# Patient Record
Sex: FEMALE | Race: BLACK OR AFRICAN AMERICAN | Employment: OTHER | URBAN - METROPOLITAN AREA
[De-identification: names, ages, dates, MRNs, and addresses within clinical notes are randomized per-mention and may not be internally consistent; named-entity substitution may affect disease eponyms.]

---

## 2017-01-19 ENCOUNTER — OFFICE VISIT (OUTPATIENT)
Dept: FAMILY MEDICINE CLINIC | Age: 67
End: 2017-01-19

## 2017-01-19 ENCOUNTER — HOSPITAL ENCOUNTER (OUTPATIENT)
Dept: LAB | Age: 67
Discharge: HOME | End: 2017-01-19
Payer: COMMERCIAL

## 2017-01-19 VITALS
HEIGHT: 66 IN | HEART RATE: 87 BPM | RESPIRATION RATE: 16 BRPM | BODY MASS INDEX: 38.09 KG/M2 | WEIGHT: 237 LBS | SYSTOLIC BLOOD PRESSURE: 160 MMHG | OXYGEN SATURATION: 99 % | DIASTOLIC BLOOD PRESSURE: 74 MMHG | TEMPERATURE: 98 F

## 2017-01-19 DIAGNOSIS — K29.70 GASTRITIS WITHOUT BLEEDING, UNSPECIFIED CHRONICITY, UNSPECIFIED GASTRITIS TYPE: ICD-10-CM

## 2017-01-19 DIAGNOSIS — M25.561 CHRONIC PAIN OF BOTH KNEES: ICD-10-CM

## 2017-01-19 DIAGNOSIS — Z13.39 SCREENING FOR ALCOHOLISM: ICD-10-CM

## 2017-01-19 DIAGNOSIS — Z13.5 GLAUCOMA SCREENING: ICD-10-CM

## 2017-01-19 DIAGNOSIS — E78.00 HYPERCHOLESTEREMIA: Chronic | ICD-10-CM

## 2017-01-19 DIAGNOSIS — Z71.89 ADVANCE CARE PLANNING: ICD-10-CM

## 2017-01-19 DIAGNOSIS — Z00.00 MEDICARE ANNUAL WELLNESS VISIT, SUBSEQUENT: Primary | ICD-10-CM

## 2017-01-19 DIAGNOSIS — G40.909 SEIZURE DISORDER (HCC): Chronic | ICD-10-CM

## 2017-01-19 DIAGNOSIS — Z86.73 HISTORY OF CVA (CEREBROVASCULAR ACCIDENT): Chronic | ICD-10-CM

## 2017-01-19 DIAGNOSIS — M25.562 CHRONIC PAIN OF BOTH KNEES: ICD-10-CM

## 2017-01-19 DIAGNOSIS — Z00.00 ROUTINE GENERAL MEDICAL EXAMINATION AT A HEALTH CARE FACILITY: ICD-10-CM

## 2017-01-19 DIAGNOSIS — I10 ESSENTIAL HYPERTENSION: ICD-10-CM

## 2017-01-19 DIAGNOSIS — G89.29 CHRONIC PAIN OF BOTH KNEES: ICD-10-CM

## 2017-01-19 DIAGNOSIS — Z12.11 COLON CANCER SCREENING: ICD-10-CM

## 2017-01-19 DIAGNOSIS — K21.9 GASTROESOPHAGEAL REFLUX DISEASE WITHOUT ESOPHAGITIS: ICD-10-CM

## 2017-01-19 DIAGNOSIS — E11.40 TYPE 2 DIABETES MELLITUS WITH DIABETIC NEUROPATHY, UNSPECIFIED LONG TERM INSULIN USE STATUS: ICD-10-CM

## 2017-01-19 LAB
CHOLEST SERPL-MCNC: 229 MG/DL
EST. AVERAGE GLUCOSE BLD GHB EST-MCNC: 246 MG/DL
HBA1C MFR BLD: 10.2 % (ref 4.2–5.6)
HDLC SERPL-MCNC: 98 MG/DL (ref 40–60)
HDLC SERPL: 2.3 {RATIO} (ref 0–5)
LDLC SERPL CALC-MCNC: 97 MG/DL (ref 0–100)
LIPID PROFILE,FLP: ABNORMAL
TRIGL SERPL-MCNC: 170 MG/DL (ref ?–150)
VALPROATE SERPL-MCNC: 10 UG/ML (ref 50–100)
VLDLC SERPL CALC-MCNC: 34 MG/DL

## 2017-01-19 PROCEDURE — 80061 LIPID PANEL: CPT | Performed by: INTERNAL MEDICINE

## 2017-01-19 PROCEDURE — 83036 HEMOGLOBIN GLYCOSYLATED A1C: CPT | Performed by: INTERNAL MEDICINE

## 2017-01-19 PROCEDURE — 80164 ASSAY DIPROPYLACETIC ACD TOT: CPT | Performed by: INTERNAL MEDICINE

## 2017-01-19 PROCEDURE — 36415 COLL VENOUS BLD VENIPUNCTURE: CPT | Performed by: INTERNAL MEDICINE

## 2017-01-19 RX ORDER — ISOSORBIDE MONONITRATE 30 MG/1
30 TABLET, EXTENDED RELEASE ORAL DAILY
Qty: 90 TAB | Refills: 1 | Status: SHIPPED | OUTPATIENT
Start: 2017-01-19 | End: 2017-03-27 | Stop reason: SDUPTHER

## 2017-01-19 RX ORDER — LOSARTAN POTASSIUM 50 MG/1
50 TABLET ORAL 2 TIMES DAILY
Qty: 180 TAB | Refills: 1 | Status: SHIPPED | OUTPATIENT
Start: 2017-01-19 | End: 2017-03-27 | Stop reason: SDUPTHER

## 2017-01-19 RX ORDER — HYDROCHLOROTHIAZIDE 25 MG/1
25 TABLET ORAL DAILY
Qty: 90 TAB | Refills: 1 | Status: SHIPPED | OUTPATIENT
Start: 2017-01-19 | End: 2017-03-27 | Stop reason: SDUPTHER

## 2017-01-19 RX ORDER — PANTOPRAZOLE SODIUM 40 MG/1
40 TABLET, DELAYED RELEASE ORAL DAILY
Qty: 90 TAB | Refills: 1 | Status: SHIPPED | OUTPATIENT
Start: 2017-01-19 | End: 2017-03-27 | Stop reason: SDUPTHER

## 2017-01-19 RX ORDER — DIVALPROEX SODIUM 500 MG/1
500 TABLET, EXTENDED RELEASE ORAL 2 TIMES DAILY
Qty: 180 TAB | Refills: 3 | Status: SHIPPED | OUTPATIENT
Start: 2017-01-19 | End: 2017-08-10 | Stop reason: SDUPTHER

## 2017-01-19 RX ORDER — ATORVASTATIN CALCIUM 40 MG/1
40 TABLET, FILM COATED ORAL DAILY
Qty: 90 TAB | Refills: 1 | Status: SHIPPED | OUTPATIENT
Start: 2017-01-19 | End: 2017-03-27 | Stop reason: SDUPTHER

## 2017-01-19 RX ORDER — CARVEDILOL 6.25 MG/1
6.25 TABLET ORAL 2 TIMES DAILY
Qty: 60 TAB | Refills: 3 | Status: SHIPPED | OUTPATIENT
Start: 2017-01-19 | End: 2017-03-27 | Stop reason: DRUGHIGH

## 2017-01-19 RX ORDER — INSULIN LISPRO 100 [IU]/ML
INJECTION, SOLUTION INTRAVENOUS; SUBCUTANEOUS
Qty: 2 VIAL | Refills: 5
Start: 2017-01-19 | End: 2017-03-01 | Stop reason: SDUPTHER

## 2017-01-19 RX ORDER — ASPIRIN AND DIPYRIDAMOLE 25; 200 MG/1; MG/1
1 CAPSULE, EXTENDED RELEASE ORAL 2 TIMES DAILY
Qty: 180 CAP | Refills: 1 | Status: SHIPPED | OUTPATIENT
Start: 2017-01-19 | End: 2017-12-04 | Stop reason: SDUPTHER

## 2017-01-19 NOTE — MR AVS SNAPSHOT
Visit Information Date & Time Provider Department Dept. Phone Encounter #  
 1/19/2017  2:15 PM Jacki William, [address] [phone] [id number] Follow-up Instructions Return in 2 months (on 3/19/2017). Upcoming Health Maintenance Date Due  
 EYE EXAM RETINAL OR DILATED Q1 3/3/2016 FOOT EXAM Q1 11/23/2016 HEMOGLOBIN A1C Q6M 4/24/2017 LIPID PANEL Q1 4/29/2017 Pneumococcal 19-64 Highest Risk (3 of 3 - PCV13) 11/10/2017 MICROALBUMIN Q1 1/19/2018 MEDICARE YEARLY EXAM 1/20/2018 PAP AKA CERVICAL CYTOLOGY 10/27/2018 BREAST CANCER SCRN MAMMOGRAM 12/5/2018 COLONOSCOPY 10/27/2025 DTaP/Tdap/Td series (2 - Td) 11/23/2025 Allergies as of 1/19/2017  Review Complete On: 1/19/2017 By: Alice Almaraz LPN Severity Noted Reaction Type Reactions Metformin  10/22/2015    Other (comments) Patient states that she is not allergic to metformin. Current Immunizations  Reviewed on 11/15/2016 Name Date Influenza Vaccine 10/17/2016, 11/23/2015 10:18 AM  
 Influenza Vaccine (Madin Gita Canine Kidney) PF 2/4/2015 10:00 AM  
 Pneumococcal Polysaccharide (PPSV-23) 2/4/2015  9:57 AM  
 Tdap 11/23/2015 10:19 AM  
  
 Not reviewed this visit You Were Diagnosed With   
  
 Codes Comments Medicare annual wellness visit, subsequent    -  Primary ICD-10-CM: Z00.00 ICD-9-CM: V70.0 Essential hypertension     ICD-10-CM: I10 
ICD-9-CM: 401.9 Hypercholesteremia     ICD-10-CM: E78.00 ICD-9-CM: 272.0 Gastritis without bleeding, unspecified chronicity, unspecified gastritis type     ICD-10-CM: K29.70 ICD-9-CM: 535.50 Type 2 diabetes mellitus with diabetic neuropathy, unspecified long term insulin use status (HCC)     ICD-10-CM: E11.40 ICD-9-CM: 250.60, 357.2 Seizure disorder ([facility] [address]     ICD-10-CM: G40.909 ICD-9-CM: 345.90  History of CVA (cerebrovascular accident)     ICD-10-CM: Z86.73 
ICD-9-CM: V12.54   
 Gastroesophageal reflux disease without esophagitis     ICD-10-CM: K21.9 ICD-9-CM: 530.81 Advance care planning     ICD-10-CM: Z71.89 ICD-9-CM: V65.49 Glaucoma screening     ICD-10-CM: Z13.5 ICD-9-CM: V80.1 Routine general medical examination at a health care facility     ICD-10-CM: Z00.00 ICD-9-CM: V70.0 Screening for alcoholism     ICD-10-CM: Z13.89 ICD-9-CM: V79.1 Colon cancer screening     ICD-10-CM: Z12.11 ICD-9-CM: V76.51 Chronic pain of both knees     ICD-10-CM: M25.561, M25.562, G89.29 ICD-9-CM: 719.46, 338.29 Vitals BP Pulse Temp Resp Height(growth percentile) Weight(growth percentile) 160/74 87 98 °F (36.7 °C) (Oral) 16 5' 6\" (1.676 m) 237 lb (107.5 kg) SpO2 BMI OB Status Smoking Status 99% 38.25 kg/m2 Postmenopausal Never Smoker Vitals History BMI and BSA Data Body Mass Index Body Surface Area  
 38.25 kg/m 2 2.24 m 2 Preferred Pharmacy Pharmacy Name [facility] [address], [address] [place] [address] [phone] Your Updated Medication List  
  
   
This list is accurate as of: 1/19/17  2:56 PM.  Always use your most recent med list.  
  
  
  
  
 aspirin-dipyridamole  mg per SR capsule Commonly known as:  AGGRENOX Take 1 Cap by mouth two (2) times a day. atorvastatin 40 mg tablet Commonly known as:  LIPITOR Take 1 Tab by mouth daily. carvedilol 6.25 mg tablet Commonly known as:  May Hush Take 1 Tab by mouth two (2) times a day. clotrimazole 1 % topical cream  
Commonly known as:  Anu Laina Apply  to affected area two (2) times a day. divalproex  mg ER tablet Commonly known as:  DEPAKOTE ER Take 1 Tab by mouth two (2) times a day. glucose blood VI test strips strip Commonly known as:  FREESTYLE LITE STRIPS Monitor blood sugars twice daily  
  
 hydroCHLOROthiazide 25 mg tablet Commonly known as:  HYDRODIURIL  
 Take 1 Tab by mouth daily. insulin detemir 100 unit/mL injection Commonly known as:  LEVEMIR  
0.3 mL by SubCUTAneous route every twelve (12) hours. insulin lispro 100 unit/mL injection Commonly known as:  HUMALOG Take  6 units 3   times daily before each meal.  
  
 isosorbide mononitrate ER 30 mg tablet Commonly known as:  IMDUR Take 1 Tab by mouth daily. letrozole 2.5 mg tablet Commonly known as:  [facility] Take 2.5 mg by mouth daily. levoFLOXacin 750 mg tablet Commonly known as:  Amanda Pih Take 1 Tab by mouth daily. losartan 50 mg tablet Commonly known as:  COZAAR Take 1 Tab by mouth two (2) times a day. pantoprazole 40 mg tablet Commonly known as:  PROTONIX Take 1 Tab by mouth daily. SONAFINE Emul topical  
Generic drug:  emollient combination no. 10  
  
 therapeutic multivitamin tablet Commonly known as:  [facility] Take 1 Tab by mouth daily. VITAMIN D3 1,000 unit tablet Generic drug:  cholecalciferol Take 1,000 Units by mouth daily. Prescriptions Sent to Pharmacy Refills  
 hydroCHLOROthiazide (HYDRODIURIL) 25 mg tablet 1 Sig: Take 1 Tab by mouth daily. Class: Normal  
 Pharmacy: [facility] [address], [address] Ph #: [phone] Route: Oral  
 aspirin-dipyridamole (AGGRENOX)  mg per SR capsule 1 Sig: Take 1 Cap by mouth two (2) times a day. Class: Normal  
 Pharmacy: [facility] [address], [address] Ph #: [phone] Route: Oral  
 atorvastatin (LIPITOR) 40 mg tablet 1 Sig: Take 1 Tab by mouth daily. Class: Normal  
 Pharmacy: [facility] [address], [address] Ph #: [phone] Route: Oral  
 isosorbide mononitrate ER (IMDUR) 30 mg tablet 1 Sig: Take 1 Tab by mouth daily. Class: Normal  
 Pharmacy: [facility] [address], [address] Ph #: [phone]  Route: Oral  
 pantoprazole (PROTONIX) 40 mg tablet 1 Sig: Take 1 Tab by mouth daily. Class: Normal  
 Pharmacy: [address], [address] Ph #: [phone] Route: Oral  
 losartan (COZAAR) 50 mg tablet 1 Sig: Take 1 Tab by mouth two (2) times a day. Class: Normal  
 Pharmacy: [address], [address] Ph #: [phone] Route: Oral  
 insulin detemir (LEVEMIR) 100 unit/mL injection 0 Si.3 mL by SubCUTAneous route every twelve (12) hours. Class: Normal  
 Pharmacy: [address], [address] Ph #: [phone] Route: SubCUTAneous  
 divalproex ER (DEPAKOTE ER) 500 mg ER tablet 3 Sig: Take 1 Tab by mouth two (2) times a day. Class: Normal  
 Pharmacy: [address], [address] Ph #: [phone] Route: Oral  
 carvedilol (COREG) 6.25 mg tablet 3 Sig: Take 1 Tab by mouth two (2) times a day. Class: Normal  
 Pharmacy: [address], [address] Ph #: [phone] Route: Oral  
  
We Performed the Following ADVANCE CARE PLANNING FIRST 30 MINS [35359 CPT(R)] REFERRAL TO GASTROENTEROLOGY [GJU60 Custom] REFERRAL TO OPHTHALMOLOGY [REF57 Custom] Follow-up Instructions Return in 2 months (on 3/19/2017). To-Do List   
 2017 Lab:  HEMOGLOBIN A1C WITH EAG   
  
 2017 Lab:  LIPID PANEL   
  
 2017 Lab:  MICROALBUMIN, UR, RAND W/ MICROALBUMIN/CREA RATIO   
  
 2017 Lab:  VALPROIC ACID   
  
 2017 Imaging:  XR KNEE LT MAX 2 VWS   
  
 2017 Imaging:  XR KNEE RT MAX 2 VWS Referral Information Referral ID Referred By Referred To  
  
 [id number] [facility] Not Available Visits Status Start Date End Date 1 New Request 17  If your referral has a status of pending review or denied, additional information will be sent to support the outcome of this decision. Referral ID Referred By Referred To  
 [id number] [facility], Leslie Christiansen MD  
   [id number] [facility] [address] [place] [address] [facility] Phone: [phone] Fax: [phone] Visits Status Start Date End Date 1 New Request 1/19/17 1/19/18 If your referral has a status of pending review or denied, additional information will be sent to support the outcome of this decision. Patient Instructions Medicare Wellness Visit, Female The best way to live healthy is to have a healthy lifestyle by eating a well-balanced diet, exercising regularly, limiting alcohol and stopping smoking. Regular physical exams and screening tests are another way to keep healthy. Preventive exams provided by your health care provider can find health problems before they become diseases or illnesses. Preventive services including immunizations, screening tests, monitoring and exams can help you take care of your own health. All people over age 72 should have a pneumovax  and and a prevnar shot to prevent pneumonia. These are once in a lifetime unless you and your provider decide differently. All people over 65 should have a yearly flu shot and a tetanus vaccine every 10 years. A bone mass density to screen for osteoporosis or thinning of the bones should be done every 2 years after 65. Screening for diabetes mellitus with a blood sugar test should be done every year. Glaucoma is a disease of the eye due to increased ocular pressure that can lead to blindness and it should be done every year by an eye professional. 
 
Cardiovascular screening tests that check for elevated lipids (fatty part of blood) which can lead to heart disease and strokes should be done every 5 years.  
 
Colorectal screening that evaluates for blood or polyps in your colon should be done yearly as a stool test or every five years as a flexible sigmoidoscope or every 10 years as a colonoscopy up to age 76. Breast cancer screening with a mammogram is recommended biennially  for women age 54-69. Screening for cervical cancer with a pap smear and pelvic exam is recommended for women after age 72 years every 2 years up to age 79 or when the provider and patient decide to stop. If there is a history of cervical abnormalities or other increased risk for cancer then the test is recommended yearly. Hepatitis C screening is also recommended for anyone born between [birth year] through [address]. A shingles vaccine is also recommended once in a lifetime after age 61. Your Medicare Wellness Exam is recommended annually. Here is a list of your current Health Maintenance items with a due date: 
Health Maintenance Due Topic Date Due [facility] Eye Exam  03/03/2016 [facility] Diabetic Foot Care  11/23/2016 Introducing [facility] SERVICES! [facility] introduces [employer] patient portal. Now you can access parts of your medical record, email your doctor's office, and request medication refills online. 1. In your internet browser, go to [email or web address] 2. Click on the First Time User? Click Here link in the Sign In box. You will see the New Member Sign Up page. 3. Enter your [employer] Access Code exactly as it appears below. You will not need to use this code after youve completed the sign-up process. If you do not sign up before the expiration date, you must request a new code. · [employer] Access Code: [facility] Expires: 4/19/2017  2:56 PM 
 
4. Enter the last four digits of your Social Security Number (xxxx) and Date of Birth (mm/dd/yyyy) as indicated and click Submit. You will be taken to the next sign-up page. 5. Create a [employer] ID. This will be your [employer] login ID and cannot be changed, so think of one that is secure and easy to remember. 6. Create a [employer] password. You can change your password at any time. 7. Enter your Password Reset Question and Answer. This can be used at a later time if you forget your password. 8. Enter your e-mail address. You will receive e-mail notification when new information is available in [address] 9. Click Sign Up. You can now view and download portions of your medical record. 10. Click the Download Summary menu link to download a portable copy of your medical information. If you have questions, please visit the Frequently Asked Questions section of the [employer] website. Remember, [employer] is NOT to be used for urgent needs. For medical emergencies, dial 911. Now available from your iPhone and Android! Please provide this summary of care documentation to your next provider. Your primary care clinician is listed as Renetta Thacker. If you have any questions after today's visit, please call [phone].

## 2017-01-19 NOTE — ACP (ADVANCE CARE PLANNING)
Advance Care Planning (ACP) Provider Note - Comprehensive     Date of ACP Conversation: 01/19/17  Persons included in Conversation:  patient and family  Length of ACP Conversation in minutes:  16 minutes    Authorized Decision Maker (if patient is incapable of making informed decisions): This person is:            General ACP for ALL Patients with Decision Making Capacity:   Importance of advance care planning, including choosing a healthcare agent to communicate patient's healthcare decisions if patient lost the ability to make decisions, such as after a sudden illness or accident  Understanding of the healthcare agent role was assessed and information provided    Review of Existing Advance Directive:  none exists    For Serious or Chronic Illness:  Understanding of medical condition    Understanding of CPR, goals and expected outcomes, benefits and burdens discussed.     Interventions Provided:  Recommended completion of Advance Directive form after review of ACP materials and conversation with prospective healthcare agent

## 2017-01-19 NOTE — PROGRESS NOTES
This is a Subsequent Medicare Annual Wellness Visit providing Personalized Prevention Plan Services (PPPS) (Performed 12 months after initial AWV and PPPS )    I have reviewed the patient's medical history in detail and updated the computerized patient record. History     Past Medical History   Diagnosis Date    Arthritis     Breast CA ([facility] [address]     Diabetes ([facility] [address]     Hypercholesteremia     Hypertension     Menopause     Psychiatric disorder      DEPRESSION    PUD (peptic ulcer disease)     S/P cardiac cath 02/2015     No angiographic evidence of CAD    Seizures (HCC)      LAST SEIZURE ABOUT 2 MONTHS AGO (NOV 2015)    Stroke ([facility] [address]      x2 with left side deficit      Past Surgical History   Procedure Laterality Date    Hx heent       TONSILLECTOMY    Hx breast lumpectomy Right 1/12/2016     Dr. Carito Albright Partial Mastectomy w./ axillary lymphadenectomy    Hx mastectomy Right 1/26/2016     Dr. Carito Albright Repeat Partial Mastectomy for positive margins    Hx orthopaedic       left arm surg    Ir mediport       Current Outpatient Prescriptions   Medication Sig Dispense Refill    hydroCHLOROthiazide (HYDRODIURIL) 25 mg tablet Take 1 Tab by mouth daily. 90 Tab 1    aspirin-dipyridamole (AGGRENOX)  mg per SR capsule Take 1 Cap by mouth two (2) times a day. 180 Cap 1    atorvastatin (LIPITOR) 40 mg tablet Take 1 Tab by mouth daily. 90 Tab 1    isosorbide mononitrate ER (IMDUR) 30 mg tablet Take 1 Tab by mouth daily. 90 Tab 1    pantoprazole (PROTONIX) 40 mg tablet Take 1 Tab by mouth daily. 90 Tab 1    losartan (COZAAR) 50 mg tablet Take 1 Tab by mouth two (2) times a day. 180 Tab 1    insulin detemir (LEVEMIR) 100 unit/mL injection 0.3 mL by SubCUTAneous route every twelve (12) hours. 1 Vial 0    insulin lispro (HUMALOG) 100 unit/mL injection Take  6 units 3   times daily before each meal. 2 Vial 5    divalproex ER (DEPAKOTE ER) 500 mg ER tablet Take 1 Tab by mouth two (2) times a day. 180 Tab 3    carvedilol (COREG) 6.25 mg tablet Take 1 Tab by mouth two (2) times a day. 60 Tab 3    letrozole (FEMARA) 2.5 mg tablet Take 2.5 mg by mouth daily.  cholecalciferol (VITAMIN D3) 1,000 unit tablet Take 1,000 Units by mouth daily.  glucose blood VI test strips (FREESTYLE LITE STRIPS) strip Monitor blood sugars twice daily 100 Strip 2    therapeutic multivitamin (THERAGRAN) tablet Take 1 Tab by mouth daily.  clotrimazole (LOTRIMIN) 1 % topical cream Apply  to affected area two (2) times a day. 45 g 3    SONAFINE emul topical       levoFLOXacin (LEVAQUIN) 750 mg tablet Take 1 Tab by mouth daily. (Patient not taking: Reported on 11/18/2016) 6 Tab 0     Allergies   Allergen Reactions    Metformin Other (comments)     Patient states that she is not allergic to metformin.       Family History   Problem Relation Age of Onset    Cancer Father [de-identified]     colon    Hypertension Mother     Heart Disease Mother     Diabetes Mother      Social History   Substance Use Topics    Smoking status: Never Smoker    Smokeless tobacco: Never Used    Alcohol use No     Patient Active Problem List   Diagnosis Code    Diabetes mellitus with neuropathy (HCC) E11.40    HTN (hypertension) I10    Chest pain R07.9    Seizure disorder ([address] G40.909    Hypercholesteremia E78.00    History of CVA (cerebrovascular accident) Z80.78    Hearing impairment H91.90    Gastroesophageal reflux disease without esophagitis K21.9    Breast cancer ([facility])-right inferior, 2.2cm,2/18 nodes, + - -, C50.919    DM type 2, not at goal [facility]) E11.9    Altered mental status R41.82    SIRS (systemic inflammatory response syndrome) ([facility]) R65.10    Type 2 diabetes mellitus with diabetic neuropathy, with long-term current use of insulin (HCC) E11.40, Z79.4    Pulmonary nodule, right R91.1    History of breast cancer Z85.3       Depression Risk Factor Screening:     PHQ 2 / 9, over the last two weeks 1/7/2016   Little interest or pleasure in doing things Not at all   Feeling down, depressed or hopeless Not at all   Total Score PHQ 2 0     Alcohol Risk Factor Screening: On any occasion during the past 3 months, have you had more than 3 drinks containing alcohol? No    Do you average more than 7 drinks per week? No      Functional Ability and Level of Safety:     Hearing Loss   none    Activities of Daily Living   Partial assistance. Requires assistance with: ambulation and no ADLs    Fall Risk     Fall Risk Assessment, last 12 mths 1/12/2016   Able to walk? Yes   Fall in past 12 months? No     Abuse Screen   Patient is not abused    Review of Systems   A comprehensive review of systems was negative except for: Musculoskeletal: positive for arthralgias    Physical Examination     Evaluation of Cognitive Function:  Mood/affect:  neutral  Appearance: age appropriate  Family member/caregiver input:     Visit Vitals    /74    Pulse 87    Temp 98 °F (36.7 °C) (Oral)    Resp 16    Ht 5' 6\" (1.676 m)    Wt 237 lb (107.5 kg)    SpO2 99%    BMI 38.25 kg/m2     General:  Alert, cooperative, no distress, appears stated age. Head:  Normocephalic, without obvious abnormality, atraumatic. Eyes:  Conjunctivae/corneas clear. PERRL, EOMs intact. Fundi benign. Ears:  Normal TMs and external ear canals both ears. Nose: Nares normal. Septum midline. Mucosa normal. No drainage or sinus tenderness. Throat: Lips, mucosa, and tongue normal. Teeth and gums normal.   Neck: Supple, symmetrical, trachea midline, no adenopathy, thyroid: no enlargement/tenderness/nodules, no carotid bruit and no JVD. Back:   Symmetric, no curvature. ROM normal. No CVA tenderness. Lungs:   Clear to auscultation bilaterally. Chest wall:  No tenderness or deformity. Heart:  Regular rate and rhythm, S1, S2 normal, no murmur, click, rub or gallop. Abdomen:   Soft, non-tender. Bowel sounds normal. No masses,  No organomegaly. Extremities: Extremities normal, atraumatic, no cyanosis or edema. Pulses: 2+ and symmetric all extremities. Skin: Skin color, texture, turgor normal. No rashes or lesions. Lymph nodes: Cervical, supraclavicular, and axillary nodes normal.   Neurologic: CNII-XII intact. Normal strength, sensation and reflexes throughout. ,uses a cane to walk, slight limp  DM foot exam - sensations intact, hallux deformity, DP , PT weak bilateral       Patient Care Team:  Bill Mclain MD as PCP - General (Internal Medicine)  Abel Vega NP (Nurse Practitioner)  Barb Alcantar MD (General Surgery)  Willis Dose. Nida Cho RN as Nurse Navigator (Oncology)  Kenton Lau MD (Pulmonary Disease)    Advice/Referrals/Counseling   Education and counseling provided:  Are appropriate based on today's review and evaluation    Assessment/Plan   current treatment plan is effective, no change in therapy. Chronic condition    DM - will order labs    Knee pain - order Xrays.

## 2017-01-19 NOTE — PATIENT INSTRUCTIONS

## 2017-01-31 ENCOUNTER — OFFICE VISIT (OUTPATIENT)
Dept: SURGERY | Age: 67
End: 2017-01-31

## 2017-01-31 VITALS
HEART RATE: 85 BPM | OXYGEN SATURATION: 99 % | TEMPERATURE: 97.1 F | DIASTOLIC BLOOD PRESSURE: 83 MMHG | RESPIRATION RATE: 20 BRPM | HEIGHT: 66 IN | BODY MASS INDEX: 38.57 KG/M2 | SYSTOLIC BLOOD PRESSURE: 186 MMHG | WEIGHT: 240 LBS

## 2017-01-31 DIAGNOSIS — Z86.73 HISTORY OF CVA (CEREBROVASCULAR ACCIDENT): Chronic | ICD-10-CM

## 2017-01-31 DIAGNOSIS — I10 ESSENTIAL HYPERTENSION: Chronic | ICD-10-CM

## 2017-01-31 DIAGNOSIS — M79.621 AXILLARY PAIN, RIGHT: ICD-10-CM

## 2017-01-31 DIAGNOSIS — C50.911 MALIGNANT NEOPLASM OF RIGHT FEMALE BREAST, UNSPECIFIED SITE OF BREAST: Primary | ICD-10-CM

## 2017-01-31 DIAGNOSIS — E11.40 TYPE 2 DIABETES MELLITUS WITH DIABETIC NEUROPATHY, WITH LONG-TERM CURRENT USE OF INSULIN (HCC): Chronic | ICD-10-CM

## 2017-01-31 DIAGNOSIS — G40.909 SEIZURE DISORDER (HCC): Chronic | ICD-10-CM

## 2017-01-31 DIAGNOSIS — Z79.4 TYPE 2 DIABETES MELLITUS WITH DIABETIC NEUROPATHY, WITH LONG-TERM CURRENT USE OF INSULIN (HCC): Chronic | ICD-10-CM

## 2017-01-31 NOTE — MR AVS SNAPSHOT
Visit Information Date & Time Provider Department Dept. Phone Encounter #  
 1/31/2017  2:30 PM Johan Hale MD [address] [phone] [id number] Follow-up Instructions Return in about 4 months (around 5/31/2017). Your Appointments 3/20/2017  2:30 PM  
Follow Up with Leslye Granados MD  
[facility] [address] [place] [address]) Appt Note: Return in 2 months (on 3/19/2017). [address] [place] [address] [address]  
[phone]  
  
   
 [address] [place] [facility] 1334  
  
    
 5/30/2017  1:00 PM  
Follow Up with Johan Hale MD  
[address]) Appt Note: 4 month follow up  
 [address] [facility] 405 [facility] 83 [address]  
  
   
 [address] [place] [facility] 88 [address] [place] [address] Upcoming Health Maintenance Date Due  
 EYE EXAM RETINAL OR DILATED Q1 3/3/2016 FOOT EXAM Q1 11/23/2016 HEMOGLOBIN A1C Q6M 7/19/2017 Pneumococcal 19-64 Highest Risk (3 of 3 - PCV13) 11/10/2017 MICROALBUMIN Q1 1/19/2018 LIPID PANEL Q1 1/19/2018 MEDICARE YEARLY EXAM 1/20/2018 PAP AKA CERVICAL CYTOLOGY 10/27/2018 BREAST CANCER SCRN MAMMOGRAM 12/5/2018 COLONOSCOPY 10/27/2025 DTaP/Tdap/Td series (2 - Td) 11/23/2025 Allergies as of 1/31/2017  Review Complete On: 1/31/2017 By: Johan Hale MD  
  
 Severity Noted Reaction Type Reactions Metformin  10/22/2015    Other (comments) Patient states that she is not allergic to metformin. Current Immunizations  Reviewed on 11/15/2016 Name Date Influenza Vaccine 10/17/2016, 11/23/2015 10:18 AM  
 Influenza Vaccine (Madin Gita Canine Kidney) PF 2/4/2015 10:00 AM  
 Pneumococcal Polysaccharide (PPSV-23) 2/4/2015  9:57 AM  
 Tdap 11/23/2015 10:19 AM  
  
 Not reviewed this visit You Were Diagnosed With   
  
 Codes Comments Malignant neoplasm of right female breast, unspecified site of breast ([facility].)    -  Primary ICD-10-CM: C50.911 ICD-9-CM: 174.9 Type 2 diabetes mellitus with diabetic neuropathy, with long-term current use of insulin (HCC)     ICD-10-CM: E11.40, Z79.4 ICD-9-CM: 250.60, 357.2, V58.67 Essential hypertension     ICD-10-CM: I10 
ICD-9-CM: 401.9 Seizure disorder ([facility].)     ICD-10-CM: G40.909 ICD-9-CM: 345.90 History of CVA (cerebrovascular accident)     ICD-10-CM: Z86.73 
ICD-9-CM: V12.54 Vitals BP Pulse Temp Resp Height(growth percentile) Weight(growth percentile) 186/83 (BP 1 Location: Left arm, BP Patient Position: Sitting) 85 97.1 °F (36.2 °C) (Oral) 20 5' 6\" (1.676 m) 240 lb (108.9 kg) SpO2 BMI OB Status Smoking Status 99% 38.74 kg/m2 Postmenopausal Never Smoker BMI and BSA Data Body Mass Index Body Surface Area 38.74 kg/m 2 2.25 m 2 Preferred Pharmacy Pharmacy Name [facility] [address], [address] [place], [place] [address]erna [phone] Your Updated Medication List  
  
   
This list is accurate as of: 1/31/17  3:01 PM.  Always use your most recent med list.  
  
  
  
  
 aspirin-dipyridamole  mg per SR capsule Commonly known as:  AGGRENOX Take 1 Cap by mouth two (2) times a day. atorvastatin 40 mg tablet Commonly known as:  LIPITOR Take 1 Tab by mouth daily. carvedilol 6.25 mg tablet Commonly known as:  Margaret Dome Take 1 Tab by mouth two (2) times a day. clotrimazole 1 % topical cream  
Commonly known as:  [place] Star Apply  to affected area two (2) times a day. divalproex  mg ER tablet Commonly known as:  DEPAKOTE ER Take 1 Tab by mouth two (2) times a day. glucose blood VI test strips strip Commonly known as:  FREESTYLE LITE STRIPS Monitor blood sugars twice daily  
  
 hydroCHLOROthiazide 25 mg tablet Commonly known as:  HYDRODIURIL Take 1 Tab by mouth daily. insulin detemir 100 unit/mL injection Commonly known as:  LEVEMIR  
0.3 mL by SubCUTAneous route every twelve (12) hours. insulin lispro 100 unit/mL injection Commonly known as:  HUMALOG Take  6 units 3   times daily before each meal.  
  
 isosorbide mononitrate ER 30 mg tablet Commonly known as:  IMDUR Take 1 Tab by mouth daily. letrozole 2.5 mg tablet Commonly known as:  [facility] Take 2.5 mg by mouth daily. levoFLOXacin 750 mg tablet Commonly known as:  [place] Claude Take 1 Tab by mouth daily. losartan 50 mg tablet Commonly known as:  COZAAR Take 1 Tab by mouth two (2) times a day. pantoprazole 40 mg tablet Commonly known as:  PROTONIX Take 1 Tab by mouth daily. SONAFINE Emul topical  
Generic drug:  emollient combination no. 10  
  
 therapeutic multivitamin tablet Commonly known as:  [facility] Take 1 Tab by mouth daily. VITAMIN D3 1,000 unit tablet Generic drug:  cholecalciferol Take 1,000 Units by mouth daily. Follow-up Instructions Return in about 4 months (around 5/31/2017). Patient Instructions If you have any questions or concerns about today's appointment, the verbal and/or written instructions you were given for follow up care, please call our office at [phone]. [facility] - [address], [address] 
 
[phone] office [phone] at [address] [place] [address] located at [address] (y) [phone] Please contact Dr. Keisha Garcia surgery scheduler, Deon Rodríguez, at [phone] with any questions regarding the appointment for physical therapy Introducing [facility] SERVICES! [facility] introduces [email or web address] patient portal. Now you can access parts of your medical record, email your doctor's office, and request medication refills online.    
 
1. In your internet browser, go to [email or web address] 2. Click on the First Time User? Click Here link in the Sign In box. You will see the New Member Sign Up page. 3. Enter your [employer] Access Code exactly as it appears below. You will not need to use this code after youve completed the sign-up process. If you do not sign up before the expiration date, you must request a new code. · [employer] Access Code: [facility] Expires: 4/19/2017  2:56 PM 
 
4. Enter the last four digits of your Social Security Number (xxxx) and Date of Birth (mm/dd/yyyy) as indicated and click Submit. You will be taken to the next sign-up page. 5. Create a VOIP [employer] ID. This will be your [employer] login ID and cannot be changed, so think of one that is secure and easy to remember. 6. Create a Delphi password. You can change your password at any time. 7. Enter your Password Reset Question and Answer. This can be used at a later time if you forget your password. 8. Enter your e-mail address. You will receive e-mail notification when new information is available in [address] 9. Click Sign Up. You can now view and download portions of your medical record. 10. Click the Download Summary menu link to download a portable copy of your medical information. If you have questions, please visit the Frequently Asked Questions section of the [employer] website. Remember, [employer] is NOT to be used for urgent needs. For medical emergencies, dial 911. Now available from your iPhone and Android! Please provide this summary of care documentation to your next provider. Your primary care clinician is listed as Maribeth Harris. If you have any questions after today's visit, please call [phone].

## 2017-01-31 NOTE — PATIENT INSTRUCTIONS
If you have any questions or concerns about today's appointment, the verbal and/or written instructions you were given for follow up care, please call our office at [phone].     [facility] - [address]    [phone] office  [phone]      Physical Therapy at [facility] located at [address] ) [phone]    Please contact Dr. Amaury Andres surgery scheduler, Samantha Rivera, at [phone] with any questions regarding the appointment for physical therapy

## 2017-01-31 NOTE — PROGRESS NOTES
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 She had 2/18 lymph nodes that were positive. The tumor was estrogen receptor positive progesterone receptor and HER-2 negative. The patient did have a positive margin at the time of the initial surgery and have it reexcised. She saw Dr. Edmundo Dalton postoperatively and underwent a brief course of chemotherapy but had problems and it was stopped. Subtotal she had radiation therapy to the right breast and now is on Femara. The patient's last mammogram in December was negative. The patient denies any new breast lumps or masses or soreness. She denies any edema in the right arm. She denies any other new aches, pains, shortness of breath or headaches. She does complain of pain in the right axilla especially when she uses the arm reaching forward or lifting up. At other times it is minimal.    Allergies   Allergen Reactions    Metformin Other (comments)     Patient states that she is not allergic to metformin. Current Outpatient Prescriptions   Medication Sig Dispense Refill    hydroCHLOROthiazide (HYDRODIURIL) 25 mg tablet Take 1 Tab by mouth daily. 90 Tab 1    aspirin-dipyridamole (AGGRENOX)  mg per SR capsule Take 1 Cap by mouth two (2) times a day. 180 Cap 1    atorvastatin (LIPITOR) 40 mg tablet Take 1 Tab by mouth daily. 90 Tab 1    isosorbide mononitrate ER (IMDUR) 30 mg tablet Take 1 Tab by mouth daily. 90 Tab 1    pantoprazole (PROTONIX) 40 mg tablet Take 1 Tab by mouth daily. 90 Tab 1    insulin detemir (LEVEMIR) 100 unit/mL injection 0.3 mL by SubCUTAneous route every twelve (12) hours.  1 Vial 0    insulin lispro (HUMALOG) 100 unit/mL injection Take  6 units 3   times daily before each meal. 2 Vial 5    divalproex ER (DEPAKOTE ER) 500 mg ER tablet Take 1 Tab by mouth two (2) times a day. 180 Tab 3    carvedilol (COREG) 6.25 mg tablet Take 1 Tab by mouth two (2) times a day. 60 Tab 3    letrozole (FEMARA) 2.5 mg tablet Take 2.5 mg by mouth daily.  cholecalciferol (VITAMIN D3) 1,000 unit tablet Take 1,000 Units by mouth daily.  glucose blood VI test strips (FREESTYLE LITE STRIPS) strip Monitor blood sugars twice daily 100 Strip 2    therapeutic multivitamin (THERAGRAN) tablet Take 1 Tab by mouth daily.  losartan (COZAAR) 50 mg tablet Take 1 Tab by mouth two (2) times a day. 180 Tab 1    SONAFINE emul topical       levoFLOXacin (LEVAQUIN) 750 mg tablet Take 1 Tab by mouth daily. (Patient not taking: Reported on 11/18/2016) 6 Tab 0    clotrimazole (LOTRIMIN) 1 % topical cream Apply  to affected area two (2) times a day. 45 g 3       Past Medical History   Diagnosis Date    Arthritis     Breast CA ([address]     Diabetes ([address]     Hypercholesteremia     Hypertension     Menopause     Psychiatric disorder      DEPRESSION    PUD (peptic ulcer disease)     S/P cardiac cath 02/2015     No angiographic evidence of CAD    Seizures (HCC)      LAST SEIZURE ABOUT 2 MONTHS AGO (NOV 2015)    Stroke ([address]      x2 with left side deficit       Past Surgical History   Procedure Laterality Date    Hx heent       TONSILLECTOMY    Hx breast lumpectomy Right 1/12/2016     Dr. Doshi Necessary Partial Mastectomy w./ axillary lymphadenectomy    Hx mastectomy Right 1/26/2016     Dr. Doshi Necessary Repeat Partial Mastectomy for positive margins    Hx orthopaedic       left arm surg    Ir mediport          Family History   Problem Relation Age of Onset    Cancer Father [de-identified]     colon    Hypertension Mother     Heart Disease Mother     Diabetes Mother        Social History     Social History    Marital status: SINGLE     Spouse name: N/A    Number of children: N/A    Years of education: N/A     Occupational History    Not on file.      Social History Main Topics    Smoking status: Never Smoker    Smokeless tobacco: Never Used    Alcohol use No    Drug use: No    Sexual activity: Yes     Partners: Male     Other Topics Concern     Service No    Blood Transfusions No    Caffeine Concern No    Occupational Exposure No    Hobby Hazards No    Sleep Concern Yes    Stress Concern No    Exercise Yes     walks using cane    Seat Belt Yes    Self-Exams Yes     Social History Narrative      Patient's review of systems has not changed. She continues to be on medication for diabetes and hypertension and history of stroke. She denies any cardiac, respiratory, gastrointestinal or genitourinary problems. PHYSICAL EXAM    Visit Vitals    /83 (BP 1 Location: Left arm, BP Patient Position: Sitting)    Pulse 85    Temp 97.1 °F (36.2 °C) (Oral)    Resp 20    Ht 5' 6\" (1.676 m)    Wt 108.9 kg (240 lb)    SpO2 99%    BMI 38.74 kg/m2       Constitutional:  Well-developed, well-nourished, no acute distress. Head:  Head, eyes, ears, nose, throat within normal limits. Skin:  No suspicious moles or rashes. Neck:  No masses or adenopathy. The airway appears normal. Thyroid is not enlarged and there are no palpable thyroid nodules. Lungs:  Lungs are clear to auscultation and percussion. No respiratory distress. No chest wall tenderness. Heart:  Heart is regular with no extra heart sounds or murmur heard. Breast Exam: The breasts are free of any discrete tenderness or masses. The skin and nipple areas appear normal. Both axillae are negative. She does complain of some tenderness on exam in her right axilla but I do not feel any masses. All of the incisions in the right breast are healed well without any evidence of recurrent masses. She does have some brown discoloration from radiation and some edema of the skin from the radiation. Abdomen: The abdomen is soft and nontender without organomegaly or masses. Bowel sounds are active and of normal  pitch.  There is no abdominal distention. No hernias are evident. Extremities:  No tenderness of the extremities and no significant swelling. Psych:  Alert and oriented. ASSESSMENT:Invasive ductal 2.2 cm right breast cancer treated with partial mastectomy in July 2016. 2 lymph nodes were positive out of 18 the tumor was estrogen receptor positive she received some partial chemotherapy from Dr. Leopoldo Hodgkins is now on Femara. #2 pain in the right axilla at times. #3 diabetes. #4 hypertension. RECOMMENDATIONS:We will plan to get some physical therapy on the patient for her right arm/axilla to see if that will improve or tenderness. Otherwise she is due for a mammogram next December. I will see her in 4 months. oBb Jessica MD  Please note: This document has been produced using voice recognition software. Unrecognized errors in transcription may be present.

## 2017-01-31 NOTE — PROGRESS NOTES
Patient is a 61 y.o. female who presents for a follow up breast exam for invasive ductal carcinoma of the right breast with partial mastectomy done on 01/12/16. Patient relays she has been experiencing right axillary pain since the time of her surgery, which she scores as a 9/10 today. 1. Have you been to the ER, urgent care clinic since your last visit? Hospitalized since your last visit? No    2. Have you seen or consulted any other health care providers outside of the [address] [place] [address] since your last visit? Include any pap smears or colon screening.  No

## 2017-02-13 ENCOUNTER — APPOINTMENT (OUTPATIENT)
Dept: PHYSICAL THERAPY | Age: 67
End: 2017-02-13

## 2017-03-01 DIAGNOSIS — E11.40 TYPE 2 DIABETES MELLITUS WITH DIABETIC NEUROPATHY, UNSPECIFIED LONG TERM INSULIN USE STATUS: ICD-10-CM

## 2017-03-01 RX ORDER — INSULIN LISPRO 100 [IU]/ML
INJECTION, SOLUTION INTRAVENOUS; SUBCUTANEOUS
Qty: 2 VIAL | Refills: 5
Start: 2017-03-01 | End: 2017-03-08 | Stop reason: SDUPTHER

## 2017-03-01 NOTE — TELEPHONE ENCOUNTER
Requested Prescriptions     Pending Prescriptions Disp Refills    insulin lispro (HUMALOG) 100 unit/mL injection 2 Vial 5     Sig: Take  6 units 3   times daily before each meal.    insulin detemir (LEVEMIR) 100 unit/mL injection 1 Vial 0     Si Units by SubCUTAneous route every twelve (12) hours.      Last office visit: 2017  Next office visit: 3/20/2017

## 2017-03-01 NOTE — TELEPHONE ENCOUNTER
Patient called in stating she needs a reorder of the following medication   Requested Prescriptions     Pending Prescriptions Disp Refills    insulin lispro (HUMALOG) 100 unit/mL injection 2 Vial 5     Sig: Take  6 units 3   times daily before each meal.    insulin detemir (LEVEMIR) 100 unit/mL injection 1 Vial 0     Si Units by SubCUTAneous route every twelve (12) hours.

## 2017-03-03 ENCOUNTER — TELEPHONE (OUTPATIENT)
Dept: FAMILY MEDICINE CLINIC | Age: 67
End: 2017-03-03

## 2017-03-08 DIAGNOSIS — E11.40 TYPE 2 DIABETES MELLITUS WITH DIABETIC NEUROPATHY, UNSPECIFIED LONG TERM INSULIN USE STATUS: ICD-10-CM

## 2017-03-08 RX ORDER — INSULIN LISPRO 100 [IU]/ML
INJECTION, SOLUTION INTRAVENOUS; SUBCUTANEOUS
Qty: 2 VIAL | Refills: 5 | Status: SHIPPED | OUTPATIENT
Start: 2017-03-08 | End: 2017-03-27 | Stop reason: SDUPTHER

## 2017-03-26 ENCOUNTER — HOSPITAL ENCOUNTER (EMERGENCY)
Age: 67
Discharge: HOME | End: 2017-03-27
Attending: EMERGENCY MEDICINE
Payer: COMMERCIAL

## 2017-03-26 DIAGNOSIS — G40.909 RECURRENT SEIZURES (HCC): Primary | ICD-10-CM

## 2017-03-26 DIAGNOSIS — Z78.9 DIFFICULT INTRAVENOUS ACCESS: ICD-10-CM

## 2017-03-26 LAB
ALBUMIN SERPL BCP-MCNC: 3 G/DL (ref 3.4–5)
ALBUMIN/GLOB SERPL: 0.6 {RATIO} (ref 0.8–1.7)
ALP SERPL-CCNC: 88 U/L (ref 45–117)
ALT SERPL-CCNC: 17 U/L (ref 13–56)
ANION GAP BLD CALC-SCNC: 7 MMOL/L (ref 3–18)
AST SERPL W P-5'-P-CCNC: 17 U/L (ref 15–37)
BASOPHILS # BLD AUTO: 0 K/UL (ref 0–0.06)
BASOPHILS # BLD: 0 % (ref 0–2)
BILIRUB SERPL-MCNC: 0.4 MG/DL (ref 0.2–1)
BUN SERPL-MCNC: 20 MG/DL (ref 7–18)
BUN/CREAT SERPL: 20 (ref 12–20)
CALCIUM SERPL-MCNC: 9.2 MG/DL (ref 8.5–10.1)
CHLORIDE SERPL-SCNC: 102 MMOL/L (ref 100–108)
CO2 SERPL-SCNC: 29 MMOL/L (ref 21–32)
CREAT SERPL-MCNC: 1.02 MG/DL (ref 0.6–1.3)
DIFFERENTIAL METHOD BLD: ABNORMAL
EOSINOPHIL # BLD: 0.1 K/UL (ref 0–0.4)
EOSINOPHIL NFR BLD: 2 % (ref 0–5)
ERYTHROCYTE [DISTWIDTH] IN BLOOD BY AUTOMATED COUNT: 13.4 % (ref 11.6–14.5)
GLOBULIN SER CALC-MCNC: 4.7 G/DL (ref 2–4)
GLUCOSE SERPL-MCNC: 350 MG/DL (ref 74–99)
HCT VFR BLD AUTO: 30.7 % (ref 35–45)
HGB BLD-MCNC: 9.9 G/DL (ref 12–16)
LYMPHOCYTES # BLD AUTO: 28 % (ref 21–52)
LYMPHOCYTES # BLD: 1.7 K/UL (ref 0.9–3.6)
MCH RBC QN AUTO: 26.7 PG (ref 24–34)
MCHC RBC AUTO-ENTMCNC: 32.2 G/DL (ref 31–37)
MCV RBC AUTO: 82.7 FL (ref 74–97)
MONOCYTES # BLD: 0.6 K/UL (ref 0.05–1.2)
MONOCYTES NFR BLD AUTO: 10 % (ref 3–10)
NEUTS SEG # BLD: 3.5 K/UL (ref 1.8–8)
NEUTS SEG NFR BLD AUTO: 60 % (ref 40–73)
PLATELET # BLD AUTO: 184 K/UL (ref 135–420)
PMV BLD AUTO: 10.8 FL (ref 9.2–11.8)
POTASSIUM SERPL-SCNC: 3.7 MMOL/L (ref 3.5–5.5)
PROT SERPL-MCNC: 7.7 G/DL (ref 6.4–8.2)
RBC # BLD AUTO: 3.71 M/UL (ref 4.2–5.3)
SODIUM SERPL-SCNC: 138 MMOL/L (ref 136–145)
WBC # BLD AUTO: 5.8 K/UL (ref 4.6–13.2)

## 2017-03-26 PROCEDURE — 93005 ELECTROCARDIOGRAM TRACING: CPT

## 2017-03-26 PROCEDURE — 74011250636 HC RX REV CODE- 250/636: Performed by: EMERGENCY MEDICINE

## 2017-03-26 PROCEDURE — 99284 EMERGENCY DEPT VISIT MOD MDM: CPT

## 2017-03-26 PROCEDURE — 85025 COMPLETE CBC W/AUTO DIFF WBC: CPT | Performed by: EMERGENCY MEDICINE

## 2017-03-26 PROCEDURE — 96365 THER/PROPH/DIAG IV INF INIT: CPT

## 2017-03-26 PROCEDURE — 74011000250 HC RX REV CODE- 250: Performed by: EMERGENCY MEDICINE

## 2017-03-26 PROCEDURE — 96375 TX/PRO/DX INJ NEW DRUG ADDON: CPT

## 2017-03-26 PROCEDURE — 74011000258 HC RX REV CODE- 258: Performed by: EMERGENCY MEDICINE

## 2017-03-26 PROCEDURE — 80053 COMPREHEN METABOLIC PANEL: CPT | Performed by: EMERGENCY MEDICINE

## 2017-03-26 RX ORDER — LORAZEPAM 2 MG/ML
0.52 INJECTION INTRAMUSCULAR
Status: DISCONTINUED | OUTPATIENT
Start: 2017-03-26 | End: 2017-03-26

## 2017-03-26 RX ORDER — LORAZEPAM 2 MG/ML
2 INJECTION INTRAMUSCULAR
Status: COMPLETED | OUTPATIENT
Start: 2017-03-26 | End: 2017-03-26

## 2017-03-26 RX ORDER — LEVETIRACETAM 15 MG/ML
1500 INJECTION INTRAVASCULAR
Status: DISCONTINUED | OUTPATIENT
Start: 2017-03-26 | End: 2017-03-26

## 2017-03-26 RX ADMIN — LORAZEPAM 2 MG: 2 INJECTION, SOLUTION INTRAMUSCULAR; INTRAVENOUS at 20:16

## 2017-03-26 RX ADMIN — SODIUM CHLORIDE 500 MG: 900 INJECTION, SOLUTION INTRAVENOUS at 22:49

## 2017-03-27 ENCOUNTER — TELEPHONE (OUTPATIENT)
Dept: FAMILY MEDICINE CLINIC | Age: 67
End: 2017-03-27

## 2017-03-27 ENCOUNTER — OFFICE VISIT (OUTPATIENT)
Dept: FAMILY MEDICINE CLINIC | Age: 67
End: 2017-03-27

## 2017-03-27 VITALS
OXYGEN SATURATION: 96 % | RESPIRATION RATE: 18 BRPM | DIASTOLIC BLOOD PRESSURE: 83 MMHG | BODY MASS INDEX: 37.93 KG/M2 | SYSTOLIC BLOOD PRESSURE: 170 MMHG | WEIGHT: 236 LBS | HEART RATE: 93 BPM | TEMPERATURE: 98.5 F | HEIGHT: 66 IN

## 2017-03-27 VITALS
HEART RATE: 100 BPM | SYSTOLIC BLOOD PRESSURE: 168 MMHG | TEMPERATURE: 98.8 F | RESPIRATION RATE: 24 BRPM | DIASTOLIC BLOOD PRESSURE: 68 MMHG | OXYGEN SATURATION: 100 %

## 2017-03-27 DIAGNOSIS — I10 ESSENTIAL HYPERTENSION: Primary | ICD-10-CM

## 2017-03-27 DIAGNOSIS — K29.70 GASTRITIS WITHOUT BLEEDING, UNSPECIFIED CHRONICITY, UNSPECIFIED GASTRITIS TYPE: ICD-10-CM

## 2017-03-27 DIAGNOSIS — G40.909 SEIZURE DISORDER (HCC): Chronic | ICD-10-CM

## 2017-03-27 DIAGNOSIS — I10 ESSENTIAL HYPERTENSION: ICD-10-CM

## 2017-03-27 DIAGNOSIS — E78.00 HYPERCHOLESTEREMIA: Chronic | ICD-10-CM

## 2017-03-27 DIAGNOSIS — Z86.73 HISTORY OF CVA (CEREBROVASCULAR ACCIDENT): Chronic | ICD-10-CM

## 2017-03-27 DIAGNOSIS — E11.40 TYPE 2 DIABETES MELLITUS WITH DIABETIC NEUROPATHY, UNSPECIFIED LONG TERM INSULIN USE STATUS: ICD-10-CM

## 2017-03-27 LAB
ATRIAL RATE: 94 BPM
CALCULATED P AXIS, ECG09: 51 DEGREES
CALCULATED R AXIS, ECG10: 10 DEGREES
CALCULATED T AXIS, ECG11: 12 DEGREES
DIAGNOSIS, 93000: NORMAL
P-R INTERVAL, ECG05: 134 MS
Q-T INTERVAL, ECG07: 358 MS
QRS DURATION, ECG06: 86 MS
QTC CALCULATION (BEZET), ECG08: 447 MS
VENTRICULAR RATE, ECG03: 94 BPM

## 2017-03-27 PROCEDURE — 74011250636 HC RX REV CODE- 250/636: Performed by: EMERGENCY MEDICINE

## 2017-03-27 PROCEDURE — 74011000258 HC RX REV CODE- 258: Performed by: EMERGENCY MEDICINE

## 2017-03-27 RX ORDER — PANTOPRAZOLE SODIUM 40 MG/1
40 TABLET, DELAYED RELEASE ORAL DAILY
Qty: 90 TAB | Refills: 1 | Status: SHIPPED | OUTPATIENT
Start: 2017-03-27 | End: 2017-03-27 | Stop reason: SDUPTHER

## 2017-03-27 RX ORDER — CARVEDILOL 12.5 MG/1
12.5 TABLET ORAL 2 TIMES DAILY WITH MEALS
Qty: 60 TAB | Refills: 3 | Status: SHIPPED | OUTPATIENT
Start: 2017-03-27 | End: 2017-03-27 | Stop reason: SDUPTHER

## 2017-03-27 RX ORDER — INSULIN LISPRO 100 [IU]/ML
INJECTION, SOLUTION INTRAVENOUS; SUBCUTANEOUS
Qty: 2 VIAL | Refills: 5
Start: 2017-03-27 | End: 2017-05-03 | Stop reason: SDUPTHER

## 2017-03-27 RX ORDER — LEVETIRACETAM 500 MG/1
500 TABLET ORAL 2 TIMES DAILY
Qty: 60 TAB | Refills: 0 | Status: SHIPPED | OUTPATIENT
Start: 2017-03-27 | End: 2017-08-10 | Stop reason: SDUPTHER

## 2017-03-27 RX ORDER — AMLODIPINE BESYLATE 5 MG/1
5 TABLET ORAL
Qty: 30 TAB | Refills: 3 | Status: SHIPPED | OUTPATIENT
Start: 2017-03-27 | End: 2017-03-27 | Stop reason: SDUPTHER

## 2017-03-27 RX ORDER — AMLODIPINE BESYLATE 5 MG/1
5 TABLET ORAL
Qty: 30 TAB | Refills: 3 | Status: SHIPPED | OUTPATIENT
Start: 2017-03-27 | End: 2017-09-05 | Stop reason: SDUPTHER

## 2017-03-27 RX ORDER — LEVETIRACETAM 500 MG/1
500 TABLET ORAL 2 TIMES DAILY
Qty: 60 TAB | Refills: 0 | Status: SHIPPED | OUTPATIENT
Start: 2017-03-27 | End: 2017-03-27 | Stop reason: SDUPTHER

## 2017-03-27 RX ORDER — ISOSORBIDE MONONITRATE 30 MG/1
30 TABLET, EXTENDED RELEASE ORAL DAILY
Qty: 90 TAB | Refills: 1 | Status: SHIPPED | OUTPATIENT
Start: 2017-03-27 | End: 2017-09-05 | Stop reason: SDUPTHER

## 2017-03-27 RX ORDER — HYDROCHLOROTHIAZIDE 25 MG/1
25 TABLET ORAL DAILY
Qty: 90 TAB | Refills: 1 | Status: SHIPPED | OUTPATIENT
Start: 2017-03-27 | End: 2017-03-27 | Stop reason: SDUPTHER

## 2017-03-27 RX ORDER — LOSARTAN POTASSIUM 50 MG/1
50 TABLET ORAL 2 TIMES DAILY
Qty: 180 TAB | Refills: 1 | Status: SHIPPED | OUTPATIENT
Start: 2017-03-27 | End: 2017-03-27 | Stop reason: SDUPTHER

## 2017-03-27 RX ORDER — INSULIN LISPRO 100 [IU]/ML
INJECTION, SOLUTION INTRAVENOUS; SUBCUTANEOUS
Qty: 2 VIAL | Refills: 5
Start: 2017-03-27 | End: 2017-03-27 | Stop reason: SDUPTHER

## 2017-03-27 RX ORDER — PANTOPRAZOLE SODIUM 40 MG/1
40 TABLET, DELAYED RELEASE ORAL DAILY
Qty: 90 TAB | Refills: 1 | Status: SHIPPED | OUTPATIENT
Start: 2017-03-27 | End: 2018-10-10 | Stop reason: SDUPTHER

## 2017-03-27 RX ORDER — ATORVASTATIN CALCIUM 40 MG/1
40 TABLET, FILM COATED ORAL DAILY
Qty: 90 TAB | Refills: 1 | Status: SHIPPED | OUTPATIENT
Start: 2017-03-27 | End: 2017-03-27 | Stop reason: SDUPTHER

## 2017-03-27 RX ORDER — CARVEDILOL 12.5 MG/1
12.5 TABLET ORAL 2 TIMES DAILY WITH MEALS
Qty: 60 TAB | Refills: 3 | Status: SHIPPED | OUTPATIENT
Start: 2017-03-27 | End: 2017-09-05 | Stop reason: SDUPTHER

## 2017-03-27 RX ORDER — HYDROCHLOROTHIAZIDE 25 MG/1
25 TABLET ORAL DAILY
Qty: 90 TAB | Refills: 1 | Status: SHIPPED | OUTPATIENT
Start: 2017-03-27 | End: 2017-09-05 | Stop reason: SDUPTHER

## 2017-03-27 RX ORDER — ATORVASTATIN CALCIUM 40 MG/1
40 TABLET, FILM COATED ORAL DAILY
Qty: 90 TAB | Refills: 1 | Status: SHIPPED | OUTPATIENT
Start: 2017-03-27 | End: 2017-09-05 | Stop reason: SDUPTHER

## 2017-03-27 RX ORDER — LOSARTAN POTASSIUM 50 MG/1
50 TABLET ORAL 2 TIMES DAILY
Qty: 180 TAB | Refills: 1 | Status: SHIPPED | OUTPATIENT
Start: 2017-03-27 | End: 2017-09-05 | Stop reason: SDUPTHER

## 2017-03-27 RX ORDER — ISOSORBIDE MONONITRATE 30 MG/1
30 TABLET, EXTENDED RELEASE ORAL DAILY
Qty: 90 TAB | Refills: 1 | Status: SHIPPED | OUTPATIENT
Start: 2017-03-27 | End: 2017-03-27 | Stop reason: SDUPTHER

## 2017-03-27 RX ADMIN — SODIUM CHLORIDE 2000 MG: 900 INJECTION, SOLUTION INTRAVENOUS at 00:32

## 2017-03-27 NOTE — TELEPHONE ENCOUNTER
Attempt made to notify pt of appt for April 7 at 1130 with Dr. Justo Gomez at [facility]. Unable to leave message, voicemail full.

## 2017-03-27 NOTE — MR AVS SNAPSHOT
Visit Information Date & Time Provider Department Dept. Phone Encounter #  
 3/27/2017  2:15 PM Brigido Cid, [address] [facility] [phone] [id number] Your Appointments 5/30/2017  1:00 PM  
Follow Up with Fercho Vaughan MD  
[address]) Appt Note: 4 month follow up  
 [facility] [address]  
  
   
 [facility] Abdirizak De Gasperi 88 [address] [facility] 95 Upcoming Health Maintenance Date Due  
 EYE EXAM RETINAL OR DILATED Q1 3/3/2016 FOOT EXAM Q1 11/23/2016 HEMOGLOBIN A1C Q6M 7/19/2017 Pneumococcal 19-64 Highest Risk (3 of 3 - PCV13) 11/10/2017 MICROALBUMIN Q1 1/19/2018 LIPID PANEL Q1 1/19/2018 MEDICARE YEARLY EXAM 1/20/2018 PAP AKA CERVICAL CYTOLOGY 10/27/2018 BREAST CANCER SCRN MAMMOGRAM 12/5/2018 COLONOSCOPY 10/27/2025 DTaP/Tdap/Td series (2 - Td) 11/23/2025 Allergies as of 3/27/2017  Review Complete On: 3/27/2017 By: Brigido Cdi MD  
  
 Severity Noted Reaction Type Reactions Metformin  10/22/2015    Other (comments) Patient states that she is not allergic to metformin. Current Immunizations  Reviewed on 11/15/2016 Name Date Influenza Vaccine 10/17/2016, 11/23/2015 10:18 AM  
 Influenza Vaccine (Madin [place] Canine Kidney) PF 2/4/2015 10:00 AM  
 Pneumococcal Polysaccharide (PPSV-23) 2/4/2015  9:57 AM  
 Tdap 11/23/2015 10:19 AM  
  
 Not reviewed this visit You Were Diagnosed With   
  
 Codes Comments Essential hypertension    -  Primary ICD-10-CM: I10 
ICD-9-CM: 401.9 Type 2 diabetes mellitus with diabetic neuropathy, unspecified long term insulin use status (HCC)     ICD-10-CM: E11.40 ICD-9-CM: 250.60, 357.2 Seizure disorder ([facility] [address]     ICD-10-CM: G40.909 ICD-9-CM: 345.90  History of CVA (cerebrovascular accident)     ICD-10-CM: Z86.73 
ICD-9-CM: V12.54   
 Hypercholesteremia     ICD-10-CM: E78.00 ICD-9-CM: 272.0 Gastritis without bleeding, unspecified chronicity, unspecified gastritis type     ICD-10-CM: K29.70 ICD-9-CM: 535.50 Vitals BP Pulse Temp Resp Height(growth percentile) Weight(growth percentile) 170/83 (BP 1 Location: Right arm, BP Patient Position: Sitting) 93 98.5 °F (36.9 °C) (Oral) 18 5' 6\" (1.676 m) 236 lb (107 kg) SpO2 BMI OB Status Smoking Status 96% 38.09 kg/m2 Postmenopausal Never Smoker Vitals History BMI and BSA Data Body Mass Index Body Surface Area 38.09 kg/m 2 2.23 m 2 Preferred Pharmacy Pharmacy Name Phone [facility] [address], [address] [place] [address] [phone] Your Updated Medication List  
  
   
This list is accurate as of: 3/27/17  3:30 PM.  Always use your most recent med list. amLODIPine 5 mg tablet Commonly known as:  Horris Bills Take 1 Tab by mouth nightly. aspirin-dipyridamole  mg per SR capsule Commonly known as:  AGGRENOX Take 1 Cap by mouth two (2) times a day. atorvastatin 40 mg tablet Commonly known as:  LIPITOR Take 1 Tab by mouth daily. carvedilol 12.5 mg tablet Commonly known as:  Dafne Chew Take 1 Tab by mouth two (2) times daily (with meals). clotrimazole 1 % topical cream  
Commonly known as:  Delcie Moraes Apply  to affected area two (2) times a day. divalproex  mg ER tablet Commonly known as:  DEPAKOTE ER Take 1 Tab by mouth two (2) times a day. glucose blood VI test strips strip Commonly known as:  FREESTYLE LITE STRIPS Monitor blood sugars twice daily  
  
 hydroCHLOROthiazide 25 mg tablet Commonly known as:  HYDRODIURIL Take 1 Tab by mouth daily. insulin detemir 100 unit/mL injection Commonly known as:  LEVEMIR  
30 Units by SubCUTAneous route every twelve (12) hours. insulin lispro 100 unit/mL injection Commonly known as:  HUMALOG Take  6 units 3   times daily before each meal.  
  
 isosorbide mononitrate ER 30 mg tablet Commonly known as:  IMDUR Take 1 Tab by mouth daily. letrozole 2.5 mg tablet Commonly known as:  [facility] Take 2.5 mg by mouth daily. levETIRAcetam 500 mg tablet Commonly known as:  KEPPRA Take 1 Tab by mouth two (2) times a day. levoFLOXacin 750 mg tablet Commonly known as:  Threasa Mince Take 1 Tab by mouth daily. losartan 50 mg tablet Commonly known as:  COZAAR Take 1 Tab by mouth two (2) times a day. pantoprazole 40 mg tablet Commonly known as:  PROTONIX Take 1 Tab by mouth daily. SONAFINE Emul topical  
Generic drug:  emollient combination no. 10  
  
 therapeutic multivitamin tablet Commonly known as:  [facility] Take 1 Tab by mouth daily. VITAMIN D3 1,000 unit tablet Generic drug:  cholecalciferol Take 1,000 Units by mouth daily. Prescriptions Sent to Pharmacy Refills  
 losartan (COZAAR) 50 mg tablet 1 Sig: Take 1 Tab by mouth two (2) times a day. Class: Normal  
 Pharmacy: [facility] [address], [address] Ph #: [phone] Route: Oral  
 carvedilol (COREG) 12.5 mg tablet 3 Sig: Take 1 Tab by mouth two (2) times daily (with meals). Class: Normal  
 Pharmacy: [facility] [address], [address] Ph #: [phone] Route: Oral  
 amLODIPine (NORVASC) 5 mg tablet 3 Sig: Take 1 Tab by mouth nightly. Class: Normal  
 Pharmacy: [facility] [address], [address] Ph #: [phone] Route: Oral  
 hydroCHLOROthiazide (HYDRODIURIL) 25 mg tablet 1 Sig: Take 1 Tab by mouth daily. Class: Normal  
 Pharmacy: [facility] [address], [address] Ph #: [phone] Route: Oral  
 levETIRAcetam (KEPPRA) 500 mg tablet 0 Sig: Take 1 Tab by mouth two (2) times a day.   
 Class: Normal  
 Pharmacy: [facility] [address], [address] Ph #: [phone] Route: Oral  
 insulin detemir (LEVEMIR) 100 unit/mL injection 0 Si Units by SubCUTAneous route every twelve (12) hours. Class: Normal  
 Pharmacy: [facility] [address], [address] Ph #: [phone] Route: SubCUTAneous  
 atorvastatin (LIPITOR) 40 mg tablet 1 Sig: Take 1 Tab by mouth daily. Class: Normal  
 Pharmacy: [facility] [address], [address] Ph #: [phone] Route: Oral  
 isosorbide mononitrate ER (IMDUR) 30 mg tablet 1 Sig: Take 1 Tab by mouth daily. Class: Normal  
 Pharmacy: [place] [address], [address] Ph #: [phone] Route: Oral  
 pantoprazole (PROTONIX) 40 mg tablet 1 Sig: Take 1 Tab by mouth daily. Class: Normal  
 Pharmacy: [facility] [address], [address] Ph #: [phone] Route: Oral  
  
We Performed the Following REFERRAL TO NEUROLOGY [HNZ21 Custom] Referral Information Referral ID Referred By Referred To  
  
 [id number] Ashley JARAMILLO Not Available Visits Status Start Date End Date 1 New Request 3/27/17 3/27/18 If your referral has a status of pending review or denied, additional information will be sent to support the outcome of this decision. Introducing [facility]! Shelby Rivera introduces [employer] patient portal. Now you can access parts of your medical record, email your doctor's office, and request medication refills online. 1. In your internet browser, go to [email or web address]. [email or web address] 2. Click on the First Time User? Click Here link in the Sign In box. You will see the New Member Sign Up page. 3. Enter your [employer] Access Code exactly as it appears below. You will not need to use this code after youve completed the sign-up process.  If you do not sign up before the expiration date, you must request a new code. · [employer] Access Code: [facility] Expires: 4/19/2017  3:56 PM 
 
4. Enter the last four digits of your Social Security Number (xxxx) and Date of Birth (mm/dd/yyyy) as indicated and click Submit. You will be taken to the next sign-up page. 5. Create a [employer] ID. This will be your [employer] login ID and cannot be changed, so think of one that is secure and easy to remember. 6. Create a [employer] password. You can change your password at any time. 7. Enter your Password Reset Question and Answer. This can be used at a later time if you forget your password. 8. Enter your e-mail address. You will receive e-mail notification when new information is available in [address] 9. Click Sign Up. You can now view and download portions of your medical record. 10. Click the Download Summary menu link to download a portable copy of your medical information. If you have questions, please visit the Frequently Asked Questions section of the [employer] website. Remember, [employer] is NOT to be used for urgent needs. For medical emergencies, dial 911. Now available from your iPhone and Android! Please provide this summary of care documentation to your next provider. Your primary care clinician is listed as [id number]. If you have any questions after today's visit, please call [phone].

## 2017-03-27 NOTE — PROGRESS NOTES
1. Have you been to the ER, urgent care clinic since your last visit? Hospitalized since your last visit? 3/26/17 for seizures at [address]    2. Have you seen or consulted any other health care providers outside of the [address] [place] [address] since your last visit? Include any pap smears or colon screening.  No

## 2017-03-27 NOTE — ED NOTES
MD asked anesthesiology for assistance with placing a line. Anesthesiologist able to place IV in left AC #20 diffusix.

## 2017-03-27 NOTE — PROGRESS NOTES
Sharon Mae is a 61 y.o.  female and presents with     Chief Complaint   Patient presents with    Diabetes    Hypertension    Seizure    Extremity Weakness       [facility] whe ns he had seizures.  reports that pt had 12 seuzures. Pt did not bite her tongue. Pt did not have urinary incont.  was holding her . Pt did not fall. Pt does not know how many seizures she had. Past Medical History:   Diagnosis Date    Arthritis     Breast CA ([address]     Diabetes ([address]     Hypercholesteremia     Hypertension     Menopause     Psychiatric disorder     DEPRESSION    PUD (peptic ulcer disease)     S/P cardiac cath 02/2015    No angiographic evidence of CAD    Seizures (HCC)     LAST SEIZURE ABOUT 2 MONTHS AGO (NOV 2015)    Stroke ([facility] [address]     x2 with left side deficit     Past Surgical History:   Procedure Laterality Date    HX BREAST LUMPECTOMY Right 1/12/2016    Dr. Claudius Litten Partial Mastectomy w./ axillary lymphadenectomy    HX HEENT      TONSILLECTOMY    HX MASTECTOMY Right 1/26/2016    Dr. Claudius Litten Repeat Partial Mastectomy for positive margins    HX ORTHOPAEDIC      left arm surg    IR MEDIPORT       Current Outpatient Prescriptions   Medication Sig    losartan (COZAAR) 50 mg tablet Take 1 Tab by mouth two (2) times a day.  carvedilol (COREG) 12.5 mg tablet Take 1 Tab by mouth two (2) times daily (with meals).  amLODIPine (NORVASC) 5 mg tablet Take 1 Tab by mouth nightly.  hydroCHLOROthiazide (HYDRODIURIL) 25 mg tablet Take 1 Tab by mouth daily.  levETIRAcetam (KEPPRA) 500 mg tablet Take 1 Tab by mouth two (2) times a day.  insulin detemir (LEVEMIR) 100 unit/mL injection 30 Units by SubCUTAneous route every twelve (12) hours.  atorvastatin (LIPITOR) 40 mg tablet Take 1 Tab by mouth daily.  isosorbide mononitrate ER (IMDUR) 30 mg tablet Take 1 Tab by mouth daily.  pantoprazole (PROTONIX) 40 mg tablet Take 1 Tab by mouth daily.     insulin lispro (HUMALOG) 100 unit/mL injection Take  6 units 3   times daily before each meal.    aspirin-dipyridamole (AGGRENOX)  mg per SR capsule Take 1 Cap by mouth two (2) times a day.  divalproex ER (DEPAKOTE ER) 500 mg ER tablet Take 1 Tab by mouth two (2) times a day.  letrozole (FEMARA) 2.5 mg tablet Take 2.5 mg by mouth daily.  SONAFINE emul topical     cholecalciferol (VITAMIN D3) 1,000 unit tablet Take 1,000 Units by mouth daily.  levoFLOXacin (LEVAQUIN) 750 mg tablet Take 1 Tab by mouth daily.  glucose blood VI test strips (FREESTYLE LITE STRIPS) strip Monitor blood sugars twice daily    therapeutic multivitamin (THERAGRAN) tablet Take 1 Tab by mouth daily.  clotrimazole (LOTRIMIN) 1 % topical cream Apply  to affected area two (2) times a day. No current facility-administered medications for this visit. Health Maintenance   Topic Date Due    EYE EXAM RETINAL OR DILATED Q1  03/03/2016    FOOT EXAM Q1  11/23/2016    HEMOGLOBIN A1C Q6M  07/19/2017    Pneumococcal 19-64 Highest Risk (3 of 3 - PCV13) 11/10/2017    MICROALBUMIN Q1  01/19/2018    LIPID PANEL Q1  01/19/2018    MEDICARE YEARLY EXAM  01/20/2018    PAP AKA CERVICAL CYTOLOGY  10/27/2018    BREAST CANCER SCRN MAMMOGRAM  12/05/2018    COLONOSCOPY  10/27/2025    DTaP/Tdap/Td series (2 - Td) 11/23/2025    Hepatitis C Screening  Completed    ZOSTER VACCINE AGE 60>  Completed    INFLUENZA AGE 9 TO ADULT  Completed     Immunization History   Administered Date(s) Administered    Influenza Vaccine 11/23/2015, 10/17/2016    Influenza Vaccine (Madin [place] Canine Kidney) PF 02/04/2015    Pneumococcal Polysaccharide (PPSV-23) 02/04/2015    Tdap 11/23/2015     No LMP recorded. Patient is postmenopausal.        Allergies and Intolerances: Allergies   Allergen Reactions    Metformin Other (comments)     Patient states that she is not allergic to metformin.         Family History:   Family History   Problem Relation Age of Onset    Cancer Father [de-identified]     colon    Hypertension Mother     Heart Disease Mother     Diabetes Mother        Social History:   She  reports that she has never smoked. She has never used smokeless tobacco.  She  reports that she does not drink alcohol. Review of Systems:   General: negative for - chills, fatigue, fever, weight change  Psych: negative for - anxiety, depression, irritability or mood swings  ENT: negative for - headaches, hearing change, nasal congestion, oral lesions, sneezing or sore throat  Heme/ Lymph: negative for - bleeding problems, bruising, pallor or swollen lymph nodes  Endo: negative for - hot flashes, polydipsia/polyuria or temperature intolerance  Resp: negative for - cough, shortness of breath or wheezing  CV: negative for - chest pain, edema or palpitations  GI: negative for - abdominal pain, change in bowel habits, constipation, diarrhea or nausea/vomiting  : negative for - dysuria, hematuria, incontinence, pelvic pain or vulvar/vaginal symptoms  MSK: negative for - joint pain, joint swelling or muscle pain  Neuro: negative for - confusion, headaches, seizures or weakness  Derm: negative for - dry skin, hair changes, rash or skin lesion changes          Physical:   Vitals:   Vitals:    03/27/17 1455 03/27/17 1502   BP: 171/87 170/83   Pulse: 93    Resp: 18    Temp: 98.5 °F (36.9 °C)    TempSrc: Oral    SpO2: 96%    Weight: 236 lb (107 kg)    Height: 5' 6\" (1.676 m)            Exam:   HEENT- atraumatic,normocephalic, awake, oriented, well nourished,slightly slurred speech, not new. Neck - supple,no enlarged lymph nodes, no JVD, no thyromegaly  Chest- CTA, no rhonchi, no crackles  Heart- rrr, no murmurs / gallop/rub  Abdomen- soft,BS+,NT, no hepatosplenomegaly  Ext - no c/c/edema   Neuro- no focal deficits. Power 5/5 all extremities  Skin - warm,dry, no obvious rashes.           Review of Data:   LABS:   Lab Results   Component Value Date/Time    WBC 5.8 03/26/2017 10:32 PM    HGB 9.9 03/26/2017 10:32 PM    HCT 30.7 03/26/2017 10:32 PM    PLATELET 110 66/31/7229 10:32 PM     Lab Results   Component Value Date/Time    Sodium 138 03/26/2017 10:32 PM    Potassium 3.7 03/26/2017 10:32 PM    Chloride 102 03/26/2017 10:32 PM    CO2 29 03/26/2017 10:32 PM    Glucose 350 03/26/2017 10:32 PM    BUN 20 03/26/2017 10:32 PM    Creatinine 1.02 03/26/2017 10:32 PM     Lab Results   Component Value Date/Time    Cholesterol, total 229 01/19/2017 03:08 PM    HDL Cholesterol 98 01/19/2017 03:08 PM    LDL, calculated 97 01/19/2017 03:08 PM    Triglyceride 170 01/19/2017 03:08 PM     No results found for: GPT        Impression / Plan:        ICD-10-CM ICD-9-CM    1. Essential hypertension I10 401.9 losartan (COZAAR) 50 mg tablet      carvedilol (COREG) 12.5 mg tablet      amLODIPine (NORVASC) 5 mg tablet      hydroCHLOROthiazide (HYDRODIURIL) 25 mg tablet      isosorbide mononitrate ER (IMDUR) 30 mg tablet      DISCONTINUED: losartan (COZAAR) 50 mg tablet      DISCONTINUED: carvedilol (COREG) 12.5 mg tablet      DISCONTINUED: amLODIPine (NORVASC) 5 mg tablet   2. Type 2 diabetes mellitus with diabetic neuropathy, unspecified long term insulin use status (HCC) E11.40 250.60 insulin detemir (LEVEMIR) 100 unit/mL injection     357.2 insulin lispro (HUMALOG) 100 unit/mL injection      DISCONTINUED: insulin detemir (LEVEMIR) 100 unit/mL injection      DISCONTINUED: insulin lispro (HUMALOG) 100 unit/mL injection   3. Seizure disorder ([facility] [address] G40.909 345.90 REFERRAL TO NEUROLOGY   4. History of CVA (cerebrovascular accident) Z86.73 V12.54    5. Hypercholesteremia E78.00 272.0 atorvastatin (LIPITOR) 40 mg tablet   6. Gastritis without bleeding, unspecified chronicity, unspecified gastritis type K29.70 535.50 pantoprazole (PROTONIX) 40 mg tablet         Explained to patient risk benefits of the medications. Advised patient to stop meds if having any side effects. Pt verbalized understanding of the instructions. I have discussed the diagnosis with the patient and the intended plan as seen in the above orders. The patient has received an after-visit summary and questions were answered concerning future plans. I have discussed medication side effects and warnings with the patient as well. I have reviewed the plan of care with the patient, accepted their input and they are in agreement with the treatment goals. Reviewed plan of care. Patient has provided input and agrees with goals.     Follow-up Disposition: Not on Gee Rivas MD

## 2017-03-27 NOTE — ED TRIAGE NOTES
Patient arrives via EMS for reported 9 seizures today. Patient reports she is med compliant and no changes to medications recently. Patient began seizing during triage, no IV access at this time, Megha Mendoza MD verbal order for lorazepam IM. Medication given. Patient stated later she ran out of her seizure medications last night.

## 2017-03-27 NOTE — ED PROVIDER NOTES
HPI Comments: Michelle Tam is a 61 y.o. Female with h/o seizures with reportedly mult seizures today per family. Pt arrived awake alert, states she has been compliant with meds up until today. C/o headache since this morning. Per ems she was post ictal but became awake alert on way here. No recent falls, fcs, nvd, cp, sob, cough, urinary diff. Sx are intermittent without known exacerbating factor    The history is provided by the patient, the EMS personnel and medical records. Past Medical History:   Diagnosis Date    Arthritis     [facility]     Diabetes ([facility]     Hypercholesteremia     Hypertension     Menopause     Psychiatric disorder     DEPRESSION    PUD (peptic ulcer disease)     S/P cardiac cath 02/2015    No angiographic evidence of CAD    Seizures (HCC)     LAST SEIZURE ABOUT 2 MONTHS AGO (NOV 2015)    Stroke ([facility]     x2 with left side deficit       Past Surgical History:   Procedure Laterality Date    HX BREAST LUMPECTOMY Right 1/12/2016    Dr. Neftali Jiménez Partial Mastectomy w./ axillary lymphadenectomy    HX HEENT      TONSILLECTOMY    HX MASTECTOMY Right 1/26/2016    Dr. Neftali Jiménez Repeat Partial Mastectomy for positive margins    HX ORTHOPAEDIC      left arm surg    IR MEDIPORT           Family History:   Problem Relation Age of Onset    Cancer Father [de-identified]     colon    Hypertension Mother     Heart Disease Mother     Diabetes Mother        Social History     Social History    Marital status: SINGLE     Spouse name: N/A    Number of children: N/A    Years of education: N/A     Occupational History    Not on file.      Social History Main Topics    Smoking status: Never Smoker    Smokeless tobacco: Never Used    Alcohol use No    Drug use: No    Sexual activity: Yes     Partners: Male     Other Topics Concern     Service No    Blood Transfusions No    Caffeine Concern No    Occupational Exposure No    Hobby Hazards No    Sleep Concern Yes    Stress Concern No    Exercise Yes     walks using cane    Seat Belt Yes    Self-Exams Yes     Social History Narrative         ALLERGIES: Metformin    Review of Systems   Constitutional: Negative for fever. HENT: Negative for sore throat. Eyes: Negative for visual disturbance. Respiratory: Negative for cough and shortness of breath. Cardiovascular: Negative for leg swelling. Gastrointestinal: Negative for abdominal pain. Endocrine: Negative for polyuria. Genitourinary: Negative for difficulty urinating and dysuria. Musculoskeletal: Positive for gait problem. Skin: Negative for rash. Allergic/Immunologic: Negative for immunocompromised state. Neurological: Positive for seizures and headaches. Negative for syncope. Psychiatric/Behavioral: Positive for sleep disturbance. Vitals:    03/26/17 2012 03/26/17 2045 03/26/17 2254 03/27/17 0053   BP: (!) 138/114  177/56 168/68   Pulse: 88 98 100 100   Resp: 18 17 15 24   Temp: 98.8 °F (37.1 °C)      SpO2: 100% 100%              Physical Exam   Constitutional: She is oriented to person, place, and time. She appears well-developed and well-nourished. No distress. HENT:   Head: Normocephalic and atraumatic. Right Ear: External ear normal.   Left Ear: External ear normal.   Nose: Nose normal.   Mouth/Throat: Uvula is midline, oropharynx is clear and moist and mucous membranes are normal.   Eyes: Conjunctivae are normal. No scleral icterus. Neck: Neck supple. Cardiovascular: Normal rate, regular rhythm, normal heart sounds and intact distal pulses. Pulmonary/Chest: Effort normal and breath sounds normal.   Abdominal: Soft. There is no tenderness. Musculoskeletal: She exhibits no edema. Neurological: She is alert and oriented to person, place, and time. No cranial nerve deficit (3-12). She exhibits normal muscle tone. Coordination (Fn) and gait normal.   Equal strength both ext   Skin: Skin is warm and dry. She is not diaphoretic. Psychiatric: Her behavior is normal.   Nursing note and vitals reviewed. [facility]  ED Course       Procedures    Vitals:  Patient Vitals for the past 12 hrs:   Temp Pulse Resp BP SpO2   03/27/17 0053 - 100 24 168/68 -   03/26/17 2254 - 100 15 177/56 -   03/26/17 2045 - 98 17 - 100 %   03/26/17 2012 98.8 °F (37.1 °C) 88 18 (!) 138/114 100 %         Medications ordered:   Medications   valproate (DEPACON) 500 mg in 0.9% sodium chloride 50 mL IVPB (0 mg IntraVENous IV Completed 3/27/17 0000)   LORazepam (ATIVAN) injection 2 mg (2 mg IntraMUSCular Given 3/26/17 2016)   levETIRAcetam (KEPPRA) 2,000 mg in 0.9% sodium chloride IVPB (0 mg IntraVENous IV Completed 3/27/17 0047)         Lab findings:  Recent Results (from the past 12 hour(s))   CBC WITH AUTOMATED DIFF    Collection Time: 03/26/17 10:32 PM   Result Value Ref Range    WBC 5.8 4.6 - 13.2 K/uL    RBC 3.71 (L) 4.20 - 5.30 M/uL    HGB 9.9 (L) 12.0 - 16.0 g/dL    HCT 30.7 (L) 35.0 - 45.0 %    MCV 82.7 74.0 - 97.0 FL    MCH 26.7 24.0 - 34.0 PG    MCHC 32.2 31.0 - 37.0 g/dL    RDW 13.4 11.6 - 14.5 %    PLATELET 788 831 - 819 K/uL    MPV 10.8 9.2 - 11.8 FL    NEUTROPHILS 60 40 - 73 %    LYMPHOCYTES 28 21 - 52 %    MONOCYTES 10 3 - 10 %    EOSINOPHILS 2 0 - 5 %    BASOPHILS 0 0 - 2 %    ABS. NEUTROPHILS 3.5 1.8 - 8.0 K/UL    ABS. LYMPHOCYTES 1.7 0.9 - 3.6 K/UL    ABS. MONOCYTES 0.6 0.05 - 1.2 K/UL    ABS. EOSINOPHILS 0.1 0.0 - 0.4 K/UL    ABS.  BASOPHILS 0.0 0.0 - 0.06 K/UL    DF AUTOMATED     METABOLIC PANEL, COMPREHENSIVE    Collection Time: 03/26/17 10:32 PM   Result Value Ref Range    Sodium 138 136 - 145 mmol/L    Potassium 3.7 3.5 - 5.5 mmol/L    Chloride 102 100 - 108 mmol/L    CO2 29 21 - 32 mmol/L    Anion gap 7 3.0 - 18 mmol/L    Glucose 350 (H) 74 - 99 mg/dL    BUN 20 (H) 7.0 - 18 MG/DL    Creatinine 1.02 0.6 - 1.3 MG/DL    BUN/Creatinine ratio 20 12 - 20      GFR est AA >60 >60 ml/min/1.73m2    GFR est non-AA 55 (L) >60 ml/min/1.73m2    Calcium 9.2 8.5 - 10.1 MG/DL    Bilirubin, total 0.4 0.2 - 1.0 MG/DL    ALT (SGPT) 17 13 - 56 U/L    AST (SGOT) 17 15 - 37 U/L    Alk. phosphatase 88 45 - 117 U/L    Protein, total 7.7 6.4 - 8.2 g/dL    Albumin 3.0 (L) 3.4 - 5.0 g/dL    Globulin 4.7 (H) 2.0 - 4.0 g/dL    A-G Ratio 0.6 (L) 0.8 - 1.7     EKG, 12 LEAD, INITIAL    Collection Time: 03/26/17 10:52 PM   Result Value Ref Range    Ventricular Rate 94 BPM    Atrial Rate 94 BPM    P-R Interval 134 ms    QRS Duration 86 ms    Q-T Interval 358 ms    QTC Calculation (Bezet) 447 ms    Calculated P Axis 51 degrees    Calculated R Axis 10 degrees    Calculated T Axis 12 degrees    Diagnosis       Normal sinus rhythm  Voltage criteria for left ventricular hypertrophy  Abnormal ECG  When compared with ECG of 23-OCT-2016 21:05,  T wave inversion less evident in Inferior leads         EKG interpretation by ED Physician:  nsr with no acute st tw changes except for slight tw abnl from before  ratea 94, qtc 447      X-Ray, CT or other radiology findings or impressions:  No orders to display       Progress notes, Consult notes or additional Procedure notes:   Mult attempts at iv access by nurses myself to include us guidance,. crna was able to obtain access after mult attempts with us  Unable to get additional blood to check depakote level after also mult attempts  Pt has remained seizure free since ativan. Loaded with keppra  Will place on keppra as well, d/c home with close f/u this week with pcp recommended      Reevaluation of patient:   Stable for d/     Disposition:  Diagnosis:   1. Recurrent seizures ([address]    2.  Difficult intravenous access        Disposition: home      Follow-up Information     Follow up With Details Comments [address] MD Schedule an appointment as soon as possible for a visit this week for recheck about your seizures [address]  [address] [facility] EMERGENCY DEPT  If symptoms worsen [address] [place] [address]  [phone]            Patient's Medications   Start Taking    LEVETIRACETAM (KEPPRA) 500 MG TABLET    Take 1 Tab by mouth two (2) times a day. Continue Taking    ASPIRIN-DIPYRIDAMOLE (AGGRENOX)  MG PER SR CAPSULE    Take 1 Cap by mouth two (2) times a day. ATORVASTATIN (LIPITOR) 40 MG TABLET    Take 1 Tab by mouth daily. CARVEDILOL (COREG) 6.25 MG TABLET    Take 1 Tab by mouth two (2) times a day. CHOLECALCIFEROL (VITAMIN D3) 1,000 UNIT TABLET    Take 1,000 Units by mouth daily. CLOTRIMAZOLE (LOTRIMIN) 1 % TOPICAL CREAM    Apply  to affected area two (2) times a day. DIVALPROEX ER (DEPAKOTE ER) 500 MG ER TABLET    Take 1 Tab by mouth two (2) times a day. GLUCOSE BLOOD VI TEST STRIPS (FREESTYLE LITE STRIPS) STRIP    Monitor blood sugars twice daily    HYDROCHLOROTHIAZIDE (HYDRODIURIL) 25 MG TABLET    Take 1 Tab by mouth daily. INSULIN DETEMIR (LEVEMIR) 100 UNIT/ML INJECTION    30 Units by SubCUTAneous route every twelve (12) hours. INSULIN LISPRO (HUMALOG) 100 UNIT/ML INJECTION    Take  6 units 3   times daily before each meal.    ISOSORBIDE MONONITRATE ER (IMDUR) 30 MG TABLET    Take 1 Tab by mouth daily. LETROZOLE (FEMARA) 2.5 MG TABLET    Take 2.5 mg by mouth daily. LEVOFLOXACIN (LEVAQUIN) 750 MG TABLET    Take 1 Tab by mouth daily. LOSARTAN (COZAAR) 50 MG TABLET    Take 1 Tab by mouth two (2) times a day. PANTOPRAZOLE (PROTONIX) 40 MG TABLET    Take 1 Tab by mouth daily. SONAFINE EMUL TOPICAL        THERAPEUTIC MULTIVITAMIN (THERAGRAN) TABLET    Take 1 Tab by mouth daily.    These Medications have changed    No medications on file   Stop Taking    No medications on file

## 2017-03-27 NOTE — ED NOTES
This RN, two other RN's and an ED tech attempted IV acces. NP Ed attempted EJ access without success. Sanam Mendes MD at bedside at this time attempting ultrasound.

## 2017-03-29 ENCOUNTER — PATIENT OUTREACH (OUTPATIENT)
Dept: FAMILY MEDICINE CLINIC | Age: 67
End: 2017-03-29

## 2017-03-29 NOTE — LETTER
3/29/2017 4:58 PM 
 
Ms. Abhijeet Gao [address] [facility] Dear Ms. Blakely John am a nurse navigator working with your PCP Dr. Leila Mcgill. We spoke on the phone regarding your Neurology referral that Dr. Leila Mcgill referred you to. You are currently scheduled with Dr. Gene Diaz on 04/07/2017 at 11:30am. Their office is located at [address] [place] [address]. Please call their office if you have any questions about your appointment at [phone]. I have also attached a copy of your referral in this letter. If you have any questions or concerns, please contact your PCP office at [phone]. Thank you for allowing us to participate in your care! Sincerely, Carolina BONDN, RN   Nurse Navigator [address] Office   [phone] Fax   [phone]

## 2017-03-29 NOTE — PROGRESS NOTES
Nurse Navigator  POST ED NOTE  Patient discharged on 03/26/17 from [facility] ED : seizures      Noted patient seen in PCP office 03/27/17 for post ED f/u  Noted nurse attempted to reach patient to inform appt with Neuro      Attempted to reach patient x2--  1st attempt, a woman answered who stated she was the patient. Then phone line got disconnected. Attempted to reach patient again. No answer, mailbox full. An option was given to send an SMS message with PCP office number.  I requested to have SMS message sent to patient      Will try to reach patient again

## 2017-03-29 NOTE — PROGRESS NOTES
Nurse Navigator  POST ED NOTE  Patient discharged on 03/26/17 from [facility] ED : seizures        Reached patient. Identity verified x2. Spoke to patient regarding her Neuro appt. Appt info provided as well as Neuro office address and number. Also mailed a copy of appt into to patient. Pt also denied any seizures since her office visit. No complaints at this time.  Advised to call PCP office for any questions/concerns      Letter sent

## 2017-04-05 DIAGNOSIS — E11.40 TYPE 2 DIABETES MELLITUS WITH DIABETIC NEUROPATHY, UNSPECIFIED LONG TERM INSULIN USE STATUS: ICD-10-CM

## 2017-04-06 RX ORDER — INSULIN DETEMIR 100 [IU]/ML
INJECTION, SOLUTION SUBCUTANEOUS
Qty: 10 ML | Refills: 0 | Status: SHIPPED | OUTPATIENT
Start: 2017-04-06 | End: 2017-05-03 | Stop reason: SDUPTHER

## 2017-05-03 DIAGNOSIS — E11.40 TYPE 2 DIABETES MELLITUS WITH DIABETIC NEUROPATHY, UNSPECIFIED LONG TERM INSULIN USE STATUS: ICD-10-CM

## 2017-05-04 ENCOUNTER — HOSPITAL ENCOUNTER (OUTPATIENT)
Dept: LAB | Age: 67
Discharge: HOME | End: 2017-05-04
Payer: COMMERCIAL

## 2017-05-04 ENCOUNTER — TELEPHONE (OUTPATIENT)
Dept: FAMILY MEDICINE CLINIC | Age: 67
End: 2017-05-04

## 2017-05-04 DIAGNOSIS — E11.40 TYPE 2 DIABETES MELLITUS WITH DIABETIC NEUROPATHY, UNSPECIFIED LONG TERM INSULIN USE STATUS: ICD-10-CM

## 2017-05-04 LAB — VALPROATE SERPL-MCNC: 61 UG/ML (ref 50–100)

## 2017-05-04 PROCEDURE — 36415 COLL VENOUS BLD VENIPUNCTURE: CPT | Performed by: PSYCHIATRY & NEUROLOGY

## 2017-05-04 PROCEDURE — 80164 ASSAY DIPROPYLACETIC ACD TOT: CPT | Performed by: PSYCHIATRY & NEUROLOGY

## 2017-05-04 RX ORDER — INSULIN LISPRO 100 [IU]/ML
6 INJECTION, SOLUTION INTRAVENOUS; SUBCUTANEOUS
Qty: 2 VIAL | Refills: 5 | Status: SHIPPED | OUTPATIENT
Start: 2017-05-04 | End: 2017-07-12 | Stop reason: SDUPTHER

## 2017-05-04 RX ORDER — INSULIN LISPRO 100 [IU]/ML
INJECTION, SOLUTION INTRAVENOUS; SUBCUTANEOUS
Qty: 2 VIAL | Refills: 5
Start: 2017-05-04 | End: 2017-05-04 | Stop reason: SDUPTHER

## 2017-05-04 RX ORDER — INSULIN DETEMIR 100 [IU]/ML
INJECTION, SOLUTION SUBCUTANEOUS
Qty: 10 ML | Refills: 0 | Status: SHIPPED | OUTPATIENT
Start: 2017-05-04 | End: 2017-05-30 | Stop reason: SDUPTHER

## 2017-05-04 NOTE — TELEPHONE ENCOUNTER
Pt pharmacy called stating they need to verify that both Humalog and Levimer medications are needed.  Please assist.

## 2017-05-30 ENCOUNTER — OFFICE VISIT (OUTPATIENT)
Dept: SURGERY | Age: 67
End: 2017-05-30

## 2017-05-30 VITALS
SYSTOLIC BLOOD PRESSURE: 174 MMHG | TEMPERATURE: 99 F | WEIGHT: 233.2 LBS | HEIGHT: 66 IN | DIASTOLIC BLOOD PRESSURE: 78 MMHG | OXYGEN SATURATION: 100 % | RESPIRATION RATE: 16 BRPM | BODY MASS INDEX: 37.48 KG/M2 | HEART RATE: 85 BPM

## 2017-05-30 DIAGNOSIS — G40.909 SEIZURE DISORDER (HCC): Chronic | ICD-10-CM

## 2017-05-30 DIAGNOSIS — E11.40 TYPE 2 DIABETES MELLITUS WITH DIABETIC NEUROPATHY, UNSPECIFIED LONG TERM INSULIN USE STATUS: ICD-10-CM

## 2017-05-30 DIAGNOSIS — C50.911 MALIGNANT NEOPLASM OF RIGHT FEMALE BREAST, UNSPECIFIED SITE OF BREAST: Primary | ICD-10-CM

## 2017-05-30 DIAGNOSIS — I10 ESSENTIAL HYPERTENSION: Chronic | ICD-10-CM

## 2017-05-30 DIAGNOSIS — E11.40 TYPE 2 DIABETES MELLITUS WITH DIABETIC NEUROPATHY, WITH LONG-TERM CURRENT USE OF INSULIN (HCC): Chronic | ICD-10-CM

## 2017-05-30 DIAGNOSIS — K21.9 GASTROESOPHAGEAL REFLUX DISEASE WITHOUT ESOPHAGITIS: ICD-10-CM

## 2017-05-30 DIAGNOSIS — Z79.4 TYPE 2 DIABETES MELLITUS WITH DIABETIC NEUROPATHY, WITH LONG-TERM CURRENT USE OF INSULIN (HCC): Chronic | ICD-10-CM

## 2017-05-30 NOTE — PROGRESS NOTES
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  She had 2 out of 18 lymph nodes that were positive. The tumor was estrogen receptor positive but progesterone and HER-2/ravi negative. She had a positive margin initially and needed reexcision. Subsequently she received radiation therapy and has been evaluated by Dr. Tio Avalos. He initially gave her some chemotherapy but she was developing a reaction to it that he was very concerned about so he stopped chemotherapy and she is now on Femara. She states that she has fairly constant pain in the area of her right breast incision and axillary incision. There is not been any redness or drainage. Nothing seems to aggravate the pain but not much seems to relieve it either. She has good range of motion of her right arm. She does believe that she has some slight swelling in the triceps area of her right arm. She denies any other new aches, pains, shortness of breath or headaches. The patient's last mammogram was in December and was negative other than postsurgical changes. The patient has been doing some exercise and although she usually rides a wheelchair that is motorized to the office today she was walking with a cane and states that she stronger as a result of the exercise. Allergies   Allergen Reactions    Metformin Other (comments)     Patient states that she is not allergic to metformin.       Past Medical History:   Diagnosis Date    Arthritis     Breast CA ([address]     Diabetes ([address]     Hypercholesteremia     Hypertension     Menopause     Psychiatric disorder     DEPRESSION    PUD (peptic ulcer disease)     S/P cardiac cath 02/2015    No angiographic evidence of CAD    Seizures ([address]     LAST SEIZURE ABOUT 2 MONTHS AGO (NOV 2015)    Stroke ([address]     x2 with left side deficit     Past Surgical History:   Procedure Laterality Date    HX BREAST LUMPECTOMY Right 1/12/2016    Dr. Sae Lamb Partial Mastectomy w./ axillary lymphadenectomy    HX HEENT      TONSILLECTOMY    HX MASTECTOMY Right 1/26/2016    Dr. Sae Lamb Repeat Partial Mastectomy for positive margins    HX ORTHOPAEDIC      left arm surg    IR MEDIPORT       Social History     Social History    Marital status: SINGLE     Spouse name: N/A    Number of children: N/A    Years of education: N/A     Social History Main Topics    Smoking status: Never Smoker    Smokeless tobacco: Never Used    Alcohol use No    Drug use: No    Sexual activity: Yes     Partners: Male     Other Topics Concern     Service No    Blood Transfusions No    Caffeine Concern No    Occupational Exposure No    Hobby Hazards No    Sleep Concern Yes    Stress Concern No    Exercise Yes     walks using cane    Seat Belt Yes    Self-Exams Yes     Social History Narrative     The patient's review of systems has not changed. She has multiple medical problems as noted above including hypertension which is only moderately well controlled and diabetes which is somewhat poorly controlled. She takes medication for her GERD and for hypothyroidism and hypercholesterolemia. She does seem to be speaking better today and moving with less difficulty. She denies any new cardiac, respiratory, gastrointestinal or genitourinary problems. PHYSICAL EXAM    Visit Vitals    /78 (BP 1 Location: Left arm, BP Patient Position: Sitting)    Pulse 85    Temp 99 °F (37.2 °C) (Oral)    Resp 16    Ht 5' 6\" (1.676 m)    Wt 105.8 kg (233 lb 3.2 oz)    SpO2 100%    BMI 37.64 kg/m2        Constitutional:  Well-developed, well-nourished, no acute distress. Head:  Head, eyes, ears, nose, throat within normal limits. Skin:  No suspicious moles or rashes. Neck:  No masses or adenopathy. The airway appears normal. Thyroid is not enlarged and there are no palpable thyroid nodules.      Lungs:  Lungs are clear to auscultation and percussion. No respiratory distress. No chest wall tenderness. Heart:  Heart is regular with no extra heart sounds or murmur heard. Breast Exam: The breasts are free of any discrete tenderness or masses except in the upper right breast and in the axilla where she has tenderness to palpation. The skin and nipple areas appear normal. Both axillae are negative. I do not feel any masses in either breast.     Abdomen: The abdomen is soft and nontender without organomegaly or masses. Bowel sounds are active and of normal pitch. There is no abdominal distention. No hernias are evident. Extremities:  No tenderness of the extremities and no significant swelling. Psych:  Alert and oriented. Assessment: #1 invasive ductal carcinoma of the right breast upper outer portion measuring 2.2 cm with 2 out of 18 lymph nodes positive. Tumor was estrogen receptor positive but progesterone and HER-2 negative. Patient did receive postop radiation therapy and a brief course of chemotherapy. She is now on Femara. No evidence of recurrent disease. #2 insulin-dependent diabetes. #3 hypertension poorly controlled. #4 hypercholesterolemia. #5 GERD. #6 previous seizure disorder and CVA. Recommendations at this point I recommend patient continue to see me every 4 months. We will plan on repeating her next mammogram at the end of this year. She is to call if she has any problems. The above was dictated with voice recognition and there may be unrecognized errors.     Sae Lamb MD

## 2017-05-30 NOTE — PATIENT INSTRUCTIONS
If you have any questions or concerns about today's appointment, the verbal and/or written instructions you were given for follow up care, please call our office at [phone].     [facility] - [address]    [phone] office  [phone]

## 2017-05-30 NOTE — MR AVS SNAPSHOT
Visit Information Date & Time Provider Department Dept. Phone Encounter #  
 5/30/2017  1:00 PM Karely Castro MD [facility] [phone] [id number] Follow-up Instructions Return in about 4 months (around 9/30/2017). Your Appointments 6/27/2017  2:30 PM  
ROUTINE CARE with Cynthia Samuel MD  
[facility] App[facility] Note: Return in 3 month for ROV [address] [place] [address] [place]  
  
   
 [address] Upcoming Health Maintenance Date Due  
 EYE EXAM RETINAL OR DILATED Q1 3/3/2016 FOOT EXAM Q1 11/23/2016 HEMOGLOBIN A1C Q6M 7/19/2017 INFLUENZA AGE 9 TO ADULT 8/1/2017 Pneumococcal 19-64 Highest Risk (3 of 3 - PCV13) 11/10/2017 MICROALBUMIN Q1 1/19/2018 LIPID PANEL Q1 1/19/2018 MEDICARE YEARLY EXAM 1/20/2018 PAP AKA CERVICAL CYTOLOGY 10/27/2018 BREAST CANCER SCRN MAMMOGRAM 12/5/2018 COLONOSCOPY 10/27/2025 DTaP/Tdap/Td series (2 - Td) 11/23/2025 Allergies as of 5/30/2017  Review Complete On: 5/30/2017 By: Karely Castro MD  
  
 Severity Noted Reaction Type Reactions Metformin  10/22/2015    Other (comments) Patient states that she is not allergic to metformin. Current Immunizations  Reviewed on 11/15/2016 Name Date Influenza Vaccine 10/17/2016, 11/23/2015 10:18 AM  
 Influenza Vaccine (Madin [place] Canine Kidney) PF 2/4/2015 10:00 AM  
 Pneumococcal Polysaccharide (PPSV-23) 2/4/2015  9:57 AM  
 Tdap 11/23/2015 10:19 AM  
  
 Not reviewed this visit Vitals BP Pulse Temp Resp Height(growth percentile) Weight(growth percentile) 174/78 (BP 1 Location: Left arm, BP Patient Position: Sitting) 85 99 °F (37.2 °C) (Oral) 16 5' 6\" (1.676 m) 233 lb 3.2 oz (105.8 kg) SpO2 BMI OB Status Smoking Status 100% 37.64 kg/m2 Postmenopausal Never Smoker BMI and BSA Data Body Mass Index Body Surface Area  
 37.64 kg/m 2 2.22 m 2 Preferred Pharmacy Pharmacy Name Phone [facility] - [address], [address] [phone] Your Updated Medication List  
  
   
This list is accurate as of: 5/30/17  1:23 PM.  Always use your most recent med list. amLODIPine 5 mg tablet Commonly known as:  Sandra Half Take 1 Tab by mouth nightly. aspirin-dipyridamole  mg per SR capsule Commonly known as:  AGGRENOX Take 1 Cap by mouth two (2) times a day. atorvastatin 40 mg tablet Commonly known as:  LIPITOR Take 1 Tab by mouth daily. carvedilol 12.5 mg tablet Commonly known as:  Roger Shy Take 1 Tab by mouth two (2) times daily (with meals). clotrimazole 1 % topical cream  
Commonly known as:  [place] Apply  to affected area two (2) times a day. divalproex  mg ER tablet Commonly known as:  DEPAKOTE ER Take 1 Tab by mouth two (2) times a day. glucose blood VI test strips strip Commonly known as:  FREESTYLE LITE STRIPS Monitor blood sugars twice daily  
  
 hydroCHLOROthiazide 25 mg tablet Commonly known as:  HYDRODIURIL Take 1 Tab by mouth daily. insulin lispro 100 unit/mL injection Commonly known as:  HUMALOG  
6 Units by SubCUTAneous route Before breakfast, lunch, and dinner. isosorbide mononitrate ER 30 mg tablet Commonly known as:  IMDUR Take 1 Tab by mouth daily. letrozole 2.5 mg tablet Commonly known as:  [facility] Take 2.5 mg by mouth daily. LEVEMIR 100 unit/mL injection Generic drug:  insulin detemir INJECT 30 UNITS SUBCUTANEOUSLY EVERY 12 HOURS  
  
 levETIRAcetam 500 mg tablet Commonly known as:  KEPPRA Take 1 Tab by mouth two (2) times a day. levoFLOXacin 750 mg tablet Commonly known as:  Laretta Bending Take 1 Tab by mouth daily. losartan 50 mg tablet Commonly known as:  COZAAR Take 1 Tab by mouth two (2) times a day. pantoprazole 40 mg tablet Commonly known as:  PROTONIX Take 1 Tab by mouth daily. SONAFINE Emul topical  
Generic drug:  emollient combination no. 10  
  
 therapeutic multivitamin tablet Commonly known as:  [facility] Take 1 Tab by mouth daily. VITAMIN D3 1,000 unit tablet Generic drug:  cholecalciferol Take 1,000 Units by mouth daily. Follow-up Instructions Return in about 4 months (around 9/30/2017). Patient Instructions If you have any questions or concerns about today's appointment, the verbal and/or written instructions you were given for follow up care, please call our office at [phone]. [facility] - [address], [address] 
 
[phone] office [phone] Introducing hospitals & HEALTH SERVICES! [facility] introduces [employer] patient portal. Now you can access parts of your medical record, email your doctor's office, and request medication refills online. 1. In your internet browser, go to [email or web address] 2. Click on the First Time User? Click Here link in the Sign In box. You will see the New Member Sign Up page. 3. Enter your [employer] Access Code exactly as it appears below. You will not need to use this code after youve completed the sign-up process. If you do not sign up before the expiration date, you must request a new code. · [employer] Access Code: [email or web address] Expires: 8/28/2017  1:23 PM 
 
4. Enter the last four digits of your Social Security Number (xxxx) and Date of Birth (mm/dd/yyyy) as indicated and click Submit. You will be taken to the next sign-up page. 5. Create a Intelligent Beautyt ID. This will be your [employer] login ID and cannot be changed, so think of one that is secure and easy to remember. 6. Create a Intelligent Beautyt password. You can change your password at any time. 7. Enter your Password Reset Question and Answer.  This can be used at a later time if you forget your password. 8. Enter your e-mail address. You will receive e-mail notification when new information is available in [address] 9. Click Sign Up. You can now view and download portions of your medical record. 10. Click the Download Summary menu link to download a portable copy of your medical information. If you have questions, please visit the Frequently Asked Questions section of the EventBuilder website. Remember, [employer] is NOT to be used for urgent needs. For medical emergencies, dial 911. Now available from your iPhone and Android! Please provide this summary of care documentation to your next provider. Your primary care clinician is listed as Jose Eduardo Cheatham. If you have any questions after today's visit, please call [phone].

## 2017-07-12 ENCOUNTER — HOSPITAL ENCOUNTER (OUTPATIENT)
Dept: LAB | Age: 67
Discharge: HOME | End: 2017-07-12

## 2017-07-12 ENCOUNTER — OFFICE VISIT (OUTPATIENT)
Dept: FAMILY MEDICINE CLINIC | Age: 67
End: 2017-07-12

## 2017-07-12 VITALS
WEIGHT: 237 LBS | OXYGEN SATURATION: 99 % | BODY MASS INDEX: 38.09 KG/M2 | SYSTOLIC BLOOD PRESSURE: 148 MMHG | HEART RATE: 72 BPM | DIASTOLIC BLOOD PRESSURE: 62 MMHG | TEMPERATURE: 98 F | HEIGHT: 66 IN

## 2017-07-12 DIAGNOSIS — E11.40 TYPE 2 DIABETES MELLITUS WITH DIABETIC NEUROPATHY, WITH LONG-TERM CURRENT USE OF INSULIN (HCC): Primary | Chronic | ICD-10-CM

## 2017-07-12 DIAGNOSIS — E78.00 HYPERCHOLESTEREMIA: Chronic | ICD-10-CM

## 2017-07-12 DIAGNOSIS — I10 ESSENTIAL HYPERTENSION: Chronic | ICD-10-CM

## 2017-07-12 DIAGNOSIS — Z79.4 TYPE 2 DIABETES MELLITUS WITH DIABETIC NEUROPATHY, WITH LONG-TERM CURRENT USE OF INSULIN (HCC): Primary | Chronic | ICD-10-CM

## 2017-07-12 DIAGNOSIS — Z79.4 TYPE 2 DIABETES MELLITUS WITH DIABETIC NEUROPATHY, WITH LONG-TERM CURRENT USE OF INSULIN (HCC): Chronic | ICD-10-CM

## 2017-07-12 DIAGNOSIS — K59.00 CONSTIPATION, UNSPECIFIED CONSTIPATION TYPE: ICD-10-CM

## 2017-07-12 DIAGNOSIS — M79.89 LEG SWELLING: ICD-10-CM

## 2017-07-12 DIAGNOSIS — E11.40 TYPE 2 DIABETES MELLITUS WITH DIABETIC NEUROPATHY, UNSPECIFIED LONG TERM INSULIN USE STATUS: Primary | ICD-10-CM

## 2017-07-12 DIAGNOSIS — E11.40 TYPE 2 DIABETES MELLITUS WITH DIABETIC NEUROPATHY, WITH LONG-TERM CURRENT USE OF INSULIN (HCC): Chronic | ICD-10-CM

## 2017-07-12 RX ORDER — LUBIPROSTONE 8 UG/1
8 CAPSULE, GELATIN COATED ORAL
Qty: 30 CAP | Refills: 3 | Status: SHIPPED | OUTPATIENT
Start: 2017-07-12 | End: 2017-08-29 | Stop reason: DRUGHIGH

## 2017-07-12 RX ORDER — FUROSEMIDE 20 MG/1
TABLET ORAL
Qty: 30 TAB | Refills: 3 | Status: SHIPPED | OUTPATIENT
Start: 2017-07-12 | End: 2018-09-24 | Stop reason: SDUPTHER

## 2017-07-12 RX ORDER — INSULIN LISPRO 100 [IU]/ML
6 INJECTION, SOLUTION INTRAVENOUS; SUBCUTANEOUS
Qty: 2 VIAL | Refills: 5
Start: 2017-07-12 | End: 2019-06-21 | Stop reason: ALTCHOICE

## 2017-07-12 NOTE — MR AVS SNAPSHOT
Visit Information Date & Time Provider Department Dept. Phone Encounter #  
 7/12/2017  3:00 PM [id number], [address] [facility] [phone] [id number] Follow-up Instructions Return in about 1 month (around 8/12/2017). Your Appointments 9/26/2017  1:00 PM  
Follow Up with Abram Remy MD  
[facility] [facility] Appt Note: 4 months follow up  
 [address] [facility] 405 [facility] [address] [place] [address]  
  
   
 [address] [facility] [address] [place] Upcoming Health Maintenance Date Due  
 EYE EXAM RETINAL OR DILATED Q1 3/3/2016 FOOT EXAM Q1 11/23/2016 HEMOGLOBIN A1C Q6M 7/19/2017 INFLUENZA AGE 9 TO ADULT 8/1/2017 Pneumococcal 19-64 Highest Risk (3 of 3 - PCV13) 11/10/2017 MICROALBUMIN Q1 1/19/2018 LIPID PANEL Q1 1/19/2018 MEDICARE YEARLY EXAM 1/20/2018 PAP AKA CERVICAL CYTOLOGY 10/27/2018 BREAST CANCER SCRN MAMMOGRAM 12/5/2018 COLONOSCOPY 10/27/2025 DTaP/Tdap/Td series (2 - Td) 11/23/2025 Allergies as of 7/12/2017  Review Complete On: 7/12/2017 By: [id number] [address] MD  
  
 Severity Noted Reaction Type Reactions Metformin  10/22/2015    Other (comments) Patient states that she is not allergic to metformin. Current Immunizations  Reviewed on 11/15/2016 Name Date Influenza Vaccine 10/17/2016, 11/23/2015 10:18 AM  
 Influenza Vaccine (Madin [place] Canine Kidney) PF 2/4/2015 10:00 AM  
 Pneumococcal Polysaccharide (PPSV-23) 2/4/2015  9:57 AM  
 Tdap 11/23/2015 10:19 AM  
  
 Not reviewed this visit You Were Diagnosed With   
  
 Codes Comments Type 2 diabetes mellitus with diabetic neuropathy, unspecified long term insulin use status ([facility]    -  Primary ICD-10-CM: E11.40 ICD-9-CM: 250.60, 357.2 Essential hypertension     ICD-10-CM: I10 
ICD-9-CM: 401.9 Hypercholesteremia     ICD-10-CM: E78.00 ICD-9-CM: 272.0 Leg swelling     ICD-10-CM: M79.89 ICD-9-CM: 729.81 Constipation, unspecified constipation type     ICD-10-CM: K59.00 ICD-9-CM: 564.00 Vitals BP Pulse Temp Height(growth percentile) Weight(growth percentile) SpO2  
 148/62 72 98 °F (36.7 °C) (Oral) 5' 6\" (1.676 m) 237 lb (107.5 kg) 99% BMI OB Status Smoking Status 38.25 kg/m2 Postmenopausal Never Smoker Vitals History BMI and BSA Data Body Mass Index Body Surface Area  
 38.25 kg/m 2 2.24 m 2 Preferred Pharmacy Pharmacy Name Phone [facility] - [address], [address] [phone] Your Updated Medication List  
  
   
This list is accurate as of: 7/12/17  3:12 PM.  Always use your most recent med list. amLODIPine 5 mg tablet Commonly known as:  Ester Raddle Take 1 Tab by mouth nightly. aspirin-dipyridamole  mg per SR capsule Commonly known as:  AGGRENOX Take 1 Cap by mouth two (2) times a day. atorvastatin 40 mg tablet Commonly known as:  LIPITOR Take 1 Tab by mouth daily. carvedilol 12.5 mg tablet Commonly known as:  Heather Juan Take 1 Tab by mouth two (2) times daily (with meals). clotrimazole 1 % topical cream  
Commonly known as:  Inetta Frankford Apply  to affected area two (2) times a day. divalproex  mg ER tablet Commonly known as:  DEPAKOTE ER Take 1 Tab by mouth two (2) times a day. furosemide 20 mg tablet Commonly known as:  LASIX One po daily  
  
 glucose blood VI test strips strip Commonly known as:  FREESTYLE LITE STRIPS Monitor blood sugars twice daily  
  
 hydroCHLOROthiazide 25 mg tablet Commonly known as:  HYDRODIURIL Take 1 Tab by mouth daily. insulin detemir 100 unit/mL injection Commonly known as:  LEVEMIR  
30 Units by SubCUTAneous route two (2) times a day. insulin lispro 100 unit/mL injection Commonly known as:  HUMALOG 6 Units by SubCUTAneous route Before breakfast, lunch, and dinner. isosorbide mononitrate ER 30 mg tablet Commonly known as:  IMDUR Take 1 Tab by mouth daily. letrozole 2.5 mg tablet Commonly known as:  [facility] Take 2.5 mg by mouth daily. levETIRAcetam 500 mg tablet Commonly known as:  KEPPRA Take 1 Tab by mouth two (2) times a day. levoFLOXacin 750 mg tablet Commonly known as:  Shellia Havers Take 1 Tab by mouth daily. losartan 50 mg tablet Commonly known as:  COZAAR Take 1 Tab by mouth two (2) times a day. lubiPROStone 8 mcg capsule Commonly known as:  Shefali President Take 1 Cap by mouth daily (with breakfast). pantoprazole 40 mg tablet Commonly known as:  PROTONIX Take 1 Tab by mouth daily. SONAFINE Emul topical  
Generic drug:  emollient combination no. 10  
  
 therapeutic multivitamin tablet Commonly known as:  [facility] Take 1 Tab by mouth daily. VITAMIN D3 1,000 unit tablet Generic drug:  cholecalciferol Take 1,000 Units by mouth daily. Prescriptions Sent to Pharmacy Refills  
 insulin detemir (LEVEMIR) 100 unit/mL injection 1 Si Units by SubCUTAneous route two (2) times a day. Class: Normal  
 Pharmacy: "[facility] [employer] [facility] [employer] Ph #: [phone] Route: SubCUTAneous  
 furosemide (LASIX) 20 mg tablet 3 Sig: One po daily Class: Normal  
 Pharmacy: [address] [place] [address] Ph #: [phone]  
 lubiPROStone (AMITIZA) 8 mcg capsule 3 Sig: Take 1 Cap by mouth daily (with breakfast). Class: Normal  
 Pharmacy: [email or web address] Ph #: [phone] Route: Oral  
  
Follow-up Instructions Return in about 1 month (around 2017). To-Do List   
 2017 Lab:  HEMOGLOBIN A1C WITH EAG   
  
 2017 Lab:  LIPID PANEL   
  
 2017   Lab:  METABOLIC PANEL, COMPREHENSIVE   
  
 07/12/2017 Lab:  MICROALBUMIN, UR, RAND W/ MICROALBUMIN/CREA RATIO   
  
 07/12/2017 Lab:  TSH 3RD GENERATION Introducing [facility] & HEALTH SERVICES! [employer] introduces [employer] patient portal. Now you can access parts of your medical record, email your doctor's office, and request medication refills online. 1. In your internet browser, go to [email or web address] 2. Click on the First Time User? Click Here link in the Sign In box. You will see the New Member Sign Up page. 3. Enter your [employer] Access Code exactly as it appears below. You will not need to use this code after youve completed the sign-up process. If you do not sign up before the expiration date, you must request a new code. · [employer] Access Code: [email or web address] Expires: 8/28/2017  1:23 PM 
 
4. Enter the last four digits of your Social Security Number (xxxx) and Date of Birth (mm/dd/yyyy) as indicated and click Submit. You will be taken to the next sign-up page. 5. Create a [employer] ID. This will be your [employer] login ID and cannot be changed, so think of one that is secure and easy to remember. 6. Create a [employer] password. You can change your password at any time. 7. Enter your Password Reset Question and Answer. This can be used at a later time if you forget your password. 8. Enter your e-mail address. You will receive e-mail notification when new information is available in [address] 9. Click Sign Up. You can now view and download portions of your medical record. 10. Click the Download Summary menu link to download a portable copy of your medical information. If you have questions, please visit the Frequently Asked Questions section of the [employer] website. Remember, [employer] is NOT to be used for urgent needs. For medical emergencies, dial 911. Now available from your iPhone and Android! Please provide this summary of care documentation to your next provider. Your primary care clinician is listed as Skye Lopez. If you have any questions after today's visit, please call [phone].

## 2017-07-26 ENCOUNTER — HOSPITAL ENCOUNTER (OUTPATIENT)
Dept: LAB | Age: 67
Discharge: HOME | End: 2017-07-26
Payer: MEDICARE

## 2017-07-26 DIAGNOSIS — E11.9 DM TYPE 2, NOT AT GOAL (HCC): Primary | ICD-10-CM

## 2017-07-26 LAB
CREAT UR-MCNC: 44.46 MG/DL (ref 30–125)
MICROALBUMIN UR-MCNC: 1.5 MG/DL (ref 0–3)
MICROALBUMIN/CREAT UR-RTO: 34 MG/G (ref 0–30)

## 2017-07-26 PROCEDURE — 82043 UR ALBUMIN QUANTITATIVE: CPT | Performed by: INTERNAL MEDICINE

## 2017-07-28 ENCOUNTER — HOSPITAL ENCOUNTER (OUTPATIENT)
Dept: LAB | Age: 67
Discharge: HOME | End: 2017-07-28
Payer: MEDICARE

## 2017-07-28 LAB
ALBUMIN SERPL BCP-MCNC: 3 G/DL (ref 3.4–5)
ALBUMIN/GLOB SERPL: 0.6 {RATIO} (ref 0.8–1.7)
ALP SERPL-CCNC: 78 U/L (ref 45–117)
ALT SERPL-CCNC: 21 U/L (ref 13–56)
ANION GAP BLD CALC-SCNC: 10 MMOL/L (ref 3–18)
AST SERPL W P-5'-P-CCNC: 18 U/L (ref 15–37)
BILIRUB SERPL-MCNC: 0.3 MG/DL (ref 0.2–1)
BUN SERPL-MCNC: 23 MG/DL (ref 7–18)
BUN/CREAT SERPL: 21 (ref 12–20)
CALCIUM SERPL-MCNC: 9 MG/DL (ref 8.5–10.1)
CHLORIDE SERPL-SCNC: 106 MMOL/L (ref 100–108)
CHOLEST SERPL-MCNC: 222 MG/DL
CO2 SERPL-SCNC: 23 MMOL/L (ref 21–32)
CREAT SERPL-MCNC: 1.1 MG/DL (ref 0.6–1.3)
GLOBULIN SER CALC-MCNC: 5 G/DL (ref 2–4)
GLUCOSE SERPL-MCNC: 175 MG/DL (ref 74–99)
HBA1C MFR BLD: 7.3 % (ref 4.2–5.6)
HDLC SERPL-MCNC: 86 MG/DL (ref 40–60)
HDLC SERPL: 2.6 {RATIO} (ref 0–5)
LDLC SERPL CALC-MCNC: 111.8 MG/DL (ref 0–100)
LIPID PROFILE,FLP: ABNORMAL
POTASSIUM SERPL-SCNC: 4.3 MMOL/L (ref 3.5–5.5)
PROT SERPL-MCNC: 8 G/DL (ref 6.4–8.2)
SODIUM SERPL-SCNC: 139 MMOL/L (ref 136–145)
TRIGL SERPL-MCNC: 121 MG/DL (ref ?–150)
TSH SERPL DL<=0.05 MIU/L-ACNC: 2.46 UIU/ML (ref 0.36–3.74)
VLDLC SERPL CALC-MCNC: 24.2 MG/DL

## 2017-07-28 PROCEDURE — 83036 HEMOGLOBIN GLYCOSYLATED A1C: CPT | Performed by: INTERNAL MEDICINE

## 2017-07-28 PROCEDURE — 84443 ASSAY THYROID STIM HORMONE: CPT | Performed by: INTERNAL MEDICINE

## 2017-07-28 PROCEDURE — 80061 LIPID PANEL: CPT | Performed by: INTERNAL MEDICINE

## 2017-07-28 PROCEDURE — 80053 COMPREHEN METABOLIC PANEL: CPT | Performed by: INTERNAL MEDICINE

## 2017-07-28 PROCEDURE — 36415 COLL VENOUS BLD VENIPUNCTURE: CPT | Performed by: INTERNAL MEDICINE

## 2017-08-10 ENCOUNTER — OFFICE VISIT (OUTPATIENT)
Dept: FAMILY MEDICINE CLINIC | Age: 67
End: 2017-08-10

## 2017-08-10 VITALS
WEIGHT: 232 LBS | HEIGHT: 66 IN | SYSTOLIC BLOOD PRESSURE: 148 MMHG | DIASTOLIC BLOOD PRESSURE: 76 MMHG | HEART RATE: 88 BPM | TEMPERATURE: 98.7 F | RESPIRATION RATE: 20 BRPM | BODY MASS INDEX: 37.28 KG/M2 | OXYGEN SATURATION: 100 %

## 2017-08-10 DIAGNOSIS — E11.40 TYPE 2 DIABETES MELLITUS WITH DIABETIC NEUROPATHY, WITH LONG-TERM CURRENT USE OF INSULIN (HCC): Chronic | ICD-10-CM

## 2017-08-10 DIAGNOSIS — Z86.73 HISTORY OF CVA (CEREBROVASCULAR ACCIDENT): Chronic | ICD-10-CM

## 2017-08-10 DIAGNOSIS — M79.89 LEG SWELLING: ICD-10-CM

## 2017-08-10 DIAGNOSIS — G40.909 SEIZURE DISORDER (HCC): Primary | Chronic | ICD-10-CM

## 2017-08-10 DIAGNOSIS — Z79.4 TYPE 2 DIABETES MELLITUS WITH DIABETIC NEUROPATHY, WITH LONG-TERM CURRENT USE OF INSULIN (HCC): Chronic | ICD-10-CM

## 2017-08-10 DIAGNOSIS — K21.9 GASTROESOPHAGEAL REFLUX DISEASE WITHOUT ESOPHAGITIS: ICD-10-CM

## 2017-08-10 RX ORDER — LEVETIRACETAM 500 MG/1
500 TABLET ORAL 2 TIMES DAILY
Qty: 60 TAB | Refills: 0 | Status: SHIPPED | OUTPATIENT
Start: 2017-08-10 | End: 2017-09-05 | Stop reason: SDUPTHER

## 2017-08-10 RX ORDER — DIVALPROEX SODIUM 500 MG/1
500 TABLET, EXTENDED RELEASE ORAL 2 TIMES DAILY
Qty: 180 TAB | Refills: 0 | Status: SHIPPED | OUTPATIENT
Start: 2017-08-10 | End: 2017-09-05 | Stop reason: SDUPTHER

## 2017-08-10 NOTE — PROGRESS NOTES
1. Have you been to the ER, urgent care clinic since your last visit? Hospitalized since your last visit? No    2. Have you seen or consulted any other health care providers outside of the [address] [place] [address] since your last visit? Include any pap smears or colon screening.  No

## 2017-08-10 NOTE — MR AVS SNAPSHOT
Visit Information Date & Time Provider Department Dept. Phone Encounter #  
 8/10/2017  3:00 PM [id number], 16[address] [facility] [phone] [id number] Follow-up Instructions Return in about 3 months (around 11/10/2017). Follow-up and Disposition History Your Appointments 9/26/2017  1:00 PM  
Follow Up with Shruthi Hale MD  
[address]) Appt Note: 4 months follow up  
 [address] [address] [place]  
  
   
 [address] [facility] 88 710 [facility] [address] Upcoming Health Maintenance Date Due  
 EYE EXAM RETINAL OR DILATED Q1 3/3/2016 FOOT EXAM Q1 11/23/2016 INFLUENZA AGE 9 TO ADULT 8/1/2017 GLAUCOMA SCREENING Q2Y 8/9/2017 Pneumococcal 65+ High/Highest Risk (1 of 2 - PCV13) 8/9/2017 MEDICARE YEARLY EXAM 1/20/2018 HEMOGLOBIN A1C Q6M 1/28/2018 MICROALBUMIN Q1 7/26/2018 LIPID PANEL Q1 7/28/2018 PAP AKA CERVICAL CYTOLOGY 10/27/2018 BREAST CANCER SCRN MAMMOGRAM 12/5/2018 COLONOSCOPY 10/27/2025 DTaP/Tdap/Td series (2 - Td) 11/23/2025 Allergies as of 8/10/2017  Review Complete On: 8/10/2017 By: [id number] [address] FR[address] MD  
  
 Severity Noted Reaction Type Reactions Metformin  10/22/2015    Other (comments) Patient states that she is not allergic to metformin. Current Immunizations  Reviewed on 11/15/2016 Name Date Influenza Vaccine 10/17/2016, 11/23/2015 10:18 AM  
 Influenza Vaccine (Madin [place] Canine Kidney) PF 2/4/2015 10:00 AM  
 Pneumococcal Polysaccharide (PPSV-23) 2/4/2015  9:57 AM  
 Tdap 11/23/2015 10:19 AM  
  
 Not reviewed this visit You Were Diagnosed With   
  
 Codes Comments Seizure disorder ([facility]    -  Primary ICD-10-CM: D13.428 ICD-9-CM: 345.90 Type 2 diabetes mellitus with diabetic neuropathy, with long-term current use of insulin (HCC)     ICD-10-CM: E11.40, Z79.4 ICD-9-CM: 250.60, 357.2, V58.67 History of CVA (cerebrovascular accident)     ICD-10-CM: Z86.73 
ICD-9-CM: V12.54 Leg swelling     ICD-10-CM: M79.89 ICD-9-CM: 729.81 Gastroesophageal reflux disease without esophagitis     ICD-10-CM: K21.9 ICD-9-CM: 530.81 Vitals BP Pulse Temp Resp Height(growth percentile) Weight(growth percentile) 148/76 88 98.7 °F (37.1 °C) (Oral) 20 5' 6\" (1.676 m) 232 lb (105.2 kg) SpO2 BMI OB Status Smoking Status 100% 37.45 kg/m2 Postmenopausal Never Smoker BMI and BSA Data Body Mass Index Body Surface Area  
 37.45 kg/m 2 2.21 m 2 Preferred Pharmacy Pharmacy Name Phone [facility] - [address], [address] [phone] Your Updated Medication List  
  
   
This list is accurate as of: 8/10/17  4:26 PM.  Always use your most recent med list. amLODIPine 5 mg tablet Commonly known as:  Tone Lute Take 1 Tab by mouth nightly. aspirin-dipyridamole  mg per SR capsule Commonly known as:  AGGRENOX Take 1 Cap by mouth two (2) times a day. atorvastatin 40 mg tablet Commonly known as:  LIPITOR Take 1 Tab by mouth daily. carvedilol 12.5 mg tablet Commonly known as:  Halima Ready Take 1 Tab by mouth two (2) times daily (with meals). clotrimazole 1 % topical cream  
Commonly known as:  [place] Goodie Apply  to affected area two (2) times a day. divalproex  mg ER tablet Commonly known as:  DEPAKOTE ER Take 1 Tab by mouth two (2) times a day. furosemide 20 mg tablet Commonly known as:  LASIX One po daily  
  
 glucose blood VI test strips strip Commonly known as:  FREESTYLE LITE STRIPS Monitor blood sugars twice daily  
  
 hydroCHLOROthiazide 25 mg tablet Commonly known as:  HYDRODIURIL Take 1 Tab by mouth daily. insulin detemir 100 unit/mL injection Commonly known as:  LEVEMIR  
30 Units by SubCUTAneous route two (2) times a day. insulin lispro 100 unit/mL injection Commonly known as:  HUMALOG  
6 Units by SubCUTAneous route Before breakfast, lunch, and dinner. isosorbide mononitrate ER 30 mg tablet Commonly known as:  IMDUR Take 1 Tab by mouth daily. letrozole 2.5 mg tablet Commonly known as:  [facility] Take 2.5 mg by mouth daily. levETIRAcetam 500 mg tablet Commonly known as:  KEPPRA Take 1 Tab by mouth two (2) times a day. levoFLOXacin 750 mg tablet Commonly known as:  Kayy Ground Take 1 Tab by mouth daily. losartan 50 mg tablet Commonly known as:  COZAAR Take 1 Tab by mouth two (2) times a day. lubiPROStone 8 mcg capsule Commonly known as:  Audrene Rings Take 1 Cap by mouth daily (with breakfast). pantoprazole 40 mg tablet Commonly known as:  PROTONIX Take 1 Tab by mouth daily. SONAFINE Emul topical  
Generic drug:  emollient combination no. 10  
  
 therapeutic multivitamin tablet Commonly known as:  [facility] Take 1 Tab by mouth daily. VITAMIN D3 1,000 unit tablet Generic drug:  cholecalciferol Take 1,000 Units by mouth daily. Prescriptions Sent to Pharmacy Refills  
 levETIRAcetam (KEPPRA) 500 mg tablet 0 Sig: Take 1 Tab by mouth two (2) times a day. Class: Normal  
 Pharmacy: [address] [place] [address], [employer] Ph #: [phone] Route: Oral  
 divalproex ER (DEPAKOTE ER) 500 mg ER tablet 0 Sig: Take 1 Tab by mouth two (2) times a day. Class: Normal  
 Pharmacy: [address] [place] [address], [employer] Ph #: [phone] Route: Oral  
  
Follow-up Instructions Return in about 3 months (around 11/10/2017). Introducing [facility] & HEALTH SERVICES! Ramón Padilla introduces [employer] patient portal. Now you can access parts of your medical record, email your doctor's office, and request medication refills online. 1. In your internet browser, go to [email or web address] 2. Click on the First Time User? Click Here link in the Sign In box. You will see the New Member Sign Up page. 3. Enter your [employer] Access Code exactly as it appears below. You will not need to use this code after youve completed the sign-up process. If you do not sign up before the expiration date, you must request a new code. · [employer] Access Code: [email or web address] Expires: 8/28/2017  1:23 PM 
 
4. Enter the last four digits of your Social Security Number (xxxx) and Date of Birth (mm/dd/yyyy) as indicated and click Submit. You will be taken to the next sign-up page. 5. Create a [employer]zter ID. This will be your [employer]ter login ID and cannot be changed, so think of one that is secure and easy to remember. 6. Create a Magzter password. You can change your password at any time. 7. Enter your Password Reset Question and Answer. This can be used at a later time if you forget your password. 8. Enter your e-mail address. You will receive e-mail notification when new information is available in [address] 9. Click Sign Up. You can now view and download portions of your medical record. 10. Click the Download Summary menu link to download a portable copy of your medical information. If you have questions, please visit the Frequently Asked Questions section of the [employer] website. Remember, [employer] is NOT to be used for urgent needs. For medical emergencies, dial 911. Now available from your iPhone and Android! Please provide this summary of care documentation to your next provider. Your primary care clinician is listed as [id number]. If you have any questions after today's visit, please call [phone].

## 2017-08-10 NOTE — PROGRESS NOTES
Olaf Bates is a 74 y.o.  female and presents with     Chief Complaint   Patient presents with    Medication Refill    Diabetes    Seizure    Hypertension    Extremity Weakness    Cholesterol Problem    Leg Swelling       Pt is here for follow up on labs. Pts sugars have improved. Pt is taking lasix but still has some swelling in legs. Pt denies any SOB. Pt has appt with Neurology in a few weeks. Pt is not sure if she is taking Norvasc. Cosntiaption has improved. Past Medical History:   Diagnosis Date    Arthritis     Breast CA ([address]     Diabetes ([address]     Hypercholesteremia     Hypertension     Menopause     Psychiatric disorder     DEPRESSION    PUD (peptic ulcer disease)     S/P cardiac cath 02/2015    No angiographic evidence of CAD    Seizures (HCC)     LAST SEIZURE ABOUT 2 MONTHS AGO (NOV 2015)    Stroke ([facility] [address]     x2 with left side deficit     Past Surgical History:   Procedure Laterality Date    HX BREAST LUMPECTOMY Right 1/12/2016    Dr. Bouchra Le Partial Mastectomy w./ axillary lymphadenectomy    HX HEENT      TONSILLECTOMY    HX MASTECTOMY Right 1/26/2016    Dr. Bouchra Le Repeat Partial Mastectomy for positive margins    HX ORTHOPAEDIC      left arm surg    IR MEDIPORT       Current Outpatient Prescriptions   Medication Sig    insulin detemir (LEVEMIR) 100 unit/mL injection 30 Units by SubCUTAneous route two (2) times a day.  insulin lispro (HUMALOG) 100 unit/mL injection 6 Units by SubCUTAneous route Before breakfast, lunch, and dinner.  furosemide (LASIX) 20 mg tablet One po daily    lubiPROStone (AMITIZA) 8 mcg capsule Take 1 Cap by mouth daily (with breakfast).  losartan (COZAAR) 50 mg tablet Take 1 Tab by mouth two (2) times a day.  carvedilol (COREG) 12.5 mg tablet Take 1 Tab by mouth two (2) times daily (with meals).  amLODIPine (NORVASC) 5 mg tablet Take 1 Tab by mouth nightly.     hydroCHLOROthiazide (HYDRODIURIL) 25 mg tablet Take 1 Tab by mouth daily.  levETIRAcetam (KEPPRA) 500 mg tablet Take 1 Tab by mouth two (2) times a day.  atorvastatin (LIPITOR) 40 mg tablet Take 1 Tab by mouth daily.  isosorbide mononitrate ER (IMDUR) 30 mg tablet Take 1 Tab by mouth daily.  pantoprazole (PROTONIX) 40 mg tablet Take 1 Tab by mouth daily.  aspirin-dipyridamole (AGGRENOX)  mg per SR capsule Take 1 Cap by mouth two (2) times a day.  divalproex ER (DEPAKOTE ER) 500 mg ER tablet Take 1 Tab by mouth two (2) times a day.  letrozole (FEMARA) 2.5 mg tablet Take 2.5 mg by mouth daily.  SONAFINE emul topical     cholecalciferol (VITAMIN D3) 1,000 unit tablet Take 1,000 Units by mouth daily.  levoFLOXacin (LEVAQUIN) 750 mg tablet Take 1 Tab by mouth daily.  glucose blood VI test strips (FREESTYLE LITE STRIPS) strip Monitor blood sugars twice daily    therapeutic multivitamin (THERAGRAN) tablet Take 1 Tab by mouth daily.  clotrimazole (LOTRIMIN) 1 % topical cream Apply  to affected area two (2) times a day. No current facility-administered medications for this visit.       Health Maintenance   Topic Date Due    EYE EXAM RETINAL OR DILATED Q1  03/03/2016    FOOT EXAM Q1  11/23/2016    INFLUENZA AGE 9 TO ADULT  08/01/2017    GLAUCOMA SCREENING Q2Y  08/09/2017    Pneumococcal 65+ High/Highest Risk (1 of 2 - PCV13) 08/09/2017    MEDICARE YEARLY EXAM  01/20/2018    HEMOGLOBIN A1C Q6M  01/28/2018    MICROALBUMIN Q1  07/26/2018    LIPID PANEL Q1  07/28/2018    PAP AKA CERVICAL CYTOLOGY  10/27/2018    BREAST CANCER SCRN MAMMOGRAM  12/05/2018    COLONOSCOPY  10/27/2025    DTaP/Tdap/Td series (2 - Td) 11/23/2025    Hepatitis C Screening  Completed    OSTEOPOROSIS SCREENING (DEXA)  Completed    ZOSTER VACCINE AGE 60>  Completed     Immunization History   Administered Date(s) Administered    Influenza Vaccine 11/23/2015, 10/17/2016    Influenza Vaccine ([facility] [place] Canine Kidney) PF 02/04/2015    Pneumococcal Polysaccharide (PPSV-23) 02/04/2015    Tdap 11/23/2015     No LMP recorded. Patient is postmenopausal.        Allergies and Intolerances: Allergies   Allergen Reactions    Metformin Other (comments)     Patient states that she is not allergic to metformin. Family History:   Family History   Problem Relation Age of Onset    Cancer Father [de-identified]     colon    Hypertension Mother     Heart Disease Mother     Diabetes Mother        Social History:   She  reports that she has never smoked. She has never used smokeless tobacco.  She  reports that she does not drink alcohol.             Review of Systems:   General: negative for - chills, fatigue, fever, weight change  Psych: negative for - anxiety, depression, irritability or mood swings  ENT: negative for - headaches, hearing change, nasal congestion, oral lesions, sneezing or sore throat  Heme/ Lymph: negative for - bleeding problems, bruising, pallor or swollen lymph nodes  Endo: negative for - hot flashes, polydipsia/polyuria or temperature intolerance  Resp: negative for - cough, shortness of breath or wheezing  CV: negative for - chest pain, edema or palpitations  GI: negative for - abdominal pain, change in bowel habits, constipation, diarrhea or nausea/vomiting  : negative for - dysuria, hematuria, incontinence, pelvic pain or vulvar/vaginal symptoms  MSK: negative for - joint pain, joint swelling or muscle pain  Neuro: negative for - confusion, headaches, seizures or weakness  Derm: negative for - dry skin, hair changes, rash or skin lesion changes          Physical:   Vitals:   Vitals:    08/10/17 1449   BP: 148/76   Pulse: 88   Resp: 20   Temp: 98.7 °F (37.1 °C)   TempSrc: Oral   SpO2: 100%   Weight: 232 lb (105.2 kg)   Height: 5' 6\" (1.676 m)           Exam:   HEENT- atraumatic,normocephalic, awake, oriented, well nourished  Neck - supple,no enlarged lymph nodes, no JVD, no thyromegaly  Chest- CTA, no rhonchi, no crackles  Heart- rrr, no murmurs / gallop/rub  Abdomen- soft,BS+,NT, no hepatosplenomegaly  Ext - no c/c/edema   Neuro- no focal deficits. Power 5/5 all extremities  Skin - warm,dry, no obvious rashes. Review of Data:   LABS:   Lab Results   Component Value Date/Time    WBC 5.8 03/26/2017 10:32 PM    HGB 9.9 03/26/2017 10:32 PM    HCT 30.7 03/26/2017 10:32 PM    PLATELET 483 75/75/1947 10:32 PM     Lab Results   Component Value Date/Time    Sodium 139 07/28/2017 10:31 AM    Potassium 4.3 07/28/2017 10:31 AM    Chloride 106 07/28/2017 10:31 AM    CO2 23 07/28/2017 10:31 AM    Glucose 175 07/28/2017 10:31 AM    BUN 23 07/28/2017 10:31 AM    Creatinine 1.10 07/28/2017 10:31 AM     Lab Results   Component Value Date/Time    Cholesterol, total 222 07/28/2017 10:31 AM    HDL Cholesterol 86 07/28/2017 10:31 AM    LDL, calculated 111.8 07/28/2017 10:31 AM    Triglyceride 121 07/28/2017 10:31 AM     No results found for: GPT        Impression / Plan:        ICD-10-CM ICD-9-CM    1. Seizure disorder ([facility] [address] G40.909 345.90    2. Type 2 diabetes mellitus with diabetic neuropathy, with long-term current use of insulin (HCC) E11.40 250.60     Z79.4 357.2      V58.67    3. History of CVA (cerebrovascular accident) Z86.73 V12.54    4. Leg swelling M79.89 729.81    5. Gastroesophageal reflux disease without esophagitis K21.9 530.81      DM - A1c - improved     Constipaiton - symptoms improved    Seizure disorder - stable , has appt with Neurology    Breast cancer -stable, in remission, sees Dr Shweta Gayle. Explained to patient risk benefits of the medications. Advised patient to stop meds if having any side effects. Pt verbalized understanding of the instructions. I have discussed the diagnosis with the patient and the intended plan as seen in the above orders. The patient has received an after-visit summary and questions were answered concerning future plans. I have discussed medication side effects and warnings with the patient as well.  I have reviewed the plan of care with the patient, accepted their input and they are in agreement with the treatment goals. Reviewed plan of care. Patient has provided input and agrees with goals.     Follow-up Disposition: Not on Primus MD Zaria

## 2017-08-22 ENCOUNTER — HOSPITAL ENCOUNTER (OUTPATIENT)
Dept: GENERAL RADIOLOGY | Age: 67
Discharge: HOME | End: 2017-08-22
Payer: MEDICARE

## 2017-08-22 ENCOUNTER — OFFICE VISIT (OUTPATIENT)
Dept: FAMILY MEDICINE CLINIC | Age: 67
End: 2017-08-22

## 2017-08-22 VITALS
OXYGEN SATURATION: 98 % | HEIGHT: 66 IN | SYSTOLIC BLOOD PRESSURE: 137 MMHG | BODY MASS INDEX: 37.09 KG/M2 | DIASTOLIC BLOOD PRESSURE: 81 MMHG | RESPIRATION RATE: 16 BRPM | HEART RATE: 87 BPM | WEIGHT: 230.8 LBS | TEMPERATURE: 98.9 F

## 2017-08-22 DIAGNOSIS — K59.00 CONSTIPATION, UNSPECIFIED CONSTIPATION TYPE: Primary | ICD-10-CM

## 2017-08-22 DIAGNOSIS — K59.00 CONSTIPATION, UNSPECIFIED CONSTIPATION TYPE: ICD-10-CM

## 2017-08-22 PROCEDURE — 74000 XR ABD (KUB): CPT

## 2017-08-22 RX ORDER — DOCUSATE SODIUM 100 MG/1
100 CAPSULE, LIQUID FILLED ORAL 2 TIMES DAILY
Qty: 60 CAP | Refills: 2 | Status: SHIPPED | OUTPATIENT
Start: 2017-08-22 | End: 2017-11-20

## 2017-08-22 RX ORDER — FACIAL-BODY WIPES
10 EACH TOPICAL DAILY
Qty: 10 SUPPOSITORY | Refills: 1 | Status: SHIPPED | OUTPATIENT
Start: 2017-08-22 | End: 2017-08-29 | Stop reason: SDUPTHER

## 2017-08-22 RX ORDER — LACTULOSE 10 G/15ML
30 SOLUTION ORAL; RECTAL
Qty: 480 ML | Refills: 3 | Status: SHIPPED | OUTPATIENT
Start: 2017-08-22 | End: 2017-08-29 | Stop reason: SDUPTHER

## 2017-08-22 NOTE — PROGRESS NOTES
Jennifer Edwards is a 74 y.o. female presents today for compliant of constipation. Patient reports that she last had a bowel movement last week. Patient reports of some abdominal pain. Pt is in Room # 21        1. Have you been to the ER, urgent care clinic since your last visit? Hospitalized since your last visit? No    2. Have you seen or consulted any other health care providers outside of the [address] [place] [address] since your last visit? Include any pap smears or colon screening.  No

## 2017-08-22 NOTE — PROGRESS NOTES
Karen Aldana is a 74 y.o.  female and presents with     Chief Complaint   Patient presents with    Constipation       Pt says her constipation had improved on Amitiza. However now she has recurrence of symptoms. Pt has not had Bm in a week. Does not have great appeitite. No vomiting. No chills or fevers. Past Medical History:   Diagnosis Date    Arthritis     Breast CA ([address]     Diabetes ([address]     Hypercholesteremia     Hypertension     Menopause     Psychiatric disorder     DEPRESSION    PUD (peptic ulcer disease)     S/P cardiac cath 02/2015    No angiographic evidence of CAD    Seizures (HCC)     LAST SEIZURE ABOUT 2 MONTHS AGO (NOV 2015)    Stroke ([facility] [address]     x2 with left side deficit     Past Surgical History:   Procedure Laterality Date    HX BREAST LUMPECTOMY Right 1/12/2016    Dr. Genevie Saint Partial Mastectomy w./ axillary lymphadenectomy    HX HEENT      TONSILLECTOMY    HX MASTECTOMY Right 1/26/2016    Dr. Genevie Saint Repeat Partial Mastectomy for positive margins    HX ORTHOPAEDIC      left arm surg    IR MEDIPORT       Current Outpatient Prescriptions   Medication Sig    lactulose (CHRONULAC) 10 gram/15 mL solution Take 45 mL by mouth nightly.  bisacodyl (DULCOLAX, BISACODYL,) 10 mg suppository Insert 10 mg into rectum daily.  docusate sodium (COLACE) 100 mg capsule Take 1 Cap by mouth two (2) times a day for 90 days.  levETIRAcetam (KEPPRA) 500 mg tablet Take 1 Tab by mouth two (2) times a day.  divalproex ER (DEPAKOTE ER) 500 mg ER tablet Take 1 Tab by mouth two (2) times a day.  insulin detemir (LEVEMIR) 100 unit/mL injection 30 Units by SubCUTAneous route two (2) times a day.  insulin lispro (HUMALOG) 100 unit/mL injection 6 Units by SubCUTAneous route Before breakfast, lunch, and dinner.  furosemide (LASIX) 20 mg tablet One po daily    lubiPROStone (AMITIZA) 8 mcg capsule Take 1 Cap by mouth daily (with breakfast).     losartan (COZAAR) 50 mg tablet Take 1 Tab by mouth two (2) times a day.  carvedilol (COREG) 12.5 mg tablet Take 1 Tab by mouth two (2) times daily (with meals).  amLODIPine (NORVASC) 5 mg tablet Take 1 Tab by mouth nightly.  hydroCHLOROthiazide (HYDRODIURIL) 25 mg tablet Take 1 Tab by mouth daily.  atorvastatin (LIPITOR) 40 mg tablet Take 1 Tab by mouth daily.  isosorbide mononitrate ER (IMDUR) 30 mg tablet Take 1 Tab by mouth daily.  pantoprazole (PROTONIX) 40 mg tablet Take 1 Tab by mouth daily.  aspirin-dipyridamole (AGGRENOX)  mg per SR capsule Take 1 Cap by mouth two (2) times a day.  letrozole (FEMARA) 2.5 mg tablet Take 2.5 mg by mouth daily.  SONAFINE emul topical     cholecalciferol (VITAMIN D3) 1,000 unit tablet Take 1,000 Units by mouth daily.  glucose blood VI test strips (FREESTYLE LITE STRIPS) strip Monitor blood sugars twice daily    therapeutic multivitamin (THERAGRAN) tablet Take 1 Tab by mouth daily.  clotrimazole (LOTRIMIN) 1 % topical cream Apply  to affected area two (2) times a day. No current facility-administered medications for this visit.       Health Maintenance   Topic Date Due    EYE EXAM RETINAL OR DILATED Q1  03/03/2016    FOOT EXAM Q1  11/23/2016    INFLUENZA AGE 9 TO ADULT  08/01/2017    GLAUCOMA SCREENING Q2Y  08/09/2017    Pneumococcal 65+ High/Highest Risk (1 of 2 - PCV13) 08/09/2017    MEDICARE YEARLY EXAM  01/20/2018    HEMOGLOBIN A1C Q6M  01/28/2018    MICROALBUMIN Q1  07/26/2018    LIPID PANEL Q1  07/28/2018    PAP AKA CERVICAL CYTOLOGY  10/27/2018    BREAST CANCER SCRN MAMMOGRAM  12/05/2018    COLONOSCOPY  10/27/2025    DTaP/Tdap/Td series (2 - Td) 11/23/2025    Hepatitis C Screening  Completed    OSTEOPOROSIS SCREENING (DEXA)  Completed    ZOSTER VACCINE AGE 60>  Completed     Immunization History   Administered Date(s) Administered    Influenza Vaccine 11/23/2015, 10/17/2016    Influenza Vaccine (Madin [place] Canine Kidney) PF 02/04/2015    Pneumococcal Polysaccharide (PPSV-23) 02/04/2015    Tdap 11/23/2015     No LMP recorded. Patient is postmenopausal.        Allergies and Intolerances: Allergies   Allergen Reactions    Metformin Other (comments)     Patient states that she is not allergic to metformin. Family History:   Family History   Problem Relation Age of Onset    Cancer Father [de-identified]     colon    Hypertension Mother     Heart Disease Mother     Diabetes Mother        Social History:   She  reports that she has never smoked. She has never used smokeless tobacco.  She  reports that she does not drink alcohol.             Review of Systems:   General: negative for - chills, fatigue, fever, weight change  Psych: negative for - anxiety, depression, irritability or mood swings  ENT: negative for - headaches, hearing change, nasal congestion, oral lesions, sneezing or sore throat  Heme/ Lymph: negative for - bleeding problems, bruising, pallor or swollen lymph nodes  Endo: negative for - hot flashes, polydipsia/polyuria or temperature intolerance  Resp: negative for - cough, shortness of breath or wheezing  CV: negative for - chest pain, edema or palpitations  GI: negative for - abdominal pain, change in bowel habits, pos ofr constipation  : negative for - dysuria, hematuria, incontinence, pelvic pain or vulvar/vaginal symptoms  MSK: negative for - joint pain, joint swelling or muscle pain  Neuro: negative for - confusion, headaches, seizures or weakness  Derm: negative for - dry skin, hair changes, rash or skin lesion changes          Physical:   Vitals:   Vitals:    08/22/17 1358   BP: 137/81   Pulse: 87   Resp: 16   Temp: 98.9 °F (37.2 °C)   TempSrc: Oral   SpO2: 98%   Weight: 230 lb 12.8 oz (104.7 kg)   Height: 5' 6\" (1.676 m)           Exam:   HEENT- atraumatic,normocephalic, awake, oriented, well nourished  Neck - supple,no enlarged lymph nodes, no JVD, no thyromegaly  Chest- CTA, no rhonchi, no crackles  Heart- rrr, no murmurs / gallop/rub  Abdomen- soft,BS+,NT, no hepatosplenomegaly, mild lower abdominal tendneress, dulllness to percusssion  Ext - no c/c/edema   Neuro- no focal deficits. Power 5/5 all extremities  Skin - warm,dry, no obvious rashes. Review of Data:   LABS:   Lab Results   Component Value Date/Time    WBC 5.8 03/26/2017 10:32 PM    HGB 9.9 03/26/2017 10:32 PM    HCT 30.7 03/26/2017 10:32 PM    PLATELET 438 34/44/4473 10:32 PM     Lab Results   Component Value Date/Time    Sodium 139 07/28/2017 10:31 AM    Potassium 4.3 07/28/2017 10:31 AM    Chloride 106 07/28/2017 10:31 AM    CO2 23 07/28/2017 10:31 AM    Glucose 175 07/28/2017 10:31 AM    BUN 23 07/28/2017 10:31 AM    Creatinine 1.10 07/28/2017 10:31 AM     Lab Results   Component Value Date/Time    Cholesterol, total 222 07/28/2017 10:31 AM    HDL Cholesterol 86 07/28/2017 10:31 AM    LDL, calculated 111.8 07/28/2017 10:31 AM    Triglyceride 121 07/28/2017 10:31 AM     No results found for: GPT        Impression / Plan:        ICD-10-CM ICD-9-CM    1. Constipation, unspecified constipation type K59.00 564.00 lactulose (CHRONULAC) 10 gram/15 mL solution      bisacodyl (DULCOLAX, BISACODYL,) 10 mg suppository      XR ABD (KUB)      docusate sodium (COLACE) 100 mg capsule       Explained to patient risk benefits of the medications. Advised patient to stop meds if having any side effects. Pt verbalized understanding of the instructions. I have discussed the diagnosis with the patient and the intended plan as seen in the above orders. The patient has received an after-visit summary and questions were answered concerning future plans. I have discussed medication side effects and warnings with the patient as well. I have reviewed the plan of care with the patient, accepted their input and they are in agreement with the treatment goals. Reviewed plan of care. Patient has provided input and agrees with goals.     Follow-up Disposition: Not on File    Toi JARAMILLO Brennon Lira MD

## 2017-08-22 NOTE — MR AVS SNAPSHOT
Visit Information Date & Time Provider Department Dept. Phone Encounter #  
 8/22/2017  1:45 PM Betty Pedroradha, 55[address] [facility] [phone] Follow-up Instructions Return in about 1 week (around 8/29/2017). Your Appointments 9/26/2017  1:00 PM  
Follow Up with Katrina Blanco MD  
[address] [facility]) Appt Note: 4 months follow up  
 [address] [facility] [address] [facility] Abdirizak De Gasperi [address] [place] [address]  
  
    
 11/9/2017  3:00 PM  
Follow Up with Betty Doan MD  
[facility] 3651 [facility]) Appt Note: Return in 3 months (11/9/17) for follow up. [address] [facility] [address]  
  
   
 [address] [facility] Upcoming Health Maintenance Date Due  
 EYE EXAM RETINAL OR DILATED Q1 3/3/2016 FOOT EXAM Q1 11/23/2016 INFLUENZA AGE 9 TO ADULT 8/1/2017 GLAUCOMA SCREENING Q2Y 8/9/2017 Pneumococcal 65+ High/Highest Risk (1 of 2 - PCV13) 8/9/2017 MEDICARE YEARLY EXAM 1/20/2018 HEMOGLOBIN A1C Q6M 1/28/2018 MICROALBUMIN Q1 7/26/2018 LIPID PANEL Q1 7/28/2018 PAP AKA CERVICAL CYTOLOGY 10/27/2018 BREAST CANCER SCRN MAMMOGRAM 12/5/2018 COLONOSCOPY 10/27/2025 DTaP/Tdap/Td series (2 - Td) 11/23/2025 Allergies as of 8/22/2017  Review Complete On: 8/22/2017 By: Kenan Vazquez LPN Severity Noted Reaction Type Reactions Metformin  10/22/2015    Other (comments) Patient states that she is not allergic to metformin. Current Immunizations  Reviewed on 11/15/2016 Name Date Influenza Vaccine 10/17/2016, 11/23/2015 10:18 AM  
 Influenza Vaccine (Madin Tate Canine Kidney) PF 2/4/2015 10:00 AM  
 Pneumococcal Polysaccharide (PPSV-23) 2/4/2015  9:57 AM  
 Tdap 11/23/2015 10:19 AM  
  
 Not reviewed this visit You Were Diagnosed With   
  
 Codes Comments Constipation, unspecified constipation type    -  Primary ICD-10-CM: K59.00 ICD-9-CM: 564.00 Vitals BP Pulse Temp Resp Height(growth percentile) Weight(growth percentile) 137/81 (BP 1 Location: Left arm, BP Patient Position: Sitting) 87 98.9 °F (37.2 °C) (Oral) 16 5' 6\" (1.676 m) 230 lb 12.8 oz (104.7 kg) SpO2 BMI OB Status Smoking Status 98% 37.25 kg/m2 Postmenopausal Never Smoker BMI and BSA Data Body Mass Index Body Surface Area  
 37.25 kg/m 2 2.21 m 2 Preferred Pharmacy Pharmacy Name Phone [facility] - [address], [address] [phone] Your Updated Medication List  
  
   
This list is accurate as of: 8/22/17  2:35 PM.  Always use your most recent med list. amLODIPine 5 mg tablet Commonly known as:  Davis Inks Take 1 Tab by mouth nightly. aspirin-dipyridamole  mg per SR capsule Commonly known as:  AGGRENOX Take 1 Cap by mouth two (2) times a day. atorvastatin 40 mg tablet Commonly known as:  LIPITOR Take 1 Tab by mouth daily. bisacodyl 10 mg suppository Commonly known as:  DULCOLAX (BISACODYL) Insert 10 mg into rectum daily. carvedilol 12.5 mg tablet Commonly known as:  Edison Heal Take 1 Tab by mouth two (2) times daily (with meals). clotrimazole 1 % topical cream  
Commonly known as:  Quenten Purl Apply  to affected area two (2) times a day. divalproex  mg ER tablet Commonly known as:  DEPAKOTE ER Take 1 Tab by mouth two (2) times a day. docusate sodium 100 mg capsule Commonly known as:  Jamie Parody Take 1 Cap by mouth two (2) times a day for 90 days. furosemide 20 mg tablet Commonly known as:  LASIX One po daily  
  
 glucose blood VI test strips strip Commonly known as:  FREESTYLE LITE STRIPS Monitor blood sugars twice daily  
  
 hydroCHLOROthiazide 25 mg tablet Commonly known as:  HYDRODIURIL Take 1 Tab by mouth daily. insulin detemir 100 unit/mL injection Commonly known as:  LEVEMIR  
30 Units by SubCUTAneous route two (2) times a day. insulin lispro 100 unit/mL injection Commonly known as:  HUMALOG  
6 Units by SubCUTAneous route Before breakfast, lunch, and dinner. isosorbide mononitrate ER 30 mg tablet Commonly known as:  IMDUR Take 1 Tab by mouth daily. lactulose 10 gram/15 mL solution Commonly known as:  Marimar Basket Take 45 mL by mouth nightly. letrozole 2.5 mg tablet Commonly known as:  [facility] Take 2.5 mg by mouth daily. levETIRAcetam 500 mg tablet Commonly known as:  KEPPRA Take 1 Tab by mouth two (2) times a day. losartan 50 mg tablet Commonly known as:  COZAAR Take 1 Tab by mouth two (2) times a day. lubiPROStone 8 mcg capsule Commonly known as:  Maddy Stagers Take 1 Cap by mouth daily (with breakfast). pantoprazole 40 mg tablet Commonly known as:  PROTONIX Take 1 Tab by mouth daily. SONAFINE Emul topical  
Generic drug:  emollient combination no. 10  
  
 therapeutic multivitamin tablet Commonly known as:  [facility] Take 1 Tab by mouth daily. VITAMIN D3 1,000 unit tablet Generic drug:  cholecalciferol Take 1,000 Units by mouth daily. Prescriptions Sent to Pharmacy Refills  
 lactulose (CHRONULAC) 10 gram/15 mL solution 3 Sig: Take 45 mL by mouth nightly. Class: Normal  
 Pharmacy: [address] [place], [place] [address]y I, [address] Ph #: [phone] Route: Oral  
 bisacodyl (DULCOLAX, BISACODYL,) 10 mg suppository 1 Sig: Insert 10 mg into rectum daily. Class: Normal  
 [facility]: [facility] [address], [address] Ph #: [phone] Route: Rectal  
 docusate sodium (COLACE) 100 mg capsule 2 Sig: Take 1 Cap by mouth two (2) times a day for 90 days.   
 Class: Normal  
 Pharmacy: [address], [address] [employer]  #: [phone] Route: Oral  
  
Follow-up Instructions Return in about 1 week (around 8/29/2017). To-Do List   
 08/22/2017 Imaging:  XR ABD (KUB) Introducing [facility] & HEALTH SERVICES! [facility] introduces GVISP 1 patient portal. Now you can access parts of your medical record, email your doctor's office, and request medication refills online. 1. In your internet browser, go to [email or web address] 2. Click on the First Time User? Click Here link in the Sign In box. You will see the New Member Sign Up page. 3. Enter your GVISP 1 Access Code exactly as it appears below. You will not need to use this code after youve completed the sign-up process. If you do not sign up before the expiration date, you must request a new code. · GVISP 1 Access Code: [email or web address] Expires: 8/28/2017  1:23 PM 
 
4. Enter the last four digits of your Social Security Number (xxxx) and Date of Birth (mm/dd/yyyy) as indicated and click Submit. You will be taken to the next sign-up page. 5. Create a GVISP 1 ID. This will be your GVISP 1 login ID and cannot be changed, so think of one that is secure and easy to remember. 6. Create a GVISP 1 password. You can change your password at any time. 7. Enter your Password Reset Question and Answer. This can be used at a later time if you forget your password. 8. Enter your e-mail address. You will receive e-mail notification when new information is available in [address] 9. Click Sign Up. You can now view and download portions of your medical record. 10. Click the Download Summary menu link to download a portable copy of your medical information. If you have questions, please visit the Frequently Asked Questions section of the GVISP 1 website. Remember, GVISP 1 is NOT to be used for urgent needs. For medical emergencies, dial 911. Now available from your iPhone and Android! Please provide this summary of care documentation to your next provider. Your primary care clinician is listed as Betty Doan. If you have any questions after today's visit, please call [phone].

## 2017-08-29 ENCOUNTER — OFFICE VISIT (OUTPATIENT)
Dept: FAMILY MEDICINE CLINIC | Age: 67
End: 2017-08-29

## 2017-08-29 ENCOUNTER — HOSPITAL ENCOUNTER (OUTPATIENT)
Dept: LAB | Age: 67
Discharge: HOME | End: 2017-08-29
Payer: MEDICARE

## 2017-08-29 VITALS
DIASTOLIC BLOOD PRESSURE: 92 MMHG | TEMPERATURE: 97.9 F | HEART RATE: 81 BPM | RESPIRATION RATE: 18 BRPM | OXYGEN SATURATION: 100 % | BODY MASS INDEX: 37.19 KG/M2 | WEIGHT: 231.4 LBS | SYSTOLIC BLOOD PRESSURE: 199 MMHG | HEIGHT: 66 IN

## 2017-08-29 DIAGNOSIS — R10.10 PAIN OF UPPER ABDOMEN: ICD-10-CM

## 2017-08-29 DIAGNOSIS — Z23 ENCOUNTER FOR IMMUNIZATION: ICD-10-CM

## 2017-08-29 DIAGNOSIS — K80.20 GALLSTONES: ICD-10-CM

## 2017-08-29 DIAGNOSIS — K59.00 CONSTIPATION, UNSPECIFIED CONSTIPATION TYPE: Primary | ICD-10-CM

## 2017-08-29 DIAGNOSIS — R10.9 ABDOMINAL PAIN, UNSPECIFIED LOCATION: Primary | ICD-10-CM

## 2017-08-29 PROCEDURE — 80053 COMPREHEN METABOLIC PANEL: CPT | Performed by: INTERNAL MEDICINE

## 2017-08-29 PROCEDURE — 83690 ASSAY OF LIPASE: CPT | Performed by: INTERNAL MEDICINE

## 2017-08-29 PROCEDURE — 82150 ASSAY OF AMYLASE: CPT | Performed by: INTERNAL MEDICINE

## 2017-08-29 PROCEDURE — 85025 COMPLETE CBC W/AUTO DIFF WBC: CPT | Performed by: INTERNAL MEDICINE

## 2017-08-29 PROCEDURE — 83605 ASSAY OF LACTIC ACID: CPT | Performed by: INTERNAL MEDICINE

## 2017-08-29 PROCEDURE — 36415 COLL VENOUS BLD VENIPUNCTURE: CPT | Performed by: INTERNAL MEDICINE

## 2017-08-29 RX ORDER — LUBIPROSTONE 24 UG/1
24 CAPSULE, GELATIN COATED ORAL
Qty: 30 CAP | Refills: 3 | Status: SHIPPED | OUTPATIENT
Start: 2017-08-29 | End: 2017-09-28

## 2017-08-29 RX ORDER — LACTULOSE 10 G/15ML
30 SOLUTION ORAL; RECTAL
Qty: 480 ML | Refills: 3 | Status: SHIPPED | OUTPATIENT
Start: 2017-08-29 | End: 2019-06-21 | Stop reason: ALTCHOICE

## 2017-08-29 RX ORDER — FACIAL-BODY WIPES
10 EACH TOPICAL DAILY
Qty: 10 SUPPOSITORY | Refills: 1 | Status: SHIPPED | OUTPATIENT
Start: 2017-08-29 | End: 2019-06-21 | Stop reason: ALTCHOICE

## 2017-08-29 NOTE — MR AVS SNAPSHOT
Visit Information Date & Time Provider Department Dept. Phone Encounter #  
 8/29/2017  3:30 PM [id number] Fuentes, [address] [phone] Follow-up Instructions Return in about 1 week (around 9/5/2017). Your Appointments 9/26/2017  1:00 PM  
Follow Up with Katrina Blanco MD  
[address] 3651 [facility]) Appt Note: 4 months follow up  
 [address] [place] Abdirizak De Gasperi [address]  
  
    
 11/9/2017  3:00 PM  
Follow Up with Bianka Fuentes MD  
[facility] Medical [facility] 3651 [facility]) Appt Note: Return in 3 months (11/9/17) for follow up. [address]  
  
   
 [address] Upcoming Health Maintenance Date Due  
 EYE EXAM RETINAL OR DILATED Q1 3/3/2016 FOOT EXAM Q1 11/23/2016 GLAUCOMA SCREENING Q2Y 8/9/2017 Pneumococcal 65+ High/Highest Risk (1 of 2 - PCV13) 8/9/2017 MEDICARE YEARLY EXAM 1/20/2018 HEMOGLOBIN A1C Q6M 1/28/2018 MICROALBUMIN Q1 7/26/2018 LIPID PANEL Q1 7/28/2018 PAP AKA CERVICAL CYTOLOGY 10/27/2018 BREAST CANCER SCRN MAMMOGRAM 12/5/2018 COLONOSCOPY 10/27/2025 DTaP/Tdap/Td series (2 - Td) 11/23/2025 Allergies as of 8/29/2017  Review Complete On: 8/29/2017 By: Bianka Fuentes MD  
  
 Severity Noted Reaction Type Reactions Metformin  10/22/2015    Other (comments) Patient states that she is not allergic to metformin. Current Immunizations  Reviewed on 11/15/2016 Name Date Influenza Vaccine 10/17/2016, 11/23/2015 10:18 AM  
 Influenza Vaccine (Madin Lannon Canine Kidney) PF 2/4/2015 10:00 AM  
 Pneumococcal Polysaccharide (PPSV-23) 2/4/2015  9:57 AM  
 Tdap 11/23/2015 10:19 AM  
  
 Not reviewed this visit You Were Diagnosed With   
  
 Codes Comments Constipation, unspecified constipation type    -  Primary ICD-10-CM: K59.00 ICD-9-CM: 564.00 Pain of upper abdomen     ICD-10-CM: R10.10 ICD-9-CM: 789.09 Gallstones     ICD-10-CM: K80.20 ICD-9-CM: 574.20 Vitals BP Pulse Temp Resp Height(growth percentile) Weight(growth percentile) (!) 199/92 81 97.9 °F (36.6 °C) (Oral) 18 5' 6\" (1.676 m) 231 lb 6.4 oz (105 kg) SpO2 BMI OB Status Smoking Status 100% 37.35 kg/m2 Postmenopausal Never Smoker Vitals History BMI and BSA Data Body Mass Index Body Surface Area  
 37.35 kg/m 2 2.21 m 2 Preferred Pharmacy Pharmacy Name Phone [facility] - [address], [address] [phone] Your Updated Medication List  
  
   
This list is accurate as of: 8/29/17  3:57 PM.  Always use your most recent med list. amLODIPine 5 mg tablet Commonly known as:  Bonita Ape Take 1 Tab by mouth nightly. aspirin-dipyridamole  mg per SR capsule Commonly known as:  AGGRENOX Take 1 Cap by mouth two (2) times a day. atorvastatin 40 mg tablet Commonly known as:  LIPITOR Take 1 Tab by mouth daily. bisacodyl 10 mg suppository Commonly known as:  DULCOLAX (BISACODYL) Insert 10 mg into rectum daily. carvedilol 12.5 mg tablet Commonly known as:  Nydia Salts Take 1 Tab by mouth two (2) times daily (with meals). clotrimazole 1 % topical cream  
Commonly known as:  Lindajean Hasting Apply  to affected area two (2) times a day. divalproex  mg ER tablet Commonly known as:  DEPAKOTE ER Take 1 Tab by mouth two (2) times a day. docusate sodium 100 mg capsule Commonly known as:  Eilleen Schneiders Take 1 Cap by mouth two (2) times a day for 90 days. furosemide 20 mg tablet Commonly known as:  LASIX One po daily  
  
 glucose blood VI test strips strip Commonly known as:  FREESTYLE LITE STRIPS Monitor blood sugars twice daily hydroCHLOROthiazide 25 mg tablet Commonly known as:  HYDRODIURIL Take 1 Tab by mouth daily. insulin detemir 100 unit/mL injection Commonly known as:  LEVEMIR  
30 Units by SubCUTAneous route two (2) times a day. insulin lispro 100 unit/mL injection Commonly known as:  HUMALOG  
6 Units by SubCUTAneous route Before breakfast, lunch, and dinner. isosorbide mononitrate ER 30 mg tablet Commonly known as:  IMDUR Take 1 Tab by mouth daily. lactulose 10 gram/15 mL solution Commonly known as:  Suri Katie Take 45 mL by mouth nightly. letrozole 2.5 mg tablet Commonly known as:  [facility] Take 2.5 mg by mouth daily. levETIRAcetam 500 mg tablet Commonly known as:  KEPPRA Take 1 Tab by mouth two (2) times a day. losartan 50 mg tablet Commonly known as:  COZAAR Take 1 Tab by mouth two (2) times a day. lubiPROStone 24 mcg capsule Commonly known as:  [place] Eaton Take 1 Cap by mouth daily (with breakfast) for 30 days. pantoprazole 40 mg tablet Commonly known as:  PROTONIX Take 1 Tab by mouth daily. SONAFINE Emul topical  
Generic drug:  emollient combination no. 10  
  
 therapeutic multivitamin tablet Commonly known as:  [facility] Take 1 Tab by mouth daily. VITAMIN D3 1,000 unit tablet Generic drug:  cholecalciferol Take 1,000 Units by mouth daily. Prescriptions Sent to Pharmacy Refills  
 lubiPROStone (AMITIZA) 24 mcg capsule 3 Sig: Take 1 Cap by mouth daily (with breakfast) for 30 days. Class: Normal  
 Pharmacy: [address] [facility] [address], [address] Ph #: [phone] Route: Oral  
 bisacodyl (DULCOLAX, BISACODYL,) 10 mg suppository 1 Sig: Insert 10 mg into rectum daily. Class: Normal  
 Pharmacy: [address] [facility] [address], [address] Ph #: [phone] Route: Rectal  
 lactulose (CHRONULAC) 10 gram/15 mL solution 3 Sig: Take 45 mL by mouth nightly. Class: Normal  
 Pharmacy: [address], [address]  #: [phone] Route: Oral  
  
Follow-up Instructions Return in about 1 week (around 9/5/2017). To-Do List   
 08/29/2017 Lab:  AMYLASE   
  
 08/29/2017 Lab:  CBC WITH AUTOMATED DIFF   
  
 08/29/2017 Lab:  LACTIC ACID   
  
 08/29/2017 Lab:  LIPASE   
  
 08/29/2017 Lab:  METABOLIC PANEL, COMPREHENSIVE   
  
 08/29/2017 Imaging:  NM HEPATOBILIARY DUCT SCAN Introducing \[facility]\"" & HEALTH SERVICES! [facility] introduces BookMyShow patient portal. Now you can access parts of your medical record, email your doctor's office, and request medication refills online. 1. In your internet browser, go to [email or web address] 2. Click on the First Time User? Click Here link in the Sign In box. You will see the New Member Sign Up page. 3. Enter your BookMyShow Access Code exactly as it appears below. You will not need to use this code after youve completed the sign-up process. If you do not sign up before the expiration date, you must request a new code. · BookMyShow Access Code: [email or web address] Expires: 11/27/2017  3:57 PM 
 
4. Enter the last four digits of your Social Security Number (xxxx) and Date of Birth (mm/dd/yyyy) as indicated and click Submit. You will be taken to the next sign-up page. 5. Create a BookMyShow ID. This will be your BookMyShow login ID and cannot be changed, so think of one that is secure and easy to remember. 6. Create a BookMyShow password. You can change your password at any time. 7. Enter your Password Reset Question and Answer. This can be used at a later time if you forget your password. 8. Enter your e-mail address. You will receive e-mail notification when new information is available in [address] 9. Click Sign Up. You can now view and download portions of your medical record.  
10. Click the Download Summary menu link to download a portable copy of your medical information. If you have questions, please visit the Frequently Asked Questions section of the [employer] website. Remember, [employer] is NOT to be used for urgent needs. For medical emergencies, dial 911. Now available from your iPhone and Android! Please provide this summary of care documentation to your next provider. Your primary care clinician is listed as Dmitri Bradford. If you have any questions after today's visit, please call [phone].

## 2017-08-29 NOTE — PROGRESS NOTES
1. Have you been to the ER, urgent care clinic since your last visit? Hospitalized since your last visit? No    2. Have you seen or consulted any other health care providers outside of the [address] [place] [address] since your last visit? Include any pap smears or colon screening.  No

## 2017-08-29 NOTE — PROGRESS NOTES
Hussain Grimm is a 74 y.o.  female and presents with     Chief Complaint   Patient presents with    Abdominal Pain    Constipation       Pt is taking Amitiza, colace and lactulose along with dulcolax supp. Pt feels the suppositories are helping . She had a small bowel movement today. No nausea, vomiting. No chills, fevers, no rectal bleeding. Pt had Xray abdomen that showed gallstone and moderate amount of stool in the colon. Past Medical History:   Diagnosis Date    Arthritis     Breast CA ([address]     Diabetes ([facility]     Hypercholesteremia     Hypertension     Menopause     Psychiatric disorder     DEPRESSION    PUD (peptic ulcer disease)     S/P cardiac cath 02/2015    No angiographic evidence of CAD    Seizures (HCC)     LAST SEIZURE ABOUT 2 MONTHS AGO (NOV 2015)    Stroke ([facility]     x2 with left side deficit     Past Surgical History:   Procedure Laterality Date    HX BREAST LUMPECTOMY Right 1/12/2016    Dr. Jesus Manuel Dean Partial Mastectomy w./ axillary lymphadenectomy    HX HEENT      TONSILLECTOMY    HX MASTECTOMY Right 1/26/2016    Dr. Jesus Manuel Dean Repeat Partial Mastectomy for positive margins    HX ORTHOPAEDIC      left arm surg    IR MEDIPORT       Current Outpatient Prescriptions   Medication Sig    lubiPROStone (AMITIZA) 24 mcg capsule Take 1 Cap by mouth daily (with breakfast) for 30 days.  bisacodyl (DULCOLAX, BISACODYL,) 10 mg suppository Insert 10 mg into rectum daily.  lactulose (CHRONULAC) 10 gram/15 mL solution Take 45 mL by mouth nightly.  docusate sodium (COLACE) 100 mg capsule Take 1 Cap by mouth two (2) times a day for 90 days.  levETIRAcetam (KEPPRA) 500 mg tablet Take 1 Tab by mouth two (2) times a day.  divalproex ER (DEPAKOTE ER) 500 mg ER tablet Take 1 Tab by mouth two (2) times a day.  insulin detemir (LEVEMIR) 100 unit/mL injection 30 Units by SubCUTAneous route two (2) times a day.     insulin lispro (HUMALOG) 100 unit/mL injection 6 Units by SubCUTAneous route Before breakfast, lunch, and dinner.  furosemide (LASIX) 20 mg tablet One po daily    losartan (COZAAR) 50 mg tablet Take 1 Tab by mouth two (2) times a day.  carvedilol (COREG) 12.5 mg tablet Take 1 Tab by mouth two (2) times daily (with meals).  amLODIPine (NORVASC) 5 mg tablet Take 1 Tab by mouth nightly.  hydroCHLOROthiazide (HYDRODIURIL) 25 mg tablet Take 1 Tab by mouth daily.  atorvastatin (LIPITOR) 40 mg tablet Take 1 Tab by mouth daily.  isosorbide mononitrate ER (IMDUR) 30 mg tablet Take 1 Tab by mouth daily.  pantoprazole (PROTONIX) 40 mg tablet Take 1 Tab by mouth daily.  aspirin-dipyridamole (AGGRENOX)  mg per SR capsule Take 1 Cap by mouth two (2) times a day.  letrozole (FEMARA) 2.5 mg tablet Take 2.5 mg by mouth daily.  SONAFINE emul topical     cholecalciferol (VITAMIN D3) 1,000 unit tablet Take 1,000 Units by mouth daily.  glucose blood VI test strips (FREESTYLE LITE STRIPS) strip Monitor blood sugars twice daily    therapeutic multivitamin (THERAGRAN) tablet Take 1 Tab by mouth daily.  clotrimazole (LOTRIMIN) 1 % topical cream Apply  to affected area two (2) times a day. No current facility-administered medications for this visit.       Health Maintenance   Topic Date Due    EYE EXAM RETINAL OR DILATED Q1  03/03/2016    FOOT EXAM Q1  11/23/2016    GLAUCOMA SCREENING Q2Y  08/09/2017    Pneumococcal 65+ High/Highest Risk (1 of 2 - PCV13) 08/09/2017    MEDICARE YEARLY EXAM  01/20/2018    HEMOGLOBIN A1C Q6M  01/28/2018    MICROALBUMIN Q1  07/26/2018    LIPID PANEL Q1  07/28/2018    PAP AKA CERVICAL CYTOLOGY  10/27/2018    BREAST CANCER SCRN MAMMOGRAM  12/05/2018    COLONOSCOPY  10/27/2025    DTaP/Tdap/Td series (2 - Td) 11/23/2025    Hepatitis C Screening  Completed    OSTEOPOROSIS SCREENING (DEXA)  Completed    ZOSTER VACCINE AGE 60>  Completed    INFLUENZA AGE 9 TO ADULT  Completed     Immunization History Administered Date(s) Administered    Influenza Vaccine 11/23/2015, 10/17/2016    Influenza Vaccine (Madin [place] Canine Kidney) PF 02/04/2015    Pneumococcal Polysaccharide (PPSV-23) 02/04/2015    Tdap 11/23/2015     No LMP recorded. Patient is postmenopausal.        Allergies and Intolerances: Allergies   Allergen Reactions    Metformin Other (comments)     Patient states that she is not allergic to metformin. Family History:   Family History   Problem Relation Age of Onset    Cancer Father [de-identified]     colon    Hypertension Mother     Heart Disease Mother     Diabetes Mother        Social History:   She  reports that she has never smoked. She has never used smokeless tobacco.  She  reports that she does not drink alcohol.             Review of Systems:   General: negative for - chills, fatigue, fever, weight change  Psych: negative for - anxiety, depression, irritability or mood swings  ENT: negative for - headaches, hearing change, nasal congestion, oral lesions, sneezing or sore throat  Heme/ Lymph: negative for - bleeding problems, bruising, pallor or swollen lymph nodes  Endo: negative for - hot flashes, polydipsia/polyuria or temperature intolerance  Resp: negative for - cough, shortness of breath or wheezing  CV: negative for - chest pain, edema or palpitations  GI: positive for - abdominal pain, change in bowel habits, constipation, neg for diarrhea or nausea/vomiting  : negative for - dysuria, hematuria, incontinence, pelvic pain or vulvar/vaginal symptoms  MSK: negative for - joint pain, joint swelling or muscle pain  Neuro: negative for - confusion, headaches, seizures or weakness  Derm: negative for - dry skin, hair changes, rash or skin lesion changes          Physical:   Vitals:   Vitals:    08/29/17 1532 08/29/17 1536   BP: (!) 192/94 (!) 199/92   Pulse: 81    Resp: 18    Temp: 97.9 °F (36.6 °C)    TempSrc: Oral    SpO2: 100%    Weight: 231 lb 6.4 oz (105 kg)    Height: 5' 6\" (1.676 m) Exam:   HEENT- atraumatic,normocephalic, awake, oriented, well nourished  Neck - supple,no enlarged lymph nodes, no JVD, no thyromegaly  Chest- CTA, no rhonchi, no crackles  Heart- rrr, no murmurs / gallop/rub  Abdomen- soft,BS+,NT, no hepatosplenomegaly, mild RUQ tedneress, mild periumbilical tenderness , left lower quadrant tendernss, no gaurding , no rebound, bowel sounds slightly hypoactive. Ext - no c/c/edema   Neuro- no focal deficits. Power 5/5 all extremities  Skin - warm,dry, no obvious rashes. Review of Data:   LABS:   Lab Results   Component Value Date/Time    WBC 5.8 03/26/2017 10:32 PM    HGB 9.9 03/26/2017 10:32 PM    HCT 30.7 03/26/2017 10:32 PM    PLATELET 143 87/69/6826 10:32 PM     Lab Results   Component Value Date/Time    Sodium 139 07/28/2017 10:31 AM    Potassium 4.3 07/28/2017 10:31 AM    Chloride 106 07/28/2017 10:31 AM    CO2 23 07/28/2017 10:31 AM    Glucose 175 07/28/2017 10:31 AM    BUN 23 07/28/2017 10:31 AM    Creatinine 1.10 07/28/2017 10:31 AM     Lab Results   Component Value Date/Time    Cholesterol, total 222 07/28/2017 10:31 AM    HDL Cholesterol 86 07/28/2017 10:31 AM    LDL, calculated 111.8 07/28/2017 10:31 AM    Triglyceride 121 07/28/2017 10:31 AM     No results found for: GPT        Impression / Plan:        ICD-10-CM ICD-9-CM    1. Constipation, unspecified constipation type K59.00 564.00 lubiPROStone (AMITIZA) 24 mcg capsule      bisacodyl (DULCOLAX, BISACODYL,) 10 mg suppository      lactulose (CHRONULAC) 10 gram/15 mL solution   2. Pain of upper abdomen R10.10 789.09 AMYLASE      LIPASE      CBC WITH AUTOMATED DIFF      METABOLIC PANEL, COMPREHENSIVE      LACTIC ACID   3. Gallstones K80.20 574.20 NM HEPATOBILIARY DUCT SCAN     Asked pt to go to ER for worsening of symptoms such as chills, fever, nausea, vomiting. Explained to patient risk benefits of the medications. Advised patient to stop meds if having any side effects. Pt verbalized understanding of the instructions. I have discussed the diagnosis with the patient and the intended plan as seen in the above orders. The patient has received an after-visit summary and questions were answered concerning future plans. I have discussed medication side effects and warnings with the patient as well. I have reviewed the plan of care with the patient, accepted their input and they are in agreement with the treatment goals. Reviewed plan of care. Patient has provided input and agrees with goals.     Follow-up Disposition: Not on Robert Liu MD

## 2017-08-30 ENCOUNTER — TELEPHONE (OUTPATIENT)
Dept: FAMILY MEDICINE CLINIC | Age: 67
End: 2017-08-30

## 2017-08-30 PROCEDURE — 83605 ASSAY OF LACTIC ACID: CPT | Performed by: INTERNAL MEDICINE

## 2017-08-30 PROCEDURE — 85025 COMPLETE CBC W/AUTO DIFF WBC: CPT | Performed by: INTERNAL MEDICINE

## 2017-08-30 PROCEDURE — 82150 ASSAY OF AMYLASE: CPT | Performed by: INTERNAL MEDICINE

## 2017-08-30 PROCEDURE — 80053 COMPREHEN METABOLIC PANEL: CPT | Performed by: INTERNAL MEDICINE

## 2017-08-30 PROCEDURE — 36415 COLL VENOUS BLD VENIPUNCTURE: CPT | Performed by: INTERNAL MEDICINE

## 2017-08-30 PROCEDURE — 83690 ASSAY OF LIPASE: CPT | Performed by: INTERNAL MEDICINE

## 2017-08-31 NOTE — TELEPHONE ENCOUNTER
Spoke with physician concerning critical lab, called patient and if patient continues to experience abdominal pain, pt should go to the ER. If patient is fine, provider will order a CT. Lvm detailing this information and also asked for patient to return call.

## 2017-08-31 NOTE — TELEPHONE ENCOUNTER
Patient returned call. Advised patient that due to the lab results provider is requesting patient to go to the ER. Pt was asked do she have a way to the ER and she stated by bus. Asked patient to go to the ER today. This encounter will be closed.

## 2017-09-05 ENCOUNTER — OFFICE VISIT (OUTPATIENT)
Dept: FAMILY MEDICINE CLINIC | Age: 67
End: 2017-09-05

## 2017-09-05 ENCOUNTER — HOSPITAL ENCOUNTER (OUTPATIENT)
Dept: NUCLEAR MEDICINE | Age: 67
Discharge: HOME | End: 2017-09-05
Attending: INTERNAL MEDICINE
Payer: MEDICARE

## 2017-09-05 VITALS
TEMPERATURE: 97.6 F | SYSTOLIC BLOOD PRESSURE: 191 MMHG | HEIGHT: 66 IN | OXYGEN SATURATION: 97 % | DIASTOLIC BLOOD PRESSURE: 85 MMHG | RESPIRATION RATE: 16 BRPM | BODY MASS INDEX: 37.28 KG/M2 | WEIGHT: 232 LBS

## 2017-09-05 VITALS — BODY MASS INDEX: 37.19 KG/M2 | WEIGHT: 230.4 LBS

## 2017-09-05 DIAGNOSIS — K80.20 GALLSTONES: ICD-10-CM

## 2017-09-05 DIAGNOSIS — E78.00 HYPERCHOLESTEREMIA: Chronic | ICD-10-CM

## 2017-09-05 DIAGNOSIS — G40.909 SEIZURE DISORDER (HCC): Chronic | ICD-10-CM

## 2017-09-05 DIAGNOSIS — I10 ESSENTIAL HYPERTENSION: Primary | ICD-10-CM

## 2017-09-05 PROCEDURE — 78226 HEPATOBILIARY SYSTEM IMAGING: CPT

## 2017-09-05 PROCEDURE — 78227 HEPATOBIL SYST IMAGE W/DRUG: CPT

## 2017-09-05 PROCEDURE — A9537 TC99M MEBROFENIN: HCPCS

## 2017-09-05 RX ORDER — ATORVASTATIN CALCIUM 40 MG/1
40 TABLET, FILM COATED ORAL DAILY
Qty: 30 TAB | Refills: 3 | Status: SHIPPED | OUTPATIENT
Start: 2017-09-05 | End: 2018-09-24 | Stop reason: SDUPTHER

## 2017-09-05 RX ORDER — LEVETIRACETAM 500 MG/1
500 TABLET ORAL 2 TIMES DAILY
Qty: 60 TAB | Refills: 3 | Status: SHIPPED | OUTPATIENT
Start: 2017-09-05 | End: 2018-05-08

## 2017-09-05 RX ORDER — CARVEDILOL 12.5 MG/1
12.5 TABLET ORAL 2 TIMES DAILY WITH MEALS
Qty: 60 TAB | Refills: 3 | Status: SHIPPED | OUTPATIENT
Start: 2017-09-05 | End: 2018-09-24 | Stop reason: SDUPTHER

## 2017-09-05 RX ORDER — ISOSORBIDE MONONITRATE 30 MG/1
30 TABLET, EXTENDED RELEASE ORAL DAILY
Qty: 90 TAB | Refills: 1 | Status: SHIPPED | OUTPATIENT
Start: 2017-09-05 | End: 2018-09-24 | Stop reason: SDUPTHER

## 2017-09-05 RX ORDER — AMLODIPINE BESYLATE 5 MG/1
5 TABLET ORAL
Qty: 30 TAB | Refills: 3 | Status: SHIPPED | OUTPATIENT
Start: 2017-09-05 | End: 2018-09-24 | Stop reason: SDUPTHER

## 2017-09-05 RX ORDER — DIVALPROEX SODIUM 500 MG/1
500 TABLET, EXTENDED RELEASE ORAL 2 TIMES DAILY
Qty: 60 TAB | Refills: 3 | Status: SHIPPED | OUTPATIENT
Start: 2017-09-05 | End: 2018-09-24 | Stop reason: SDUPTHER

## 2017-09-05 RX ORDER — HYDROCHLOROTHIAZIDE 25 MG/1
25 TABLET ORAL DAILY
Qty: 30 TAB | Refills: 3 | Status: SHIPPED | OUTPATIENT
Start: 2017-09-05 | End: 2018-09-24 | Stop reason: SDUPTHER

## 2017-09-05 RX ORDER — LOSARTAN POTASSIUM 50 MG/1
50 TABLET ORAL 2 TIMES DAILY
Qty: 60 TAB | Refills: 3 | Status: SHIPPED | OUTPATIENT
Start: 2017-09-05 | End: 2018-09-24 | Stop reason: SDUPTHER

## 2017-09-05 NOTE — MR AVS SNAPSHOT
Visit Information Date & Time Provider Department Dept. Phone Encounter #  
 9/5/2017  3:30 PM Chauncey Cummings, [address] [facility] [phone] [id number] Follow-up Instructions Return in about 1 month (around 10/5/2017). Your Appointments 9/26/2017  1:00 PM  
Follow Up with Tomeka Ward MD  
[address] [facility]) Appt Note: 4 months follow up  
 [facility] [facility] [address]  
  
    
 11/9/2017  3:00 PM  
Follow Up with Chauncey Cummings MD  
[facility] [address] [facility]) Appt Note: Return in 3 months (11/9/17) for follow up. [address]  
  
   
 [address] Upcoming Health Maintenance Date Due  
 EYE EXAM RETINAL OR DILATED Q1 3/3/2016 FOOT EXAM Q1 11/23/2016 GLAUCOMA SCREENING Q2Y 8/9/2017 Pneumococcal 65+ High/Highest Risk (1 of 2 - PCV13) 8/9/2017 MEDICARE YEARLY EXAM 1/20/2018 HEMOGLOBIN A1C Q6M 1/28/2018 MICROALBUMIN Q1 7/26/2018 LIPID PANEL Q1 7/28/2018 PAP AKA CERVICAL CYTOLOGY 10/27/2018 BREAST CANCER SCRN MAMMOGRAM 12/5/2018 COLONOSCOPY 10/27/2025 DTaP/Tdap/Td series (2 - Td) 11/23/2025 Allergies as of 9/5/2017  Review Complete On: 9/5/2017 By: Chauncey Cummings MD  
  
 Severity Noted Reaction Type Reactions Metformin  10/22/2015    Other (comments) Patient states that she is not allergic to metformin. Current Immunizations  Reviewed on 11/15/2016 Name Date Influenza High Dose Vaccine PF 8/29/2017  4:00 PM  
 Influenza Vaccine 10/17/2016, 11/23/2015 10:18 AM  
 Influenza Vaccine (Madin [place] Canine Kidney) PF 2/4/2015 10:00 AM  
 Pneumococcal Polysaccharide (PPSV-23) 2/4/2015  9:57 AM  
 Tdap 11/23/2015 10:19 AM  
  
 Not reviewed this visit You Were Diagnosed With   
  
 Codes Comments Essential hypertension    -  Primary ICD-10-CM: I10 
ICD-9-CM: 401.9 Seizure disorder ([facility] [address]     ICD-10-CM: G40.909 ICD-9-CM: 345.90 Hypercholesteremia     ICD-10-CM: E78.00 ICD-9-CM: 272.0 Vitals BP Temp Resp Height(growth percentile) Weight(growth percentile) SpO2  
 191/85 97.6 °F (36.4 °C) (Oral) 16 5' 6\" (1.676 m) 232 lb (105.2 kg) 97% BMI OB Status Smoking Status 37.45 kg/m2 Postmenopausal Never Smoker Vitals History BMI and BSA Data Body Mass Index Body Surface Area  
 37.45 kg/m 2 2.21 m 2 Preferred Pharmacy Pharmacy Name Phone [facility] - [address], [address] [phone] Your Updated Medication List  
  
   
This list is accurate as of: 9/5/17  4:12 PM.  Always use your most recent med list. amLODIPine 5 mg tablet Commonly known as:  Sherrye Acron Take 1 Tab by mouth nightly. aspirin-dipyridamole  mg per SR capsule Commonly known as:  AGGRENOX Take 1 Cap by mouth two (2) times a day. atorvastatin 40 mg tablet Commonly known as:  LIPITOR Take 1 Tab by mouth daily. bisacodyl 10 mg suppository Commonly known as:  DULCOLAX (BISACODYL) Insert 10 mg into rectum daily. carvedilol 12.5 mg tablet Commonly known as:  Zachariah Byres Take 1 Tab by mouth two (2) times daily (with meals). clotrimazole 1 % topical cream  
Commonly known as:  Rudie Primes Apply  to affected area two (2) times a day. divalproex  mg ER tablet Commonly known as:  DEPAKOTE ER Take 1 Tab by mouth two (2) times a day. docusate sodium 100 mg capsule Commonly known as:  Yakov Roe Take 1 Cap by mouth two (2) times a day for 90 days. furosemide 20 mg tablet Commonly known as:  LASIX One po daily  
  
 glucose blood VI test strips strip Commonly known as:  FREESTYLE LITE STRIPS Monitor blood sugars twice daily hydroCHLOROthiazide 25 mg tablet Commonly known as:  HYDRODIURIL Take 1 Tab by mouth daily. insulin detemir 100 unit/mL injection Commonly known as:  LEVEMIR  
30 Units by SubCUTAneous route two (2) times a day. insulin lispro 100 unit/mL injection Commonly known as:  HUMALOG  
6 Units by SubCUTAneous route Before breakfast, lunch, and dinner. isosorbide mononitrate ER 30 mg tablet Commonly known as:  IMDUR Take 1 Tab by mouth daily. lactulose 10 gram/15 mL solution Commonly known as:  Mabelene Madelyn Take 45 mL by mouth nightly. letrozole 2.5 mg tablet Commonly known as:  [facility] Take 2.5 mg by mouth daily. levETIRAcetam 500 mg tablet Commonly known as:  KEPPRA Take 1 Tab by mouth two (2) times a day. losartan 50 mg tablet Commonly known as:  COZAAR Take 1 Tab by mouth two (2) times a day. lubiPROStone 24 mcg capsule Commonly known as:  Shefali President Take 1 Cap by mouth daily (with breakfast) for 30 days. pantoprazole 40 mg tablet Commonly known as:  PROTONIX Take 1 Tab by mouth daily. SONAFINE Emul topical  
Generic drug:  emollient combination no. 10  
  
 therapeutic multivitamin tablet Commonly known as:  [facility] Take 1 Tab by mouth daily. VITAMIN D3 1,000 unit tablet Generic drug:  cholecalciferol Take 1,000 Units by mouth daily. Prescriptions Sent to Pharmacy Refills  
 levETIRAcetam (KEPPRA) 500 mg tablet 3 Sig: Take 1 Tab by mouth two (2) times a day. Class: Normal  
 Pharmacy: [address] [place] [address] [employer] [employer] Ph #: [phone] Route: Oral  
 divalproex ER (DEPAKOTE ER) 500 mg ER tablet 3 Sig: Take 1 Tab by mouth two (2) times a day. Class: Normal  
 Pharmacy: [address] [place] [address], [address] [employer] Ph #: [phone] Route: Oral  
 losartan (COZAAR) 50 mg tablet 3 Sig: Take 1 Tab by mouth two (2) times a day.   
 Class: Normal  
 [facility] [employer] Ph #: [phone] Route: Oral  
 carvedilol (COREG) 12.5 mg tablet 3 Sig: Take 1 Tab by mouth two (2) times daily (with meals). Class: Normal  
 [facility] [employer] Ph #: [phone] Route: Oral  
 amLODIPine (NORVASC) 5 mg tablet 3 Sig: Take 1 Tab by mouth nightly. Class: Normal  
 [facility] [employer] Ph #: [phone] Route: Oral  
 hydroCHLOROthiazide (HYDRODIURIL) 25 mg tablet 3 Sig: Take 1 Tab by mouth daily. Class: Normal  
 Pharmacy: [address] [place], [place] [address] [employer] Ph #: [phone] Route: Oral  
 atorvastatin (LIPITOR) 40 mg tablet 3 Sig: Take 1 Tab by mouth daily. Class: Normal  
 Pharmacy: [address] [place] [address] [facility] [employer] Ph #: [phone] Route: Oral  
 isosorbide mononitrate ER (IMDUR) 30 mg tablet 1 Sig: Take 1 Tab by mouth daily. Class: Normal  
 [facility] [employer] Ph #: [phone] Route: Oral  
  
Follow-up Instructions Return in about 1 month (around 10/5/2017). Introducing [facility] SERVICES! Jenna Covington introduces [employer] patient portal. Now you can access parts of your medical record, email your doctor's office, and request medication refills online. 1. In your internet browser, go to [email or web address] 2. Click on the First Time User? Click Here link in the Sign In box. You will see the New Member Sign Up page. 3. Enter your [employer] Access Code exactly as it appears below. You will not need to use this code after youve completed the sign-up process. If you do not sign up before the expiration date, you must request a new code. · [employer] Access Code: [email or web address] Expires: 11/27/2017  3:57 PM 
 
4.  Enter the last four digits of your Social Security Number (xxxx) and Date of Birth (mm/dd/yyyy) as indicated and click Submit. You will be taken to the next sign-up page. 5. Create a [employer] ID. This will be your [employer] login ID and cannot be changed, so think of one that is secure and easy to remember. 6. Create a [employer] password. You can change your password at any time. 7. Enter your Password Reset Question and Answer. This can be used at a later time if you forget your password. 8. Enter your e-mail address. You will receive e-mail notification when new information is available in [address] 9. Click Sign Up. You can now view and download portions of your medical record. 10. Click the Download Summary menu link to download a portable copy of your medical information. If you have questions, please visit the Frequently Asked Questions section of the [employer] website. Remember, [employer] is NOT to be used for urgent needs. For medical emergencies, dial 911. Now available from your iPhone and Android! Please provide this summary of care documentation to your next provider. Your primary care clinician is listed as [id number]. If you have any questions after today's visit, please call [phone].

## 2017-09-05 NOTE — PROGRESS NOTES
Ishmael Espinoza is a 74 y.o.  female and presents with     Chief Complaint   Patient presents with    Hypertension    Cholesterol Problem    Seizure    Constipation       Pt says her belly is not hurting  Like it was. Pt says last BM was 3 days back and that is better than it was. Pt denies nausea or vomiting. Pt just picked Amitiza today. Pt is not taking any of her BP meds. Back of her head hurts. Pt has HIDA scan today. Results are pending. Past Medical History:   Diagnosis Date    Arthritis     [facility]     Diabetes ([facility]     Hypercholesteremia     Hypertension     Menopause     Psychiatric disorder     DEPRESSION    PUD (peptic ulcer disease)     S/P cardiac cath 02/2015    No angiographic evidence of CAD    Seizures (HCC)     LAST SEIZURE ABOUT 2 MONTHS AGO (NOV 2015)    Stroke ([facility]     x2 with left side deficit     Past Surgical History:   Procedure Laterality Date    HX BREAST LUMPECTOMY Right 1/12/2016    Dr. Candie Wang Partial Mastectomy w./ axillary lymphadenectomy    HX HEENT      TONSILLECTOMY    HX MASTECTOMY Right 1/26/2016    Dr. Candie Wang Repeat Partial Mastectomy for positive margins    HX ORTHOPAEDIC      left arm surg    IR MEDIPORT       Current Outpatient Prescriptions   Medication Sig    levETIRAcetam (KEPPRA) 500 mg tablet Take 1 Tab by mouth two (2) times a day.  divalproex ER (DEPAKOTE ER) 500 mg ER tablet Take 1 Tab by mouth two (2) times a day.  losartan (COZAAR) 50 mg tablet Take 1 Tab by mouth two (2) times a day.  carvedilol (COREG) 12.5 mg tablet Take 1 Tab by mouth two (2) times daily (with meals).  amLODIPine (NORVASC) 5 mg tablet Take 1 Tab by mouth nightly.  hydroCHLOROthiazide (HYDRODIURIL) 25 mg tablet Take 1 Tab by mouth daily.  atorvastatin (LIPITOR) 40 mg tablet Take 1 Tab by mouth daily.  isosorbide mononitrate ER (IMDUR) 30 mg tablet Take 1 Tab by mouth daily.     lubiPROStone (AMITIZA) 24 mcg capsule Take 1 Cap by mouth daily (with breakfast) for 30 days.  bisacodyl (DULCOLAX, BISACODYL,) 10 mg suppository Insert 10 mg into rectum daily.  lactulose (CHRONULAC) 10 gram/15 mL solution Take 45 mL by mouth nightly.  docusate sodium (COLACE) 100 mg capsule Take 1 Cap by mouth two (2) times a day for 90 days.  insulin detemir (LEVEMIR) 100 unit/mL injection 30 Units by SubCUTAneous route two (2) times a day.  insulin lispro (HUMALOG) 100 unit/mL injection 6 Units by SubCUTAneous route Before breakfast, lunch, and dinner.  furosemide (LASIX) 20 mg tablet One po daily    pantoprazole (PROTONIX) 40 mg tablet Take 1 Tab by mouth daily.  aspirin-dipyridamole (AGGRENOX)  mg per SR capsule Take 1 Cap by mouth two (2) times a day.  letrozole (FEMARA) 2.5 mg tablet Take 2.5 mg by mouth daily.  SONAFINE emul topical     cholecalciferol (VITAMIN D3) 1,000 unit tablet Take 1,000 Units by mouth daily.  glucose blood VI test strips (FREESTYLE LITE STRIPS) strip Monitor blood sugars twice daily    therapeutic multivitamin (THERAGRAN) tablet Take 1 Tab by mouth daily.  clotrimazole (LOTRIMIN) 1 % topical cream Apply  to affected area two (2) times a day. No current facility-administered medications for this visit.       Health Maintenance   Topic Date Due    EYE EXAM RETINAL OR DILATED Q1  03/03/2016    FOOT EXAM Q1  11/23/2016    GLAUCOMA SCREENING Q2Y  08/09/2017    Pneumococcal 65+ High/Highest Risk (1 of 2 - PCV13) 08/09/2017    MEDICARE YEARLY EXAM  01/20/2018    HEMOGLOBIN A1C Q6M  01/28/2018    MICROALBUMIN Q1  07/26/2018    LIPID PANEL Q1  07/28/2018    PAP AKA CERVICAL CYTOLOGY  10/27/2018    BREAST CANCER SCRN MAMMOGRAM  12/05/2018    COLONOSCOPY  10/27/2025    DTaP/Tdap/Td series (2 - Td) 11/23/2025    Hepatitis C Screening  Completed    OSTEOPOROSIS SCREENING (DEXA)  Completed    ZOSTER VACCINE AGE 60>  Completed    INFLUENZA AGE 9 TO ADULT  Completed     Immunization History   Administered Date(s) Administered    Influenza High Dose Vaccine PF 08/29/2017    Influenza Vaccine 11/23/2015, 10/17/2016    Influenza Vaccine ([place] Canine Kidney) PF 02/04/2015    Pneumococcal Polysaccharide (PPSV-23) 02/04/2015    Tdap 11/23/2015     No LMP recorded. Patient is postmenopausal.        Allergies and Intolerances: Allergies   Allergen Reactions    Metformin Other (comments)     Patient states that she is not allergic to metformin. Family History:   Family History   Problem Relation Age of Onset    Cancer Father [de-identified]     colon    Hypertension Mother     Heart Disease Mother     Diabetes Mother        Social History:   She  reports that she has never smoked. She has never used smokeless tobacco.  She  reports that she does not drink alcohol.             Review of Systems:   General: negative for - chills, fatigue, fever, weight change  Psych: negative for - anxiety, depression, irritability or mood swings  ENT: negative for - headaches, hearing change, nasal congestion, oral lesions, sneezing or sore throat  Heme/ Lymph: negative for - bleeding problems, bruising, pallor or swollen lymph nodes  Endo: negative for - hot flashes, polydipsia/polyuria or temperature intolerance  Resp: negative for - cough, shortness of breath or wheezing  CV: negative for - chest pain, edema or palpitations  GI: negative for - abdominal pain, change in bowel habits, constipation, diarrhea or nausea/vomiting  : negative for - dysuria, hematuria, incontinence, pelvic pain or vulvar/vaginal symptoms  MSK: negative for - joint pain, joint swelling or muscle pain  Neuro: negative for - confusion, headaches, seizures or weakness  Derm: negative for - dry skin, hair changes, rash or skin lesion changes          Physical:   Vitals:   Vitals:    09/05/17 1540 09/05/17 1545   BP: 192/86 191/85   Resp: 16    Temp: 97.6 °F (36.4 °C)    TempSrc: Oral    SpO2: 97%    Weight: 232 lb (105.2 kg) Height: 5' 6\" (1.676 m)            Exam:   HEENT- atraumatic,normocephalic, awake, oriented, well nourished  Neck - supple,no enlarged lymph nodes, no JVD, no thyromegaly  Chest- CTA, no rhonchi, no crackles  Heart- rrr, no murmurs / gallop/rub  Abdomen- soft,BS+,NT, no hepatosplenomegaly  Ext - no c/c/edema   Neuro- no focal deficits. Power 5/5 all extremities  Skin - warm,dry, no obvious rashes. Review of Data:   LABS:   Lab Results   Component Value Date/Time    WBC 8.0 08/30/2017 01:37 PM    HGB 11.4 08/30/2017 01:37 PM    HCT 36.5 08/30/2017 01:37 PM    PLATELET 046 62/38/3230 01:37 PM     Lab Results   Component Value Date/Time    Sodium 138 08/30/2017 01:37 PM    Potassium 4.4 08/30/2017 01:37 PM    Chloride 108 08/30/2017 01:37 PM    CO2 23 08/30/2017 01:37 PM    Glucose 218 08/30/2017 01:37 PM    BUN 17 08/30/2017 01:37 PM    Creatinine 1.06 08/30/2017 01:37 PM     Lab Results   Component Value Date/Time    Cholesterol, total 222 07/28/2017 10:31 AM    HDL Cholesterol 86 07/28/2017 10:31 AM    LDL, calculated 111.8 07/28/2017 10:31 AM    Triglyceride 121 07/28/2017 10:31 AM     No results found for: GPT        Impression / Plan:        ICD-10-CM ICD-9-CM    1. Essential hypertension I10 401.9 losartan (COZAAR) 50 mg tablet      carvedilol (COREG) 12.5 mg tablet      amLODIPine (NORVASC) 5 mg tablet      hydroCHLOROthiazide (HYDRODIURIL) 25 mg tablet      isosorbide mononitrate ER (IMDUR) 30 mg tablet   2. Seizure disorder (HCC) G40.909 345.90 divalproex ER (DEPAKOTE ER) 500 mg ER tablet   3. Hypercholesteremia E78.00 272.0 atorvastatin (LIPITOR) 40 mg tablet     Asked pt to restart all her blood pressure meds    Abdominal pain , constipation - improved HIDA scan pending. Explained to patient risk benefits of the medications. Advised patient to stop meds if having any side effects. Pt verbalized understanding of the instructions.     I have discussed the diagnosis with the patient and the intended plan as seen in the above orders. The patient has received an after-visit summary and questions were answered concerning future plans. I have discussed medication side effects and warnings with the patient as well. I have reviewed the plan of care with the patient, accepted their input and they are in agreement with the treatment goals. Reviewed plan of care. Patient has provided input and agrees with goals.     Follow-up Disposition: Not on Lexx Swanson MD

## 2017-09-05 NOTE — PROGRESS NOTES
1. Have you been to the ER, urgent care clinic since your last visit? Hospitalized since your last visit? No    2. Have you seen or consulted any other health care providers outside of the [address] [place] [address] since your last visit? Include any pap smears or colon screening.  No

## 2017-09-06 LAB
ALBUMIN SERPL-MCNC: 3.2 G/DL (ref 3.4–5)
ALBUMIN/GLOB SERPL: 0.6 {RATIO} (ref 0.8–1.7)
ALP SERPL-CCNC: 109 U/L (ref 45–117)
ALT SERPL-CCNC: 22 U/L (ref 13–56)
AMYLASE SERPL-CCNC: 33 U/L (ref 25–115)
ANION GAP SERPL CALC-SCNC: 7 MMOL/L (ref 3–18)
AST SERPL-CCNC: 14 U/L (ref 15–37)
BASOPHILS # BLD: 0 K/UL (ref 0–0.06)
BASOPHILS NFR BLD: 0 % (ref 0–2)
BILIRUB SERPL-MCNC: 0.5 MG/DL (ref 0.2–1)
BUN SERPL-MCNC: 17 MG/DL (ref 7–18)
BUN/CREAT SERPL: 16 (ref 12–20)
CALCIUM SERPL-MCNC: 9.6 MG/DL (ref 8.5–10.1)
CHLORIDE SERPL-SCNC: 108 MMOL/L (ref 100–108)
CO2 SERPL-SCNC: 23 MMOL/L (ref 21–32)
CREAT SERPL-MCNC: 1.06 MG/DL (ref 0.6–1.3)
DIFFERENTIAL METHOD BLD: ABNORMAL
EOSINOPHIL # BLD: 0.1 K/UL (ref 0–0.4)
EOSINOPHIL NFR BLD: 1 % (ref 0–5)
ERYTHROCYTE [DISTWIDTH] IN BLOOD BY AUTOMATED COUNT: 13.1 % (ref 11.6–14.5)
GLOBULIN SER CALC-MCNC: 5 G/DL (ref 2–4)
GLUCOSE SERPL-MCNC: 218 MG/DL (ref 74–99)
HCT VFR BLD AUTO: 36.5 % (ref 35–45)
HGB BLD-MCNC: 11.4 G/DL (ref 12–16)
LACTATE SERPL-SCNC: 2.5 MMOL/L (ref 0.4–2)
LIPASE SERPL-CCNC: 91 U/L (ref 73–393)
LYMPHOCYTES # BLD: 2.1 K/UL (ref 0.9–3.6)
LYMPHOCYTES NFR BLD: 27 % (ref 21–52)
MCH RBC QN AUTO: 26.8 PG (ref 24–34)
MCHC RBC AUTO-ENTMCNC: 31.2 G/DL (ref 31–37)
MCV RBC AUTO: 85.9 FL (ref 74–97)
MONOCYTES # BLD: 0.4 K/UL (ref 0.05–1.2)
MONOCYTES NFR BLD: 5 % (ref 3–10)
NEUTS SEG # BLD: 5.4 K/UL (ref 1.8–8)
NEUTS SEG NFR BLD: 67 % (ref 40–73)
PLATELET # BLD AUTO: 209 K/UL (ref 135–420)
PMV BLD AUTO: 11.2 FL (ref 9.2–11.8)
POTASSIUM SERPL-SCNC: 4.4 MMOL/L (ref 3.5–5.5)
PROT SERPL-MCNC: 8.2 G/DL (ref 6.4–8.2)
RBC # BLD AUTO: 4.25 M/UL (ref 4.2–5.3)
SODIUM SERPL-SCNC: 138 MMOL/L (ref 136–145)
WBC # BLD AUTO: 8 K/UL (ref 4.6–13.2)

## 2017-09-07 ENCOUNTER — TELEPHONE (OUTPATIENT)
Dept: FAMILY MEDICINE CLINIC | Age: 67
End: 2017-09-07

## 2017-09-07 DIAGNOSIS — K80.20 GALLSTONES: Primary | ICD-10-CM

## 2017-09-07 DIAGNOSIS — R10.11 RIGHT UPPER QUADRANT ABDOMINAL PAIN: ICD-10-CM

## 2017-10-04 ENCOUNTER — TELEPHONE (OUTPATIENT)
Dept: SURGERY | Age: 67
End: 2017-10-04

## 2017-10-04 ENCOUNTER — OFFICE VISIT (OUTPATIENT)
Dept: SURGERY | Age: 67
End: 2017-10-04

## 2017-10-04 VITALS
OXYGEN SATURATION: 99 % | TEMPERATURE: 96.6 F | HEIGHT: 66 IN | DIASTOLIC BLOOD PRESSURE: 64 MMHG | SYSTOLIC BLOOD PRESSURE: 126 MMHG | WEIGHT: 231 LBS | HEART RATE: 72 BPM | RESPIRATION RATE: 16 BRPM | BODY MASS INDEX: 37.12 KG/M2

## 2017-10-04 DIAGNOSIS — R10.11 ABDOMINAL PAIN, RIGHT UPPER QUADRANT: Primary | ICD-10-CM

## 2017-10-04 NOTE — LETTER
10/4/2017 10:32 AM 
 
Patient:  Jenaro Nelson YOB: 1950 Date of Visit: 10/4/2017 Bill Mclain MD 
[address] [place] [id number] VIA In Basket Dear Bill Mclain MD, Thank you for referring Ms. Jenaro Nelson to [place] [address] for evaluation and treatment. Below are the relevant portions of my assessment and plan of care. Thank you very much for your referral of Ms. Jenaro Nelson. If you have questions, please do not hesitate to call me. I look forward to following Ms. Margret Conti along with you and will keep you updated as to her progress. Sincerely, Diann Alston MD

## 2017-10-04 NOTE — PROGRESS NOTES
General Surgery Consult      Breanna Sales  Admit date: (Not on file)    MRN: [id number]     : 1950     Age: 74 y.o. Attending Physician: Natasha Avalos MD, FACS      Subjective:     Cosme Leos is a 74 y.o. female with a history of abdominal pain. The patient had history of stroke and she is a poor historian. She said that she had pain all over her body including the left flank and right upper extremity in addition to right upper quadrant pain. She said that she's been having some pain for years but her most significant pain happened in the last few weeks in the right upper quadrant. She had a previous CT scan in the past that showed cholelithiasis. She had a recent HIDA scan a few weeks ago that showed nonvisualization of the gallbladder consistent with acute cholecystitis. She denies any fever or chills currently. As noted below the patient has significant active medical conditions.     Patient Active Problem List    Diagnosis Date Noted    Pulmonary nodule, right 11/15/2016    History of breast cancer 11/15/2016    Type 2 diabetes mellitus with diabetic neuropathy, with long-term current use of insulin ([address] 11/10/2016    Altered mental status 10/24/2016    SIRS (systemic inflammatory response syndrome) ([address] 10/24/2016    DM type 2, not at goal [facility]) 2016    Breast cancer (HCC)-right inferior, 2.2cm,2/18 nodes, + - -, 2016    Gastroesophageal reflux disease without esophagitis 2016    Hearing impairment 2015    History of CVA (cerebrovascular accident) 2015    Hypercholesteremia 2015    Seizure disorder ([address] 2015    Chest pain 2015    Diabetes mellitus with neuropathy ([address] 2015    HTN (hypertension) 2015     Past Medical History:   Diagnosis Date    Arthritis     Breast CA ([address]     Diabetes ([address]     Hypercholesteremia     Hypertension     Menopause     Psychiatric disorder     DEPRESSION    PUD (peptic ulcer disease)  S/P cardiac cath 02/2015    No angiographic evidence of CAD    Seizures (HCC)     LAST SEIZURE ABOUT 2 MONTHS AGO (NOV 2015)    Stroke [facility])     x2 with left side deficit      Past Surgical History:   Procedure Laterality Date    HX BREAST LUMPECTOMY Right 1/12/2016    Dr. Neftali Jiménez Partial Mastectomy w./ axillary lymphadenectomy    HX HEENT      TONSILLECTOMY    HX MASTECTOMY Right 1/26/2016    Dr. Neftali Jiménez Repeat Partial Mastectomy for positive margins    HX ORTHOPAEDIC      left arm surg    IR MEDIPORT        Social History   Substance Use Topics    Smoking status: Never Smoker    Smokeless tobacco: Never Used    Alcohol use No      History   Smoking Status    Never Smoker   Smokeless Tobacco    Never Used     Family History   Problem Relation Age of Onset    Cancer Father [de-identified]     colon    Hypertension Mother     Heart Disease Mother     Diabetes Mother       Current Outpatient Prescriptions   Medication Sig    levETIRAcetam (KEPPRA) 500 mg tablet Take 1 Tab by mouth two (2) times a day.  divalproex ER (DEPAKOTE ER) 500 mg ER tablet Take 1 Tab by mouth two (2) times a day.  losartan (COZAAR) 50 mg tablet Take 1 Tab by mouth two (2) times a day.  carvedilol (COREG) 12.5 mg tablet Take 1 Tab by mouth two (2) times daily (with meals).  amLODIPine (NORVASC) 5 mg tablet Take 1 Tab by mouth nightly.  hydroCHLOROthiazide (HYDRODIURIL) 25 mg tablet Take 1 Tab by mouth daily.  atorvastatin (LIPITOR) 40 mg tablet Take 1 Tab by mouth daily.  isosorbide mononitrate ER (IMDUR) 30 mg tablet Take 1 Tab by mouth daily.  bisacodyl (DULCOLAX, BISACODYL,) 10 mg suppository Insert 10 mg into rectum daily.  lactulose (CHRONULAC) 10 gram/15 mL solution Take 45 mL by mouth nightly.  docusate sodium (COLACE) 100 mg capsule Take 1 Cap by mouth two (2) times a day for 90 days.  insulin detemir (LEVEMIR) 100 unit/mL injection 30 Units by SubCUTAneous route two (2) times a day.     insulin lispro (HUMALOG) 100 unit/mL injection 6 Units by SubCUTAneous route Before breakfast, lunch, and dinner.  furosemide (LASIX) 20 mg tablet One po daily    pantoprazole (PROTONIX) 40 mg tablet Take 1 Tab by mouth daily.  aspirin-dipyridamole (AGGRENOX)  mg per SR capsule Take 1 Cap by mouth two (2) times a day.  letrozole (FEMARA) 2.5 mg tablet Take 2.5 mg by mouth daily.  SONAFINE emul topical     cholecalciferol (VITAMIN D3) 1,000 unit tablet Take 1,000 Units by mouth daily.  glucose blood VI test strips (FREESTYLE LITE STRIPS) strip Monitor blood sugars twice daily    therapeutic multivitamin (THERAGRAN) tablet Take 1 Tab by mouth daily.  clotrimazole (LOTRIMIN) 1 % topical cream Apply  to affected area two (2) times a day. No current facility-administered medications for this visit. Allergies   Allergen Reactions    Metformin Other (comments)     Patient states that she is not allergic to metformin. Review of Systems:  Pertinent items are noted in the History of Present Illness. Objective:     Visit Vitals    /64 (BP 1 Location: Left arm, BP Patient Position: Sitting)    Pulse 72    Temp 96.6 °F (35.9 °C) (Oral)    Resp 16    Ht 5' 6\" (1.676 m)    Wt 104.8 kg (231 lb)    SpO2 99%    BMI 37.28 kg/m2       Physical Exam:      General:  in no apparent distress   Eyes:  conjunctivae and sclerae normal, pupils equal, round, reactive to light   Throat & Neck: no erythema or exudates noted and neck supple and symmetrical; no palpable masses   Lungs:   clear to auscultation bilaterally   Heart:  Regular rate and rhythm   Abdomen:   rounded and obese, soft, right upper quadrant tenderness with localized guarding ( positive  sign), distended, no masses or organomegaly.     Extremities: extremities normal, atraumatic, no cyanosis or edema   Skin: Normal.         Imaging and Lab Review:     CBC:   Lab Results   Component Value Date/Time    WBC 8.0 08/29/2017 01:37 PM    RBC 4.25 08/29/2017 01:37 PM    HGB 11.4 08/29/2017 01:37 PM    HCT 36.5 08/29/2017 01:37 PM    PLATELET 687 81/05/1306 01:37 PM     BMP:   Lab Results   Component Value Date/Time    Glucose 218 08/29/2017 01:37 PM    Sodium 138 08/29/2017 01:37 PM    Potassium 4.4 08/29/2017 01:37 PM    Chloride 108 08/29/2017 01:37 PM    CO2 23 08/29/2017 01:37 PM    BUN 17 08/29/2017 01:37 PM    Creatinine 1.06 08/29/2017 01:37 PM    Calcium 9.6 08/29/2017 01:37 PM     CMP:  Lab Results   Component Value Date/Time    Glucose 218 08/29/2017 01:37 PM    Sodium 138 08/29/2017 01:37 PM    Potassium 4.4 08/29/2017 01:37 PM    Chloride 108 08/29/2017 01:37 PM    CO2 23 08/29/2017 01:37 PM    BUN 17 08/29/2017 01:37 PM    Creatinine 1.06 08/29/2017 01:37 PM    Calcium 9.6 08/29/2017 01:37 PM    Anion gap 7 08/29/2017 01:37 PM    BUN/Creatinine ratio 16 08/29/2017 01:37 PM    Alk. phosphatase 109 08/29/2017 01:37 PM    Protein, total 8.2 08/29/2017 01:37 PM    Albumin 3.2 08/29/2017 01:37 PM    Globulin 5.0 08/29/2017 01:37 PM    A-G Ratio 0.6 08/29/2017 01:37 PM       No results found for this or any previous visit (from the past 24 hour(s)). images and reports reviewed    Assessment:   Jose Raul James is a 74 y.o. female is presenting with abdominal pain and a picture of acute cholecystitis. Unfortunately the disease process has been there for at least 2-3 weeks, which makes the surgical option is very difficult one. In addition her medical history put her at risk for even higher complication. I explained the indications for robotic cholecystectomy as well as the alternatives. I discussed the potential risks, including but not limited to bleeding, wound infection, trocar injuries, bile duct injury and leak, and also the possible need for conversion to open procedure. I also explained the firefly technology and how it allow us to visualize the biliary tree to avoid bile duct injury or leak.  She indicates that she understands the risks, accepts and wishes to proceed. However I explained to the patient that there is an option of IR cholecystostomy tube placement. I believe that if the patient is cleared to for surgery that would be the best option. Plan:     If the patient is cleared medically to undergo general anesthesia then we proceed with robotic cholecystectomy with firefly (ICG) technology for identification of the biliary tree.    If the patient is high risk for the surgery, and probably the best office to do an eye on a cholecystostomy or medical management and observation    Please call me if you have any questions (cell phone: [phone])     Signed By: Javid Quiñones MD     October 4, 2017

## 2017-10-04 NOTE — TELEPHONE ENCOUNTER
Left voice mail message to contact our office re: scheduling a visit with PCP, Dr. Tone Diamond, for a surgical clearance for cholecystectomy.

## 2017-10-04 NOTE — MR AVS SNAPSHOT
Visit Information Date & Time Provider Department Dept. Phone Encounter #  
 10/4/2017 10:30 AM Ortiz Chapa MD [facility] [phone] [id number] Your Appointments 10/5/2017  3:00 PM  
Follow Up with Ezio Ricks MD  
[address]) Appt Note: Return in about 1 month (around 10/5/2017). [address] [facility] [address] [place] [facility] [address]  
[phone]  
  
   
 [address] [facility] [address]  
  
    
 10/10/2017  2:15 PM  
Follow Up with Roc Donnelly MD  
[address]) Appt Note: 4 months follow up; $15 cp/ pwk/ photo ID/ ins card. ..Alondra Serna; R/S appointment from 09- Reason: forgot appt [id number] [place] [address] [address] [place] [address] 88 [address] [address]  
  
    
 11/9/2017  3:00 PM  
Follow Up with Ezio Ricks MD  
[address]) Appt Note: Return in 3 months (11/9/17) for follow up. [address] [facility] [address] [facility] 83 [id number] [phone] Upcoming Health Maintenance Date Due  
 EYE EXAM RETINAL OR DILATED Q1 3/3/2016 FOOT EXAM Q1 11/23/2016 GLAUCOMA SCREENING Q2Y 8/9/2017 Pneumococcal 65+ High/Highest Risk (1 of 2 - PCV13) 8/9/2017 MEDICARE YEARLY EXAM 1/20/2018 HEMOGLOBIN A1C Q6M 1/28/2018 MICROALBUMIN Q1 7/26/2018 LIPID PANEL Q1 7/28/2018 BREAST CANCER SCRN MAMMOGRAM 12/5/2018 COLONOSCOPY 10/27/2025 DTaP/Tdap/Td series (2 - Td) 11/23/2025 Allergies as of 10/4/2017  Review Complete On: 10/4/2017 By: Mark Alarcon Severity Noted Reaction Type Reactions Metformin  10/22/2015    Other (comments) Patient states that she is not allergic to metformin. Current Immunizations  Reviewed on 11/15/2016 Name Date  Influenza High Dose Vaccine PF 8/29/2017  4:00 PM  
 Influenza Vaccine 10/17/2016, 11/23/2015 10:18 AM  
 Influenza Vaccine (Madin Gita Canine Kidney) PF 2/4/2015 10:00 AM  
 Pneumococcal Polysaccharide (PPSV-23) 2/4/2015  9:57 AM  
 Tdap 11/23/2015 10:19 AM  
  
 Not reviewed this visit Vitals BP Pulse Temp Resp Height(growth percentile) Weight(growth percentile) 126/64 (BP 1 Location: Left arm, BP Patient Position: Sitting) 72 96.6 °F (35.9 °C) (Oral) 16 5' 6\" (1.676 m) 231 lb (104.8 kg) SpO2 BMI OB Status Smoking Status 99% 37.28 kg/m2 Postmenopausal Never Smoker BMI and BSA Data Body Mass Index Body Surface Area  
 37.28 kg/m 2 2.21 m 2 Preferred Pharmacy Pharmacy Name Phone [facility] - [address], [address] [phone] Your Updated Medication List  
  
   
This list is accurate as of: 10/4/17 10:54 AM.  Always use your most recent med list. amLODIPine 5 mg tablet Commonly known as:  Celina Royals Take 1 Tab by mouth nightly. aspirin-dipyridamole  mg per SR capsule Commonly known as:  AGGRENOX Take 1 Cap by mouth two (2) times a day. atorvastatin 40 mg tablet Commonly known as:  LIPITOR Take 1 Tab by mouth daily. bisacodyl 10 mg suppository Commonly known as:  DULCOLAX (BISACODYL) Insert 10 mg into rectum daily. carvedilol 12.5 mg tablet Commonly known as:  Mar Secor Take 1 Tab by mouth two (2) times daily (with meals). clotrimazole 1 % topical cream  
Commonly known as:  Quin Cater Apply  to affected area two (2) times a day. divalproex  mg ER tablet Commonly known as:  DEPAKOTE ER Take 1 Tab by mouth two (2) times a day. docusate sodium 100 mg capsule Commonly known as:  [place] Helio Take 1 Cap by mouth two (2) times a day for 90 days. furosemide 20 mg tablet Commonly known as:  LASIX One po daily  
  
 glucose blood VI test strips strip Commonly known as:  FREESTYLE LITE STRIPS  
 Monitor blood sugars twice daily  
  
 hydroCHLOROthiazide 25 mg tablet Commonly known as:  HYDRODIURIL Take 1 Tab by mouth daily. insulin detemir 100 unit/mL injection Commonly known as:  LEVEMIR  
30 Units by SubCUTAneous route two (2) times a day. insulin lispro 100 unit/mL injection Commonly known as:  HUMALOG  
6 Units by SubCUTAneous route Before breakfast, lunch, and dinner. isosorbide mononitrate ER 30 mg tablet Commonly known as:  IMDUR Take 1 Tab by mouth daily. lactulose 10 gram/15 mL solution Commonly known as:  Maximiano Ros Take 45 mL by mouth nightly. letrozole 2.5 mg tablet Commonly known as:  [facility] Take 2.5 mg by mouth daily. levETIRAcetam 500 mg tablet Commonly known as:  KEPPRA Take 1 Tab by mouth two (2) times a day. losartan 50 mg tablet Commonly known as:  COZAAR Take 1 Tab by mouth two (2) times a day. pantoprazole 40 mg tablet Commonly known as:  PROTONIX Take 1 Tab by mouth daily. SONAFINE Emul topical  
Generic drug:  emollient combination no. 10  
  
 therapeutic multivitamin tablet Commonly known as:  [facility] Take 1 Tab by mouth daily. VITAMIN D3 1,000 unit tablet Generic drug:  cholecalciferol Take 1,000 Units by mouth daily. Patient Instructions If you have any questions or concerns about today's appointment, the verbal and/or written instructions you were given for follow up care, please call our office at [phone]. [facility] - [address], [address] [facility], [address] [place] [address] 
 
[phone] office [phone] Introducing [facility]! [facility] introduces [employer] patient portal. Now you can access parts of your medical record, email your doctor's office, and request medication refills online. 1. In your internet browser, go to [email or web address] 2. Click on the First Time User? Click Here link in the Sign In box. You will see the New Member Sign Up page. 3. Enter your [employer] Access Code exactly as it appears below. You will not need to use this code after youve completed the sign-up process. If you do not sign up before the expiration date, you must request a new code. · [employer] Access Code: [email or web address] Expires: 11/27/2017  3:57 PM 
 
4. Enter the last four digits of your Social Security Number (xxxx) and Date of Birth (mm/dd/yyyy) as indicated and click Submit. You will be taken to the next sign-up page. 5. Create a [employer] ID. This will be your [employer] login ID and cannot be changed, so think of one that is secure and easy to remember. 6. Create a [employer] password. You can change your password at any time. 7. Enter your Password Reset Question and Answer. This can be used at a later time if you forget your password. 8. Enter your e-mail address. You will receive e-mail notification when new information is available in [address] 9. Click Sign Up. You can now view and download portions of your medical record. 10. Click the Download Summary menu link to download a portable copy of your medical information. If you have questions, please visit the Frequently Asked Questions section of the [employer] website. Remember, [employer] is NOT to be used for urgent needs. For medical emergencies, dial 911. Now available from your iPhone and Android! Please provide this summary of care documentation to your next provider. Your primary care clinician is listed as Brigido Cid. If you have any questions after today's visit, please call [phone].

## 2017-10-04 NOTE — PATIENT INSTRUCTIONS
If you have any questions or concerns about today's appointment, the verbal and/or written instructions you were given for follow up care, please call our office at [phone].     [facility] - [address]    [phone] office  [phone]

## 2017-10-04 NOTE — PROGRESS NOTES
Padma Gil is a 74 y.o. female who presents for a surgical evaluation of an abnormal HIDA scan done on 09/05/17 & choloelithiasis. She scores her RUQ abdominal pain as a 9/10.

## 2017-10-06 ENCOUNTER — DOCUMENTATION ONLY (OUTPATIENT)
Dept: FAMILY MEDICINE CLINIC | Age: 67
End: 2017-10-06

## 2017-10-06 NOTE — PROGRESS NOTES
Diabetic eye exam notes received from [facility] eye care. patient had an appnt on 9/25/17, notes sent to scanning.

## 2017-10-10 ENCOUNTER — OFFICE VISIT (OUTPATIENT)
Dept: SURGERY | Age: 67
End: 2017-10-10

## 2017-10-10 VITALS
WEIGHT: 231 LBS | BODY MASS INDEX: 37.12 KG/M2 | TEMPERATURE: 98.2 F | SYSTOLIC BLOOD PRESSURE: 148 MMHG | HEIGHT: 66 IN | RESPIRATION RATE: 20 BRPM | HEART RATE: 90 BPM | DIASTOLIC BLOOD PRESSURE: 90 MMHG

## 2017-10-10 DIAGNOSIS — Z86.73 HISTORY OF CVA (CEREBROVASCULAR ACCIDENT): Chronic | ICD-10-CM

## 2017-10-10 DIAGNOSIS — I10 ESSENTIAL HYPERTENSION: Chronic | ICD-10-CM

## 2017-10-10 DIAGNOSIS — E11.40 TYPE 2 DIABETES MELLITUS WITH DIABETIC NEUROPATHY, WITH LONG-TERM CURRENT USE OF INSULIN (HCC): Chronic | ICD-10-CM

## 2017-10-10 DIAGNOSIS — Z79.4 TYPE 2 DIABETES MELLITUS WITH DIABETIC NEUROPATHY, WITH LONG-TERM CURRENT USE OF INSULIN (HCC): Chronic | ICD-10-CM

## 2017-10-10 DIAGNOSIS — Z17.0 MALIGNANT NEOPLASM OF RIGHT BREAST IN FEMALE, ESTROGEN RECEPTOR POSITIVE, UNSPECIFIED SITE OF BREAST (HCC): Primary | ICD-10-CM

## 2017-10-10 DIAGNOSIS — C50.911 MALIGNANT NEOPLASM OF RIGHT BREAST IN FEMALE, ESTROGEN RECEPTOR POSITIVE, UNSPECIFIED SITE OF BREAST (HCC): Primary | ICD-10-CM

## 2017-10-10 DIAGNOSIS — G40.909 SEIZURE DISORDER (HCC): Chronic | ICD-10-CM

## 2017-10-10 NOTE — PROGRESS NOTES
Sierra Cunningham is a 74 y.o. female who presents today with   Chief Complaint   Patient presents with    Surgical Follow-up     4 month f/u  from 5- for previous Right Partial mastectomy done 1/12/2016 HX of right breast invasive ductal carcinoma/                1. Have you been to the ER, urgent care clinic since your last visit? Hospitalized since your last visit? No    2. Have you seen or consulted any other health care providers outside of the [address] [place] [address] since your last visit? Include any pap smears or colon screening.  No

## 2017-10-11 NOTE — PROGRESS NOTES
Valentin Magallanes comes to the office today for her almost 2 year follow-up from a previous right partial mastectomy for a 2.2 cm invasive ductal carcinoma grade 2. She had 2 out of 18 lymph nodes positive. The tumor was estrogen receptor positive but progesterone receptor and HER-2 negative. After her partial mastectomy there was an attempt to treat her with some chemotherapy by Dr. arellano but there was a question of a possible small seizure so that was stopped. Subsequently she underwent radiation therapy and then has been placed on Femara which she has tolerated well. The patient does complain of pain in the right lateral breast and chest wall which she states is not getting better. She has also had some laterally in the left breast.  This is been a chronic problem but on the right perhaps exacerbated somewhat by her surgery and radiation. Denies any edema of the right hand but thinks she may have a little bit of swelling around the right axilla and upper arm. She denies any other shortness of breath or headaches. Allergies   Allergen Reactions    Metformin Other (comments)     Patient states that she is not allergic to metformin.       Past Medical History:   Diagnosis Date    Arthritis     Breast CA ([address]     Diabetes ([address]     Hypercholesteremia     Hypertension     Menopause     Psychiatric disorder     DEPRESSION    PUD (peptic ulcer disease)     S/P cardiac cath 02/2015    No angiographic evidence of CAD    Seizures (HCC)     LAST SEIZURE ABOUT 2 MONTHS AGO (NOV 2015)    Stroke ([address]     x2 with left side deficit     Past Surgical History:   Procedure Laterality Date    HX BREAST LUMPECTOMY Right 1/12/2016    Dr. Carito Albright Partial Mastectomy w./ axillary lymphadenectomy    HX HEENT      TONSILLECTOMY    HX MASTECTOMY Right 1/26/2016    Dr. Carito Albright Repeat Partial Mastectomy for positive margins    HX ORTHOPAEDIC      left arm surg    IR [facility]       Social History     Social History    Marital status: SINGLE     Spouse name: N/A    Number of children: N/A    Years of education: N/A     Social History Main Topics    Smoking status: Never Smoker    Smokeless tobacco: Never Used    Alcohol use No    Drug use: No    Sexual activity: Yes     Partners: Male     Other Topics Concern     Service No    Blood Transfusions No    Caffeine Concern No    Occupational Exposure No    Hobby Hazards No    Sleep Concern Yes    Stress Concern No    Exercise Yes     walks using cane    Seat Belt Yes    Self-Exams Yes     Social History Narrative     The patient's review of systems has not changed. She continues with multiple medical problems including gastroesophageal reflux disease type 2 diabetes with diabetic neuropathy some hearing impairment with a history of a previous CVA, hypercholesterolemia, and seizure disorder. She has not had any seizures in a long time. She also has hypertension. PHYSICAL EXAM    Visit Vitals    /90 (BP 1 Location: Left arm, BP Patient Position: At rest)    Pulse 90    Temp 98.2 °F (36.8 °C) (Oral)    Resp 20    Ht 5' 6\" (1.676 m)    Wt 104.8 kg (231 lb)    BMI 37.28 kg/m2          Constitutional:  Well-developed, well-nourished, no acute distress. Patient says that she is depressed. She is fearful about the possibility of recurrence of her breast cancer. She has also been having some abdominal pain recently and apparently was evaluated by Dr. Apple Adames and told that she might need her gallbladder removed. Head:  Head, eyes, ears, nose, throat within normal limits. She still has slightly slurred speech from her previous stroke as well as some difficulty walking. This is a powered chair normally. Skin:  No suspicious moles or rashes. Neck:  No masses or adenopathy. The airway appears normal. Thyroid is not enlarged and there are no palpable thyroid nodules. Lungs:  Lungs are clear to auscultation and percussion.  No respiratory distress. No chest wall tenderness. Heart:  Heart is regular with no extra heart sounds or murmur heard. Breast Exam: The breasts are free of any discrete tenderness or masses  The skin and nipple areas appear normal. Both axillae are negative. On the right breast the patient does have some tenderness especially laterally and in the axilla. There does seem to be some slight swelling of the upper arm but not of the hands. I do not appreciate any masses in the right breast but she has some mild persistent changes from the radiation. Abdomen: The abdomen is soft and nontender without organomegaly or masses. Bowel sounds are active and of normal pitch. There is no abdominal distention. No hernias are evident. Extremities:  No tenderness of the extremities and no significant swelling. Psych:  Alert and oriented. Assessment: #1   2.2 cm invasive ductal carcinoma of the lateral right breast with 2 out of 18 lymph nodes positive. The tumor was estrogen receptor positive but progesterone receptor and HER-2 negative. Patient is presently on Femara and tolerating it. #2 history of diabetes #3 hypertension. #4 GERD. #5 history of CVA. Recommendations: I recommend that we see the patient back in about 6 months. She is to follow-up with us sooner if she has any problems. She will need to have her next mammogram in December. The above was dictated with Silvino. There may be unrecognized errors.     Janina Irving MD

## 2017-10-12 ENCOUNTER — DOCUMENTATION ONLY (OUTPATIENT)
Dept: FAMILY MEDICINE CLINIC | Age: 67
End: 2017-10-12

## 2017-10-12 NOTE — LETTER
10/12/2017 Rick Kuo [address] [facility] [address] [id number] Dear Ms. Rick Torrespj, We had an appointment reserved for you 10/5/2017 and were concerned when you did not show or call within 24 hours to cancel the appointment. Our policy is to call patients two days prior to their appointment to remind them of the date and time. We perform these calls as a courtesy to our patients and to allow us the opportunity to rebook the time slot should the appointment not be necessary. Recognizing that everyones time is valuable and that appointment time is limited, we ask that you provide 24 hours notice if you are unable to keep your appointment. Please call us at your earliest convenience to reschedule your appointment as your provider felt it was important to see you. Thank you for your anticipated cooperation. The scheduling staff: 
 
[address] [place] [address],[address] [id number] 
[phone]

## 2017-10-16 ENCOUNTER — TELEPHONE (OUTPATIENT)
Dept: FAMILY MEDICINE CLINIC | Age: 67
End: 2017-10-16

## 2017-10-16 NOTE — TELEPHONE ENCOUNTER
Kate Hernandez from [address] [place] [address] Court stated that patient was seen by her on 10/14/17. Patient was positive with depression and abnormal result for mini cognitive exam. Ms. Kate Hernandez is also stated that patient was concern about the high cost of her medication. She would like to know if mail order can help to lower the cost of the patient's medication. No further detail was given when asked.

## 2017-10-19 NOTE — TELEPHONE ENCOUNTER
Called Ms. Antonio Zaldivar back from [facility], requested to be called back at 3pm. This encounter will be closed.

## 2017-11-10 ENCOUNTER — PATIENT OUTREACH (OUTPATIENT)
Dept: FAMILY MEDICINE CLINIC | Age: 67
End: 2017-11-10

## 2017-11-10 NOTE — PROGRESS NOTES
Nurse Navigator  ED Follow Up Note  Received patient from [employer]  Patient discharged on 11/07/17 from [facility]           Attempted to reach patient for follow up. Unable to reach. Left message on voicemail. Noted had an appt scheduled with PCP yesterday. Patient no showed, no other appt currently scheduled at this time. Patient's most recent hospital visit summary not available at [address] [place] [address] Loop     Will try to call patient again at later time          This note will not be viewable in [address]

## 2017-11-13 ENCOUNTER — PATIENT OUTREACH (OUTPATIENT)
Dept: FAMILY MEDICINE CLINIC | Age: 67
End: 2017-11-13

## 2017-11-13 NOTE — LETTER
11/13/2017 12:12 PM 
 
Ms. Karen Strange [address] [facility] 8 [facility] 83 [phone] Dear Ms. Deepali Nakul am a Nurse Navigator working with your primary care provider (PCP), Dr. Imtiaz Maurer. Part of my job is to follow up with patients who have been in the hospital to see how they are feeling, answer any questions they may have about their visit, and also make sure they have a follow-up appointment to see their primary care doctor. I have been unable to reach you by telephone and wanted to make sure that you scheduled a follow-up appointment to come in and talk to Dr. Imtiaz Maurer about your recent visit to the hospital.   
 
Please call our office at [phone] to schedule your appointment as soon as possible. If you have any questions or concerns, please call [phone]. Thank you for allowing us to participate in your care! Sincerely, Roro BONDN, RN   Nurse Navigator [address] Office   [phone] Fax   [phone]

## 2017-11-13 NOTE — PROGRESS NOTES
Nurse Navigator  ED Follow Up Note  Received patient from [employer]  Patient discharged on 11/07/17 from [facility]  Attempted to reach patient again for follow up. Unable to reach. Left message on voicemail.     Patient's most recent hospital visit summary currently not available at [facility].   No future PCP appt currently scheduled at this time       Letter sent

## 2017-12-04 ENCOUNTER — OFFICE VISIT (OUTPATIENT)
Dept: FAMILY MEDICINE CLINIC | Age: 67
End: 2017-12-04

## 2017-12-04 VITALS
HEART RATE: 86 BPM | WEIGHT: 226 LBS | BODY MASS INDEX: 36.32 KG/M2 | DIASTOLIC BLOOD PRESSURE: 61 MMHG | TEMPERATURE: 97.3 F | HEIGHT: 66 IN | OXYGEN SATURATION: 100 % | RESPIRATION RATE: 16 BRPM | SYSTOLIC BLOOD PRESSURE: 113 MMHG

## 2017-12-04 DIAGNOSIS — E11.40 TYPE 2 DIABETES MELLITUS WITH DIABETIC NEUROPATHY, WITH LONG-TERM CURRENT USE OF INSULIN (HCC): Primary | Chronic | ICD-10-CM

## 2017-12-04 DIAGNOSIS — R91.1 LUNG NODULE: ICD-10-CM

## 2017-12-04 DIAGNOSIS — Z79.4 TYPE 2 DIABETES MELLITUS WITH DIABETIC NEUROPATHY, WITH LONG-TERM CURRENT USE OF INSULIN (HCC): Primary | Chronic | ICD-10-CM

## 2017-12-04 DIAGNOSIS — Z86.73 HISTORY OF CVA (CEREBROVASCULAR ACCIDENT): Chronic | ICD-10-CM

## 2017-12-04 LAB
GLUCOSE POC: 232 MG/DL
HBA1C MFR BLD HPLC: 11.3 %

## 2017-12-04 RX ORDER — ASPIRIN AND DIPYRIDAMOLE 25; 200 MG/1; MG/1
1 CAPSULE, EXTENDED RELEASE ORAL 2 TIMES DAILY
Qty: 180 CAP | Refills: 1 | Status: SHIPPED | OUTPATIENT
Start: 2017-12-04 | End: 2019-09-26 | Stop reason: SDUPTHER

## 2017-12-04 NOTE — PROGRESS NOTES
Rick Kuo is a 74 y.o.  female and presents with     Chief Complaint   Patient presents with    Diabetes    Hypertension    Cholesterol Problem    Breast Cancer    Lung Nodule    Seizure       Pt went to [facility] nad was there for a month. Pt went to ER as she ran out of in[employer]. Her sugars were high in the ER. Pt is currently injecting Levimir 40 units twice daily along with Humalog. However she has not been watching her diet. Pt is taking her blood pressure ,chol and seizure meds. Pt has h/o lung nodule. CT chets was order by Thuy but was never done by pt. Past Medical History:   Diagnosis Date    Arthritis     Breast CA ([address]     Diabetes [facility]     Hypercholesteremia     Hypertension     Menopause     Psychiatric disorder     DEPRESSION    PUD (peptic ulcer disease)     S/P cardiac cath 02/2015    No angiographic evidence of CAD    Seizures (HCC)     LAST SEIZURE ABOUT 2 MONTHS AGO (NOV 2015)    Stroke ([facility]     x2 with left side deficit     Past Surgical History:   Procedure Laterality Date    HX BREAST LUMPECTOMY Right 1/12/2016    Dr. Gerardo Sanchez Partial Mastectomy w./ axillary lymphadenectomy    HX HEENT      TONSILLECTOMY    HX MASTECTOMY Right 1/26/2016    Dr. Gerardo Sanchez Repeat Partial Mastectomy for positive margins    HX ORTHOPAEDIC      left arm surg    IR MEDIPORT       Current Outpatient Prescriptions   Medication Sig    aspirin-dipyridamole (AGGRENOX)  mg per SR capsule Take 1 Cap by mouth two (2) times a day.  levETIRAcetam (KEPPRA) 500 mg tablet Take 1 Tab by mouth two (2) times a day.  divalproex ER (DEPAKOTE ER) 500 mg ER tablet Take 1 Tab by mouth two (2) times a day.  losartan (COZAAR) 50 mg tablet Take 1 Tab by mouth two (2) times a day.  carvedilol (COREG) 12.5 mg tablet Take 1 Tab by mouth two (2) times daily (with meals).  amLODIPine (NORVASC) 5 mg tablet Take 1 Tab by mouth nightly.     hydroCHLOROthiazide (HYDRODIURIL) 25 mg tablet Take 1 Tab by mouth daily.  atorvastatin (LIPITOR) 40 mg tablet Take 1 Tab by mouth daily.  isosorbide mononitrate ER (IMDUR) 30 mg tablet Take 1 Tab by mouth daily.  bisacodyl (DULCOLAX, BISACODYL,) 10 mg suppository Insert 10 mg into rectum daily.  lactulose (CHRONULAC) 10 gram/15 mL solution Take 45 mL by mouth nightly.  insulin detemir (LEVEMIR) 100 unit/mL injection 30 Units by SubCUTAneous route two (2) times a day.  insulin lispro (HUMALOG) 100 unit/mL injection 6 Units by SubCUTAneous route Before breakfast, lunch, and dinner.  furosemide (LASIX) 20 mg tablet One po daily    pantoprazole (PROTONIX) 40 mg tablet Take 1 Tab by mouth daily.  letrozole (FEMARA) 2.5 mg tablet Take 2.5 mg by mouth daily.  SONAFINE emul topical     cholecalciferol (VITAMIN D3) 1,000 unit tablet Take 1,000 Units by mouth daily.  glucose blood VI test strips (FREESTYLE LITE STRIPS) strip Monitor blood sugars twice daily    therapeutic multivitamin (THERAGRAN) tablet Take 1 Tab by mouth daily.  clotrimazole (LOTRIMIN) 1 % topical cream Apply  to affected area two (2) times a day. No current facility-administered medications for this visit.       Health Maintenance   Topic Date Due    FOOT EXAM Q1  11/23/2016    Pneumococcal 65+ High/Highest Risk (1 of 2 - PCV13) 08/09/2017    MEDICARE YEARLY EXAM  01/20/2018    HEMOGLOBIN A1C Q6M  01/28/2018    MICROALBUMIN Q1  07/26/2018    LIPID PANEL Q1  07/28/2018    EYE EXAM RETINAL OR DILATED Q1  09/25/2018    BREAST CANCER SCRN MAMMOGRAM  12/05/2018    GLAUCOMA SCREENING Q2Y  09/25/2019    COLONOSCOPY  10/27/2025    DTaP/Tdap/Td series (2 - Td) 11/23/2025    Hepatitis C Screening  Completed    OSTEOPOROSIS SCREENING (DEXA)  Completed    ZOSTER VACCINE AGE 60>  Completed    Influenza Age 5 to Adult  Completed     Immunization History   Administered Date(s) Administered    Influenza High Dose Vaccine PF 08/29/2017    Influenza Vaccine 11/23/2015, 10/17/2016    Influenza Vaccine (Madin Gita Canine Kidney) PF 02/04/2015    Pneumococcal Polysaccharide (PPSV-23) 02/04/2015    Tdap 11/23/2015     No LMP recorded. Patient is postmenopausal.        Allergies and Intolerances: Allergies   Allergen Reactions    Metformin Other (comments)     Patient states that she is not allergic to metformin. Family History:   Family History   Problem Relation Age of Onset    Cancer Father [de-identified]     colon    Hypertension Mother     Heart Disease Mother     Diabetes Mother        Social History:   She  reports that she has never smoked. She has never used smokeless tobacco.  She  reports that she does not drink alcohol.             Review of Systems:   General: negative for - chills, fatigue, fever, weight change  Psych: negative for - anxiety, depression, irritability or mood swings  ENT: negative for - headaches, hearing change, nasal congestion, oral lesions, sneezing or sore throat  Heme/ Lymph: negative for - bleeding problems, bruising, pallor or swollen lymph nodes  Endo: negative for - hot flashes, polydipsia/polyuria or temperature intolerance  Resp: negative for - cough, shortness of breath or wheezing  CV: negative for - chest pain, edema or palpitations  GI: negative for - abdominal pain, change in bowel habits, constipation, diarrhea or nausea/vomiting  : negative for - dysuria, hematuria, incontinence, pelvic pain or vulvar/vaginal symptoms  MSK: negative for - joint pain, joint swelling or muscle pain  Neuro: negative for - confusion, headaches, seizures or weakness  Derm: negative for - dry skin, hair changes, rash or skin lesion changes          Physical:   Vitals:   Vitals:    12/04/17 1324   BP: 113/61   Pulse: 86   Resp: 16   Temp: 97.3 °F (36.3 °C)   TempSrc: Oral   SpO2: 100%   Weight: 226 lb (102.5 kg)   Height: 5' 6\" (1.676 m)           Exam:   HEENT- atraumatic,normocephalic, awake, oriented, well nourished  Neck - supple,no enlarged lymph nodes, no JVD, no thyromegaly  Chest- CTA, no rhonchi, no crackles  Heart- rrr, no murmurs / gallop/rub  Abdomen- soft,BS+,NT, no hepatosplenomegaly  Ext - no c/c/edema   Neuro- no focal deficits. Power 5/5 all extremities  Skin - warm,dry, no obvious rashes. Review of Data:   LABS:   Lab Results   Component Value Date/Time    WBC 8.0 08/29/2017 01:37 PM    HGB 11.4 08/29/2017 01:37 PM    HCT 36.5 08/29/2017 01:37 PM    PLATELET 592 99/94/7513 01:37 PM     Lab Results   Component Value Date/Time    Sodium 138 08/29/2017 01:37 PM    Potassium 4.4 08/29/2017 01:37 PM    Chloride 108 08/29/2017 01:37 PM    CO2 23 08/29/2017 01:37 PM    Glucose 218 08/29/2017 01:37 PM    BUN 17 08/29/2017 01:37 PM    Creatinine 1.06 08/29/2017 01:37 PM     Lab Results   Component Value Date/Time    Cholesterol, total 222 07/28/2017 10:31 AM    HDL Cholesterol 86 07/28/2017 10:31 AM    LDL, calculated 111.8 07/28/2017 10:31 AM    Triglyceride 121 07/28/2017 10:31 AM     No results found for: GPT        Impression / Plan:        ICD-10-CM ICD-9-CM    1. Type 2 diabetes mellitus with diabetic neuropathy, with long-term current use of insulin (HCC) E11.40 250.60 AMB POC GLUCOSE BLOOD, BY GLUCOSE MONITORING DEVICE    Z79.4 357.2 AMB POC HEMOGLOBIN A1C     V58.67    2. History of CVA (cerebrovascular accident) Z86.73 V12.54 aspirin-dipyridamole (AGGRENOX)  mg per SR capsule   3. Lung nodule R91.1 793.11 CT CHEST W WO CONT     MD - uncontrolled, increse levimir to 45  Units twice daily. HTN/Hyperchol - stable    H/o breast cancer - [facility] scheduled for Dec 11th.,    Monitor blood sugars and bring a log next visit      Explained to patient risk benefits of the medications. Advised patient to stop meds if having any side effects. Pt verbalized understanding of the instructions. I have discussed the diagnosis with the patient and the intended plan as seen in the above orders.   The patient has received an after-visit summary and questions were answered concerning future plans. I have discussed medication side effects and warnings with the patient as well. I have reviewed the plan of care with the patient, accepted their input and they are in agreement with the treatment goals. Reviewed plan of care. Patient has provided input and agrees with goals.     Follow-up Disposition: Not on Tejinder Milan MD

## 2017-12-04 NOTE — PROGRESS NOTES
1. Have you been to the ER, urgent care clinic since your last visit? Hospitalized since your last visit? 11/7/17 high blood sugar    2. Have you seen or consulted any other health care providers outside of the [facility] since your last visit? Include any pap smears or colon screening.  No

## 2017-12-04 NOTE — MR AVS SNAPSHOT
Visit Information Date & Time Provider Department Dept. Phone Encounter #  
 12/4/2017  1:15 PM Marlene Dumont, [address] [phone] Follow-up Instructions Return in about 5 weeks (around 1/8/2018). Upcoming Health Maintenance Date Due  
 FOOT EXAM Q1 11/23/2016 Pneumococcal 65+ High/Highest Risk (1 of 2 - PCV13) 8/9/2017 MEDICARE YEARLY EXAM 1/20/2018 HEMOGLOBIN A1C Q6M 1/28/2018 MICROALBUMIN Q1 7/26/2018 LIPID PANEL Q1 7/28/2018 EYE EXAM RETINAL OR DILATED Q1 9/25/2018 BREAST CANCER SCRN MAMMOGRAM 12/5/2018 GLAUCOMA SCREENING Q2Y 9/25/2019 COLONOSCOPY 10/27/2025 DTaP/Tdap/Td series (2 - Td) 11/23/2025 Allergies as of 12/4/2017  Review Complete On: 10/10/2017 By: Durga Draper MD  
  
 Severity Noted Reaction Type Reactions Metformin  10/22/2015    Other (comments) Patient states that she is not allergic to metformin. Current Immunizations  Reviewed on 11/15/2016 Name Date Influenza High Dose Vaccine PF 8/29/2017  4:00 PM  
 Influenza Vaccine 10/17/2016, 11/23/2015 10:18 AM  
 Influenza Vaccine (Madin Knox Canine Kidney) PF 2/4/2015 10:00 AM  
 Pneumococcal Polysaccharide (PPSV-23) 2/4/2015  9:57 AM  
 Tdap 11/23/2015 10:19 AM  
  
 Not reviewed this visit You Were Diagnosed With   
  
 Codes Comments Type 2 diabetes mellitus with diabetic neuropathy, with long-term current use of insulin (HCC)    -  Primary ICD-10-CM: E11.40, Z79.4 ICD-9-CM: 250.60, 357.2, V58.67 History of CVA (cerebrovascular accident)     ICD-10-CM: Z86.73 
ICD-9-CM: V12.54 Lung nodule     ICD-10-CM: R91.1 ICD-9-CM: 793.11 Vitals BP Pulse Temp Resp Height(growth percentile) Weight(growth percentile) 113/61 86 97.3 °F (36.3 °C) (Oral) 16 5' 6\" (1.676 m) 226 lb (102.5 kg) SpO2 BMI OB Status Smoking Status 100% 36.48 kg/m2 Postmenopausal Never Smoker Vitals History BMI and BSA Data Body Mass Index Body Surface Area  
 36.48 kg/m 2 2.18 m 2 Preferred Pharmacy Pharmacy Name Phone [facility] - [address], [address] [phone] Your Updated Medication List  
  
   
This list is accurate as of: 12/4/17  1:49 PM.  Always use your most recent med list. amLODIPine 5 mg tablet Commonly known as:  [place] Fanti Take 1 Tab by mouth nightly. aspirin-dipyridamole  mg per SR capsule Commonly known as:  AGGRENOX Take 1 Cap by mouth two (2) times a day. atorvastatin 40 mg tablet Commonly known as:  LIPITOR Take 1 Tab by mouth daily. bisacodyl 10 mg suppository Commonly known as:  DULCOLAX (BISACODYL) Insert 10 mg into rectum daily. carvedilol 12.5 mg tablet Commonly known as:  Yuridiajorge alberto Le Take 1 Tab by mouth two (2) times daily (with meals). clotrimazole 1 % topical cream  
Commonly known as:  Morgan Dusky Apply  to affected area two (2) times a day. divalproex  mg ER tablet Commonly known as:  DEPAKOTE ER Take 1 Tab by mouth two (2) times a day. furosemide 20 mg tablet Commonly known as:  LASIX One po daily  
  
 glucose blood VI test strips strip Commonly known as:  FREESTYLE LITE STRIPS Monitor blood sugars twice daily  
  
 hydroCHLOROthiazide 25 mg tablet Commonly known as:  HYDRODIURIL Take 1 Tab by mouth daily. insulin detemir 100 unit/mL injection Commonly known as:  LEVEMIR  
30 Units by SubCUTAneous route two (2) times a day. insulin lispro 100 unit/mL injection Commonly known as:  HUMALOG  
6 Units by SubCUTAneous route Before breakfast, lunch, and dinner. isosorbide mononitrate ER 30 mg tablet Commonly known as:  IMDUR Take 1 Tab by mouth daily. lactulose 10 gram/15 mL solution Commonly known as:  [place] Take 45 mL by mouth nightly. letrozole 2.5 mg tablet Commonly known as:  [facility] Take 2.5 mg by mouth daily. levETIRAcetam 500 mg tablet Commonly known as:  KEPPRA Take 1 Tab by mouth two (2) times a day. losartan 50 mg tablet Commonly known as:  COZAAR Take 1 Tab by mouth two (2) times a day. pantoprazole 40 mg tablet Commonly known as:  PROTONIX Take 1 Tab by mouth daily. SONAFINE Emul topical  
Generic drug:  emollient combination no. 10  
  
 therapeutic multivitamin tablet Commonly known as:  [facility] Take 1 Tab by mouth daily. VITAMIN D3 1,000 unit tablet Generic drug:  cholecalciferol Take 1,000 Units by mouth daily. Prescriptions Sent to Pharmacy Refills  
 aspirin-dipyridamole (AGGRENOX)  mg per SR capsule 1 Sig: Take 1 Cap by mouth two (2) times a day. Class: Normal  
 Pharmacy: [address] [place] [address], [address] Ph #: [phone] Route: Oral  
  
We Performed the Following AMB POC GLUCOSE BLOOD, BY GLUCOSE MONITORING DEVICE [14773 CPT(R)] AMB POC HEMOGLOBIN A1C [42810 CPT(R)] Follow-up Instructions Return in about 5 weeks (around 1/8/2018). To-Do List   
 12/04/2017 Imaging:  CT CHEST W WO CONT   
  
 12/11/2017 11:00 AM  
  Appointment with [facility] RHONA CHRIS RM 1 at [facility] ([phone]) PAYMENT  For Non-Medicare patients - $15.00 will be collected from you at the time of your exam.  You will be billed $35.00 from the reading [facility]. OUTSIDE FILMS  - Any outside films related to the study being scheduled should be brought with you on the day of the exam.  If this cannot be done there may be a delay in the reading of the study. MEDICATIONS  - Patient must bring a complete list of all medications currently taking to include prescriptions, over-the-counter meds, herbals, vitamins & any dietary supplements GENERAL INSTRUCTIONS  - On the day of your exam do not use any bath powder, deodorant or lotions on the armpit area. Referral Information Referral ID Referred By Referred To  
  
 [id number] Orquidea JARAMILLO Not Available Visits Status Start Date End Date 1 New Request 12/4/17 12/4/18 If your referral has a status of pending review or denied, additional information will be sent to support the outcome of this decision. Introducing [facility]! Divya Vu introduces [employer] patient portal. Now you can access parts of your medical record, email your doctor's office, and request medication refills online. 1. In your internet browser, go to [email or web address] [email or web address] 2. Click on the First Time User? Click Here link in the Sign In box. You will see the New Member Sign Up page. 3. Enter your [employer] Access Code exactly as it appears below. You will not need to use this code after youve completed the sign-up process. If you do not sign up before the expiration date, you must request a new code. · [employer] Access Code: [email or web address] Expires: 3/4/2018  1:16 PM 
 
4. Enter the last four digits of your Social Security Number (xxxx) and Date of Birth (mm/dd/yyyy) as indicated and click Submit. You will be taken to the next sign-up page. 5. Create a [employer] ID. This will be your [employer] login ID and cannot be changed, so think of one that is secure and easy to remember. 6. Create a [employer] password. You can change your password at any time. 7. Enter your Password Reset Question and Answer. This can be used at a later time if you forget your password. 8. Enter your e-mail address. You will receive e-mail notification when new information is available in [address] 9. Click Sign Up. You can now view and download portions of your medical record. 10. Click the Download Summary menu link to download a portable copy of your medical information.  
 
If you have questions, please visit the Frequently Asked Questions section of the [employer]. Remember, [employer] is NOT to be used for urgent needs. For medical emergencies, dial 911. Now available from your iPhone and Android! Please provide this summary of care documentation to your next provider. Your primary care clinician is listed as Elva Negron. If you have any questions after today's visit, please call [phone].

## 2017-12-11 ENCOUNTER — HOSPITAL ENCOUNTER (OUTPATIENT)
Dept: MAMMOGRAPHY | Age: 67
Discharge: HOME | End: 2017-12-11
Attending: SPECIALIST
Payer: MEDICARE

## 2017-12-11 DIAGNOSIS — Z17.0 MALIGNANT NEOPLASM OF RIGHT BREAST IN FEMALE, ESTROGEN RECEPTOR POSITIVE, UNSPECIFIED SITE OF BREAST (HCC): ICD-10-CM

## 2017-12-11 DIAGNOSIS — C50.911 MALIGNANT NEOPLASM OF RIGHT BREAST IN FEMALE, ESTROGEN RECEPTOR POSITIVE, UNSPECIFIED SITE OF BREAST (HCC): ICD-10-CM

## 2017-12-11 PROCEDURE — 77062 BREAST TOMOSYNTHESIS BI: CPT

## 2017-12-13 ENCOUNTER — HOSPITAL ENCOUNTER (OUTPATIENT)
Dept: CT IMAGING | Age: 67
Discharge: HOME | End: 2017-12-13
Attending: INTERNAL MEDICINE
Payer: MEDICARE

## 2017-12-13 DIAGNOSIS — R91.1 LUNG NODULE: ICD-10-CM

## 2017-12-13 PROCEDURE — 71250 CT THORAX DX C-: CPT

## 2017-12-14 ENCOUNTER — OFFICE VISIT (OUTPATIENT)
Dept: SURGERY | Age: 67
End: 2017-12-14

## 2017-12-14 VITALS
HEIGHT: 66 IN | WEIGHT: 231.8 LBS | SYSTOLIC BLOOD PRESSURE: 180 MMHG | OXYGEN SATURATION: 100 % | RESPIRATION RATE: 18 BRPM | HEART RATE: 79 BPM | BODY MASS INDEX: 37.25 KG/M2 | TEMPERATURE: 97.5 F | DIASTOLIC BLOOD PRESSURE: 84 MMHG

## 2017-12-14 DIAGNOSIS — I10 ESSENTIAL HYPERTENSION: Chronic | ICD-10-CM

## 2017-12-14 DIAGNOSIS — I89.0 LYMPHEDEMA OF RIGHT ARM: ICD-10-CM

## 2017-12-14 DIAGNOSIS — C50.911 MALIGNANT NEOPLASM OF RIGHT BREAST IN FEMALE, ESTROGEN RECEPTOR POSITIVE, UNSPECIFIED SITE OF BREAST (HCC): Primary | ICD-10-CM

## 2017-12-14 DIAGNOSIS — Z79.4 TYPE 2 DIABETES MELLITUS WITH DIABETIC NEUROPATHY, WITH LONG-TERM CURRENT USE OF INSULIN (HCC): Chronic | ICD-10-CM

## 2017-12-14 DIAGNOSIS — Z17.0 MALIGNANT NEOPLASM OF RIGHT BREAST IN FEMALE, ESTROGEN RECEPTOR POSITIVE, UNSPECIFIED SITE OF BREAST (HCC): Primary | ICD-10-CM

## 2017-12-14 DIAGNOSIS — E11.40 TYPE 2 DIABETES MELLITUS WITH DIABETIC NEUROPATHY, WITH LONG-TERM CURRENT USE OF INSULIN (HCC): Chronic | ICD-10-CM

## 2017-12-14 PROBLEM — E11.21 TYPE 2 DIABETES MELLITUS WITH NEPHROPATHY (HCC): Status: ACTIVE | Noted: 2017-12-14

## 2017-12-14 NOTE — PROGRESS NOTES
Gabi Turner comes to the office today for her slightly over 2 year follow-up for a previous right partial mastectomy. 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 She had 2 out of 18 lymph nodes that were positive after undergoing an axillary dissection. The tumor was estrogen and progestin receptor positive but HER-2 negative. She was seen by Dr. arellano and started on chemotherapy but it was stopped because of concerns about some symptoms that she was having. She now takes Femara. She had a recent mammogram that was negative. The patient denies any other new aches, pains or shortness of breath. She has occasional headaches but this has been a chronic problem. The patient comes in and states that as of about Thanksgiving a month ago she began to notice swelling in her right hand and arm. It is associated with some mild tenderness as well. She states that it does seem to vary and that presently it is less than it was around Thanksgiving. The patient is already on diuretics. She denied any increased usage of the right arm or any trauma that may have triggered this. She also has not had any obvious infections. Allergies   Allergen Reactions    Metformin Other (comments)     Patient states that she is not allergic to metformin.       Past Medical History:   Diagnosis Date    Arthritis     Breast CA ([address]     Diabetes ([address]     Hypercholesteremia     Hypertension     Menopause     Psychiatric disorder     DEPRESSION    PUD (peptic ulcer disease)     S/P cardiac cath 02/2015    No angiographic evidence of CAD    Seizures (HCC)     LAST SEIZURE ABOUT 2 MONTHS AGO (NOV 2015)    Stroke ([address]     x2 with left side deficit     Past Surgical History:   Procedure Laterality Date    HX BREAST LUMPECTOMY Right 1/12/2016    Dr. Julio Redmond Partial Mastectomy w./ axillary lymphadenectomy    HX HEENT      TONSILLECTOMY    HX MASTECTOMY Right 1/26/2016    Dr. Julio Redmond Repeat Partial Mastectomy for positive margins    HX ORTHOPAEDIC      left arm surg    IR MEDIPORT       Social History     Social History    Marital status: SINGLE     Spouse name: N/A    Number of children: N/A    Years of education: N/A     Social History Main Topics    Smoking status: Never Smoker    Smokeless tobacco: Never Used    Alcohol use No    Drug use: No    Sexual activity: Yes     Partners: Male     Other Topics Concern     Service No    Blood Transfusions No    Caffeine Concern No    Occupational Exposure No    Hobby Hazards No    Sleep Concern Yes    Stress Concern No    Exercise Yes     walks using cane    Seat Belt Yes    Self-Exams Yes     Social History Narrative     The patient denies any new cardiac, respiratory, gastrointestinal or genitourinary problems. She states that her diabetes is under moderate control and her blood pressure obviously seems poorly controlled given the blood pressure today. PHYSICAL EXAM    Visit Vitals    /84 (BP 1 Location: Left arm, BP Patient Position: Sitting)    Pulse 79    Temp 97.5 °F (36.4 °C) (Oral)    Resp 18    Ht 5' 6\" (1.676 m)    Wt 105.1 kg (231 lb 12.8 oz)    SpO2 100%    BMI 37.41 kg/m2          Constitutional:  Well-developed, well-nourished, no acute distress. Head:  Head, eyes, ears, nose, throat within normal limits. Skin:  No suspicious moles or rashes. Neck:  No masses or adenopathy. The airway appears normal. Thyroid is not enlarged and there are no palpable thyroid nodules. Lungs:  Lungs are clear to auscultation and percussion. No respiratory distress. No chest wall tenderness. Heart:  Heart is regular with no extra heart sounds or murmur heard. Breast Exam: The breasts are free of any discrete  masses but she does have some tenderness in the right breast especially in the areas of her previous lumpectomy and axillary dissection.   There are also radiation changes of the skin in the right breast.  The skin and nipple areas appear normal. Both axillae are negative. But she has some swelling or firmness of the axillary incision on the right as well as some tenderness. Abdomen: The abdomen is soft and nontender without organomegaly or masses. Bowel sounds are active and of normal pitch. There is no abdominal distention. No hernias are evident. Extremities:  No tenderness of the extremities and no significant swelling. Examination of the upper extremity shows that she does have about 1+ edema of the right arm compared to the left with no pitting. She has some mild tenderness to palpation. Psych:  Alert and oriented. Assessment: #1 new onset post axillary dissection right arm lymphedema after lumpectomy for breast cancer. #2 status post right partial mastectomy for a 2.2 cm invasive ductal grade 2 carcinoma. Axillary dissection showed 2 positive lymph nodes out of 18. The tumor was estrogen and progesterone positive but HER-2 negative. She presently is on Femara and being followed by Dr. arellano. #3 poorly controlled diabetes. #4 poorly controlled hypertension. #5 history of CVA and seizures. Recommendations: I referred the patient for lymphedema therapy by physical therapy and encouraged her to use a pillow at night to keep her arm elevated. She is to call me if this does not improve. The above was dictated with Silvino. There may be unrecognized errors.     Torsten Butler MD

## 2017-12-14 NOTE — PATIENT INSTRUCTIONS
If you have any questions or concerns about today's appointment, the verbal and/or written instructions you were given for follow up care, please call our office at [phone].     [facility] [place]  [address] [place] [address], [facility] [address]    [phone] office  [phone]

## 2017-12-14 NOTE — PROGRESS NOTES
Estefany Harrell is a 74 y.o. female who presents for a surgical evaluation of right arm swelling & pain, which she cores as a 9/10, & relays as having an onset of one month ago. Patient has a history of a previous right partial mastectomy done for invasive ductal carcinoma on 01/12/16. 1. Have you been to the ER, urgent care clinic since your last visit? Hospitalized since your last visit? Yes, an ED in [facility] due to being out of her insulin. 2. Have you seen or consulted any other health care providers outside of the [address] [place] [address] since your last visit? Include any pap smears or colon screening. Yes, see previous answer.

## 2017-12-14 NOTE — MR AVS SNAPSHOT
Visit Information Date & Time Provider Department Dept. Phone Encounter #  
 12/14/2017 10:00 AM Lidia Evans, 64 [address] [id number] Follow-up Instructions Return in about 4 months (around 4/14/2018). Your Appointments 1/4/2018  1:30 PM  
ROUTINE CARE with Daisy Wilkins MD  
[facility] [address] [facility]) Appt Note: Return in about 5 weeks (around 1/8/2018). [address] [facility] [address]  
  
   
 [address] [place] [address] [address] Upcoming Health Maintenance Date Due  
 FOOT EXAM Q1 11/23/2016 Pneumococcal 65+ High/Highest Risk (1 of 2 - PCV13) 8/9/2017 MEDICARE YEARLY EXAM 1/20/2018 HEMOGLOBIN A1C Q6M 6/4/2018 MICROALBUMIN Q1 7/26/2018 LIPID PANEL Q1 7/28/2018 EYE EXAM RETINAL OR DILATED Q1 9/25/2018 GLAUCOMA SCREENING Q2Y 9/25/2019 COLONOSCOPY 10/27/2025 DTaP/Tdap/Td series (2 - Td) 11/23/2025 Allergies as of 12/14/2017  Review Complete On: 12/14/2017 By: Lidia Evans MD  
  
 Severity Noted Reaction Type Reactions Metformin  10/22/2015    Other (comments) Patient states that she is not allergic to metformin. Current Immunizations  Reviewed on 11/15/2016 Name Date Influenza High Dose Vaccine PF 8/29/2017  4:00 PM  
 Influenza Vaccine 10/17/2016, 11/23/2015 10:18 AM  
 Influenza Vaccine (Madin Pope Canine Kidney) PF 2/4/2015 10:00 AM  
 Pneumococcal Polysaccharide (PPSV-23) 2/4/2015  9:57 AM  
 Tdap 11/23/2015 10:19 AM  
  
 Not reviewed this visit You Were Diagnosed With   
  
 Codes Comments Malignant neoplasm of right breast in female, estrogen receptor positive, unspecified site of breast ([facility]    -  Primary ICD-10-CM: C50.911, Z17.0 ICD-9-CM: 174.9, V86.0  Type 2 diabetes mellitus with diabetic neuropathy, with long-term current use of insulin (HCC)     ICD-10-CM: E11.40, Z79.4 ICD-9-CM: 250.60, 357.2, V58.67 Essential hypertension     ICD-10-CM: I10 
ICD-9-CM: 401.9 Lymphedema of right arm     ICD-10-CM: I89.0 ICD-9-CM: 945.6 Vitals BP Pulse Temp Resp Height(growth percentile) Weight(growth percentile) 180/84 (BP 1 Location: Left arm, BP Patient Position: Sitting) 79 97.5 °F (36.4 °C) (Oral) 18 5' 6\" (1.676 m) 231 lb 12.8 oz (105.1 kg) SpO2 BMI OB Status Smoking Status 100% 37.41 kg/m2 Postmenopausal Never Smoker BMI and BSA Data Body Mass Index Body Surface Area  
 37.41 kg/m 2 2.21 m 2 Preferred Pharmacy Pharmacy Name Phone [facility] [place], [address] [phone] Your Updated Medication List  
  
   
This list is accurate as of: 12/14/17 10:59 AM.  Always use your most recent med list. amLODIPine 5 mg tablet Commonly known as:  Arthur Presser Take 1 Tab by mouth nightly. aspirin-dipyridamole  mg per SR capsule Commonly known as:  AGGRENOX Take 1 Cap by mouth two (2) times a day. atorvastatin 40 mg tablet Commonly known as:  LIPITOR Take 1 Tab by mouth daily. bisacodyl 10 mg suppository Commonly known as:  DULCOLAX (BISACODYL) Insert 10 mg into rectum daily. carvedilol 12.5 mg tablet Commonly known as:  Clarice Buster Take 1 Tab by mouth two (2) times daily (with meals). clotrimazole 1 % topical cream  
Commonly known as:  Dia Lash Apply  to affected area two (2) times a day. divalproex  mg ER tablet Commonly known as:  DEPAKOTE ER Take 1 Tab by mouth two (2) times a day. furosemide 20 mg tablet Commonly known as:  LASIX One po daily  
  
 glucose blood VI test strips strip Commonly known as:  FREESTYLE LITE STRIPS Monitor blood sugars twice daily  
  
 hydroCHLOROthiazide 25 mg tablet Commonly known as:  HYDRODIURIL Take 1 Tab by mouth daily. insulin detemir 100 unit/mL injection Commonly known as:  LEVEMIR  
30 Units by SubCUTAneous route two (2) times a day. insulin lispro 100 unit/mL injection Commonly known as:  HUMALOG  
6 Units by SubCUTAneous route Before breakfast, lunch, and dinner. isosorbide mononitrate ER 30 mg tablet Commonly known as:  IMDUR Take 1 Tab by mouth daily. lactulose 10 gram/15 mL solution Commonly known as:  Graciela Common Take 45 mL by mouth nightly. letrozole 2.5 mg tablet Commonly known as:  [facility] Take 2.5 mg by mouth daily. levETIRAcetam 500 mg tablet Commonly known as:  KEPPRA Take 1 Tab by mouth two (2) times a day. losartan 50 mg tablet Commonly known as:  COZAAR Take 1 Tab by mouth two (2) times a day. pantoprazole 40 mg tablet Commonly known as:  PROTONIX Take 1 Tab by mouth daily. SONAFINE Emul topical  
Generic drug:  emollient combination no. 10  
  
 therapeutic multivitamin tablet Commonly known as:  [facility] Take 1 Tab by mouth daily. VITAMIN D3 1,000 unit tablet Generic drug:  cholecalciferol Take 1,000 Units by mouth daily. We Performed the Following REFERRAL TO PHYSICAL THERAPY [OCD93 Custom] Comments:  
 Lymphedema rt arm post lumpectomy Follow-up Instructions Return in about 4 months (around 4/14/2018). Referral Information Referral ID Referred By Referred To  
  
 [id number] JOSIAS MURILLO Not Available Visits Status Start Date End Date 1 New Request 12/14/17 12/14/18 If your referral has a status of pending review or denied, additional information will be sent to support the outcome of this decision. Patient Instructions If you have any questions or concerns about today's appointment, the verbal and/or written instructions you were given for follow up care, please call our office at [phone]. [employer] [facility] [place] [id number] [place] [address], [address] 
 
[phone] office [phone] Introducing [facility] & HEALTH SERVICES! [facility] introduces [employer] patient portal. Now you can access parts of your medical record, email your doctor's office, and request medication refills online. 1. In your internet browser, go to https://"[employer] Zapcoder/"[employer]" 2. Click on the First Time User? Click Here link in the Sign In box. You will see the New Member Sign Up page. 3. Enter your Smart Holograms Access Code exactly as it appears below. You will not need to use this code after youve completed the sign-up process. If you do not sign up before the expiration date, you must request a new code. · [employer] Access Code: [email or web address] Expires: 3/4/2018  1:16 PM 
 
4. Enter the last four digits of your Social Security Number (xxxx) and Date of Birth (mm/dd/yyyy) as indicated and click Submit. You will be taken to the next sign-up page. 5. Create a Smart Holograms ID. This will be your Smart Holograms login ID and cannot be changed, so think of one that is secure and easy to remember. 6. Create a [employer] H[employer]rams password. You can change your password at any time. 7. Enter your Password Reset Question and Answer. This can be used at a later time if you forget your password. 8. Enter your e-mail address. You will receive e-mail notification when new information is available in [address] 9. Click Sign Up. You can now view and download portions of your medical record. 10. Click the Download Summary menu link to download a portable copy of your medical information. If you have questions, please visit the Frequently Asked Questions section of the [employer] Holograms website. Remember, [employer] Holograms is NOT to be used for urgent needs. For medical emergencies, dial 911. Now available from your iPhone and Android! Please provide this summary of care documentation to your next provider. Your primary care clinician is listed as [id number]. If you have any questions after today's visit, please call [phone].

## 2018-02-13 DIAGNOSIS — E11.40 TYPE 2 DIABETES MELLITUS WITH DIABETIC NEUROPATHY, UNSPECIFIED LONG TERM INSULIN USE STATUS: ICD-10-CM

## 2018-02-18 RX ORDER — INSULIN LISPRO 100 [IU]/ML
INJECTION, SOLUTION INTRAVENOUS; SUBCUTANEOUS
Qty: 10 ML | Refills: 0 | Status: SHIPPED | OUTPATIENT
Start: 2018-02-18 | End: 2018-05-08 | Stop reason: ALTCHOICE

## 2018-03-09 NOTE — PROGRESS NOTES
"Subjective:   Ariella Rendon is a 64 y.o. Female presenting to clinic for follow-up on pericardial effusion. Patient underwent a CT scan that showed an incidental finding of pericardial effusion. She denies any shortness of breath. No fluid overload symptoms.    She continues to have intermittent substernal chest pressure. She has a history of GERD and tried using Mylanta with one of the episodes with complete resolution of her symptoms.     Continues to smoke about half a pack a day.     Her blood pressure is usually well-controlled like it is today.    Her main concern today is fatigue. She has a history of sleep apnea for which she is only on nocturnal oxygen. She has previously been advised to be on CPAP but the patient does not like to use it.      Past Medical History:   Diagnosis Date   • Anemia     past history   • Arthritis     hands   • Breath shortness    • Cancer (CMS-HCC)     cervical, endometrial   • COPD (chronic obstructive pulmonary disease) (CMS-HCC)    • Dental disorder    • Heart burn     lumbar, colon   • Hiatus hernia syndrome    • Hypertension    • Indigestion    • Other specified disorder of intestines     constipation and diarrhea   • Other specified symptom associated with female genital organs 1970    cervical   • Psychiatric problem     depression     Past Surgical History:   Procedure Laterality Date   • LOW ANTERIOR RESECTION LAPAROSCOPIC  9/12/2014    Performed by Nakul Beltran M.D. at SURGERY [facility] ORS   • GYN SURGERY  1970    hysterectomy   • GYN SURGERY  1970    scope x 4   • OTHER ABDOMINAL SURGERY      remove foreign object, age 9     History reviewed. No pertinent family history.  History   Smoking Status   • Current Every Day Smoker   • Packs/day: 0.50   • Years: 40.00   • Types: Cigarettes   Smokeless Tobacco   • Never Used     No alcohol use. Smokes marijuana daily.    Allergies   Allergen Reactions   • Other Environmental Rash     \"alloids in metal\"     Outpatient " 1. Have you been to the ER, urgent care clinic since your last visit? Hospitalized since your last visit? No    2. Have you seen or consulted any other health care providers outside of the [address] [place] [address] since your last visit? Include any pap smears or colon screening.  No Encounter Prescriptions as of 3/9/2018   Medication Sig Dispense Refill   • BREO ELLIPTA 200-25 MCG/INH AEROSOL POWDER, BREATH ACTIVATED INL 1 PUFF PO QD  3   • vitamin D (CHOLECALCIFEROL) 1000 UNIT Tab TK 1 T PO ONCE A DAY  5   • amlodipine (NORVASC) 10 MG Tab TK 1 T PO QD  3   • bisacodyl EC (DULCOLAX) 5 MG Tablet Delayed Response TK 1 T PO  QD PRN  7   • morphine ER (MS CONTIN) 15 MG Tab CR tablet TK 1 T PO Q 12 H  0   • ropinirole (REQUIP) 4 MG tablet TK 1 T PO QID PRN P  3   • topiramate (TOPAMAX) 50 MG tablet TK 1 T PO  QD  3   • VENTOLIN  (90 Base) MCG/ACT Aero Soln inhalation aerosol INL 1 PUFF PO Q 4 H PRF SOB  11   • aspirin EC (ECOTRIN) 81 MG Tablet Delayed Response Take 1 Tab by mouth every day. 30 Tab    • asa/apap/caffeine (EXCEDRIN) 250-250-65 MG TABS Take 2 Tabs by mouth every 6 hours as needed. Indications: Headache     • NS SOLN 60 mL with albuterol 2.5 mg/0.5 mL NEBU 5 mL 5 mg/hr by Nebulization route as needed.     • magnesium gluconate (MAG-G) 500 MG tablet Take 1,000 mg by mouth every day.     • fluoxetine (PROZAC) 40 MG capsule Take 40 mg by mouth every day.     • Cetirizine HCl (ZYRTEC PO) Take  by mouth every day.     • omeprazole (PRILOSEC) 40 MG capsule Take 40 mg by mouth every day.     • lorazepam (ATIVAN) 1 MG Tab TK 1 T PO BID  3   • oxycodone-acetaminophen (PERCOCET-10)  MG Tab TK 1 T PO QID  0   • meloxicam (MOBIC) 15 MG tablet TK 1 T PO QD  6   • ALBUTEROL SULFATE INH Inhale 2 Puffs by mouth as needed.       No facility-administered encounter medications on file as of 3/9/2018.      Review of Systems   Constitutional: Positive for malaise/fatigue.   Respiratory: Negative for shortness of breath.    Cardiovascular: Positive for chest pain. Negative for palpitations, orthopnea, leg swelling and PND.   Gastrointestinal: Negative for abdominal pain.   Musculoskeletal: Positive for back pain. Negative for falls.   Neurological: Negative for dizziness and loss of  "consciousness.   Endo/Heme/Allergies: Does not bruise/bleed easily.   Psychiatric/Behavioral: Negative for depression.   All other systems reviewed and are negative.       Objective:   /62   Pulse (!) 110   Ht 1.676 m (5' 6\")   Wt 96.2 kg (212 lb)   SpO2 92%   BMI 34.22 kg/m²     Physical Exam   Constitutional: She is oriented to person, place, and time. No distress.   HENT:   Head: Normocephalic and atraumatic.   Eyes: Conjunctivae are normal.   Neck: Normal range of motion. Neck supple. No JVD present.   Cardiovascular: Normal rate and regular rhythm.  Exam reveals no gallop and no friction rub.    Murmur heard.   Crescendo decrescendo systolic murmur is present with a grade of 2/6   Pulmonary/Chest: Effort normal and breath sounds normal. No respiratory distress. She has no wheezes. She has no rales.   Abdominal: Soft. There is no tenderness.   Musculoskeletal: She exhibits no edema.   Neurological: She is alert and oriented to person, place, and time.   Skin: Skin is warm and dry. She is not diaphoretic.   Psychiatric: She has a normal mood and affect.   Nursing note and vitals reviewed.    Echocardiogram performed in February 2018. Images were personally reviewed and showed normal LV systolic function. No significant pericardial effusion noted. No significant valvular pathology noted.    Nuclear myocardial perfusion study performed in February 2018. Images and tracings were personally reviewed and showed no fixed or reversible defects.    Assessment:     1. Chest pain, unspecified type     2. Pericardial effusion     3. Systolic murmur     4. Essential hypertension     5. EMILY (obstructive sleep apnea)     6. Cigarette smoker         Medical Decision Making:  Today's Assessment / Status / Plan:     Pericardial effusion: Was  an incidental finding on the CT scan. Echocardiogram did not show any significant pericardial effusion. No further workup is indicated at this time.    Systolic murmur: No " significant valvular pathology noted on the echocardiogram. Patient was reassured about this.    Chest discomfort: Atypical in nature. Myocardial perfusion study as detailed above was low risk. I have extended the test results to the patient. Given her recent history, it appears that her symptoms are likely secondary to GERD. Should the patient started having increasing symptoms or symptoms of exertion, she will let us know.    Obstructive sleep apnea: Patient's only on nocturnal oxygen. Her fatigue is likely secondary to her untreated sleep apnea. I spent a few minutes during this appointment discussing the benefits of CPAP use. Patient however is not interested in using CPAP.    Tobacco use: Patient continues to smoke about half a pack a day. She takes about quitting but is not quite ready to quit at this time. I have encouraged her to consider quitting. I spent 4 minutes discussing smoking cessation.     Return to clinic in 6 months or earlier if needed.    Thank you for allowing me to participate in the care of this patient. Please do not hesitate to contact me with any questions.    Jessica Diaz MD  Cardiologist  [facility]      PLEASE NOTE: This dictation was created using voice recognition software.

## 2018-04-17 ENCOUNTER — HOSPITAL ENCOUNTER (OUTPATIENT)
Dept: LAB | Age: 68
Discharge: HOME | End: 2018-04-17
Payer: MEDICARE

## 2018-04-17 ENCOUNTER — OFFICE VISIT (OUTPATIENT)
Dept: SURGERY | Age: 68
End: 2018-04-17

## 2018-04-17 ENCOUNTER — OFFICE VISIT (OUTPATIENT)
Dept: FAMILY MEDICINE CLINIC | Age: 68
End: 2018-04-17

## 2018-04-17 VITALS — TEMPERATURE: 97.1 F | DIASTOLIC BLOOD PRESSURE: 82 MMHG | HEART RATE: 80 BPM | SYSTOLIC BLOOD PRESSURE: 155 MMHG

## 2018-04-17 VITALS
BODY MASS INDEX: 38.57 KG/M2 | TEMPERATURE: 97.2 F | HEIGHT: 66 IN | DIASTOLIC BLOOD PRESSURE: 78 MMHG | HEART RATE: 87 BPM | RESPIRATION RATE: 18 BRPM | OXYGEN SATURATION: 98 % | SYSTOLIC BLOOD PRESSURE: 170 MMHG | WEIGHT: 240 LBS

## 2018-04-17 DIAGNOSIS — Z00.00 MEDICARE ANNUAL WELLNESS VISIT, SUBSEQUENT: Primary | ICD-10-CM

## 2018-04-17 DIAGNOSIS — I10 ESSENTIAL HYPERTENSION: Chronic | ICD-10-CM

## 2018-04-17 DIAGNOSIS — Z86.73 HISTORY OF CVA (CEREBROVASCULAR ACCIDENT): Chronic | ICD-10-CM

## 2018-04-17 DIAGNOSIS — Z79.4 TYPE 2 DIABETES MELLITUS WITH DIABETIC NEUROPATHY, WITH LONG-TERM CURRENT USE OF INSULIN (HCC): Chronic | ICD-10-CM

## 2018-04-17 DIAGNOSIS — G40.909 SEIZURE DISORDER (HCC): ICD-10-CM

## 2018-04-17 DIAGNOSIS — R19.7 DIARRHEA, UNSPECIFIED TYPE: ICD-10-CM

## 2018-04-17 DIAGNOSIS — R82.90 ABNORMAL FINDING ON URINALYSIS: ICD-10-CM

## 2018-04-17 DIAGNOSIS — Z78.0 POST-MENOPAUSAL: ICD-10-CM

## 2018-04-17 DIAGNOSIS — C50.011 MALIGNANT NEOPLASM OF AREOLA OF RIGHT BREAST IN FEMALE, UNSPECIFIED ESTROGEN RECEPTOR STATUS (HCC): ICD-10-CM

## 2018-04-17 DIAGNOSIS — E11.40 TYPE 2 DIABETES MELLITUS WITH DIABETIC NEUROPATHY, WITH LONG-TERM CURRENT USE OF INSULIN (HCC): Chronic | ICD-10-CM

## 2018-04-17 DIAGNOSIS — H91.92 HEARING LOSS OF LEFT EAR, UNSPECIFIED HEARING LOSS TYPE: ICD-10-CM

## 2018-04-17 DIAGNOSIS — C50.911 MALIGNANT NEOPLASM OF RIGHT BREAST IN FEMALE, ESTROGEN RECEPTOR POSITIVE, UNSPECIFIED SITE OF BREAST (HCC): Primary | ICD-10-CM

## 2018-04-17 DIAGNOSIS — R35.0 FREQUENT URINATION: ICD-10-CM

## 2018-04-17 DIAGNOSIS — E66.01 SEVERE OBESITY (BMI 35.0-39.9) WITH COMORBIDITY (HCC): ICD-10-CM

## 2018-04-17 DIAGNOSIS — Z23 ENCOUNTER FOR IMMUNIZATION: ICD-10-CM

## 2018-04-17 DIAGNOSIS — Z17.0 MALIGNANT NEOPLASM OF RIGHT BREAST IN FEMALE, ESTROGEN RECEPTOR POSITIVE, UNSPECIFIED SITE OF BREAST (HCC): Primary | ICD-10-CM

## 2018-04-17 DIAGNOSIS — M79.89 SWELLING OF UPPER ARM: ICD-10-CM

## 2018-04-17 DIAGNOSIS — G40.909 SEIZURE DISORDER (HCC): Chronic | ICD-10-CM

## 2018-04-17 DIAGNOSIS — E78.00 HYPERCHOLESTEREMIA: Chronic | ICD-10-CM

## 2018-04-17 LAB
BILIRUB UR QL STRIP: NEGATIVE
GLUCOSE UR-MCNC: NEGATIVE MG/DL
KETONES P FAST UR STRIP-MCNC: NEGATIVE MG/DL
PH UR STRIP: 5 [PH] (ref 4.6–8)
PROT UR QL STRIP: NORMAL
SP GR UR STRIP: 1.01 (ref 1–1.03)
UA UROBILINOGEN AMB POC: NORMAL (ref 0.2–1)
URINALYSIS CLARITY POC: CLEAR
URINALYSIS COLOR POC: YELLOW
URINE BLOOD POC: NEGATIVE
URINE LEUKOCYTES POC: NORMAL
URINE NITRITES POC: NEGATIVE

## 2018-04-17 PROCEDURE — 87086 URINE CULTURE/COLONY COUNT: CPT | Performed by: INTERNAL MEDICINE

## 2018-04-17 NOTE — MR AVS SNAPSHOT
[address] [place] [address] [phone] 
[phone] Patient: Carri Roe MRN: [id number]  Visit Information Date & Time Provider Department Dept. Phone Encounter #  
 2018  1:30 PM John Meyer MD [address] [phone] [id number] Follow-up Instructions Return in about 6 months (around 10/17/2018). Upcoming Health Maintenance Date Due  
 FOOT EXAM Q1 2016 Pneumococcal 65+ High/Highest Risk (1 of 2 - PCV13) 2017 MEDICARE YEARLY EXAM 3/20/2018 HEMOGLOBIN A1C Q6M 2018 MICROALBUMIN Q1 2018 LIPID PANEL Q1 2018 EYE EXAM RETINAL OR DILATED Q1 2018 GLAUCOMA SCREENING Q2Y 2019 BREAST CANCER SCRN MAMMOGRAM 2019 COLONOSCOPY 10/27/2025 DTaP/Tdap/Td series (2 - Td) 2025 Allergies as of 2018  Review Complete On: 2018 By: John Meyer MD  
  
 Severity Noted Reaction Type Reactions Metformin  10/22/2015    Other (comments) Patient states that she is not allergic to metformin. Current Immunizations  Reviewed on 11/15/2016 Name Date Influenza High Dose Vaccine PF 2017  4:00 PM  
 Influenza Vaccine 10/17/2016, 2015 10:18 AM  
 Influenza Vaccine (Madin Gita Canine Kidney) PF 2015 10:00 AM  
 Pneumococcal Polysaccharide (PPSV-23) 2015  9:57 AM  
 Tdap 2015 10:19 AM  
  
 Not reviewed this visit You Were Diagnosed With   
  
 Codes Comments Malignant neoplasm of right breast in female, estrogen receptor positive, unspecified site of breast ([facility]    -  Primary ICD-10-CM: C50.911, Z17.0 ICD-9-CM: 174.9, V86.0 Severe obesity (BMI 35.0-39. 9) with comorbidity ([facility]     ICD-10-CM: E66.01 
ICD-9-CM: 278.01 Type 2 diabetes mellitus with diabetic neuropathy, with long-term current use of insulin (HCC)     ICD-10-CM: E11.40, Z79.4 ICD-9-CM: 250.60, 357.2, V58.67 Essential hypertension     ICD-10-CM: I10 
ICD-9-CM: 401.9 Seizure disorder ([address]     ICD-10-CM: G40.909 ICD-9-CM: 345.90 History of CVA (cerebrovascular accident)     ICD-10-CM: Z86.73 
ICD-9-CM: V12.54 Vitals BP Pulse Temp OB Status Smoking Status 155/82 (BP 1 Location: Left arm, BP Patient Position: At rest) 80 97.1 °F (36.2 °C) (Oral) Postmenopausal Never Smoker Vitals History Preferred Pharmacy Pharmacy Name Phone [facility] [place],  [address] [phone] Your Updated Medication List  
  
   
This list is accurate as of 4/17/18  2:50 PM.  Always use your most recent med list. amLODIPine 5 mg tablet Commonly known as:  Davis Inks Take 1 Tab by mouth nightly. aspirin-dipyridamole  mg per SR capsule Commonly known as:  AGGRENOX Take 1 Cap by mouth two (2) times a day. atorvastatin 40 mg tablet Commonly known as:  LIPITOR Take 1 Tab by mouth daily. bisacodyl 10 mg suppository Commonly known as:  DULCOLAX (BISACODYL) Insert 10 mg into rectum daily. carvedilol 12.5 mg tablet Commonly known as:  Edison Heal Take 1 Tab by mouth two (2) times daily (with meals). clotrimazole 1 % topical cream  
Commonly known as:  Quenten Purl Apply  to affected area two (2) times a day. divalproex  mg ER tablet Commonly known as:  DEPAKOTE ER Take 1 Tab by mouth two (2) times a day. furosemide 20 mg tablet Commonly known as:  LASIX One po daily  
  
 glucose blood VI test strips strip Commonly known as:  FREESTYLE LITE STRIPS Monitor blood sugars twice daily  
  
 hydroCHLOROthiazide 25 mg tablet Commonly known as:  HYDRODIURIL Take 1 Tab by mouth daily. insulin detemir U-100 100 unit/mL injection Commonly known as:  LEVEMIR U-100 INSULIN  
30 Units by SubCUTAneous route two (2) times a day. * insulin lispro 100 unit/mL injection Commonly known as:  HUMALOG  
6 Units by SubCUTAneous route Before breakfast, lunch, and dinner. * HumaLOG U-100 Insulin 100 unit/mL injection Generic drug:  insulin lispro INJECT 6 UNITS BENEATH THE SKIN BREAKFAST, LUNCH, AND DINNER  
  
 isosorbide mononitrate ER 30 mg tablet Commonly known as:  IMDUR Take 1 Tab by mouth daily. lactulose 10 gram/15 mL solution Commonly known as:  Maryla Mood Take 45 mL by mouth nightly. letrozole 2.5 mg tablet Commonly known as:  [facility] Take 2.5 mg by mouth daily. levETIRAcetam 500 mg tablet Commonly known as:  KEPPRA Take 1 Tab by mouth two (2) times a day. losartan 50 mg tablet Commonly known as:  COZAAR Take 1 Tab by mouth two (2) times a day. pantoprazole 40 mg tablet Commonly known as:  PROTONIX Take 1 Tab by mouth daily. SONAFINE Emul topical  
Generic drug:  emollient combination no. 10  
  
 therapeutic multivitamin tablet Commonly known as:  [facility] Take 1 Tab by mouth daily. VITAMIN D3 1,000 unit tablet Generic drug:  cholecalciferol Take 1,000 Units by mouth daily. * Notice: This list has 2 medication(s) that are the same as other medications prescribed for you. Read the directions carefully, and ask your doctor or other care provider to review them with you. Follow-up Instructions Return in about 6 months (around 10/17/2018). Patient Instructions If you have any questions or concerns about today's appointment, the verbal and/or written instructions you were given for follow up care, please call our office at [phone]. [facility] [place] [address] [place] [address], [facility] [address] 
 
[phone] office [phone] Introducing [facility] & HEALTH SERVICES!    
 [facility] introduces [employer] patient portal. Now you can access parts of your medical record, email your doctor's office, and request medication refills online. 1. In your internet browser, go to [email or web address] 2. Click on the First Time User? Click Here link in the Sign In box. You will see the New Member Sign Up page. 3. Enter your [employer] Access Code exactly as it appears below. You will not need to use this code after youve completed the sign-up process. If you do not sign up before the expiration date, you must request a new code. · [employer] Access Code: [email or web address] Expires: 7/16/2018  1:41 PM 
 
4. Enter the last four digits of your Social Security Number (xxxx) and Date of Birth (mm/dd/yyyy) as indicated and click Submit. You will be taken to the next sign-up page. 5. Create a [employer] ID. This will be your [employer] login ID and cannot be changed, so think of one that is secure and easy to remember. 6. Create a [employer] password. You can change your password at any time. 7. Enter your Password Reset Question and Answer. This can be used at a later time if you forget your password. 8. Enter your e-mail address. You will receive e-mail notification when new information is available in [address] 9. Click Sign Up. You can now view and download portions of your medical record. 10. Click the Download Summary menu link to download a portable copy of your medical information. If you have questions, please visit the Frequently Asked Questions section of the [employer] website. Remember, [employer] is NOT to be used for urgent needs. For medical emergencies, dial 911. Now available from your iPhone and Android! Please provide this summary of care documentation to your next provider. Your primary care clinician is listed as [address] If you have any questions after today's visit, please call [phone].

## 2018-04-17 NOTE — PATIENT INSTRUCTIONS

## 2018-04-17 NOTE — PATIENT INSTRUCTIONS
If you have any questions or concerns about today's appointment, the verbal and/or written instructions you were given for follow up care, please call our office at [phone].     [facility] - [address]    [phone] office  [phone]

## 2018-04-17 NOTE — ACP (ADVANCE CARE PLANNING)
Advance Care Planning (ACP) Provider Note - Comprehensive     Date of ACP Conversation: 04/17/18  Persons included in Conversation:  pt and   Length of ACP Conversation in minutes:  <16 minutes (Non-Billable)    Authorized Decision Maker (if patient is incapable of making informed decisions): This person is:            General ACP for ALL Patients with Decision Making Capacity:   Importance of advance care planning, including choosing a healthcare agent to communicate patient's healthcare decisions if patient lost the ability to make decisions, such as after a sudden illness or accident  Understanding of the healthcare agent role was assessed and information provided    Review of Existing Advance Directive:  does not have one    For Serious or Chronic Illness:  Understanding of medical condition    Understanding of CPR, goals and expected outcomes, benefits and burdens discussed.     Interventions Provided:  Recommended completion of Advance Directive form after review of ACP materials and conversation with prospective healthcare agent

## 2018-04-17 NOTE — PROGRESS NOTES
This is the Subsequent Medicare Annual Wellness Exam, performed 12 months or more after the Initial AWV or the last Subsequent AWV    I have reviewed the patient's medical history in detail and updated the computerized patient record. History     Past Medical History:   Diagnosis Date    Arthritis     [facility] CA ([facility]     Diabetes ([facility]     Hypercholesteremia     Hypertension     Menopause     Psychiatric disorder     DEPRESSION    PUD (peptic ulcer disease)     S/P cardiac cath 02/2015    No angiographic evidence of CAD    Seizures (HCC)     LAST SEIZURE ABOUT 2 MONTHS AGO (NOV 2015)    Stroke ([facility]     x2 with left side deficit      Past Surgical History:   Procedure Laterality Date    HX BREAST LUMPECTOMY Right 1/12/2016    Dr. Mindi Piña Partial Mastectomy w./ axillary lymphadenectomy    HX HEENT      TONSILLECTOMY    HX MASTECTOMY Right 1/26/2016    Dr. Mindi Piña Repeat Partial Mastectomy for positive margins    HX ORTHOPAEDIC      left arm surg    IR MEDIPORT       Current Outpatient Prescriptions   Medication Sig Dispense Refill    varicella zoster vaccine live (ZOSTAVAX) 19,400 unit/0.65 mL susr injection 1 Vial by SubCUTAneous route once for 1 dose. 0.65 mL 0    HUMALOG U-100 INSULIN 100 unit/mL injection INJECT 6 UNITS BENEATH THE SKIN BREAKFAST, LUNCH, AND DINNER 10 mL 0    aspirin-dipyridamole (AGGRENOX)  mg per SR capsule Take 1 Cap by mouth two (2) times a day. 180 Cap 1    levETIRAcetam (KEPPRA) 500 mg tablet Take 1 Tab by mouth two (2) times a day. 60 Tab 3    divalproex ER (DEPAKOTE ER) 500 mg ER tablet Take 1 Tab by mouth two (2) times a day. 60 Tab 3    losartan (COZAAR) 50 mg tablet Take 1 Tab by mouth two (2) times a day. 60 Tab 3    carvedilol (COREG) 12.5 mg tablet Take 1 Tab by mouth two (2) times daily (with meals). 60 Tab 3    amLODIPine (NORVASC) 5 mg tablet Take 1 Tab by mouth nightly.  30 Tab 3    hydroCHLOROthiazide (HYDRODIURIL) 25 mg tablet Take 1 Tab by mouth daily. 30 Tab 3    atorvastatin (LIPITOR) 40 mg tablet Take 1 Tab by mouth daily. 30 Tab 3    isosorbide mononitrate ER (IMDUR) 30 mg tablet Take 1 Tab by mouth daily. 90 Tab 1    bisacodyl (DULCOLAX, BISACODYL,) 10 mg suppository Insert 10 mg into rectum daily. 10 Suppository 1    lactulose (CHRONULAC) 10 gram/15 mL solution Take 45 mL by mouth nightly. 480 mL 3    insulin detemir (LEVEMIR) 100 unit/mL injection 30 Units by SubCUTAneous route two (2) times a day. 54 mL 1    insulin lispro (HUMALOG) 100 unit/mL injection 6 Units by SubCUTAneous route Before breakfast, lunch, and dinner. 2 Vial 5    furosemide (LASIX) 20 mg tablet One po daily 30 Tab 3    pantoprazole (PROTONIX) 40 mg tablet Take 1 Tab by mouth daily. 90 Tab 1    letrozole (FEMARA) 2.5 mg tablet Take 2.5 mg by mouth daily.  SONAFINE emul topical       cholecalciferol (VITAMIN D3) 1,000 unit tablet Take 1,000 Units by mouth daily.  glucose blood VI test strips (FREESTYLE LITE STRIPS) strip Monitor blood sugars twice daily 100 Strip 2    therapeutic multivitamin (THERAGRAN) tablet Take 1 Tab by mouth daily.  clotrimazole (LOTRIMIN) 1 % topical cream Apply  to affected area two (2) times a day. 45 g 3     Allergies   Allergen Reactions    Metformin Other (comments)     Patient states that she is not allergic to metformin.       Family History   Problem Relation Age of Onset    Cancer Father [de-identified]     colon    Hypertension Mother     Heart Disease Mother     Diabetes Mother      Social History   Substance Use Topics    Smoking status: Never Smoker    Smokeless tobacco: Never Used    Alcohol use No     Patient Active Problem List   Diagnosis Code    Diabetes mellitus with neuropathy (HCC) E11.40    HTN (hypertension) I10    Chest pain R07.9    Seizure disorder ([address] G40.909    Hypercholesteremia E78.00    History of CVA (cerebrovascular accident) Z80.78    Hearing impairment H91.90    Gastroesophageal reflux disease without esophagitis K21.9    Breast cancer (HCC)-right inferior, 2.2cm,2/18 nodes, + - -, C50.919    DM type 2, not at goal [facility]) E11.9    Altered mental status R41.82    SIRS (systemic inflammatory response syndrome) (HCC) R65.10    Type 2 diabetes mellitus with diabetic neuropathy, with long-term current use of insulin (HCC) E11.40, Z79.4    Pulmonary nodule, right R91.1    History of breast cancer Z85.3    Type 2 diabetes mellitus with nephropathy (HCC) E11.21    Severe obesity (BMI 35.0-39. 9) with comorbidity ([facility] E66.01       Depression Risk Factor Screening:     PHQ over the last two weeks 1/7/2016   Little interest or pleasure in doing things Not at all   Feeling down, depressed or hopeless Not at all   Total Score PHQ 2 0     Alcohol Risk Factor Screening: You do not drink alcohol or very rarely. Functional Ability and Level of Safety:   Hearing Loss  The patient needs further evaluation. Activities of Daily Living  The home contains: no safety equipment. Patient does total self care    Fall Risk  Fall Risk Assessment, last 12 mths 8/10/2017   Able to walk? Yes   Fall in past 12 months? No       Abuse Screen  Patient is not abused    Cognitive Screening   Evaluation of Cognitive Function:  Has your family/caregiver stated any concerns about your memory: no  Normal    Patient Care Team   Patient Care Team:  Anselmo Nesbitt MD as PCP - General (Internal Medicine)  Rose Mary Eisenberg NP (Nurse Practitioner)  Malcom Aldridge MD (General Surgery)  Jayce Lagos. Jacqueline Harrison RN as Nurse Navigator (Oncology)  Kathryn Miramontes MD (Pulmonary Disease)  Rufino Crumb as [address] (Family Practice)  Miguel Yoo MD (Surgery)    Assessment/Plan   Education and counseling provided:  Are appropriate based on today's review and evaluation    Diagnoses and all orders for this visit:    1. Medicare annual wellness visit, subsequent    2.  Frequent urination  -     AMB POC URINALYSIS DIP STICK AUTO W/O MICRO  -     CULTURE, URINE; Future    3. Abnormal finding on urinalysis  -     CULTURE, URINE; Future    4. Type 2 diabetes mellitus with diabetic neuropathy, with long-term current use of insulin (HCC)  -     HEMOGLOBIN A1C WITH EAG; Future  -     LIPID PANEL; Future  -     METABOLIC PANEL, COMPREHENSIVE; Future  -     CBC WITH AUTOMATED DIFF; Future  -     MICROALBUMIN, UR, RAND W/ MICROALB/CREAT RATIO; Future  -     TSH 3RD GENERATION; Future    5. Essential hypertension    6. Hypercholesteremia    7. History of CVA (cerebrovascular accident)    8. Malignant neoplasm of areola of right breast in female, unspecified estrogen receptor status ([facility] [address]    9. Swelling of upper arm    10. Post-menopausal  -     DEXA BONE DENSITY STUDY AXIAL; Future    11. Seizure disorder (HCC)  -     LEVETIRACETAM (KEPPRA); Future  -     VALPROIC ACID; Future    12. Encounter for immunization  -     Pneumococcal Admin ()  -     Pneumococcal Conjugate Vaccine  -     varicella zoster vaccine live (ZOSTAVAX) 19,400 unit/0.65 mL susr injection; 1 Vial by SubCUTAneous route once for 1 dose. 13. Hearing loss of left ear, unspecified hearing loss type  -     REFERRAL TO AUDIOLOGY    14. Diarrhea, unspecified type  -     C. DIFFICILE/EPI PCR ([facility] Only); Future  -     WBC, STOOL; Future        Health Maintenance Due   Topic Date Due    FOOT EXAM Q1  11/23/2016    Pneumococcal 65+ High/Highest Risk (1 of 2 - PCV13) 08/09/2017     -----------------------------------------------------------------    Hussain Grimm is a 74 y.o.  female and presents with     Chief Complaint   Patient presents with    Diabetes    Seizure    Arm swelling    Breast Cancer    Diarrhea       Pt has rt arm swelling. Pt has h/o breast cancer and had rt breast mastectomy. She has swelling in rt arm. She is supposed to get PT. Pt has dysuria. Pt denies any seizure.   Pt says she has been having loose BMs after she takes her meds, but she is not sure what med is doing it. Past Medical History:   Diagnosis Date    Arthritis     Breast CA ([facility] [address]     Diabetes ([facility] [address]     Hypercholesteremia     Hypertension     Menopause     Psychiatric disorder     DEPRESSION    PUD (peptic ulcer disease)     S/P cardiac cath 02/2015    No angiographic evidence of CAD    Seizures (HCC)     LAST SEIZURE ABOUT 2 MONTHS AGO (NOV 2015)    Stroke ([facility] [address]     x2 with left side deficit     Past Surgical History:   Procedure Laterality Date    HX BREAST LUMPECTOMY Right 1/12/2016    Dr. Padmini Smith Partial Mastectomy w./ axillary lymphadenectomy    HX HEENT      TONSILLECTOMY    HX MASTECTOMY Right 1/26/2016    Dr. Padmini Smith Repeat Partial Mastectomy for positive margins    HX ORTHOPAEDIC      left arm surg    IR MEDIPORT       Current Outpatient Prescriptions   Medication Sig    varicella zoster vaccine live (ZOSTAVAX) 19,400 unit/0.65 mL susr injection 1 Vial by SubCUTAneous route once for 1 dose.  HUMALOG U-100 INSULIN 100 unit/mL injection INJECT 6 UNITS BENEATH THE SKIN BREAKFAST, LUNCH, AND DINNER    aspirin-dipyridamole (AGGRENOX)  mg per SR capsule Take 1 Cap by mouth two (2) times a day.  levETIRAcetam (KEPPRA) 500 mg tablet Take 1 Tab by mouth two (2) times a day.  divalproex ER (DEPAKOTE ER) 500 mg ER tablet Take 1 Tab by mouth two (2) times a day.  losartan (COZAAR) 50 mg tablet Take 1 Tab by mouth two (2) times a day.  carvedilol (COREG) 12.5 mg tablet Take 1 Tab by mouth two (2) times daily (with meals).  amLODIPine (NORVASC) 5 mg tablet Take 1 Tab by mouth nightly.  hydroCHLOROthiazide (HYDRODIURIL) 25 mg tablet Take 1 Tab by mouth daily.  atorvastatin (LIPITOR) 40 mg tablet Take 1 Tab by mouth daily.  isosorbide mononitrate ER (IMDUR) 30 mg tablet Take 1 Tab by mouth daily.  bisacodyl (DULCOLAX, BISACODYL,) 10 mg suppository Insert 10 mg into rectum daily.     lactulose (CHRONULAC) 10 gram/15 mL solution Take 45 mL by mouth nightly.  insulin detemir (LEVEMIR) 100 unit/mL injection 30 Units by SubCUTAneous route two (2) times a day.  insulin lispro (HUMALOG) 100 unit/mL injection 6 Units by SubCUTAneous route Before breakfast, lunch, and dinner.  furosemide (LASIX) 20 mg tablet One po daily    pantoprazole (PROTONIX) 40 mg tablet Take 1 Tab by mouth daily.  letrozole (FEMARA) 2.5 mg tablet Take 2.5 mg by mouth daily.  SONAFINE emul topical     cholecalciferol (VITAMIN D3) 1,000 unit tablet Take 1,000 Units by mouth daily.  glucose blood VI test strips (FREESTYLE LITE STRIPS) strip Monitor blood sugars twice daily    therapeutic multivitamin (THERAGRAN) tablet Take 1 Tab by mouth daily.  clotrimazole (LOTRIMIN) 1 % topical cream Apply  to affected area two (2) times a day. No current facility-administered medications for this visit. Health Maintenance   Topic Date Due    FOOT EXAM Q1  11/23/2016    Pneumococcal 65+ High/Highest Risk (1 of 2 - PCV13) 08/09/2017    HEMOGLOBIN A1C Q6M  06/04/2018    MICROALBUMIN Q1  07/26/2018    LIPID PANEL Q1  07/28/2018    EYE EXAM RETINAL OR DILATED Q1  09/25/2018    MEDICARE YEARLY EXAM  04/18/2019    GLAUCOMA SCREENING Q2Y  09/25/2019    BREAST CANCER SCRN MAMMOGRAM  12/11/2019    COLONOSCOPY  10/27/2025    DTaP/Tdap/Td series (2 - Td) 11/23/2025    Hepatitis C Screening  Completed    Bone Densitometry (Dexa) Screening  Completed    ZOSTER VACCINE AGE 60>  Completed    Influenza Age 5 to Adult  Completed     Immunization History   Administered Date(s) Administered    Influenza High Dose Vaccine PF 08/29/2017    Influenza Vaccine 11/23/2015, 10/17/2016    Influenza Vaccine (Madin Rodeo Canine Kidney) PF 02/04/2015    Pneumococcal Conjugate (PCV-13) 04/17/2018    Pneumococcal Polysaccharide (PPSV-23) 02/04/2015    Tdap 11/23/2015     No LMP recorded.  Patient is postmenopausal.        Allergies and Intolerances: Allergies   Allergen Reactions    Metformin Other (comments)     Patient states that she is not allergic to metformin. Family History:   Family History   Problem Relation Age of Onset    Cancer Father [de-identified]     colon    Hypertension Mother     Heart Disease Mother     Diabetes Mother        Social History:   She  reports that she has never smoked. She has never used smokeless tobacco.  She  reports that she does not drink alcohol.             Review of Systems: pos for rt arm swelling  General: negative for - chills, fatigue, fever, weight change  Psych: negative for - anxiety, depression, irritability or mood swings  ENT: negative for - headaches, hearing change, nasal congestion, oral lesions, sneezing or sore throat  Heme/ Lymph: negative for - bleeding problems, bruising, pallor or swollen lymph nodes  Endo: negative for - hot flashes, polydipsia/polyuria or temperature intolerance  Resp: negative for - cough, shortness of breath or wheezing  CV: negative for - chest pain, edema or palpitations  GI: negative for - abdominal pain, change in bowel habits, constipation, pos for diarrhea   : negative for - dysuria, hematuria, incontinence, pelvic pain or vulvar/vaginal symptoms, pos for dysuria  MSK: negative for - joint pain, joint swelling or muscle pain  Neuro: negative for - confusion, headaches, seizures or weakness  Derm: negative for - dry skin, hair changes, rash or skin lesion changes          Physical:   Vitals:   Vitals:    04/17/18 1558   BP: 170/78   Pulse: 87   Resp: 18   Temp: 97.2 °F (36.2 °C)   TempSrc: Oral   SpO2: 98%   Weight: 240 lb (108.9 kg)   Height: 5' 6\" (1.676 m)           Exam:   HEENT- atraumatic,normocephalic, awake, oriented, well nourished,slurred speech  Neck - supple,no enlarged lymph nodes, no JVD, no thyromegaly  Chest- CTA, no rhonchi, no crackles  Heart- rrr, no murmurs / gallop/rub  Abdomen- soft,BS+,NT, no hepatosplenomegaly  Ext - rt arm edema   Neuro- residual weakness   Skin - warm,dry, no obvious rashes. Review of Data:   LABS:   Lab Results   Component Value Date/Time    WBC 8.0 08/29/2017 01:37 PM    HGB 11.4 (L) 08/29/2017 01:37 PM    HCT 36.5 08/29/2017 01:37 PM    PLATELET 122 29/56/1942 01:37 PM     Lab Results   Component Value Date/Time    Sodium 138 08/29/2017 01:37 PM    Potassium 4.4 08/29/2017 01:37 PM    Chloride 108 08/29/2017 01:37 PM    CO2 23 08/29/2017 01:37 PM    Glucose 218 (H) 08/29/2017 01:37 PM    BUN 17 08/29/2017 01:37 PM    Creatinine 1.06 08/29/2017 01:37 PM     Lab Results   Component Value Date/Time    Cholesterol, total 222 (H) 07/28/2017 10:31 AM    HDL Cholesterol 86 (H) 07/28/2017 10:31 AM    LDL, calculated 111.8 (H) 07/28/2017 10:31 AM    Triglyceride 121 07/28/2017 10:31 AM     No results found for: GPT        Impression / Plan:        ICD-10-CM ICD-9-CM    1. Medicare annual wellness visit, subsequent Z00.00 V70.0    2. Frequent urination R35.0 788.41 AMB POC URINALYSIS DIP STICK AUTO W/O MICRO      CULTURE, URINE      CULTURE, URINE   3. Abnormal finding on urinalysis R82.90 791.9 CULTURE, URINE      CULTURE, URINE   4. Type 2 diabetes mellitus with diabetic neuropathy, with long-term current use of insulin ([facility]) E11.40 250.60 HEMOGLOBIN A1C WITH EAG    Z79.4 357.2 LIPID PANEL     E91.81 METABOLIC PANEL, COMPREHENSIVE      CBC WITH AUTOMATED DIFF      MICROALBUMIN, UR, RAND W/ MICROALB/CREAT RATIO      TSH 3RD GENERATION   5. Essential hypertension I10 401.9    6. Hypercholesteremia E78.00 272.0    7. History of CVA (cerebrovascular accident) Z86.73 V12.54    8. Malignant neoplasm of areola of right breast in female, unspecified estrogen receptor status (HCC) C50.011 174.0    9. Swelling of upper arm M79.89 729.81    10. Post-menopausal Z78.0 V49.81 DEXA BONE DENSITY STUDY AXIAL   11. Seizure disorder ([address] G40.909 345.90 LEVETIRACETAM (KEPPRA)      VALPROIC ACID   12.  Encounter for immunization Z23 V03.89 ADMIN PNEUMOCOCCAL VACCINE      PNEUMOCOCCAL CONJ VACCINE 13 VALENT IM      varicella zoster vaccine live (ZOSTAVAX) 19,400 unit/0.65 mL susr injection   13. Hearing loss of left ear, unspecified hearing loss type H91.92 389.9 REFERRAL TO AUDIOLOGY   14. Diarrhea, unspecified type R19.7 787.91 C. DIFFICILE/EPI PCR      WBC, STOOL         Explained to patient risk benefits of the medications. Advised patient to stop meds if having any side effects. Pt verbalized understanding of the instructions. I have discussed the diagnosis with the patient and the intended plan as seen in the above orders. The patient has received an after-visit summary and questions were answered concerning future plans. I have discussed medication side effects and warnings with the patient as well. I have reviewed the plan of care with the patient, accepted their input and they are in agreement with the treatment goals. Reviewed plan of care. Patient has provided input and agrees with goals. Follow-up Disposition:  Return in about 3 weeks (around 5/8/2018), or if symptoms worsen or fail to improve.     Barrie Shook MD

## 2018-04-17 NOTE — PROGRESS NOTES
Claudetta Sin is a 74 y.o. female who presents today with   Chief Complaint   Patient presents with    Surgical Follow-up     F/u exam For Right Breast Lumpectomy 2/26/2016                1. Have you been to the ER, urgent care clinic since your last visit? Hospitalized since your last visit? No    2. Have you seen or consulted any other health care providers outside of the [address] [place] [address] since your last visit? Include any pap smears or colon screening.  No

## 2018-04-17 NOTE — MR AVS SNAPSHOT
[address] [facility] 83 [address] 
[phone] Patient: Branden Garza MRN: [id number] Visit Information Date & Time Provider Department Dept. Phone Encounter #  
 4/17/2018  4:15 PM [id number], [place] 6 [phone] Follow-up Instructions Return in about 3 weeks (around 5/8/2018), or if symptoms worsen or fail to improve. Upcoming Health Maintenance Date Due  
 FOOT EXAM Q1 11/23/2016 Pneumococcal 65+ High/Highest Risk (1 of 2 - PCV13) 8/9/2017 HEMOGLOBIN A1C Q6M 6/4/2018 MICROALBUMIN Q1 7/26/2018 LIPID PANEL Q1 7/28/2018 EYE EXAM RETINAL OR DILATED Q1 9/25/2018 MEDICARE YEARLY EXAM 4/18/2019 GLAUCOMA SCREENING Q2Y 9/25/2019 BREAST CANCER SCRN MAMMOGRAM 12/11/2019 COLONOSCOPY 10/27/2025 DTaP/Tdap/Td series (2 - Td) 11/23/2025 Allergies as of 4/17/2018  Review Complete On: 4/17/2018 By: Geremias Coles LPN Severity Noted Reaction Type Reactions Metformin  10/22/2015    Other (comments) Patient states that she is not allergic to metformin. Current Immunizations  Reviewed on 11/15/2016 Name Date Influenza High Dose Vaccine PF 8/29/2017  4:00 PM  
 Influenza Vaccine 10/17/2016, 11/23/2015 10:18 AM  
 Influenza Vaccine (Madin Gita Canine Kidney) PF 2/4/2015 10:00 AM  
 Pneumococcal Conjugate (PCV-13)  Incomplete Pneumococcal Polysaccharide (PPSV-23) 2/4/2015  9:57 AM  
 Tdap 11/23/2015 10:19 AM  
  
 Not reviewed this visit You Were Diagnosed With   
  
 Codes Comments Medicare annual wellness visit, subsequent    -  Primary ICD-10-CM: Z00.00 ICD-9-CM: V70.0 Frequent urination     ICD-10-CM: R35.0 ICD-9-CM: 788.41 Abnormal finding on urinalysis     ICD-10-CM: R82.90 ICD-9-CM: 791.9  Type 2 diabetes mellitus with diabetic neuropathy, with long-term current use of insulin (HCC)     ICD-10-CM: E11.40, Z79.4 ICD-9-CM: 250.60, 357.2, V58.67 Essential hypertension     ICD-10-CM: I10 
ICD-9-CM: 401.9 Hypercholesteremia     ICD-10-CM: E78.00 ICD-9-CM: 272.0 History of CVA (cerebrovascular accident)     ICD-10-CM: Z86.73 
ICD-9-CM: V12.54 Malignant neoplasm of areola of right breast in female, unspecified estrogen receptor status ([facility]     ICD-10-CM: C50.011 ICD-9-CM: 833. 0 Swelling of upper arm     ICD-10-CM: M79.89 ICD-9-CM: 729.81 Post-menopausal     ICD-10-CM: Z78.0 ICD-9-CM: V49.81 Seizure disorder ([facility]     ICD-10-CM: G40.909 ICD-9-CM: 345.90 Encounter for immunization     ICD-10-CM: O96 ICD-9-CM: V03.89 Hearing loss of left ear, unspecified hearing loss type     ICD-10-CM: H91.92 
ICD-9-CM: 389.9 Diarrhea, unspecified type     ICD-10-CM: R19.7 ICD-9-CM: 787.91 Vitals BP Pulse Temp Resp Height(growth percentile) Weight(growth percentile) 170/78 87 97.2 °F (36.2 °C) (Oral) 18 5' 6\" (1.676 m) 240 lb (108.9 kg) SpO2 BMI OB Status Smoking Status 98% 38.74 kg/m2 Postmenopausal Never Smoker Vitals History BMI and BSA Data Body Mass Index Body Surface Area 38.74 kg/m 2 2.25 m 2 Preferred Pharmacy Pharmacy Name Phone [facility] - [address], [address] [phone] Your Updated Medication List  
  
   
This list is accurate as of 4/17/18  4:51 PM.  Always use your most recent med list. amLODIPine 5 mg tablet Commonly known as:  Sundra Navasota Take 1 Tab by mouth nightly. aspirin-dipyridamole  mg per SR capsule Commonly known as:  AGGRENOX Take 1 Cap by mouth two (2) times a day. atorvastatin 40 mg tablet Commonly known as:  LIPITOR Take 1 Tab by mouth daily. bisacodyl 10 mg suppository Commonly known as:  DULCOLAX (BISACODYL) Insert 10 mg into rectum daily. carvedilol 12.5 mg tablet Commonly known as:  Heather Juan Take 1 Tab by mouth two (2) times daily (with meals). clotrimazole 1 % topical cream  
Commonly known as:  Inetta Nemaha Apply  to affected area two (2) times a day. divalproex  mg ER tablet Commonly known as:  DEPAKOTE ER Take 1 Tab by mouth two (2) times a day. furosemide 20 mg tablet Commonly known as:  LASIX One po daily  
  
 glucose blood VI test strips strip Commonly known as:  FREESTYLE LITE STRIPS Monitor blood sugars twice daily  
  
 hydroCHLOROthiazide 25 mg tablet Commonly known as:  HYDRODIURIL Take 1 Tab by mouth daily. insulin detemir U-100 100 unit/mL injection Commonly known as:  LEVEMIR U-100 INSULIN  
30 Units by SubCUTAneous route two (2) times a day. * insulin lispro 100 unit/mL injection Commonly known as:  HUMALOG  
6 Units by SubCUTAneous route Before breakfast, lunch, and dinner. * HumaLOG U-100 Insulin 100 unit/mL injection Generic drug:  insulin lispro INJECT 6 UNITS BENEATH THE SKIN BREAKFAST, LUNCH, AND DINNER  
  
 isosorbide mononitrate ER 30 mg tablet Commonly known as:  IMDUR Take 1 Tab by mouth daily. lactulose 10 gram/15 mL solution Commonly known as:  Lorilee Rotunda Take 45 mL by mouth nightly. letrozole 2.5 mg tablet Commonly known as:  [facility] Take 2.5 mg by mouth daily. levETIRAcetam 500 mg tablet Commonly known as:  KEPPRA Take 1 Tab by mouth two (2) times a day. losartan 50 mg tablet Commonly known as:  COZAAR Take 1 Tab by mouth two (2) times a day. pantoprazole 40 mg tablet Commonly known as:  PROTONIX Take 1 Tab by mouth daily. SONAFINE Emul topical  
Generic drug:  emollient combination no. 10  
  
 therapeutic multivitamin tablet Commonly known as:  [facility] Take 1 Tab by mouth daily. varicella zoster vaccine live 19,400 unit/0.65 mL Susr injection Commonly known as:  ZOSTAVAX 1 Vial by SubCUTAneous route once for 1 dose. VITAMIN D3 1,000 unit tablet Generic drug:  cholecalciferol Take 1,000 Units by mouth daily. * Notice: This list has 2 medication(s) that are the same as other medications prescribed for you. Read the directions carefully, and ask your doctor or other care provider to review them with you. Prescriptions Printed Refills  
 varicella zoster vaccine live (ZOSTAVAX) 19,400 unit/0.65 mL susr injection 0 Si Vial by SubCUTAneous route once for 1 dose. Class: Print Route: SubCUTAneous We Performed the Following ADMIN PNEUMOCOCCAL VACCINE [ HCP] AMB POC URINALYSIS DIP STICK AUTO W/O MICRO [75421 CPT(R)] CULTURE, URINE [id number] CPT(R)] PNEUMOCOCCAL CONJ VACCINE 13 VALENT IM [id number] CPT(R)] REFERRAL TO AUDIOLOGY [REF7 Custom] Follow-up Instructions Return in about 3 weeks (around 2018), or if symptoms worsen or fail to improve. To-Do List   
 2018 Microbiology:  C. DIFFICILE/EPI PCR   
  
 2018 Lab:  CBC WITH AUTOMATED DIFF   
  
 2018 Microbiology:  CULTURE, URINE   
  
 2018 Imaging:  DEXA BONE DENSITY STUDY AXIAL   
  
 2018 Lab:  HEMOGLOBIN A1C WITH EAG   
  
 2018 Lab:  LEVETIRACETAM (KEPPRA)   
  
 2018 Lab:  LIPID PANEL   
  
 2018 Lab:  METABOLIC PANEL, COMPREHENSIVE   
  
 2018 Lab:  MICROALBUMIN, UR, RAND W/ MICROALB/CREAT RATIO   
  
 2018 Lab:  TSH 3RD GENERATION   
  
 2018 Lab:  VALPROIC ACID   
  
 2018 Lab:  WBC, STOOL Referral Information Referral ID Referred By Referred To  
  
 [id number] [facility] M Not Available Visits Status Start Date End Date 1 New Request 18 If your referral has a status of pending review or denied, additional information will be sent to support the outcome of this decision. Patient Instructions Medicare Wellness Visit, Female The best way to live healthy is to have a healthy lifestyle by eating a well-balanced diet, exercising regularly, limiting alcohol and stopping smoking. Regular physical exams and screening tests are another way to keep healthy. Preventive exams provided by your health care provider can find health problems before they become diseases or illnesses. Preventive services including immunizations, screening tests, monitoring and exams can help you take care of your own health. All people over age 72 should have a pneumovax  and and a prevnar shot to prevent pneumonia. These are once in a lifetime unless you and your provider decide differently. All people over 65 should have a yearly flu shot and a tetanus vaccine every 10 years. A bone mass density to screen for osteoporosis or thinning of the bones should be done every 2 years after 65. Screening for diabetes mellitus with a blood sugar test should be done every year. Glaucoma is a disease of the eye due to increased ocular pressure that can lead to blindness and it should be done every year by an eye professional. 
 
Cardiovascular screening tests that check for elevated lipids (fatty part of blood) which can lead to heart disease and strokes should be done every 5 years. Colorectal screening that evaluates for blood or polyps in your colon should be done yearly as a stool test or every five years as a flexible sigmoidoscope or every 10 years as a colonoscopy up to age 76. Breast cancer screening with a mammogram is recommended biennially  for women age 54-69. Screening for cervical cancer with a pap smear and pelvic exam is recommended for women after age 72 years every 2 years up to age 79 or when the provider and patient decide to stop. If there is a history of cervical abnormalities or other increased risk for cancer then the test is recommended yearly. Hepatitis C screening is also recommended for anyone born between [birth year] through [address]. A shingles vaccine is also recommended once in a lifetime after age 61. Your Medicare Wellness Exam is recommended annually. Here is a list of your current Health Maintenance items with a due date: 
Health Maintenance Due Topic Date Due  
 Diabetic Foot Care  11/23/2016  Pneumococcal Vaccine (1 of 2 - PCV13) 08/09/2017 Introducing [address]! Rika Akins introduces [employer] patient portal. Now you can access parts of your medical record, email your doctor's office, and request medication refills online. 1. In your internet browser, go to [email or web address] 2. Click on the First Time User? Click Here link in the Sign In box. You will see the New Member Sign Up page. 3. Enter your [employer] Access Code exactly as it appears below. You will not need to use this code after youve completed the sign-up process. If you do not sign up before the expiration date, you must request a new code. · [employer] Access Code: [email or web address] Expires: 7/16/2018  1:41 PM 
 
4. Enter the last four digits of your Social Security Number (xxxx) and Date of Birth (mm/dd/yyyy) as indicated and click Submit. You will be taken to the next sign-up page. 5. Create a [employer] ID. This will be your [employer] login ID and cannot be changed, so think of one that is secure and easy to remember. 6. Create a [employer] password. You can change your password at any time. 7. Enter your Password Reset Question and Answer. This can be used at a later time if you forget your password. 8. Enter your e-mail address. You will receive e-mail notification when new information is available in [address] 9. Click Sign Up. You can now view and download portions of your medical record. 10. Click the Download Summary menu link to download a portable copy of your medical information. If you have questions, please visit the Frequently Asked Questions section of the [employer] website. Remember, [employer] is NOT to be used for urgent needs. For medical emergencies, dial 911. Now available from your iPhone and Android! Please provide this summary of care documentation to your next provider. Your primary care clinician is listed as Lucia Del Angel. If you have any questions after today's visit, please call [phone].

## 2018-04-17 NOTE — PROGRESS NOTES
1. Have you been to the ER, urgent care clinic since your last visit? Hospitalized since your last visit? No    2. Have you seen or consulted any other health care providers outside of the [address] [place] [address] since your last visit? Include any pap smears or colon screening.  No

## 2018-04-18 NOTE — PROGRESS NOTES
Brenda Cruz comes to the office today for her 2-1/2 year follow-up from a previous right breast lumpectomy for a 2.2 cm invasive ductal grade 2 carcinoma. She underwent an axillary dissection had 2 out of 18 lymph nodes were positive. The tumor was estrogen and progestin receptor positive but HER-2 negative. The patient's last mammogram was in December and was negative. She previously saw Dr. arellano who treated her briefly for chemotherapy because of some symptoms and concern about a recurrent stroke chemotherapy was stopped and she has been placed on Femara which she has been tolerating well. The patient continues to have breast tenderness especially in the area of her previous surgery. She denies any new breast lumps. She has noticed some increasing right arm edema. We had previously ordered lymphedema therapy for her but apparently they tried to arrange that and subsequent where the patient really cannot travel to. Therefore we have asked that they try to make arrangements for her to get therapy closer to home. She denies any new aches, pains, shortness of breath or headaches. Allergies   Allergen Reactions    Metformin Other (comments)     Patient states that she is not allergic to metformin.       Past Medical History:   Diagnosis Date    Arthritis     Breast CA ([address]     Diabetes ([address]     Hypercholesteremia     Hypertension     Menopause     Psychiatric disorder     DEPRESSION    PUD (peptic ulcer disease)     S/P cardiac cath 02/2015    No angiographic evidence of CAD    Seizures (HCC)     LAST SEIZURE ABOUT 2 MONTHS AGO (NOV 2015)    Stroke ([facility]     x2 with left side deficit     Past Surgical History:   Procedure Laterality Date    HX BREAST LUMPECTOMY Right 1/12/2016    Dr. Dejuan Faulkner Partial Mastectomy w./ axillary lymphadenectomy    HX HEENT      TONSILLECTOMY    HX MASTECTOMY Right 1/26/2016    Dr. Dejuan Faulkner Repeat Partial Mastectomy for positive margins    HX ORTHOPAEDIC      left arm surg    IR MEDIPORT       Social History     Social History    Marital status: SINGLE     Spouse name: N/A    Number of children: N/A    Years of education: N/A     Social History Main Topics    Smoking status: Never Smoker    Smokeless tobacco: Never Used    Alcohol use No    Drug use: No    Sexual activity: Yes     Partners: Male     Other Topics Concern     Service No    Blood Transfusions No    Caffeine Concern No    Occupational Exposure No    Hobby Hazards No    Sleep Concern Yes    Stress Concern No    Exercise Yes     walks using cane    Seat Belt Yes    Self-Exams Yes     Social History Narrative     The patient's review of systems overall has not changed. She did not take her blood pressure medicine this morning and her pressure is up today. She continues to be on medication for hypertension and diabetes, her seizure disorder, GERD, and a history of her CVA. She has had no recent seizures. PHYSICAL EXAM    Visit Vitals    /82 (BP 1 Location: Left arm, BP Patient Position: At rest)    Pulse 80    Temp 97.1 °F (36.2 °C) (Oral)          Constitutional:  Well-developed, well-nourished, no acute distress. Head:  Head, eyes, ears, nose, throat within normal limits. Skin:  No suspicious moles or rashes. Neck:  No masses or adenopathy. The airway appears normal. Thyroid is not enlarged and there are no palpable thyroid nodules. Lungs:  Lungs are clear to auscultation and percussion. No respiratory distress. No chest wall tenderness. Heart:  Heart is regular with no extra heart sounds or murmur heard. Breast Exam: The breasts are free of any discrete tenderness or masses. In the right breast she does have some lateral and inferior thickening from her ileus right breast lumpectomy. Her axillary incision is healed well but both of these areas are tender.   Left breast is free of any masses the skin and nipple areas appear normal. Both axillae are negative. Abdomen: The abdomen is soft and nontender without organomegaly or masses. Bowel sounds are active and of normal pitch. There is no abdominal distention. No hernias are evident. Extremities:  No tenderness of the extremities and no significant swelling.,  And the patient's right arm she does have some swelling of the whole arm down including the hand with some slight tenderness of her left thumb. Psych:  Alert and oriented. Assessment: Invasive 2.2 cm ductal carcinoma grade 2 of the right breast laterally and inferiorly treated with lumpectomy 2-1/2 years ago. 2 out of 18 lymph nodes positive. Tumor estrogen progestin receptor positive but HER-2 negative. Now on Femara. No evidence of recurrent disease. #2 diabetes #3 hypertension. #4 seizure disorder. #5 history of CVA. #6 GERD. #7 lymphedema right arm. Recommendations. We will plan to see the patient back again in about 6 months. We have asked that she be referred to a local physical therapy to get some lymphedema therapy. The above was dictated with Silvino. There may be unrecognized errors.     Mile Sharp MD

## 2018-04-19 ENCOUNTER — TELEPHONE (OUTPATIENT)
Dept: SURGERY | Age: 68
End: 2018-04-19

## 2018-04-19 LAB
BACTERIA SPEC CULT: NORMAL
SERVICE CMNT-IMP: NORMAL

## 2018-04-19 NOTE — TELEPHONE ENCOUNTER
Spoke with Ms. Eva Morris to inform Dr. Zak Witt has initiated a referral to [facility] for lymphedema therapy and she will be contacted by their office to schedule an appointment because currently [facility] only has lymphedema therapy services at our [facility] locations and Ms. Eva Morris is not able to travel to [place].

## 2018-04-20 ENCOUNTER — HOSPITAL ENCOUNTER (OUTPATIENT)
Dept: LAB | Age: 68
Discharge: HOME | End: 2018-04-20
Payer: MEDICARE

## 2018-04-20 LAB
ALBUMIN SERPL-MCNC: 3.2 G/DL (ref 3.4–5)
ALBUMIN/GLOB SERPL: 0.7 {RATIO} (ref 0.8–1.7)
ALP SERPL-CCNC: 102 U/L (ref 45–117)
ALT SERPL-CCNC: 16 U/L (ref 13–56)
ANION GAP SERPL CALC-SCNC: 4 MMOL/L (ref 3–18)
AST SERPL-CCNC: 10 U/L (ref 15–37)
BASOPHILS # BLD: 0 K/UL (ref 0–0.06)
BASOPHILS NFR BLD: 1 % (ref 0–2)
BILIRUB SERPL-MCNC: 0.5 MG/DL (ref 0.2–1)
BUN SERPL-MCNC: 27 MG/DL (ref 7–18)
BUN/CREAT SERPL: 28 (ref 12–20)
CALCIUM SERPL-MCNC: 8.5 MG/DL (ref 8.5–10.1)
CHLORIDE SERPL-SCNC: 104 MMOL/L (ref 100–108)
CHOLEST SERPL-MCNC: 222 MG/DL
CO2 SERPL-SCNC: 29 MMOL/L (ref 21–32)
CREAT SERPL-MCNC: 0.98 MG/DL (ref 0.6–1.3)
DIFFERENTIAL METHOD BLD: ABNORMAL
EOSINOPHIL # BLD: 0.2 K/UL (ref 0–0.4)
EOSINOPHIL NFR BLD: 3 % (ref 0–5)
ERYTHROCYTE [DISTWIDTH] IN BLOOD BY AUTOMATED COUNT: 13.7 % (ref 11.6–14.5)
EST. AVERAGE GLUCOSE BLD GHB EST-MCNC: 243 MG/DL
GLOBULIN SER CALC-MCNC: 4.9 G/DL (ref 2–4)
GLUCOSE SERPL-MCNC: 241 MG/DL (ref 74–99)
HBA1C MFR BLD: 10.1 % (ref 4.2–5.6)
HCT VFR BLD AUTO: 34.5 % (ref 35–45)
HDLC SERPL-MCNC: 84 MG/DL (ref 40–60)
HDLC SERPL: 2.6 {RATIO} (ref 0–5)
HGB BLD-MCNC: 10.6 G/DL (ref 12–16)
LDLC SERPL CALC-MCNC: 117.6 MG/DL (ref 0–100)
LIPID PROFILE,FLP: ABNORMAL
LYMPHOCYTES # BLD: 2.6 K/UL (ref 0.9–3.6)
LYMPHOCYTES NFR BLD: 41 % (ref 21–52)
MCH RBC QN AUTO: 25.6 PG (ref 24–34)
MCHC RBC AUTO-ENTMCNC: 30.7 G/DL (ref 31–37)
MCV RBC AUTO: 83.3 FL (ref 74–97)
MONOCYTES # BLD: 0.4 K/UL (ref 0.05–1.2)
MONOCYTES NFR BLD: 6 % (ref 3–10)
NEUTS SEG # BLD: 3.2 K/UL (ref 1.8–8)
NEUTS SEG NFR BLD: 49 % (ref 40–73)
PLATELET # BLD AUTO: 238 K/UL (ref 135–420)
PMV BLD AUTO: 11.6 FL (ref 9.2–11.8)
POTASSIUM SERPL-SCNC: 4.2 MMOL/L (ref 3.5–5.5)
PROT SERPL-MCNC: 8.1 G/DL (ref 6.4–8.2)
RBC # BLD AUTO: 4.14 M/UL (ref 4.2–5.3)
SODIUM SERPL-SCNC: 137 MMOL/L (ref 136–145)
TRIGL SERPL-MCNC: 102 MG/DL (ref ?–150)
TSH SERPL DL<=0.05 MIU/L-ACNC: 2.06 UIU/ML (ref 0.36–3.74)
VLDLC SERPL CALC-MCNC: 20.4 MG/DL
WBC # BLD AUTO: 6.4 K/UL (ref 4.6–13.2)

## 2018-04-20 PROCEDURE — 80061 LIPID PANEL: CPT | Performed by: INTERNAL MEDICINE

## 2018-04-20 PROCEDURE — 84443 ASSAY THYROID STIM HORMONE: CPT | Performed by: INTERNAL MEDICINE

## 2018-04-20 PROCEDURE — 36415 COLL VENOUS BLD VENIPUNCTURE: CPT | Performed by: INTERNAL MEDICINE

## 2018-04-20 PROCEDURE — 80164 ASSAY DIPROPYLACETIC ACD TOT: CPT | Performed by: INTERNAL MEDICINE

## 2018-04-20 PROCEDURE — 80177 DRUG SCRN QUAN LEVETIRACETAM: CPT | Performed by: INTERNAL MEDICINE

## 2018-04-20 PROCEDURE — 85025 COMPLETE CBC W/AUTO DIFF WBC: CPT | Performed by: INTERNAL MEDICINE

## 2018-04-20 PROCEDURE — 83036 HEMOGLOBIN GLYCOSYLATED A1C: CPT | Performed by: INTERNAL MEDICINE

## 2018-04-20 PROCEDURE — 80053 COMPREHEN METABOLIC PANEL: CPT | Performed by: INTERNAL MEDICINE

## 2018-04-21 LAB — VALPROATE SERPL-MCNC: 49 UG/ML (ref 50–100)

## 2018-04-23 ENCOUNTER — HOSPITAL ENCOUNTER (OUTPATIENT)
Dept: LAB | Age: 68
Discharge: HOME | End: 2018-04-23
Payer: MEDICARE

## 2018-04-23 ENCOUNTER — TELEPHONE (OUTPATIENT)
Dept: FAMILY MEDICINE CLINIC | Age: 68
End: 2018-04-23

## 2018-04-23 LAB
BACTERIA SPEC CULT: NORMAL
LEVETIRACETAM SERPL-MCNC: NORMAL UG/ML (ref 10–40)
SERVICE CMNT-IMP: NORMAL
WBC #/AREA STL HPF: NORMAL /HPF

## 2018-04-23 PROCEDURE — 89055 LEUKOCYTE ASSESSMENT FECAL: CPT | Performed by: INTERNAL MEDICINE

## 2018-04-23 PROCEDURE — 87493 C DIFF AMPLIFIED PROBE: CPT | Performed by: INTERNAL MEDICINE

## 2018-04-23 PROCEDURE — 82043 UR ALBUMIN QUANTITATIVE: CPT | Performed by: INTERNAL MEDICINE

## 2018-04-24 ENCOUNTER — HOSPITAL ENCOUNTER (OUTPATIENT)
Dept: GENERAL RADIOLOGY | Age: 68
Discharge: HOME | End: 2018-04-24
Attending: INTERNAL MEDICINE
Payer: MEDICARE

## 2018-04-24 DIAGNOSIS — Z78.0 POST-MENOPAUSAL: ICD-10-CM

## 2018-04-24 LAB
CREAT UR-MCNC: 98.5 MG/DL (ref 30–125)
MICROALBUMIN UR-MCNC: 15.6 MG/DL (ref 0–3)
MICROALBUMIN/CREAT UR-RTO: 158 MG/G (ref 0–30)

## 2018-04-24 PROCEDURE — 77080 DXA BONE DENSITY AXIAL: CPT

## 2018-05-08 ENCOUNTER — HOSPITAL ENCOUNTER (OUTPATIENT)
Dept: LAB | Age: 68
Discharge: HOME | End: 2018-05-08
Payer: MEDICARE

## 2018-05-08 ENCOUNTER — OFFICE VISIT (OUTPATIENT)
Dept: FAMILY MEDICINE CLINIC | Age: 68
End: 2018-05-08

## 2018-05-08 VITALS
BODY MASS INDEX: 38.09 KG/M2 | RESPIRATION RATE: 16 BRPM | SYSTOLIC BLOOD PRESSURE: 156 MMHG | DIASTOLIC BLOOD PRESSURE: 81 MMHG | WEIGHT: 237 LBS | TEMPERATURE: 98.4 F | HEIGHT: 66 IN | OXYGEN SATURATION: 100 % | HEART RATE: 86 BPM

## 2018-05-08 DIAGNOSIS — E53.8 VITAMIN B12 DEFICIENCY: ICD-10-CM

## 2018-05-08 DIAGNOSIS — Z79.4 TYPE 2 DIABETES MELLITUS WITH DIABETIC NEUROPATHY, WITH LONG-TERM CURRENT USE OF INSULIN (HCC): Chronic | ICD-10-CM

## 2018-05-08 DIAGNOSIS — D64.9 ANEMIA, UNSPECIFIED TYPE: Primary | ICD-10-CM

## 2018-05-08 DIAGNOSIS — I10 ESSENTIAL HYPERTENSION: ICD-10-CM

## 2018-05-08 DIAGNOSIS — E11.40 TYPE 2 DIABETES MELLITUS WITH DIABETIC NEUROPATHY, WITH LONG-TERM CURRENT USE OF INSULIN (HCC): Chronic | ICD-10-CM

## 2018-05-08 LAB
FERRITIN SERPL-MCNC: 245 NG/ML (ref 8–388)
FOLATE SERPL-MCNC: 14.9 NG/ML (ref 3.1–17.5)
IRON SATN MFR SERPL: 21 % (ref 20–50)
IRON SERPL-MCNC: 59 UG/DL (ref 50–175)
TIBC SERPL-MCNC: 277 UG/DL (ref 250–450)
VIT B12 SERPL-MCNC: 456 PG/ML (ref 211–911)

## 2018-05-08 PROCEDURE — 83540 ASSAY OF IRON: CPT | Performed by: INTERNAL MEDICINE

## 2018-05-08 PROCEDURE — 36415 COLL VENOUS BLD VENIPUNCTURE: CPT | Performed by: INTERNAL MEDICINE

## 2018-05-08 PROCEDURE — 82607 VITAMIN B-12: CPT | Performed by: INTERNAL MEDICINE

## 2018-05-08 PROCEDURE — 82728 ASSAY OF FERRITIN: CPT | Performed by: INTERNAL MEDICINE

## 2018-05-08 RX ORDER — INSULIN ASPART 100 [IU]/ML
INJECTION, SUSPENSION SUBCUTANEOUS
Qty: 5 PEN | Refills: 3 | Status: SHIPPED | OUTPATIENT
Start: 2018-05-08 | End: 2018-08-07 | Stop reason: SDUPTHER

## 2018-05-08 NOTE — PROGRESS NOTES
1. Have you been to the ER, urgent care clinic since your last visit? Hospitalized since your last visit? No    2. Have you seen or consulted any other health care providers outside of the [address] [place] [address] since your last visit? Include any pap smears or colon screening.  neurologist

## 2018-05-08 NOTE — MR AVS SNAPSHOT
[address] [place], [place] [address] [place] [id number] 
[phone] Patient: Olaf Bates MRN: [id number] PIW:[id number] Visit Information Date & Time Provider Department Dept. Phone Encounter #  
 5/8/2018  1:00 PM Bianka JOSE FRANCISCO Fuentes, [facility] [phone] [id number] Follow-up Instructions Return in about 2 months (around 7/8/2018). Upcoming Health Maintenance Date Due  
 FOOT EXAM Q1 11/23/2016 Influenza Age 5 to Adult 8/1/2018 EYE EXAM RETINAL OR DILATED Q1 9/25/2018 HEMOGLOBIN A1C Q6M 10/20/2018 MEDICARE YEARLY EXAM 4/18/2019 LIPID PANEL Q1 4/20/2019 MICROALBUMIN Q1 4/23/2019 GLAUCOMA SCREENING Q2Y 9/25/2019 BREAST CANCER SCRN MAMMOGRAM 12/11/2019 Pneumococcal 65+ High/Highest Risk (2 of 2 - PPSV23) 2/4/2020 COLONOSCOPY 10/27/2025 DTaP/Tdap/Td series (2 - Td) 11/23/2025 Allergies as of 5/8/2018  Review Complete On: 4/17/2018 By: [id number] JOSE FRANCISCO Fuentes MD  
  
 Severity Noted Reaction Type Reactions Metformin  10/22/2015    Other (comments) Patient states that she is not allergic to metformin. Current Immunizations  Reviewed on 11/15/2016 Name Date Influenza High Dose Vaccine PF 8/29/2017  4:00 PM  
 Influenza Vaccine 10/17/2016, 11/23/2015 10:18 AM  
 Influenza Vaccine (Madin [place] Canine Kidney) PF 2/4/2015 10:00 AM  
 Pneumococcal Conjugate (PCV-13) 4/17/2018  4:50 PM  
 Pneumococcal Polysaccharide (PPSV-23) 2/4/2015  9:57 AM  
 Tdap 11/23/2015 10:19 AM  
  
 Not reviewed this visit You Were Diagnosed With   
  
 Codes Comments Anemia, unspecified type    -  Primary ICD-10-CM: D64.9 ICD-9-CM: 978. 9 Vitamin B12 deficiency     ICD-10-CM: E53.8 ICD-9-CM: 266.2 Type 2 diabetes mellitus with diabetic neuropathy, with long-term current use of insulin (HCC)     ICD-10-CM: E11.40, Z79.4 ICD-9-CM: 250.60, 357.2, V58.67  Essential hypertension     ICD-10-CM: I10 
 ICD-9-CM: 401.9 Vitals BP Pulse Temp Resp Height(growth percentile) Weight(growth percentile) 156/81 86 98.4 °F (36.9 °C) (Oral) 16 5' 6\" (1.676 m) 237 lb (107.5 kg) SpO2 BMI OB Status Smoking Status 100% 38.25 kg/m2 Postmenopausal Never Smoker Vitals History BMI and BSA Data Body Mass Index Body Surface Area  
 38.25 kg/m 2 2.24 m 2 Preferred Pharmacy Pharmacy Name Phone [facility] - [address], [address] [phone] Your Updated Medication List  
  
   
This list is accurate as of 5/8/18  1:37 PM.  Always use your most recent med list. amLODIPine 5 mg tablet Commonly known as:  [place] Take 1 Tab by mouth nightly. aspirin-dipyridamole  mg per SR capsule Commonly known as:  AGGRENOX Take 1 Cap by mouth two (2) times a day. atorvastatin 40 mg tablet Commonly known as:  LIPITOR Take 1 Tab by mouth daily. bisacodyl 10 mg suppository Commonly known as:  DULCOLAX (BISACODYL) Insert 10 mg into rectum daily. carvedilol 12.5 mg tablet Commonly known as:  Jani Mood Take 1 Tab by mouth two (2) times daily (with meals). clotrimazole 1 % topical cream  
Commonly known as:  Janine Barrs Apply  to affected area two (2) times a day. divalproex  mg ER tablet Commonly known as:  DEPAKOTE ER Take 1 Tab by mouth two (2) times a day. furosemide 20 mg tablet Commonly known as:  LASIX One po daily  
  
 glucose blood VI test strips strip Commonly known as:  FREESTYLE LITE STRIPS Monitor blood sugars twice daily  
  
 hydroCHLOROthiazide 25 mg tablet Commonly known as:  HYDRODIURIL Take 1 Tab by mouth daily. insulin aspart protamine/insulin aspart 100 unit/mL (70-30) Inpn Commonly known as:  NovoLOG Mix 70-30FlexPen U-100 Administer 60 units twice daily 30 minutes before breakfast and dinner. insulin lispro 100 unit/mL injection Commonly known as:  HUMALOG  
6 Units by SubCUTAneous route Before breakfast, lunch, and dinner. isosorbide mononitrate ER 30 mg tablet Commonly known as:  IMDUR Take 1 Tab by mouth daily. lactulose 10 gram/15 mL solution Commonly known as:  [place] Doc Take 45 mL by mouth nightly. letrozole 2.5 mg tablet Commonly known as:  [facility] Take 2.5 mg by mouth daily. losartan 50 mg tablet Commonly known as:  COZAAR Take 1 Tab by mouth two (2) times a day. pantoprazole 40 mg tablet Commonly known as:  PROTONIX Take 1 Tab by mouth daily. SONAFINE Emul topical  
Generic drug:  emollient combination no. 10  
  
 therapeutic multivitamin tablet Commonly known as:  [facility] Take 1 Tab by mouth daily. VITAMIN D3 1,000 unit tablet Generic drug:  cholecalciferol Take 1,000 Units by mouth daily. Prescriptions Sent to Pharmacy Refills  
 insulin aspart protamine/insulin aspart (NOVOLOG MIX 70-30FLEXPEN U-100) 100 unit/mL (70-30) inpn 3 Sig: Administer 60 units twice daily 30 minutes before breakfast and dinner. Class: Normal  
 [id number], [address] [place] [address] Ph #: [phone] Follow-up Instructions Return in about 2 months (around 7/8/2018). To-Do List   
 05/08/2018 Lab:  FERRITIN   
  
 05/08/2018 Lab:  IRON PROFILE   
  
 05/08/2018 Lab:  VITAMIN B12 & FOLATE Introducing [facility]! Clary Guadarrama introduces [employer]ing[employer] patient portal. Now you can access parts of your medical record, email your doctor's office, and request medication refills online. 1. In your internet browser, go to [email or web address]/[email or web address] 2. Click on the First Time User? Click Here link in the Sign In box. You will see the New Member Sign Up page. 3. Enter your Casting[employer] Access Code exactly as it appears below.  You will not need to use this code after youve completed the sign-up process. If you do not sign up before the expiration date, you must request a new code. · Liquiverse Access Code: [email or web address] Expires: 7/16/2018  1:41 PM 
 
4. Enter the last four digits of your Social Security Number (xxxx) and Date of Birth (mm/dd/yyyy) as indicated and click Submit. You will be taken to the next sign-up page. 5. Create a Liquiverse ID. This will be your Liquiverse login ID and cannot be changed, so think of one that is secure and easy to remember. 6. Create a Liquiverse password. You can change your password at any time. 7. Enter your Password Reset Question and Answer. This can be used at a later time if you forget your password. 8. Enter your e-mail address. You will receive e-mail notification when new information is available in [address] 9. Click Sign Up. You can now view and download portions of your medical record. 10. Click the Download Summary menu link to download a portable copy of your medical information. If you have questions, please visit the Frequently Asked Questions section of the Liquiverse website. Remember, Liquiverse is NOT to be used for urgent needs. For medical emergencies, dial 911. Now available from your iPhone and Android! Please provide this summary of care documentation to your next provider. Your primary care clinician is listed as Ron Trujillo. If you have any questions after today's visit, please call [phone].

## 2018-05-08 NOTE — PROGRESS NOTES
Branden Garza is a 74 y.o.  female and presents with     Chief Complaint   Patient presents with    Diabetes    Hypertension    Seizure    Anemia    Cholesterol Problem    Diarrhea       Pt is here for lab results and DEXA. Pt reports diarrhea has resolved. Pt is not taking Keppra. She is going to call Neurology if she needs to take it. Pt is anemic , but denies rectal bleeding , melena  Pt takes Levimir 45 units bid. Pt takes HUmalog 16 units twice daily/  Pt forgets the humalog sometimes      Past Medical History:   Diagnosis Date    Arthritis     Breast CA ([address]     Diabetes ([address]     Hypercholesteremia     Hypertension     Menopause     Psychiatric disorder     DEPRESSION    PUD (peptic ulcer disease)     S/P cardiac cath 02/2015    No angiographic evidence of CAD    Seizures (HCC)     LAST SEIZURE ABOUT 2 MONTHS AGO (NOV 2015)    St[address] [facility]     x2 with left side deficit     Past Surgical History:   Procedure Laterality Date    HX BREAST LUMPECTOMY Right 1/12/2016    Dr. Mile Sharp Partial Mastectomy w./ axillary lymphadenectomy    HX HEENT      TONSILLECTOMY    HX MASTECTOMY Right 1/26/2016    Dr. Mile Sharp Repeat Partial Mastectomy for positive margins    HX ORTHOPAEDIC      left arm surg    IR MEDIPORT       Current Outpatient Prescriptions   Medication Sig    insulin aspart protamine/insulin aspart (NOVOLOG MIX 70-30FLEXPEN U-100) 100 unit/mL (70-30) inpn Administer 60 units twice daily 30 minutes before breakfast and dinner.  aspirin-dipyridamole (AGGRENOX)  mg per SR capsule Take 1 Cap by mouth two (2) times a day.  divalproex ER (DEPAKOTE ER) 500 mg ER tablet Take 1 Tab by mouth two (2) times a day.  losartan (COZAAR) 50 mg tablet Take 1 Tab by mouth two (2) times a day.  carvedilol (COREG) 12.5 mg tablet Take 1 Tab by mouth two (2) times daily (with meals).  amLODIPine (NORVASC) 5 mg tablet Take 1 Tab by mouth nightly.     hydroCHLOROthiazide (HYDRODIURIL) 25 mg tablet Take 1 Tab by mouth daily.  atorvastatin (LIPITOR) 40 mg tablet Take 1 Tab by mouth daily.  isosorbide mononitrate ER (IMDUR) 30 mg tablet Take 1 Tab by mouth daily.  bisacodyl (DULCOLAX, BISACODYL,) 10 mg suppository Insert 10 mg into rectum daily.  lactulose (CHRONULAC) 10 gram/15 mL solution Take 45 mL by mouth nightly.  insulin lispro (HUMALOG) 100 unit/mL injection 6 Units by SubCUTAneous route Before breakfast, lunch, and dinner.  furosemide (LASIX) 20 mg tablet One po daily    pantoprazole (PROTONIX) 40 mg tablet Take 1 Tab by mouth daily.  letrozole (FEMARA) 2.5 mg tablet Take 2.5 mg by mouth daily.  SONAFINE emul topical     cholecalciferol (VITAMIN D3) 1,000 unit tablet Take 1,000 Units by mouth daily.  glucose blood VI test strips (FREESTYLE LITE STRIPS) strip Monitor blood sugars twice daily    therapeutic multivitamin (THERAGRAN) tablet Take 1 Tab by mouth daily.  clotrimazole (LOTRIMIN) 1 % topical cream Apply  to affected area two (2) times a day. No current facility-administered medications for this visit.       Health Maintenance   Topic Date Due    FOOT EXAM Q1  11/23/2016    Influenza Age 5 to Adult  08/01/2018    EYE EXAM RETINAL OR DILATED Q1  09/25/2018    HEMOGLOBIN A1C Q6M  10/20/2018    MEDICARE YEARLY EXAM  04/18/2019    LIPID PANEL Q1  04/20/2019    MICROALBUMIN Q1  04/23/2019    GLAUCOMA SCREENING Q2Y  09/25/2019    BREAST CANCER SCRN MAMMOGRAM  12/11/2019    Pneumococcal 65+ High/Highest Risk (2 of 2 - PPSV23) 02/04/2020    COLONOSCOPY  10/27/2025    DTaP/Tdap/Td series (2 - Td) 11/23/2025    Hepatitis C Screening  Completed    Bone Densitometry (Dexa) Screening  Completed    ZOSTER VACCINE AGE 60>  Completed     Immunization History   Administered Date(s) Administered    Influenza High Dose Vaccine PF 08/29/2017    Influenza Vaccine 11/23/2015, 10/17/2016    Influenza Vaccine (Madin [place] Canine Kidney) PF 02/04/2015    Pneumococcal Conjugate (PCV-13) 04/17/2018    Pneumococcal Polysaccharide (PPSV-23) 02/04/2015    Tdap 11/23/2015     No LMP recorded. Patient is postmenopausal.        Allergies and Intolerances: Allergies   Allergen Reactions    Metformin Other (comments)     Patient states that she is not allergic to metformin. Family History:   Family History   Problem Relation Age of Onset    Cancer Father [de-identified]     colon    Hypertension Mother     Heart Disease Mother     Diabetes Mother        Social History:   She  reports that she has never smoked. She has never used smokeless tobacco.  She  reports that she does not drink alcohol.             Review of Systems:   General: negative for - chills, fatigue, fever, weight change  Psych: negative for - anxiety, depression, irritability or mood swings  ENT: negative for - headaches, hearing change, nasal congestion, oral lesions, sneezing or sore throat  Heme/ Lymph: negative for - bleeding problems, bruising, pallor or swollen lymph nodes  Endo: negative for - hot flashes, polydipsia/polyuria or temperature intolerance  Resp: negative for - cough, shortness of breath or wheezing  CV: negative for - chest pain, edema or palpitations  GI: negative for - abdominal pain, change in bowel habits, constipation, diarrhea or nausea/vomiting  : negative for - dysuria, hematuria, incontinence, pelvic pain or vulvar/vaginal symptoms  MSK: negative for - joint pain, joint swelling or muscle pain  Neuro: negative for - confusion, headaches, seizures or weakness  Derm: negative for - dry skin, hair changes, rash or skin lesion changes          Physical:   Vitals:   Vitals:    05/08/18 1304   BP: 163/82   Pulse: 86   Resp: 16   Temp: 98.4 °F (36.9 °C)   TempSrc: Oral   SpO2: 100%   Weight: 237 lb (107.5 kg)   Height: 5' 6\" (1.676 m)           Exam:   HEENT- atraumatic,normocephalic, awake, oriented, well nourished,obese , slurred speech  Neck - supple,no enlarged lymph nodes, no JVD, no thyromegaly  Chest- CTA, no rhonchi, no crackles  Heart- rrr, no murmurs / gallop/rub  Abdomen- soft,BS+,NT, no hepatosplenomegaly  Ext - no c/c/edema   Neuro- mild left sided weaknes, uses a cane  Skin - warm,dry, no obvious rashes. Review of Data:   LABS:   Lab Results   Component Value Date/Time    WBC 6.4 04/20/2018 01:46 PM    HGB 10.6 (L) 04/20/2018 01:46 PM    HCT 34.5 (L) 04/20/2018 01:46 PM    PLATELET 815 63/05/8063 01:46 PM     Lab Results   Component Value Date/Time    Sodium 137 04/20/2018 01:46 PM    Potassium 4.2 04/20/2018 01:46 PM    Chloride 104 04/20/2018 01:46 PM    CO2 29 04/20/2018 01:46 PM    Glucose 241 (H) 04/20/2018 01:46 PM    BUN 27 (H) 04/20/2018 01:46 PM    Creatinine 0.98 04/20/2018 01:46 PM     Lab Results   Component Value Date/Time    Cholesterol, total 222 (H) 04/20/2018 01:46 PM    HDL Cholesterol 84 (H) 04/20/2018 01:46 PM    LDL, calculated 117.6 (H) 04/20/2018 01:46 PM    Triglyceride 102 04/20/2018 01:46 PM     No results found for: GPT        Impression / Plan:        ICD-10-CM ICD-9-CM    1. Anemia, unspecified type D64.9 285.9 FERRITIN      IRON PROFILE      VITAMIN B12 & FOLATE   2. Vitamin B12 deficiency E53.8 266.2 FERRITIN      IRON PROFILE      VITAMIN B12 & FOLATE   3. Type 2 diabetes mellitus with diabetic neuropathy, with long-term current use of insulin (HCC) E11.40 250.60 insulin aspart protamine/insulin aspart (NOVOLOG MIX 70-30FLEXPEN U-100) 100 unit/mL (70-30) inpn    Z79.4 357.2      V58.67    4. Essential hypertension I10 401.9          Asked pt to check with Neurology if she is supposed to take both keppra and depakote. Currently she is not taking keppra. ,denies seizures. HTN - acceptable BP    Hyperchol - stable    Stroke - stable    Breast cancer - follow up with specialist.          Explained to patient risk benefits of the medications. Advised patient to stop meds if having any side effects. Pt verbalized understanding of the instructions. I have discussed the diagnosis with the patient and the intended plan as seen in the above orders. The patient has received an after-visit summary and questions were answered concerning future plans. I have discussed medication side effects and warnings with the patient as well. I have reviewed the plan of care with the patient, accepted their input and they are in agreement with the treatment goals. Reviewed plan of care. Patient has provided input and agrees with goals.     Follow-up Disposition: Not on Trish Daly MD

## 2018-06-08 ENCOUNTER — TELEPHONE (OUTPATIENT)
Dept: FAMILY MEDICINE CLINIC | Age: 68
End: 2018-06-08

## 2018-06-13 ENCOUNTER — TELEPHONE (OUTPATIENT)
Dept: SURGERY | Age: 68
End: 2018-06-13

## 2018-06-13 NOTE — TELEPHONE ENCOUNTER
Jeannine Loya with [facility] wanted to know if we have received fax for prescription for compression pump. Advised we have it and it's on his desk for signature, however, he will not be in the office until 6/14. Jeannine Loya verbalized understanding.

## 2018-06-22 ENCOUNTER — TELEPHONE (OUTPATIENT)
Dept: SURGERY | Age: 68
End: 2018-06-22

## 2018-06-26 ENCOUNTER — TELEPHONE (OUTPATIENT)
Dept: SURGERY | Age: 68
End: 2018-06-26

## 2018-07-27 ENCOUNTER — HOSPITAL ENCOUNTER (EMERGENCY)
Age: 68
Discharge: HOME | End: 2018-07-27
Attending: EMERGENCY MEDICINE
Payer: MEDICARE

## 2018-07-27 VITALS
WEIGHT: 240 LBS | HEIGHT: 67 IN | BODY MASS INDEX: 37.67 KG/M2 | TEMPERATURE: 98.4 F | RESPIRATION RATE: 17 BRPM | OXYGEN SATURATION: 96 % | SYSTOLIC BLOOD PRESSURE: 180 MMHG | DIASTOLIC BLOOD PRESSURE: 67 MMHG | HEART RATE: 90 BPM

## 2018-07-27 DIAGNOSIS — G89.29 CHRONIC PAIN OF LEFT KNEE: ICD-10-CM

## 2018-07-27 DIAGNOSIS — R03.0 ELEVATED BLOOD PRESSURE READING: Primary | ICD-10-CM

## 2018-07-27 DIAGNOSIS — M25.562 CHRONIC PAIN OF LEFT KNEE: ICD-10-CM

## 2018-07-27 LAB
ATRIAL RATE: 88 BPM
CALCULATED P AXIS, ECG09: 57 DEGREES
CALCULATED R AXIS, ECG10: 10 DEGREES
CALCULATED T AXIS, ECG11: 41 DEGREES
DIAGNOSIS, 93000: NORMAL
P-R INTERVAL, ECG05: 134 MS
Q-T INTERVAL, ECG07: 352 MS
QRS DURATION, ECG06: 86 MS
QTC CALCULATION (BEZET), ECG08: 425 MS
VENTRICULAR RATE, ECG03: 88 BPM

## 2018-07-27 PROCEDURE — 99283 EMERGENCY DEPT VISIT LOW MDM: CPT

## 2018-07-27 PROCEDURE — 74011250637 HC RX REV CODE- 250/637: Performed by: NURSE PRACTITIONER

## 2018-07-27 PROCEDURE — 93005 ELECTROCARDIOGRAM TRACING: CPT

## 2018-07-27 RX ORDER — HYDRALAZINE HYDROCHLORIDE 25 MG/1
25 TABLET, FILM COATED ORAL
Status: COMPLETED | OUTPATIENT
Start: 2018-07-27 | End: 2018-07-27

## 2018-07-27 RX ADMIN — HYDRALAZINE HYDROCHLORIDE 25 MG: 25 TABLET, FILM COATED ORAL at 11:15

## 2018-07-27 NOTE — ED TRIAGE NOTES
Seen at clinic for left knee arthritis pain/ told BP was high. 170/103. Told to come to ED. Didn't take BP meds today.

## 2018-07-27 NOTE — DISCHARGE INSTRUCTIONS
Conject Activation    Thank you for requesting access to Conject. Please follow the instructions below to securely access and download your online medical record. Conject allows you to send messages to your doctor, view your test results, renew your prescriptions, schedule appointments, and more. How Do I Sign Up? 1. In your internet browser, go to [email or web address]  2. Click on the First Time User? Click Here link in the Sign In box. You will be redirect to the New Member Sign Up page. 3. Enter your Conject Access Code exactly as it appears below. You will not need to use this code after youve completed the sign-up process. If you do not sign up before the expiration date, you must request a new code. Conject Access Code: [email or web address]  Expires: 10/25/2018 10:55 AM (This is the date your Conject access code will )    4. Enter the last four digits of your Social Security Number (xxxx) and Date of Birth (mm/dd/yyyy) as indicated and click Submit. You will be taken to the next sign-up page. 5. Create a Conject ID. This will be your Conject login ID and cannot be changed, so think of one that is secure and easy to remember. 6. Create a Conject password. You can change your password at any time. 7. Enter your Password Reset Question and Answer. This can be used at a later time if you forget your password. 8. Enter your e-mail address. You will receive e-mail notification when new information is available in [address] 9. Click Sign Up. You can now view and download portions of your medical record. 10. Click the Download Summary menu link to download a portable copy of your medical information. Additional Information    If you have questions, please visit the Frequently Asked Questions section of the Conject website at [email or web address] Remember, Conject is NOT to be used for urgent needs. For medical emergencies, dial 911.     Be sure to take your BP medication as directed.
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
## 2018-08-03 ENCOUNTER — HOSPITAL ENCOUNTER (EMERGENCY)
Age: 68
Discharge: HOME | End: 2018-08-03
Attending: EMERGENCY MEDICINE
Payer: MEDICARE

## 2018-08-03 VITALS
OXYGEN SATURATION: 100 % | BODY MASS INDEX: 35.24 KG/M2 | SYSTOLIC BLOOD PRESSURE: 180 MMHG | DIASTOLIC BLOOD PRESSURE: 106 MMHG | RESPIRATION RATE: 18 BRPM | TEMPERATURE: 98.4 F | HEART RATE: 71 BPM | WEIGHT: 225 LBS

## 2018-08-03 DIAGNOSIS — E16.2 HYPOGLYCEMIA: Primary | ICD-10-CM

## 2018-08-03 LAB
ALBUMIN SERPL-MCNC: 3.2 G/DL (ref 3.4–5)
ALBUMIN/GLOB SERPL: 0.6 {RATIO} (ref 0.8–1.7)
ALP SERPL-CCNC: 103 U/L (ref 45–117)
ALT SERPL-CCNC: 20 U/L (ref 13–56)
ANION GAP SERPL CALC-SCNC: 9 MMOL/L (ref 3–18)
AST SERPL-CCNC: 15 U/L (ref 15–37)
BILIRUB SERPL-MCNC: 0.3 MG/DL (ref 0.2–1)
BUN SERPL-MCNC: 21 MG/DL (ref 7–18)
BUN/CREAT SERPL: 23 (ref 12–20)
CALCIUM SERPL-MCNC: 8.8 MG/DL (ref 8.5–10.1)
CHLORIDE SERPL-SCNC: 111 MMOL/L (ref 100–108)
CO2 SERPL-SCNC: 24 MMOL/L (ref 21–32)
CREAT SERPL-MCNC: 0.93 MG/DL (ref 0.6–1.3)
GLOBULIN SER CALC-MCNC: 5 G/DL (ref 2–4)
GLUCOSE BLD STRIP.AUTO-MCNC: 166 MG/DL (ref 70–110)
GLUCOSE BLD STRIP.AUTO-MCNC: 77 MG/DL (ref 70–110)
GLUCOSE SERPL-MCNC: 97 MG/DL (ref 74–99)
POTASSIUM SERPL-SCNC: 3.7 MMOL/L (ref 3.5–5.5)
PROT SERPL-MCNC: 8.2 G/DL (ref 6.4–8.2)
SODIUM SERPL-SCNC: 144 MMOL/L (ref 136–145)

## 2018-08-03 PROCEDURE — 80053 COMPREHEN METABOLIC PANEL: CPT | Performed by: EMERGENCY MEDICINE

## 2018-08-03 PROCEDURE — 82962 GLUCOSE BLOOD TEST: CPT

## 2018-08-03 PROCEDURE — 99284 EMERGENCY DEPT VISIT MOD MDM: CPT

## 2018-08-03 RX ORDER — LUBIPROSTONE 8 UG/1
8 CAPSULE, GELATIN COATED ORAL
COMMUNITY
End: 2019-07-05 | Stop reason: SDUPTHER

## 2018-08-03 NOTE — ED PROVIDER NOTES
EMERGENCY DEPARTMENT HISTORY AND PHYSICAL EXAM 
 
2:44 AM 
 
 
Date: 8/3/2018 Patient Name: Herlinda Suarez History of Presenting Illness Chief Complaint Patient presents with  Low Blood Sugar History Provided By: Patient Chief Complaint: Hypoglycemic episode Duration: 1 Hours Timing:  Acute Location: Generalized body Quality: N/A Severity: Moderate Modifying Factors: BG raised 20 in field with administration of 1 mg glucagon and 15 mg oral glucose tablets by medics in the field. Associated Symptoms: Edema, weakness, diaphoresis, dyspnea, CP Additional History (Context): Herlinda Suarez is a 74 y.o. female with Breast ca, Seizures, PUD, HLD, HTN, DM who presents to the ED with c/o an acute, moderate hypoglycemic episode onset 1 hour. EMS assisting with pt's history. Pt notes she woke up and tried to use the restroom but felt too weak so she went back to bed. EMS states pt was not responding upon their arrival to pt but began talking on the way to the hospital. Pt notes she was experiencing CP earlier today along with dyspnea which felt different from nl. Pt is not a smoker and does not use EtOH or drugs. Associated sx include Edema, weakness, diaphoresis, dyspnea and CP. Denies fever, chills, SOB, nausea, vomiting, diarrhea and any urinary sx. No other sx or complaints at this time. PCP: [id number], [place] 
 
Current Outpatient Prescriptions Medication Sig Dispense Refill  lubiPROStone (AMITIZA) 8 mcg capsule Take 8 mcg by mouth daily (with breakfast).  aspirin-dipyridamole (AGGRENOX)  mg per SR capsule Take 1 Cap by mouth two (2) times a day. 180 Cap 1  divalproex ER (DEPAKOTE ER) 500 mg ER tablet Take 1 Tab by mouth two (2) times a day. 60 Tab 3  
 losartan (COZAAR) 50 mg tablet Take 1 Tab by mouth two (2) times a day. 60 Tab 3  carvedilol (COREG) 12.5 mg tablet Take 1 Tab by mouth two (2) times daily (with meals).  60 Tab 3  
 hydroCHLOROthiazide (HYDRODIURIL) 25 mg tablet Take 1 Tab by mouth daily. 30 Tab 3  
 atorvastatin (LIPITOR) 40 mg tablet Take 1 Tab by mouth daily. 30 Tab 3  
 isosorbide mononitrate ER (IMDUR) 30 mg tablet Take 1 Tab by mouth daily. 90 Tab 1  
 furosemide (LASIX) 20 mg tablet One po daily 30 Tab 3  pantoprazole (PROTONIX) 40 mg tablet Take 1 Tab by mouth daily. 90 Tab 1  
 letrozole (FEMARA) 2.5 mg tablet Take 2.5 mg by mouth daily.  therapeutic multivitamin (THERAGRAN) tablet Take 1 Tab by mouth daily.  insulin aspart protamine/insulin aspart (NOVOLOG MIX 70-30FLEXPEN U-100) 100 unit/mL (70-30) inpn Administer 60 units twice daily 30 minutes before breakfast and dinner. 5 Pen 3  
 amLODIPine (NORVASC) 5 mg tablet Take 1 Tab by mouth nightly. 30 Tab 3  
 bisacodyl (DULCOLAX, BISACODYL,) 10 mg suppository Insert 10 mg into rectum daily. 10 Suppository 1  
 lactulose (CHRONULAC) 10 gram/15 mL solution Take 45 mL by mouth nightly. 480 mL 3  
 insulin lispro (HUMALOG) 100 unit/mL injection 6 Units by SubCUTAneous route Before breakfast, lunch, and dinner. 2 Vial 5  
 SONAFINE emul topical     
 cholecalciferol (VITAMIN D3) 1,000 unit tablet Take 1,000 Units by mouth daily.  glucose blood VI test strips (FREESTYLE LITE STRIPS) strip Monitor blood sugars twice daily 100 Strip 2  clotrimazole (LOTRIMIN) 1 % topical cream Apply  to affected area two (2) times a day. 45 g 3 Past History Past Medical History: 
Past Medical History:  
Diagnosis Date  Arthritis  Breast CA ([address]  Diabetes [address] [address]  Hypercholesteremia  Hypertension  Menopause  Psychiatric disorder DEPRESSION  
 PUD (peptic ulcer disease)  S/P cardiac cath 02/2015 No angiographic evidence of CAD  Seizures ([address] LAST SEIZURE ABOUT 2 MONTHS AGO (NOV 2015)  Stroke ([facility] [address] x2 with left side deficit Past Surgical History: 
Past Surgical History:  
Procedure Laterality Date  HX BREAST LUMPECTOMY Right 1/12/2016 Dr. Heather Huerta Partial Mastectomy w./ axillary lymphadenectomy  HX HEENT    
 TONSILLECTOMY  HX MASTECTOMY Right 1/26/2016 Dr. Heather Huerta Repeat Partial Mastectomy for positive margins  HX ORTHOPAEDIC    
 left arm surg  IR MEDIPORT Family History: 
Family History Problem Relation Age of Onset  Cancer Father [de-identified]  
  colon  Hypertension Mother  Heart Disease Mother  Diabetes Mother Social History: 
Social History Substance Use Topics  Smoking status: Never Smoker  Smokeless tobacco: Never Used  Alcohol use No  
 
 
Allergies: Allergies Allergen Reactions  Metformin Other (comments) Patient states that she is not allergic to metformin. Review of Systems Review of Systems Constitutional: Negative for fever. HENT: Negative for trouble swallowing. Respiratory: Positive for shortness of breath. Cardiovascular: Positive for chest pain. Gastrointestinal: Negative for abdominal pain, diarrhea and vomiting. Genitourinary: Negative for difficulty urinating. Musculoskeletal: Negative for back pain and neck pain. Skin: Negative for wound. Neurological: Negative for syncope. Psychiatric/Behavioral: Negative for behavioral problems. All other systems reviewed and are negative. Physical Exam  
 
Visit Vitals  BP (!) 180/106 (BP 1 Location: Left arm, BP Patient Position: At rest)  Pulse 71  Temp 98.4 °F (36.9 °C)  Resp 18  Wt 102.1 kg (225 lb)  SpO2 100%  BMI 35.24 kg/m2 Physical Exam  
Constitutional: She is oriented to person, place, and time. She appears well-developed. No distress. Chronically ill-appearing, nad HENT:  
Head: Normocephalic and atraumatic. Eyes: EOM are normal.  
Neck: Normal range of motion. Cardiovascular: Normal rate and intact distal pulses. Pulmonary/Chest: Effort normal and breath sounds normal. No respiratory distress. Abdominal: Soft. There is no tenderness. Musculoskeletal: Normal range of motion. Mechanically stable Neurological: She is alert and oriented to person, place, and time. No focal deficits noted Skin: Skin is warm. Psychiatric: Her behavior is normal.  
Nursing note and vitals reviewed. Diagnostic Study Results Labs - Recent Results (from the past 12 hour(s)) GLUCOSE, POC Collection Time: 08/03/18  2:01 AM  
Result Value Ref Range Glucose (POC) 77 70 - 110 mg/dL METABOLIC PANEL, COMPREHENSIVE Collection Time: 08/03/18  2:15 AM  
Result Value Ref Range Sodium 144 136 - 145 mmol/L Potassium 3.7 3.5 - 5.5 mmol/L Chloride 111 (H) 100 - 108 mmol/L  
 CO2 24 21 - 32 mmol/L Anion gap 9 3.0 - 18 mmol/L Glucose 97 74 - 99 mg/dL BUN 21 (H) 7.0 - 18 MG/DL Creatinine 0.93 0.6 - 1.3 MG/DL  
 BUN/Creatinine ratio 23 (H) 12 - 20 GFR est AA >60 >60 ml/min/1.73m2 GFR est non-AA >60 >60 ml/min/1.73m2 Calcium 8.8 8.5 - 10.1 MG/DL Bilirubin, total 0.3 0.2 - 1.0 MG/DL  
 ALT (SGPT) 20 13 - 56 U/L  
 AST (SGOT) 15 15 - 37 U/L Alk. phosphatase 103 45 - 117 U/L Protein, total 8.2 6.4 - 8.2 g/dL Albumin 3.2 (L) 3.4 - 5.0 g/dL Globulin 5.0 (H) 2.0 - 4.0 g/dL A-G Ratio 0.6 (L) 0.8 - 1.7 GLUCOSE, POC Collection Time: 08/03/18  4:09 AM  
Result Value Ref Range Glucose (POC) 166 (H) 70 - 110 mg/dL Radiologic Studies - No orders to display Medical Decision Making I am the first provider for this patient. I reviewed the vital signs, available nursing notes, past medical history, past surgical history, family history and social history. Vital Signs-Reviewed the patient's vital signs. Pulse Oximetry Analysis -  100% on room air (Interpretation)Normal 
 
Cardiac Monitor: 
Rate: 71 Rhythm:  Normal Sinus Rhythm Records Reviewed: Nursing Notes and Old Medical Records (Time of Review: 2:44 AM) ED Course: Progress Notes, Reevaluation, and Consults: 
 
 
Provider Notes: MDM Number of Diagnoses or Management Options Hypoglycemia:  
Diagnosis management comments: 73 yo AAF with PMHx DM presents by EMS for low blood sugar. Pt given some glucose and OJ, states she feels better, no specific complaints. Examination generally unremarkable. Will monitor in ED and evaluate for acute process. 4:13 AM 
Labs unremarkable. Pt tolerating po, glucose has stabilized. Discussed results and poc for dc home, symptom management, follow-up, return precautions. Amount and/or Complexity of Data Reviewed Clinical lab tests: ordered and reviewed Obtain history from someone other than the patient: yes Review and summarize past medical records: yes Patient Progress Patient progress: improved Procedures Diagnosis Clinical Impression: 1. Hypoglycemia Disposition: Discharged Follow-up Information Follow up With Details Comments Contact Info [id number] MD Call in 2 days  [address] [facility] [address] [place] [id number] [phone] [facility] EMERGENCY DEPT Go to As needed, If symptoms worsen [address] 
[phone] Patient's Medications Start Taking No medications on file Continue Taking AMLODIPINE (NORVASC) 5 MG TABLET    Take 1 Tab by mouth nightly. ASPIRIN-DIPYRIDAMOLE (AGGRENOX)  MG PER SR CAPSULE    Take 1 Cap by mouth two (2) times a day. ATORVASTATIN (LIPITOR) 40 MG TABLET    Take 1 Tab by mouth daily. BISACODYL (DULCOLAX, BISACODYL,) 10 MG SUPPOSITORY    Insert 10 mg into rectum daily. CARVEDILOL (COREG) 12.5 MG TABLET    Take 1 Tab by mouth two (2) times daily (with meals). CHOLECALCIFEROL (VITAMIN D3) 1,000 UNIT TABLET    Take 1,000 Units by mouth daily. CLOTRIMAZOLE (LOTRIMIN) 1 % TOPICAL CREAM    Apply  to affected area two (2) times a day.   
 DIVALPROEX ER (DEPAKOTE ER) 500 MG ER TABLET    Take 1 Tab by mouth two (2) times a day. FUROSEMIDE (LASIX) 20 MG TABLET    One po daily GLUCOSE BLOOD VI TEST STRIPS (FREESTYLE LITE STRIPS) STRIP    Monitor blood sugars twice daily HYDROCHLOROTHIAZIDE (HYDRODIURIL) 25 MG TABLET    Take 1 Tab by mouth daily. INSULIN ASPART PROTAMINE/INSULIN ASPART (NOVOLOG MIX 70-30FLEXPEN U-100) 100 UNIT/ML (70-30) INPN    Administer 60 units twice daily 30 minutes before breakfast and dinner. INSULIN LISPRO (HUMALOG) 100 UNIT/ML INJECTION    6 Units by SubCUTAneous route Before breakfast, lunch, and dinner. ISOSORBIDE MONONITRATE ER (IMDUR) 30 MG TABLET    Take 1 Tab by mouth daily. LACTULOSE (CHRONULAC) 10 GRAM/15 ML SOLUTION    Take 45 mL by mouth nightly. LETROZOLE (FEMARA) 2.5 MG TABLET    Take 2.5 mg by mouth daily. LOSARTAN (COZAAR) 50 MG TABLET    Take 1 Tab by mouth two (2) times a day. LUBIPROSTONE (AMITIZA) 8 MCG CAPSULE    Take 8 mcg by mouth daily (with breakfast). PANTOPRAZOLE (PROTONIX) 40 MG TABLET    Take 1 Tab by mouth daily. SONAFINE EMUL TOPICAL THERAPEUTIC MULTIVITAMIN (THERAGRAN) TABLET    Take 1 Tab by mouth daily. These Medications have changed No medications on file Stop Taking No medications on file  
 
_______________________________ Attestations: 
Scribe Attestation Angie Mckenna acting as a scribe for and in the presence of Lisa Saenz MD     
August 03, 2018 at 2:44 AM 
    
Provider Attestation:     
I personally performed the services described in the documentation, reviewed the documentation, as recorded by the scribe in my presence, and it accurately and completely records my words and actions. August 03, 2018 at 2:44 AM - Lisa Saenz MD   
_______________________________

## 2018-08-03 NOTE — DISCHARGE INSTRUCTIONS
Hypoglycemia: Care Instructions  Your Care Instructions    Hypoglycemia means that your blood sugar is low and your body is not getting enough fuel. Some people get low blood sugar from not eating often enough. Some medicines to treat diabetes can cause low blood sugar. People who have had surgery on their stomachs or intestines may get hypoglycemia. Problems with the pancreas, kidneys, or liver also can cause low blood sugar. A snack or drink with sugar in it will raise your blood sugar and should ease your symptoms right away. Your doctor may recommend that you change or stop your medicines until you can get your blood sugar levels under control. In the long run, you may need to change your diet and eating habits so that you get enough fuel for your body throughout the day. Follow-up care is a key part of your treatment and safety. Be sure to make and go to all appointments, and call your doctor if you are having problems. It's also a good idea to know your test results and keep a list of the medicines you take. How can you care for yourself at home? · Learn to recognize the early signs of low blood sugar. Signs include:  ¨ Nausea. ¨ Hunger. ¨ Feeling nervous, irritable, or shaky. ¨ Cold, clammy, wet skin. ¨ Sweating (when you are not exercising). ¨ A fast heartbeat. ¨ Numbness or tingling of the fingertips or lips. · If you feel an episode of low blood sugar coming on, drink fruit juice or sugared (not diet) soda, or eat sugar in the form of candy, cubes, or tablets. [facility] are another [employer]. · Eat small, frequent meals so that you do not get too hungry between meals. · Balance extra exercise with eating more. · Keep a written record of your low blood sugar episodes, including when you last ate and what you ate, so that you can learn what causes your blood sugar to drop.   · Make sure your family, friends, and coworkers know the symptoms of low blood sugar and know what to do to get your sugar level up. · Wear medical alert jewelry that lists your condition. You can buy this at most drugstores. When should you call for help? Call 911 anytime you think you may need emergency care. For example, call if:    · You passed out (lost consciousness).     · You are confused or cannot think clearly.     · Your blood sugar is very high or very low.    Watch closely for changes in your health, and be sure to contact your doctor if:    · Your blood sugar stays outside the level your doctor set for you.     · You have any problems. Where can you learn more? Go to http://mady-travon.info/. Enter P478 in the search box to learn more about \"Hypoglycemia: Care Instructions. \"  Current as of: December 7, 2017  Content Version: 11.7  © 2889-7725 [employer]. Care instructions adapted under license by [employer] (which disclaims liability or warranty for this information). If you have questions about a medical condition or this instruction, always ask your healthcare professional. [address] any warranty or liability for your use of this information.

## 2018-08-03 NOTE — ED NOTES
Unable to obtain iv access. Dr Sandra Smallwood aware. Ultrasound to bedside. cmp obtained and taken to lab during piv attempt

## 2018-08-03 NOTE — ED TRIAGE NOTES
Pt arrived by EMS with chief c/o initial BG 32. EMS gave 1 mg glucagon and 15mg oral glucose tablets to patient and repeat for medics was BG 50. Upon arrival patient alert and talking and given 12 ounces of juice immediately. Pts BG upon arrival 77. Patient ambulatory to bathroom.  Per EMS patient was not responding when they initially got to patient and slowly began taking on way to hospital

## 2018-08-06 RX ORDER — BLOOD-GLUCOSE METER
KIT MISCELLANEOUS
Qty: 100 STRIP | Refills: 0 | Status: SHIPPED | OUTPATIENT
Start: 2018-08-06 | End: 2018-09-26 | Stop reason: SDUPTHER

## 2018-08-07 ENCOUNTER — PATIENT OUTREACH (OUTPATIENT)
Dept: FAMILY MEDICINE CLINIC | Age: 68
End: 2018-08-07

## 2018-08-07 ENCOUNTER — TELEPHONE (OUTPATIENT)
Dept: FAMILY MEDICINE CLINIC | Age: 68
End: 2018-08-07

## 2018-08-07 ENCOUNTER — OFFICE VISIT (OUTPATIENT)
Dept: FAMILY MEDICINE CLINIC | Age: 68
End: 2018-08-07

## 2018-08-07 VITALS
HEIGHT: 67 IN | DIASTOLIC BLOOD PRESSURE: 79 MMHG | RESPIRATION RATE: 18 BRPM | WEIGHT: 228 LBS | SYSTOLIC BLOOD PRESSURE: 148 MMHG | BODY MASS INDEX: 35.79 KG/M2

## 2018-08-07 DIAGNOSIS — M17.12 PRIMARY OSTEOARTHRITIS OF LEFT KNEE: Primary | ICD-10-CM

## 2018-08-07 DIAGNOSIS — E11.40 TYPE 2 DIABETES MELLITUS WITH DIABETIC NEUROPATHY, WITH LONG-TERM CURRENT USE OF INSULIN (HCC): Chronic | ICD-10-CM

## 2018-08-07 DIAGNOSIS — Z79.4 TYPE 2 DIABETES MELLITUS WITH DIABETIC NEUROPATHY, WITH LONG-TERM CURRENT USE OF INSULIN (HCC): Chronic | ICD-10-CM

## 2018-08-07 RX ORDER — INSULIN ASPART 100 [IU]/ML
INJECTION, SUSPENSION SUBCUTANEOUS
Qty: 5 PEN | Refills: 3 | Status: SHIPPED | OUTPATIENT
Start: 2018-08-07 | End: 2018-10-22 | Stop reason: SDUPTHER

## 2018-08-07 NOTE — TELEPHONE ENCOUNTER
Gianna Quintanilla from [facility] [employer] needs signed office notes from today. Fax number [phone].

## 2018-08-07 NOTE — MR AVS SNAPSHOT
[address] [facility] 
 
 
 [address] [facility] [address] [facility] [address] [id number] 
[phone] Patient: Bob Hernandez MRN: [id number] Visit Information Date & Time Provider Department Dept. Phone Encounter #  
 8/7/2018 12:45 PM Renae Vance, [phone] Follow-up Instructions Return in about 6 weeks (around 9/18/2018). Your Appointments 8/14/2018  2:00 PM  
Follow Up with Renae Vance MD  
[facility]) Appt Note: bruise on left leg; lvm for pt. to call office to r/s appt. due to provider being out of the office. 07/27/18 1:29pm KW; pt r/s fr 08/07/2018  
 [address] [facility] [address] [facility]  
  
   
 [address] [facility] [address] [facility] Upcoming Health Maintenance Date Due  
 FOOT EXAM Q1 11/23/2016 Influenza Age 5 to Adult 8/1/2018 EYE EXAM RETINAL OR DILATED Q1 9/25/2018 HEMOGLOBIN A1C Q6M 10/20/2018 MEDICARE YEARLY EXAM 4/18/2019 LIPID PANEL Q1 4/20/2019 MICROALBUMIN Q1 4/23/2019 GLAUCOMA SCREENING Q2Y 9/25/2019 BREAST CANCER SCRN MAMMOGRAM 12/11/2019 Pneumococcal 65+ High/Highest Risk (2 of 2 - PPSV23) 2/4/2020 COLONOSCOPY 10/27/2025 DTaP/Tdap/Td series (2 - Td) 11/23/2025 Allergies as of 8/7/2018  Review Complete On: 8/7/2018 By: Renae Vance MD  
  
 Severity Noted Reaction Type Reactions Metformin  10/22/2015    Other (comments) Patient states that she is not allergic to metformin. Current Immunizations  Reviewed on 11/15/2016 Name Date  Influenza High Dose Vaccine PF 8/29/2017  4:00 PM  
 Influenza Vaccine 10/17/2016, 11/23/2015 10:18 AM  
 Influenza Vaccine ([place] Canine Kidney) PF 2/4/2015 10:00 AM  
 Pneumococcal Conjugate (PCV-13) 4/17/2018  4:50 PM  
 Pneumococcal Polysaccharide (PPSV-23) 2/4/2015  9:57 AM  
 Tdap 11/23/2015 10:19 AM  
  
 Not reviewed this visit You Were Diagnosed With   
  
 Codes Comments Primary osteoarthritis of left knee    -  Primary ICD-10-CM: M17.12 
ICD-9-CM: 715.16 Type 2 diabetes mellitus with diabetic neuropathy, with long-term current use of insulin (HCC)     ICD-10-CM: E11.40, Z79.4 ICD-9-CM: 250.60, 357.2, V58.67 Vitals BP Resp Height(growth percentile) Weight(growth percentile) BMI OB Status 148/79 18 5' 7\" (1.702 m) 228 lb (103.4 kg) 35.71 kg/m2 Postmenopausal  
 Smoking Status Never Smoker Vitals History BMI and BSA Data Body Mass Index Body Surface Area 35.71 kg/m 2 2.21 m 2 Preferred Pharmacy Pharmacy Name [facility],  [address] [phone] Your Updated Medication List  
  
   
This list is accurate as of 8/7/18  1:42 PM.  Always use your most recent med list.  
  
  
  
  
 AMITIZA 8 mcg capsule Generic drug:  lubiPROStone Take 8 mcg by mouth daily (with breakfast). amLODIPine 5 mg tablet Commonly known as:  Elyn Coffer Take 1 Tab by mouth nightly. aspirin-dipyridamole  mg per SR capsule Commonly known as:  AGGRENOX Take 1 Cap by mouth two (2) times a day. atorvastatin 40 mg tablet Commonly known as:  LIPITOR Take 1 Tab by mouth daily. bisacodyl 10 mg suppository Commonly known as:  DULCOLAX (BISACODYL) Insert 10 mg into rectum daily. carvedilol 12.5 mg tablet Commonly known as:  Rudy Montse Take 1 Tab by mouth two (2) times daily (with meals). clotrimazole 1 % topical cream  
Commonly known as:  Precilla Rincon Apply  to affected area two (2) times a day. divalproex  mg ER tablet Commonly known as:  DEPAKOTE ER Take 1 Tab by mouth two (2) times a day. FREESTYLE LITE STRIPS strip Generic drug:  glucose blood VI test strips USE TO TEST BLOOD SUGAR 2 TIMES A DAY  
  
 furosemide 20 mg tablet Commonly known as:  LASIX One po daily  
  
 hydroCHLOROthiazide 25 mg tablet Commonly known as:  HYDRODIURIL Take 1 Tab by mouth daily. insulin aspart protamine/insulin aspart 100 unit/mL (70-30) Inpn Commonly known as:  NovoLOG Mix 70-30FlexPen U-100 Administer 60 units twice daily 30 minutes before breakfast and dinner. insulin lispro 100 unit/mL injection Commonly known as:  HUMALOG  
6 Units by SubCUTAneous route Before breakfast, lunch, and dinner. isosorbide mononitrate ER 30 mg tablet Commonly known as:  IMDUR Take 1 Tab by mouth daily. lactulose 10 gram/15 mL solution Commonly known as:  Li Adams Take 45 mL by mouth nightly. letrozole 2.5 mg tablet Commonly known as:  [facility] Take 2.5 mg by mouth daily. losartan 50 mg tablet Commonly known as:  COZAAR Take 1 Tab by mouth two (2) times a day. pantoprazole 40 mg tablet Commonly known as:  PROTONIX Take 1 Tab by mouth daily. SONAFINE Emul topical  
Generic drug:  emollient combination no. 10  
  
 therapeutic multivitamin tablet Commonly known as:  [facility] Take 1 Tab by mouth daily. VITAMIN D3 1,000 unit tablet Generic drug:  cholecalciferol Take 1,000 Units by mouth daily. Prescriptions Sent to Pharmacy Refills  
 insulin aspart protamine/insulin aspart (NOVOLOG MIX 70-30FLEXPEN U-100) 100 unit/mL (70-30) inpn 3 Sig: Administer 60 units twice daily 30 minutes before breakfast and dinner. Class: Normal  
 Pharmacy: [address] [place] [address] I,  L. [facility] Ph #: [phone] We Performed the Following REFERRAL TO ORTHOPEDICS [IIZ366 Custom] Follow-up Instructions Return in about 6 weeks (around 9/18/2018). To-Do List   
 08/07/2018 Lab:  HEMOGLOBIN A1C WITH EAG   
  
 08/07/2018 Lab:  LIPID PANEL   
  
 08/07/2018 Lab:  METABOLIC PANEL, COMPREHENSIVE Referral Information Referral ID Referred By Referred To [id number] Melanie Turk Not Available Visits Status Start Date End Date 1 New Request 8/7/18 8/7/19 If your referral has a status of pending review or denied, additional information will be sent to support the outcome of this decision. Introducing [facility] [place] [facility]! [facility] introduces [employer] patient portal. Now you can access parts of your medical record, email your doctor's office, and request medication refills online. 1. In your internet browser, go to [email or web address] 2. Click on the First Time User? Click Here link in the Sign In box. You will see the New Member Sign Up page. 3. Enter your [employer] Access Code exactly as it appears below. You will not need to use this code after youve completed the sign-up process. If you do not sign up before the expiration date, you must request a new code. · [employer] Access Code: [email or web address] Expires: 10/25/2018 10:55 AM 
 
4. Enter the last four digits of your Social Security Number (xxxx) and Date of Birth (mm/dd/yyyy) as indicated and click Submit. You will be taken to the next sign-up page. 5. Create a [employer] ID. This will be your [employer] login ID and cannot be changed, so think of one that is secure and easy to remember. 6. Create a [employer] password. You can change your password at any time. 7. Enter your Password Reset Question and Answer. This can be used at a later time if you forget your password. 8. Enter your e-mail address. You will receive e-mail notification when new information is available in [address] 9. Click Sign Up. You can now view and download portions of your medical record. 10. Click the Download Summary menu link to download a portable copy of your medical information. If you have questions, please visit the Frequently Asked Questions section of the [employer] website. Remember, [employer] is NOT to be used for urgent needs. For medical emergencies, dial 911. Now available from your iPhone and Android! Please provide this summary of care documentation to your next provider. Your primary care clinician is listed as [id number]. If you have any questions after today's visit, please call [phone].
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
## 2018-08-07 NOTE — PROGRESS NOTES
1. Have you been to the ER, urgent care clinic since your last visit? Hospitalized since your last visit? 7/12 [facility]    2. Have you seen or consulted any other health care providers outside of the [address] [place] [address] since your last visit? Include any pap smears or colon screening.  No

## 2018-08-09 ENCOUNTER — HOSPITAL ENCOUNTER (OUTPATIENT)
Dept: LAB | Age: 68
Discharge: HOME | End: 2018-08-09
Payer: MEDICARE

## 2018-08-09 DIAGNOSIS — E11.40 TYPE 2 DIABETES MELLITUS WITH DIABETIC NEUROPATHY, WITH LONG-TERM CURRENT USE OF INSULIN (HCC): Chronic | ICD-10-CM

## 2018-08-09 DIAGNOSIS — Z79.4 TYPE 2 DIABETES MELLITUS WITH DIABETIC NEUROPATHY, WITH LONG-TERM CURRENT USE OF INSULIN (HCC): Chronic | ICD-10-CM

## 2018-08-09 LAB
ALBUMIN SERPL-MCNC: 3.1 G/DL (ref 3.4–5)
ALBUMIN/GLOB SERPL: 0.6 {RATIO} (ref 0.8–1.7)
ALP SERPL-CCNC: 91 U/L (ref 45–117)
ALT SERPL-CCNC: 15 U/L (ref 13–56)
ANION GAP SERPL CALC-SCNC: 10 MMOL/L (ref 3–18)
AST SERPL-CCNC: 15 U/L (ref 15–37)
BILIRUB SERPL-MCNC: 0.4 MG/DL (ref 0.2–1)
BUN SERPL-MCNC: 26 MG/DL (ref 7–18)
BUN/CREAT SERPL: 25 (ref 12–20)
CALCIUM SERPL-MCNC: 8.6 MG/DL (ref 8.5–10.1)
CHLORIDE SERPL-SCNC: 104 MMOL/L (ref 100–108)
CHOLEST SERPL-MCNC: 233 MG/DL
CO2 SERPL-SCNC: 25 MMOL/L (ref 21–32)
CREAT SERPL-MCNC: 1.04 MG/DL (ref 0.6–1.3)
EST. AVERAGE GLUCOSE BLD GHB EST-MCNC: 183 MG/DL
GLOBULIN SER CALC-MCNC: 4.8 G/DL (ref 2–4)
GLUCOSE SERPL-MCNC: 224 MG/DL (ref 74–99)
HBA1C MFR BLD: 8 % (ref 4.2–5.6)
HDLC SERPL-MCNC: 85 MG/DL (ref 40–60)
HDLC SERPL: 2.7 {RATIO} (ref 0–5)
LDLC SERPL CALC-MCNC: 122.2 MG/DL (ref 0–100)
LIPID PROFILE,FLP: ABNORMAL
POTASSIUM SERPL-SCNC: 4.7 MMOL/L (ref 3.5–5.5)
PROT SERPL-MCNC: 7.9 G/DL (ref 6.4–8.2)
SODIUM SERPL-SCNC: 139 MMOL/L (ref 136–145)
TRIGL SERPL-MCNC: 129 MG/DL (ref ?–150)
VLDLC SERPL CALC-MCNC: 25.8 MG/DL

## 2018-08-09 PROCEDURE — 36415 COLL VENOUS BLD VENIPUNCTURE: CPT | Performed by: INTERNAL MEDICINE

## 2018-08-09 PROCEDURE — 80061 LIPID PANEL: CPT | Performed by: INTERNAL MEDICINE

## 2018-08-09 PROCEDURE — 80053 COMPREHEN METABOLIC PANEL: CPT | Performed by: INTERNAL MEDICINE

## 2018-08-09 PROCEDURE — 83036 HEMOGLOBIN GLYCOSYLATED A1C: CPT | Performed by: INTERNAL MEDICINE

## 2018-09-05 ENCOUNTER — TELEPHONE (OUTPATIENT)
Dept: SURGERY | Age: 68
End: 2018-09-05

## 2018-09-19 ENCOUNTER — DOCUMENTATION ONLY (OUTPATIENT)
Dept: FAMILY MEDICINE CLINIC | Age: 68
End: 2018-09-19

## 2018-09-19 NOTE — LETTER
9/19/2018 Liyah Hernández [address] [place] [phone] Dear Ms. Liyah Hernández, We had an appointment reserved for you on 9/18/18 and were concerned when you did not show or call within 24 hours to cancel the appointment. Our policy is to call patients two days prior to their appointment to remind them of the date and time. We perform these calls as a courtesy to our patients and to allow us the opportunity to rebook the time slot should the appointment not be necessary. Recognizing that everyones time is valuable and that appointment time is limited, we ask that you provide 24 hours notice if you are unable to keep your appointment. Please call us at your earliest convenience to reschedule your appointment as your provider felt it was important to see you. Thank you for your anticipated cooperation. [address] [place] [phone] 
[phone]

## 2018-09-24 ENCOUNTER — OFFICE VISIT (OUTPATIENT)
Dept: FAMILY MEDICINE CLINIC | Age: 68
End: 2018-09-24

## 2018-09-24 VITALS
OXYGEN SATURATION: 99 % | DIASTOLIC BLOOD PRESSURE: 77 MMHG | HEIGHT: 67 IN | BODY MASS INDEX: 36.73 KG/M2 | RESPIRATION RATE: 18 BRPM | TEMPERATURE: 98.8 F | WEIGHT: 234 LBS | HEART RATE: 91 BPM | SYSTOLIC BLOOD PRESSURE: 163 MMHG

## 2018-09-24 DIAGNOSIS — E11.40 TYPE 2 DIABETES MELLITUS WITH DIABETIC NEUROPATHY, WITH LONG-TERM CURRENT USE OF INSULIN (HCC): Primary | Chronic | ICD-10-CM

## 2018-09-24 DIAGNOSIS — M79.89 LEG SWELLING: ICD-10-CM

## 2018-09-24 DIAGNOSIS — E78.00 HYPERCHOLESTEREMIA: Chronic | ICD-10-CM

## 2018-09-24 DIAGNOSIS — Z86.73 HISTORY OF CVA (CEREBROVASCULAR ACCIDENT): Chronic | ICD-10-CM

## 2018-09-24 DIAGNOSIS — Z23 ENCOUNTER FOR IMMUNIZATION: ICD-10-CM

## 2018-09-24 DIAGNOSIS — G40.909 SEIZURE DISORDER (HCC): Chronic | ICD-10-CM

## 2018-09-24 DIAGNOSIS — I10 ESSENTIAL HYPERTENSION: ICD-10-CM

## 2018-09-24 DIAGNOSIS — Z79.4 TYPE 2 DIABETES MELLITUS WITH DIABETIC NEUROPATHY, WITH LONG-TERM CURRENT USE OF INSULIN (HCC): Primary | Chronic | ICD-10-CM

## 2018-09-24 RX ORDER — AMLODIPINE BESYLATE 5 MG/1
5 TABLET ORAL
Qty: 30 TAB | Refills: 3 | Status: SHIPPED | OUTPATIENT
Start: 2018-09-24 | End: 2019-09-26 | Stop reason: SDUPTHER

## 2018-09-24 RX ORDER — FUROSEMIDE 20 MG/1
TABLET ORAL
Qty: 30 TAB | Refills: 3 | Status: SHIPPED | OUTPATIENT
Start: 2018-09-24 | End: 2019-09-26 | Stop reason: SDUPTHER

## 2018-09-24 RX ORDER — HYDROCHLOROTHIAZIDE 25 MG/1
25 TABLET ORAL DAILY
Qty: 30 TAB | Refills: 3 | Status: SHIPPED | OUTPATIENT
Start: 2018-09-24 | End: 2019-09-26 | Stop reason: SDUPTHER

## 2018-09-24 RX ORDER — ISOSORBIDE MONONITRATE 30 MG/1
30 TABLET, EXTENDED RELEASE ORAL DAILY
Qty: 90 TAB | Refills: 1 | Status: SHIPPED | OUTPATIENT
Start: 2018-09-24 | End: 2019-09-26 | Stop reason: SDUPTHER

## 2018-09-24 RX ORDER — CARVEDILOL 12.5 MG/1
12.5 TABLET ORAL 2 TIMES DAILY WITH MEALS
Qty: 60 TAB | Refills: 3 | Status: SHIPPED | OUTPATIENT
Start: 2018-09-24 | End: 2019-09-26 | Stop reason: SDUPTHER

## 2018-09-24 RX ORDER — DIVALPROEX SODIUM 500 MG/1
500 TABLET, EXTENDED RELEASE ORAL 2 TIMES DAILY
Qty: 60 TAB | Refills: 3 | Status: SHIPPED | OUTPATIENT
Start: 2018-09-24 | End: 2020-06-15 | Stop reason: DRUGHIGH

## 2018-09-24 RX ORDER — ATORVASTATIN CALCIUM 40 MG/1
40 TABLET, FILM COATED ORAL DAILY
Qty: 30 TAB | Refills: 3 | Status: SHIPPED | OUTPATIENT
Start: 2018-09-24 | End: 2019-09-26 | Stop reason: SDUPTHER

## 2018-09-24 RX ORDER — LOSARTAN POTASSIUM 50 MG/1
50 TABLET ORAL 2 TIMES DAILY
Qty: 60 TAB | Refills: 3 | Status: SHIPPED | OUTPATIENT
Start: 2018-09-24 | End: 2019-09-26 | Stop reason: SDUPTHER

## 2018-09-24 NOTE — PROGRESS NOTES
Chief Complaint Patient presents with  
 Other  
  wheelchair evaluation 1. Have you been to the ER, urgent care clinic since your last visit? Hospitalized since your last visit? No 
 
2. Have you seen or consulted any other health care providers outside of the [address] [place] [address] since your last visit? Include any pap smears or colon screening.  No

## 2018-09-24 NOTE — MR AVS SNAPSHOT
[facility] 
 
 
 [address] [facility] [address] [facility] [id number] 
[phone] Patient: Sierra Cunningham MRN: [id number] ODO:[birth year] Visit Information Date & Time Provider Department Dept. Phone Encounter #  
 9/24/2018 11:15 AM Genevieve Hayes [phone] [id number] Follow-up Instructions Return in about 3 months (around 12/24/2018). Your Appointments 9/26/2018  1:00 PM  
Follow Up with Neel George MD  
[address] [facility]) Appt Note: Follow up appointment; r/s  
 [address] [facility] 405 [facility] 83 222 [facility]  
  
   
 [address] [facility] [address] Upcoming Health Maintenance Date Due Shingrix Vaccine Age 50> (1 of 2) 8/9/2002 FOOT EXAM Q1 11/23/2016 Influenza Age 5 to Adult 8/1/2018 EYE EXAM RETINAL OR DILATED Q1 9/25/2018 HEMOGLOBIN A1C Q6M 2/9/2019 MEDICARE YEARLY EXAM 4/18/2019 MICROALBUMIN Q1 4/23/2019 LIPID PANEL Q1 8/9/2019 GLAUCOMA SCREENING Q2Y 9/25/2019 BREAST CANCER SCRN MAMMOGRAM 12/11/2019 Pneumococcal 65+ High/Highest Risk (2 of 2 - PPSV23) 2/4/2020 COLONOSCOPY 10/27/2025 DTaP/Tdap/Td series (2 - Td) 11/23/2025 Allergies as of 9/24/2018  Review Complete On: 8/7/2018 By: Zonia Weldon MD  
  
 Severity Noted Reaction Type Reactions Metformin  10/22/2015    Other (comments) Patient states that she is not allergic to metformin. Current Immunizations  Reviewed on 11/15/2016 Name Date Influenza High Dose Vaccine PF 8/29/2017  4:00 PM  
 Influenza Vaccine 10/17/2016, 11/23/2015 10:18 AM  
 Influenza Vaccine (Madin Gita Canine Kidney) PF 2/4/2015 10:00 AM  
 Pneumococcal Conjugate (PCV-13) 4/17/2018  4:50 PM  
 Pneumococcal Polysaccharide (PPSV-23) 2/4/2015  9:57 AM  
 Tdap 11/23/2015 10:19 AM  
  
 Not reviewed this visit You Were Diagnosed With   
  
 Codes Comments Type 2 diabetes mellitus with diabetic neuropathy, with long-term current use of insulin (HCC)    -  Primary ICD-10-CM: E11.40, Z79.4 ICD-9-CM: 250.60, 357.2, V58.67 Seizure disorder ([address]     ICD-10-CM: G40.909 ICD-9-CM: 345.90 History of CVA (cerebrovascular accident)     ICD-10-CM: Z86.73 
ICD-9-CM: V12.54 Essential hypertension     ICD-10-CM: I10 
ICD-9-CM: 401.9 Hypercholesteremia     ICD-10-CM: E78.00 ICD-9-CM: 272.0 Leg swelling     ICD-10-CM: M79.89 ICD-9-CM: 729.81 Vitals BP Pulse Temp Resp Height(growth percentile) Weight(growth percentile) 163/77 (BP 1 Location: Right arm, BP Patient Position: At rest) 91 98.8 °F (37.1 °C) (Oral) 18 5' 7\" (1.702 m) 234 lb (106.1 kg) SpO2 BMI OB Status Smoking Status 99% 36.65 kg/m2 Postmenopausal Never Smoker Vitals History BMI and BSA Data Body Mass Index Body Surface Area  
 36.65 kg/m 2 2.24 m 2 Preferred Pharmacy Pharmacy Name Phone [facility] - [address], [address] [phone] Your Updated Medication List  
  
   
This list is accurate as of 9/24/18 12:20 PM.  Always use your most recent med list.  
  
  
  
  
 AMITIZA 8 mcg capsule Generic drug:  lubiPROStone Take 8 mcg by mouth daily (with breakfast). amLODIPine 5 mg tablet Commonly known as:  Dora Gables Take 1 Tab by mouth nightly. aspirin-dipyridamole  mg per SR capsule Commonly known as:  AGGRENOX Take 1 Cap by mouth two (2) times a day. atorvastatin 40 mg tablet Commonly known as:  LIPITOR Take 1 Tab by mouth daily. bisacodyl 10 mg suppository Commonly known as:  DULCOLAX (BISACODYL) Insert 10 mg into rectum daily. carvedilol 12.5 mg tablet Commonly known as:  Esthelalin Gunter Take 1 Tab by mouth two (2) times daily (with meals).   
  
 clotrimazole 1 % topical cream  
Commonly known as:  Kodi Valenzuela  
 Apply  to affected area two (2) times a day. divalproex  mg ER tablet Commonly known as:  DEPAKOTE ER Take 1 Tab by mouth two (2) times a day. FREESTYLE LITE STRIPS strip Generic drug:  glucose blood VI test strips USE TO TEST BLOOD SUGAR 2 TIMES A DAY  
  
 furosemide 20 mg tablet Commonly known as:  LASIX One po daily  
  
 hydroCHLOROthiazide 25 mg tablet Commonly known as:  HYDRODIURIL Take 1 Tab by mouth daily. insulin aspart protamine/insulin aspart 100 unit/mL (70-30) Inpn Commonly known as:  NovoLOG Mix 70-30FlexPen U-100 Administer 60 units twice daily 30 minutes before breakfast and dinner. insulin lispro 100 unit/mL injection Commonly known as:  HUMALOG  
6 Units by SubCUTAneous route Before breakfast, lunch, and dinner. isosorbide mononitrate ER 30 mg tablet Commonly known as:  IMDUR Take 1 Tab by mouth daily. lactulose 10 gram/15 mL solution Commonly known as:  [facility] Take 45 mL by mouth nightly. letrozole 2.5 mg tablet Commonly known as:  [facility] Take 2.5 mg by mouth daily. losartan 50 mg tablet Commonly known as:  COZAAR Take 1 Tab by mouth two (2) times a day. pantoprazole 40 mg tablet Commonly known as:  PROTONIX Take 1 Tab by mouth daily. SONAFINE Emul topical  
Generic drug:  emollient combination no. 10  
  
 therapeutic multivitamin tablet Commonly known as:  [facility] Take 1 Tab by mouth daily. VITAMIN D3 1,000 unit tablet Generic drug:  cholecalciferol Take 1,000 Units by mouth daily. Prescriptions Sent to Pharmacy Refills  
 divalproex ER (DEPAKOTE ER) 500 mg ER tablet 3 Sig: Take 1 Tab by mouth two (2) times a day. Class: Normal  
 Pharmacy: [address] Ph #: [phone] Route: Oral  
 losartan (COZAAR) 50 mg tablet 3 Sig: Take 1 Tab by mouth two (2) times a day.   
 Class: Normal  
 [facility] Ph #: [phone] Route: Oral  
 carvedilol (COREG) 12.5 mg tablet 3 Sig: Take 1 Tab by mouth two (2) times daily (with meals). Class: Normal  
 [facility] Ph #: [phone] Route: Oral  
 amLODIPine (NORVASC) 5 mg tablet 3 Sig: Take 1 Tab by mouth nightly. Class: Normal  
 [facility] Ph #: [phone] Route: Oral  
 hydroCHLOROthiazide (HYDRODIURIL) 25 mg tablet 3 Sig: Take 1 Tab by mouth daily. Class: Normal  
 Pharmacy: [address] [place], [place] [address]l[place] Christel Tackk Ph #: [phone] Route: Oral  
 atorvastatin (LIPITOR) 40 mg tablet 3 Sig: Take 1 Tab by mouth daily. Class: Normal  
 Pharmacy: [address] [place], [place] [address]lulú  Christel Tackk Ph #: [phone] Route: Oral  
 isosorbide mononitrate ER (IMDUR) 30 mg tablet 1 Sig: Take 1 Tab by mouth daily. Class: Normal  
 Pharmacy: [address] [place], [place] [address] Christel Tackk Ph #: [phone] Route: Oral  
 furosemide (LASIX) 20 mg tablet 3 Sig: One po daily Class: Normal  
 Pharmacy: [address] [place] [address] EUSEBIO Christel Tackk Ph #: [phone] Follow-up Instructions Return in about 3 months (around 12/24/2018). Introducing [facility]! Hira Rodriguez introduces [employer] patient portal. Now you can access parts of your medical record, email your doctor's office, and request medication refills online. 1. In your internet browser, go to [email or web address] 2. Click on the First Time User? Click Here link in the Sign In box. You will see the New Member Sign Up page. 3. Enter your [employer] Access Code exactly as it appears below. You will not need to use this code after youve completed the sign-up process. If you do not sign up before the expiration date, you must request a new code. · [email or web address] Access Code: [email or web address] Expires: 10/25/2018 10:55 AM 
 
4. Enter the last four digits of your Social Security Number (xxxx) and Date of Birth (mm/dd/yyyy) as indicated and click Submit. You will be taken to the next sign-up page. 5. Create a [email or web address] ID. This will be your [email or web address] login ID and cannot be changed, so think of one that is secure and easy to remember. 6. Create a [email or web address] password. You can change your password at any time. 7. Enter your Password Reset Question and Answer. This can be used at a later time if you forget your password. 8. Enter your e-mail address. You will receive e-mail notification when new information is available in [address] 9. Click Sign Up. You can now view and download portions of your medical record. 10. Click the Download Summary menu link to download a portable copy of your medical information. If you have questions, please visit the Frequently Asked Questions section of the [email or web address] website. Remember, [email or web address] is NOT to be used for urgent needs. For medical emergencies, dial 911. Now available from your iPhone and Android! Please provide this summary of care documentation to your next provider. Your primary care clinician is listed as [id number]. If you have any questions after today's visit, please call [phone].

## 2018-09-24 NOTE — PROGRESS NOTES
Abhijeet Gao is a 79 y.o.  female and presents with Chief Complaint Patient presents with  
 Other  
  wheelchair evaluation  Extremity Weakness  Hypertension  Diabetes  Seizure  Immunization/Injection  Cholesterol Problem Pt is here for Power scooter eval. 
Her olvd scooter broke down. Pt does have h/o stroke with lef tsided weakness Pt has h/o seizures well controlled on meds and has not had seizures in many years. Pt is here for lab results DM has improved. She is taking her chol med. Past Medical History:  
Diagnosis Date  Arthritis  Breast CA ([address]  Diabetes ([address]  Hypercholesteremia  Hypertension  Menopause  Psychiatric disorder DEPRESSION  
 PUD (peptic ulcer disease)  S/P cardiac cath 02/2015 No angiographic evidence of CAD  Seizures ([facility] LAST SEIZURE ABOUT 2 MONTHS AGO (NOV 2015)  Stroke ([facility] x2 with left side deficit Past Surgical History:  
Procedure Laterality Date  HX BREAST LUMPECTOMY Right 1/12/2016 Dr. Sarmiento Rm Partial Mastectomy w./ axillary lymphadenectomy  HX HEENT    
 TONSILLECTOMY  HX MASTECTOMY Right 1/26/2016 Dr. Sarmiento Rm Repeat Partial Mastectomy for positive margins  HX ORTHOPAEDIC    
 left arm surg  IR MEDIPORT Current Outpatient Prescriptions Medication Sig  divalproex ER (DEPAKOTE ER) 500 mg ER tablet Take 1 Tab by mouth two (2) times a day.  losartan (COZAAR) 50 mg tablet Take 1 Tab by mouth two (2) times a day.  carvedilol (COREG) 12.5 mg tablet Take 1 Tab by mouth two (2) times daily (with meals).  amLODIPine (NORVASC) 5 mg tablet Take 1 Tab by mouth nightly.  hydroCHLOROthiazide (HYDRODIURIL) 25 mg tablet Take 1 Tab by mouth daily.  atorvastatin (LIPITOR) 40 mg tablet Take 1 Tab by mouth daily.  isosorbide mononitrate ER (IMDUR) 30 mg tablet Take 1 Tab by mouth daily.  furosemide (LASIX) 20 mg tablet One po daily  insulin aspart protamine/insulin aspart (NOVOLOG MIX 70-30FLEXPEN U-100) 100 unit/mL (70-30) inpn Administer 60 units twice daily 30 minutes before breakfast and dinner.  FREESTYLE LITE STRIPS strip USE TO TEST BLOOD SUGAR 2 TIMES A DAY  lubiPROStone (AMITIZA) 8 mcg capsule Take 8 mcg by mouth daily (with breakfast).  aspirin-dipyridamole (AGGRENOX)  mg per SR capsule Take 1 Cap by mouth two (2) times a day.  lactulose (CHRONULAC) 10 gram/15 mL solution Take 45 mL by mouth nightly.  insulin lispro (HUMALOG) 100 unit/mL injection 6 Units by SubCUTAneous route Before breakfast, lunch, and dinner.  pantoprazole (PROTONIX) 40 mg tablet Take 1 Tab by mouth daily.  letrozole (FEMARA) 2.5 mg tablet Take 2.5 mg by mouth daily.  SONAFINE emul topical   
 cholecalciferol (VITAMIN D3) 1,000 unit tablet Take 1,000 Units by mouth daily.  therapeutic multivitamin (THERAGRAN) tablet Take 1 Tab by mouth daily.  clotrimazole (LOTRIMIN) 1 % topical cream Apply  to affected area two (2) times a day.  bisacodyl (DULCOLAX, BISACODYL,) 10 mg suppository Insert 10 mg into rectum daily. No current facility-administered medications for this visit. Health Maintenance Topic Date Due  Shingrix Vaccine Age 50> (1 of 2) 08/09/2002  
 FOOT EXAM Q1  11/23/2016  Influenza Age 5 to Adult  08/01/2018  
 EYE EXAM RETINAL OR DILATED Q1  09/25/2018  HEMOGLOBIN A1C Q6M  02/09/2019  MEDICARE YEARLY EXAM  04/18/2019  MICROALBUMIN Q1  04/23/2019  LIPID PANEL Q1  08/09/2019  GLAUCOMA SCREENING Q2Y  09/25/2019  BREAST CANCER SCRN MAMMOGRAM  12/11/2019  Pneumococcal 65+ High/Highest Risk (2 of 2 - PPSV23) 02/04/2020  COLONOSCOPY  10/27/2025  DTaP/Tdap/Td series (2 - Td) 11/23/2025  Hepatitis C Screening  Completed  Bone Densitometry (Dexa) Screening  Completed Immunization History Administered Date(s) Administered  Influenza High Dose Vaccine PF 08/29/2017  Influenza Vaccine 11/23/2015, 10/17/2016  Influenza Vaccine (Madin Lennon Canine Kidney) PF 02/04/2015  Influenza Vaccine (Tri) Adjuvanted 09/24/2018  Pneumococcal Conjugate (PCV-13) 04/17/2018  Pneumococcal Polysaccharide (PPSV-23) 02/04/2015  Tdap 11/23/2015 No LMP recorded. Patient is postmenopausal. 
 
 
 
Allergies and Intolerances: Allergies Allergen Reactions  Metformin Other (comments) Patient states that she is not allergic to metformin. Family History:  
Family History Problem Relation Age of Onset  Cancer Father [de-identified]  
  colon  Hypertension Mother  Heart Disease Mother  Diabetes Mother Social History: She  reports that she has never smoked. She has never used smokeless tobacco.  She  reports that she does not drink alcohol. Review of Systems:  
General: negative for - chills, fatigue, fever, weight change Psych: negative for - anxiety, depression, irritability or mood swings ENT: negative for - headaches, hearing change, nasal congestion, oral lesions, sneezing or sore throat Heme/ Lymph: negative for - bleeding problems, bruising, pallor or swollen lymph nodes Endo: negative for - hot flashes, polydipsia/polyuria or temperature intolerance Resp: negative for - cough, shortness of breath or wheezing CV: negative for - chest pain, edema or palpitations GI: negative for - abdominal pain, change in bowel habits, constipation, diarrhea or nausea/vomiting : negative for - dysuria, hematuria, incontinence, pelvic pain or vulvar/vaginal symptoms MSK: negative for - joint pain, joint swelling or muscle pain Neuro: negative for - confusion, headaches, seizures or weakness, pos for left sided weakness Derm: negative for - dry skin, hair changes, rash or skin lesion changes Physical:  
Vitals:  
Vitals:  
 09/24/18 1129 BP: 163/77 Pulse: 91  
Resp: 18 Temp: 98.8 °F (37.1 °C) TempSrc: Oral  
SpO2: 99% Weight: 234 lb (106.1 kg) Height: 5' 7\" (1.702 m) Exam:  
HEENT- atraumatic,normocephalic, awake, oriented, well nourished Neck - supple,no enlarged lymph nodes, no JVD, no thyromegaly Chest- CTA, no rhonchi, no crackles Heart- rrr, no murmurs / gallop/rub Abdomen- soft,BS+,NT, no hepatosplenomegaly Ext - no c/c/edema , slight swelling in rt arm ,non tender Neuro- no focal deficits. Power 5/5 all extremities, using a cane, lef tsided weakness, Skin - warm,dry, no obvious rashes. Review of Data:  
LABS:  
Lab Results Component Value Date/Time WBC 6.4 04/20/2018 01:46 PM  
 HGB 10.6 (L) 04/20/2018 01:46 PM  
 HCT 34.5 (L) 04/20/2018 01:46 PM  
 PLATELET 947 94/06/0073 01:46 PM  
 
Lab Results Component Value Date/Time Sodium 139 08/09/2018 02:14 PM  
 Potassium 4.7 08/09/2018 02:14 PM  
 Chloride 104 08/09/2018 02:14 PM  
 CO2 25 08/09/2018 02:14 PM  
 Glucose 224 (H) 08/09/2018 02:14 PM  
 BUN 26 (H) 08/09/2018 02:14 PM  
 Creatinine 1.04 08/09/2018 02:14 PM  
 
Lab Results Component Value Date/Time Cholesterol, total 233 (H) 08/09/2018 02:14 PM  
 HDL Cholesterol 85 (H) 08/09/2018 02:14 PM  
 LDL, calculated 122.2 (H) 08/09/2018 02:14 PM  
 Triglyceride 129 08/09/2018 02:14 PM  
 
No results found for: GPT Impression / Plan: ICD-10-CM ICD-9-CM 1. Type 2 diabetes mellitus with diabetic neuropathy, with long-term current use of insulin (HCC) E11.40 250.60   
 Z79.4 357.2 V58.67 2. Seizure disorder (HCC) G40.909 345.90 divalproex ER (DEPAKOTE ER) 500 mg ER tablet 3. History of CVA (cerebrovascular accident) Z86.73 V12.54   
4. Essential hypertension I10 401.9 losartan (COZAAR) 50 mg tablet  
   carvedilol (COREG) 12.5 mg tablet  
   amLODIPine (NORVASC) 5 mg tablet  
   hydroCHLOROthiazide (HYDRODIURIL) 25 mg tablet  
   isosorbide mononitrate ER (IMDUR) 30 mg tablet 5. Hypercholesteremia E78.00 272.0 atorvastatin (LIPITOR) 40 mg tablet 6. Leg swelling M79.89 729.81 furosemide (LASIX) 20 mg tablet 7. Encounter for immunization Z23 V03.89 INFLUENZA VACCINE INACTIVATED (IIV), SUBUNIT, ADJUVANTED, IM Form for Power  Scooter filled DM - improved. Hyperchol  - stable Seizure disorder - stable Asked pt to bring all meds next visit. Explained to patient risk benefits of the medications. Advised patient to stop meds if having any side effects. Pt verbalized understanding of the instructions. I have discussed the diagnosis with the patient and the intended plan as seen in the above orders. The patient has received an after-visit summary and questions were answered concerning future plans. I have discussed medication side effects and warnings with the patient as well. I have reviewed the plan of care with the patient, accepted their input and they are in agreement with the treatment goals. Reviewed plan of care. Patient has provided input and agrees with goals. Follow-up Disposition: 
Return in about 3 months (around 12/24/2018).  
 
Renae Vance MD

## 2018-09-24 NOTE — PROGRESS NOTES
After obtaining consent, and per orders of Dr. Varun Pandey, injection of flu given by Owen Curtis. Patient instructed to remain in clinic for 20 minutes afterwards, and to report any adverse reaction to me immediately.

## 2018-09-25 ENCOUNTER — TELEPHONE (OUTPATIENT)
Dept: FAMILY MEDICINE CLINIC | Age: 68
End: 2018-09-25

## 2018-09-25 NOTE — TELEPHONE ENCOUNTER
Rep from [facility] called in and would like for the paperwork for the motorized wheelchair to be faxed over to [phone]. Please assist

## 2018-09-25 NOTE — TELEPHONE ENCOUNTER
[employer] called again stating they are faxing the paperwork back over and they circled the incorrect fields that need to be filled out properly. Also requested Dr. James Sepulveda to please sign the paper work also.

## 2018-09-26 ENCOUNTER — OFFICE VISIT (OUTPATIENT)
Dept: SURGERY | Age: 68
End: 2018-09-26

## 2018-09-26 VITALS
TEMPERATURE: 98.4 F | BODY MASS INDEX: 36.81 KG/M2 | HEART RATE: 79 BPM | HEIGHT: 67 IN | RESPIRATION RATE: 18 BRPM | DIASTOLIC BLOOD PRESSURE: 80 MMHG | SYSTOLIC BLOOD PRESSURE: 182 MMHG | WEIGHT: 234.5 LBS

## 2018-09-26 DIAGNOSIS — C50.011 MALIGNANT NEOPLASM OF AREOLA OF RIGHT BREAST IN FEMALE, UNSPECIFIED ESTROGEN RECEPTOR STATUS (HCC): Primary | ICD-10-CM

## 2018-09-26 RX ORDER — BLOOD SUGAR DIAGNOSTIC
STRIP MISCELLANEOUS
Qty: 100 STRIP | Refills: 0 | Status: SHIPPED | OUTPATIENT
Start: 2018-09-26 | End: 2018-11-13 | Stop reason: SDUPTHER

## 2018-09-26 NOTE — LETTER
9/26/2018 2:50 PM 
 
Patient:  Millie Bellamy YOB: 1950 Date of Visit: 9/26/2018 Renetta Thacker MD 
[address] [place] [address] VIA In Basket Dear Renetta Thacker MD, 
 
 
I had the pleasure of seeing Ms. Millie Bellamy in my office today for follow up of her right breast cancer. She is overall doing well. I am including a copy of my office visit today. If you have questions, please do not hesitate to call me. I look forward to following Ms. Yulia Baird along with you and will keep you updated as to her progress. Sincerely, Kelly Morales MD

## 2018-09-26 NOTE — PROGRESS NOTES
Chief Complaint   Patient presents with    Follow-up     follow up breast exam from wider excision of lumpectomy to acheive negative margins for invasive ducttal carcinomal 1.26.18.  will get right breast pain to the upper breast under right axillary rating 9/10.     1. Have you been to the ER, urgent care clinic since your last visit? Hospitalized since your last visit? No    2. Have you seen or consulted any other health care providers outside of the [address] [place] [address] since your last visit? Include any pap smears or colon screening.  No

## 2018-09-26 NOTE — PATIENT INSTRUCTIONS
If you have any questions or concerns about today's appointment, the verbal and/or written instructions you were given for follow up care, please call our office at [phone].     [facility] - [address]    [phone] office  [phone]

## 2018-09-26 NOTE — PROGRESS NOTES
Dagmar Villarreal comes to the office today for follow-up after right breast lumpectomy, sentinel node biopsy and axillary node dissection 1/12/16 by Dr. Renetta Costello for a 2.2 cm invasive ductal grade 2 ER positive, VA negative, HER-2 negative carcinoma with 2 out of 18 lymph nodes. Reexcision was negative 1/26/16. She previously saw Dr. Joy Holloway who treated her briefly for chemotherapy because of some symptoms and concern about a recurrent stroke chemotherapy was stopped and she has been placed on Femara which she has been tolerating well. The patient continues to have breast tenderness especially in the area of her surgery. She denies any new breast lumps. She denies increase of right arm edema. Dr. Renetta Costello previously ordered lymphedema therapy for her but apparently they tried to arrange that and subsequent where the patient really cannot travel. She denies any new aches, pains, shortness of breath or headaches. Allergies   Allergen Reactions    Metformin Other (comments)     Patient states that she is not allergic to metformin.       Past Medical History:   Diagnosis Date    Arthritis     Breast CA ([address]     Diabetes ([address]     Hypercholesteremia     Hypertension     Menopause     Psychiatric disorder     DEPRESSION    PUD (peptic ulcer disease)     S/P cardiac cath 02/2015    No angiographic evidence of CAD    Seizures (HCC)     LAST SEIZURE ABOUT 2 MONTHS AGO (NOV 2015)    Stroke ([facility]     x2 with left side deficit     Past Surgical History:   Procedure Laterality Date    HX BREAST LUMPECTOMY Right 1/12/2016    Dr. Doshi Necessary Partial Mastectomy w./ axillary lymphadenectomy    HX HEENT      TONSILLECTOMY    HX MASTECTOMY Right 1/26/2016    Dr. Doshi Necessary Repeat Partial Mastectomy for positive margins    HX ORTHOPAEDIC      left arm surg    IR MEDIPORT       Social History     Social History    Marital status: SINGLE     Spouse name: N/A    Number of children: N/A    Years of education: N/A Social History Main Topics    Smoking status: Never Smoker    Smokeless tobacco: Never Used    Alcohol use No    Drug use: No    Sexual activity: Yes     Partners: Male     Other Topics Concern     Service No    Blood Transfusions No    Caffeine Concern No    Occupational Exposure No    Hobby Hazards No    Sleep Concern Yes    Stress Concern No    Exercise Yes     walks using cane    Seat Belt Yes    Self-Exams Yes     Social History Narrative     The patient's review of systems overall has not changed. She did not take her blood pressure medicine this morning and her pressure is up today. She continues to be on medication for hypertension and diabetes, her seizure disorder, GERD, and a history of her CVA. She has had no recent seizures. PHYSICAL EXAM    Visit Vitals    /80 (BP 1 Location: Left arm, BP Patient Position: Sitting)    Pulse 79    Temp 98.4 °F (36.9 °C) (Oral)    Resp 18    Ht 5' 7\" (1.702 m)    Wt 106.4 kg (234 lb 8 oz)    BMI 36.73 kg/m2          Constitutional:  Well-developed, well-nourished, no acute distress. Head:  Head, eyes, ears, nose, throat within normal limits. Skin:  No suspicious moles or rashes. Neck:  No masses or adenopathy. The airway appears normal. Thyroid is not enlarged and there are no palpable thyroid nodules. Lungs:  Lungs are clear to auscultation and percussion. No respiratory distress. No chest wall tenderness. Heart:  Heart is regular with no extra heart sounds or murmur heard. Breast Exam: The breasts are free of any discrete tenderness or masses. In the right breast she does have some lateral and inferior thickening from her ileus right breast lumpectomy. Her axillary incision is healed well but both of these areas are tender. Left breast is free of any masses the skin and nipple areas appear normal. Both axillae are negative. Abdomen:   The abdomen is soft and nontender without organomegaly or masses. Bowel sounds are active and of normal pitch. There is no abdominal distention. No hernias are evident. Extremities:  No tenderness of the extremities and no significant swelling.,  And the patient's right arm she does have some swelling of the whole arm down including the hand with some slight tenderness of her left thumb. Psych:  Alert and oriented. Pathology  A-H: RIGHT BREAST MASS, EXCISION WITH WIRE-GUIDED LOCALIZATION TOGETHER WITH  SENTINEL LYMPH NODE BIOPSIES (5) AND SUBSEQUENT RIGHT AXILLARY DISSECTION:  INVASIVE, MODERATELY DIFFERENTIATED DUCTAL ADENOCARCINOMA.  TUMOR SIZE: 22 MM.   HISTOLOGIC TYPE: DUCTAL, NOS.  HISTOLOGIC GRADE (JEAN): 2.   TUBULAR DIFFERENTIATION SCORE: 2   NUCLEAR PLEOMORPHISM SCORE: 2.   MITOTIC RATE SCORE: 2.   TOTAL SCORE: 6.   FOCALITY: UNIFOCAL.  IN-SITU CARCINOMA COMPONENT: RARE SMALL FOCUS OF DCIS.  MARGINS OF RESECTION: INVASIVE TUMOR EXTENDS FOCALLY TO INFERIOR  MARGIN. OTHER MARGINS CLEAR.  SKIN: NEGATIVE FOR MALIGNANCY.  NIPPLE: NOT INCLUDED.   LYMPH NODES: TWO (2) OF EIGHTEEN (18) LYMPH NODES CONTAINING METASTATIC  CARCINOMA.  ESTROGEN RECEPTOR: POSITIVE (70%) (SEE [id number]).  PROGESTERONE RECEPTOR: NEGATIVE (SEE [id number]).  HER2/ravi (FISH): NEGATIVE FOR HER2 AMPLIFICATION (SEE [id number]).  AJCC PATHOLOGIC STAGE: T2, N1a.    Imaging  12/11/17 bilat 3D mammo  Unchanged benign-appearing posttreatment findings within the right breast. No  mammographic evidence of malignancy. Recommend annual screening mammography and  clinical breast examination.      BI-RADS Assessment Category 2: Benign finding / Letter 40 NM     Assessment: Invasive T2N1a ER positive, KY negative, HER negative 2.2 cm ductal carcinoma grade 2 of the right breast laterally and inferiorly treated with lumpectomy 2-1/2 years ago. 2 out of 18 lymph nodes positive. Tumor estrogen progestin receptor positive but HER-2 negative. Now on Femara.   No evidence of recurrent disease. #2 diabetes #3 hypertension. #4 seizure disorder. #5 history of CVA. #6 GERD. #7 lymphedema right arm. Recommendations. Continue AI. She is due for bilateral mammogram December 2018. Follow up after imaging. Please call sooner with questions or concerns.

## 2018-10-02 ENCOUNTER — HOSPITAL ENCOUNTER (EMERGENCY)
Age: 68
Discharge: HOME | End: 2018-10-03
Attending: EMERGENCY MEDICINE
Payer: MEDICARE

## 2018-10-02 DIAGNOSIS — N39.0 ACUTE UTI: ICD-10-CM

## 2018-10-02 DIAGNOSIS — G40.909 RECURRENT SEIZURES (HCC): Primary | ICD-10-CM

## 2018-10-02 DIAGNOSIS — E16.2 HYPOGLYCEMIA: ICD-10-CM

## 2018-10-02 LAB
ALBUMIN SERPL-MCNC: 3.3 G/DL (ref 3.4–5)
ALBUMIN/GLOB SERPL: 0.6 {RATIO} (ref 0.8–1.7)
ALP SERPL-CCNC: 113 U/L (ref 45–117)
ALT SERPL-CCNC: 18 U/L (ref 13–56)
ANION GAP SERPL CALC-SCNC: 8 MMOL/L (ref 3–18)
APPEARANCE UR: CLEAR
AST SERPL-CCNC: 13 U/L (ref 15–37)
BACTERIA URNS QL MICRO: ABNORMAL /HPF
BASOPHILS # BLD: 0 K/UL (ref 0–0.1)
BASOPHILS NFR BLD: 0 % (ref 0–2)
BILIRUB SERPL-MCNC: 0.5 MG/DL (ref 0.2–1)
BILIRUB UR QL: NEGATIVE
BUN SERPL-MCNC: 28 MG/DL (ref 7–18)
BUN/CREAT SERPL: 23 (ref 12–20)
CALCIUM SERPL-MCNC: 9.2 MG/DL (ref 8.5–10.1)
CHLORIDE SERPL-SCNC: 106 MMOL/L (ref 100–108)
CO2 SERPL-SCNC: 28 MMOL/L (ref 21–32)
COLOR UR: YELLOW
CREAT SERPL-MCNC: 1.23 MG/DL (ref 0.6–1.3)
DIFFERENTIAL METHOD BLD: ABNORMAL
EOSINOPHIL # BLD: 0.1 K/UL (ref 0–0.4)
EOSINOPHIL NFR BLD: 2 % (ref 0–5)
EPITH CASTS URNS QL MICRO: ABNORMAL /LPF (ref 0–5)
ERYTHROCYTE [DISTWIDTH] IN BLOOD BY AUTOMATED COUNT: 13.6 % (ref 11.6–14.5)
GLOBULIN SER CALC-MCNC: 5.1 G/DL (ref 2–4)
GLUCOSE BLD STRIP.AUTO-MCNC: 120 MG/DL (ref 70–110)
GLUCOSE SERPL-MCNC: 53 MG/DL (ref 74–99)
GLUCOSE UR STRIP.AUTO-MCNC: NEGATIVE MG/DL
HCT VFR BLD AUTO: 30.5 % (ref 35–45)
HGB BLD-MCNC: 9.6 G/DL (ref 12–16)
HGB UR QL STRIP: NEGATIVE
KETONES UR QL STRIP.AUTO: NEGATIVE MG/DL
LEUKOCYTE ESTERASE UR QL STRIP.AUTO: ABNORMAL
LYMPHOCYTES # BLD: 2.9 K/UL (ref 0.9–3.6)
LYMPHOCYTES NFR BLD: 48 % (ref 21–52)
MCH RBC QN AUTO: 26.3 PG (ref 24–34)
MCHC RBC AUTO-ENTMCNC: 31.5 G/DL (ref 31–37)
MCV RBC AUTO: 83.6 FL (ref 74–97)
MONOCYTES # BLD: 0.5 K/UL (ref 0.05–1.2)
MONOCYTES NFR BLD: 8 % (ref 3–10)
NEUTS SEG # BLD: 2.6 K/UL (ref 1.8–8)
NEUTS SEG NFR BLD: 42 % (ref 40–73)
NITRITE UR QL STRIP.AUTO: NEGATIVE
PH UR STRIP: 5 [PH] (ref 5–8)
PLATELET # BLD AUTO: 90 K/UL (ref 135–420)
PMV BLD AUTO: 11.7 FL (ref 9.2–11.8)
POTASSIUM SERPL-SCNC: 4.3 MMOL/L (ref 3.5–5.5)
PROT SERPL-MCNC: 8.4 G/DL (ref 6.4–8.2)
PROT UR STRIP-MCNC: NEGATIVE MG/DL
RBC # BLD AUTO: 3.65 M/UL (ref 4.2–5.3)
RBC #/AREA URNS HPF: 0 /HPF (ref 0–5)
SODIUM SERPL-SCNC: 142 MMOL/L (ref 136–145)
SP GR UR REFRACTOMETRY: 1.02 (ref 1–1.03)
UROBILINOGEN UR QL STRIP.AUTO: 0.2 EU/DL (ref 0.2–1)
WBC # BLD AUTO: 6.1 K/UL (ref 4.6–13.2)
WBC URNS QL MICRO: ABNORMAL /HPF (ref 0–4)

## 2018-10-02 PROCEDURE — 93005 ELECTROCARDIOGRAM TRACING: CPT

## 2018-10-02 PROCEDURE — 74011250637 HC RX REV CODE- 250/637: Performed by: EMERGENCY MEDICINE

## 2018-10-02 PROCEDURE — 80053 COMPREHEN METABOLIC PANEL: CPT | Performed by: EMERGENCY MEDICINE

## 2018-10-02 PROCEDURE — 94762 N-INVAS EAR/PLS OXIMTRY CONT: CPT

## 2018-10-02 PROCEDURE — 99285 EMERGENCY DEPT VISIT HI MDM: CPT

## 2018-10-02 PROCEDURE — 74011250636 HC RX REV CODE- 250/636: Performed by: EMERGENCY MEDICINE

## 2018-10-02 PROCEDURE — 87086 URINE CULTURE/COLONY COUNT: CPT | Performed by: EMERGENCY MEDICINE

## 2018-10-02 PROCEDURE — 82962 GLUCOSE BLOOD TEST: CPT

## 2018-10-02 PROCEDURE — 80164 ASSAY DIPROPYLACETIC ACD TOT: CPT | Performed by: EMERGENCY MEDICINE

## 2018-10-02 PROCEDURE — 81001 URINALYSIS AUTO W/SCOPE: CPT | Performed by: EMERGENCY MEDICINE

## 2018-10-02 PROCEDURE — 96372 THER/PROPH/DIAG INJ SC/IM: CPT

## 2018-10-02 PROCEDURE — 85025 COMPLETE CBC W/AUTO DIFF WBC: CPT | Performed by: EMERGENCY MEDICINE

## 2018-10-02 RX ORDER — LORAZEPAM 2 MG/ML
2 INJECTION INTRAMUSCULAR
Status: COMPLETED | OUTPATIENT
Start: 2018-10-02 | End: 2018-10-02

## 2018-10-02 RX ORDER — DIVALPROEX SODIUM 250 MG/1
750 TABLET, EXTENDED RELEASE ORAL
Status: COMPLETED | OUTPATIENT
Start: 2018-10-02 | End: 2018-10-02

## 2018-10-02 RX ORDER — CEPHALEXIN 250 MG/1
500 CAPSULE ORAL
Status: COMPLETED | OUTPATIENT
Start: 2018-10-02 | End: 2018-10-02

## 2018-10-02 RX ORDER — MIDAZOLAM HYDROCHLORIDE 1 MG/ML
5 INJECTION, SOLUTION INTRAMUSCULAR; INTRAVENOUS
Status: DISCONTINUED | OUTPATIENT
Start: 2018-10-02 | End: 2018-10-02

## 2018-10-02 RX ORDER — ACETAMINOPHEN 500 MG
1000 TABLET ORAL
Status: COMPLETED | OUTPATIENT
Start: 2018-10-02 | End: 2018-10-02

## 2018-10-02 RX ADMIN — CEPHALEXIN 500 MG: 250 CAPSULE ORAL at 20:38

## 2018-10-02 RX ADMIN — ACETAMINOPHEN 1000 MG: 500 TABLET, FILM COATED ORAL at 20:38

## 2018-10-02 RX ADMIN — DIVALPROEX SODIUM 750 MG: 250 TABLET, EXTENDED RELEASE ORAL at 20:38

## 2018-10-02 RX ADMIN — GLUCAGON HYDROCHLORIDE 1 MG: 1 INJECTION, POWDER, FOR SOLUTION INTRAMUSCULAR; INTRAVENOUS; SUBCUTANEOUS at 22:38

## 2018-10-02 RX ADMIN — LORAZEPAM 2 MG: 2 INJECTION, SOLUTION INTRAMUSCULAR; INTRAVENOUS at 19:59

## 2018-10-02 NOTE — ED TRIAGE NOTES
Per EMS family states had Witnessed seizure activity call EMS who also who seizure activity given 5 mg ativan intra nasal patient present post ictal drowsy but oriented x 3

## 2018-10-02 NOTE — ED NOTES
Pt continuing to pull off leads and try to get out of bed. Pt instructed that she is a fall risk and cannot get out of bed for her safety.

## 2018-10-02 NOTE — ED PROVIDER NOTES
HPI Comments: Bryanna Redd is a 79 y.o. Female with h/o cva, seizure d/o with noted seizures this evening x 2. Upon ems arrival pt awake, alert and stated she did not fell well. After being placed into ambulance pt had seizure with gen shaking lasting a couple of min resolved after IM versed. Pt now awake alert. Denies pain. Has been compliant with meds. No new trauma or neurologic deficits per family The history is provided by the patient, a relative, the EMS personnel and medical records. Past Medical History:  
Diagnosis Date  Arthritis  Breast CA ([facility]  Diabetes ([facility]  Hypercholesteremia  Hypertension  Menopause  Psychiatric disorder DEPRESSION  
 PUD (peptic ulcer disease)  S/P cardiac cath 02/2015 No angiographic evidence of CAD  Seizures ([facility] LAST SEIZURE ABOUT 2 MONTHS AGO (NOV 2015)  Stroke ([facility] x2 with left side deficit Past Surgical History:  
Procedure Laterality Date  HX BREAST LUMPECTOMY Right 1/12/2016 Dr. Kiran Sheets Partial Mastectomy w./ axillary lymphadenectomy  HX HEENT    
 TONSILLECTOMY  HX MASTECTOMY Right 1/26/2016 Dr. Kiran Sheets Repeat Partial Mastectomy for positive margins  HX ORTHOPAEDIC    
 left arm surg  IR MEDIPORT Family History:  
Problem Relation Age of Onset  Cancer Father [de-identified]  
  colon  Hypertension Mother  Heart Disease Mother  Diabetes Mother Social History Social History  Marital status: SINGLE Spouse name: N/A  
 Number of children: N/A  
 Years of education: N/A Occupational History  Not on file. Social History Main Topics  Smoking status: Never Smoker  Smokeless tobacco: Never Used  Alcohol use No  
 Drug use: No  
 Sexual activity: Yes  
  Partners: Male Other Topics Concern   Service No  
 Blood Transfusions No  
 Caffeine Concern No  
 Occupational Exposure No  
 Hobby Hazards No  
  Sleep Concern Yes  Stress Concern No  
 Exercise Yes  
  walks using cane [address] [facility] Yes  Self-Exams Yes Social History Narrative ALLERGIES: Metformin Review of Systems Constitutional: Negative for fever. HENT: Negative for drooling and trouble swallowing. Eyes: Negative for redness. Respiratory: Negative for shortness of breath. Cardiovascular: Negative for chest pain. Gastrointestinal: Negative for abdominal pain. Endocrine: Negative for polyuria. Genitourinary: Negative for difficulty urinating. Musculoskeletal: Negative for back pain. Skin: Negative for rash. Allergic/Immunologic: Negative for immunocompromised state. Neurological: Positive for seizures. Psychiatric/Behavioral: Positive for confusion. Vitals:  
 10/02/18 1900 10/02/18 2030 10/02/18 2100 10/02/18 2300 BP: 135/72 129/78 116/58 149/88 Pulse:  68 73 65 Resp:  12 14 Temp:      
SpO2:      
Weight:      
Height:      
      
 
Physical Exam  
Constitutional: She is oriented to person, place, and time. She appears well-developed and well-nourished. She appears distressed. HENT:  
Head: Normocephalic and atraumatic. Right Ear: External ear normal.  
Left Ear: External ear normal.  
Nose: Nose normal.  
Mouth/Throat: Uvula is midline, oropharynx is clear and moist and mucous membranes are normal. No oropharyngeal exudate. Eyes: Conjunctivae are normal. No scleral icterus. Neck: Neck supple. Cardiovascular: Normal rate, regular rhythm, normal heart sounds and intact distal pulses. Pulmonary/Chest: Effort normal and breath sounds normal.  
Abdominal: Soft. There is no tenderness. Musculoskeletal: She exhibits no edema. Neurological: She is alert and oriented to person, place, and time. She exhibits normal muscle tone. Gait normal.  
Skin: Skin is warm and dry. She is not diaphoretic. Psychiatric: Her behavior is normal.  
Nursing note and vitals reviewed.  
  
 
NAMAN 
 
 ED Course Procedures Vitals: 
Patient Vitals for the past 12 hrs: 
 Temp Pulse Resp BP SpO2  
10/02/18 2300 - 65 - 149/88 -  
10/02/18 2100 - 73 14 116/58 -  
10/02/18 2030 - 68 12 129/78 -  
10/02/18 1900 - - - 135/72 -  
10/02/18 1850 - 74 13 120/75 98 % 10/02/18 1840 - 86 21 107/76 98 % 10/02/18 1838 98.7 °F (37.1 °C) 84 14 139/75 98 % 10/02/18 1835 - 84 15 139/75 98 % Medications ordered:  
Medications LORazepam (ATIVAN) injection 2 mg (2 mg IntraMUSCular Given 10/2/18 1959)  
acetaminophen (TYLENOL) tablet 1,000 mg (1,000 mg Oral Given 10/2/18 2038) divalproex ER (DEPAKOTE ER) 24 hour tablet 750 mg (750 mg Oral Given 10/2/18 2038) cephALEXin (KEFLEX) capsule 500 mg (500 mg Oral Given 10/2/18 2038) glucagon (GLUCAGEN) injection 1 mg (1 mg IntraMUSCular Given 10/2/18 2238) Lab findings: 
Recent Results (from the past 12 hour(s)) URINALYSIS W/ RFLX MICROSCOPIC Collection Time: 10/02/18  7:00 PM  
Result Value Ref Range Color YELLOW Appearance CLEAR Specific gravity 1.017 1.005 - 1.030    
 pH (UA) 5.0 5.0 - 8.0 Protein NEGATIVE  NEG mg/dL Glucose NEGATIVE  NEG mg/dL Ketone NEGATIVE  NEG mg/dL Bilirubin NEGATIVE  NEG Blood NEGATIVE  NEG Urobilinogen 0.2 0.2 - 1.0 EU/dL Nitrites NEGATIVE  NEG Leukocyte Esterase MODERATE (A) NEG URINE MICROSCOPIC ONLY Collection Time: 10/02/18  7:00 PM  
Result Value Ref Range WBC 6 to 8 0 - 4 /hpf  
 RBC 0 0 - 5 /hpf Epithelial cells FEW 0 - 5 /lpf Bacteria FEW (A) NEG /hpf  
EKG, 12 LEAD, INITIAL Collection Time: 10/02/18  7:30 PM  
Result Value Ref Range Ventricular Rate 80 BPM  
 Atrial Rate 80 BPM  
 P-R Interval 130 ms QRS Duration 84 ms Q-T Interval 372 ms QTC Calculation (Bezet) 429 ms Calculated P Axis 50 degrees Calculated R Axis 3 degrees Calculated T Axis 18 degrees Diagnosis Normal sinus rhythm Voltage criteria for left ventricular hypertrophy Abnormal ECG When compared with ECG of 27-JUL-2018 11:31, 
Nonspecific T wave abnormality no longer evident in Lateral leads CBC WITH AUTOMATED DIFF Collection Time: 10/02/18  7:34 PM  
Result Value Ref Range WBC 6.1 4.6 - 13.2 K/uL  
 RBC 3.65 (L) 4.20 - 5.30 M/uL HGB 9.6 (L) 12.0 - 16.0 g/dL HCT 30.5 (L) 35.0 - 45.0 % MCV 83.6 74.0 - 97.0 FL  
 MCH 26.3 24.0 - 34.0 PG  
 MCHC 31.5 31.0 - 37.0 g/dL  
 RDW 13.6 11.6 - 14.5 % PLATELET 90 (L) 232 - 420 K/uL MPV 11.7 9.2 - 11.8 FL  
 NEUTROPHILS 42 40 - 73 % LYMPHOCYTES 48 21 - 52 % MONOCYTES 8 3 - 10 % EOSINOPHILS 2 0 - 5 % BASOPHILS 0 0 - 2 %  
 ABS. NEUTROPHILS 2.6 1.8 - 8.0 K/UL  
 ABS. LYMPHOCYTES 2.9 0.9 - 3.6 K/UL  
 ABS. MONOCYTES 0.5 0.05 - 1.2 K/UL  
 ABS. EOSINOPHILS 0.1 0.0 - 0.4 K/UL  
 ABS. BASOPHILS 0.0 0.0 - 0.1 K/UL  
 DF AUTOMATED METABOLIC PANEL, COMPREHENSIVE Collection Time: 10/02/18  9:20 PM  
Result Value Ref Range Sodium 142 136 - 145 mmol/L Potassium 4.3 3.5 - 5.5 mmol/L Chloride 106 100 - 108 mmol/L  
 CO2 28 21 - 32 mmol/L Anion gap 8 3.0 - 18 mmol/L Glucose 53 (LL) 74 - 99 mg/dL BUN 28 (H) 7.0 - 18 MG/DL Creatinine 1.23 0.6 - 1.3 MG/DL  
 BUN/Creatinine ratio 23 (H) 12 - 20 GFR est AA 53 (L) >60 ml/min/1.73m2 GFR est non-AA 44 (L) >60 ml/min/1.73m2 Calcium 9.2 8.5 - 10.1 MG/DL Bilirubin, total 0.5 0.2 - 1.0 MG/DL  
 ALT (SGPT) 18 13 - 56 U/L  
 AST (SGOT) 13 (L) 15 - 37 U/L Alk. phosphatase 113 45 - 117 U/L Protein, total 8.4 (H) 6.4 - 8.2 g/dL Albumin 3.3 (L) 3.4 - 5.0 g/dL Globulin 5.1 (H) 2.0 - 4.0 g/dL A-G Ratio 0.6 (L) 0.8 - 1.7 GLUCOSE, POC Collection Time: 10/02/18 11:11 PM  
Result Value Ref Range Glucose (POC) 120 (H) 70 - 110 mg/dL EKG interpretation by ED Physician: 
nsr with no acute st tw changes Rate 80, pr 130, qtc 429 Ns tw changes no longer evident Cardiac monitor: nl rate, reg rhythm, no ectopy Pulse ox: 98% ra X-Ray, CT or other radiology findings or impressions: No orders to display Progress notes, Consult notes or additional Procedure notes: No recurrent seizures. Hypoglycemia resolved. Will also treat for uti. depakote level in low therapeutic range. Will have her f/u with pcp soon to possibly readjust 
I have discussed with patient and/or family/sig other the results, interpretation of any imaging if performed, suspected diagnosis and treatment plan to include instructions regarding the diagnoses listed to which understanding was expressed with all questions answered Reevaluation of patient:  
stable Disposition: 
Diagnosis: 1. Recurrent seizures ([address] 2. Hypoglycemia 3. Acute UTI Disposition: home Follow-up Information Follow up With Details Comments Contact Info Bulmaro Roe MD Schedule an appointment as soon as possible for a visit this week for recheck [id number] [facility] [address] [place] [address] 
[phone] [facility] EMERGENCY DEPT  If symptoms worsen [address] 
[phone] Patient's Medications Start Taking CEPHALEXIN (KEFLEX) 500 MG CAPSULE    Take 1 Cap by mouth two (2) times a day for 7 days. Continue Taking AMLODIPINE (NORVASC) 5 MG TABLET    Take 1 Tab by mouth nightly. ASPIRIN-DIPYRIDAMOLE (AGGRENOX)  MG PER SR CAPSULE    Take 1 Cap by mouth two (2) times a day. ATORVASTATIN (LIPITOR) 40 MG TABLET    Take 1 Tab by mouth daily. BISACODYL (DULCOLAX, BISACODYL,) 10 MG SUPPOSITORY    Insert 10 mg into rectum daily. CARVEDILOL (COREG) 12.5 MG TABLET    Take 1 Tab by mouth two (2) times daily (with meals). CHOLECALCIFEROL (VITAMIN D3) 1,000 UNIT TABLET    Take 1,000 Units by mouth daily. CLOTRIMAZOLE (LOTRIMIN) 1 % TOPICAL CREAM    Apply  to affected area two (2) times a day. DIVALPROEX ER (DEPAKOTE ER) 500 MG ER TABLET    Take 1 Tab by mouth two (2) times a day. FUROSEMIDE (LASIX) 20 MG TABLET    One po daily HYDROCHLOROTHIAZIDE (HYDRODIURIL) 25 MG TABLET    Take 1 Tab by mouth daily. INSULIN ASPART PROTAMINE/INSULIN ASPART (NOVOLOG MIX 70-30FLEXPEN U-100) 100 UNIT/ML (70-30) INPN    Administer 60 units twice daily 30 minutes before breakfast and dinner. INSULIN LISPRO (HUMALOG) 100 UNIT/ML INJECTION    6 Units by SubCUTAneous route Before breakfast, lunch, and dinner. ISOSORBIDE MONONITRATE ER (IMDUR) 30 MG TABLET    Take 1 Tab by mouth daily. LACTULOSE (CHRONULAC) 10 GRAM/15 ML SOLUTION    Take 45 mL by mouth nightly. LETROZOLE (FEMARA) 2.5 MG TABLET    Take 2.5 mg by mouth daily. LOSARTAN (COZAAR) 50 MG TABLET    Take 1 Tab by mouth two (2) times a day. LUBIPROSTONE (AMITIZA) 8 MCG CAPSULE    Take 8 mcg by mouth daily (with breakfast). PANTOPRAZOLE (PROTONIX) 40 MG TABLET    Take 1 Tab by mouth daily. SONAFINE EMUL TOPICAL THERAPEUTIC MULTIVITAMIN (THERAGRAN) TABLET    Take 1 Tab by mouth daily. [place] PRESTO STRIP    USE TO TEST BLOOD SUGAR 2 TIMES A DAY These Medications have changed No medications on file Stop Taking No medications on file

## 2018-10-03 VITALS
RESPIRATION RATE: 14 BRPM | OXYGEN SATURATION: 100 % | DIASTOLIC BLOOD PRESSURE: 73 MMHG | HEART RATE: 66 BPM | TEMPERATURE: 98.7 F | WEIGHT: 240 LBS | SYSTOLIC BLOOD PRESSURE: 174 MMHG | BODY MASS INDEX: 42.52 KG/M2 | HEIGHT: 63 IN

## 2018-10-03 LAB
ATRIAL RATE: 80 BPM
CALCULATED P AXIS, ECG09: 50 DEGREES
CALCULATED R AXIS, ECG10: 3 DEGREES
CALCULATED T AXIS, ECG11: 18 DEGREES
DIAGNOSIS, 93000: NORMAL
P-R INTERVAL, ECG05: 130 MS
Q-T INTERVAL, ECG07: 372 MS
QRS DURATION, ECG06: 84 MS
QTC CALCULATION (BEZET), ECG08: 429 MS
VALPROATE SERPL-MCNC: 51 UG/ML (ref 50–100)
VENTRICULAR RATE, ECG03: 80 BPM

## 2018-10-03 RX ORDER — CEPHALEXIN 500 MG/1
500 CAPSULE ORAL 2 TIMES DAILY
Qty: 14 CAP | Refills: 0 | Status: SHIPPED | OUTPATIENT
Start: 2018-10-03 | End: 2018-10-10

## 2018-10-03 NOTE — ED NOTES
I have reviewed discharge instructions with the patient. The patient verbalized understanding. Patient armband removed and shredded. 1 prescription given. All questions answered.   Pt d/c'd to home awake, alert and in NAD

## 2018-10-04 LAB
BACTERIA SPEC CULT: NORMAL
SERVICE CMNT-IMP: NORMAL

## 2018-10-05 ENCOUNTER — DOCUMENTATION ONLY (OUTPATIENT)
Dept: FAMILY MEDICINE CLINIC | Age: 68
End: 2018-10-05

## 2018-10-05 ENCOUNTER — TELEPHONE (OUTPATIENT)
Dept: FAMILY MEDICINE CLINIC | Age: 68
End: 2018-10-05

## 2018-10-05 NOTE — LETTER
10/5/2018 Jono Simon [address] [facility] [address] [address] Dear Ms. Jono Simon, We had an appointment reserved for you on 10/4/18 and were concerned when you did not show or call within 24 hours to cancel the appointment. As mentioned in a previous letter to you, appointment time is limited and no-showed appointments may prevent another sick individual who needs to be seen from getting a preferred appointment time. Please note that a continued pattern of not showing for appointments may result in your inability to pre-book future appointments as well as possible discharge from the practice. Please call us at your earliest convenience to reschedule your appointment as your provider felt it was important to see you. Thank you for your anticipated cooperation. Sincerely, [address] [place] [id number] 
[phone]

## 2018-10-05 NOTE — TELEPHONE ENCOUNTER
Family medical supplies calling to check the status of order faxed on 9/28/18  Please call Ashlee Roberts at  [phone]

## 2018-10-08 ENCOUNTER — TELEPHONE (OUTPATIENT)
Dept: FAMILY MEDICINE CLINIC | Age: 68
End: 2018-10-08

## 2018-10-08 NOTE — TELEPHONE ENCOUNTER
Nurse will personally get signature from provider and fax to [phone]. This encounter will be closed.

## 2018-10-10 DIAGNOSIS — K29.70 GASTRITIS WITHOUT BLEEDING, UNSPECIFIED CHRONICITY, UNSPECIFIED GASTRITIS TYPE: ICD-10-CM

## 2018-10-10 RX ORDER — PANTOPRAZOLE SODIUM 40 MG/1
TABLET, DELAYED RELEASE ORAL
Qty: 90 TAB | Refills: 0 | Status: SHIPPED | OUTPATIENT
Start: 2018-10-10 | End: 2018-12-20 | Stop reason: SDUPTHER

## 2018-10-22 DIAGNOSIS — E11.40 TYPE 2 DIABETES MELLITUS WITH DIABETIC NEUROPATHY, WITH LONG-TERM CURRENT USE OF INSULIN (HCC): Chronic | ICD-10-CM

## 2018-10-22 DIAGNOSIS — Z79.4 TYPE 2 DIABETES MELLITUS WITH DIABETIC NEUROPATHY, WITH LONG-TERM CURRENT USE OF INSULIN (HCC): Chronic | ICD-10-CM

## 2018-10-22 RX ORDER — INSULIN ASPART 100 [IU]/ML
INJECTION, SUSPENSION SUBCUTANEOUS
Qty: 15 ML | Refills: 0 | Status: SHIPPED | OUTPATIENT
Start: 2018-10-22 | End: 2019-02-19 | Stop reason: SDUPTHER

## 2018-10-23 ENCOUNTER — TELEPHONE (OUTPATIENT)
Dept: FAMILY MEDICINE CLINIC | Age: 68
End: 2018-10-23

## 2018-10-23 NOTE — TELEPHONE ENCOUNTER
Patient can save some money if prescription written for 2 boxes instead of 1 box.  Please return call to Tracy Mueller [phone]

## 2018-11-09 ENCOUNTER — TELEPHONE (OUTPATIENT)
Dept: FAMILY MEDICINE CLINIC | Age: 68
End: 2018-11-09

## 2018-11-13 RX ORDER — BLOOD SUGAR DIAGNOSTIC
STRIP MISCELLANEOUS
Qty: 100 STRIP | Refills: 0 | Status: SHIPPED | OUTPATIENT
Start: 2018-11-13 | End: 2019-01-18 | Stop reason: SDUPTHER

## 2018-12-20 ENCOUNTER — OFFICE VISIT (OUTPATIENT)
Dept: FAMILY MEDICINE CLINIC | Age: 68
End: 2018-12-20

## 2018-12-20 VITALS
RESPIRATION RATE: 15 BRPM | DIASTOLIC BLOOD PRESSURE: 74 MMHG | HEART RATE: 78 BPM | TEMPERATURE: 98.4 F | BODY MASS INDEX: 41.99 KG/M2 | HEIGHT: 63 IN | OXYGEN SATURATION: 99 % | WEIGHT: 237 LBS | SYSTOLIC BLOOD PRESSURE: 132 MMHG

## 2018-12-20 DIAGNOSIS — E78.00 HYPERCHOLESTEREMIA: Chronic | ICD-10-CM

## 2018-12-20 DIAGNOSIS — K59.00 CONSTIPATION, UNSPECIFIED CONSTIPATION TYPE: ICD-10-CM

## 2018-12-20 DIAGNOSIS — K29.70 GASTRITIS WITHOUT BLEEDING, UNSPECIFIED CHRONICITY, UNSPECIFIED GASTRITIS TYPE: ICD-10-CM

## 2018-12-20 DIAGNOSIS — R56.9 SEIZURE (HCC): ICD-10-CM

## 2018-12-20 DIAGNOSIS — I10 ESSENTIAL HYPERTENSION: Primary | Chronic | ICD-10-CM

## 2018-12-20 DIAGNOSIS — R10.2 PELVIC PAIN: ICD-10-CM

## 2018-12-20 DIAGNOSIS — N39.0 URINARY TRACT INFECTION WITHOUT HEMATURIA, SITE UNSPECIFIED: ICD-10-CM

## 2018-12-20 DIAGNOSIS — Z86.73 HISTORY OF CVA (CEREBROVASCULAR ACCIDENT): Chronic | ICD-10-CM

## 2018-12-20 RX ORDER — POLYETHYLENE GLYCOL 3350 17 G/17G
17 POWDER, FOR SOLUTION ORAL DAILY
Qty: 850 G | Refills: 3 | Status: SHIPPED | OUTPATIENT
Start: 2018-12-20 | End: 2019-06-21 | Stop reason: ALTCHOICE

## 2018-12-20 RX ORDER — PANTOPRAZOLE SODIUM 40 MG/1
40 TABLET, DELAYED RELEASE ORAL DAILY
Qty: 90 TAB | Refills: 1 | Status: SHIPPED | OUTPATIENT
Start: 2018-12-20 | End: 2019-09-26 | Stop reason: SDUPTHER

## 2018-12-20 RX ORDER — NITROFURANTOIN (MACROCRYSTALS) 100 MG/1
100 CAPSULE ORAL 2 TIMES DAILY
Qty: 14 CAP | Refills: 0 | Status: SHIPPED | OUTPATIENT
Start: 2018-12-20 | End: 2018-12-27

## 2018-12-20 NOTE — PROGRESS NOTES
1. Have you been to the ER, urgent care clinic since your last visit? Hospitalized since your last visit? No    2. Have you seen or consulted any other health care providers outside of the [address] [place] [address] since your last visit? Include any pap smears or colon screening.  No

## 2018-12-20 NOTE — PROGRESS NOTES
Libertad Veras is a 79 y.o.  female and presents with     Chief Complaint   Patient presents with    Constipation    Dysuria    Seizure    Cholesterol Problem    Abdominal Pain     Pt has constipation and also symptoms of dysuria and frequent urination/  Pt otherwise feels okay. Pt has h/o gallstones but surgery felt that pt does not need surgery. Past Medical History:   Diagnosis Date    Arthritis     Breast CA ([address]     Diabetes ([address]     Hypercholesteremia     Hypertension     Menopause     Psychiatric disorder     DEPRESSION    PUD (peptic ulcer disease)     S/P cardiac cath 02/2015    No angiographic evidence of CAD    Seizures (HCC)     LAST SEIZURE ABOUT 2 MONTHS AGO (NOV 2015)    Stroke [facility] [address]     x2 with left side deficit     Past Surgical History:   Procedure Laterality Date    HX BREAST LUMPECTOMY Right 1/12/2016    Dr. Brittnee Murphy Partial Mastectomy w./ axillary lymphadenectomy    HX HEENT      TONSILLECTOMY    HX MASTECTOMY Right 1/26/2016    Dr. Brittnee Murphy Repeat Partial Mastectomy for positive margins    HX ORTHOPAEDIC      left arm surg    IR MEDIPORT       Current Outpatient Medications   Medication Sig    nitrofurantoin (MACRODANTIN) 100 mg capsule Take 1 Cap by mouth two (2) times a day for 7 days.  polyethylene glycol (MIRALAX) 17 gram/dose powder Take 17 g by mouth daily.  pantoprazole (PROTONIX) 40 mg tablet Take 1 Tab by mouth daily.  WAVESENSE PRESTO strip USE TO TEST BLOOD SUGAR 2 TIMES A DAY    insulin aspart protamine/insulin aspart (NOVOLOG MIX 70-30FLEXPEN U-100) 100 unit/mL (70-30) inpn INJECT 60 UNITS SUBCUTANEOUSLY TWICE DAILY 30 MINUTES BEFORE BREAKFAST AND DINNER    divalproex ER (DEPAKOTE ER) 500 mg ER tablet Take 1 Tab by mouth two (2) times a day.  losartan (COZAAR) 50 mg tablet Take 1 Tab by mouth two (2) times a day.  carvedilol (COREG) 12.5 mg tablet Take 1 Tab by mouth two (2) times daily (with meals).     amLODIPine (NORVASC) 5 mg tablet Take 1 Tab by mouth nightly.  hydroCHLOROthiazide (HYDRODIURIL) 25 mg tablet Take 1 Tab by mouth daily.  atorvastatin (LIPITOR) 40 mg tablet Take 1 Tab by mouth daily.  isosorbide mononitrate ER (IMDUR) 30 mg tablet Take 1 Tab by mouth daily.  furosemide (LASIX) 20 mg tablet One po daily    lubiPROStone (AMITIZA) 8 mcg capsule Take 8 mcg by mouth daily (with breakfast).  aspirin-dipyridamole (AGGRENOX)  mg per SR capsule Take 1 Cap by mouth two (2) times a day.  bisacodyl (DULCOLAX, BISACODYL,) 10 mg suppository Insert 10 mg into rectum daily.  lactulose (CHRONULAC) 10 gram/15 mL solution Take 45 mL by mouth nightly.  insulin lispro (HUMALOG) 100 unit/mL injection 6 Units by SubCUTAneous route Before breakfast, lunch, and dinner.  letrozole (FEMARA) 2.5 mg tablet Take 2.5 mg by mouth daily.  SONAFINE emul topical     cholecalciferol (VITAMIN D3) 1,000 unit tablet Take 1,000 Units by mouth daily.  therapeutic multivitamin (THERAGRAN) tablet Take 1 Tab by mouth daily.  clotrimazole (LOTRIMIN) 1 % topical cream Apply  to affected area two (2) times a day. No current facility-administered medications for this visit.       Health Maintenance   Topic Date Due    Shingrix Vaccine Age 50> (1 of 2) 08/09/2002    FOOT EXAM Q1  11/23/2016    HEMOGLOBIN A1C Q6M  02/09/2019    MEDICARE YEARLY EXAM  04/18/2019    MICROALBUMIN Q1  04/23/2019    LIPID PANEL Q1  08/09/2019    BREAST CANCER SCRN MAMMOGRAM  12/11/2019    Pneumococcal 65+ High/Highest Risk (2 of 2 - PPSV23) 02/04/2020    GLAUCOMA SCREENING Q2Y  09/26/2020    EYE EXAM RETINAL OR DILATED  09/26/2020    COLONOSCOPY  10/27/2025    DTaP/Tdap/Td series (2 - Td) 11/23/2025    Hepatitis C Screening  Completed    Bone Densitometry (Dexa) Screening  Completed    Influenza Age 5 to Adult  Completed     Immunization History   Administered Date(s) Administered    Influenza High Dose Vaccine PF 08/29/2017    Influenza Vaccine 11/23/2015, 10/17/2016    Influenza Vaccine ([place] [facility]) PF 02/04/2015    Influenza Vaccine (Tri) Adjuvanted 09/24/2018    Pneumococcal Conjugate (PCV-13) 04/17/2018    Pneumococcal Polysaccharide (PPSV-23) 02/04/2015    Tdap 11/23/2015     No LMP recorded. Patient is postmenopausal.        Allergies and Intolerances: Allergies   Allergen Reactions    Metformin Other (comments)     Patient states that she is not allergic to metformin. Family History:   Family History   Problem Relation Age of Onset    Cancer Father [de-identified]        colon    Hypertension Mother     Heart Disease Mother     Diabetes Mother        Social History:   She  reports that  has never smoked. she has never used smokeless tobacco.  She  reports that she does not drink alcohol.             Review of Systems:   General: negative for - chills, fatigue, fever, weight change  Psych: negative for - anxiety, depression, irritability or mood swings  ENT: negative for - headaches, hearing change, nasal congestion, oral lesions, sneezing or sore throat  Heme/ Lymph: negative for - bleeding problems, bruising, pallor or swollen lymph nodes  Endo: negative for - hot flashes, polydipsia/polyuria or temperature intolerance  Resp: negative for - cough, shortness of breath or wheezing  CV: negative for - chest pain, edema or palpitations  GI: negative for - abdominal pain, change in bowel habits, constipation, diarrhea or nausea/vomiting  : negative for - dysuria, hematuria, incontinence, pelvic pain or vulvar/vaginal symptoms  MSK: negative for - joint pain, joint swelling or muscle pain  Neuro: negative for - confusion, headaches, seizures or weakness  Derm: negative for - dry skin, hair changes, rash or skin lesion changes          Physical:   Vitals:   Vitals:    12/20/18 1152   BP: 132/74   Pulse: 78   Resp: 15   Temp: 98.4 °F (36.9 °C)   TempSrc: Oral   SpO2: 99%   Weight: 237 lb (107.5 kg)   Height: 5' 3\" (1.6 m)           Exam:   HEENT- atraumatic,normocephalic, awake, oriented, well nourished  Neck - supple,no enlarged lymph nodes, no JVD, no thyromegaly  Chest- CTA, no rhonchi, no crackles  Heart- rrr, no murmurs / gallop/rub  Abdomen- soft,BS+,NT, no hepatosplenomegaly  Ext - no c/c/edema   Neuro- no focal deficits. Power 5/5 all extremities  Skin - warm,dry, no obvious rashes. Review of Data:   LABS:   Lab Results   Component Value Date/Time    WBC 6.1 10/02/2018 07:34 PM    HGB 9.6 (L) 10/02/2018 07:34 PM    HCT 30.5 (L) 10/02/2018 07:34 PM    PLATELET 90 (L) 09/43/7512 07:34 PM     Lab Results   Component Value Date/Time    Sodium 142 10/02/2018 09:20 PM    Potassium 4.3 10/02/2018 09:20 PM    Chloride 106 10/02/2018 09:20 PM    CO2 28 10/02/2018 09:20 PM    Glucose 53 (LL) 10/02/2018 09:20 PM    BUN 28 (H) 10/02/2018 09:20 PM    Creatinine 1.23 10/02/2018 09:20 PM     Lab Results   Component Value Date/Time    Cholesterol, total 233 (H) 08/09/2018 02:14 PM    HDL Cholesterol 85 (H) 08/09/2018 02:14 PM    LDL, calculated 122.2 (H) 08/09/2018 02:14 PM    Triglyceride 129 08/09/2018 02:14 PM     No results found for: GPT        Impression / Plan:        ICD-10-CM ICD-9-CM    1. Essential hypertension I10 401.9    2. Hypercholesteremia E78.00 272.0    3. History of CVA (cerebrovascular accident) Z86.73 V12.54    4. Constipation, unspecified constipation type K59.00 564.00 polyethylene glycol (MIRALAX) 17 gram/dose powder   5. Urinary tract infection without hematuria, site unspecified N39.0 599.0 nitrofurantoin (MACRODANTIN) 100 mg capsule   6. Seizure ([address] R56.9 780.39 REFERRAL TO NEUROLOGY   7. Gastritis without bleeding, unspecified chronicity, unspecified gastritis type K29.70 535.50 pantoprazole (PROTONIX) 40 mg tablet       Gallstones - asymptomatic, pt wants to wait. Explained to patient risk benefits of the medications. Advised patient to stop meds if having any side effects. Pt verbalized understanding of the instructions. I have discussed the diagnosis with the patient and the intended plan as seen in the above orders. The patient has received an after-visit summary and questions were answered concerning future plans. I have discussed medication side effects and warnings with the patient as well. I have reviewed the plan of care with the patient, accepted their input and they are in agreement with the treatment goals. Reviewed plan of care. Patient has provided input and agrees with goals.     Follow-up Disposition: Not on Manual MD Estephania

## 2019-01-21 ENCOUNTER — TELEPHONE (OUTPATIENT)
Dept: FAMILY MEDICINE CLINIC | Age: 69
End: 2019-01-21

## 2019-02-19 DIAGNOSIS — E11.40 TYPE 2 DIABETES MELLITUS WITH DIABETIC NEUROPATHY, WITH LONG-TERM CURRENT USE OF INSULIN (HCC): Chronic | ICD-10-CM

## 2019-02-19 DIAGNOSIS — Z79.4 TYPE 2 DIABETES MELLITUS WITH DIABETIC NEUROPATHY, WITH LONG-TERM CURRENT USE OF INSULIN (HCC): Chronic | ICD-10-CM

## 2019-02-19 RX ORDER — INSULIN ASPART 100 [IU]/ML
INJECTION, SUSPENSION SUBCUTANEOUS
Qty: 30 ML | Refills: 0 | Status: SHIPPED | OUTPATIENT
Start: 2019-02-19 | End: 2019-03-21 | Stop reason: SDUPTHER

## 2019-03-13 ENCOUNTER — HOSPITAL ENCOUNTER (EMERGENCY)
Age: 69
Discharge: HOME | End: 2019-03-13
Attending: EMERGENCY MEDICINE
Payer: MEDICARE

## 2019-03-13 VITALS
RESPIRATION RATE: 18 BRPM | TEMPERATURE: 99 F | DIASTOLIC BLOOD PRESSURE: 74 MMHG | OXYGEN SATURATION: 100 % | SYSTOLIC BLOOD PRESSURE: 173 MMHG | HEART RATE: 90 BPM

## 2019-03-13 DIAGNOSIS — Z91.14 NONCOMPLIANCE WITH MEDICATION REGIMEN: ICD-10-CM

## 2019-03-13 DIAGNOSIS — G40.909 RECURRENT SEIZURES (HCC): Primary | ICD-10-CM

## 2019-03-13 LAB
ALBUMIN SERPL-MCNC: 3.2 G/DL (ref 3.4–5)
ALBUMIN/GLOB SERPL: 0.7 {RATIO} (ref 0.8–1.7)
ALP SERPL-CCNC: 110 U/L (ref 45–117)
ALT SERPL-CCNC: 17 U/L (ref 13–56)
ANION GAP SERPL CALC-SCNC: 5 MMOL/L (ref 3–18)
AST SERPL-CCNC: 21 U/L (ref 15–37)
BASOPHILS # BLD: 0 K/UL (ref 0–0.1)
BASOPHILS NFR BLD: 0 % (ref 0–2)
BILIRUB SERPL-MCNC: 0.3 MG/DL (ref 0.2–1)
BUN SERPL-MCNC: 28 MG/DL (ref 7–18)
BUN/CREAT SERPL: 30 (ref 12–20)
CALCIUM SERPL-MCNC: 8.7 MG/DL (ref 8.5–10.1)
CHLORIDE SERPL-SCNC: 103 MMOL/L (ref 100–108)
CO2 SERPL-SCNC: 27 MMOL/L (ref 21–32)
CREAT SERPL-MCNC: 0.93 MG/DL (ref 0.6–1.3)
DIFFERENTIAL METHOD BLD: ABNORMAL
EOSINOPHIL # BLD: 0.2 K/UL (ref 0–0.4)
EOSINOPHIL NFR BLD: 3 % (ref 0–5)
ERYTHROCYTE [DISTWIDTH] IN BLOOD BY AUTOMATED COUNT: 13.5 % (ref 11.6–14.5)
ETHANOL SERPL-MCNC: <3 MG/DL (ref 0–3)
GLOBULIN SER CALC-MCNC: 4.4 G/DL (ref 2–4)
GLUCOSE SERPL-MCNC: 245 MG/DL (ref 74–99)
HCT VFR BLD AUTO: 34.7 % (ref 35–45)
HGB BLD-MCNC: 10.7 G/DL (ref 12–16)
LYMPHOCYTES # BLD: 3.9 K/UL (ref 0.9–3.6)
LYMPHOCYTES NFR BLD: 43 % (ref 21–52)
MCH RBC QN AUTO: 25.5 PG (ref 24–34)
MCHC RBC AUTO-ENTMCNC: 30.8 G/DL (ref 31–37)
MCV RBC AUTO: 82.8 FL (ref 74–97)
MONOCYTES # BLD: 0.6 K/UL (ref 0.05–1.2)
MONOCYTES NFR BLD: 7 % (ref 3–10)
NEUTS SEG # BLD: 4.2 K/UL (ref 1.8–8)
NEUTS SEG NFR BLD: 47 % (ref 40–73)
PLATELET # BLD AUTO: 192 K/UL (ref 135–420)
PMV BLD AUTO: 10.6 FL (ref 9.2–11.8)
POTASSIUM SERPL-SCNC: 4.8 MMOL/L (ref 3.5–5.5)
PROT SERPL-MCNC: 7.6 G/DL (ref 6.4–8.2)
RBC # BLD AUTO: 4.19 M/UL (ref 4.2–5.3)
SODIUM SERPL-SCNC: 135 MMOL/L (ref 136–145)
VALPROATE SERPL-MCNC: 14 UG/ML (ref 50–100)
WBC # BLD AUTO: 8.9 K/UL (ref 4.6–13.2)

## 2019-03-13 PROCEDURE — 99285 EMERGENCY DEPT VISIT HI MDM: CPT

## 2019-03-13 PROCEDURE — 80307 DRUG TEST PRSMV CHEM ANLYZR: CPT

## 2019-03-13 PROCEDURE — 85025 COMPLETE CBC W/AUTO DIFF WBC: CPT

## 2019-03-13 PROCEDURE — 80164 ASSAY DIPROPYLACETIC ACD TOT: CPT

## 2019-03-13 PROCEDURE — 80053 COMPREHEN METABOLIC PANEL: CPT

## 2019-03-13 PROCEDURE — 74011250637 HC RX REV CODE- 250/637: Performed by: EMERGENCY MEDICINE

## 2019-03-13 PROCEDURE — 93005 ELECTROCARDIOGRAM TRACING: CPT

## 2019-03-13 RX ORDER — CARVEDILOL 12.5 MG/1
12.5 TABLET ORAL
Status: COMPLETED | OUTPATIENT
Start: 2019-03-13 | End: 2019-03-13

## 2019-03-13 RX ORDER — AMLODIPINE BESYLATE 5 MG/1
5 TABLET ORAL
Status: COMPLETED | OUTPATIENT
Start: 2019-03-13 | End: 2019-03-13

## 2019-03-13 RX ORDER — LORAZEPAM 2 MG/ML
2 INJECTION INTRAMUSCULAR
Status: DISCONTINUED | OUTPATIENT
Start: 2019-03-13 | End: 2019-03-13

## 2019-03-13 RX ORDER — LORAZEPAM 1 MG/1
1 TABLET ORAL
Status: COMPLETED | OUTPATIENT
Start: 2019-03-13 | End: 2019-03-13

## 2019-03-13 RX ORDER — DIVALPROEX SODIUM 250 MG/1
1000 TABLET, DELAYED RELEASE ORAL
Status: COMPLETED | OUTPATIENT
Start: 2019-03-13 | End: 2019-03-13

## 2019-03-13 RX ORDER — LOSARTAN POTASSIUM 50 MG/1
50 TABLET ORAL
Status: COMPLETED | OUTPATIENT
Start: 2019-03-13 | End: 2019-03-13

## 2019-03-13 RX ORDER — LORAZEPAM 2 MG/ML
0.5 INJECTION INTRAMUSCULAR
Status: DISCONTINUED | OUTPATIENT
Start: 2019-03-13 | End: 2019-03-13

## 2019-03-13 RX ADMIN — LOSARTAN POTASSIUM 50 MG: 50 TABLET ORAL at 20:36

## 2019-03-13 RX ADMIN — AMLODIPINE BESYLATE 5 MG: 5 TABLET ORAL at 20:36

## 2019-03-13 RX ADMIN — DIVALPROEX SODIUM 1000 MG: 250 TABLET, DELAYED RELEASE ORAL at 21:44

## 2019-03-13 RX ADMIN — CARVEDILOL 12.5 MG: 12.5 TABLET, FILM COATED ORAL at 20:36

## 2019-03-13 RX ADMIN — LORAZEPAM 1 MG: 1 TABLET ORAL at 20:36

## 2019-03-13 NOTE — ED TRIAGE NOTES
Patient arrives via EMS with a c/c of (2) witnessed seizures at home lasting 20-30 seconds. Patient is noncompliant with medications. Patient is currently nonverbal which family states is normal post-seizure.

## 2019-03-13 NOTE — ED NOTES
Patient suddenly very spacey, looking off into distance and not responding. MD notified and new orders for ativan IM placed. Upon returning to room, patient back to baseline. Per MD, order on stand-by until line placed.

## 2019-03-14 DIAGNOSIS — E11.40 TYPE 2 DIABETES MELLITUS WITH DIABETIC NEUROPATHY, WITH LONG-TERM CURRENT USE OF INSULIN (HCC): Chronic | ICD-10-CM

## 2019-03-14 DIAGNOSIS — Z79.4 TYPE 2 DIABETES MELLITUS WITH DIABETIC NEUROPATHY, WITH LONG-TERM CURRENT USE OF INSULIN (HCC): Chronic | ICD-10-CM

## 2019-03-14 LAB
ATRIAL RATE: 102 BPM
CALCULATED P AXIS, ECG09: 54 DEGREES
CALCULATED R AXIS, ECG10: 4 DEGREES
CALCULATED T AXIS, ECG11: 14 DEGREES
DIAGNOSIS, 93000: NORMAL
P-R INTERVAL, ECG05: 124 MS
Q-T INTERVAL, ECG07: 324 MS
QRS DURATION, ECG06: 80 MS
QTC CALCULATION (BEZET), ECG08: 422 MS
VENTRICULAR RATE, ECG03: 102 BPM

## 2019-03-14 NOTE — DISCHARGE INSTRUCTIONS
Patient Education   TAKE YOUR MEDICATIONS AS DIRECTED!!!     Epilepsy: Care Instructions  Your Care Instructions    Epilepsy is a common condition that causes repeated seizures. The seizures are caused by bursts of electrical activity in the brain that aren't normal. Seizures may cause problems with muscle control, movement, speech, vision, or awareness. They can be scary. Epilepsy affects each person differently. Some people have only a few seizures. Others get them more often. If you know what triggers a seizure, you may be able to avoid having one. You can take medicines to control and reduce seizures. You and your doctor will need to find the right combination, schedule, and dose of medicine. This may take time and careful changes. Seizures may get worse and happen more often over time. Follow-up care is a key part of your treatment and safety. Be sure to make and go to all appointments, and call your doctor if you are having problems. It's also a good idea to know your test results and keep a list of the medicines you take. How can you care for yourself at home? · Be safe with medicines. Take your medicines exactly as prescribed. Call your doctor if you think you are having a problem with your medicine. · Make a treatment plan with your doctor. Be sure to follow your plan. · Try to identify and avoid things that may make you more likely to have a seizure. These may include:  ? Not getting enough sleep. ? Using drugs or alcohol. ? Being emotionally stressed. ? Skipping meals. · Keep a record of any seizures you have. Note the date, time of day, and any details about the seizure that you can remember. Your doctor can use this information to plan or adjust your medicine or other treatment. · Be sure that any doctor treating you for another condition knows that you have epilepsy. Each doctor should know what medicines you are taking, if any. · Wear a medical ID bracelet.  You can buy this at most drugstores. If you have a seizure that leaves you unconscious or unable to speak for yourself, this bracelet will let those who are treating you know that you have epilepsy. · Talk to your doctor about whether it is safe for you to do certain activities, such as drive or swim. When should you call for help? Call 911 anytime you think you may need emergency care. For example, call if:    · A seizure does not stop as it normally does.     · You have new symptoms such as:  ? Numbness, tingling, or weakness on one side of your body or face. ? Vision changes. ? Trouble speaking or thinking clearly.    Call your doctor now or seek immediate medical care if:    · You have a fever.     · You have a severe headache.    Watch closely for changes in your health, and be sure to contact your doctor if:    · The normal pattern or features of your seizures change. Where can you learn more? Go to http://mady-travon.info/. [employer] in the search box to learn more about \"Epilepsy: Care Instructions. \"  Current as of: Olive 3, 2018  Content Version: 11.9  © 0323-7772 [employer]. Care instructions adapted under license by [employer] (which disclaims liability or warranty for this information). If you have questions about a medical condition or this instruction, always ask your healthcare professional. [address] any warranty or liability for your use of this information.

## 2019-03-14 NOTE — ED PROVIDER NOTES
Jennifer Edwards is a 79 y.o. Female with a history of seizures and hypertension who has been noncompliant with her seizure and hypertension medication recently. Her  states that she had 2 seizures today back to back after getting upset with a family issue. Patient did take a dose of her seizure medication afterwards. She was slightly confused for several minutes but has since returned to a normal baseline mental status. She also has not taken her blood pressure medication today. Patient denies any new headache nausea or vomiting diarrhea chest pain shortness of breath numbness tingling or weakness. There is no trauma involved in her seizures. There is no incontinence or intraoral injury. The history is provided by the patient and the spouse.         Past Medical History:   Diagnosis Date    Arthritis     [facility]     Diabetes ([facility]     Hypercholesteremia     Hypertension     Menopause     Psychiatric disorder     DEPRESSION    PUD (peptic ulcer disease)     S/P cardiac cath 02/2015    No angiographic evidence of CAD    Seizures (HCC)     LAST SEIZURE ABOUT 2 MONTHS AGO (NOV 2015)    Stroke ([facility] [address]     x2 with left side deficit       Past Surgical History:   Procedure Laterality Date    HX BREAST LUMPECTOMY Right 1/12/2016    Dr. Martinez Randhawa Partial Mastectomy w./ axillary lymphadenectomy    HX HEENT      TONSILLECTOMY    HX MASTECTOMY Right 1/26/2016    Dr. Martinez Randhawa Repeat Partial Mastectomy for positive margins    HX ORTHOPAEDIC      left arm surg    IR MEDIPORT           Family History:   Problem Relation Age of Onset    Cancer Father [de-identified]        colon    Hypertension Mother     Heart Disease Mother     Diabetes Mother        Social History     Socioeconomic History    Marital status: SINGLE     Spouse name: Not on file    Number of children: Not on file    Years of education: Not on file    Highest education level: Not on file   Social Needs    Financial resource strain: Not on file    Food insecurity - worry: Not on file    Food insecurity - inability: Not on file    Transportation needs - medical: Not on file   [email or web address] needs - non-medical: Not on file   Occupational History    Not on file   Tobacco Use    Smoking status: Never Smoker    Smokeless tobacco: Never Used   Substance and Sexual Activity    Alcohol use: No    Drug use: No    Sexual activity: Yes     Partners: Male   Other Topics Concern     Service No    Blood Transfusions No    Caffeine Concern No    Occupational Exposure No    Hobby Hazards No    Sleep Concern Yes    Stress Concern No    Weight Concern Not Asked    Special Diet Not Asked    Back Care Not Asked    Exercise Yes     Comment: walks using cane    Bike Helmet Not Asked    Seat Belt Yes    Self-Exams Yes   Social History Narrative    Not on file         ALLERGIES: Metformin    Review of Systems   Constitutional: Negative for fever. HENT: Negative for sore throat. Eyes: Negative for visual disturbance. Respiratory: Negative for shortness of breath. Cardiovascular: Negative for chest pain. Gastrointestinal: Negative for abdominal pain. Genitourinary: Negative for difficulty urinating. Musculoskeletal: Negative for gait problem. Skin: Negative for rash. Allergic/Immunologic: Negative for immunocompromised state. Neurological: Positive for seizures. Psychiatric/Behavioral: Positive for sleep disturbance. Vitals:    03/13/19 2015 03/13/19 2036 03/13/19 2115 03/13/19 2136   BP: 196/77 (!) 202/83 160/60 173/74   Pulse: 95 88 91 90   Resp: 13  11 18   Temp:       SpO2: 100%  100% 100%            Physical Exam   Constitutional: She is oriented to person, place, and time. She appears well-developed and well-nourished. No distress. HENT:   Head: Normocephalic and atraumatic.    Right Ear: External ear normal.   Left Ear: External ear normal.   Nose: Nose normal.   Mouth/Throat: Uvula is midline, oropharynx is clear and moist and mucous membranes are normal.   Eyes: Conjunctivae are normal. No scleral icterus. Neck: Neck supple. Cardiovascular: Normal rate, regular rhythm, normal heart sounds and intact distal pulses. Pulmonary/Chest: Effort normal and breath sounds normal.   Abdominal: Soft. There is no tenderness. Musculoskeletal: She exhibits no edema. Neurological: She is alert and oriented to person, place, and time. No cranial nerve deficit (3-12) or sensory deficit. She exhibits normal muscle tone. Coordination and gait normal. GCS eye subscore is 4. GCS verbal subscore is 5. GCS motor subscore is 6. Skin: Skin is warm and dry. Capillary refill takes less than 2 seconds. She is not diaphoretic. Psychiatric: Her behavior is normal.   Nursing note and vitals reviewed.        MDM       Procedures      Vitals:  Patient Vitals for the past 12 hrs:   Temp Pulse Resp BP SpO2   03/13/19 2136  90 18 173/74 100 %   03/13/19 2115  91 11 160/60 100 %   03/13/19 2036  88  (!) 202/83    03/13/19 2015  95 13 196/77 100 %   03/13/19 1935     100 %   03/13/19 1931 99 °F (37.2 °C) (!) 108 16 (!) 206/110 100 %   03/13/19 1930  (!) 109 19 (!) 206/110          Medications ordered:   Medications   sodium chloride 0.9 % bolus infusion 1,000 mL (0 mL IntraVENous Held 3/13/19 1940)   losartan (COZAAR) tablet 50 mg (50 mg Oral Given 3/13/19 2036)   carvedilol (COREG) tablet 12.5 mg (12.5 mg Oral Given 3/13/19 2036)   amLODIPine (NORVASC) tablet 5 mg (5 mg Oral Given 3/13/19 2036)   LORazepam (ATIVAN) tablet 1 mg (1 mg Oral Given 3/13/19 2036)   divalproex DR (DEPAKOTE) tablet 1,000 mg (1,000 mg Oral Given 3/13/19 2144)         Lab findings:  Recent Results (from the past 12 hour(s))   EKG, 12 LEAD, INITIAL    Collection Time: 03/13/19  7:37 PM   Result Value Ref Range    Ventricular Rate 102 BPM    Atrial Rate 102 BPM    P-R Interval 124 ms    QRS Duration 80 ms    Q-T Interval 324 ms    QTC Calculation (Bezet) 422 ms    Calculated P Axis 54 degrees    Calculated R Axis 4 degrees    Calculated T Axis 14 degrees    Diagnosis       Sinus tachycardia  Voltage criteria for left ventricular hypertrophy  Abnormal ECG  When compared with ECG of 02-OCT-2018 19:30,  ST no longer elevated in Anterior leads     CBC WITH AUTOMATED DIFF    Collection Time: 03/13/19  7:50 PM   Result Value Ref Range    WBC 8.9 4.6 - 13.2 K/uL    RBC 4.19 (L) 4.20 - 5.30 M/uL    HGB 10.7 (L) 12.0 - 16.0 g/dL    HCT 34.7 (L) 35.0 - 45.0 %    MCV 82.8 74.0 - 97.0 FL    MCH 25.5 24.0 - 34.0 PG    MCHC 30.8 (L) 31.0 - 37.0 g/dL    RDW 13.5 11.6 - 14.5 %    PLATELET 269 683 - 534 K/uL    MPV 10.6 9.2 - 11.8 FL    NEUTROPHILS 47 40 - 73 %    LYMPHOCYTES 43 21 - 52 %    MONOCYTES 7 3 - 10 %    EOSINOPHILS 3 0 - 5 %    BASOPHILS 0 0 - 2 %    ABS. NEUTROPHILS 4.2 1.8 - 8.0 K/UL    ABS. LYMPHOCYTES 3.9 (H) 0.9 - 3.6 K/UL    ABS. MONOCYTES 0.6 0.05 - 1.2 K/UL    ABS. EOSINOPHILS 0.2 0.0 - 0.4 K/UL    ABS. BASOPHILS 0.0 0.0 - 0.1 K/UL    DF AUTOMATED     METABOLIC PANEL, COMPREHENSIVE    Collection Time: 03/13/19  7:50 PM   Result Value Ref Range    Sodium 135 (L) 136 - 145 mmol/L    Potassium 4.8 3.5 - 5.5 mmol/L    Chloride 103 100 - 108 mmol/L    CO2 27 21 - 32 mmol/L    Anion gap 5 3.0 - 18 mmol/L    Glucose 245 (H) 74 - 99 mg/dL    BUN 28 (H) 7.0 - 18 MG/DL    Creatinine 0.93 0.6 - 1.3 MG/DL    BUN/Creatinine ratio 30 (H) 12 - 20      GFR est AA >60 >60 ml/min/1.73m2    GFR est non-AA >60 >60 ml/min/1.73m2    Calcium 8.7 8.5 - 10.1 MG/DL    Bilirubin, total 0.3 0.2 - 1.0 MG/DL    ALT (SGPT) 17 13 - 56 U/L    AST (SGOT) 21 15 - 37 U/L    Alk.  phosphatase 110 45 - 117 U/L    Protein, total 7.6 6.4 - 8.2 g/dL    Albumin 3.2 (L) 3.4 - 5.0 g/dL    Globulin 4.4 (H) 2.0 - 4.0 g/dL    A-G Ratio 0.7 (L) 0.8 - 1.7     VALPROIC ACID    Collection Time: 03/13/19  7:50 PM   Result Value Ref Range    Valproic acid 14 (L) 50 - 100 ug/ml   ETHYL ALCOHOL Collection Time: 03/13/19  7:50 PM   Result Value Ref Range    ALCOHOL(ETHYL),SERUM <3 0 - 3 MG/DL       EKG interpretation by ED Physician:  Sinus tach with no acute ST or T wave changes. LVH. Rate 102  QTc 422  Not safely change. Cardiac monitor: Normal to tacky rate with regular rhythm no ectopy  Pulse ox: 100% room air    X-Ray, CT or other radiology findings or impressions:  No orders to display       Progress notes, Consult notes or additional Procedure notes:   Discussed with both patient and  regarding need to be compliant with medication as recommended compliance would avoid further visits to the emergency department. Patient has remained seizure-free during her stay in the emergency department. She was given oral dose of Depakote as well. I have discussed with patient and/or family/sig other the results, interpretation of any imaging if performed, suspected diagnosis and treatment plan to include instructions regarding the diagnoses listed to which understanding was expressed with all questions answered      Reevaluation of patient:   stable    Disposition:  Diagnosis:   1. Recurrent seizures ([address]    2.  Noncompliance with medication regimen        Disposition: home      Follow-up Information     Follow up With Specialties Details Why Gamaliel Mojica MD Internal Medicine Schedule an appointment as soon as possible for a visit  [address] [facility] [phone] [address] [facility]  Call to get follow up as Dr Claudean Rutter is leaving the practice [address] [address] [id number] [phone]    [facility] EMERGENCY DEPT Emergency Medicine  If symptoms worsen [address]  [phone]               Medication List      ASK your doctor about these medications    AMITIZA 8 mcg capsule  Generic drug:  lubiPROStone     amLODIPine 5 mg tablet  Commonly known as:  NORVASC  Take 1 Tab by mouth nightly. aspirin-dipyridamole  mg per SR capsule  Commonly known as:  AGGRENOX  Take 1 Cap by mouth two (2) times a day. atorvastatin 40 mg tablet  Commonly known as:  LIPITOR  Take 1 Tab by mouth daily. bisacodyl 10 mg suppository  Commonly known as:  DULCOLAX (BISACODYL)  Insert 10 mg into rectum daily. carvedilol 12.5 mg tablet  Commonly known as:  COREG  Take 1 Tab by mouth two (2) times daily (with meals). clotrimazole 1 % topical cream  Commonly known as:  LOTRIMIN  Apply  to affected area two (2) times a day. divalproex  mg ER tablet  Commonly known as:  DEPAKOTE ER  Take 1 Tab by mouth two (2) times a day. furosemide 20 mg tablet  Commonly known as:  LASIX  One po daily     glucose blood VI test strips strip  Commonly known as:  WAVESENSE PRESTO  DM     hydroCHLOROthiazide 25 mg tablet  Commonly known as:  HYDRODIURIL  Take 1 Tab by mouth daily. insulin aspart protamine/insulin aspart 100 unit/mL (70-30) Inpn  Commonly known as:  NovoLOG Mix 70-30FlexPen U-100  INJECT 60 UNITS SUBCUTANEOUSLY TWICE DAILY 30 MINUTES BEFORE BREAKFAST AND DINNER     insulin lispro 100 unit/mL injection  Commonly known as:  HUMALOG  6 Units by SubCUTAneous route Before breakfast, lunch, and dinner. isosorbide mononitrate ER 30 mg tablet  Commonly known as:  IMDUR  Take 1 Tab by mouth daily. lactulose 10 gram/15 mL solution  Commonly known as:  CHRONULAC  Take 45 mL by mouth nightly. letrozole 2.5 mg tablet  Commonly known as:  FEMARA     losartan 50 mg tablet  Commonly known as:  COZAAR  Take 1 Tab by mouth two (2) times a day. pantoprazole 40 mg tablet  Commonly known as:  PROTONIX  Take 1 Tab by mouth daily. polyethylene glycol 17 gram/dose powder  Commonly known as:  MIRALAX  Take 17 g by mouth daily. SONAFINE Emul topical  Generic drug:  emollient combination no. 10     therapeutic multivitamin tablet  Commonly known as:  THERAGRAN     VITAMIN D3 1,000 unit tablet  Generic drug:  cholecalciferol

## 2019-03-14 NOTE — ED NOTES
Assumed care of patient for discharge. I have reviewed discharge instructions with the patient. The patient verbalized understanding. Patient armband removed and shredded. VSS. PAtient ambulatory at discharge.

## 2019-03-14 NOTE — ED NOTES
Multiple staff at bedside attempting IV access. Patient now able to state that she usually has to get an US-guided IV. MD notified, states he cannot perform US-guided IV's and that IV access will be put on hold for the time being. Charge nurse notified and states she will also attempt IV access if need-be.

## 2019-03-18 RX ORDER — INSULIN ASPART 100 [IU]/ML
INJECTION, SUSPENSION SUBCUTANEOUS
Qty: 30 ML | Refills: 0 | OUTPATIENT
Start: 2019-03-18

## 2019-03-21 DIAGNOSIS — E11.40 TYPE 2 DIABETES MELLITUS WITH DIABETIC NEUROPATHY, WITH LONG-TERM CURRENT USE OF INSULIN (HCC): Chronic | ICD-10-CM

## 2019-03-21 DIAGNOSIS — Z79.4 TYPE 2 DIABETES MELLITUS WITH DIABETIC NEUROPATHY, WITH LONG-TERM CURRENT USE OF INSULIN (HCC): Chronic | ICD-10-CM

## 2019-03-21 RX ORDER — INSULIN ASPART 100 [IU]/ML
INJECTION, SUSPENSION SUBCUTANEOUS
Qty: 30 ML | Refills: 0 | Status: SHIPPED | OUTPATIENT
Start: 2019-03-21 | End: 2019-06-21 | Stop reason: SDUPTHER

## 2019-04-11 ENCOUNTER — OFFICE VISIT (OUTPATIENT)
Dept: OBGYN CLINIC | Age: 69
End: 2019-04-11

## 2019-04-11 ENCOUNTER — HOSPITAL ENCOUNTER (OUTPATIENT)
Dept: LAB | Age: 69
Discharge: HOME | End: 2019-04-11
Payer: MEDICARE

## 2019-04-11 VITALS
HEIGHT: 63 IN | HEART RATE: 91 BPM | WEIGHT: 244 LBS | SYSTOLIC BLOOD PRESSURE: 156 MMHG | DIASTOLIC BLOOD PRESSURE: 79 MMHG | BODY MASS INDEX: 43.23 KG/M2 | TEMPERATURE: 98.6 F | OXYGEN SATURATION: 100 % | RESPIRATION RATE: 18 BRPM

## 2019-04-11 DIAGNOSIS — L98.9 SKIN LESION: Primary | ICD-10-CM

## 2019-04-11 DIAGNOSIS — L98.9 SKIN LESION: ICD-10-CM

## 2019-04-11 PROCEDURE — 87255 GENET VIRUS ISOLATE HSV: CPT

## 2019-04-12 NOTE — PROGRESS NOTES
Subjective:      Patient complains of pain of the skin of her groins bilaterally, but with pain greater on the right than the left. She denies any vaginal bleeding, abnormal vaginal discharge, or pelvic pain. Current Outpatient Medications   Medication Sig Dispense Refill    insulin aspart protamine/insulin aspart (NOVOLOG MIX 70-30FLEXPEN U-100) 100 unit/mL (70-30) inpn INJECT 60 UNITS SUBCUTANEOUSLY TWICE DAILY 30 MINUTES BEFORE BREAKFAST AND DINNER 30 mL 0    glucose blood VI test strips ([employer] PRESTO) strip  Strip 0    pantoprazole (PROTONIX) 40 mg tablet Take 1 Tab by mouth daily. 90 Tab 1    divalproex ER (DEPAKOTE ER) 500 mg ER tablet Take 1 Tab by mouth two (2) times a day. 60 Tab 3    losartan (COZAAR) 50 mg tablet Take 1 Tab by mouth two (2) times a day. 60 Tab 3    carvedilol (COREG) 12.5 mg tablet Take 1 Tab by mouth two (2) times daily (with meals). 60 Tab 3    amLODIPine (NORVASC) 5 mg tablet Take 1 Tab by mouth nightly. 30 Tab 3    hydroCHLOROthiazide (HYDRODIURIL) 25 mg tablet Take 1 Tab by mouth daily. 30 Tab 3    isosorbide mononitrate ER (IMDUR) 30 mg tablet Take 1 Tab by mouth daily. 90 Tab 1    furosemide (LASIX) 20 mg tablet One po daily 30 Tab 3    lubiPROStone (AMITIZA) 8 mcg capsule Take 8 mcg by mouth daily (with breakfast).  aspirin-dipyridamole (AGGRENOX)  mg per SR capsule Take 1 Cap by mouth two (2) times a day. 180 Cap 1    bisacodyl (DULCOLAX, BISACODYL,) 10 mg suppository Insert 10 mg into rectum daily. 10 Suppository 1    lactulose (CHRONULAC) 10 gram/15 mL solution Take 45 mL by mouth nightly. 480 mL 3    insulin lispro (HUMALOG) 100 unit/mL injection 6 Units by SubCUTAneous route Before breakfast, lunch, and dinner. 2 Vial 5    letrozole (FEMARA) 2.5 mg tablet Take 2.5 mg by mouth daily.  SONAFINE emul topical       cholecalciferol (VITAMIN D3) 1,000 unit tablet Take 1,000 Units by mouth daily.       therapeutic multivitamin [facility]) tablet Take 1 Tab by mouth daily.  clotrimazole (LOTRIMIN) 1 % topical cream Apply  to affected area two (2) times a day. 45 g 3    polyethylene glycol (MIRALAX) 17 gram/dose powder Take 17 g by mouth daily. 850 g 3    atorvastatin (LIPITOR) 40 mg tablet Take 1 Tab by mouth daily. 30 Tab 3     Allergies   Allergen Reactions    Metformin Other (comments)     Patient states that she is not allergic to metformin. Past Medical History:   Diagnosis Date    Arthritis     Breast CA ([address]     Breast CA ([address] 2016    Diabetes ([address]     Hypercholesteremia     Hypertension     Menopause     Psychiatric disorder     DEPRESSION    PUD (peptic ulcer disease)     S/P cardiac cath 02/2015    No angiographic evidence of CAD    Seizures (HCC)     LAST SEIZURE ABOUT 2 MONTHS AGO (NOV 2015)    St[facility]     x2 with left side deficit     Past Surgical History:   Procedure Laterality Date    HX BREAST LUMPECTOMY Right 1/12/2016    Dr. Rosalie Sequeira Partial Mastectomy w./ axillary lymphadenectomy    HX HEENT      TONSILLECTOMY    HX MASTECTOMY Right 1/26/2016    Dr. Rosalie Sequeira Repeat Partial Mastectomy for positive margins    HX ORTHOPAEDIC      left arm surg    IR MEDIPORT       Family History   Problem Relation Age of Onset    Cancer Father [de-identified]        colon    Hypertension Mother     Heart Disease Mother     Diabetes Mother      Social History     Tobacco Use    Smoking status: Never Smoker    Smokeless tobacco: Never Used   Substance Use Topics    Alcohol use: No      Review of Systems    A comprehensive review of systems was negative except for that written in the HPI.         Objective:     Visit Vitals  /79 (BP 1 Location: Left arm, BP Patient Position: Sitting)   Pulse 91   Temp 98.6 °F (37 °C) (Oral)   Resp 18   Ht 5' 3\" (1.6 m)   Wt 244 lb (110.7 kg)   SpO2 100%   BMI 43.22 kg/m²     General appearance: alert, cooperative, no distress, appears stated age, morbidly obese Skin of the inguinal crease mildly erythematous bilaterally but without evidence of active yeast infection, 3 small shallow ulcerations of the right groin present at the site where patient indicates her greatest pain. Lesions swabbed for viral culture. Assessment/Plan:     Skin lesions suspicious for HSV. Will contact the patient with culture results. Advised her to use A&D ointment for barrier protection until then. Follow up prn. Plan of care discussed. Patient expressed understanding.

## 2019-04-16 LAB
HSV SPEC CULT: ABNORMAL
SPECIMEN SOURCE: ABNORMAL

## 2019-04-16 RX ORDER — ACYCLOVIR 400 MG/1
400 TABLET ORAL 3 TIMES DAILY
Qty: 15 TAB | Refills: 2 | Status: SHIPPED | OUTPATIENT
Start: 2019-04-16 | End: 2019-04-21

## 2019-04-16 NOTE — PROGRESS NOTES
Culture of the patient's right groin was positive for herpes. I sent in a Rx to her pharmacy for her to use if she is still haveing pain or if she has another outbreak.

## 2019-05-12 ENCOUNTER — APPOINTMENT (OUTPATIENT)
Dept: CT IMAGING | Age: 69
End: 2019-05-12
Attending: EMERGENCY MEDICINE
Payer: MEDICARE

## 2019-05-12 ENCOUNTER — APPOINTMENT (OUTPATIENT)
Dept: GENERAL RADIOLOGY | Age: 69
End: 2019-05-12
Attending: NURSE PRACTITIONER
Payer: MEDICARE

## 2019-05-12 ENCOUNTER — HOSPITAL ENCOUNTER (EMERGENCY)
Age: 69
Discharge: HOME | End: 2019-05-12
Attending: EMERGENCY MEDICINE
Payer: MEDICARE

## 2019-05-12 VITALS
RESPIRATION RATE: 20 BRPM | HEIGHT: 67 IN | SYSTOLIC BLOOD PRESSURE: 148 MMHG | TEMPERATURE: 98.2 F | BODY MASS INDEX: 36.57 KG/M2 | OXYGEN SATURATION: 100 % | DIASTOLIC BLOOD PRESSURE: 87 MMHG | WEIGHT: 233 LBS | HEART RATE: 73 BPM

## 2019-05-12 DIAGNOSIS — R06.00 DYSPNEA, UNSPECIFIED TYPE: Primary | ICD-10-CM

## 2019-05-12 LAB
ALBUMIN SERPL-MCNC: 2.8 G/DL (ref 3.4–5)
ALBUMIN/GLOB SERPL: 0.5 {RATIO} (ref 0.8–1.7)
ALP SERPL-CCNC: 133 U/L (ref 45–117)
ALT SERPL-CCNC: 16 U/L (ref 13–56)
ANION GAP SERPL CALC-SCNC: 9 MMOL/L (ref 3–18)
AST SERPL-CCNC: 13 U/L (ref 15–37)
BASOPHILS # BLD: 0 K/UL (ref 0–0.1)
BASOPHILS NFR BLD: 0 % (ref 0–2)
BILIRUB SERPL-MCNC: 0.5 MG/DL (ref 0.2–1)
BNP SERPL-MCNC: 189 PG/ML (ref 0–900)
BUN SERPL-MCNC: 19 MG/DL (ref 7–18)
BUN/CREAT SERPL: 16 (ref 12–20)
CALCIUM SERPL-MCNC: 9 MG/DL (ref 8.5–10.1)
CHLORIDE SERPL-SCNC: 103 MMOL/L (ref 100–108)
CK MB CFR SERPL CALC: ABNORMAL % (ref 0–4)
CK MB SERPL-MCNC: <1 NG/ML (ref 5–25)
CK SERPL-CCNC: 208 U/L (ref 26–192)
CO2 SERPL-SCNC: 25 MMOL/L (ref 21–32)
CREAT SERPL-MCNC: 1.22 MG/DL (ref 0.6–1.3)
D DIMER PPP FEU-MCNC: 2.25 UG/ML(FEU)
DIFFERENTIAL METHOD BLD: ABNORMAL
EOSINOPHIL # BLD: 0.2 K/UL (ref 0–0.4)
EOSINOPHIL NFR BLD: 2 % (ref 0–5)
ERYTHROCYTE [DISTWIDTH] IN BLOOD BY AUTOMATED COUNT: 13.6 % (ref 11.6–14.5)
GLOBULIN SER CALC-MCNC: 5.6 G/DL (ref 2–4)
GLUCOSE SERPL-MCNC: 280 MG/DL (ref 74–99)
HCT VFR BLD AUTO: 34.9 % (ref 35–45)
HGB BLD-MCNC: 11 G/DL (ref 12–16)
LYMPHOCYTES # BLD: 2.8 K/UL (ref 0.9–3.6)
LYMPHOCYTES NFR BLD: 39 % (ref 21–52)
MCH RBC QN AUTO: 26.2 PG (ref 24–34)
MCHC RBC AUTO-ENTMCNC: 31.5 G/DL (ref 31–37)
MCV RBC AUTO: 83.1 FL (ref 74–97)
MONOCYTES # BLD: 0.4 K/UL (ref 0.05–1.2)
MONOCYTES NFR BLD: 6 % (ref 3–10)
NEUTS SEG # BLD: 3.9 K/UL (ref 1.8–8)
NEUTS SEG NFR BLD: 53 % (ref 40–73)
PLATELET # BLD AUTO: 199 K/UL (ref 135–420)
PMV BLD AUTO: 10.9 FL (ref 9.2–11.8)
POTASSIUM SERPL-SCNC: 4.2 MMOL/L (ref 3.5–5.5)
PROT SERPL-MCNC: 8.4 G/DL (ref 6.4–8.2)
RBC # BLD AUTO: 4.2 M/UL (ref 4.2–5.3)
SODIUM SERPL-SCNC: 137 MMOL/L (ref 136–145)
TROPONIN I SERPL-MCNC: <0.02 NG/ML (ref 0–0.04)
WBC # BLD AUTO: 7.4 K/UL (ref 4.6–13.2)

## 2019-05-12 PROCEDURE — 96360 HYDRATION IV INFUSION INIT: CPT

## 2019-05-12 PROCEDURE — 93005 ELECTROCARDIOGRAM TRACING: CPT

## 2019-05-12 PROCEDURE — 74011636320 HC RX REV CODE- 636/320: Performed by: EMERGENCY MEDICINE

## 2019-05-12 PROCEDURE — 71275 CT ANGIOGRAPHY CHEST: CPT

## 2019-05-12 PROCEDURE — 80053 COMPREHEN METABOLIC PANEL: CPT

## 2019-05-12 PROCEDURE — 85379 FIBRIN DEGRADATION QUANT: CPT

## 2019-05-12 PROCEDURE — 99285 EMERGENCY DEPT VISIT HI MDM: CPT

## 2019-05-12 PROCEDURE — 82553 CREATINE MB FRACTION: CPT

## 2019-05-12 PROCEDURE — 85025 COMPLETE CBC W/AUTO DIFF WBC: CPT

## 2019-05-12 PROCEDURE — 71045 X-RAY EXAM CHEST 1 VIEW: CPT

## 2019-05-12 PROCEDURE — 74011000258 HC RX REV CODE- 258: Performed by: EMERGENCY MEDICINE

## 2019-05-12 PROCEDURE — 83880 ASSAY OF NATRIURETIC PEPTIDE: CPT

## 2019-05-12 RX ORDER — SODIUM CHLORIDE 9 MG/ML
100 INJECTION, SOLUTION INTRAVENOUS ONCE
Status: COMPLETED | OUTPATIENT
Start: 2019-05-12 | End: 2019-05-12

## 2019-05-12 RX ADMIN — IOPAMIDOL 75 ML: 755 INJECTION, SOLUTION INTRAVENOUS at 14:38

## 2019-05-12 RX ADMIN — SODIUM CHLORIDE 100 ML: 900 INJECTION, SOLUTION INTRAVENOUS at 14:39

## 2019-05-12 NOTE — ED NOTES
Assume care of patient, patient with increasing SOB over the past 2 days worse with ambulating, patient in no distress at this time

## 2019-05-12 NOTE — DISCHARGE INSTRUCTIONS

## 2019-05-12 NOTE — ED NOTES
AROLDO Santizo and self stuck patient once each, unsuccessful, Dr Odell Letters in to assist with US IV

## 2019-05-12 NOTE — ED PROVIDER NOTES
EMERGENCY DEPARTMENT HISTORY AND PHYSICAL EXAM 
 
Date: 5/12/2019 Patient Name: Liyah Hernández History of Presenting Illness Chief Complaint Patient presents with  Shortness of Breath History Provided By: Patient 11:19 AM 
Liyah Hernández is a 79 y.o. female with PMHX of seizures and diabetes who presents to the emergency department C/O concern for worsening shortness of breath over the last 24 hours. Denies chest pain nausea vomiting fevers or chills or any other acute symptoms. The discomfort is worse with exertion. She denies having something similar to this in the past.   
 
PCP: Farideh Milan MD 
 
Current Outpatient Medications Medication Sig Dispense Refill  insulin aspart protamine/insulin aspart (NOVOLOG MIX 70-30FLEXPEN U-100) 100 unit/mL (70-30) inpn INJECT 60 UNITS SUBCUTANEOUSLY TWICE DAILY 30 MINUTES BEFORE BREAKFAST AND DINNER 30 mL 0  
 glucose blood VI test strips ([employer] PRESTO) strip  Strip 0  
 polyethylene glycol (MIRALAX) 17 gram/dose powder Take 17 g by mouth daily. 850 g 3  pantoprazole (PROTONIX) 40 mg tablet Take 1 Tab by mouth daily. 90 Tab 1  
 divalproex ER (DEPAKOTE ER) 500 mg ER tablet Take 1 Tab by mouth two (2) times a day. 60 Tab 3  
 losartan (COZAAR) 50 mg tablet Take 1 Tab by mouth two (2) times a day. 60 Tab 3  carvedilol (COREG) 12.5 mg tablet Take 1 Tab by mouth two (2) times daily (with meals). 60 Tab 3  
 amLODIPine (NORVASC) 5 mg tablet Take 1 Tab by mouth nightly. 30 Tab 3  
 hydroCHLOROthiazide (HYDRODIURIL) 25 mg tablet Take 1 Tab by mouth daily. 30 Tab 3  
 atorvastatin (LIPITOR) 40 mg tablet Take 1 Tab by mouth daily. 30 Tab 3  
 isosorbide mononitrate ER (IMDUR) 30 mg tablet Take 1 Tab by mouth daily. 90 Tab 1  
 furosemide (LASIX) 20 mg tablet One po daily 30 Tab 3  
 lubiPROStone (AMITIZA) 8 mcg capsule Take 8 mcg by mouth daily (with breakfast).  aspirin-dipyridamole (AGGRENOX)  mg per SR capsule Take 1 Cap by mouth two (2) times a day. 180 Cap 1  
 bisacodyl (DULCOLAX, BISACODYL,) 10 mg suppository Insert 10 mg into rectum daily. 10 Suppository 1  
 lactulose (CHRONULAC) 10 gram/15 mL solution Take 45 mL by mouth nightly. 480 mL 3  
 insulin lispro (HUMALOG) 100 unit/mL injection 6 Units by SubCUTAneous route Before breakfast, lunch, and dinner. 2 Vial 5  
 letrozole (FEMARA) 2.5 mg tablet Take 2.5 mg by mouth daily.  SONAFINE emul topical     
 cholecalciferol (VITAMIN D3) 1,000 unit tablet Take 1,000 Units by mouth daily.  therapeutic multivitamin (THERAGRAN) tablet Take 1 Tab by mouth daily.  clotrimazole (LOTRIMIN) 1 % topical cream Apply  to affected area two (2) times a day. 45 g 3 Past History Past Medical History: 
Past Medical History:  
Diagnosis Date  Arthritis  Breast CA ([address]  Breast CA ([address] 2016  Diabetes ([address]  Hypercholesteremia  Hypertension  Menopause  Psychiatric disorder DEPRESSION  
 PUD (peptic ulcer disease)  S/P cardiac cath 02/2015 No angiographic evidence of CAD  Seizures ([address] LAST SEIZURE ABOUT 2 MONTHS AGO (NOV 2015)  Stroke ([address] x2 with left side deficit Past Surgical History: 
Past Surgical History:  
Procedure Laterality Date  HX BREAST LUMPECTOMY Right 1/12/2016 Dr. Allyson Painting Partial Mastectomy w./ axillary lymphadenectomy  HX HEENT    
 TONSILLECTOMY  HX MASTECTOMY Right 1/26/2016 Dr. Allyson Painting Repeat Partial Mastectomy for positive margins  HX ORTHOPAEDIC    
 left arm surg  IR MEDIPORT Family History: 
Family History Problem Relation Age of Onset  Cancer Father [de-identified]  
     colon  Hypertension Mother  Heart Disease Mother  Diabetes Mother Social History: 
Social History Tobacco Use  Smoking status: Never Smoker  Smokeless tobacco: Never Used Substance Use Topics  Alcohol use: No  
 Drug use: No  
 
 
Allergies: Allergies Allergen Reactions  Metformin Other (comments) Patient states that she is not allergic to metformin. Review of Systems Review of Systems Constitutional: Negative for fever. HENT: Negative for ear pain and hearing loss. Eyes: Negative for pain and visual disturbance. Respiratory: Positive for shortness of breath. Cardiovascular: Negative for chest pain. Gastrointestinal: Negative for abdominal pain. Genitourinary: Negative for difficulty urinating and dysuria. Neurological: Negative for dizziness, light-headedness and headaches. Physical Exam  
 
Vitals:  
 05/12/19 1345 05/12/19 1400 05/12/19 1445 05/12/19 1500 BP: 176/65 171/61 159/64 (!) 157/116 Pulse: 78 78 80 76 Resp:      
Temp:      
SpO2: 100% 100% 100% 100% Weight:      
Height:      
 
Physical Exam  
Constitutional: She is oriented to person, place, and time. She appears well-developed. HENT:  
Head: Normocephalic and atraumatic. Eyes: Pupils are equal, round, and reactive to light. Neck: Normal range of motion. Cardiovascular: Normal rate and regular rhythm. Pulmonary/Chest: Effort normal and breath sounds normal. No stridor. Abdominal: Soft. She exhibits no distension. There is no tenderness. Neurological: She is oriented to person, place, and time. Skin: No rash noted. Psychiatric: She has a normal mood and affect. Nursing note and vitals reviewed. Diagnostic Study Results Labs - Recent Results (from the past 12 hour(s)) EKG, 12 LEAD, INITIAL Collection Time: 05/12/19 11:22 AM  
Result Value Ref Range Ventricular Rate 88 BPM  
 Atrial Rate 88 BPM  
 P-R Interval 126 ms  
 QRS Duration 84 ms Q-T Interval 350 ms QTC Calculation (Bezet) 423 ms Calculated P Axis 52 degrees Calculated R Axis 0 degrees Calculated T Axis 22 degrees Diagnosis Normal sinus rhythm Voltage criteria for left ventricular hypertrophy Abnormal ECG When compared with ECG of 13-MAR-2019 19:37, 
ST elevation now present in Anterior leads EKG, 12 LEAD, SUBSEQUENT Collection Time: 05/12/19 12:10 PM  
Result Value Ref Range Ventricular Rate 81 BPM  
 Atrial Rate 81 BPM  
 P-R Interval 134 ms QRS Duration 84 ms Q-T Interval 358 ms QTC Calculation (Bezet) 415 ms Calculated P Axis 60 degrees Calculated R Axis 4 degrees Calculated T Axis 14 degrees Diagnosis Normal sinus rhythm Voltage criteria for left ventricular hypertrophy Abnormal ECG When compared with ECG of 12-MAY-2019 11:22, No significant change was found CBC WITH AUTOMATED DIFF Collection Time: 05/12/19 12:15 PM  
Result Value Ref Range WBC 7.4 4.6 - 13.2 K/uL  
 RBC 4.20 4. 20 - 5.30 M/uL  
 HGB 11.0 (L) 12.0 - 16.0 g/dL HCT 34.9 (L) 35.0 - 45.0 % MCV 83.1 74.0 - 97.0 FL  
 MCH 26.2 24.0 - 34.0 PG  
 MCHC 31.5 31.0 - 37.0 g/dL  
 RDW 13.6 11.6 - 14.5 % PLATELET 070 285 - 626 K/uL MPV 10.9 9.2 - 11.8 FL  
 NEUTROPHILS 53 40 - 73 % LYMPHOCYTES 39 21 - 52 % MONOCYTES 6 3 - 10 % EOSINOPHILS 2 0 - 5 % BASOPHILS 0 0 - 2 %  
 ABS. NEUTROPHILS 3.9 1.8 - 8.0 K/UL  
 ABS. LYMPHOCYTES 2.8 0.9 - 3.6 K/UL  
 ABS. MONOCYTES 0.4 0.05 - 1.2 K/UL  
 ABS. EOSINOPHILS 0.2 0.0 - 0.4 K/UL  
 ABS. BASOPHILS 0.0 0.0 - 0.1 K/UL  
 DF AUTOMATED METABOLIC PANEL, COMPREHENSIVE Collection Time: 05/12/19 12:15 PM  
Result Value Ref Range Sodium 137 136 - 145 mmol/L Potassium 4.2 3.5 - 5.5 mmol/L Chloride 103 100 - 108 mmol/L  
 CO2 25 21 - 32 mmol/L Anion gap 9 3.0 - 18 mmol/L Glucose 280 (H) 74 - 99 mg/dL BUN 19 (H) 7.0 - 18 MG/DL Creatinine 1.22 0.6 - 1.3 MG/DL  
 BUN/Creatinine ratio 16 12 - 20 GFR est AA 53 (L) >60 ml/min/1.73m2 GFR est non-AA 44 (L) >60 ml/min/1.73m2  Calcium 9.0 8.5 - 10.1 MG/DL  
 Bilirubin, total 0.5 0.2 - 1.0 MG/DL  
 ALT (SGPT) 16 13 - 56 U/L  
 AST (SGOT) 13 (L) 15 - 37 U/L Alk. phosphatase 133 (H) 45 - 117 U/L Protein, total 8.4 (H) 6.4 - 8.2 g/dL Albumin 2.8 (L) 3.4 - 5.0 g/dL Globulin 5.6 (H) 2.0 - 4.0 g/dL A-G Ratio 0.5 (L) 0.8 - 1.7 CARDIAC PANEL,(CK, CKMB & TROPONIN) Collection Time: 05/12/19 12:15 PM  
Result Value Ref Range  (H) 26 - 192 U/L  
 CK - MB <1.0 <3.6 ng/ml CK-MB Index  0.0 - 4.0 % CALCULATION NOT PERFORMED WHEN RESULT IS BELOW LINEAR LIMIT Troponin-I, QT <0.02 0.0 - 0.045 NG/ML  
NT-PRO BNP Collection Time: 05/12/19 12:15 PM  
Result Value Ref Range NT pro- 0 - 900 PG/ML  
D DIMER Collection Time: 05/12/19 12:15 PM  
Result Value Ref Range D DIMER 2.25 (H) <0.46 ug/ml(FEU) EKG, 12 LEAD, INITIAL Collection Time: 05/12/19 12:46 PM  
Result Value Ref Range Ventricular Rate 85 BPM  
 Atrial Rate 85 BPM  
 P-R Interval 134 ms QRS Duration 86 ms  
 Q-T Interval 358 ms QTC Calculation (Bezet) 426 ms Calculated P Axis 58 degrees Calculated R Axis 6 degrees Calculated T Axis 36 degrees Diagnosis Normal sinus rhythm Voltage criteria for left ventricular hypertrophy Abnormal ECG When compared with ECG of 12-MAY-2019 12:10, No significant change was found Radiologic Studies -  
XR CHEST PORT Final Result IMPRESSION:  
  
1. No acute cardiopulmonary process. CTA CHEST W OR W WO CONT    (Results Pending) CT Results  (Last 48 hours) None CXR Results  (Last 48 hours) 05/12/19 1120  XR CHEST PORT Final result Impression:  IMPRESSION:  
   
1. No acute cardiopulmonary process. Narrative:  EXAM: XR CHEST PORT  
   
CLINICAL INDICATION/HISTORY: SOB  
-Additional: None COMPARISON: 10/23/2016, 4/27/2016 TECHNIQUE: Frontal view of the chest  
   
_______________ FINDINGS:  
   
 HEART AND MEDIASTINUM: Midline cardiac silhouette, top normal in size. Unremarkable hilar vascular structures. LUNGS AND PLEURAL SPACES: No focal consolidation, parenchymal opacity. No  
pneumothorax or pleural effusion. BONY THORAX AND SOFT TISSUES: Right axillary surgical clips are present. No  
acute or destructive osseous abnormality. _______________ Medications given in the ED- Medications  
iopamidol (ISOVUE-370) 76 % injection 75 mL (75 mL IntraVENous Given 5/12/19 1438)  
0.9% sodium chloride infusion 100 mL (0 mL IntraVENous IV Completed 5/12/19 1440) Medical Decision Making I am the first provider for this patient. I reviewed the vital signs, available nursing notes, past medical history, past surgical history, family history and social history. Vital Signs-Reviewed the patient's vital signs. EKG interpretation: (Preliminary) EKG read by Dr David Rosado at 11:24 AM  
EKG shows a normal sinus rhythm with a rate of 88 no acute signs of ischemia she does have some mild left ventricular hypertrophy Records Reviewed: Nursing Notes and Old Medical Records Provider Notes (Medical Decision Making): Sharon Mae is a 79 y.o. female shortness of breath the last 24 hours. On exam she has clear lung fields. She has oxygen saturation percent on room air and is tachypneic but not tachycardic. Will check basic labs with a EKG and a d-dimer and cardiac panel and reevaluate. Procedures: 
Procedures ED Course:  
Patient's labs were unremarkable except for an elevated d-dimer. The CTA of her chest did not show any acute abnormality that would explain her shortness of breath. There is no signs of heart failure. Patient was instructed to follow-up with her PCM in the next 24 to 48 hours but return if she had worsening symptoms or concerns Diagnosis and Disposition DISCHARGE NOTE: 
 
 Jojo Rank  results have been reviewed with her. She has been counseled regarding her diagnosis, treatment, and plan. She verbally conveys understanding and agreement of the signs, symptoms, diagnosis, treatment and prognosis and additionally agrees to follow up as discussed. She also agrees with the care-plan and conveys that all of her questions have been answered. I have also provided discharge instructions for her that include: educational information regarding their diagnosis and treatment, and list of reasons why they would want to return to the ED prior to their follow-up appointment, should her condition change. She has been provided with education for proper emergency department utilization. CLINICAL IMPRESSION: 
 
1. Dyspnea, unspecified type PLAN: 
1. D/C Home 2. Current Discharge Medication List  
  
 
3. Follow-up Information Follow up With Specialties Details Why Contact Info Obdulio Farley MD Internal Medicine  Follow up with your [facility] on monday for further evaluation of your shortness of breath and your blood pressure is mildly elevated I recommend you talk to your [facility] about this as well  [id number] [place] [id number] 
[phone] [facility] EMERGENCY DEPT Emergency Medicine  As needed, If symptoms worsen [address] [facility] 
[phone]  
  
 
_______________________________ Please note that this dictation was completed with [employer], the computer voice recognition software. Quite often unanticipated grammatical, syntax, homophones, and other interpretive errors are inadvertently transcribed by the computer software. Please disregard these errors. Please excuse any errors that have escaped final proofreading.

## 2019-05-13 LAB
ATRIAL RATE: 81 BPM
ATRIAL RATE: 85 BPM
ATRIAL RATE: 88 BPM
CALCULATED P AXIS, ECG09: 52 DEGREES
CALCULATED P AXIS, ECG09: 58 DEGREES
CALCULATED P AXIS, ECG09: 60 DEGREES
CALCULATED R AXIS, ECG10: 0 DEGREES
CALCULATED R AXIS, ECG10: 4 DEGREES
CALCULATED R AXIS, ECG10: 6 DEGREES
CALCULATED T AXIS, ECG11: 14 DEGREES
CALCULATED T AXIS, ECG11: 22 DEGREES
CALCULATED T AXIS, ECG11: 36 DEGREES
DIAGNOSIS, 93000: NORMAL
P-R INTERVAL, ECG05: 126 MS
P-R INTERVAL, ECG05: 134 MS
P-R INTERVAL, ECG05: 134 MS
Q-T INTERVAL, ECG07: 350 MS
Q-T INTERVAL, ECG07: 358 MS
Q-T INTERVAL, ECG07: 358 MS
QRS DURATION, ECG06: 84 MS
QRS DURATION, ECG06: 84 MS
QRS DURATION, ECG06: 86 MS
QTC CALCULATION (BEZET), ECG08: 415 MS
QTC CALCULATION (BEZET), ECG08: 423 MS
QTC CALCULATION (BEZET), ECG08: 426 MS
VENTRICULAR RATE, ECG03: 81 BPM
VENTRICULAR RATE, ECG03: 85 BPM
VENTRICULAR RATE, ECG03: 88 BPM

## 2019-06-21 ENCOUNTER — OFFICE VISIT (OUTPATIENT)
Dept: FAMILY MEDICINE CLINIC | Age: 69
End: 2019-06-21

## 2019-06-21 ENCOUNTER — HOSPITAL ENCOUNTER (OUTPATIENT)
Dept: GENERAL RADIOLOGY | Age: 69
Discharge: HOME | End: 2019-06-21
Attending: NURSE PRACTITIONER
Payer: MEDICARE

## 2019-06-21 VITALS
SYSTOLIC BLOOD PRESSURE: 122 MMHG | WEIGHT: 240 LBS | BODY MASS INDEX: 37.67 KG/M2 | RESPIRATION RATE: 20 BRPM | DIASTOLIC BLOOD PRESSURE: 82 MMHG | OXYGEN SATURATION: 100 % | HEART RATE: 89 BPM | HEIGHT: 67 IN | TEMPERATURE: 98.6 F

## 2019-06-21 DIAGNOSIS — C50.011 MALIGNANT NEOPLASM OF AREOLA OF RIGHT BREAST IN FEMALE, UNSPECIFIED ESTROGEN RECEPTOR STATUS (HCC): ICD-10-CM

## 2019-06-21 DIAGNOSIS — E78.00 HYPERCHOLESTEREMIA: ICD-10-CM

## 2019-06-21 DIAGNOSIS — M25.561 CHRONIC PAIN OF RIGHT KNEE: ICD-10-CM

## 2019-06-21 DIAGNOSIS — E66.01 SEVERE OBESITY (BMI 35.0-39.9) WITH COMORBIDITY (HCC): ICD-10-CM

## 2019-06-21 DIAGNOSIS — G89.29 CHRONIC PAIN OF RIGHT KNEE: ICD-10-CM

## 2019-06-21 DIAGNOSIS — E11.40 TYPE 2 DIABETES MELLITUS WITH DIABETIC NEUROPATHY, WITH LONG-TERM CURRENT USE OF INSULIN (HCC): Primary | ICD-10-CM

## 2019-06-21 DIAGNOSIS — Z79.4 TYPE 2 DIABETES MELLITUS WITH DIABETIC NEUROPATHY, WITH LONG-TERM CURRENT USE OF INSULIN (HCC): Primary | ICD-10-CM

## 2019-06-21 DIAGNOSIS — M54.12 CERVICAL RADICULOPATHY: ICD-10-CM

## 2019-06-21 DIAGNOSIS — I10 ESSENTIAL HYPERTENSION: ICD-10-CM

## 2019-06-21 DIAGNOSIS — M79.671 PAIN IN BOTH FEET: ICD-10-CM

## 2019-06-21 DIAGNOSIS — M79.672 PAIN IN BOTH FEET: ICD-10-CM

## 2019-06-21 PROCEDURE — 73564 X-RAY EXAM KNEE 4 OR MORE: CPT

## 2019-06-21 RX ORDER — INSULIN ASPART 100 [IU]/ML
INJECTION, SUSPENSION SUBCUTANEOUS
Qty: 30 ML | Refills: 0 | Status: SHIPPED | OUTPATIENT
Start: 2019-06-21 | End: 2019-09-26 | Stop reason: SDUPTHER

## 2019-06-21 NOTE — PROGRESS NOTES
1. Have you been to the ER, urgent care clinic since your last visit? Hospitalized since your last visit? No    2. Have you seen or consulted any other health care providers outside of the [address] [place] [address] since your last visit? Include any pap smears or colon screening.  No

## 2019-06-21 NOTE — PROGRESS NOTES
Subjective     Patient ID:  Glenys Shone is a 79 y.o. ( 1950) female who presents for the following:   Establish Care; Leg Swelling; and Pain (Chronic)      HPI  Previous PCP was Dr. Victorino Lange. Presents to establish care and for chronic disease follow-up. Here with her significant other. Breast cancer:  History of right lumpectomy a couple years ago. Right side back pain and right arm pain since then. Having swelling in the lateral breast as well. Followed up with her breast surgeon 6-7 months ago. No self medication for the pain. Diabetes, type 2 follow up:  Taking medications as prescribed: Yes  Following diabetic diet: Yes  Exercise: No , limited by leg pain  Checking blood sugar: Yes 160s to low 200s  Symptoms of hyperglycemia: blurry vision and headaches  Symptoms of hypoglycemia: none  Sees podiatry: No , wants to see podiatrist, having foot pain  Eye exam within the last year: was done this year and result was normal.    Hypertension follow up:  Taking medications as prescribed: Yes  Checking BP at home: No   Symptoms: none  Low sodium diet: Yes  Exercise: No     Seizure disorder:  Followed by neurologist    Right knee pain:  Pain and swelling for many years. Getting worse. Stiffness. Limited mobility. Review of Systems   Constitutional: Negative for appetite change, diaphoresis, fatigue and unexpected weight change. Eyes: Positive for visual disturbance. Respiratory: Negative for cough, chest tightness and shortness of breath. Cardiovascular: Negative for chest pain, palpitations and leg swelling. Gastrointestinal: Negative for abdominal distention, abdominal pain, blood in stool, constipation, diarrhea, nausea, rectal pain and vomiting. Endocrine: Negative for polydipsia, polyphagia and polyuria. Genitourinary: Negative for decreased urine volume, dysuria and frequency. Musculoskeletal: Positive for arthralgias and neck pain.  Negative for back pain, gait problem, joint swelling, myalgias and neck stiffness. Skin: Negative for rash and wound. Neurological: Positive for seizures (None recently), weakness (legs) and headaches. Negative for dizziness, speech difficulty, light-headedness and numbness. Psychiatric/Behavioral: Negative for dysphoric mood and sleep disturbance. The patient is not nervous/anxious. Past Medical History, Past Surgery History, Allergies, Social History, and Family History were reviewed and updated.       Past Medical History:   Diagnosis Date    Arthritis     [facility]     Breast CA ([facility] 2016    Diabetes ([facility]     Hypercholesteremia     Hypertension     Menopause     Psychiatric disorder     DEPRESSION    PUD (peptic ulcer disease)     S/P cardiac cath 02/2015    No angiographic evidence of CAD    Seizures (HCC)     LAST SEIZURE ABOUT 2 MONTHS AGO (NOV 2015)    Stroke ([facility]     x2 with left side deficit     Past Surgical History:   Procedure Laterality Date    HX BREAST LUMPECTOMY Right 1/12/2016    Dr. Sheng Solomon Partial Mastectomy w./ axillary lymphadenectomy    HX HEENT      TONSILLECTOMY    HX MASTECTOMY Right 1/26/2016    Dr. Sheng Solomon Repeat Partial Mastectomy for positive margins    HX ORTHOPAEDIC      left arm surg    IR MEDIPORT       Family History   Problem Relation Age of Onset    Cancer Father [de-identified]        colon    Hypertension Mother     Heart Disease Mother     Diabetes Mother      Social History     Socioeconomic History    Marital status: SINGLE     Spouse name: Not on file    Number of children: Not on file    Years of education: Not on file    Highest education level: Not on file   Occupational History    Not on file   Social Needs    Financial resource strain: Not on file    Food insecurity:     Worry: Not on file     Inability: Not on file    Transportation needs:     Medical: Not on file     Non-medical: Not on file   Tobacco Use    Smoking status: Never Smoker    Smokeless tobacco: Never Used   Substance and Sexual Activity    Alcohol use: No    Drug use: No    Sexual activity: Yes     Partners: Male   Lifestyle    Physical activity:     Days per week: Not on file     Minutes per session: Not on file    Stress: Not on file   Relationships    Social connections:     Talks on phone: Not on file     Gets together: Not on file     Attends [employer] service: Not on file     Active member of club or organization: Not on file     Attends meetings of clubs or organizations: Not on file     Relationship status: Not on file    Intimate partner violence:     Fear of current or ex partner: Not on file     Emotionally abused: Not on file     Physically abused: Not on file     Forced sexual activity: Not on file   Other Topics Concern     Service No    Blood Transfusions No    Caffeine Concern No    Occupational Exposure No    Hobby Hazards No    Sleep Concern Yes    Stress Concern No    Weight Concern Not Asked    Special Diet Not Asked    Back Care Not Asked    Exercise Yes     Comment: walks using cane    Bike Helmet Not Asked    Seat Belt Yes    Self-Exams Yes   Social History Narrative    Not on file     Allergies   Allergen Reactions    Metformin Other (comments)     Patient states that she is not allergic to metformin. Current Outpatient Medications on File Prior to Visit   Medication Sig Dispense Refill    glucose blood VI test strips (WAVESENSE PRESTO) strip  Strip 0    pantoprazole (PROTONIX) 40 mg tablet Take 1 Tab by mouth daily. 90 Tab 1    divalproex ER (DEPAKOTE ER) 500 mg ER tablet Take 1 Tab by mouth two (2) times a day. 60 Tab 3    losartan (COZAAR) 50 mg tablet Take 1 Tab by mouth two (2) times a day. 60 Tab 3    carvedilol (COREG) 12.5 mg tablet Take 1 Tab by mouth two (2) times daily (with meals). 60 Tab 3    amLODIPine (NORVASC) 5 mg tablet Take 1 Tab by mouth nightly.  30 Tab 3    hydroCHLOROthiazide (HYDRODIURIL) 25 mg tablet Take 1 Tab by mouth daily. 30 Tab 3    atorvastatin (LIPITOR) 40 mg tablet Take 1 Tab by mouth daily. 30 Tab 3    isosorbide mononitrate ER (IMDUR) 30 mg tablet Take 1 Tab by mouth daily. 90 Tab 1    furosemide (LASIX) 20 mg tablet One po daily 30 Tab 3    lubiPROStone (AMITIZA) 8 mcg capsule Take 8 mcg by mouth daily (with breakfast).  aspirin-dipyridamole (AGGRENOX)  mg per SR capsule Take 1 Cap by mouth two (2) times a day. 180 Cap 1    letrozole (FEMARA) 2.5 mg tablet Take 2.5 mg by mouth daily.  SONAFINE emul topical       cholecalciferol (VITAMIN D3) 1,000 unit tablet Take 1,000 Units by mouth daily.  therapeutic multivitamin (THERAGRAN) tablet Take 1 Tab by mouth daily.  clotrimazole (LOTRIMIN) 1 % topical cream Apply  to affected area two (2) times a day. 45 g 3     No current facility-administered medications on file prior to visit. Objective     Visit Vitals  /82   Pulse 89   Temp 98.6 °F (37 °C) (Oral)   Resp 20   Ht 5' 7\" (1.702 m)   Wt 240 lb (108.9 kg)   SpO2 100%   BMI 37.59 kg/m²     No LMP recorded. Patient is postmenopausal.    Physical Exam   Constitutional: She is oriented to person, place, and time. She appears well-developed and well-nourished. No distress. Eyes: Conjunctivae and EOM are normal. Pupils are equal, round, and reactive to light. Neck: Carotid bruit is not present. Cardiovascular: Normal rate, regular rhythm, normal heart sounds, intact distal pulses and normal pulses. No murmur heard. Pulmonary/Chest: Effort normal and breath sounds normal. No respiratory distress. Abdominal: Soft. Normal appearance and bowel sounds are normal. She exhibits no distension, no ascites and no mass. There is no hepatosplenomegaly. There is no tenderness. Musculoskeletal: She exhibits no edema. Right knee: She exhibits decreased range of motion and swelling.  She exhibits no effusion, no deformity, no laceration, no erythema, normal alignment and no LCL laxity. Tenderness found. Cervical back: She exhibits decreased range of motion, tenderness and pain (With movement). Neurological: She is alert and oriented to person, place, and time. Skin: Skin is warm and dry. No rash noted. She is not diaphoretic. Psychiatric: She has a normal mood and affect. Diabetic Foot Exam  History  History of foot ulcers, wounds, or infections? No   History of peripheral vascular disease? No   Current symptoms? Yes foot pain   exam  Skin and nail inspection: within normal limits  Color: within normal limits  Deformities? No   Range of motion and strength: within normal limits  Matlock Touch Test: within normal limits  Pulses: present 2+  Temperature: within normal limits  Follow up  Referred to podiatry: No   Education for home foot care given: Yes        LABS     TESTS      Assessment and Plan     1. Type 2 diabetes mellitus with diabetic neuropathy, with long-term current use of insulin (HCC)  Fasting labs ordered. Further plan after results. Discussed and encouraged diet low in sugars and carbs, high in healthy protein sources and vegetables, regular physical activity, daily foot checks, and yearly eye exams.    - CBC WITH AUTOMATED DIFF; Future  - HEMOGLOBIN A1C W/O EAG; Future  - METABOLIC PANEL, COMPREHENSIVE; Future  - MICROALBUMIN, UR, RAND W/ MICROALB/CREAT RATIO; Future  - URINALYSIS W/ RFLX MICROSCOPIC; Future  - insulin aspart protamine/insulin aspart (NOVOLOG MIX 70-30FLEXPEN U-100) 100 unit/mL (70-30) inpn; INJECT 60 UNITS SUBCUTANEOUSLY TWICE DAILY 30 MINUTES BEFORE BREAKFAST AND DINNER  Dispense: 30 mL; Refill: 0  - REFERRAL TO PODIATRY    2. Cervical radiculopathy  OTC analgesics, stretching and range of motion. May use topical heat/ice. PT if no improvement. 3. Essential hypertension  Well-controlled. Continue current medication. 4. Hypercholesteremia    - LIPID PANEL; Future    5.  Chronic pain of right knee  OTC analgesics as needed. Further plan after x-ray results. - XR KNEE RT MIN 4 V; Future    6. Pain in both feet    - REFERRAL TO PODIATRY    7. Severe obesity (BMI 35.0-39. 9) with comorbidity ([address]  Encouraged healthy diet exercise and weight loss. 8. Malignant neoplasm of areola of right breast in female, unspecified estrogen receptor status ([address]  Continue follow-up with oncology. She is not sure what medication she is taking and which she needs refills of. She was instructed to bring the list of current medication to her follow-up appointment. Follow-up and Dispositions    · Return in about 2 weeks (around 7/5/2019), or lab review and medication follow up. Risks, benefits, and alternatives of the medications and treatment plan prescribed today were discussed, and patient expressed understanding. Printed after visit summary was given to patient and reviewed. All patient questions and concerns were addressed. Plan follow-up as discussed or as needed if any worsening symptoms or change in condition.            Signed electronically by Krystina Arthur DNP, NILSON-BC

## 2019-06-21 NOTE — PATIENT INSTRUCTIONS
Return fasting for labs. Then follow up in about 1-2 weeks to review results. Bring current medication list and blood sugar logs. Neck: Exercises  Your Care Instructions  Here are some examples of typical rehabilitation exercises for your condition. Start each exercise slowly. Ease off the exercise if you start to have pain. Your doctor or physical therapist will tell you when you can start these exercises and which ones will work best for you. How to do the exercises  Neck stretch    1. This stretch works best if you keep your shoulder down as you lean away from it. To help you remember to do this, start by relaxing your shoulders and lightly holding on to your thighs or your chair. 2. Tilt your head toward your shoulder and hold for 15 to 30 seconds. Let the weight of your head stretch your muscles. 3. If you would like a little added stretch, use your hand to gently and steadily pull your head toward your shoulder. For example, keeping your right shoulder down, lean your head to the left. 4. Repeat 2 to 4 times toward each shoulder. Diagonal neck stretch    1. Turn your head slightly toward the direction you will be stretching, and tilt your head diagonally toward your chest and hold for 15 to 30 seconds. 2. If you would like a little added stretch, use your hand to gently and steadily pull your head forward on the diagonal.  3. Repeat 2 to 4 times toward each side. Dorsal glide stretch    1. Sit or stand tall and look straight ahead. 2. Slowly tuck your chin as you glide your head backward over your body  3. Hold for a count of 6, and then relax for up to 10 seconds. 4. Repeat 8 to 12 times. Chest and shoulder stretch    1. Sit or stand tall and glide your head backward as in the dorsal glide stretch. 2. Raise both arms so that your hands are next to your ears. 3. Take a deep breath, and as you breathe out, lower your elbows down and behind your back.  You will feel your shoulder blades slide down and together, and at the same time you will feel a stretch across your chest and the front of your shoulders. 4. Hold for about 6 seconds, and then relax for up to 10 seconds. 5. Repeat 8 to 12 times. Strengthening: Hands on head    1. Move your head backward, forward, and side to side against gentle pressure from your hands, holding each position for about 6 seconds. 2. Repeat 8 to 12 times. Follow-up care is a key part of your treatment and safety. Be sure to make and go to all appointments, and call your doctor if you are having problems. It's also a good idea to know your test results and keep a list of the medicines you take. Where can you learn more? Go to http://mady-travon.info/. Enter P975 in the search box to learn more about \"Neck: Exercises. \"  Current as of: September 20, 2018  Content Version: 11.9  © 2629-0492 [employer]. Care instructions adapted under license by [employer] (which disclaims liability or warranty for this information). If you have questions about a medical condition or this instruction, always ask your healthcare professional. [address] any warranty or liability for your use of this information.

## 2019-07-01 ENCOUNTER — HOSPITAL ENCOUNTER (OUTPATIENT)
Dept: LAB | Age: 69
Discharge: HOME | End: 2019-07-01
Payer: MEDICARE

## 2019-07-01 LAB
ALBUMIN SERPL-MCNC: 3.2 G/DL (ref 3.4–5)
ALBUMIN/GLOB SERPL: 0.7 {RATIO} (ref 0.8–1.7)
ALP SERPL-CCNC: 124 U/L (ref 45–117)
ALT SERPL-CCNC: 17 U/L (ref 13–56)
ANION GAP SERPL CALC-SCNC: 10 MMOL/L (ref 3–18)
AST SERPL-CCNC: 12 U/L (ref 15–37)
BASOPHILS # BLD: 0 K/UL (ref 0–0.1)
BASOPHILS NFR BLD: 0 % (ref 0–2)
BILIRUB SERPL-MCNC: 0.4 MG/DL (ref 0.2–1)
BUN SERPL-MCNC: 26 MG/DL (ref 7–18)
BUN/CREAT SERPL: 22 (ref 12–20)
CALCIUM SERPL-MCNC: 8.9 MG/DL (ref 8.5–10.1)
CHLORIDE SERPL-SCNC: 105 MMOL/L (ref 100–108)
CHOLEST SERPL-MCNC: 253 MG/DL
CO2 SERPL-SCNC: 24 MMOL/L (ref 21–32)
CREAT SERPL-MCNC: 1.2 MG/DL (ref 0.6–1.3)
DIFFERENTIAL METHOD BLD: ABNORMAL
EOSINOPHIL # BLD: 0.2 K/UL (ref 0–0.4)
EOSINOPHIL NFR BLD: 2 % (ref 0–5)
ERYTHROCYTE [DISTWIDTH] IN BLOOD BY AUTOMATED COUNT: 13.5 % (ref 11.6–14.5)
GLOBULIN SER CALC-MCNC: 4.7 G/DL (ref 2–4)
GLUCOSE SERPL-MCNC: 95 MG/DL (ref 74–99)
HBA1C MFR BLD: 10 % (ref 4.2–5.6)
HCT VFR BLD AUTO: 32.8 % (ref 35–45)
HDLC SERPL-MCNC: 95 MG/DL (ref 40–60)
HDLC SERPL: 2.7 {RATIO} (ref 0–5)
HGB BLD-MCNC: 10.7 G/DL (ref 12–16)
LDLC SERPL CALC-MCNC: 140.6 MG/DL (ref 0–100)
LIPID PROFILE,FLP: ABNORMAL
LYMPHOCYTES # BLD: 3.2 K/UL (ref 0.9–3.6)
LYMPHOCYTES NFR BLD: 43 % (ref 21–52)
MCH RBC QN AUTO: 26.3 PG (ref 24–34)
MCHC RBC AUTO-ENTMCNC: 32.6 G/DL (ref 31–37)
MCV RBC AUTO: 80.6 FL (ref 74–97)
MONOCYTES # BLD: 0.5 K/UL (ref 0.05–1.2)
MONOCYTES NFR BLD: 7 % (ref 3–10)
NEUTS SEG # BLD: 3.5 K/UL (ref 1.8–8)
NEUTS SEG NFR BLD: 48 % (ref 40–73)
PLATELET # BLD AUTO: 217 K/UL (ref 135–420)
PMV BLD AUTO: 11.1 FL (ref 9.2–11.8)
POTASSIUM SERPL-SCNC: 4.1 MMOL/L (ref 3.5–5.5)
PROT SERPL-MCNC: 7.9 G/DL (ref 6.4–8.2)
RBC # BLD AUTO: 4.07 M/UL (ref 4.2–5.3)
SODIUM SERPL-SCNC: 139 MMOL/L (ref 136–145)
TRIGL SERPL-MCNC: 87 MG/DL (ref ?–150)
VLDLC SERPL CALC-MCNC: 17.4 MG/DL
WBC # BLD AUTO: 7.4 K/UL (ref 4.6–13.2)

## 2019-07-01 PROCEDURE — 80053 COMPREHEN METABOLIC PANEL: CPT

## 2019-07-01 PROCEDURE — 85025 COMPLETE CBC W/AUTO DIFF WBC: CPT

## 2019-07-01 PROCEDURE — 80061 LIPID PANEL: CPT

## 2019-07-01 PROCEDURE — 83036 HEMOGLOBIN GLYCOSYLATED A1C: CPT

## 2019-07-01 PROCEDURE — 36415 COLL VENOUS BLD VENIPUNCTURE: CPT

## 2019-07-02 ENCOUNTER — HOSPITAL ENCOUNTER (OUTPATIENT)
Dept: LAB | Age: 69
Discharge: HOME | End: 2019-07-02
Payer: MEDICARE

## 2019-07-02 LAB
APPEARANCE UR: ABNORMAL
BACTERIA URNS QL MICRO: ABNORMAL /HPF
BILIRUB UR QL: NEGATIVE
COLOR UR: YELLOW
CREAT UR-MCNC: 84 MG/DL (ref 30–125)
EPITH CASTS URNS QL MICRO: ABNORMAL /LPF (ref 0–5)
GLUCOSE UR STRIP.AUTO-MCNC: 100 MG/DL
HGB UR QL STRIP: NEGATIVE
KETONES UR QL STRIP.AUTO: NEGATIVE MG/DL
LEUKOCYTE ESTERASE UR QL STRIP.AUTO: ABNORMAL
MICROALBUMIN UR-MCNC: 25.3 MG/DL (ref 0–3)
MICROALBUMIN/CREAT UR-RTO: 301 MG/G (ref 0–30)
NITRITE UR QL STRIP.AUTO: NEGATIVE
PH UR STRIP: 5.5 [PH] (ref 5–8)
PROT UR STRIP-MCNC: 30 MG/DL
RBC #/AREA URNS HPF: 0 /HPF (ref 0–5)
SP GR UR REFRACTOMETRY: 1.01 (ref 1–1.03)
UROBILINOGEN UR QL STRIP.AUTO: 1 EU/DL (ref 0.2–1)
WBC URNS QL MICRO: ABNORMAL /HPF (ref 0–4)

## 2019-07-02 PROCEDURE — 81001 URINALYSIS AUTO W/SCOPE: CPT

## 2019-07-02 PROCEDURE — 82043 UR ALBUMIN QUANTITATIVE: CPT

## 2019-07-05 ENCOUNTER — OFFICE VISIT (OUTPATIENT)
Dept: FAMILY MEDICINE CLINIC | Age: 69
End: 2019-07-05

## 2019-07-05 VITALS
WEIGHT: 239 LBS | SYSTOLIC BLOOD PRESSURE: 146 MMHG | DIASTOLIC BLOOD PRESSURE: 85 MMHG | BODY MASS INDEX: 37.51 KG/M2 | TEMPERATURE: 98.4 F | HEIGHT: 67 IN | HEART RATE: 85 BPM | OXYGEN SATURATION: 99 % | RESPIRATION RATE: 20 BRPM

## 2019-07-05 DIAGNOSIS — M17.11 OSTEOARTHRITIS OF RIGHT KNEE, UNSPECIFIED OSTEOARTHRITIS TYPE: ICD-10-CM

## 2019-07-05 DIAGNOSIS — N30.00 ACUTE CYSTITIS WITHOUT HEMATURIA: ICD-10-CM

## 2019-07-05 DIAGNOSIS — K59.00 CONSTIPATION, UNSPECIFIED CONSTIPATION TYPE: ICD-10-CM

## 2019-07-05 DIAGNOSIS — R60.0 BILATERAL LEG EDEMA: ICD-10-CM

## 2019-07-05 DIAGNOSIS — E11.21 TYPE 2 DIABETES MELLITUS WITH NEPHROPATHY (HCC): ICD-10-CM

## 2019-07-05 DIAGNOSIS — D64.9 ANEMIA, UNSPECIFIED TYPE: ICD-10-CM

## 2019-07-05 DIAGNOSIS — E11.40 TYPE 2 DIABETES MELLITUS WITH DIABETIC NEUROPATHY, WITH LONG-TERM CURRENT USE OF INSULIN (HCC): Primary | ICD-10-CM

## 2019-07-05 DIAGNOSIS — E78.00 HYPERCHOLESTEREMIA: ICD-10-CM

## 2019-07-05 DIAGNOSIS — Z79.4 TYPE 2 DIABETES MELLITUS WITH DIABETIC NEUROPATHY, WITH LONG-TERM CURRENT USE OF INSULIN (HCC): Primary | ICD-10-CM

## 2019-07-05 RX ORDER — CAPSAICIN 0.07 G/100G
CREAM TOPICAL
Qty: 60 G | Refills: 0 | Status: SHIPPED | OUTPATIENT
Start: 2019-07-05 | End: 2019-09-26 | Stop reason: SDUPTHER

## 2019-07-05 RX ORDER — CEFUROXIME AXETIL 500 MG/1
500 TABLET ORAL 2 TIMES DAILY
Qty: 10 TAB | Refills: 0 | Status: SHIPPED | OUTPATIENT
Start: 2019-07-05 | End: 2019-07-10

## 2019-07-05 RX ORDER — LUBIPROSTONE 8 UG/1
8 CAPSULE, GELATIN COATED ORAL
Qty: 30 CAP | Refills: 3 | Status: SHIPPED | OUTPATIENT
Start: 2019-07-05 | End: 2019-09-26 | Stop reason: SDUPTHER

## 2019-07-05 RX ORDER — FAMOTIDINE 20 MG/1
20 TABLET, FILM COATED ORAL
COMMUNITY
End: 2019-09-26 | Stop reason: SDUPTHER

## 2019-07-05 NOTE — PATIENT INSTRUCTIONS
Increase insulin to 65 units twice a day. Keep blood sugar log of morning fasting and before bed finger sticks and bring to follow up appointments. Knee Arthritis: Care Instructions  Your Care Instructions    Knee arthritis is a breakdown of the cartilage that cushions your knee joint. When the cartilage wears down, your bones rub against each other. This causes pain and stiffness. Knee arthritis tends to get worse with time. Treatment for knee arthritis involves reducing pain, making the leg muscles stronger, and staying at a healthy body weight. The treatment usually does not improve the health of the cartilage, but it can reduce pain and improve how well your knee works. You can take simple measures to protect your knee joints, ease your pain, and help you stay active. Follow-up care is a key part of your treatment and safety. Be sure to make and go to all appointments, and call your doctor if you are having problems. It's also a good idea to know your test results and keep a list of the medicines you take. How can you care for yourself at home? · Know that knee arthritis will cause more pain on some days than on others. · Stay at a healthy weight. Lose weight if you are overweight. When you stand up, the pressure on your knees from every pound of body weight is multiplied four times. So if you lose 10 pounds, you will reduce the pressure on your knees by 40 pounds. · Talk to your doctor or physical therapist about exercises that will help ease joint pain. ? Stretch to help prevent stiffness and to prevent injury before you exercise. You may enjoy gentle forms of yoga to help keep your knee joints and muscles flexible. ? Walk instead of jog.  ? Ride a bike. This makes your thigh muscles stronger and takes pressure off your knee. ? Wear well-fitting and comfortable shoes. ? Exercise in chest-deep water. This can help you exercise longer with less pain. ?  Avoid exercises that include squatting or kneeling. They can put a lot of strain on your knees. ? Talk to your doctor to make sure that the exercise you do is not making the arthritis worse. · Do not sit for long periods of time. Try to walk once in a while to keep your knee from getting stiff. · Ask your doctor or physical therapist whether shoe inserts may reduce your arthritis pain. · If you can afford it, get new athletic shoes at least every year. This can help reduce the strain on your knees. · Use a device to help you do everyday activities. ? A cane or walking stick can help you keep your balance when you walk. Hold the cane or walking stick in the hand opposite the painful knee. ? If you feel like you may fall when you walk, try using crutches or a front-wheeled walker. These can prevent falls that could cause more damage to your knee. ? A knee brace may help keep your knee stable and prevent pain. ? You also can use other things to make life easier, such as a higher toilet seat and handrails in the bathtub or shower. · Take pain medicines exactly as directed. ? Do not wait until you are in severe pain. You will get better results if you take it sooner. ? If you are not taking a prescription pain medicine, take an over-the-counter medicine such as acetaminophen (Tylenol), ibuprofen (Advil, Motrin), or naproxen (Aleve). Read and follow all instructions on the label. ? Do not take two or more pain medicines at the same time unless the doctor told you to. Many pain medicines have acetaminophen, which is Tylenol. Too much acetaminophen (Tylenol) can be harmful. ? Tell your doctor if you take a blood thinner, have diabetes, or have allergies to shellfish. · Ask your doctor if you might benefit from a shot of steroid medicine into your knee. This may provide pain relief for several months. · Many people take the supplements glucosamine and chondroitin for osteoarthritis.  Some people feel they help, but the medical research does not show that they work. Talk to your doctor before you take these supplements. When should you call for help? Call your doctor now or seek immediate medical care if:    · You have sudden swelling, warmth, or pain in your knee.     · You have knee pain and a fever or rash.     · You have such bad pain that you cannot use your knee.    Watch closely for changes in your health, and be sure to contact your doctor if you have any problems. Where can you learn more? Go to http://mady-travon.info/. Enter Y897 in the search box to learn more about \"Knee Arthritis: Care Instructions. \"  Current as of: Olive 10, 2018  Content Version: 11.9  © 3126-0860 [employer]. Care instructions adapted under license by [employer] (which disclaims liability or warranty for this information). If you have questions about a medical condition or this instruction, always ask your healthcare professional. [address] any warranty or liability for your use of this information.

## 2019-07-05 NOTE — PROGRESS NOTES
1. Have you been to the ER, urgent care clinic since your last visit? Hospitalized since your last visit? No    2. Have you seen or consulted any other health care providers outside of the [address] [place] [address] since your last visit? Include any pap smears or colon screening.  No

## 2019-07-05 NOTE — PROGRESS NOTES
Subjective     Patient ID:  Tiffany Holman is a 79 y.o. ( 1950) female who presents for the following: Follow-up      HPI   Presents to review lab and x-ray results and for medication refills. She has her medications with her and medication list was reviewed and updated. She reports continued moderate to severe right knee pain and stiffness. Bilateral lower extremities continue with edema. Better with elevation. Worse with extended standing. Review of Systems   Constitutional: Negative for chills, diaphoresis, fatigue and fever. HENT: Negative for congestion and sore throat. Respiratory: Negative for cough. Cardiovascular: Positive for leg swelling. Negative for chest pain and palpitations. Gastrointestinal: Negative for abdominal distention, abdominal pain, nausea and vomiting. Genitourinary: Positive for frequency and urgency. Negative for decreased urine volume, difficulty urinating, dysuria, flank pain, genital sores, hematuria, menstrual problem, pelvic pain, vaginal bleeding, vaginal discharge and vaginal pain. Musculoskeletal: Positive for arthralgias (Right knee). Negative for back pain. Neurological: Negative for dizziness, weakness, light-headedness and headaches. Past Medical History, Past Surgery History, Allergies, Social History, and Family History were reviewed and updated.       Past Medical History:   Diagnosis Date    Arthritis     Breast CA ([address]     Breast CA ([address]     Diabetes ([address]     Hypercholesteremia     Hypertension     Menopause     Psychiatric disorder     DEPRESSION    PUD (peptic ulcer disease)     S/P cardiac cath 2015    No angiographic evidence of CAD    Seizures (HCC)     LAST SEIZURE ABOUT 2 MONTHS AGO (2015)    Stroke ([address]     x2 with left side deficit     Past Surgical History:   Procedure Laterality Date    HX BREAST LUMPECTOMY Right 2016    Dr. Irvin Pro Partial Mastectomy w./ axillary lymphadenectomy    HX HEENT      TONSILLECTOMY    HX MASTECTOMY Right 1/26/2016    Dr. Breanne Kincaid Repeat Partial Mastectomy for positive margins    HX ORTHOPAEDIC      left arm surg    IR MEDIPORT       Family History   Problem Relation Age of Onset    Cancer Father [de-identified]        colon    Hypertension Mother     Heart Disease Mother     Diabetes Mother      Social History     Socioeconomic History    Marital status: SINGLE     Spouse name: Not on file    Number of children: Not on file    Years of education: Not on file    Highest education level: Not on file   Occupational History    Not on file   Social Needs    Financial resource strain: Not on file    Food insecurity:     Worry: Not on file     Inability: Not on file    Transportation needs:     Medical: Not on file     Non-medical: Not on file   Tobacco Use    Smoking status: Never Smoker    Smokeless tobacco: Never Used   Substance and Sexual Activity    Alcohol use: No    Drug use: No    Sexual activity: Yes     Partners: Male   Lifestyle    Physical activity:     Days per week: Not on file     Minutes per session: Not on file    Stress: Not on file   Relationships    Social connections:     Talks on phone: Not on file     Gets together: Not on file     Attends [employer] service: Not on file     Active member of club or organization: Not on file     Attends meetings of clubs or organizations: Not on file     Relationship status: Not on file    Intimate partner violence:     Fear of current or ex partner: Not on file     Emotionally abused: Not on file     Physically abused: Not on file     Forced sexual activity: Not on file   Other Topics Concern     Service No    Blood Transfusions No    Caffeine Concern No    Occupational Exposure No    Hobby Hazards No    Sleep Concern Yes    Stress Concern No    Weight Concern Not Asked    Special Diet Not Asked    Back Care Not Asked    Exercise Yes     Comment: walks using cane    Bike Helmet Not Asked   [address] [place] [address] Yes    Self-Exams Yes   Social History Narrative    Not on file     Allergies   Allergen Reactions    Metformin Other (comments)     Patient states that she is not allergic to metformin. Current Outpatient Medications on File Prior to Visit   Medication Sig Dispense Refill    famotidine (PEPCID) 20 mg tablet Take 20 mg by mouth two (2) times daily as needed.  insulin aspart protamine/insulin aspart (NOVOLOG MIX 70-30FLEXPEN U-100) 100 unit/mL (70-30) inpn INJECT 60 UNITS SUBCUTANEOUSLY TWICE DAILY 30 MINUTES BEFORE BREAKFAST AND DINNER 30 mL 0    pantoprazole (PROTONIX) 40 mg tablet Take 1 Tab by mouth daily. 90 Tab 1    divalproex ER (DEPAKOTE ER) 500 mg ER tablet Take 1 Tab by mouth two (2) times a day. (Patient taking differently: Take 750 mg by mouth two (2) times a day.) 60 Tab 3    losartan (COZAAR) 50 mg tablet Take 1 Tab by mouth two (2) times a day. 60 Tab 3    carvedilol (COREG) 12.5 mg tablet Take 1 Tab by mouth two (2) times daily (with meals). 60 Tab 3    amLODIPine (NORVASC) 5 mg tablet Take 1 Tab by mouth nightly. 30 Tab 3    hydroCHLOROthiazide (HYDRODIURIL) 25 mg tablet Take 1 Tab by mouth daily. 30 Tab 3    atorvastatin (LIPITOR) 40 mg tablet Take 1 Tab by mouth daily. 30 Tab 3    isosorbide mononitrate ER (IMDUR) 30 mg tablet Take 1 Tab by mouth daily. 90 Tab 1    furosemide (LASIX) 20 mg tablet One po daily 30 Tab 3    aspirin-dipyridamole (AGGRENOX)  mg per SR capsule Take 1 Cap by mouth two (2) times a day. 180 Cap 1    letrozole (FEMARA) 2.5 mg tablet Take 2.5 mg by mouth daily.  SONAFINE emul topical       cholecalciferol (VITAMIN D3) 1,000 unit tablet Take 1,000 Units by mouth daily.  therapeutic multivitamin (THERAGRAN) tablet Take 1 Tab by mouth daily. No current facility-administered medications on file prior to visit.         Objective     Visit Vitals  /85   Pulse 85   Temp 98.4 °F (36.9 °C) (Oral)   Resp 20   Ht 5' 7\" (1.702 m)   Wt 239 lb (108.4 kg)   SpO2 99%   BMI 37.43 kg/m²     No LMP recorded. Patient is postmenopausal.    Physical Exam   Constitutional: She is oriented to person, place, and time. She appears well-developed and well-nourished. No distress. Cardiovascular: Normal rate, regular rhythm, normal heart sounds and intact distal pulses. No murmur heard. Pulmonary/Chest: Effort normal and breath sounds normal. No respiratory distress. Musculoskeletal: She exhibits edema (+2 pitting edema of bilateral lower extremities). Neurological: She is alert and oriented to person, place, and time. Skin: She is not diaphoretic. Psychiatric: She has a normal mood and affect. LABS   Component      Latest Ref Rng & Units 7/2/2019 7/2/2019 7/2/2019 7/1/2019           2:41 PM  2:41 PM  2:40 PM  2:08 PM   WBC      4.6 - 13.2 K/uL       RBC      4.20 - 5.30 M/uL       HGB      12.0 - 16.0 g/dL       HCT      35.0 - 45.0 %       MCV      74.0 - 97.0 FL       MCH      24.0 - 34.0 PG       MCHC      31.0 - 37.0 g/dL       RDW      11.6 - 14.5 %       PLATELET      788 - 543 K/uL       MPV      9.2 - 11.8 FL       NEUTROPHILS      40 - 73 %       LYMPHOCYTES      21 - 52 %       MONOCYTES      3 - 10 %       EOSINOPHILS      0 - 5 %       BASOPHILS      0 - 2 %       ABS. NEUTROPHILS      1.8 - 8.0 K/UL       ABS. LYMPHOCYTES      0.9 - 3.6 K/UL       ABS. MONOCYTES      0.05 - 1.2 K/UL       ABS. EOSINOPHILS      0.0 - 0.4 K/UL       ABS.  BASOPHILS      0.0 - 0.1 K/UL       DF             Sodium      136 - 145 mmol/L       Potassium      3.5 - 5.5 mmol/L       Chloride      100 - 108 mmol/L       CO2      21 - 32 mmol/L       Anion gap      3.0 - 18 mmol/L       Glucose      74 - 99 mg/dL       BUN      7.0 - 18 MG/DL       Creatinine      0.6 - 1.3 MG/DL       BUN/Creatinine ratio      12 - 20         GFR est AA      >60 ml/min/1.73m2       GFR est non-AA      >60 ml/min/1.73m2       Calcium      8.5 - 10.1 MG/DL       Bilirubin, total      0.2 - 1.0 MG/DL       ALT (SGPT)      13 - 56 U/L       AST      15 - 37 U/L       Alk. phosphatase      45 - 117 U/L       Protein, total      6.4 - 8.2 g/dL       Albumin      3.4 - 5.0 g/dL       Globulin      2.0 - 4.0 g/dL       A-G Ratio      0.8 - 1.7         Color        YELLOW     Appearance        CLOUDY     Specific gravity      1.005 - 1.030    1.015     pH (UA)      5.0 - 8.0    5.5     Protein      NEG mg/dL  30 (A)     Glucose      NEG mg/dL  100 (A)     Ketone      NEG mg/dL  NEGATIVE     Bilirubin      NEG    NEGATIVE     Blood      NEG    NEGATIVE     Urobilinogen      0.2 - 1.0 EU/dL  1.0     Nitrites      NEG    NEGATIVE     Leukocyte Esterase      NEG    SMALL (A)     Cholesterol, total      <200 MG/DL       Triglyceride      <150 MG/DL       HDL Cholesterol      40 - 60 MG/DL       LDL, calculated      0 - 100 MG/DL       VLDL, calculated      MG/DL       CHOL/HDL Ratio      0 - 5.0         WBC      0 - 4 /hpf 10 to 15      RBC      0 - 5 /hpf 0      Epithelial cells      0 - 5 /lpf FEW      Bacteria      NEG /hpf 3+ (A)      Microalbumin,urine random      0 - 3.0 MG/DL   25.30 (H)    Creatinine, urine      30 - 125 mg/dL   84.00    Microalbumin/Creat. Ratio      0 - 30 mg/g   301 (H)    Hemoglobin A1c, (calculated)      4.2 - 5.6 %    10.0 (H)     Component      Latest Ref Rng & Units 7/1/2019 7/1/2019 7/1/2019           2:07 PM  2:07 PM  2:07 PM   WBC      4.6 - 13.2 K/uL   7.4   RBC      4.20 - 5.30 M/uL   4.07 (L)   HGB      12.0 - 16.0 g/dL   10.7 (L)   HCT      35.0 - 45.0 %   32.8 (L)   MCV      74.0 - 97.0 FL   80.6   MCH      24.0 - 34.0 PG   26.3   MCHC      31.0 - 37.0 g/dL   32.6   RDW      11.6 - 14.5 %   13.5   PLATELET      899 - 831 K/uL   217   MPV      9.2 - 11.8 FL   11.1   NEUTROPHILS      40 - 73 %   48   LYMPHOCYTES      21 - 52 %   43   MONOCYTES      3 - 10 %   7   EOSINOPHILS      0 - 5 %   2   BASOPHILS      0 - 2 %   0   ABS. NEUTROPHILS      1.8 - 8.0 K/UL   3.5   ABS. LYMPHOCYTES      0.9 - 3.6 K/UL   3.2   ABS. MONOCYTES      0.05 - 1.2 K/UL   0.5   ABS. EOSINOPHILS      0.0 - 0.4 K/UL   0.2   ABS. BASOPHILS      0.0 - 0.1 K/UL   0.0   DF         AUTOMATED   Sodium      136 - 145 mmol/L 139     Potassium      3.5 - 5.5 mmol/L 4.1     Chloride      100 - 108 mmol/L 105     CO2      21 - 32 mmol/L 24     Anion gap      3.0 - 18 mmol/L 10     Glucose      74 - 99 mg/dL 95     BUN      7.0 - 18 MG/DL 26 (H)     Creatinine      0.6 - 1.3 MG/DL 1.20     BUN/Creatinine ratio      12 - 20   22 (H)     GFR est AA      >60 ml/min/1.73m2 54 (L)     GFR est non-AA      >60 ml/min/1.73m2 45 (L)     Calcium      8.5 - 10.1 MG/DL 8.9     Bilirubin, total      0.2 - 1.0 MG/DL 0.4     ALT (SGPT)      13 - 56 U/L 17     AST      15 - 37 U/L 12 (L)     Alk. phosphatase      45 - 117 U/L 124 (H)     Protein, total      6.4 - 8.2 g/dL 7.9     Albumin      3.4 - 5.0 g/dL 3.2 (L)     Globulin      2.0 - 4.0 g/dL 4.7 (H)     A-G Ratio      0.8 - 1.7   0.7 (L)     Color            Appearance            Specific gravity      1.005 - 1.030        pH (UA)      5.0 - 8.0        Protein      NEG mg/dL      Glucose      NEG mg/dL      Ketone      NEG mg/dL      Bilirubin      NEG        Blood      NEG        Urobilinogen      0.2 - 1.0 EU/dL      Nitrites      NEG        Leukocyte Esterase      NEG        Cholesterol, total      <200 MG/DL  253 (H)    Triglyceride      <150 MG/DL  87    HDL Cholesterol      40 - 60 MG/DL  95 (H)    LDL, calculated      0 - 100 MG/DL  140.6 (H)    VLDL, calculated      MG/DL  17.4    CHOL/HDL Ratio      0 - 5.0    2.7    WBC      0 - 4 /hpf      RBC      0 - 5 /hpf      Epithelial cells      0 - 5 /lpf      Bacteria      NEG /hpf      Microalbumin,urine random      0 - 3.0 MG/DL      Creatinine, urine      30 - 125 mg/dL      Microalbumin/Creat.  Ratio      0 - 30 mg/g      Hemoglobin A1c, (calculated)      4.2 - 5.6 % TESTS      Assessment and Plan     1. Type 2 diabetes mellitus with diabetic neuropathy, with long-term current use of insulin ([facility])  A1c was 10.0. Increase NovoLog Mix 70/30 from 60 units twice a day to 65 units twice a day. Check blood sugar morning and evening, keep a log, and bring to follow-up appointments. Follow-up in 6 weeks. Discussed and encouraged diet low in sugars and carbs, high in healthy protein sources and vegetables, regular physical activity, daily foot checks, and yearly eye exams.    - glucose blood VI test strips (WAVESENSE PRESTO) strip; DM  Dispense: 100 Strip; Refill: 0    2. Type 2 diabetes mellitus with nephropathy (HCC)  Continue losartan. Avoid nephrotoxins and hydrate well. 3. Hypercholesteremia  Continue Lipitor    4. Anemia, unspecified type  Will check iron levels at next visit. 5. Osteoarthritis of right knee, unspecified osteoarthritis type    - REFERRAL TO ORTHOPEDIC SURGERY  - capsaicin 0.075 % topical cream; Apply  to affected area three (3) times daily as needed for Pain. Dispense: 60 g; Refill: 0    6. Constipation, unspecified constipation type  Well-controlled. Continue current medication.  - lubiPROStone (AMITIZA) 8 mcg capsule; Take 1 Cap by mouth daily (with breakfast). Dispense: 30 Cap; Refill: 3    7. Acute cystitis without hematuria  Follow-up if symptoms fail to resolve. - cefUROXime (CEFTIN) 500 mg tablet; Take 1 Tab by mouth two (2) times a day for 5 days. Dispense: 10 Tab; Refill: 0    8. Bilateral leg edema    - Comp Stocking,Knee,Long,Medium misc; 1 Each by Does Not Apply route once for 1 dose. Dispense: 2 Each; Refill: 0      Follow-up and Dispositions    · Return in about 6 weeks (around 8/16/2019) for Diabetes follow up. Risks, benefits, and alternatives of the medications and treatment plan prescribed today were discussed, and patient expressed understanding. Printed after visit summary was given to patient and reviewed.  All patient questions and concerns were addressed. Plan follow-up as discussed or as needed if any worsening symptoms or change in condition.            Signed electronically by Yoana Bull DNP, [email or web address]

## 2019-07-29 ENCOUNTER — TELEPHONE (OUTPATIENT)
Dept: FAMILY MEDICINE CLINIC | Age: 69
End: 2019-07-29

## 2019-07-29 ENCOUNTER — HOSPITAL ENCOUNTER (EMERGENCY)
Age: 69
Discharge: HOME | End: 2019-07-29
Attending: EMERGENCY MEDICINE
Payer: MEDICARE

## 2019-07-29 VITALS
RESPIRATION RATE: 18 BRPM | HEART RATE: 112 BPM | DIASTOLIC BLOOD PRESSURE: 90 MMHG | SYSTOLIC BLOOD PRESSURE: 155 MMHG | OXYGEN SATURATION: 100 % | TEMPERATURE: 98.5 F

## 2019-07-29 DIAGNOSIS — K04.7 INFECTED DENTAL CARIES: Primary | ICD-10-CM

## 2019-07-29 DIAGNOSIS — K02.9 INFECTED DENTAL CARIES: Primary | ICD-10-CM

## 2019-07-29 PROCEDURE — 99282 EMERGENCY DEPT VISIT SF MDM: CPT

## 2019-07-29 RX ORDER — PENICILLIN V POTASSIUM 250 MG/1
250 TABLET, FILM COATED ORAL 4 TIMES DAILY
Qty: 28 TAB | Refills: 0 | Status: SHIPPED | OUTPATIENT
Start: 2019-07-29 | End: 2019-08-05

## 2019-07-29 NOTE — TELEPHONE ENCOUNTER
Request from [facility] for Lisinopril 10 mg, Take one tablet by mouth daily. Nurse is unable to verify if pt is currently on medication.

## 2019-07-30 NOTE — ED TRIAGE NOTES
Patient comes in with left sided facial swelling that she states started on Saturday. Patient states that her teeth hurt on the left side, top and bottom.

## 2019-07-30 NOTE — ED PROVIDER NOTES
EMERGENCY DEPARTMENT HISTORY AND PHYSICAL EXAM    Date: 7/29/2019  Patient Name: Jaquan Sousa    History of Presenting Illness     Chief Complaint   Patient presents with    Dental Pain    Facial Swelling         History Provided By: Patient    9:38 PM  Jaquan Sousa is a 79 y.o. female who presents to the emergency department C/O left upper and lower dental pain, onset 3 days ago. Associated sxs include left facial swelling, onset yesterday, worsened today. Has not taken any meds. History of dental infection in the past that was similar. Pt denies fever, sore throat, difficulty swallowing, tooth injury or trauma and any other sxs or complaints. PCP: Neal Dutta NP    Current Outpatient Medications   Medication Sig Dispense Refill    penicillin v potassium (VEETID) 250 mg tablet Take 1 Tab by mouth four (4) times daily for 7 days. 28 Tab 0    famotidine (PEPCID) 20 mg tablet Take 20 mg by mouth two (2) times daily as needed.  lubiPROStone (AMITIZA) 8 mcg capsule Take 1 Cap by mouth daily (with breakfast). 30 Cap 3    glucose blood VI test strips ([employer]) strip  Strip 0    capsaicin 0.075 % topical cream Apply  to affected area three (3) times daily as needed for Pain. 60 g 0    insulin aspart protamine/insulin aspart (NOVOLOG MIX 70-30FLEXPEN U-100) 100 unit/mL (70-30) inpn INJECT 60 UNITS SUBCUTANEOUSLY TWICE DAILY 30 MINUTES BEFORE BREAKFAST AND DINNER 30 mL 0    pantoprazole (PROTONIX) 40 mg tablet Take 1 Tab by mouth daily. 90 Tab 1    divalproex ER (DEPAKOTE ER) 500 mg ER tablet Take 1 Tab by mouth two (2) times a day. (Patient taking differently: Take 750 mg by mouth two (2) times a day.) 60 Tab 3    losartan (COZAAR) 50 mg tablet Take 1 Tab by mouth two (2) times a day. 60 Tab 3    carvedilol (COREG) 12.5 mg tablet Take 1 Tab by mouth two (2) times daily (with meals). 60 Tab 3    amLODIPine (NORVASC) 5 mg tablet Take 1 Tab by mouth nightly.  30 Tab 3    hydroCHLOROthiazide (HYDRODIURIL) 25 mg tablet Take 1 Tab by mouth daily. 30 Tab 3    atorvastatin (LIPITOR) 40 mg tablet Take 1 Tab by mouth daily. 30 Tab 3    isosorbide mononitrate ER (IMDUR) 30 mg tablet Take 1 Tab by mouth daily. 90 Tab 1    furosemide (LASIX) 20 mg tablet One po daily 30 Tab 3    aspirin-dipyridamole (AGGRENOX)  mg per SR capsule Take 1 Cap by mouth two (2) times a day. 180 Cap 1    letrozole (FEMARA) 2.5 mg tablet Take 2.5 mg by mouth daily.  SONAFINE emul topical       cholecalciferol (VITAMIN D3) 1,000 unit tablet Take 1,000 Units by mouth daily.  therapeutic multivitamin (THERAGRAN) tablet Take 1 Tab by mouth daily. Past History     Past Medical History:  Past Medical History:   Diagnosis Date    Arthritis     Breast CA ([address]     Breast CA ([address] 2016    Diabetes [facility]     Hypercholesteremia     Hypertension     Menopause     Psychiatric disorder     DEPRESSION    PUD (peptic ulcer disease)     S/P cardiac cath 02/2015    No angiographic evidence of CAD    Seizures (HCC)     LAST SEIZURE ABOUT 2 MONTHS AGO (NOV 2015)    Stroke ([facility]     x2 with left side deficit       Past Surgical History:  Past Surgical History:   Procedure Laterality Date    HX BREAST LUMPECTOMY Right 1/12/2016    Dr. Jag Spann Partial Mastectomy w./ axillary lymphadenectomy    HX HEENT      TONSILLECTOMY    HX MASTECTOMY Right 1/26/2016    Dr. Jag Spann Repeat Partial Mastectomy for positive margins    HX ORTHOPAEDIC      left arm surg    IR MEDIPORT         Family History:  Family History   Problem Relation Age of Onset    Cancer Father [de-identified]        colon    Hypertension Mother     Heart Disease Mother     Diabetes Mother        Social History:  Social History     Tobacco Use    Smoking status: Never Smoker    Smokeless tobacco: Never Used   Substance Use Topics    Alcohol use: No    Drug use: No       Allergies:   Allergies   Allergen Reactions    Metformin Other (comments)     Patient states that she is not allergic to metformin. Review of Systems   Review of Systems   Constitutional: Negative for fever. HENT: Positive for dental problem and facial swelling. Negative for sore throat and trouble swallowing. All other systems reviewed and are negative. Physical Exam     Vitals:    07/29/19 2100 07/29/19 2107   BP:  155/90   Pulse: (!) 112    Resp: 18    Temp: 98.5 °F (36.9 °C)    SpO2: 100%      Physical Exam  Vital signs and nursing notes reviewed. CONSTITUTIONAL: Alert. Well-appearing; well-nourished; in no apparent distress. HEAD: Normocephalic; atraumatic. EYES: PERRL; EOM's intact. No nystagmus. Conjunctiva clear. ENT: TM's normal. External ear normal. Normal nose; no rhinorrhea. Normal pharynx. Overal very poor dentition with many missing or decayed teeth; left upper single molar and 2 left lower molars decayed and TTP with mild overlying facial swelling' no pain or swlling to parotid, floor of mouth soft; Moist mucus membranes. NECK: Supple; FROM without difficulty, non-tender; no cervical lymphadenopathy. SKIN: Normal for age and race; warm; dry; good turgor; no apparent lesions or exudate. NEURO: A & O x3. PSYCH:  Mood and affect appropriate. Diagnostic Study Results     Labs -   No results found for this or any previous visit (from the past 12 hour(s)). Radiologic Studies - none  No orders to display     CT Results  (Last 48 hours)    None        CXR Results  (Last 48 hours)    None          Medications given in the ED-  Medications - No data to display      Medical Decision Making   I am the first provider for this patient. I reviewed the vital signs, available nursing notes, past medical history, past surgical history, family history and social history. Vital Signs-Reviewed the patient's vital signs.     Records Reviewed: Nursing Notes      Procedures:  Procedures    ED Course:  9:38 PM   Initial assessment performed. The patients presenting problems have been discussed, and they are in agreement with the care plan formulated and outlined with them. I have encouraged them to ask questions as they arise throughout their visit. Diagnosis and Disposition       DISCHARGE NOTE:    Michael Escalona  results have been reviewed with her. She has been counseled regarding her diagnosis, treatment, and plan. She verbally conveys understanding and agreement of the signs, symptoms, diagnosis, treatment and prognosis and additionally agrees to follow up as discussed. She also agrees with the care-plan and conveys that all of her questions have been answered. I have also provided discharge instructions for her that include: educational information regarding their diagnosis and treatment, and list of reasons why they would want to return to the ED prior to their follow-up appointment, should her condition change. She has been provided with education for proper emergency department utilization. CLINICAL IMPRESSION:    1. Infected dental caries        PLAN:  1. D/C Home  2. Discharge Medication List as of 7/29/2019  9:35 PM      START taking these medications    Details   penicillin v potassium (VEETID) 250 mg tablet Take 1 Tab by mouth four (4) times daily for 7 days. , Normal, Disp-28 Tab, R-0         CONTINUE these medications which have NOT CHANGED    Details   famotidine (PEPCID) 20 mg tablet Take 20 mg by mouth two (2) times daily as needed., Historical Med      lubiPROStone (AMITIZA) 8 mcg capsule Take 1 Cap by mouth daily (with breakfast). , Normal, Disp-30 Cap, R-3      glucose blood VI test strips (WAVES[employer] PRESTO) strip DM, Normal, Disp-100 Strip, R-0      capsaicin 0.075 % topical cream Apply  to affected area three (3) times daily as needed for Pain., Normal, Disp-60 g, R-0      insulin aspart protamine/insulin aspart (NOVOLOG MIX 70-30FLEXPEN U-100) 100 unit/mL (70-30) inpn INJECT 60 UNITS SUBCUTANEOUSLY TWICE DAILY 30 MINUTES BEFORE BREAKFAST AND DINNER, Normal, Disp-30 mL, R-0      pantoprazole (PROTONIX) 40 mg tablet Take 1 Tab by mouth daily. , Normal, Disp-90 Tab, R-1      divalproex ER (DEPAKOTE ER) 500 mg ER tablet Take 1 Tab by mouth two (2) times a day., Normal, Disp-60 Tab, R-3      losartan (COZAAR) 50 mg tablet Take 1 Tab by mouth two (2) times a day., Normal, Disp-60 Tab, R-3      carvedilol (COREG) 12.5 mg tablet Take 1 Tab by mouth two (2) times daily (with meals). , Normal, Disp-60 Tab, R-3      amLODIPine (NORVASC) 5 mg tablet Take 1 Tab by mouth nightly., Normal, Disp-30 Tab, R-3      hydroCHLOROthiazide (HYDRODIURIL) 25 mg tablet Take 1 Tab by mouth daily. , Normal, Disp-30 Tab, R-3      atorvastatin (LIPITOR) 40 mg tablet Take 1 Tab by mouth daily. , Normal, Disp-30 Tab, R-3      isosorbide mononitrate ER (IMDUR) 30 mg tablet Take 1 Tab by mouth daily. , Normal, Disp-90 Tab, R-1      furosemide (LASIX) 20 mg tablet One po daily, Normal, Disp-30 Tab, R-3      aspirin-dipyridamole (AGGRENOX)  mg per SR capsule Take 1 Cap by mouth two (2) times a day., Normal, Disp-180 Cap, R-1      letrozole (FEMARA) 2.5 mg tablet Take 2.5 mg by mouth daily. , Historical Med      SONAFINE emul topical Historical Med, SHARON      cholecalciferol (VITAMIN D3) 1,000 unit tablet Take 1,000 Units by mouth daily. , Historical Med      therapeutic multivitamin (THERAGRAN) tablet Take 1 Tab by mouth daily. , Historical Med           3.    Follow-up Information     Follow up With Specialties Details Why Contact Info    [address] [facility],[place] [address]  Schedule an appointment as soon as possible for a visit  [address]  [phone]    [facility] EMERGENCY DEPT Emergency Medicine  As needed, If symptoms worsen [address]  [phone]        _______________________________      Please note that this dictation was completed with Silvino, the computer voice recognition software. Quite often unanticipated grammatical, syntax, homophones, and other interpretive errors are inadvertently transcribed by the computer software. Please disregard these errors. Please excuse any errors that have escaped final proofreading.

## 2019-09-26 ENCOUNTER — TELEPHONE (OUTPATIENT)
Dept: SURGERY | Age: 69
End: 2019-09-26

## 2019-09-26 ENCOUNTER — OFFICE VISIT (OUTPATIENT)
Dept: FAMILY MEDICINE CLINIC | Age: 69
End: 2019-09-26

## 2019-09-26 VITALS
RESPIRATION RATE: 15 BRPM | DIASTOLIC BLOOD PRESSURE: 60 MMHG | WEIGHT: 240 LBS | BODY MASS INDEX: 37.67 KG/M2 | OXYGEN SATURATION: 99 % | HEIGHT: 67 IN | HEART RATE: 88 BPM | SYSTOLIC BLOOD PRESSURE: 130 MMHG | TEMPERATURE: 97.3 F

## 2019-09-26 DIAGNOSIS — Z86.73 HISTORY OF CVA (CEREBROVASCULAR ACCIDENT): Chronic | ICD-10-CM

## 2019-09-26 DIAGNOSIS — D64.9 ANEMIA, UNSPECIFIED TYPE: ICD-10-CM

## 2019-09-26 DIAGNOSIS — C50.911 MALIGNANT NEOPLASM OF RIGHT BREAST IN FEMALE, ESTROGEN RECEPTOR POSITIVE, UNSPECIFIED SITE OF BREAST (HCC): ICD-10-CM

## 2019-09-26 DIAGNOSIS — E78.00 HYPERCHOLESTEREMIA: Chronic | ICD-10-CM

## 2019-09-26 DIAGNOSIS — Z17.0 MALIGNANT NEOPLASM OF RIGHT BREAST IN FEMALE, ESTROGEN RECEPTOR POSITIVE, UNSPECIFIED SITE OF BREAST (HCC): ICD-10-CM

## 2019-09-26 DIAGNOSIS — Z79.4 TYPE 2 DIABETES MELLITUS WITH DIABETIC NEUROPATHY, WITH LONG-TERM CURRENT USE OF INSULIN (HCC): ICD-10-CM

## 2019-09-26 DIAGNOSIS — R35.0 URINARY FREQUENCY: ICD-10-CM

## 2019-09-26 DIAGNOSIS — Z23 ENCOUNTER FOR IMMUNIZATION: ICD-10-CM

## 2019-09-26 DIAGNOSIS — C50.011 MALIGNANT NEOPLASM OF AREOLA OF RIGHT BREAST IN FEMALE, UNSPECIFIED ESTROGEN RECEPTOR STATUS (HCC): ICD-10-CM

## 2019-09-26 DIAGNOSIS — E11.40 TYPE 2 DIABETES MELLITUS WITH DIABETIC NEUROPATHY, WITH LONG-TERM CURRENT USE OF INSULIN (HCC): ICD-10-CM

## 2019-09-26 DIAGNOSIS — K59.00 CONSTIPATION, UNSPECIFIED CONSTIPATION TYPE: ICD-10-CM

## 2019-09-26 DIAGNOSIS — M79.89 LEG SWELLING: ICD-10-CM

## 2019-09-26 DIAGNOSIS — K29.70 GASTRITIS WITHOUT BLEEDING, UNSPECIFIED CHRONICITY, UNSPECIFIED GASTRITIS TYPE: ICD-10-CM

## 2019-09-26 DIAGNOSIS — G40.909 SEIZURE DISORDER (HCC): Chronic | ICD-10-CM

## 2019-09-26 DIAGNOSIS — I10 ESSENTIAL HYPERTENSION: Primary | ICD-10-CM

## 2019-09-26 DIAGNOSIS — M17.11 OSTEOARTHRITIS OF RIGHT KNEE, UNSPECIFIED OSTEOARTHRITIS TYPE: ICD-10-CM

## 2019-09-26 DIAGNOSIS — M62.838 CERVICAL PARASPINAL MUSCLE SPASM: ICD-10-CM

## 2019-09-26 RX ORDER — ASPIRIN AND DIPYRIDAMOLE 25; 200 MG/1; MG/1
1 CAPSULE, EXTENDED RELEASE ORAL 2 TIMES DAILY
Qty: 180 CAP | Refills: 1 | Status: SHIPPED | OUTPATIENT
Start: 2019-09-26 | End: 2020-01-06

## 2019-09-26 RX ORDER — ATORVASTATIN CALCIUM 40 MG/1
40 TABLET, FILM COATED ORAL DAILY
Qty: 30 TAB | Refills: 3 | Status: SHIPPED | OUTPATIENT
Start: 2019-09-26 | End: 2020-01-06 | Stop reason: SDUPTHER

## 2019-09-26 RX ORDER — AMLODIPINE BESYLATE 5 MG/1
5 TABLET ORAL
Qty: 30 TAB | Refills: 3 | Status: SHIPPED | OUTPATIENT
Start: 2019-09-26 | End: 2020-01-06 | Stop reason: SDUPTHER

## 2019-09-26 RX ORDER — CAPSAICIN 0.07 G/100G
CREAM TOPICAL
Qty: 60 G | Refills: 6 | Status: SHIPPED | OUTPATIENT
Start: 2019-09-26 | End: 2022-05-05

## 2019-09-26 RX ORDER — CARVEDILOL 12.5 MG/1
12.5 TABLET ORAL 2 TIMES DAILY WITH MEALS
Qty: 60 TAB | Refills: 3 | Status: SHIPPED | OUTPATIENT
Start: 2019-09-26 | End: 2020-01-06 | Stop reason: SDUPTHER

## 2019-09-26 RX ORDER — FAMOTIDINE 20 MG/1
20 TABLET, FILM COATED ORAL
Qty: 180 TAB | Refills: 1 | Status: SHIPPED | OUTPATIENT
Start: 2019-09-26 | End: 2020-05-26 | Stop reason: SDUPTHER

## 2019-09-26 RX ORDER — LOSARTAN POTASSIUM 50 MG/1
50 TABLET ORAL 2 TIMES DAILY
Qty: 60 TAB | Refills: 3 | Status: SHIPPED | OUTPATIENT
Start: 2019-09-26 | End: 2020-01-06 | Stop reason: SDUPTHER

## 2019-09-26 RX ORDER — FUROSEMIDE 20 MG/1
TABLET ORAL
Qty: 90 TAB | Refills: 1 | Status: SHIPPED | OUTPATIENT
Start: 2019-09-26 | End: 2020-01-06 | Stop reason: SDUPTHER

## 2019-09-26 RX ORDER — INSULIN ASPART 100 [IU]/ML
INJECTION, SUSPENSION SUBCUTANEOUS
Qty: 30 ML | Refills: 5 | Status: SHIPPED | OUTPATIENT
Start: 2019-09-26 | End: 2020-01-06 | Stop reason: SDUPTHER

## 2019-09-26 RX ORDER — LUBIPROSTONE 8 UG/1
8 CAPSULE, GELATIN COATED ORAL
Qty: 30 CAP | Refills: 3 | Status: SHIPPED | OUTPATIENT
Start: 2019-09-26 | End: 2020-01-06 | Stop reason: ALTCHOICE

## 2019-09-26 RX ORDER — PANTOPRAZOLE SODIUM 40 MG/1
40 TABLET, DELAYED RELEASE ORAL DAILY
Qty: 90 TAB | Refills: 1 | Status: SHIPPED | OUTPATIENT
Start: 2019-09-26 | End: 2020-05-26 | Stop reason: SDUPTHER

## 2019-09-26 RX ORDER — METHOCARBAMOL 500 MG/1
500 TABLET, FILM COATED ORAL
Qty: 30 TAB | Refills: 0 | Status: SHIPPED | OUTPATIENT
Start: 2019-09-26 | End: 2020-01-06

## 2019-09-26 RX ORDER — ISOSORBIDE MONONITRATE 30 MG/1
30 TABLET, EXTENDED RELEASE ORAL DAILY
Qty: 90 TAB | Refills: 1 | Status: SHIPPED | OUTPATIENT
Start: 2019-09-26 | End: 2020-05-26 | Stop reason: SDUPTHER

## 2019-09-26 RX ORDER — HYDROCHLOROTHIAZIDE 25 MG/1
25 TABLET ORAL DAILY
Qty: 30 TAB | Refills: 3 | Status: SHIPPED | OUTPATIENT
Start: 2019-09-26 | End: 2020-01-06 | Stop reason: SDUPTHER

## 2019-09-26 RX ORDER — MELATONIN
1000 DAILY
Qty: 90 TAB | Refills: 1 | Status: SHIPPED | OUTPATIENT
Start: 2019-09-26 | End: 2020-05-26 | Stop reason: SDUPTHER

## 2019-09-26 NOTE — PROGRESS NOTES
1. Have you been to the ER, urgent care clinic since your last visit? Hospitalized since your last visit? 9/12/19 ELPIDIO Radha    2. Have you seen or consulted any other health care providers outside of the [address] [place] [address] since your last visit? Include any pap smears or colon screening. No      Consent form signed and obtained from patient. Per verbal order from Linda Bocanegra, NP- injection FLU administered. Patient instructed to remain in clinic for 15 minutes and to report any adverse reactions immediately. Most recent VIS given. No adverse reactions noted after 15 minutes.
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
## 2019-09-26 NOTE — PATIENT INSTRUCTIONS
Goal fasting blood sugar . Neck: Exercises  Introduction  Here are some examples of exercises for you to try. The exercises may be suggested for a condition or for rehabilitation. Start each exercise slowly. Ease off the exercises if you start to have pain. You will be told when to start these exercises and which ones will work best for you. How to do the exercises  Neck stretch    1. This stretch works best if you keep your shoulder down as you lean away from it. To help you remember to do this, start by relaxing your shoulders and lightly holding on to your thighs or your chair. 2. Tilt your head toward your shoulder and hold for 15 to 30 seconds. Let the weight of your head stretch your muscles. 3. If you would like a little added stretch, use your hand to gently and steadily pull your head toward your shoulder. For example, keeping your right shoulder down, lean your head to the left. 4. Repeat 2 to 4 times toward each shoulder. Diagonal neck stretch    1. Turn your head slightly toward the direction you will be stretching, and tilt your head diagonally toward your chest and hold for 15 to 30 seconds. 2. If you would like a little added stretch, use your hand to gently and steadily pull your head forward on the diagonal.  3. Repeat 2 to 4 times toward each side. Dorsal glide stretch    1. Sit or stand tall and look straight ahead. 2. Slowly tuck your chin as you glide your head backward over your body  3. Hold for a count of 6, and then relax for up to 10 seconds. 4. Repeat 8 to 12 times. Chest and shoulder stretch    1. Sit or stand tall and glide your head backward as in the dorsal glide stretch. 2. Raise both arms so that your hands are next to your ears. 3. Take a deep breath, and as you breathe out, lower your elbows down and behind your back.  You will feel your shoulder blades slide down and together, and at the same time you will feel a stretch across your chest and the front of your shoulders. 4. Hold for about 6 seconds, and then relax for up to 10 seconds. 5. Repeat 8 to 12 times. Strengthening: Hands on head    1. Move your head backward, forward, and side to side against gentle pressure from your hands, holding each position for about 6 seconds. 2. Repeat 8 to 12 times. Follow-up care is a key part of your treatment and safety. Be sure to make and go to all appointments, and call your doctor if you are having problems. It's also a good idea to know your test results and keep a list of the medicines you take. Where can you learn more? Go to http://mady-travon.info/. Enter P975 in the search box to learn more about \"Neck: Exercises. \"  Current as of: June 26, 2019  Content Version: 12.2  © 4274-0091 [email or web address]. Care instructions adapted under license by [employer] (which disclaims liability or warranty for this information). If you have questions about a medical condition or this instruction, always ask your healthcare professional. [address] any warranty or liability for your use of this information.

## 2019-10-09 RX ORDER — PEN NEEDLE, DIABETIC 29 G X1/2"
NEEDLE, DISPOSABLE MISCELLANEOUS
Refills: 0 | OUTPATIENT
Start: 2019-10-09

## 2019-10-11 DIAGNOSIS — E11.40 TYPE 2 DIABETES MELLITUS WITH DIABETIC NEUROPATHY, WITH LONG-TERM CURRENT USE OF INSULIN (HCC): Primary | ICD-10-CM

## 2019-10-11 DIAGNOSIS — Z79.4 TYPE 2 DIABETES MELLITUS WITH DIABETIC NEUROPATHY, WITH LONG-TERM CURRENT USE OF INSULIN (HCC): Primary | ICD-10-CM

## 2019-10-11 RX ORDER — PEN NEEDLE, DIABETIC 30 GX3/16"
NEEDLE, DISPOSABLE MISCELLANEOUS
Qty: 100 PEN NEEDLE | Refills: 11 | Status: SHIPPED | OUTPATIENT
Start: 2019-10-11 | End: 2020-05-26 | Stop reason: SDUPTHER

## 2019-10-16 ENCOUNTER — HOSPITAL ENCOUNTER (OUTPATIENT)
Dept: MAMMOGRAPHY | Age: 69
Discharge: HOME | End: 2019-10-16
Attending: SURGERY
Payer: MEDICARE

## 2019-10-16 ENCOUNTER — TELEPHONE (OUTPATIENT)
Dept: SURGERY | Age: 69
End: 2019-10-16

## 2019-10-16 DIAGNOSIS — Z17.0 MALIGNANT NEOPLASM OF RIGHT BREAST IN FEMALE, ESTROGEN RECEPTOR POSITIVE, UNSPECIFIED SITE OF BREAST (HCC): ICD-10-CM

## 2019-10-16 DIAGNOSIS — C50.011 MALIGNANT NEOPLASM OF AREOLA OF RIGHT BREAST IN FEMALE, UNSPECIFIED ESTROGEN RECEPTOR STATUS (HCC): ICD-10-CM

## 2019-10-16 DIAGNOSIS — C50.911 MALIGNANT NEOPLASM OF RIGHT BREAST IN FEMALE, ESTROGEN RECEPTOR POSITIVE, UNSPECIFIED SITE OF BREAST (HCC): ICD-10-CM

## 2019-10-16 PROCEDURE — 77062 BREAST TOMOSYNTHESIS BI: CPT

## 2019-10-16 NOTE — TELEPHONE ENCOUNTER
Attempted to reach patient with results and to set up annual check up. There was no answer so  was left requesting a return call.

## 2019-12-17 ENCOUNTER — TELEPHONE (OUTPATIENT)
Dept: FAMILY MEDICINE CLINIC | Age: 69
End: 2019-12-17

## 2019-12-17 NOTE — TELEPHONE ENCOUNTER
Called left message with [facility] . Gave patients appointment date 1/20/20 at 12:45v with Dr. Juan Antonio Nichole.

## 2020-01-06 ENCOUNTER — OFFICE VISIT (OUTPATIENT)
Dept: FAMILY MEDICINE CLINIC | Age: 70
End: 2020-01-06

## 2020-01-06 VITALS
TEMPERATURE: 98.2 F | WEIGHT: 234.8 LBS | BODY MASS INDEX: 36.85 KG/M2 | RESPIRATION RATE: 18 BRPM | HEIGHT: 67 IN | DIASTOLIC BLOOD PRESSURE: 81 MMHG | OXYGEN SATURATION: 100 % | HEART RATE: 80 BPM | SYSTOLIC BLOOD PRESSURE: 139 MMHG

## 2020-01-06 DIAGNOSIS — Z79.4 TYPE 2 DIABETES MELLITUS WITH DIABETIC NEUROPATHY, WITH LONG-TERM CURRENT USE OF INSULIN (HCC): ICD-10-CM

## 2020-01-06 DIAGNOSIS — I89.0 LYMPHEDEMA OF RIGHT ARM: ICD-10-CM

## 2020-01-06 DIAGNOSIS — M79.89 LEG SWELLING: ICD-10-CM

## 2020-01-06 DIAGNOSIS — I10 ESSENTIAL HYPERTENSION: ICD-10-CM

## 2020-01-06 DIAGNOSIS — Z78.9 POOR HISTORIAN: ICD-10-CM

## 2020-01-06 DIAGNOSIS — E78.00 HYPERCHOLESTEREMIA: ICD-10-CM

## 2020-01-06 DIAGNOSIS — G40.909 SEIZURE DISORDER (HCC): ICD-10-CM

## 2020-01-06 DIAGNOSIS — Z86.73 HISTORY OF CVA (CEREBROVASCULAR ACCIDENT): ICD-10-CM

## 2020-01-06 DIAGNOSIS — E11.40 TYPE 2 DIABETES MELLITUS WITH DIABETIC NEUROPATHY, WITH LONG-TERM CURRENT USE OF INSULIN (HCC): ICD-10-CM

## 2020-01-06 DIAGNOSIS — H26.9 CATARACT OF LEFT EYE, UNSPECIFIED CATARACT TYPE: ICD-10-CM

## 2020-01-06 DIAGNOSIS — I26.99 ACUTE PULMONARY EMBOLISM WITHOUT ACUTE COR PULMONALE, UNSPECIFIED PULMONARY EMBOLISM TYPE (HCC): Primary | ICD-10-CM

## 2020-01-06 RX ORDER — ASPIRIN 81 MG/1
TABLET ORAL DAILY
COMMUNITY
End: 2020-05-26 | Stop reason: ALTCHOICE

## 2020-01-06 RX ORDER — CARVEDILOL 12.5 MG/1
12.5 TABLET ORAL 2 TIMES DAILY WITH MEALS
Qty: 60 TAB | Refills: 2 | Status: SHIPPED | OUTPATIENT
Start: 2020-01-06 | End: 2020-05-26 | Stop reason: SDUPTHER

## 2020-01-06 RX ORDER — ATORVASTATIN CALCIUM 40 MG/1
40 TABLET, FILM COATED ORAL DAILY
Qty: 30 TAB | Refills: 2 | Status: SHIPPED | OUTPATIENT
Start: 2020-01-06 | End: 2020-05-26 | Stop reason: SDUPTHER

## 2020-01-06 RX ORDER — LOSARTAN POTASSIUM 50 MG/1
50 TABLET ORAL 2 TIMES DAILY
Qty: 60 TAB | Refills: 2 | Status: SHIPPED | OUTPATIENT
Start: 2020-01-06 | End: 2020-05-26 | Stop reason: SDUPTHER

## 2020-01-06 RX ORDER — HYDROCHLOROTHIAZIDE 25 MG/1
25 TABLET ORAL DAILY
Qty: 30 TAB | Refills: 2 | Status: SHIPPED | OUTPATIENT
Start: 2020-01-06 | End: 2020-05-26 | Stop reason: SDUPTHER

## 2020-01-06 RX ORDER — AMLODIPINE BESYLATE 5 MG/1
5 TABLET ORAL
Qty: 30 TAB | Refills: 2 | Status: SHIPPED | OUTPATIENT
Start: 2020-01-06 | End: 2020-05-26 | Stop reason: SDUPTHER

## 2020-01-06 RX ORDER — INSULIN ASPART 100 [IU]/ML
INJECTION, SUSPENSION SUBCUTANEOUS
Qty: 30 ML | Refills: 5 | Status: SHIPPED | OUTPATIENT
Start: 2020-01-06 | End: 2020-05-26 | Stop reason: SDUPTHER

## 2020-01-06 RX ORDER — FUROSEMIDE 20 MG/1
TABLET ORAL
Qty: 30 TAB | Refills: 2 | Status: SHIPPED | OUTPATIENT
Start: 2020-01-06 | End: 2020-05-26 | Stop reason: SDUPTHER

## 2020-01-06 NOTE — PROGRESS NOTES
Padma Gil is a 81 y.o.  female and presents with    Chief Complaint   Patient presents with   [facility] Follow Up       Subjective:  Patient of KANCHAN Reza  Ms. Mag Green presents today for hospital follow up. She was admitted to [facility] on 12/20 with complaints of right sided chest pain and shortness of breath. She was found to have an acute pulmonary embolism in the right lobar and in the left lower lobe. PVL of the lower extremities was negative. Hospitalization was complicated by onset of encephalopathy. She has history of stroke in 2002. CTA of the head and neck showed moderate narrowing of right vertebral artery and moderate to high grade stenosis. While hospitalized she was started on eliquis and discharged. She initially started with Eliquis 10mg twice daily and is now on 5mg twice daily. She is a poor historian- unable to recall all of her medications that she is taking. She is seeing Dr. Deepika Pepper, neurology for her seizures- unable to recall dosage of depakote. Cardiovascular Review:  The patient has diabetes, hypertension, coronary artery disease, history of prior stroke and pulmonary embolism . Diet and Lifestyle: not attempting to follow a low fat, low cholesterol diet  Home BP Monitoring: is not measured at home. Pertinent ROS: not taking medications regularly as instructed, no medication side effects noted, no TIA's, no chest pain on exertion, no dyspnea on exertion, no swelling of ankles. Diabetes Mellitus:  She has diabetes mellitus, and  diabetes, hypertension, hyperlipidemia, coronary artery disease and history of prior stroke. Diabetic ROS - medication compliance: noncompliant much of the time, diabetic diet compliance: noncompliant much of the time, home glucose monitoring: is performed regularly- per patient's  (unsure of credibility)     She states she is supposed to have cataract surgery on 1/27/2020.      Additional Concerns: No Patient Active Problem List   Diagnosis Code    Diabetes mellitus with neuropathy ([facility] [address] E11.40    HTN (hypertension) I10    Chest pain R07.9    Seizure disorder ([facility] [address] G40.909    Hypercholesteremia E78.00    History of CVA (cerebrovascular accident) Z80.78    Hearing impairment H91.90    Gastroesophageal reflux disease without esophagitis K21.9    Breast cancer ([facility])-right inferior, 2.2cm,2/18 nodes, + - -, C50.919    Altered mental status R41.82    Type 2 diabetes mellitus with diabetic neuropathy, with long-term current use of insulin ([facility]) E11.40, Z79.4    Pulmonary nodule, right R91.1    History of breast cancer Z85.3    Type 2 diabetes mellitus with nephropathy ([facility]) E11.21    Severe obesity (BMI 35.0-39. 9) with comorbidity ([facility]) E66.01     Current Outpatient Medications   Medication Sig Dispense Refill    apixaban (ELIQUIS) 5 mg tablet Take 5 mg by mouth two (2) times a day.  aspirin delayed-release 81 mg tablet Take  by mouth daily.  carvedilol (COREG) 12.5 mg tablet Take 1 Tab by mouth two (2) times daily (with meals). 60 Tab 3    amLODIPine (NORVASC) 5 mg tablet Take 1 Tab by mouth nightly. 30 Tab 3    cholecalciferol (VITAMIN D3) (1000 Units /25 mcg) tablet Take 1 Tab by mouth daily. 90 Tab 1    insulin aspart protamine/insulin aspart (NOVOLOG MIX 70-30FLEXPEN U-100) 100 unit/mL (70-30) inpn INJECT 60 UNITS SUBCUTANEOUSLY TWICE DAILY 30 MINUTES BEFORE BREAKFAST AND DINNER 30 mL 5    divalproex ER (DEPAKOTE ER) 500 mg ER tablet Take 1 Tab by mouth two (2) times a day. (Patient taking differently: Take 750 mg by mouth two (2) times a day.) 60 Tab 3    letrozole (FEMARA) 2.5 mg tablet Take 2.5 mg by mouth daily.  Insulin Needles, Disposable, (TRUEPLUS PEN NEEDLE) 31 gauge x 5/16\" ndle Use as directed 4 times daily 100 Pen Needle 11    losartan (COZAAR) 50 mg tablet Take 1 Tab by mouth two (2) times a day.  60 Tab 3    hydroCHLOROthiazide (HYDRODIURIL) 25 mg tablet Take 1 Tab by mouth daily. 30 Tab 3    atorvastatin (LIPITOR) 40 mg tablet Take 1 Tab by mouth daily. 30 Tab 3    famotidine (PEPCID) 20 mg tablet Take 1 Tab by mouth two (2) times daily as needed (heartburn). 180 Tab 1    pantoprazole (PROTONIX) 40 mg tablet Take 1 Tab by mouth daily. 90 Tab 1    lubiPROStone (AMITIZA) 8 mcg capsule Take 1 Cap by mouth daily (with breakfast). 30 Cap 3    isosorbide mononitrate ER (IMDUR) 30 mg tablet Take 1 Tab by mouth daily. 90 Tab 1    furosemide (LASIX) 20 mg tablet One po daily 90 Tab 1    capsaicin 0.075 % topical cream Apply  to affected area three (3) times daily as needed for Pain. 60 g 6    methocarbamol (ROBAXIN) 500 mg tablet Take 1 Tab by mouth three (3) times daily as needed (muscle spasm). 30 Tab 0    glucose blood VI test strips (WAVESENSE PRESTO) strip  Strip 0    SONAFINE emul topical       therapeutic multivitamin (THERAGRAN) tablet Take 1 Tab by mouth daily. Allergies   Allergen Reactions    Metformin Other (comments)     Patient states that she is not allergic to metformin.       Past Medical History:   Diagnosis Date    Arthritis     Breast CA ([address]     Breast CA ([address] 2016    DDD (degenerative disc disease), cervical     Diabetes ([address]     DJD (degenerative joint disease) of cervical spine     Hypercholesteremia     Hypertension     Menopause     Psychiatric disorder     DEPRESSION    PUD (peptic ulcer disease)     S/P cardiac cath 02/2015    No angiographic evidence of CAD    Seizures (HCC)     LAST SEIZURE ABOUT 2 MONTHS AGO (NOV 2015)    Stroke ([address]     x2 with left side deficit     Past Surgical History:   Procedure Laterality Date    HX BREAST LUMPECTOMY Right 1/12/2016    Dr. Tiny Hoover Partial Mastectomy w./ axillary lymphadenectomy    HX HEENT      TONSILLECTOMY    HX MASTECTOMY Right 1/26/2016    Dr. Tiny Hoover Repeat Partial Mastectomy for positive margins    HX ORTHOPAEDIC      left arm surg    IR ELLIOT       Family History   Problem Relation Age of Onset    Cancer Father [de-identified]        colon    Hypertension Mother     Heart Disease Mother     Diabetes Mother     Breast Cancer Maternal Aunt     Breast Cancer Other     Breast Cancer Other      Social History     Tobacco Use    Smoking status: Never Smoker    Smokeless tobacco: Never Used   Substance Use Topics    Alcohol use: No       ROS   History obtained from the patient and    General ROS: negative for - chills or fever  Respiratory ROS: no cough, shortness of breath, or wheezing  Cardiovascular ROS: positive for - chest pain- yesterday- non today   Gastrointestinal ROS: no abdominal pain, change in bowel habits, or black or bloody stools  Genito-Urinary ROS: no dysuria, trouble voiding, or hematuria  Musculoskeletal ROS: negative for - joint pain, joint stiffness or joint swelling  Neurological ROS: no TIA or stroke symptoms    All other systems reviewed and are negative.       Objective:  Vitals:    01/06/20 0842   BP: 139/81   Pulse: 80   Resp: 18   Temp: 98.2 °F (36.8 °C)   TempSrc: Oral   SpO2: 100%   Weight: 234 lb 12.8 oz (106.5 kg)   Height: 5' 7\" (1.702 m)   PainSc:   0 - No pain       PE  General appearance - alert, well appearing, and in no distress and overweight  Mental status - affect appropriate to mood  Chest - clear to auscultation, no wheezes, rales or rhonchi, symmetric air entry  Heart - normal rate and regular rhythm  Abdomen - soft, nontender, nondistended, no masses or organomegaly  Neurological - alert, oriented, normal speech, no focal findings or movement disorder noted  Musculoskeletal - no joint tenderness, deformity or swelling  Extremities - lymphedema to right arm      LABS   APTT - Daily (12/23/2019 3:57 AM EST)  Only the most recent of 2 results within the time period is included.    APTT - Daily (12/23/2019 3:57 AM EST)   Component Value Ref Range Performed At Pathologist Signature   APTT 34 22 - 36 sec [facility] LABORATORY      CBC (12/23/2019 3:46 AM EST)  CBC (12/23/2019 3:46 AM EST)   Component Value Ref Range Performed At [facility]   WBC x 10*3 9.2 4.0 - 11.0 K/uL [facility]     RBC x 10^6 4. 65 3.80 - 5.20 M/uL [facility]     HGB 12.3 11.7 - 16.1 g/dL [facility]     HCT 39.0 35.1 - 48.3 % [facility]     MCV 84 81 - 99 fL [facility]     MCH 27 26 - 34 pg [facility]     MCHC 32 31 - 36 g/dL [facility]     RDW 13.6 10.0 - 15.5 % [facility]     Platelet 036 732 - 735 K/uL [facility]     MPV 12.1 9.0 - 13.0 fL [facility]       Echo Cardiogram Complete (12/22/2019 10:55 AM EST)  Echo Cardiogram Complete (12/22/2019 10:55 AM EST)   Component Value Ref Range Performed At [facility]   EF Echo 82   RADIOLOGY       HGB A1C WITH EST AVG GLUCOSE (12/22/2019 5:03 AM EST)  HGB A1C WITH EST AVG GLUCOSE (12/22/2019 5:03 AM EST)   Component Value Ref Range Performed At [facility]   Hemoglobin A1c 11.9 (H) 4.8 - 5.6 % [facility] REFERENCE LAB     Estimated Average Glucose 296 (H) 91 - 123 mg/dL [facility] REFERENCE LAB       COMPREHENSIVE METABOLIC PANEL (17/11/4764 5:03 AM EST)  COMPREHENSIVE METABOLIC PANEL (09/58/3367 5:03 AM EST)   Component Value Ref Range Performed At Pathologist Signature   Potassium 4.3 3.5 - 5.5 mmol/L [facility] LABORATORY     Sodium 137 133 - 145 mmol/L [facility] LAB[facility]AT[facility]     Chloride 103 98 - 110 mmol/L [facility]     Glucose 104 (H) 70 - 99 mg/dL [facility] LABORAT[facility]     Calcium 9.0 8.4 - 10.5 mg/dL [facility]     Albumin 3.2 (L) 3.5 - 5.0 g/dL [facility]     SGPT (ALT) 6 5 - 40 U/L [facility]     SGOT (AST) 11 10 - 37 U/L [facility] LABORATORY     Bilirubin Total 0.4 0.2 - 1.2 mg/dL [facility] LABORATORY     Alkaline Phosphatase 98 40 - 120 U/L [facility] LABORATORY     BUN 20 6 - 22 mg/dL [facility] LABORATORY     CO2 24 20 - 32 mmol/L [facility] LABORATORY     Creatinine 0.9 0.8 - 1.4 mg/dL [facility] LABORATORY     eGFR  >60.0 >60.0 [facility] LABORATORY     eGFR Non  58.7 (L) >60.0 [facility] LAB[facility]AT[facility]     Globulin 4.2 (H) 2.0 - 4.0 g/dL [facility] LABORATORY     A/G Ratio 0.8 (L) 1.1 - 2.6 ratio [facility] LABORATORY     Total Protein 7.4 6.2 - 8.1 g/dL [facility] LABORAT[facility]     Anion Gap 10.4 mmol/L [facility] LABORAT[facility]         TESTS  CTA Head, Neck (12/21/2019 2:47 PM EST)  CTA Head, Neck (12/21/2019 2:47 PM EST)   Specimen         CTA Head, Neck (12/21/2019 2:47 PM EST)   Impressions Performed At           1. No intracranial large vessel occlusion.    2. Moderate narrowing in the right MCA M2 superior division origin. May correspond with the chronic right MCA territory infarct.    3. In the posterior circulation moderate stenosis in the proximal intradural RVA, LVA and basilar normal. Mild right PCA stenosis. Moderate to high-grade stenosis left PCA.    4. Atherosclerotic plaque at both ICA origins, no significant stenosis. Up to 25% right ICA origin narrowing.    5. No significant cervical vertebral artery stenosis.    6. Right upper lobe pulmonary embolus, seen and described on CTA chest pulmonary earlier same day.        CT HEAD without CONTRAST (12/21/2019 2:25 PM EST)  CT HEAD without CONTRAST (12/21/2019 2:25 PM EST)   Specimen         CT HEAD without CONTRAST (12/21/2019 2:25 PM EST)   Impressions Performed At   1. No CT evidence for acute intracranial process.    -Please note that CT is relatively insensitive for acute ischemia in the first 24-48 hours, and MRI may be helpful if clinically indicated.        2. Old right MCA territory infarct.        3.  Cerebral atrophy        Message regarding 1 of the above impression was communicated to Dr. Micheline Nolan [facility] hospitalist on call) at 1433 hours 12/21/2019        CT CTA CHEST PULMONARY (12/21/2019 5:10 AM EST)  CT CTA CHEST PULMONARY (12/21/2019 5:10 AM EST)   Specimen         CT CTA CHEST PULMONARY (12/21/2019 5:10 AM EST)   Impressions Performed At   1.  Study is positive for pulmonary embolism .    -Emboli in the right lobar, segmental and subsegmental branches of the upper and lower lobes.    -Emboli in left lower lobe and lingular segmental branches.    -Findings communicated with Dr. Devin Aviles at [address], hrs 12/21/19.    2.  Heart strain assessment detailed above.    3. No evidence of pulmonary infarction. Scattered areas of scarring and atelectasis.    4. Three hypoattenuating foci in the liver, not well evaluated on this study but most likely representing cysts. Also suspected cholelithiasis.                 Assessment/Plan:    1. Acute pulmonary embolism without acute cor pulmonale, unspecified pulmonary embolism type (HCC)       -currently on Eliquis 5mg twice daily; continue current dosage; discussed with patient possible duration of treatment ( at least 3-6 months); discussed safety precautions while taking medication and if she were to fall and hit her head she needs to go to the ED immediately    2. Essential hypertension  -     carvedilol (COREG) 12.5 mg tablet; Take 1 Tab by mouth two (2) times daily (with meals). -     amLODIPine (NORVASC) 5 mg tablet; Take 1 Tab by mouth nightly. -     losartan (COZAAR) 50 mg tablet; Take 1 Tab by mouth two (2) times a day. -     hydroCHLOROthiazide (HYDRODIURIL) 25 mg tablet; Take 1 Tab by mouth daily. 3. History of CVA (cerebrovascular accident)    4. Hypercholesteremia  -     atorvastatin (LIPITOR) 40 mg tablet; Take 1 Tab by mouth daily. 5. Seizure disorder ([address]       -continue with Depakote as prescribed by neurology     6. Lymphedema of right arm    7.  Cataract of left eye, unspecified cataract type       -advised at this time to hold off on surgery since Eliquis was just started for acute PE; follow up with PCP in 2 weeks    8. Poor historian       -both patient and  are poor historians; made aware to bring ALL medications to next office appointment and advised to get pill organizer    9. Type 2 diabetes mellitus with diabetic neuropathy, with long-term current use of insulin (HCC)  -     insulin aspart protamine/insulin aspart (NOVOLOG MIX 70-30FLEXPEN U-100) 100 unit/mL (70-30) inpn; INJECT 60 UNITS SUBCUTANEOUSLY TWICE DAILY 30 MINUTES BEFORE BREAKFAST AND DINNER        -not checking blood sugar consistently prior to insulin administration; last hemoglobin a1c done on 12/23 was 11.9; goal is < 7; needs further teaching/education     10. Leg swelling  -     furosemide (LASIX) 20 mg tablet; One po daily        Lab review: labs are reviewed, up to date        Health Maintenance:    I have discussed the diagnosis with the patient and the intended plan as seen in the above orders. The patient has received an after-visit summary and questions were answered concerning future plans. I have discussed medication side effects and warnings with the patient as well. I have reviewed the plan of care with the patient, accepted their input and they are in agreement with the treatment goals. Follow-up and Dispositions    · Return in about 2 weeks (around 1/20/2020) for to see PCP for medication evaluation . More than 1/2 of this 25 minute visit was spent in counseling and coordination of care, as described above.     Jesu Anderson DNP, FNP-C

## 2020-01-06 NOTE — PATIENT INSTRUCTIONS
Pulmonary Embolism: Care Instructions  Your Care Instructions    Pulmonary embolism is the sudden blockage of an artery in the lung. Blood clots in the deep veins of the leg or pelvis (deep vein thrombosis, or DVT) are the most common cause. These blood clots can travel to the lungs. Pulmonary embolism can be very serious. Because you have had one pulmonary embolism, you are at greater risk for having another one. But you can take steps to prevent another pulmonary embolism by following your doctor's instructions. You will probably take a prescription blood-thinning medicine to prevent blood clots. A blood thinner can stop a blood clot from growing larger and prevent new clots from forming. Follow-up care is a key part of your treatment and safety. Be sure to make and go to all appointments, and call your doctor if you are having problems. It's also a good idea to know your test results and keep a list of the medicines you take. How can you care for yourself at home? · Take your medicines exactly as prescribed. Call your doctor if you think you are having a problem with your medicine. You will get more details on the specific medicines your doctor prescribes. · If you are taking a blood thinner, be sure you get instructions about how to take your medicine safely. Blood thinners can cause serious bleeding problems. Preventing future pulmonary embolisms  · Exercise. Keep blood moving in your legs to keep clots from forming. If you are traveling by car, stop every hour or so. Get out and walk around for a few minutes. If you are traveling by bus, train, or plane, get out of your seat and walk up and down the aisles every hour or so. You also can do leg exercises while you are seated. Pump your feet up and down by pulling your toes up toward your knees then pointing them down. · Get up out of bed as soon as possible after an illness or surgery. · Do not smoke.  If you need help quitting, talk to your doctor about stop-smoking programs and medicines. These can increase your chances of quitting for good. · Check with your doctor before taking hormone or birth control pills. These may increase your risk of blot clots. · Ask your doctor about wearing compression stockings to help prevent blood clots in your legs. There are different types of stockings, and they need to fit right. So your doctor will recommend what you need. When should you call for help? Call 911 anytime you think you may need emergency care. For example, call if:    · You have shortness of breath.     · You have chest pain.     · You passed out (lost consciousness).     · You cough up blood.    Call your doctor now or seek immediate medical care if:    · You have new or worsening pain or swelling in your leg.    Watch closely for changes in your health, and be sure to contact your doctor if:    · You do not get better as expected. Where can you learn more? Go to http://mady-travon.info/. Enter T765 in the search box to learn more about \"Pulmonary Embolism: Care Instructions. \"  Current as of: September 26, 2018  Content Version: 12.2  © 8794-3291 [employer]. Care instructions adapted under license by [employer] (which disclaims liability or warranty for this information). If you have questions about a medical condition or this instruction, always ask your healthcare professional. [address] any warranty or liability for your use of this information.

## 2020-01-06 NOTE — PROGRESS NOTES
Sharon Mae is a 81 y.o. female  Chief Complaint   Patient presents with   [facility] Follow Up     1. Have you been to the ER, urgent care clinic since your last visit? Hospitalized since your last visit? Yes Reason for visit: KIMBERLY palma,12/20/19    2. Have you seen or consulted any other health care providers outside of the [address] [place] [address] since your last visit? Include any pap smears or colon screening.  No

## 2020-02-19 LAB — HBA1C MFR BLD HPLC: NORMAL %

## 2020-03-15 ENCOUNTER — HOSPITAL ENCOUNTER (EMERGENCY)
Age: 70
Discharge: HOME | End: 2020-03-16
Attending: EMERGENCY MEDICINE
Payer: MEDICARE

## 2020-03-15 DIAGNOSIS — G40.909 RECURRENT SEIZURES (HCC): Primary | ICD-10-CM

## 2020-03-15 DIAGNOSIS — I10 HYPERTENSION, ACCELERATED: ICD-10-CM

## 2020-03-15 LAB
ALBUMIN SERPL-MCNC: 2.9 G/DL (ref 3.4–5)
ALBUMIN/GLOB SERPL: 0.6 {RATIO} (ref 0.8–1.7)
ALP SERPL-CCNC: 104 U/L (ref 45–117)
ALT SERPL-CCNC: 14 U/L (ref 13–56)
ANION GAP SERPL CALC-SCNC: 10 MMOL/L (ref 3–18)
AST SERPL-CCNC: 16 U/L (ref 10–38)
BASOPHILS # BLD: 0 K/UL (ref 0–0.1)
BASOPHILS NFR BLD: 0 % (ref 0–2)
BILIRUB SERPL-MCNC: 0.5 MG/DL (ref 0.2–1)
BUN SERPL-MCNC: 26 MG/DL (ref 7–18)
BUN/CREAT SERPL: 16 (ref 12–20)
CALCIUM SERPL-MCNC: 8.9 MG/DL (ref 8.5–10.1)
CHLORIDE SERPL-SCNC: 103 MMOL/L (ref 100–111)
CO2 SERPL-SCNC: 23 MMOL/L (ref 21–32)
CREAT SERPL-MCNC: 1.63 MG/DL (ref 0.6–1.3)
DIFFERENTIAL METHOD BLD: ABNORMAL
EOSINOPHIL # BLD: 0.1 K/UL (ref 0–0.4)
EOSINOPHIL NFR BLD: 1 % (ref 0–5)
ERYTHROCYTE [DISTWIDTH] IN BLOOD BY AUTOMATED COUNT: 13.9 % (ref 11.6–14.5)
GLOBULIN SER CALC-MCNC: 4.5 G/DL (ref 2–4)
GLUCOSE SERPL-MCNC: 364 MG/DL (ref 74–99)
HCT VFR BLD AUTO: 34.8 % (ref 35–45)
HGB BLD-MCNC: 10.7 G/DL (ref 12–16)
INR PPP: 1 (ref 0.8–1.2)
LYMPHOCYTES # BLD: 4.3 K/UL (ref 0.9–3.6)
LYMPHOCYTES NFR BLD: 38 % (ref 21–52)
MCH RBC QN AUTO: 25.7 PG (ref 24–34)
MCHC RBC AUTO-ENTMCNC: 30.7 G/DL (ref 31–37)
MCV RBC AUTO: 83.7 FL (ref 74–97)
MONOCYTES # BLD: 0.7 K/UL (ref 0.05–1.2)
MONOCYTES NFR BLD: 6 % (ref 3–10)
NEUTS SEG # BLD: 6.1 K/UL (ref 1.8–8)
NEUTS SEG NFR BLD: 55 % (ref 40–73)
PLATELET # BLD AUTO: 223 K/UL (ref 135–420)
PMV BLD AUTO: 10.8 FL (ref 9.2–11.8)
POTASSIUM SERPL-SCNC: 4.4 MMOL/L (ref 3.5–5.5)
PROT SERPL-MCNC: 7.4 G/DL (ref 6.4–8.2)
PROTHROMBIN TIME: 13.1 SEC (ref 11.5–15.2)
RBC # BLD AUTO: 4.16 M/UL (ref 4.2–5.3)
SODIUM SERPL-SCNC: 136 MMOL/L (ref 136–145)
WBC # BLD AUTO: 11.2 K/UL (ref 4.6–13.2)

## 2020-03-15 PROCEDURE — 94762 N-INVAS EAR/PLS OXIMTRY CONT: CPT

## 2020-03-15 PROCEDURE — 80164 ASSAY DIPROPYLACETIC ACD TOT: CPT

## 2020-03-15 PROCEDURE — 93005 ELECTROCARDIOGRAM TRACING: CPT

## 2020-03-15 PROCEDURE — 74011250637 HC RX REV CODE- 250/637: Performed by: EMERGENCY MEDICINE

## 2020-03-15 PROCEDURE — 80053 COMPREHEN METABOLIC PANEL: CPT

## 2020-03-15 PROCEDURE — 85025 COMPLETE CBC W/AUTO DIFF WBC: CPT

## 2020-03-15 PROCEDURE — 96372 THER/PROPH/DIAG INJ SC/IM: CPT

## 2020-03-15 PROCEDURE — 99285 EMERGENCY DEPT VISIT HI MDM: CPT

## 2020-03-15 PROCEDURE — 74011250636 HC RX REV CODE- 250/636: Performed by: EMERGENCY MEDICINE

## 2020-03-15 PROCEDURE — 85610 PROTHROMBIN TIME: CPT

## 2020-03-15 RX ORDER — LORAZEPAM 2 MG/ML
2 INJECTION INTRAMUSCULAR
Status: DISCONTINUED | OUTPATIENT
Start: 2020-03-15 | End: 2020-03-15

## 2020-03-15 RX ORDER — DIVALPROEX SODIUM 250 MG/1
1000 TABLET, DELAYED RELEASE ORAL
Status: COMPLETED | OUTPATIENT
Start: 2020-03-15 | End: 2020-03-15

## 2020-03-15 RX ORDER — CARVEDILOL 12.5 MG/1
12.5 TABLET ORAL
Status: COMPLETED | OUTPATIENT
Start: 2020-03-15 | End: 2020-03-15

## 2020-03-15 RX ORDER — DIVALPROEX SODIUM 250 MG/1
750 TABLET, DELAYED RELEASE ORAL
Status: DISCONTINUED | OUTPATIENT
Start: 2020-03-15 | End: 2020-03-15

## 2020-03-15 RX ORDER — AMLODIPINE BESYLATE 5 MG/1
5 TABLET ORAL
Status: COMPLETED | OUTPATIENT
Start: 2020-03-15 | End: 2020-03-15

## 2020-03-15 RX ORDER — ACETAMINOPHEN 500 MG
1000 TABLET ORAL
Status: COMPLETED | OUTPATIENT
Start: 2020-03-15 | End: 2020-03-15

## 2020-03-15 RX ORDER — LORAZEPAM 2 MG/ML
1 INJECTION INTRAMUSCULAR
Status: COMPLETED | OUTPATIENT
Start: 2020-03-15 | End: 2020-03-15

## 2020-03-15 RX ADMIN — AMLODIPINE BESYLATE 5 MG: 5 TABLET ORAL at 21:36

## 2020-03-15 RX ADMIN — ACETAMINOPHEN 1000 MG: 500 TABLET, FILM COATED ORAL at 21:22

## 2020-03-15 RX ADMIN — CARVEDILOL 12.5 MG: 12.5 TABLET, FILM COATED ORAL at 21:36

## 2020-03-15 RX ADMIN — DIVALPROEX SODIUM 1000 MG: 250 TABLET, DELAYED RELEASE ORAL at 21:21

## 2020-03-15 RX ADMIN — LORAZEPAM 1 MG: 2 INJECTION, SOLUTION INTRAMUSCULAR; INTRAVENOUS at 22:04

## 2020-03-16 ENCOUNTER — PATIENT OUTREACH (OUTPATIENT)
Dept: CASE MANAGEMENT | Age: 70
End: 2020-03-16

## 2020-03-16 VITALS
BODY MASS INDEX: 40.32 KG/M2 | HEIGHT: 65 IN | SYSTOLIC BLOOD PRESSURE: 127 MMHG | RESPIRATION RATE: 18 BRPM | DIASTOLIC BLOOD PRESSURE: 53 MMHG | WEIGHT: 242 LBS | TEMPERATURE: 98 F | HEART RATE: 84 BPM | OXYGEN SATURATION: 99 %

## 2020-03-16 LAB
ATRIAL RATE: 104 BPM
CALCULATED P AXIS, ECG09: 61 DEGREES
CALCULATED R AXIS, ECG10: 7 DEGREES
CALCULATED T AXIS, ECG11: 46 DEGREES
DIAGNOSIS, 93000: NORMAL
P-R INTERVAL, ECG05: 132 MS
Q-T INTERVAL, ECG07: 336 MS
QRS DURATION, ECG06: 78 MS
QTC CALCULATION (BEZET), ECG08: 441 MS
VALPROATE SERPL-MCNC: <3 UG/ML (ref 50–100)
VENTRICULAR RATE, ECG03: 104 BPM

## 2020-03-16 NOTE — ED PROVIDER NOTES
Liyah Hernández is a 81 y.o. female with history of seizures, PE who had a reported seizure tonight at home per family with a witness generalized shaking and postictal period. Patient did not take her seizure medications today. Patient states she did urinate on herself. She denies any recent fever, vomiting or diarrhea. She does have a headache now. There is no trauma. There is no intraoral trauma. Patient has been otherwise compliant with medications. There is been no new numbness or tingling or weakness. No new visual changes. The history is provided by the patient, the EMS personnel and medical records.         Past Medical History:   Diagnosis Date    Arthritis     [facility]     Breast CA ([facility] 2016    DDD (degenerative disc disease), cervical     Diabetes ([facility]     DJD (degenerative joint disease) of cervical spine     Hypercholesteremia     Hypertension     Menopause     Psychiatric disorder     DEPRESSION    PUD (peptic ulcer disease)     S/P cardiac cath 02/2015    No angiographic evidence of CAD    Seizures (HCC)     LAST SEIZURE ABOUT 2 MONTHS AGO (NOV 2015)    Stroke ([facility]     x2 with left side deficit       Past Surgical History:   Procedure Laterality Date    HX BREAST LUMPECTOMY Right 1/12/2016    Dr. Lars Murray Partial Mastectomy w./ axillary lymphadenectomy    HX HEENT      TONSILLECTOMY    HX MASTECTOMY Right 1/26/2016    Dr. Lars Murray Repeat Partial Mastectomy for positive margins    HX ORTHOPAEDIC      left arm surg    IR MEDIPORT           Family History:   Problem Relation Age of Onset    Cancer Father [de-identified]        colon    Hypertension Mother     Heart Disease Mother     Diabetes Mother     Breast Cancer Maternal Aunt     Breast Cancer Other     Breast Cancer Other        Social History     Socioeconomic History    Marital status: SINGLE     Spouse name: Not on file    Number of children: Not on file    Years of education: Not on file    Highest education level: Not on file   Occupational History    Not on file   Social Needs    Financial resource strain: Not on file    Food insecurity     Worry: Not on file     Inability: Not on file    Transportation needs     Medical: Not on file     Non-medical: Not on file   Tobacco Use    Smoking status: Never Smoker    Smokeless tobacco: Never Used   Substance and Sexual Activity    Alcohol use: No    Drug use: No    Sexual activity: Yes     Partners: Male   Lifestyle    Physical activity     Days per week: Not on file     Minutes per session: Not on file    Stress: Not on file   Relationships    Social connections     Talks on phone: Not on file     Gets together: Not on file     Attends [employer] service: Not on file     Active member of club or organization: Not on file     Attends meetings of clubs or organizations: Not on file     Relationship status: Not on file    Intimate partner violence     Fear of current or ex partner: Not on file     Emotionally abused: Not on file     Physically abused: Not on file     Forced sexual activity: Not on file   Other Topics Concern     Service No    Blood Transfusions No    Caffeine Concern No    Occupational Exposure No    Hobby Hazards No    Sleep Concern Yes    Stress Concern No    Weight Concern Not Asked    Special Diet Not Asked    Back Care Not Asked    Exercise Yes     Comment: walks using cane    Bike Helmet Not Asked    Seat Belt Yes    Self-Exams Yes   Social History Narrative    Not on file         ALLERGIES: Metformin    Review of Systems   Constitutional: Negative for fever. HENT: Negative for sore throat and trouble swallowing. Eyes: Negative for visual disturbance. Gastrointestinal: Negative for abdominal pain. Genitourinary: Negative for difficulty urinating. Musculoskeletal: Negative for joint swelling. Skin: Negative for wound. Allergic/Immunologic: Negative for food allergies.    Neurological: Positive for seizures. Hematological: Bruises/bleeds easily. Psychiatric/Behavioral: Positive for sleep disturbance. Vitals:    03/15/20 2200 03/15/20 2215 03/15/20 2230 03/15/20 2245   BP: (!) 194/126 149/79 171/75 178/64   Pulse: (!) 102 (!) 101 95 99   Resp:       Temp:       SpO2: 100% 100% 100% 100%   Weight:       Height:                Physical Exam  Vitals signs and nursing note reviewed. Constitutional:       General: She is not in acute distress. Appearance: She is well-developed. She is obese. She is ill-appearing. She is not toxic-appearing or diaphoretic. HENT:      Head: Normocephalic and atraumatic. Right Ear: External ear normal.      Left Ear: External ear normal.      Nose: Nose normal.      Mouth/Throat:      Pharynx: Uvula midline. Eyes:      General: No scleral icterus. Conjunctiva/sclera: Conjunctivae normal.   Neck:      Musculoskeletal: Neck supple. Cardiovascular:      Rate and Rhythm: Regular rhythm. Tachycardia present. Heart sounds: Normal heart sounds. Pulmonary:      Effort: Pulmonary effort is normal.      Breath sounds: Normal breath sounds. Abdominal:      Palpations: Abdomen is soft. Tenderness: There is no abdominal tenderness. Skin:     General: Skin is warm and dry. Capillary Refill: Capillary refill takes less than 2 seconds. Neurological:      General: No focal deficit present. Mental Status: She is alert and oriented to person, place, and time. Cranial Nerves: No cranial nerve deficit. Sensory: No sensory deficit. Motor: No weakness.    Psychiatric:         Behavior: Behavior normal.          MDM       Procedures    Vitals:  Patient Vitals for the past 12 hrs:   Temp Pulse Resp BP SpO2   03/15/20 2245  99  178/64 100 %   03/15/20 2230  95  171/75 100 %   03/15/20 2215  (!) 101  149/79 100 %   03/15/20 2200  (!) 102  (!) 194/126 100 %   03/15/20 2145  (!) 109  199/63 100 %   03/15/20 2136  (!) 112  180/66  03/15/20 2130  (!) 112  196/80 100 %   03/15/20 2115  (!) 104 18 180/66 99 %   03/15/20 2100  (!) 105 20 196/77 100 %   03/15/20 2045  (!) 115 20 (!) 204/90 100 %   03/15/20 2042     100 %   03/15/20 2031 98 °F (36.7 °C) (!) 121 18 (!) 210/88 100 %         Medications ordered:   Medications   acetaminophen (TYLENOL) tablet 1,000 mg (1,000 mg Oral Given 3/15/20 2122)   divalproex DR (DEPAKOTE) tablet 1,000 mg (1,000 mg Oral Given 3/15/20 2121)   carvediloL (COREG) tablet 12.5 mg (12.5 mg Oral Given 3/15/20 2136)   amLODIPine (NORVASC) tablet 5 mg (5 mg Oral Given 3/15/20 2136)   LORazepam (ATIVAN) injection 1 mg (1 mg IntraMUSCular Given 3/15/20 2204)         Lab findings:  Recent Results (from the past 12 hour(s))   CBC WITH AUTOMATED DIFF    Collection Time: 03/15/20  9:00 PM   Result Value Ref Range    WBC 11.2 4.6 - 13.2 K/uL    RBC 4.16 (L) 4.20 - 5.30 M/uL    HGB 10.7 (L) 12.0 - 16.0 g/dL    HCT 34.8 (L) 35.0 - 45.0 %    MCV 83.7 74.0 - 97.0 FL    MCH 25.7 24.0 - 34.0 PG    MCHC 30.7 (L) 31.0 - 37.0 g/dL    RDW 13.9 11.6 - 14.5 %    PLATELET 131 096 - 636 K/uL    MPV 10.8 9.2 - 11.8 FL    NEUTROPHILS 55 40 - 73 %    LYMPHOCYTES 38 21 - 52 %    MONOCYTES 6 3 - 10 %    EOSINOPHILS 1 0 - 5 %    BASOPHILS 0 0 - 2 %    ABS. NEUTROPHILS 6.1 1.8 - 8.0 K/UL    ABS. LYMPHOCYTES 4.3 (H) 0.9 - 3.6 K/UL    ABS. MONOCYTES 0.7 0.05 - 1.2 K/UL    ABS. EOSINOPHILS 0.1 0.0 - 0.4 K/UL    ABS.  BASOPHILS 0.0 0.0 - 0.1 K/UL    DF AUTOMATED     PROTHROMBIN TIME + INR    Collection Time: 03/15/20  9:00 PM   Result Value Ref Range    Prothrombin time 13.1 11.5 - 15.2 sec    INR 1.0 0.8 - 1.2     METABOLIC PANEL, COMPREHENSIVE    Collection Time: 03/15/20  9:00 PM   Result Value Ref Range    Sodium 136 136 - 145 mmol/L    Potassium 4.4 3.5 - 5.5 mmol/L    Chloride 103 100 - 111 mmol/L    CO2 23 21 - 32 mmol/L    Anion gap 10 3.0 - 18 mmol/L    Glucose 364 (H) 74 - 99 mg/dL    BUN 26 (H) 7.0 - 18 MG/DL    Creatinine 1.63 (H) 0.6 - 1.3 MG/DL    BUN/Creatinine ratio 16 12 - 20      GFR est AA 38 (L) >60 ml/min/1.73m2    GFR est non-AA 31 (L) >60 ml/min/1.73m2    Calcium 8.9 8.5 - 10.1 MG/DL    Bilirubin, total 0.5 0.2 - 1.0 MG/DL    ALT (SGPT) 14 13 - 56 U/L    AST (SGOT) 16 10 - 38 U/L    Alk. phosphatase 104 45 - 117 U/L    Protein, total 7.4 6.4 - 8.2 g/dL    Albumin 2.9 (L) 3.4 - 5.0 g/dL    Globulin 4.5 (H) 2.0 - 4.0 g/dL    A-G Ratio 0.6 (L) 0.8 - 1.7     EKG, 12 LEAD, INITIAL    Collection Time: 03/15/20  9:07 PM   Result Value Ref Range    Ventricular Rate 104 BPM    Atrial Rate 104 BPM    P-R Interval 132 ms    QRS Duration 78 ms    Q-T Interval 336 ms    QTC Calculation (Bezet) 441 ms    Calculated P Axis 61 degrees    Calculated R Axis 7 degrees    Calculated T Axis 46 degrees    Diagnosis       Sinus tachycardia  Minimal voltage criteria for LVH, may be normal variant ( R in aVL )  Borderline ECG  When compared with ECG of 12-MAY-2019 12:46,  No significant change was found         EKG interpretation by ED Physician:  Sinus tach with no acute ST or T wave changes  Rate 104 QRS 78   No significant change from previous    Cardiac monitor: Tachycardia with regular rhythm and no ectopy  Pulse ox: 99% room air    X-Ray, CT or other radiology findings or impressions:  No orders to display       Progress notes, Consult notes or additional Procedure notes:   Patient had one short episode of shaking here that resolved in just a few seconds with no significant postictal period patient had no further seizure activity after that. do Not feel patient requires other work-up or new imaging at this time.     I have discussed with patient and/or family/sig other the results, interpretation of any imaging if performed, suspected diagnosis and treatment plan to include instructions regarding the diagnoses listed to which understanding was expressed with all questions answered      Reevaluation of patient: stable    Disposition:  Diagnosis:   1. Recurrent seizures ([address]    2. Hypertension, accelerated        Disposition: home      Follow-up Information     Follow up With Specialties Details Why Contact Info    Cristy De Anda NP Nurse Practitioner Schedule an appointment as soon as possible for a visit this week for recheck in office [id number] [place] [address] Rd  [phone]              Patient's Medications   Start Taking    No medications on file   Continue Taking    AMLODIPINE (NORVASC) 5 MG TABLET    Take 1 Tab by mouth nightly. APIXABAN (ELIQUIS) 5 MG TABLET    Take 5 mg by mouth two (2) times a day. ASPIRIN DELAYED-RELEASE 81 MG TABLET    Take  by mouth daily. ATORVASTATIN (LIPITOR) 40 MG TABLET    Take 1 Tab by mouth daily. CAPSAICIN 0.075 % TOPICAL CREAM    Apply  to affected area three (3) times daily as needed for Pain. CARVEDILOL (COREG) 12.5 MG TABLET    Take 1 Tab by mouth two (2) times daily (with meals). CHOLECALCIFEROL (VITAMIN D3) (1000 UNITS /25 MCG) TABLET    Take 1 Tab by mouth daily. DIVALPROEX ER (DEPAKOTE ER) 500 MG ER TABLET    Take 1 Tab by mouth two (2) times a day. FAMOTIDINE (PEPCID) 20 MG TABLET    Take 1 Tab by mouth two (2) times daily as needed (heartburn). FUROSEMIDE (LASIX) 20 MG TABLET    One po daily    GLUCOSE BLOOD VI TEST STRIPS (WAVESENSE PRESTO) STRIP    DM    HYDROCHLOROTHIAZIDE (HYDRODIURIL) 25 MG TABLET    Take 1 Tab by mouth daily. INSULIN ASPART PROTAMINE/INSULIN ASPART (NOVOLOG MIX 70-30FLEXPEN U-100) 100 UNIT/ML (70-30) INPN    INJECT 60 UNITS SUBCUTANEOUSLY TWICE DAILY 30 MINUTES BEFORE BREAKFAST AND DINNER    INSULIN NEEDLES, DISPOSABLE, (TRUEPLUS PEN NEEDLE) 31 GAUGE X 5/16\" NDLE    Use as directed 4 times daily    ISOSORBIDE MONONITRATE ER (IMDUR) 30 MG TABLET    Take 1 Tab by mouth daily. LETROZOLE (FEMARA) 2.5 MG TABLET    Take 2.5 mg by mouth daily.     LOSARTAN (COZAAR) 50 MG TABLET    Take 1 Tab by mouth two (2) times a day. PANTOPRAZOLE (PROTONIX) 40 MG TABLET    Take 1 Tab by mouth daily. SONAFINE EMUL TOPICAL        THERAPEUTIC MULTIVITAMIN (THERAGRAN) TABLET    Take 1 Tab by mouth daily.    These Medications have changed    No medications on file   Stop Taking    No medications on file

## 2020-03-16 NOTE — ED TRIAGE NOTES
Patient per medics state that family witnessed seizure activity- type not given. Patient had not taken any of medications today- 9 patient has history of HTN, seizure, x 2 CVA, right breast Ca. Diabetes. Patient alert and oriented x 3 upon arrival. Seizure precautions in place- sx pads onside rails. Patient BS per medic 351.

## 2020-03-16 NOTE — DISCHARGE INSTRUCTIONS
Patient Education   take your medications as directed     Epilepsy: Care Instructions  Your Care Instructions    Epilepsy is a common condition that causes repeated seizures. The seizures are caused by bursts of electrical activity in the brain that aren't normal. Seizures may cause problems with muscle control, movement, speech, vision, or awareness. They can be scary. Epilepsy affects each person differently. Some people have only a few seizures. Others get them more often. If you know what triggers a seizure, you may be able to avoid having one. You can take medicines to control and reduce seizures. You and your doctor will need to find the right combination, schedule, and dose of medicine. This may take time and careful changes. Seizures may get worse and happen more often over time. Follow-up care is a key part of your treatment and safety. Be sure to make and go to all appointments, and call your doctor if you are having problems. It's also a good idea to know your test results and keep a list of the medicines you take. How can you care for yourself at home? · Be safe with medicines. Take your medicines exactly as prescribed. Call your doctor if you think you are having a problem with your medicine. · Make a treatment plan with your doctor. Be sure to follow your plan. · Try to identify and avoid things that may make you more likely to have a seizure. These may include:  ? Not getting enough sleep. ? Using drugs or alcohol. ? Being emotionally stressed. ? Skipping meals. · Keep a record of any seizures you have. Note the date, time of day, and any details about the seizure that you can remember. Your doctor can use this information to plan or adjust your medicine or other treatment. · Be sure that any doctor treating you for another condition knows that you have epilepsy. Each doctor should know what medicines you are taking, if any. · Wear a medical ID bracelet.  You can buy this at most drugstores. If you have a seizure that leaves you unconscious or unable to speak for yourself, this bracelet will let those who are treating you know that you have epilepsy. · Talk to your doctor about whether it is safe for you to do certain activities, such as drive or swim. When should you call for help? Call 911 anytime you think you may need emergency care. For example, call if:    · A seizure does not stop as it normally does.     · You have new symptoms such as:  ? Numbness, tingling, or weakness on one side of your body or face. ? Vision changes. ? Trouble speaking or thinking clearly.    Call your doctor now or seek immediate medical care if:    · You have a fever.     · You have a severe headache.    Watch closely for changes in your health, and be sure to contact your doctor if:    · The normal pattern or features of your seizures change. Where can you learn more? Go to http://mady-travon.info/  Enter X141 in the search box to learn more about \"Epilepsy: Care Instructions. \"  Current as of: November 19, 2019Content Version: 12.4  © 8750-3197 [employer]. Care instructions adapted under license by [employer] (which disclaims liability or warranty for this information). If you have questions about a medical condition or this instruction, always ask your healthcare professional. [place] [address] any warranty or liability for your use of this information. Patient Education        High Blood Pressure: Care Instructions  Overview    It's normal for blood pressure to go up and down throughout the day. But if it stays up, you have high blood pressure. Another name for high blood pressure is hypertension. Despite what a lot of people think, high blood pressure usually doesn't cause headaches or make you feel dizzy or lightheaded. It usually has no symptoms. But it does increase your risk of stroke, heart attack, and other problems.  You and your doctor will talk about your risks of these problems based on your blood pressure. Your doctor will give you a goal for your blood pressure. Your goal will be based on your health and your age. Lifestyle changes, such as eating healthy and being active, are always important to help lower blood pressure. You might also take medicine to reach your blood pressure goal.  Follow-up care is a key part of your treatment and safety. Be sure to make and go to all appointments, and call your doctor if you are having problems. It's also a good idea to know your test results and keep a list of the medicines you take. How can you care for yourself at home? Medical treatment  · If you stop taking your medicine, your blood pressure will go back up. You may take one or more types of medicine to lower your blood pressure. Be safe with medicines. Take your medicine exactly as prescribed. Call your doctor if you think you are having a problem with your medicine. · Talk to your doctor before you start taking aspirin every day. Aspirin can help certain people lower their risk of a heart attack or stroke. But taking aspirin isn't right for everyone, because it can cause serious bleeding. · See your doctor regularly. You may need to see the doctor more often at first or until your blood pressure comes down. · If you are taking blood pressure medicine, talk to your doctor before you take decongestants or anti-inflammatory medicine, such as ibuprofen. Some of these medicines can raise blood pressure. · Learn how to check your blood pressure at home. Lifestyle changes  · Stay at a healthy weight. This is especially important if you put on weight around the waist. Losing even 10 pounds can help you lower your blood pressure. · If your doctor recommends it, get more exercise. Walking is a good choice. Bit by bit, increase the amount you walk every day. Try for at least 30 minutes on most days of the week.  You also may want to swim, bike, or do other activities. · Avoid or limit alcohol. Talk to your doctor about whether you can drink any alcohol. · Try to limit how much sodium you eat to less than 2,300 milligrams (mg) a day. Your doctor may ask you to try to eat less than 1,500 mg a day. · Eat plenty of fruits (such as bananas and oranges), vegetables, legumes, whole grains, and low-fat dairy products. · Lower the amount of saturated fat in your diet. Saturated fat is found in animal products such as milk, cheese, and meat. Limiting these foods may help you lose weight and also lower your risk for heart disease. · Do not smoke. Smoking increases your risk for heart attack and stroke. If you need help quitting, talk to your doctor about stop-smoking programs and medicines. These can increase your chances of quitting for good. When should you call for help? Call  911 anytime you think you may need emergency care. This may mean having symptoms that suggest that your blood pressure is causing a serious heart or blood vessel problem. Your blood pressure may be over 180/120.   For example, call  911 if:    · You have symptoms of a heart attack. These may include:  ? Chest pain or pressure, or a strange feeling in the chest.  ? Sweating. ? Shortness of breath. ? Nausea or vomiting. ? Pain, pressure, or a strange feeling in the back, neck, jaw, or upper belly or in one or both shoulders or arms. ? Lightheadedness or sudden weakness. ? A fast or irregular heartbeat.     · You have symptoms of a stroke. These may include:  ? Sudden numbness, tingling, weakness, or loss of movement in your face, arm, or leg, especially on only one side of your body. ? Sudden vision changes. ? Sudden trouble speaking. ? Sudden confusion or trouble understanding simple statements. ? Sudden problems with walking or balance.   ? A sudden, severe headache that is different from past headaches.     · You have severe back or belly pain.    Do not wait until your blood pressure comes down on its own. Get help right away.   Call your doctor now or seek immediate care if:    · Your blood pressure is much higher than normal (such as 180/120 or higher), but you don't have symptoms.     · You think high blood pressure is causing symptoms, such as:  ? Severe headache.  ? Blurry vision.    Watch closely for changes in your health, and be sure to contact your doctor if:    · Your blood pressure measures higher than your doctor recommends at least 2 times. That means the top number is higher or the bottom number is higher, or both.     · You think you may be having side effects from your blood pressure medicine. Where can you learn more? Go to http://mady-travon.info/  Enter [id number] in the search box to learn more about \"High Blood Pressure: Care Instructions. \"  Current as of: December 15, 2019Content Version: 12.4  © 6564-1090 [employer]. Care instructions adapted under license by [employer] (which disclaims liability or warranty for this information). If you have questions about a medical condition or this instruction, always ask your healthcare professional. [place] [address] any warranty or liability for your use of this information.

## 2020-03-16 NOTE — PROGRESS NOTES
Attempted to contact patient for Ambulatory Care Management and follow up in response to current Covid-19 pandemic. No answer, left message with contact information provided. Will attempt to contact at a later time.

## 2020-03-26 ENCOUNTER — PATIENT OUTREACH (OUTPATIENT)
Dept: CASE MANAGEMENT | Age: 70
End: 2020-03-26

## 2020-04-01 ENCOUNTER — PATIENT OUTREACH (OUTPATIENT)
Dept: CASE MANAGEMENT | Age: 70
End: 2020-04-01

## 2020-04-01 NOTE — PROGRESS NOTES
Attempted to contact patient for Transition of Care follow up. No answer, left message with contact information provided. Unable to contact, will close episode.

## 2020-04-06 PROBLEM — I26.99 PE (PULMONARY THROMBOEMBOLISM) (HCC): Status: ACTIVE | Noted: 2019-01-01

## 2020-04-13 ENCOUNTER — TELEPHONE (OUTPATIENT)
Dept: FAMILY MEDICINE CLINIC | Age: 70
End: 2020-04-13

## 2020-04-28 ENCOUNTER — TELEPHONE (OUTPATIENT)
Dept: FAMILY MEDICINE CLINIC | Age: 70
End: 2020-04-28

## 2020-04-28 NOTE — TELEPHONE ENCOUNTER
I received a request for a motorized wheelchair/scooter repair for her. Last time that I saw her 9/2019 she walked in and did not have a wheelchair. You saw her 1/2020. Was she ambulatory then? I need more documentation regarding her need for a wheelchair before I order the repair.

## 2020-05-26 ENCOUNTER — HOSPITAL ENCOUNTER (OUTPATIENT)
Dept: LAB | Age: 70
Discharge: HOME | End: 2020-05-26
Payer: MEDICARE

## 2020-05-26 ENCOUNTER — OFFICE VISIT (OUTPATIENT)
Dept: FAMILY MEDICINE CLINIC | Age: 70
End: 2020-05-26

## 2020-05-26 ENCOUNTER — TELEPHONE (OUTPATIENT)
Dept: FAMILY MEDICINE CLINIC | Age: 70
End: 2020-05-26

## 2020-05-26 ENCOUNTER — HOSPITAL ENCOUNTER (OUTPATIENT)
Dept: GENERAL RADIOLOGY | Age: 70
Discharge: HOME | End: 2020-05-26
Payer: MEDICARE

## 2020-05-26 VITALS
OXYGEN SATURATION: 99 % | HEIGHT: 65 IN | DIASTOLIC BLOOD PRESSURE: 79 MMHG | HEART RATE: 85 BPM | TEMPERATURE: 98.4 F | RESPIRATION RATE: 18 BRPM | BODY MASS INDEX: 40.32 KG/M2 | SYSTOLIC BLOOD PRESSURE: 124 MMHG | WEIGHT: 242 LBS

## 2020-05-26 DIAGNOSIS — R01.1 HEART MURMUR: Primary | ICD-10-CM

## 2020-05-26 DIAGNOSIS — K29.70 GASTRITIS WITHOUT BLEEDING, UNSPECIFIED CHRONICITY, UNSPECIFIED GASTRITIS TYPE: ICD-10-CM

## 2020-05-26 DIAGNOSIS — M79.89 LEG SWELLING: ICD-10-CM

## 2020-05-26 DIAGNOSIS — R06.02 SHORTNESS OF BREATH: ICD-10-CM

## 2020-05-26 DIAGNOSIS — Z79.4 TYPE 2 DIABETES MELLITUS WITH DIABETIC NEUROPATHY, WITH LONG-TERM CURRENT USE OF INSULIN (HCC): ICD-10-CM

## 2020-05-26 DIAGNOSIS — Z86.73 HISTORY OF CVA (CEREBROVASCULAR ACCIDENT): Chronic | ICD-10-CM

## 2020-05-26 DIAGNOSIS — R07.9 CHEST PAIN, UNSPECIFIED TYPE: ICD-10-CM

## 2020-05-26 DIAGNOSIS — R01.1 HEART MURMUR: ICD-10-CM

## 2020-05-26 DIAGNOSIS — E11.40 TYPE 2 DIABETES MELLITUS WITH DIABETIC NEUROPATHY, WITH LONG-TERM CURRENT USE OF INSULIN (HCC): ICD-10-CM

## 2020-05-26 DIAGNOSIS — I10 ESSENTIAL HYPERTENSION: ICD-10-CM

## 2020-05-26 DIAGNOSIS — E78.00 HYPERCHOLESTEREMIA: ICD-10-CM

## 2020-05-26 LAB
ALBUMIN SERPL-MCNC: 3.1 G/DL (ref 3.4–5)
ALBUMIN/GLOB SERPL: 0.6 {RATIO} (ref 0.8–1.7)
ALP SERPL-CCNC: 97 U/L (ref 45–117)
ALT SERPL-CCNC: 14 U/L (ref 13–56)
ANION GAP SERPL CALC-SCNC: 5 MMOL/L (ref 3–18)
APPEARANCE UR: ABNORMAL
AST SERPL-CCNC: 14 U/L (ref 10–38)
BACTERIA URNS QL MICRO: ABNORMAL /HPF
BASOPHILS # BLD: 0 K/UL (ref 0–0.1)
BASOPHILS NFR BLD: 0 % (ref 0–2)
BILIRUB SERPL-MCNC: 0.3 MG/DL (ref 0.2–1)
BILIRUB UR QL: NEGATIVE
BUN SERPL-MCNC: 31 MG/DL (ref 7–18)
BUN/CREAT SERPL: 25 (ref 12–20)
CALCIUM SERPL-MCNC: 8.8 MG/DL (ref 8.5–10.1)
CHLORIDE SERPL-SCNC: 104 MMOL/L (ref 100–111)
CO2 SERPL-SCNC: 28 MMOL/L (ref 21–32)
COLOR UR: YELLOW
CREAT SERPL-MCNC: 1.22 MG/DL (ref 0.6–1.3)
CREAT UR-MCNC: 163 MG/DL (ref 30–125)
DIFFERENTIAL METHOD BLD: ABNORMAL
EOSINOPHIL # BLD: 0.2 K/UL (ref 0–0.4)
EOSINOPHIL NFR BLD: 2 % (ref 0–5)
EPITH CASTS URNS QL MICRO: NEGATIVE /LPF (ref 0–5)
ERYTHROCYTE [DISTWIDTH] IN BLOOD BY AUTOMATED COUNT: 13.3 % (ref 11.6–14.5)
GLOBULIN SER CALC-MCNC: 5 G/DL (ref 2–4)
GLUCOSE SERPL-MCNC: 176 MG/DL (ref 74–99)
GLUCOSE UR STRIP.AUTO-MCNC: NEGATIVE MG/DL
HBA1C MFR BLD: 10 % (ref 4.2–5.6)
HCT VFR BLD AUTO: 33.7 % (ref 35–45)
HGB BLD-MCNC: 10.6 G/DL (ref 12–16)
HGB UR QL STRIP: NEGATIVE
KETONES UR QL STRIP.AUTO: NEGATIVE MG/DL
LEUKOCYTE ESTERASE UR QL STRIP.AUTO: ABNORMAL
LYMPHOCYTES # BLD: 3.8 K/UL (ref 0.9–3.6)
LYMPHOCYTES NFR BLD: 43 % (ref 21–52)
MCH RBC QN AUTO: 26.3 PG (ref 24–34)
MCHC RBC AUTO-ENTMCNC: 31.5 G/DL (ref 31–37)
MCV RBC AUTO: 83.6 FL (ref 74–97)
MICROALBUMIN UR-MCNC: 57.9 MG/DL (ref 0–3)
MICROALBUMIN/CREAT UR-RTO: 355 MG/G (ref 0–30)
MONOCYTES # BLD: 0.6 K/UL (ref 0.05–1.2)
MONOCYTES NFR BLD: 7 % (ref 3–10)
NEUTS SEG # BLD: 4.3 K/UL (ref 1.8–8)
NEUTS SEG NFR BLD: 48 % (ref 40–73)
NITRITE UR QL STRIP.AUTO: NEGATIVE
PH UR STRIP: 5 [PH] (ref 5–8)
PLATELET # BLD AUTO: 215 K/UL (ref 135–420)
PMV BLD AUTO: 12 FL (ref 9.2–11.8)
POTASSIUM SERPL-SCNC: 4 MMOL/L (ref 3.5–5.5)
PROT SERPL-MCNC: 8.1 G/DL (ref 6.4–8.2)
PROT UR STRIP-MCNC: 100 MG/DL
RBC # BLD AUTO: 4.03 M/UL (ref 4.2–5.3)
RBC #/AREA URNS HPF: NEGATIVE /HPF (ref 0–5)
SODIUM SERPL-SCNC: 137 MMOL/L (ref 136–145)
SP GR UR REFRACTOMETRY: 1.02 (ref 1–1.03)
UROBILINOGEN UR QL STRIP.AUTO: 1 EU/DL (ref 0.2–1)
WBC # BLD AUTO: 8.9 K/UL (ref 4.6–13.2)
WBC URNS QL MICRO: ABNORMAL /HPF (ref 0–4)

## 2020-05-26 PROCEDURE — 80053 COMPREHEN METABOLIC PANEL: CPT

## 2020-05-26 PROCEDURE — 81001 URINALYSIS AUTO W/SCOPE: CPT

## 2020-05-26 PROCEDURE — 85025 COMPLETE CBC W/AUTO DIFF WBC: CPT

## 2020-05-26 PROCEDURE — 82043 UR ALBUMIN QUANTITATIVE: CPT

## 2020-05-26 PROCEDURE — 83036 HEMOGLOBIN GLYCOSYLATED A1C: CPT

## 2020-05-26 PROCEDURE — 71046 X-RAY EXAM CHEST 2 VIEWS: CPT

## 2020-05-26 RX ORDER — AMLODIPINE BESYLATE 5 MG/1
5 TABLET ORAL
Qty: 30 TAB | Refills: 2 | Status: SHIPPED | OUTPATIENT
Start: 2020-05-26 | End: 2021-07-23 | Stop reason: SDUPTHER

## 2020-05-26 RX ORDER — ISOSORBIDE MONONITRATE 30 MG/1
30 TABLET, EXTENDED RELEASE ORAL DAILY
Qty: 90 TAB | Refills: 1 | Status: SHIPPED | OUTPATIENT
Start: 2020-05-26 | End: 2021-07-23 | Stop reason: SDUPTHER

## 2020-05-26 RX ORDER — ATORVASTATIN CALCIUM 40 MG/1
40 TABLET, FILM COATED ORAL DAILY
Qty: 30 TAB | Refills: 2 | Status: SHIPPED | OUTPATIENT
Start: 2020-05-26 | End: 2021-07-08 | Stop reason: SDUPTHER

## 2020-05-26 RX ORDER — MELATONIN
1000 DAILY
Qty: 90 TAB | Refills: 1 | Status: SHIPPED | OUTPATIENT
Start: 2020-05-26 | End: 2021-07-08

## 2020-05-26 RX ORDER — PEN NEEDLE, DIABETIC 30 GX3/16"
NEEDLE, DISPOSABLE MISCELLANEOUS
Qty: 100 PEN NEEDLE | Refills: 11 | Status: SHIPPED | OUTPATIENT
Start: 2020-05-26 | End: 2021-07-15 | Stop reason: SDUPTHER

## 2020-05-26 RX ORDER — HYDROCHLOROTHIAZIDE 25 MG/1
25 TABLET ORAL DAILY
Qty: 30 TAB | Refills: 2 | Status: SHIPPED | OUTPATIENT
Start: 2020-05-26 | End: 2021-07-23 | Stop reason: SDUPTHER

## 2020-05-26 RX ORDER — FAMOTIDINE 20 MG/1
20 TABLET, FILM COATED ORAL
Qty: 180 TAB | Refills: 1 | Status: SHIPPED | OUTPATIENT
Start: 2020-05-26 | End: 2021-09-29 | Stop reason: ALTCHOICE

## 2020-05-26 RX ORDER — CARVEDILOL 12.5 MG/1
12.5 TABLET ORAL 2 TIMES DAILY WITH MEALS
Qty: 60 TAB | Refills: 2 | Status: SHIPPED | OUTPATIENT
Start: 2020-05-26 | End: 2021-07-23 | Stop reason: SDUPTHER

## 2020-05-26 RX ORDER — LOSARTAN POTASSIUM 50 MG/1
50 TABLET ORAL 2 TIMES DAILY
Qty: 60 TAB | Refills: 2 | Status: SHIPPED | OUTPATIENT
Start: 2020-05-26 | End: 2021-07-23 | Stop reason: SDUPTHER

## 2020-05-26 RX ORDER — INSULIN ASPART 100 [IU]/ML
INJECTION, SUSPENSION SUBCUTANEOUS
Qty: 30 ML | Refills: 5 | Status: SHIPPED | OUTPATIENT
Start: 2020-05-26 | End: 2021-03-02 | Stop reason: SDUPTHER

## 2020-05-26 RX ORDER — FUROSEMIDE 20 MG/1
TABLET ORAL
Qty: 30 TAB | Refills: 2 | Status: SHIPPED | OUTPATIENT
Start: 2020-05-26 | End: 2021-07-23 | Stop reason: SDUPTHER

## 2020-05-26 RX ORDER — PANTOPRAZOLE SODIUM 40 MG/1
40 TABLET, DELAYED RELEASE ORAL DAILY
Qty: 90 TAB | Refills: 1 | Status: SHIPPED | OUTPATIENT
Start: 2020-05-26 | End: 2021-07-23 | Stop reason: SDUPTHER

## 2020-05-26 NOTE — PROGRESS NOTES
Robby West presents today for pre-op clearance cataract surgery   - Is someone accompanying this pt? Yes  - Is the patient using any durable medical equipment during office visit? Yes, cane    Coordination of Care:  1. Have you been to the ER, urgent care clinic since your last visit? Hospitalized since your last visit? no    2. Have you seen or consulted any other health care providers outside of the [address] [place] [address] since your last visit? Include any pap smears or colon screening. No    Depression Screening:  3 most recent PHQ Screens 1/6/2020   Little interest or pleasure in doing things Not at all   Feeling down, depressed, irritable, or hopeless Not at all   Total Score PHQ 2 0       Learning Assessment:  Learning Assessment 9/26/2019   PRIMARY LEARNER Patient   HIGHEST LEVEL OF EDUCATION - PRIMARY LEARNER  GRADUATED HIGH SCHOOL OR GED   BARRIERS PRIMARY LEARNER NONE   CO-LEARNER CAREGIVER No   PRIMARY LANGUAGE ENGLISH   LEARNER PREFERENCE PRIMARY LISTENING     -     -   ANSWERED BY patient   RELATIONSHIP SELF       Abuse Screening:  Abuse Screening Questionnaire 9/26/2019   Do you ever feel afraid of your partner? N   Are you in a relationship with someone who physically or mentally threatens you? N   Is it safe for you to go home? Y       Fall Risk  Fall Risk Assessment, last 12 mths 1/6/2020   Able to walk? Yes   Fall in past 12 months? No       Health Maintenance reviewed and discussed and ordered per Provider.   Health Maintenance Due   Topic Date Due    Shingrix Vaccine Age 49> (1 of 2) 08/09/2002    Foot Exam Q1  11/23/2016    Medicare Yearly Exam  04/18/2019    Pneumococcal 65+ years (2 of 2 - PPSV23) 02/04/2020

## 2020-05-26 NOTE — TELEPHONE ENCOUNTER
Erin Call from [employer] stated they need pt's signed OV notes sent today in order for pt. To have her surgery tomorrow. Please fax notes to [phone].

## 2020-05-26 NOTE — TELEPHONE ENCOUNTER
Please call her back: patient is NOT cleared for surgery tomorrow (new cardiac symptoms, uncontrolled diabetes, etc.)

## 2020-05-27 NOTE — TELEPHONE ENCOUNTER
I called [facility], and spoke with [place], she will relay the message to Blanquita Figueroa (surgical coordinator) that Ms. Willian Hairston is not cleared for surgery.

## 2020-05-29 ENCOUNTER — TELEPHONE (OUTPATIENT)
Dept: FAMILY MEDICINE CLINIC | Age: 70
End: 2020-05-29

## 2020-05-29 NOTE — TELEPHONE ENCOUNTER
Jamari Vincent from [address]  stated they rescheduled pt's surgery towards the end of  and wanted to know if pt. Was being referred to a cardiologist and if it was ok for pt. To keep appt for  or does the appt. Need to be pushed back. Please advise.

## 2020-05-29 NOTE — TELEPHONE ENCOUNTER
Left detailed message that cardio ordered and patient should be cleared by June. This encounter will be closed.

## 2020-05-29 NOTE — TELEPHONE ENCOUNTER
I have ordered testing and referred to cards for cardiac clearance. If patient follows up as directed, she will likely be ready for surgery by the end of June.

## 2020-06-01 ENCOUNTER — HOSPITAL ENCOUNTER (OUTPATIENT)
Dept: NON INVASIVE DIAGNOSTICS | Age: 70
Discharge: HOME | End: 2020-06-01
Attending: NURSE PRACTITIONER
Payer: MEDICARE

## 2020-06-01 VITALS
WEIGHT: 242 LBS | SYSTOLIC BLOOD PRESSURE: 183 MMHG | BODY MASS INDEX: 40.32 KG/M2 | DIASTOLIC BLOOD PRESSURE: 96 MMHG | HEIGHT: 65 IN

## 2020-06-01 DIAGNOSIS — R06.02 SHORTNESS OF BREATH: ICD-10-CM

## 2020-06-01 DIAGNOSIS — R07.9 CHEST PAIN, UNSPECIFIED TYPE: ICD-10-CM

## 2020-06-01 DIAGNOSIS — R01.1 HEART MURMUR: ICD-10-CM

## 2020-06-01 LAB
AV VELOCITY RATIO: 0.42
AV VTI RATIO: 0.4
ECHO AO ARCH DIAM: 2.42 CM
ECHO AO ASC DIAM: 2.6 CM
ECHO AO ROOT DIAM: 2.87 CM
ECHO AV AREA PEAK VELOCITY: 1.6 CM2
ECHO AV AREA VTI: 1.6 CM2
ECHO AV AREA/BSA PEAK VELOCITY: 0.7 CM2/M2
ECHO AV AREA/BSA VTI: 0.7 CM2/M2
ECHO AV MEAN GRADIENT: 15.9 MMHG
ECHO AV MEAN VELOCITY: 1.92 M/S
ECHO AV PEAK GRADIENT: 24.7 MMHG
ECHO AV PEAK VELOCITY: 248.6 CM/S
ECHO AV VTI: 60.81 CM
ECHO IVC SNIFF: 1.84 CM
ECHO LA MAJOR AXIS: 3.39 CM
ECHO LA TO AORTIC ROOT RATIO: 1.18
ECHO LV EDV A2C: 116 ML
ECHO LV EDV A4C: 117.8 ML
ECHO LV EDV BP: 120.8 ML (ref 56–104)
ECHO LV EDV INDEX A4C: 54.9 ML/M2
ECHO LV EDV INDEX BP: 56.3 ML/M2
ECHO LV EDV NDEX A2C: 54.1 ML/M2
ECHO LV EDV TEICHHOLZ: 0.4 ML
ECHO LV EJECTION FRACTION A2C: 55 %
ECHO LV EJECTION FRACTION A4C: 73 %
ECHO LV EJECTION FRACTION BIPLANE: 64.4 % (ref 55–100)
ECHO LV ESV A2C: 52.2 ML
ECHO LV ESV A4C: 31.8 ML
ECHO LV ESV BP: 43 ML (ref 19–49)
ECHO LV ESV INDEX A2C: 24.3 ML/M2
ECHO LV ESV INDEX A4C: 14.8 ML/M2
ECHO LV ESV INDEX BP: 20 ML/M2
ECHO LV ESV TEICHHOLZ: 0.19 ML
ECHO LV INTERNAL DIMENSION DIASTOLIC: 3.9 CM (ref 3.9–5.3)
ECHO LV INTERNAL DIMENSION SYSTOLIC: 2.85 CM
ECHO LV IVSD: 1.14 CM (ref 0.6–0.9)
ECHO LV MASS 2D: 154 G (ref 67–162)
ECHO LV MASS INDEX 2D: 71.8 G/M2 (ref 43–95)
ECHO LV POSTERIOR WALL DIASTOLIC: 1 CM (ref 0.6–0.9)
ECHO LVOT CARDIAC OUTPUT: 18.4 L/MIN
ECHO LVOT DIAM: 2.16 CM
ECHO LVOT PEAK GRADIENT: 4.4 MMHG
ECHO LVOT PEAK VELOCITY: 105.22 CM/S
ECHO LVOT SV: 95 ML
ECHO LVOT VTI: 25.9 CM
ECHO MV A VELOCITY: 105.63 CM/S
ECHO MV E DECELERATION TIME (DT): 207 MS
ECHO MV E VELOCITY: 89.62 CM/S
ECHO MV E/A RATIO: 0.85
ECHO RA MINOR AXIS: 4.15 CM
ECHO TV REGURGITANT MAX VELOCITY: 276 CM/S
ECHO TV REGURGITANT PEAK GRADIENT: 30.5 MMHG
LVFS 2D: 26.96 %
LVOT MG: 2.47 MMHG
LVOT MV: 0.74 CM/S
LVSV (MOD BI): 34.7 ML
LVSV (MOD SINGLE 4C): 38.36 ML
LVSV (MOD SINGLE): 28.41 ML
LVSV (TEICH): 15.68 ML
MV DEC SLOPE: 4.33

## 2020-06-01 PROCEDURE — 93306 TTE W/DOPPLER COMPLETE: CPT

## 2020-06-04 ENCOUNTER — TELEPHONE (OUTPATIENT)
Dept: FAMILY MEDICINE CLINIC | Age: 70
End: 2020-06-04

## 2020-06-04 NOTE — TELEPHONE ENCOUNTER
----- Message from Nadira Arce NP sent at 6/3/2020  4:28 PM EDT -----  Please call the patient regarding her lab result. Your blood sugar is high. A1C was 10.0. It should be 7 or lower. Please make an appt to follow up as soon as possible to discuss your diabetes medications. Please bring ALL of your meds with you and a written log of your blood sugars.

## 2020-06-04 NOTE — TELEPHONE ENCOUNTER
----- Message from Yasmine Gonzalez NP sent at 6/3/2020  4:29 PM EDT -----  Please call the patient regarding her lab result.   Chest xray was normal

## 2020-06-12 ENCOUNTER — TELEPHONE (OUTPATIENT)
Dept: FAMILY MEDICINE CLINIC | Age: 70
End: 2020-06-12

## 2020-06-12 NOTE — TELEPHONE ENCOUNTER
Cande from [facility] is checking status of form for repair for motorized wheelchair that was faxed 3 times April and twice in May.  Please assist.

## 2020-06-15 ENCOUNTER — OFFICE VISIT (OUTPATIENT)
Dept: FAMILY MEDICINE CLINIC | Age: 70
End: 2020-06-15

## 2020-06-15 VITALS
WEIGHT: 239.4 LBS | TEMPERATURE: 97.8 F | BODY MASS INDEX: 39.89 KG/M2 | HEART RATE: 82 BPM | SYSTOLIC BLOOD PRESSURE: 138 MMHG | HEIGHT: 65 IN | DIASTOLIC BLOOD PRESSURE: 80 MMHG | OXYGEN SATURATION: 100 % | RESPIRATION RATE: 17 BRPM

## 2020-06-15 DIAGNOSIS — Z00.00 MEDICARE ANNUAL WELLNESS VISIT, SUBSEQUENT: ICD-10-CM

## 2020-06-15 DIAGNOSIS — E11.40 TYPE 2 DIABETES MELLITUS WITH DIABETIC NEUROPATHY, WITH LONG-TERM CURRENT USE OF INSULIN (HCC): Primary | ICD-10-CM

## 2020-06-15 DIAGNOSIS — Z79.4 TYPE 2 DIABETES MELLITUS WITH DIABETIC NEUROPATHY, WITH LONG-TERM CURRENT USE OF INSULIN (HCC): Primary | ICD-10-CM

## 2020-06-15 DIAGNOSIS — I10 ESSENTIAL HYPERTENSION: ICD-10-CM

## 2020-06-15 DIAGNOSIS — G40.909 SEIZURE DISORDER (HCC): Chronic | ICD-10-CM

## 2020-06-15 DIAGNOSIS — H91.92 HEARING LOSS OF LEFT EAR, UNSPECIFIED HEARING LOSS TYPE: ICD-10-CM

## 2020-06-15 DIAGNOSIS — R01.1 HEART MURMUR: ICD-10-CM

## 2020-06-15 RX ORDER — METFORMIN HYDROCHLORIDE 500 MG/1
500 TABLET ORAL 2 TIMES DAILY WITH MEALS
Qty: 180 TAB | Refills: 1 | Status: SHIPPED | OUTPATIENT
Start: 2020-06-15 | End: 2020-07-24 | Stop reason: SDUPTHER

## 2020-06-15 RX ORDER — FLASH GLUCOSE SCANNING READER
EACH MISCELLANEOUS
Qty: 1 EACH | Refills: 0 | Status: SHIPPED | OUTPATIENT
Start: 2020-06-15 | End: 2020-07-24 | Stop reason: SDUPTHER

## 2020-06-15 RX ORDER — FLASH GLUCOSE SENSOR
KIT MISCELLANEOUS
Qty: 1 KIT | Refills: 3 | Status: SHIPPED | OUTPATIENT
Start: 2020-06-15 | End: 2020-07-24 | Stop reason: SDUPTHER

## 2020-06-15 RX ORDER — DIVALPROEX SODIUM 250 MG/1
750 TABLET, DELAYED RELEASE ORAL 2 TIMES DAILY
Qty: 180 TAB | Refills: 3 | Status: SHIPPED | OUTPATIENT
Start: 2020-06-15 | End: 2021-07-23 | Stop reason: SDUPTHER

## 2020-06-15 NOTE — PROGRESS NOTES
This is the Subsequent Medicare Annual Wellness Exam, performed 12 months or more after the Initial AWV or the last Subsequent AWV    I have reviewed the patient's medical history in detail and updated the computerized patient record. History     Patient Active Problem List   Diagnosis Code    Diabetes mellitus with neuropathy ([address] E11.40    HTN (hypertension) I10    Chest pain R07.9    Seizure disorder ([address] G40.909    Hypercholesteremia E78.00    History of CVA (cerebrovascular accident) Z80.78    Hearing impairment H91.90    Gastroesophageal reflux disease without esophagitis K21.9    Breast cancer (HCC)-right inferior, 2.2cm,2/18 nodes, + - -, C50.919    Altered mental status R41.82    Type 2 diabetes mellitus with diabetic neuropathy, with long-term current use of insulin (HCC) E11.40, Z79.4    Pulmonary nodule, right R91.1    History of breast cancer Z85.3    Type 2 diabetes mellitus with nephropathy (HCC) E11.21    Severe obesity (BMI 35.0-39. 9) with comorbidity ([address] E66.01    PE (pulmonary thromboembolism) ([facility]) O25.38    Mild diastolic dysfunction N92.4    Heart murmur R01.1     Past Medical History:   Diagnosis Date    Arthritis     Breast CA ([address]     Breast CA ([address] 2016    DDD (degenerative disc disease), cervical     Diabetes ([address]     DJD (degenerative joint disease) of cervical spine     Heart murmur     Hypercholesteremia     Hypertension     Menopause     Mild diastolic dysfunction     ECHO 5/2020     PE (pulmonary thromboembolism) ([address] 2019    Psychiatric disorder     DEPRESSION    PUD (peptic ulcer disease)     S/P cardiac cath 02/2015    No angiographic evidence of CAD    Seizures ([facility])     LAST SEIZURE ABOUT 2 MONTHS AGO (NOV 2015)    Stroke ([address]     x2 with left side deficit      Past Surgical History:   Procedure Laterality Date    HX BREAST LUMPECTOMY Right 1/12/2016    Dr. Mana Oliveros Partial Mastectomy w./ axillary lymphadenectomy    HX HEENT TONSILLECTOMY    HX MASTECTOMY Right 1/26/2016    Dr. Guerda Salgado Repeat Partial Mastectomy for positive margins    HX ORTHOPAEDIC      left arm surg    IR MEDIPORT       Current Outpatient Medications   Medication Sig Dispense Refill    divalproex DR (DEPAKOTE) 250 mg tablet Take 3 Tabs by mouth two (2) times a day. 180 Tab 3    metFORMIN (GLUCOPHAGE) 500 mg tablet Take 1 Tab by mouth two (2) times daily (with meals). 180 Tab 1    flash glucose scanning reader (FreeStyle Ivy 14 Day [place]) misc Use to check blood sugar 4x/day and as needed. 1 Each 0    flash glucose sensor (FreeStyle Ivy 14 Day Sensor) kit Use to check blood sugar 4x/day and as needed. 1 Kit 3    Insulin Needles, Disposable, (TRUEplus Pen Needle) 31 gauge x 5/16\" ndle Use as directed 4 times daily 100 Pen Needle 11    insulin aspart protamine/insulin aspart (NovoLOG Mix 70-30FlexPen U-100) 100 unit/mL (70-30) inpn INJECT 60 UNITS SUBCUTANEOUSLY TWICE DAILY 30 MINUTES BEFORE BREAKFAST AND DINNER 30 mL 5    losartan (COZAAR) 50 mg tablet Take 1 Tab by mouth two (2) times a day. 60 Tab 2    hydroCHLOROthiazide (HYDRODIURIL) 25 mg tablet Take 1 Tab by mouth daily. 30 Tab 2    furosemide (LASIX) 20 mg tablet One po daily 30 Tab 2    carvediloL (COREG) 12.5 mg tablet Take 1 Tab by mouth two (2) times daily (with meals). 60 Tab 2    amLODIPine (NORVASC) 5 mg tablet Take 1 Tab by mouth nightly. 30 Tab 2    pantoprazole (PROTONIX) 40 mg tablet Take 1 Tab by mouth daily. 90 Tab 1    isosorbide mononitrate ER (Imdur) 30 mg tablet Take 1 Tab by mouth daily. 90 Tab 1    famotidine (PEPCID) 20 mg tablet Take 1 Tab by mouth two (2) times daily as needed (heartburn). 180 Tab 1    cholecalciferol (Vitamin D3) (1000 Units /25 mcg) tablet Take 1 Tab by mouth daily. 90 Tab 1    atorvastatin (LIPITOR) 40 mg tablet Take 1 Tab by mouth daily. 30 Tab 2    apixaban (ELIQUIS) 5 mg tablet Take 1 Tab by mouth two (2) times a day.  180 Tab 1    capsaicin 0.075 % topical cream Apply  to affected area three (3) times daily as needed for Pain. 60 g 6    glucose blood VI test strips (WAVESENSE PRESTO) strip  Strip 0    letrozole (FEMARA) 2.5 mg tablet Take 2.5 mg by mouth daily.  SONAFINE emul topical       therapeutic multivitamin (THERAGRAN) tablet Take 1 Tab by mouth daily. No Known Allergies    Family History   Problem Relation Age of Onset    Cancer Father [de-identified]        colon    Hypertension Mother     Heart Disease Mother     Diabetes Mother     Breast Cancer Maternal Aunt     Breast Cancer Other     Breast Cancer Other      Social History     Tobacco Use    Smoking status: Never Smoker    Smokeless tobacco: Never Used   Substance Use Topics    Alcohol use: No       Depression Risk Factor Screening:     3 most recent PHQ Screens 6/15/2020   Little interest or pleasure in doing things Not at all   Feeling down, depressed, irritable, or hopeless Not at all   Total Score PHQ 2 0       Alcohol Risk Factor Screening:   Do you average 1 drink per night or more than 7 drinks a week:  No    On any one occasion in the past three months have you have had more than 3 drinks containing alcohol:  No      Functional Ability and Level of Safety:   Hearing: The patient needs further evaluation. Activities of Daily Living: The home contains: no safety equipment. Patient does total self care    Ambulation: with difficulty, uses a cane and scooter    Fall Risk:  Fall Risk Assessment, last 12 mths 6/15/2020   Able to walk? Yes   Fall in past 12 months?  No       Abuse Screen:  Patient is not abused    Cognitive Screening   Has your family/caregiver stated any concerns about your memory: no       Patient Care Team   Patient Care Team:  Brenda Harper NP as PCP - General (Nurse Practitioner)  Brenda Harper NP as PCP - [facility] Empaneled Provider  Kenji Parekh NP (Nurse Practitioner)  Jesus Nava MD (General Surgery)  Eva Kee SHERRILL, RN as Nurse Navigator (Oncology)  Talia Abreu MD (Pulmonary Disease)  Melecio Cherry MD (Surgery)  Christiano Oneil MD (Breast Surgery)    Assessment/Plan   Education and counseling provided:  Are appropriate based on today's review and evaluation    Diagnoses and all orders for this visit:    1. Type 2 diabetes mellitus with diabetic neuropathy, with long-term current use of insulin (HCC)  -     metFORMIN (GLUCOPHAGE) 500 mg tablet; Take 1 Tab by mouth two (2) times daily (with meals). -     flash glucose scanning reader ([employer] DoodleStyle Ivy 14 Day [place]) misc; Use to check blood sugar 4x/day and as needed. -     flash glucose sensor (FreeStyle Ivy 14 Day Sensor) kit; Use to check blood sugar 4x/day and as needed. 2. Medicare annual wellness visit, subsequent    3. Hearing loss of left ear, unspecified hearing loss type  -     REFERRAL TO AUDIOLOGY    4. Seizure disorder (HCC)  -     divalproex DR (DEPAKOTE) 250 mg tablet; Take 3 Tabs by mouth two (2) times a day. 5. Essential hypertension    6.  Heart murmur        Health Maintenance Due   Topic Date Due    Shingrix Vaccine Age 49> (1 of 2) 08/09/2002    Foot Exam Q1  11/23/2016    Pneumococcal 65+ years (2 of 2 - PPSV23) 02/04/2020    Lipid Screen  07/01/2020

## 2020-06-15 NOTE — PATIENT INSTRUCTIONS
You have been referred to: 
Keith Ambrose 
[facility] [address] [address] Phone: [phone] Fax: [phone] Write down your blood sugar at least 4 times a day and bring the notebook to your appointments. Increase Novolog insulin to 70 units with breakfast and 60 units with dinner. Medicare Wellness Visit, Female The best way to live healthy is to have a lifestyle where you eat a well-balanced diet, exercise regularly, limit alcohol use, and quit all forms of tobacco/nicotine, if applicable. Regular preventive services are another way to keep healthy. Preventive services (vaccines, screening tests, monitoring & exams) can help personalize your care plan, which helps you manage your own care. Screening tests can find health problems at the earliest stages, when they are easiest to treat. [facility] follows the current, evidence-based guidelines published by the [facility] ([facility]) when recommending preventive services for our patients. Because we follow these guidelines, sometimes recommendations change over time as research supports it. (For example, mammograms used to be recommended annually. Even though Medicare will still pay for an annual mammogram, the newer guidelines recommend a mammogram every two years for women of average risk). Of course, you and your doctor may decide to screen more often for some diseases, based on your risk and your co-morbidities (chronic disease you are already diagnosed with). Preventive services for you include: - Medicare offers their members a free annual wellness visit, which is time for you and your primary care provider to discuss and plan for your preventive service needs. Take advantage of this benefit every year! 
-All adults over the age of 72 should receive the recommended pneumonia vaccines.  Current USPSTF guidelines recommend a series of two vaccines for the best pneumonia protection.  
-All adults should have a flu vaccine yearly and a tetanus vaccine every 10 years.  
-All adults age 48 and older should receive the shingles vaccines (series of two vaccines). -All adults age 38-68 who are overweight should have a diabetes screening test once every three years.  
-All adults born between [birth year] and 1965 should be screened once for Hepatitis C. 
-Other screening tests and preventive services for persons with diabetes include: an eye exam to screen for diabetic retinopathy, a kidney function test, a foot exam, and stricter control over your cholesterol.  
-Cardiovascular screening for adults with routine risk involves an electrocardiogram (ECG) at intervals determined by your doctor.  
-Colorectal cancer screenings should be done for adults age 54-65 with no increased risk factors for colorectal cancer. There are a number of acceptable methods of screening for this type of cancer. Each test has its own benefits and drawbacks. Discuss with your doctor what is most appropriate for you during your annual wellness visit. The different tests include: colonoscopy (considered the best screening method), a fecal occult blood test, a fecal DNA test, and sigmoidoscopy. 
 
-A bone mass density test is recommended when a woman turns 65 to screen for osteoporosis. This test is only recommended one time, as a screening. Some providers will use this same test as a disease monitoring tool if you already have osteoporosis. -Breast cancer screenings are recommended every other year for women of normal risk, age 54-69. 
-Cervical cancer screenings for women over age 72 are only recommended with certain risk factors. Here is a list of your current Health Maintenance items (your personalized list of preventive services) with a due date: 
Health Maintenance Due Topic Date Due  Shingles Vaccine (1 of 2) 08/09/2002 Mary Yanes [facility]  11/23/2016 24 \[facility]\"" Annual Well Visit  04/18/2019  Pneumococcal Vaccine (2 of 2 - PPSV23) 02/04/2020

## 2020-06-15 NOTE — PROGRESS NOTES
Migue Maurice presents today for   Chief Complaint   Patient presents with    Annual Wellness Visit    Medication Refill       Is someone accompanying this pt? boyfriend    Is the patient using any DME equipment during OV? cane    Depression Screening:  3 most recent PHQ Screens 6/15/2020   Little interest or pleasure in doing things Not at all   Feeling down, depressed, irritable, or hopeless Not at all   Total Score PHQ 2 0       Learning Assessment:  Learning Assessment 9/26/2019   PRIMARY LEARNER Patient   HIGHEST LEVEL OF EDUCATION - PRIMARY LEARNER  GRADUATED HIGH SCHOOL OR GED   BARRIERS PRIMARY LEARNER NONE   CO-LEARNER CAREGIVER No   PRIMARY LANGUAGE ENGLISH   LEARNER PREFERENCE PRIMARY LISTENING     -     -   ANSWERED BY patient   RELATIONSHIP SELF       Abuse Screening:  Abuse Screening Questionnaire 9/26/2019   Do you ever feel afraid of your partner? N   Are you in a relationship with someone who physically or mentally threatens you? N   Is it safe for you to go home? Y       Fall Risk  Fall Risk Assessment, last 12 mths 6/15/2020   Able to walk? Yes   Fall in past 12 months? No       Health Maintenance reviewed and discussed and ordered per Provider. Health Maintenance Due   Topic Date Due    Shingrix Vaccine Age 49> (1 of 2) 08/09/2002    Foot Exam Q1  11/23/2016    Medicare Yearly Exam  04/18/2019    Pneumococcal 65+ years (2 of 2 - PPSV23) 02/04/2020   . Coordination of Care:  1. Have you been to the ER, urgent care clinic since your last visit? Hospitalized since your last visit? no    2. Have you seen or consulted any other health care providers outside of the [address] [place] [address] since your last visit? Include any pap smears or colon screening.  no

## 2020-06-15 NOTE — PROGRESS NOTES
Subjective     Patient ID:  Garrett Gibbs is a 81 y.o. ( 1950) female who presents for the following:   Annual Wellness Visit and Medication Refill      HPI   She is here, once again, without her medication bottles or home medication list and without blood sugar logs. Diabetes, type 2 follow up:  A1C was 10.0  Taking medications as prescribed: unknown  Following diabetic diet: No   Exercise: No   Checking blood sugar: Yes checks every 2-3 days, usually 175 - 200   Symptoms of hyperglycemia: none  Symptoms of hypoglycemia: none  Eye exam within the last year: was done    Seizure disorder:   Last seizure was about 2 weeks ago. Made an appt with neuro for Sept. Needs depakote to last until then. Hypertension follow up:  History of hypertension, hyperlipidemia, stroke, and PE. She is on Eliquis  Taking medications as prescribed: Yes  Checking BP at home: No   Symptoms: New onset in the last several months of shortness of breath when lying down and chest pain with activity and rest.   Low sodium diet: No   Exercise: No      Heart Murmur:  I heard a new heart murmur when I saw her 2 weeks ago and obtained an ECHO which showed mild diastolic dysfunction. Has an appt to establish with cardiology. Continues with fatigue and lower extremity edema - stable. Denies any chest pain. Review of Systems   Constitutional: Positive for fatigue. Negative for appetite change, diaphoresis and unexpected weight change. HENT: Positive for hearing loss. Negative for ear discharge, ear pain and tinnitus. Eyes: Negative for visual disturbance. Respiratory: Negative for cough, chest tightness, shortness of breath and wheezing. Cardiovascular: Positive for leg swelling. Negative for chest pain and palpitations. Gastrointestinal: Negative for abdominal distention, abdominal pain, blood in stool, constipation, diarrhea, nausea, rectal pain and vomiting.    Endocrine: Negative for polydipsia, polyphagia and polyuria. Genitourinary: Negative for decreased urine volume, dysuria and frequency. Musculoskeletal: Positive for arthralgias and gait problem. Negative for joint swelling and myalgias. Skin: Negative for rash and wound. Neurological: Positive for seizures. Negative for dizziness, weakness, light-headedness, numbness and headaches. Psychiatric/Behavioral: Negative for dysphoric mood and sleep disturbance. The patient is not nervous/anxious. Past Medical History, Past Surgery History, Allergies, Social History, and Family History were reviewed and updated. Patient Active Problem List   Diagnosis Code    Diabetes mellitus with neuropathy ([address] E11.40    HTN (hypertension) I10    Chest pain R07.9    Seizure disorder ([facility] G40.909    Hypercholesteremia E78.00    History of CVA (cerebrovascular accident) Z80.78    Hearing impairment H91.90    Gastroesophageal reflux disease without esophagitis K21.9    Breast cancer ([facility])-right inferior, 2.2cm,2/18 nodes, + - -, C50.919    Altered mental status R41.82    Type 2 diabetes mellitus with diabetic neuropathy, with long-term current use of insulin ([facility]) E11.40, Z79.4    Pulmonary nodule, right R91.1    History of breast cancer Z85.3    Type 2 diabetes mellitus with nephropathy ([facility]) E11.21    Severe obesity (BMI 35.0-39. 9) with comorbidity ([address] E66.01    PE (pulmonary thromboembolism) ([facility]) U88.94    Mild diastolic dysfunction Y28.8    Heart murmur R01.1     Past Medical History:   Diagnosis Date    Arthritis     Breast CA ([address]     Breast CA ([facility] 2016    DDD (degenerative disc disease), cervical     Diabetes ([address]     DJD (degenerative joint disease) of cervical spine     Heart murmur     Hypercholesteremia     Hypertension     Menopause     Mild diastolic dysfunction     ECHO 5/2020     PE (pulmonary thromboembolism) ([address] 2019    Psychiatric disorder     DEPRESSION    PUD (peptic ulcer disease)     S/P cardiac cath 02/2015    No angiographic evidence of CAD    Seizures ([facility]     LAST SEIZURE ABOUT 2 MONTHS AGO (NOV 2015)    Stroke ([facility]     x2 with left side deficit     Patient Care Team:  Murray Delgadillo NP as PCP - General (Nurse Practitioner)  Murray Delgadillo NP as PCP - REHABILITATION [facility] Empaneled Provider  Sherri Collado NP (Nurse Practitioner)  Pavithra Blood MD (General Surgery)  Sylvia Waldrop, RN as Nurse Navigator (Oncology)  Reina Beltran MD (Pulmonary Disease)  Ella Castro MD (Surgery)  Kary Garay MD (Breast Surgery)    Past Surgical History:   Procedure Laterality Date    HX BREAST LUMPECTOMY Right 1/12/2016    Dr. Ericka Witt Partial Mastectomy w./ axillary lymphadenectomy    HX HEENT      TONSILLECTOMY    HX MASTECTOMY Right 1/26/2016    Dr. Ericka Witt Repeat Partial Mastectomy for positive margins    HX ORTHOPAEDIC      left arm surg    IR MEDIPORT       Family History   Problem Relation Age of Onset    Cancer Father [de-identified]        colon    Hypertension Mother     Heart Disease Mother     Diabetes Mother     Breast Cancer Maternal Aunt     Breast Cancer Other     Breast Cancer Other      Social History     Tobacco Use    Smoking status: Never Smoker    Smokeless tobacco: Never Used   Substance Use Topics    Alcohol use: No    Drug use: No     No Known Allergies  Current Outpatient Medications on File Prior to Visit   Medication Sig Dispense Refill    Insulin Needles, Disposable, (TRUEplus Pen Needle) 31 gauge x 5/16\" ndle Use as directed 4 times daily 100 Pen Needle 11    insulin aspart protamine/insulin aspart (NovoLOG Mix 70-30FlexPen U-100) 100 unit/mL (70-30) inpn INJECT 60 UNITS SUBCUTANEOUSLY TWICE DAILY 30 MINUTES BEFORE BREAKFAST AND DINNER 30 mL 5    losartan (COZAAR) 50 mg tablet Take 1 Tab by mouth two (2) times a day. 60 Tab 2    hydroCHLOROthiazide (HYDRODIURIL) 25 mg tablet Take 1 Tab by mouth daily.  30 Tab 2    furosemide (LASIX) 20 mg tablet One po daily 30 Tab 2    carvediloL (COREG) 12.5 mg tablet Take 1 Tab by mouth two (2) times daily (with meals). 60 Tab 2    amLODIPine (NORVASC) 5 mg tablet Take 1 Tab by mouth nightly. 30 Tab 2    pantoprazole (PROTONIX) 40 mg tablet Take 1 Tab by mouth daily. 90 Tab 1    isosorbide mononitrate ER (Imdur) 30 mg tablet Take 1 Tab by mouth daily. 90 Tab 1    famotidine (PEPCID) 20 mg tablet Take 1 Tab by mouth two (2) times daily as needed (heartburn). 180 Tab 1    cholecalciferol (Vitamin D3) (1000 Units /25 mcg) tablet Take 1 Tab by mouth daily. 90 Tab 1    atorvastatin (LIPITOR) 40 mg tablet Take 1 Tab by mouth daily. 30 Tab 2    apixaban (ELIQUIS) 5 mg tablet Take 1 Tab by mouth two (2) times a day. 180 Tab 1    capsaicin 0.075 % topical cream Apply  to affected area three (3) times daily as needed for Pain. 60 g 6    glucose blood VI test strips ([employer]) strip  Strip 0    letrozole (FEMARA) 2.5 mg tablet Take 2.5 mg by mouth daily.  SONAFINE emul topical       therapeutic multivitamin (THERAGRAN) tablet Take 1 Tab by mouth daily. No current facility-administered medications on file prior to visit. Health Maintenance Due   Topic Date Due    Shingrix Vaccine Age 49> (1 of 2) 08/09/2002    Foot Exam Q1  11/23/2016    Pneumococcal 65+ years (2 of 2 - PPSV23) 02/04/2020    Lipid Screen  07/01/2020         Objective     Visit Vitals  /80 (BP 1 Location: Right arm, BP Patient Position: At rest)   Pulse 82   Temp 97.8 °F (36.6 °C) (Oral)   Resp 17   Ht 5' 5\" (1.651 m)   Wt 239 lb 6.4 oz (108.6 kg)   SpO2 100%   BMI 39.84 kg/m²     No LMP recorded. Patient is postmenopausal.    Physical Exam  Constitutional:       General: She is not in acute distress. Appearance: She is well-developed. She is not diaphoretic.    HENT:      Right Ear: Tympanic membrane, ear canal and external ear normal.      Left Ear: Tympanic membrane, ear canal and external ear normal.   Cardiovascular:      Rate and Rhythm: Normal rate and regular rhythm. Heart sounds: Murmur present. Pulmonary:      Effort: Pulmonary effort is normal. No respiratory distress. Breath sounds: Normal breath sounds. Musculoskeletal:      Right lower leg: Edema (2+) present. Left lower leg: Edema (2+) present. Neurological:      Mental Status: She is alert and oriented to person, place, and time. LABS     TESTS      Assessment and Plan     1. Type 2 diabetes mellitus with diabetic neuropathy, with long-term current use of insulin ([facility]  Needs better control. Restart metformin. Advised to limit dietary carbs. Continue insulin. Start check blood sugar at least 4x/day. Ordered freestyle ivy. Return in 4 weeks with glucose logs and all of your meds. - metFORMIN (GLUCOPHAGE) 500 mg tablet; Take 1 Tab by mouth two (2) times daily (with meals). Dispense: 180 Tab; Refill: 1  - flash glucose scanning reader (FreeStyle Ivy 14 Day [place]) misc; Use to check blood sugar 4x/day and as needed. Dispense: 1 Each; Refill: 0  - flash glucose sensor (FreeStyle Ivy 14 Day Sensor) kit; Use to check blood sugar 4x/day and as needed. Dispense: 1 Kit; Refill: 3    2. Medicare annual wellness visit, subsequent  See separate note. 3. Hearing loss of left ear, unspecified hearing loss type  - REFERRAL TO AUDIOLOGY    4. Seizure disorder ([facility]  Continue medication and follow up with neurology asap. - divalproex DR (DEPAKOTE) 250 mg tablet; Take 3 Tabs by mouth two (2) times a day. Dispense: 180 Tab; Refill: 3    5. Essential hypertension  Well controlled. Continue current meds. 6. Heart murmur  Diastolic dysfunction. Further eval by cardiology. Follow-up and Dispositions    · Return in about 4 weeks (around 7/13/2020) for Diabetes follow up, in person, 30 min appt. Risks, benefits, and alternatives of the medications and treatment plan prescribed today were discussed, and patient expressed understanding. Printed after visit summary was given to patient and reviewed. All patient questions and concerns were addressed. Plan follow-up as discussed or as needed if any worsening symptoms or change in condition.            Signed electronically by Bridgette Joshi DNP, NILSON-BC

## 2020-07-24 ENCOUNTER — OFFICE VISIT (OUTPATIENT)
Dept: FAMILY MEDICINE CLINIC | Age: 70
End: 2020-07-24

## 2020-07-24 VITALS
OXYGEN SATURATION: 97 % | WEIGHT: 242 LBS | TEMPERATURE: 97.2 F | SYSTOLIC BLOOD PRESSURE: 123 MMHG | DIASTOLIC BLOOD PRESSURE: 64 MMHG | RESPIRATION RATE: 16 BRPM | HEIGHT: 65 IN | HEART RATE: 98 BPM | BODY MASS INDEX: 40.32 KG/M2

## 2020-07-24 DIAGNOSIS — R01.1 HEART MURMUR: Primary | ICD-10-CM

## 2020-07-24 DIAGNOSIS — Z79.4 TYPE 2 DIABETES MELLITUS WITH DIABETIC NEUROPATHY, WITH LONG-TERM CURRENT USE OF INSULIN (HCC): ICD-10-CM

## 2020-07-24 DIAGNOSIS — M25.561 CHRONIC PAIN OF RIGHT KNEE: ICD-10-CM

## 2020-07-24 DIAGNOSIS — G89.29 CHRONIC PAIN OF RIGHT KNEE: ICD-10-CM

## 2020-07-24 DIAGNOSIS — R07.9 CHEST PAIN, UNSPECIFIED TYPE: ICD-10-CM

## 2020-07-24 DIAGNOSIS — E11.40 TYPE 2 DIABETES MELLITUS WITH DIABETIC NEUROPATHY, WITH LONG-TERM CURRENT USE OF INSULIN (HCC): ICD-10-CM

## 2020-07-24 RX ORDER — FLASH GLUCOSE SENSOR
KIT MISCELLANEOUS
Qty: 1 KIT | Refills: 3 | Status: SHIPPED | OUTPATIENT
Start: 2020-07-24 | End: 2021-09-29 | Stop reason: SDUPTHER

## 2020-07-24 RX ORDER — FLASH GLUCOSE SCANNING READER
EACH MISCELLANEOUS
Qty: 1 EACH | Refills: 0 | Status: SHIPPED | OUTPATIENT
Start: 2020-07-24 | End: 2021-09-29 | Stop reason: SDUPTHER

## 2020-07-24 RX ORDER — ACETAMINOPHEN 500 MG
1000 TABLET ORAL
Qty: 100 TAB | Refills: 2 | Status: SHIPPED | OUTPATIENT
Start: 2020-07-24 | End: 2020-12-17 | Stop reason: SDUPTHER

## 2020-07-24 RX ORDER — METFORMIN HYDROCHLORIDE 1000 MG/1
1000 TABLET ORAL 2 TIMES DAILY WITH MEALS
Qty: 180 TAB | Refills: 1 | Status: SHIPPED | OUTPATIENT
Start: 2020-07-24 | End: 2021-07-23 | Stop reason: SDUPTHER

## 2020-07-24 NOTE — PATIENT INSTRUCTIONS
[place]  [address] [facility]  Phone: [phone]         Type 2 Diabetes: Care Instructions  Your Care Instructions    Type 2 diabetes is a disease that develops when the body's tissues cannot use insulin properly. Over time, the pancreas cannot make enough insulin. Insulin is a hormone that helps the body's cells use sugar (glucose) for energy. It also helps the body store extra sugar in muscle, fat, and liver cells. Without insulin, the sugar cannot get into the cells to do its work. It stays in the blood instead. This can cause high blood sugar levels. A person has diabetes when the blood sugar stays too high too much of the time. Over time, diabetes can lead to diseases of the heart, blood vessels, nerves, kidneys, and eyes. You may be able to control your blood sugar by losing weight, eating a healthy diet, and getting daily exercise. You may also have to take insulin or other diabetes medicine. Follow-up care is a key part of your treatment and safety. Be sure to make and go to all appointments. Call your doctor if you are having problems. It's also a good idea to know your test results and keep a list of the medicines you take. How can you care for yourself at home? · Keep your blood sugar at a target level (which you set with your doctor). ? Eat a good diet that spreads carbohydrate throughout the day. Carbohydratethe body's main source of fuelaffects blood sugar more than any other nutrient. Carbohydrate is in fruits, vegetables, milk, and yogurt. It also is in breads, cereals, vegetables such as potatoes and corn, and sugary foods such as candy and cakes. ? Aim for 30 minutes of exercise on most, preferably all, days of the week. Walking is a good choice. You also may want to do other activities, such as running, swimming, cycling, or playing tennis or team sports. If your doctor says it's okay, do muscle-strengthening exercises at least 2 times a week.   ? Take your medicines exactly as prescribed. Call your doctor if you think you are having a problem with your medicine. You will get more details on the specific medicines your doctor prescribes. · Check your blood sugar as often as your doctor recommends. It is important to keep track of any symptoms you have, such as low blood sugar. Also tell your doctor if you have any changes in your activities, diet, or insulin use. · Talk to your doctor before you start taking aspirin every day. Aspirin can help certain people lower their risk of a heart attack or stroke. But taking aspirin isn't right for everyone, because it can cause serious bleeding. · Do not smoke. If you need help quitting, talk to your doctor about stop-smoking programs and medicines. These can increase your chances of quitting for good. · Keep your cholesterol and blood pressure at normal levels. You may need to take one or more medicines to reach your goals. Take them exactly as directed. Do not stop or change a medicine without talking to your doctor first.  When should you call for help? [id number] anytime you think you may need emergency care. For example, call if:  · You passed out (lost consciousness), or you suddenly become very sleepy or confused. (You may have very low blood sugar.)  Call your doctor now or seek immediate medical care if:  · Your blood sugar is 300 mg/dL or is higher than the level your doctor has set for you. · You have symptoms of low blood sugar, such as:  ? Sweating. ? Feeling nervous, shaky, and weak. ? Extreme hunger and slight nausea. ? Dizziness and headache.  ? Blurred vision. ? Confusion. Watch closely for changes in your health, and be sure to contact your doctor if:  · You often have problems controlling your blood sugar. · You have symptoms of long-term diabetes problems, such as:  ? New vision changes. ? New pain, numbness, or tingling in your hands or feet. ? Skin problems. Where can you learn more?   Go to http://mady-travon.info/  Enter C553 in the search box to learn more about \"Type 2 Diabetes: Care Instructions. \"  Current as of: December 20, 2019               Content Version: 12.5  © 8754-4915 Healthwise, Incorporated. Care instructions adapted under license by [employer] (which disclaims liability or warranty for this information). If you have questions about a medical condition or this instruction, always ask your healthcare professional. [place] [address] any warranty or liability for your use of this information.

## 2020-07-24 NOTE — PROGRESS NOTES
Dwain Wells presents today for pre-op clearance for cataract surgery left eye    - Is someone accompanying this pt? partner  - Is the patient using any durable medical equipment during office visit? no    Coordination of Care:  1. Have you been to the ER, urgent care clinic since your last visit? Hospitalized since your last visit? no    2. Have you seen or consulted any other health care providers outside of the [address] [place] [address] since your last visit? Include any pap smears or colon screening. No    Depression Screening:  3 most recent PHQ Screens 6/15/2020   Little interest or pleasure in doing things Not at all   Feeling down, depressed, irritable, or hopeless Not at all   Total Score PHQ 2 0       Learning Assessment:  Learning Assessment 9/26/2019   PRIMARY LEARNER Patient   HIGHEST LEVEL OF EDUCATION - PRIMARY LEARNER  GRADUATED HIGH SCHOOL OR GED   BARRIERS PRIMARY LEARNER NONE   CO-LEARNER CAREGIVER No   PRIMARY LANGUAGE ENGLISH   LEARNER PREFERENCE PRIMARY LISTENING     -     -   ANSWERED BY patient   RELATIONSHIP SELF       Abuse Screening:  Abuse Screening Questionnaire 6/15/2020   Do you ever feel afraid of your partner? N   Are you in a relationship with someone who physically or mentally threatens you? N   Is it safe for you to go home? Y       Fall Risk  Fall Risk Assessment, last 12 mths 6/15/2020   Able to walk? Yes   Fall in past 12 months? No       Health Maintenance reviewed and discussed and ordered per Provider.   Health Maintenance Due   Topic Date Due    Shingrix Vaccine Age 49> (1 of 2) 08/09/2002    Foot Exam Q1  11/23/2016    Pneumococcal 65+ years (2 of 2 - PPSV23) 02/04/2020    Lipid Screen  07/01/2020

## 2020-07-27 ENCOUNTER — TELEPHONE (OUTPATIENT)
Dept: FAMILY MEDICINE CLINIC | Age: 70
End: 2020-07-27

## 2020-07-27 NOTE — TELEPHONE ENCOUNTER
Renny Paige from [facility] is checking status of pre-op notes for pt. That's having surgery tomorrow. Please fax to [phone].

## 2020-07-29 ENCOUNTER — OFFICE VISIT (OUTPATIENT)
Dept: CARDIOLOGY CLINIC | Age: 70
End: 2020-07-29

## 2020-07-29 VITALS
BODY MASS INDEX: 40.48 KG/M2 | HEIGHT: 65 IN | HEART RATE: 88 BPM | OXYGEN SATURATION: 98 % | WEIGHT: 243 LBS | TEMPERATURE: 99.1 F | DIASTOLIC BLOOD PRESSURE: 78 MMHG | SYSTOLIC BLOOD PRESSURE: 157 MMHG

## 2020-07-29 DIAGNOSIS — Z01.810 PREOP CARDIOVASCULAR EXAM: ICD-10-CM

## 2020-07-29 DIAGNOSIS — R07.9 CHEST PAIN, UNSPECIFIED TYPE: Primary | ICD-10-CM

## 2020-07-29 NOTE — PATIENT INSTRUCTIONS
Testing Nuclear Stress Please call [facility] central scheduling at [phone]  to schedule an appointment. All testing is completed at [address] [facility] **call office three days after testing for results** Please call with the name and number of the doctor who will be doing your surgery.

## 2020-07-29 NOTE — PROGRESS NOTES
Jesus Arroyo presents today for   Chief Complaint   Patient presents with    Surgical Clearance       Jesus Arroyo preferred language for health care discussion is english/other. Is someone accompanying this pt? Yes male friend    Is the patient using any DME equipment during [address]? no    Depression Screening:  3 most recent PHQ Screens 7/29/2020   Little interest or pleasure in doing things Not at all   Feeling down, depressed, irritable, or hopeless Not at all   Total Score PHQ 2 0       Learning Assessment:  Learning Assessment 9/26/2019   PRIMARY LEARNER Patient   HIGHEST LEVEL OF EDUCATION - PRIMARY LEARNER  GRADUATED HIGH SCHOOL OR GED   BARRIERS PRIMARY LEARNER NONE   CO-LEARNER CAREGIVER No   PRIMARY LANGUAGE ENGLISH   LEARNER PREFERENCE PRIMARY LISTENING     -     -   ANSWERED BY patient   RELATIONSHIP SELF       Abuse Screening:  Abuse Screening Questionnaire 6/15/2020   Do you ever feel afraid of your partner? N   Are you in a relationship with someone who physically or mentally threatens you? N   Is it safe for you to go home? Y       Fall Risk  Fall Risk Assessment, last 12 mths 7/29/2020   Able to walk? Yes   Fall in past 12 months? No       Pt currently taking Anticoagulant therapy? no    Coordination of Care:  1. Have you been to the ER, urgent care clinic since your last visit? Hospitalized since your last visit? no    2. Have you seen or consulted any other health care providers outside of the [address] [place] [address] since your last visit? Include any pap smears or colon screening.  yes

## 2020-08-02 NOTE — PROGRESS NOTES
Subjective:      Jamari Carey is in the office for cardiac reevaluation. She has not been seen since 2015 at which time she was experiencing chest pain which ultimately led to cardiac catheterization demonstrating no significant obstructive coronary disease and preserved systolic function. She is now in preoperative mode for cataract surgery. She continues to have some episodic atypical chest pain. She has been experiencing increasing increasing dyspnea on exertion. She does not believe she can walk a block without stopping for dyspnea. She is unable to walk up 1 flight of steps without limiting dyspnea. She reports that she tires very easily even with such activities washing dishes. She had some occasional swelling in her left ankle. She has had also had occasional palpitations with no associated dizziness, lightheadedness, near syncope or syncope. Cardiac risk factors include diabetes mellitus, essential hypertension, dyslipidemia. She was taking atorvastatin 40 mg daily but is not on her present medication list.    Patient Active Problem List    Diagnosis Date Noted    Chest pain 01/31/2015     Priority: 1 - One    History of noncompliance with medical treatment     Mild diastolic dysfunction     Heart murmur     PE (pulmonary thromboembolism) ([address] 2019    Severe obesity (BMI 35.0-39. 9) with comorbidity ([address] 04/17/2018    Type 2 diabetes mellitus with nephropathy ([address] 12/14/2017    Pulmonary nodule, right 11/15/2016    History of breast cancer 11/15/2016    Type 2 diabetes mellitus with diabetic neuropathy, with long-term current use of insulin ([address] 11/10/2016    Altered mental status 10/24/2016    Breast cancer (HCC)-right inferior, 2.2cm,2/18 nodes, + - -, 01/12/2016    Gastroesophageal reflux disease without esophagitis 01/05/2016    Hearing impairment 11/23/2015    History of CVA (cerebrovascular accident) 05/18/2015    Hypercholesteremia 03/04/2015    Seizure disorder ([address] 02/11/2015    Diabetes mellitus with neuropathy ([facility] 01/30/2015    HTN (hypertension) 01/30/2015     Current Outpatient Medications   Medication Sig Dispense Refill    flash glucose sensor (FreeStyle Ivy 14 Day Sensor) kit Use to check blood sugar 4x/day and as needed. 1 Kit 3    flash glucose scanning reader (FreeStyle Ivy 14 Day [place]) misc Use to check blood sugar 4x/day and as needed. 1 Each 0    acetaminophen (TYLENOL) 500 mg tablet Take 2 Tabs by mouth three (3) times daily as needed for Pain. 100 Tab 2    metFORMIN (GLUCOPHAGE) 1,000 mg tablet Take 1 Tab by mouth two (2) times daily (with meals). 180 Tab 1    divalproex DR (DEPAKOTE) 250 mg tablet Take 3 Tabs by mouth two (2) times a day. 180 Tab 3    Insulin Needles, Disposable, (TRUEplus Pen Needle) 31 gauge x 5/16\" ndle Use as directed 4 times daily 100 Pen Needle 11    insulin aspart protamine/insulin aspart (NovoLOG Mix 70-30FlexPen U-100) 100 unit/mL (70-30) inpn INJECT 60 UNITS SUBCUTANEOUSLY TWICE DAILY 30 MINUTES BEFORE BREAKFAST AND DINNER 30 mL 5    losartan (COZAAR) 50 mg tablet Take 1 Tab by mouth two (2) times a day. 60 Tab 2    hydroCHLOROthiazide (HYDRODIURIL) 25 mg tablet Take 1 Tab by mouth daily. 30 Tab 2    furosemide (LASIX) 20 mg tablet One po daily 30 Tab 2    carvediloL (COREG) 12.5 mg tablet Take 1 Tab by mouth two (2) times daily (with meals). 60 Tab 2    amLODIPine (NORVASC) 5 mg tablet Take 1 Tab by mouth nightly. 30 Tab 2    pantoprazole (PROTONIX) 40 mg tablet Take 1 Tab by mouth daily. 90 Tab 1    isosorbide mononitrate ER (Imdur) 30 mg tablet Take 1 Tab by mouth daily. 90 Tab 1    famotidine (PEPCID) 20 mg tablet Take 1 Tab by mouth two (2) times daily as needed (heartburn). 180 Tab 1    cholecalciferol (Vitamin D3) (1000 Units /25 mcg) tablet Take 1 Tab by mouth daily. 90 Tab 1    atorvastatin (LIPITOR) 40 mg tablet Take 1 Tab by mouth daily.  30 Tab 2    apixaban (ELIQUIS) 5 mg tablet Take 1 Tab by mouth two (2) times a day. 180 Tab 1    capsaicin 0.075 % topical cream Apply  to affected area three (3) times daily as needed for Pain. 60 g 6    glucose blood VI test strips (WAVESNCR [employer] PRESTO) strip  Strip 0    letrozole (FEMARA) 2.5 mg tablet Take 2.5 mg by mouth daily.  SONAFINE emul topical       therapeutic multivitamin (THERAGRAN) tablet Take 1 Tab by mouth daily.        No Known Allergies  Past Medical History:   Diagnosis Date    Arthritis     [facility]     Breast CA ([facility] 2016    DDD (degenerative disc disease), cervical     Diabetes (HCC)     DJD (degenerative joint disease) of cervical spine     Heart murmur     History of noncompliance with medical treatment     Hypercholesteremia     Hypertension     Menopause     Mild diastolic dysfunction     ECHO 5/2020     PE (pulmonary thromboembolism) ([facility] 2019    Psychiatric disorder     DEPRESSION    PUD (peptic ulcer disease)     S/P cardiac cath 02/2015    No angiographic evidence of CAD    Seizures (HCC)     LAST SEIZURE ABOUT 2 MONTHS AGO (NOV 2015)    Stroke ([facility]     x2 with left side deficit     Past Surgical History:   Procedure Laterality Date    HX BREAST LUMPECTOMY Right 1/12/2016    Dr. Dorie Horton Partial Mastectomy w./ axillary lymphadenectomy    HX HEENT      TONSILLECTOMY    HX MASTECTOMY Right 1/26/2016    Dr. Dorie Horton Repeat Partial Mastectomy for positive margins    HX ORTHOPAEDIC      left arm surg    IR MEDIPORT       Family History   Problem Relation Age of Onset    Cancer Father [de-identified]        colon    Hypertension Mother     Heart Disease Mother     Diabetes Mother     Breast Cancer Maternal Aunt     Breast Cancer Other     Breast Cancer Other      Social History     Tobacco Use   Smoking Status Never Smoker   Smokeless Tobacco Never Used          Review of Systems, additional:  Constitutional: negative  Eyes: negative  Respiratory: negative  Cardiovascular: positive for chest pain and increasing dyspnea on exertion  Gastrointestinal: negative  Musculoskeletal:negative  Neurological: negative  Behvioral/Psych: negative  Endocrine: negative    Objective:     Visit Vitals  /78   Pulse 88   Temp 99.1 °F (37.3 °C) (Oral)   Ht 5' 5\" (1.651 m)   Wt 243 lb (110.2 kg)   SpO2 98%   BMI 40.44 kg/m²     General:  alert, cooperative, no distress   Chest Wall: inspection normal - no chest wall deformities or tenderness, respiratory effort normal   Lung: clear to auscultation bilaterally   Heart:  normal rate, regular rhythm, normal S1, S2, grade 2/6 crescendo decrescendo systolic murmur, no rubs, clicks or gallops   Abdomen: soft, non-tender. Bowel sounds normal. No masses,  no organomegaly   Extremities: extremities normal, atraumatic, no cyanosis or edema         EK2020. Sinus rhythm. Normal.    Assessment/Plan:       ICD-9-CM    1. Essential hypertension, mildly elevated systolic BP in the office today 401.9    2. Type 2 diabetes mellitus with diabetic neuropathy (HCC) 250.60     357.2    3. Hypercholesteremia, last  on 2019. Patient uncertain about her medications today. No cholesterol-lowering medication on her present list. 272.0    4. Chest pain, history of, patient had cardiac catheterization in  that demonstrated no evidence for significant obstructive coronary disease. Her main complaint in the office today is of dyspnea on exertion. But has worsened over the last several months. Will order nuclear stress test in light of her poor functional capacity and limiting dyspnea. Return in 6 months. 786.50    5   History of CVA  6   Dyslipidemia  7   Aortic stenosis, mild, echocardiogram done 2020. EF 60 to 65%. Grade 1 diastolic dysfunction.   Mild aortic valve stenosis with a valve area 1.6 cm² and mean gradient of 15.9 mmHg

## 2020-10-29 ENCOUNTER — OFFICE VISIT (OUTPATIENT)
Dept: SURGERY | Age: 70
End: 2020-10-29
Payer: MEDICARE

## 2020-10-29 VITALS
TEMPERATURE: 97.1 F | SYSTOLIC BLOOD PRESSURE: 145 MMHG | BODY MASS INDEX: 40.35 KG/M2 | WEIGHT: 242.2 LBS | OXYGEN SATURATION: 99 % | DIASTOLIC BLOOD PRESSURE: 61 MMHG | HEIGHT: 65 IN | HEART RATE: 72 BPM

## 2020-10-29 DIAGNOSIS — N63.10 BREAST MASS, RIGHT: ICD-10-CM

## 2020-10-29 DIAGNOSIS — C50.011 MALIGNANT NEOPLASM OF AREOLA OF RIGHT BREAST IN FEMALE, UNSPECIFIED ESTROGEN RECEPTOR STATUS (HCC): Primary | ICD-10-CM

## 2020-10-29 DIAGNOSIS — C50.011 MALIGNANT NEOPLASM OF AREOLA OF RIGHT BREAST IN FEMALE, UNSPECIFIED ESTROGEN RECEPTOR STATUS (HCC): ICD-10-CM

## 2020-10-29 PROCEDURE — G8754 DIAS BP LESS 90: HCPCS | Performed by: SURGERY

## 2020-10-29 PROCEDURE — G8427 DOCREV CUR MEDS BY ELIG CLIN: HCPCS | Performed by: SURGERY

## 2020-10-29 PROCEDURE — 99213 OFFICE O/P EST LOW 20 MIN: CPT | Performed by: SURGERY

## 2020-10-29 PROCEDURE — 3017F COLORECTAL CA SCREEN DOC REV: CPT | Performed by: SURGERY

## 2020-10-29 PROCEDURE — G8432 DEP SCR NOT DOC, RNG: HCPCS | Performed by: SURGERY

## 2020-10-29 PROCEDURE — 1101F PT FALLS ASSESS-DOCD LE1/YR: CPT | Performed by: SURGERY

## 2020-10-29 PROCEDURE — G8536 NO DOC ELDER MAL SCRN: HCPCS | Performed by: SURGERY

## 2020-10-29 PROCEDURE — G8753 SYS BP > OR = 140: HCPCS | Performed by: SURGERY

## 2020-10-29 PROCEDURE — 1090F PRES/ABSN URINE INCON ASSESS: CPT | Performed by: SURGERY

## 2020-10-29 PROCEDURE — G9899 SCRN MAM PERF RSLTS DOC: HCPCS | Performed by: SURGERY

## 2020-10-29 PROCEDURE — G8399 PT W/DXA RESULTS DOCUMENT: HCPCS | Performed by: SURGERY

## 2020-10-29 PROCEDURE — G8417 CALC BMI ABV UP PARAM F/U: HCPCS | Performed by: SURGERY

## 2020-10-29 NOTE — LETTER
10/29/2020 11:34 AM 
 
Patient:  Alena Malagon YOB: 1950 Date of Visit: 10/29/2020 Hai Slater NP 
[address] [id number] VIA In Basket Laury Blackwood MD 
[address] [address] [place] [id number] VIA Facsimile: [phone] Dear KANCHAN Moser MD, 
 
 
I had the pleasure of seeing Ms. Alena Malagon in my office today for her breast cancer. I am including a copy of my office visit today. If you have questions, please do not hesitate to call me. I look forward to following Ms. Tara Hou along with you and will keep you updated as to her progress. Sincerely, Mazin Porter MD

## 2020-10-29 NOTE — PROGRESS NOTES
Arelis Alarcon is a 78 y.o. female (: 1950) presenting to address:    Chief Complaint   Patient presents with    Follow-up     Lump in right breast/ Bilateral pain       Medication list and allergies have been reviewed with Arelis Alarcon and updated as of today's date. I have gone over all Medical, Surgical and Social History with Arelis Alarcon and updated/added the information accordingly. 1. Have you been to the ER, Urgent Care or Hospitalized since your last visit? NO      2. Have you followed up with your PCP or any other Physicians since your procedure/ last office visit?    NO

## 2020-10-29 NOTE — PROGRESS NOTES
Ms. Ana Laura Duarte is a 78year old woman with right T2N1 ER positive, [place] negative, HER negative invasive breast cancer, s/p partial mastectomy, sentinel node biopsy and axillary node dissection 16 by Dr. Ashley James. Reexcision was negative 16. She received four cycles of CMF per Dr. Hoa Mitchell. Subsequent cycles were held due to concerns of stroke. She completed radiation per Dr. Amanda South. She was started on letrozole 2016. She reports bilateral breast pain and soreness and right breast \"mass\". The mass is upper outer axillary tail. It is tender.           Breast/GYN history:    OB History        3    Para   3    Term   3            AB        Living   3       SAB        TAB        Ectopic        Molar        Multiple        Live Births                     Past Medical History:   Diagnosis Date    Arthritis     Breast CA ([address]     Breast CA ([address]     DDD (degenerative disc disease), cervical     Diabetes ([address]     DJD (degenerative joint disease) of cervical spine     Heart murmur     History of noncompliance with medical treatment     Hypercholesteremia     Hypertension     Menopause     Mild diastolic dysfunction     ECHO 2020     PE (pulmonary thromboembolism) ([address] 2019    Psychiatric disorder     DEPRESSION    PUD (peptic ulcer disease)     S/P cardiac cath 2015    No angiographic evidence of CAD    Seizures (HCC)     LAST SEIZURE ABOUT 2 MONTHS AGO (2015)    Stroke ([address]     x2 with left side deficit       Past Surgical History:   Procedure Laterality Date    HX BREAST LUMPECTOMY Right 2016    Dr. Alicia Milligan Partial Mastectomy w./ axillary lymphadenectomy    HX HEENT      TONSILLECTOMY    HX MASTECTOMY Right 2016    Dr. Alicia Milligan Repeat Partial Mastectomy for positive margins    HX ORTHOPAEDIC      left arm surg    IR MEDIPORT         Current Outpatient Medications on File Prior to Visit   Medication Sig Dispense Refill    flash glucose sensor (FreeStyle Ivy 14 Day Sensor) kit Use to check blood sugar 4x/day and as needed. 1 Kit 3    flash glucose scanning reader (FreeStyle Ivy 14 Day [place]) [facility] Use to check blood sugar 4x/day and as needed. 1 Each 0    acetaminophen (TYLENOL) 500 mg tablet Take 2 Tabs by mouth three (3) times daily as needed for Pain. 100 Tab 2    metFORMIN (GLUCOPHAGE) 1,000 mg tablet Take 1 Tab by mouth two (2) times daily (with meals). 180 Tab 1    Insulin Needles, Disposable, (TRUEplus Pen Needle) 31 gauge x 5/16\" ndle Use as directed 4 times daily 100 Pen Needle 11    insulin aspart protamine/insulin aspart (NovoLOG Mix 70-30FlexPen U-100) 100 unit/mL (70-30) inpn INJECT 60 UNITS SUBCUTANEOUSLY TWICE DAILY 30 MINUTES BEFORE BREAKFAST AND DINNER 30 mL 5    losartan (COZAAR) 50 mg tablet Take 1 Tab by mouth two (2) times a day. 60 Tab 2    hydroCHLOROthiazide (HYDRODIURIL) 25 mg tablet Take 1 Tab by mouth daily. 30 Tab 2    furosemide (LASIX) 20 mg tablet One po daily 30 Tab 2    carvediloL (COREG) 12.5 mg tablet Take 1 Tab by mouth two (2) times daily (with meals). 60 Tab 2    amLODIPine (NORVASC) 5 mg tablet Take 1 Tab by mouth nightly. 30 Tab 2    pantoprazole (PROTONIX) 40 mg tablet Take 1 Tab by mouth daily. 90 Tab 1    isosorbide mononitrate ER (Imdur) 30 mg tablet Take 1 Tab by mouth daily. 90 Tab 1    famotidine (PEPCID) 20 mg tablet Take 1 Tab by mouth two (2) times daily as needed (heartburn). 180 Tab 1    cholecalciferol (Vitamin D3) (1000 Units /25 mcg) tablet Take 1 Tab by mouth daily. 90 Tab 1    atorvastatin (LIPITOR) 40 mg tablet Take 1 Tab by mouth daily. 30 Tab 2    apixaban (ELIQUIS) 5 mg tablet Take 1 Tab by mouth two (2) times a day. 180 Tab 1    glucose blood VI test strips (WAVESENSE PRESTO) strip  Strip 0    letrozole (FEMARA) 2.5 mg tablet Take 2.5 mg by mouth daily.       SONAFINE emul topical       therapeutic multivitamin (THERAGRAN) tablet Take 1 Tab by mouth daily.      divalproex DR (DEPAKOTE) 250 mg tablet Take 3 Tabs by mouth two (2) times a day. 180 Tab 3    capsaicin 0.075 % topical cream Apply  to affected area three (3) times daily as needed for Pain. 60 g 6     No current facility-administered medications on file prior to visit.         No Known Allergies    Social History     Tobacco Use    Smoking status: Never Smoker    Smokeless tobacco: Never Used   Substance Use Topics    Alcohol use: No    Drug use: No       Family History   Problem Relation Age of Onset    Cancer Father [de-identified]        colon    Hypertension Mother     Heart Disease Mother     Diabetes Mother     Breast Cancer Maternal Aunt     Breast Cancer Other     Breast Cancer Other          ROS: positives are bolded  General: fevers, chills, night sweats, fatigue, weight loss, weight gain  GI: nausea, vomiting, abdominal pain, change in bowel habits, hematochezia, melena, GERD  Integ: dermatitis, abnormal moles  HEENT: visual changes, vertigo, epistaxis, dysphagia, hoarseness  Cardiac: chest pain, palpitations, HTN, edema, varicosities  Resp: cough, shortness of breath, wheezing, hemoptysis, snoring, reactive airway disease  : hematuria, dysuria, frequency, urgency, nocturia, stress urinary incontinence   MSK: weakness, joint pain, arthritis  Endocrine:  thyroid disease, polyuria, polydipsia, polyphagia, poor wound healing, heat intolerance, cold intolerance  Lymph/Heme: anemia, bruising, history of blood transfusions  Neuro: dizziness, headache, fainting, seizures, stroke  Psych: anxiety, depression    Physical Exam:  Visit Vitals  BP (!) 145/61 (BP 1 Location: Right arm, BP Patient Position: Sitting)   Pulse 72   Temp 97.1 °F (36.2 °C) (Skin)   Ht 5' 5\" (1.651 m)   Wt 109.9 kg (242 lb 3.2 oz)   SpO2 99%   BMI 40.30 kg/m²       Gen:  No distress  Head: normocephalic, atraumatic  Mouth: Clear, no overt lesions, oral mucosa pink and moist  Neck: supple, no masses, no adenopathy, trachea midline  Resp: clear bilaterally  Cardio: Regular rate and rhythm  Abdomen: soft, nontender, nondistended  Extremeties: warm, well-perfused  Neuro: sensation and strength grossly intact and symmetrical  Psych: alert and oriented to person, place and time  Breasts:   Right: Examined in both the supine and upright positions. There was no supraclavicular, infraclavicular, or axillary lympadenopathy. There were no dominant masses, no skin changes, no asymmetry identified tender bilaterally, well healed surgical scars, chronic radiation changes  Left: Examined in both the supine and upright positions. There was no supraclavicular, infraclavicular, or axillary lympadenopathy. There were no dominant masses, no skin changes, no asymmetry identified tender bilaterally      Imaging:  10/16/19 bilateral mammogram  Unchanged benign-appearing posttreatment findings in the right breast. No  mammographic evidence of malignancy. Recommend annual screening mammography.     A result letter will be sent to the patient.     The patient will be notified by the [employer] reminder system for scheduling  of her annual screening mammogram.     BI-RADS Assessment Category 2: Benign    Pathology:  1/26/16 Ashley James  RIGHT BREAST, INFERIOR 6 OCLOCK:  RESECTION OF BIOPSY SITE WITH ASSOCIATED REPARATIVE CHANGES, NEGATIVE FOR  MALIGNANCY, MARGIN NEGATIVE FOR MALIGNANCY.    1/12/16 Ashley James  A-H: RIGHT BREAST MASS, EXCISION WITH WIRE-GUIDED LOCALIZATION TOGETHER WITH  SENTINEL LYMPH NODE BIOPSIES (5) AND SUBSEQUENT RIGHT AXILLARY DISSECTION:  INVASIVE, MODERATELY DIFFERENTIATED DUCTAL ADENOCARCINOMA.  TUMOR SIZE: 22 MM.   HISTOLOGIC TYPE: DUCTAL, NOS.  HISTOLOGIC GRADE (JEAN): 2.   TUBULAR DIFFERENTIATION SCORE: 2   NUCLEAR PLEOMORPHISM SCORE: 2.   MITOTIC RATE SCORE: 2.   TOTAL SCORE: 6.   FOCALITY: UNIFOCAL.  IN-SITU CARCINOMA COMPONENT: RARE SMALL FOCUS OF DCIS.    MARGINS OF RESECTION: INVASIVE TUMOR EXTENDS FOCALLY TO INFERIOR  MARGIN. OTHER MARGINS CLEAR.  SKIN: NEGATIVE FOR MALIGNANCY.  NIPPLE: NOT INCLUDED.   LYMPH NODES: TWO (2) OF EIGHTEEN (18) LYMPH NODES CONTAINING METASTATIC  CARCINOMA.  ESTROGEN RECEPTOR: POSITIVE (70%) (SEE [id number]).  PROGESTERONE RECEPTOR: NEGATIVE (SEE [id number]).  HER2/ravi (FISH): NEGATIVE FOR HER2 AMPLIFICATION (SEE [id number]).  AJCC PATHOLOGIC STAGE: T2, N1a. Impression:  Patient Active Problem List   Diagnosis Code    Diabetes mellitus [facility] E11.40    HTN (hypertension) I10    Chest pain R07.9    Seizure disorder ([facility] G40.909    Hypercholesteremia E78.00    History of CVA (cerebrovascular accident) Z80.78    Hearing impairment H91.90    Gastroesophageal reflux disease without esophagitis K21.9    Breast cancer (HCC)-right inferior, 2.2cm,2/18 nodes, + - -, C50.919    Altered mental status R41.82    Type 2 diabetes mellitus with diabetic neuropathy, with long-term current use of insulin (HCC) E11.40, Z79.4    Pulmonary nodule, right R91.1    History of breast cancer Z85.3    Type 2 diabetes mellitus with nephropathy (HCC) E11.21    Severe obesity (BMI 35.0-39. 9) with comorbidity ([facility] E66.01    PE (pulmonary thromboembolism) ([facility] N16.70    Mild diastolic dysfunction L05.3    Heart murmur R01.1    History of noncompliance with medical treatment Z91.19         78year old woman with right T2N1 ER positive, [place] negative, HER negative invasive breast cancer, s/p partial mastectomy, sentinel node biopsy and axillary node dissection 1/12/16 by Dr. Ravindra Ladd. Continue letrozole per Dr. Daisy Cardenas. She is due for mammogram now. I have added ultrasound for right breast mass. I have recommended imaging to evaluate further. We discussed possible need for image guided biopsy versus observation pending these results. Additional recommendations pending imaging findings. All questions were answered.   She was asked to call with any additional questions or concerns.

## 2020-10-29 NOTE — PATIENT INSTRUCTIONS
If you have any questions or concerns about today's appointment, the verbal and/or written instructions you were given for follow up care, please call our office at [phone]. [facility] - [address], [facility] [address] 
 
[phone] office [phone] fax Appointment Date/Time:________________________Mammogram at [address] [place] [id number]

## 2020-11-03 ENCOUNTER — HOSPITAL ENCOUNTER (OUTPATIENT)
Dept: MAMMOGRAPHY | Age: 70
Discharge: HOME | End: 2020-11-03
Attending: SURGERY
Payer: MEDICARE

## 2020-11-03 ENCOUNTER — HOSPITAL ENCOUNTER (OUTPATIENT)
Dept: ULTRASOUND IMAGING | Age: 70
Discharge: HOME | End: 2020-11-03
Attending: SURGERY
Payer: MEDICARE

## 2020-11-03 ENCOUNTER — TELEPHONE (OUTPATIENT)
Dept: SURGERY | Age: 70
End: 2020-11-03

## 2020-11-03 PROCEDURE — 76642 ULTRASOUND BREAST LIMITED: CPT

## 2020-11-03 PROCEDURE — 77062 BREAST TOMOSYNTHESIS BI: CPT

## 2020-11-03 NOTE — TELEPHONE ENCOUNTER
[facility] for patient to return call regarding results per Dr. Amol Cabrera.      ----- Message from Leidy Beverly MD sent at 11/3/2020 12:03 PM EST -----  Please let her know there were no areas of concern seen on imaging.   Thanks     ----- Message -----  From: Hernando Zuniga Results In  Sent: 11/3/2020  10:06 AM EST  To: Leidy Beverly MD

## 2020-11-06 NOTE — TELEPHONE ENCOUNTER
Patient returned call regarding results. I notified patient of results. Patient verbalized understanding. Patient states has no further questions at this time. ----- Message from Eliot Reid MD sent at 11/3/2020 12:03 PM EST -----  Please let her know there were no areas of concern seen on imaging.   Thanks     ----- Message -----  From: Hernando Zuniga Results In  Sent: 11/3/2020  10:06 AM EST  To: Eliot Reid MD

## 2020-11-06 NOTE — TELEPHONE ENCOUNTER
Attempted to reach patient for a second time regarding results per Dr. Armond Thompson. Unable to LVM due to mailbox is full. .    ----- Message from Bebeto Heath MD sent at 11/3/2020 12:03 PM EST -----  Please let her know there were no areas of concern seen on imaging.   Thanks     ----- Message -----  From: Hernando Zuniga Results In  Sent: 11/3/2020  10:06 AM EST  To: Bebeto Heath MD

## 2020-12-17 ENCOUNTER — OFFICE VISIT (OUTPATIENT)
Dept: FAMILY MEDICINE CLINIC | Age: 70
End: 2020-12-17
Payer: MEDICARE

## 2020-12-17 ENCOUNTER — HOSPITAL ENCOUNTER (OUTPATIENT)
Dept: LAB | Age: 70
Discharge: HOME | End: 2020-12-17

## 2020-12-17 VITALS
HEIGHT: 65 IN | SYSTOLIC BLOOD PRESSURE: 169 MMHG | DIASTOLIC BLOOD PRESSURE: 102 MMHG | RESPIRATION RATE: 16 BRPM | BODY MASS INDEX: 40.15 KG/M2 | TEMPERATURE: 97.1 F | WEIGHT: 241 LBS | HEART RATE: 85 BPM | OXYGEN SATURATION: 99 %

## 2020-12-17 DIAGNOSIS — T14.8XXA MUSCLE STRAIN: ICD-10-CM

## 2020-12-17 DIAGNOSIS — R35.0 URINARY FREQUENCY: Primary | ICD-10-CM

## 2020-12-17 DIAGNOSIS — Z79.4 TYPE 2 DIABETES MELLITUS WITH DIABETIC NEUROPATHY, WITH LONG-TERM CURRENT USE OF INSULIN (HCC): ICD-10-CM

## 2020-12-17 DIAGNOSIS — E11.40 TYPE 2 DIABETES MELLITUS WITH DIABETIC NEUROPATHY, WITH LONG-TERM CURRENT USE OF INSULIN (HCC): ICD-10-CM

## 2020-12-17 DIAGNOSIS — R35.0 URINARY FREQUENCY: ICD-10-CM

## 2020-12-17 PROCEDURE — 3017F COLORECTAL CA SCREEN DOC REV: CPT | Performed by: STUDENT IN AN ORGANIZED HEALTH CARE EDUCATION/TRAINING PROGRAM

## 2020-12-17 PROCEDURE — G8432 DEP SCR NOT DOC, RNG: HCPCS | Performed by: STUDENT IN AN ORGANIZED HEALTH CARE EDUCATION/TRAINING PROGRAM

## 2020-12-17 PROCEDURE — G8417 CALC BMI ABV UP PARAM F/U: HCPCS | Performed by: STUDENT IN AN ORGANIZED HEALTH CARE EDUCATION/TRAINING PROGRAM

## 2020-12-17 PROCEDURE — 1090F PRES/ABSN URINE INCON ASSESS: CPT | Performed by: STUDENT IN AN ORGANIZED HEALTH CARE EDUCATION/TRAINING PROGRAM

## 2020-12-17 PROCEDURE — 1101F PT FALLS ASSESS-DOCD LE1/YR: CPT | Performed by: STUDENT IN AN ORGANIZED HEALTH CARE EDUCATION/TRAINING PROGRAM

## 2020-12-17 PROCEDURE — 2022F DILAT RTA XM EVC RTNOPTHY: CPT | Performed by: STUDENT IN AN ORGANIZED HEALTH CARE EDUCATION/TRAINING PROGRAM

## 2020-12-17 PROCEDURE — G8428 CUR MEDS NOT DOCUMENT: HCPCS | Performed by: STUDENT IN AN ORGANIZED HEALTH CARE EDUCATION/TRAINING PROGRAM

## 2020-12-17 PROCEDURE — 99214 OFFICE O/P EST MOD 30 MIN: CPT | Performed by: STUDENT IN AN ORGANIZED HEALTH CARE EDUCATION/TRAINING PROGRAM

## 2020-12-17 PROCEDURE — G9899 SCRN MAM PERF RSLTS DOC: HCPCS | Performed by: STUDENT IN AN ORGANIZED HEALTH CARE EDUCATION/TRAINING PROGRAM

## 2020-12-17 PROCEDURE — G8399 PT W/DXA RESULTS DOCUMENT: HCPCS | Performed by: STUDENT IN AN ORGANIZED HEALTH CARE EDUCATION/TRAINING PROGRAM

## 2020-12-17 PROCEDURE — G8536 NO DOC ELDER MAL SCRN: HCPCS | Performed by: STUDENT IN AN ORGANIZED HEALTH CARE EDUCATION/TRAINING PROGRAM

## 2020-12-17 PROCEDURE — 3046F HEMOGLOBIN A1C LEVEL >9.0%: CPT | Performed by: STUDENT IN AN ORGANIZED HEALTH CARE EDUCATION/TRAINING PROGRAM

## 2020-12-17 PROCEDURE — G8753 SYS BP > OR = 140: HCPCS | Performed by: STUDENT IN AN ORGANIZED HEALTH CARE EDUCATION/TRAINING PROGRAM

## 2020-12-17 PROCEDURE — G8755 DIAS BP > OR = 90: HCPCS | Performed by: STUDENT IN AN ORGANIZED HEALTH CARE EDUCATION/TRAINING PROGRAM

## 2020-12-17 RX ORDER — DICLOFENAC SODIUM 10 MG/G
2 GEL TOPICAL 4 TIMES DAILY
Qty: 1 EACH | Refills: 0 | Status: SHIPPED | OUTPATIENT
Start: 2020-12-17 | End: 2022-01-24 | Stop reason: SDUPTHER

## 2020-12-17 RX ORDER — ACETAMINOPHEN 500 MG
1000 TABLET ORAL
Qty: 100 TAB | Refills: 2 | Status: SHIPPED | OUTPATIENT
Start: 2020-12-17 | End: 2021-07-23 | Stop reason: ALTCHOICE

## 2020-12-17 NOTE — PATIENT INSTRUCTIONS
Whiplash: Care Instructions Your Care Instructions Whiplash occurs when your head is suddenly forced forward and then snapped backward, as might happen in a car accident or sports injury. This can cause pain and stiffness in your neck. Your head, chest, shoulders, and arms also may hurt. Most whiplash gets better with home care. Your doctor may advise you to take medicine to relieve pain or relax your muscles. He or she may suggest exercise and physical therapy to increase flexibility and relieve pain. You can try wearing a neck (cervical) collar to support your neck. For a while you probably will need to avoid lifting and other activities that can strain the neck. Follow-up care is a key part of your treatment and safety. Be sure to make and go to all appointments, and call your doctor if you are having problems. It's also a good idea to know your test results and keep a list of the medicines you take. How can you care for yourself at home? · Take pain medicines exactly as directed. ? If the doctor gave you a prescription medicine for pain, take it as prescribed. ? If you are not taking a prescription pain medicine, ask your doctor if you can take an over-the-counter medicine. ? Do not take two or more pain medicines at the same time unless the doctor told you to. Many pain medicines have acetaminophen, which is Tylenol. Too much acetaminophen (Tylenol) can be harmful. · You can try using a soft foam collar to support your neck for short periods of time. You can buy one at most drugstores. Do not wear the collar more than 2 or 3 days unless your doctor tells you to. · You can try using heat and ice to see if it helps. ? Try using a heating pad on a low or medium setting for 15 to 20 minutes every 2 to 3 hours. Try a warm shower in place of one session with the heating pad. You can also buy single-use heat wraps that last up to 8 hours. ? You can also try an ice pack for 10 to 15 minutes every 2 to 3 hours. · Do not do anything that makes the pain worse. Take it easy for a couple of days. You can do your usual activities if they do not hurt your neck or put it at risk for more stress or injury. Avoid lifting, sports, or other activities that might strain your neck. · Try sleeping on a special neck pillow. Place it under your neck, not under your head. Placing a tightly rolled-up towel under your neck while you sleep will also work. If you use a neck pillow or rolled towel, do not use your regular pillow at the same time. · Once your neck pain is gone, do exercises to stretch your neck and back and make them stronger. Your doctor or physical therapist can tell you which exercises are best. 
When should you call for help? Call 911 anytime you think you may need emergency care. For example, call if: 
  · You are unable to move an arm or a leg at all. Call your doctor now or seek immediate medical care if: 
  · You have new or worse symptoms in your arms, legs, chest, belly, or buttocks. Symptoms may include: 
? Numbness or tingling. ? Weakness. ? Pain.  
  · You lose bladder or bowel control. Watch closely for changes in your health, and be sure to contact your doctor if: 
  · You are not getting better as expected. Where can you learn more? Go to http://www.gray.com/ Enter M241 in the search box to learn more about \"Whiplash: Care Instructions. \" Current as of: March 2, 2020               Content Version: 12.6 © 6178-4684 [employer]. Care instructions adapted under license by [employer] (which disclaims liability or warranty for this information). If you have questions about a medical condition or this instruction, always ask your healthcare professional. [address] any warranty or liability for your use of this information. Neck: Exercises Introduction Here are some examples of exercises for you to try. The exercises may be suggested for a condition or for rehabilitation. Start each exercise slowly. Ease off the exercises if you start to have pain. You will be told when to start these exercises and which ones will work best for you. How to do the exercises Neck stretch This stretch works best if you keep your shoulder down as you lean away from it. To help you remember to do this, start by relaxing your shoulders and lightly holding on to your thighs or your chair. Tilt your head toward your shoulder and hold for 15 to 30 seconds. Let the weight of your head stretch your muscles. If you would like a little added stretch, use your hand to gently and steadily pull your head toward your shoulder. For example, keeping your right shoulder down, lean your head to the left. Repeat 2 to 4 times toward each shoulder. Diagonal neck stretch Turn your head slightly toward the direction you will be stretching, and tilt your head diagonally toward your chest and hold for 15 to 30 seconds. If you would like a little added stretch, use your hand to gently and steadily pull your head forward on the diagonal. 
Repeat 2 to 4 times toward each side. Dorsal glide stretch The dorsal glide stretches the back of the neck. If you feel pain, do not glide so far back. Some people find this exercise easier to do while lying on their backs with an ice pack on the neck. Sit or stand tall and look straight ahead. Slowly tuck your chin as you glide your head backward over your body Hold for a count of 6, and then relax for up to 10 seconds. Repeat 8 to 12 times. Chest and shoulder stretch Sit or stand tall and glide your head backward as in the dorsal glide stretch. Raise both arms so that your hands are next to your ears. Take a deep breath, and as you breathe out, lower your elbows down and behind your back. You will feel your shoulder blades slide down and together, and at the same time you will feel a stretch across your chest and the front of your shoulders. Hold for about 6 seconds, and then relax for up to 10 seconds. Repeat 8 to 12 times. Strengthening: Hands on head Move your head backward, forward, and side to side against gentle pressure from your hands, holding each position for about 6 seconds. Repeat 8 to 12 times. Follow-up care is a key part of your treatment and safety. Be sure to make and go to all appointments, and call your doctor if you are having problems. It's also a good idea to know your test results and keep a list of the medicines you take. Where can you learn more? Go to http://www.wiseman.com/ Enter P975 in the search box to learn more about \"Neck: Exercises. \" Current as of: March 2, 2020               Content Version: 12.6 © 2006-2020 [employer]. Care instructions adapted under license by [employer] (which disclaims liability or warranty for this information). If you have questions about a medical condition or this instruction, always ask your healthcare professional. [address] any warranty or liability for your use of this information.
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
## 2020-12-18 ENCOUNTER — TRANSCRIBE ORDER (OUTPATIENT)
Dept: REGISTRATION | Age: 70
End: 2020-12-18

## 2020-12-18 ENCOUNTER — HOSPITAL ENCOUNTER (OUTPATIENT)
Dept: LAB | Age: 70
Discharge: HOME | End: 2020-12-18
Payer: MEDICARE

## 2020-12-18 DIAGNOSIS — E11.40 DIABETIC NEUROPATHY (HCC): ICD-10-CM

## 2020-12-18 DIAGNOSIS — Z79.4 ENCOUNTER FOR LONG-TERM (CURRENT) USE OF INSULIN (HCC): ICD-10-CM

## 2020-12-18 DIAGNOSIS — R35.0 URINARY FREQUENCY: ICD-10-CM

## 2020-12-18 DIAGNOSIS — R35.0 URINARY FREQUENCY: Primary | ICD-10-CM

## 2020-12-18 LAB
ANION GAP SERPL CALC-SCNC: 6 MMOL/L (ref 3–18)
APPEARANCE UR: CLEAR
BACTERIA URNS QL MICRO: ABNORMAL /HPF
BILIRUB UR QL: NEGATIVE
BUN SERPL-MCNC: 23 MG/DL (ref 7–18)
BUN/CREAT SERPL: 21 (ref 12–20)
CALCIUM SERPL-MCNC: 9 MG/DL (ref 8.5–10.1)
CHLORIDE SERPL-SCNC: 104 MMOL/L (ref 100–111)
CO2 SERPL-SCNC: 28 MMOL/L (ref 21–32)
COLOR UR: YELLOW
CREAT SERPL-MCNC: 1.1 MG/DL (ref 0.6–1.3)
EPITH CASTS URNS QL MICRO: ABNORMAL /LPF (ref 0–5)
EST. AVERAGE GLUCOSE BLD GHB EST-MCNC: 229 MG/DL
GLUCOSE SERPL-MCNC: 43 MG/DL (ref 74–99)
GLUCOSE UR STRIP.AUTO-MCNC: NEGATIVE MG/DL
HBA1C MFR BLD: 9.6 % (ref 4.2–5.6)
HGB UR QL STRIP: NEGATIVE
KETONES UR QL STRIP.AUTO: NEGATIVE MG/DL
LEUKOCYTE ESTERASE UR QL STRIP.AUTO: ABNORMAL
NITRITE UR QL STRIP.AUTO: NEGATIVE
PH UR STRIP: 5 [PH] (ref 5–8)
POTASSIUM SERPL-SCNC: 4.1 MMOL/L (ref 3.5–5.5)
PROT UR STRIP-MCNC: 100 MG/DL
RBC #/AREA URNS HPF: NEGATIVE /HPF (ref 0–5)
SODIUM SERPL-SCNC: 138 MMOL/L (ref 136–145)
SP GR UR REFRACTOMETRY: 1.02 (ref 1–1.03)
UROBILINOGEN UR QL STRIP.AUTO: 1 EU/DL (ref 0.2–1)
WBC URNS QL MICRO: NEGATIVE /HPF (ref 0–4)

## 2020-12-18 PROCEDURE — 87086 URINE CULTURE/COLONY COUNT: CPT

## 2020-12-18 PROCEDURE — 80048 BASIC METABOLIC PNL TOTAL CA: CPT

## 2020-12-18 PROCEDURE — 36415 COLL VENOUS BLD VENIPUNCTURE: CPT

## 2020-12-18 PROCEDURE — 83036 HEMOGLOBIN GLYCOSYLATED A1C: CPT

## 2020-12-18 PROCEDURE — 81001 URINALYSIS AUTO W/SCOPE: CPT

## 2020-12-20 LAB
BACTERIA SPEC CULT: NORMAL
SERVICE CMNT-IMP: NORMAL

## 2020-12-23 ENCOUNTER — TELEPHONE (OUTPATIENT)
Dept: FAMILY MEDICINE CLINIC | Age: 70
End: 2020-12-23

## 2020-12-23 NOTE — PROGRESS NOTES
Can we please let patient know that there was protein in her urine, but it does not seem like there is a kidney infection. Her blood work showed slight improvement in her A1C, however this number is still too high, and she is at an increased risk of having anew stroke due to her uncontrolled blood pressure and uncontrolled diabetes. Also it would be good for patient to make an appointment with Jackalyn Cooks to discuss her diabetes. If she makes this appointment please make sure to tell patient that she brings all her meds with her. When patient had her blood drawn she seemed to have had a hypoglycemic episode, and would need to know if she was aware when it happened. Her kidney function seems to have improved .

## 2020-12-29 ENCOUNTER — TELEPHONE (OUTPATIENT)
Dept: FAMILY MEDICINE CLINIC | Age: 70
End: 2020-12-29

## 2021-02-18 ENCOUNTER — DOCUMENTATION ONLY (OUTPATIENT)
Dept: FAMILY MEDICINE CLINIC | Age: 71
End: 2021-02-18

## 2021-03-02 ENCOUNTER — OFFICE VISIT (OUTPATIENT)
Dept: FAMILY MEDICINE CLINIC | Age: 71
End: 2021-03-02
Payer: MEDICARE

## 2021-03-02 VITALS
TEMPERATURE: 97 F | DIASTOLIC BLOOD PRESSURE: 92 MMHG | HEART RATE: 71 BPM | RESPIRATION RATE: 20 BRPM | WEIGHT: 240 LBS | BODY MASS INDEX: 39.94 KG/M2 | SYSTOLIC BLOOD PRESSURE: 163 MMHG | OXYGEN SATURATION: 99 %

## 2021-03-02 DIAGNOSIS — E11.40 TYPE 2 DIABETES MELLITUS WITH DIABETIC NEUROPATHY, WITH LONG-TERM CURRENT USE OF INSULIN (HCC): Primary | ICD-10-CM

## 2021-03-02 DIAGNOSIS — F51.02 ADJUSTMENT INSOMNIA: ICD-10-CM

## 2021-03-02 DIAGNOSIS — F43.21 GRIEVING: ICD-10-CM

## 2021-03-02 DIAGNOSIS — R35.0 URINARY FREQUENCY: ICD-10-CM

## 2021-03-02 DIAGNOSIS — Z79.4 TYPE 2 DIABETES MELLITUS WITH DIABETIC NEUROPATHY, WITH LONG-TERM CURRENT USE OF INSULIN (HCC): Primary | ICD-10-CM

## 2021-03-02 PROCEDURE — G8753 SYS BP > OR = 140: HCPCS | Performed by: NURSE PRACTITIONER

## 2021-03-02 PROCEDURE — 1090F PRES/ABSN URINE INCON ASSESS: CPT | Performed by: NURSE PRACTITIONER

## 2021-03-02 PROCEDURE — G8536 NO DOC ELDER MAL SCRN: HCPCS | Performed by: NURSE PRACTITIONER

## 2021-03-02 PROCEDURE — 1101F PT FALLS ASSESS-DOCD LE1/YR: CPT | Performed by: NURSE PRACTITIONER

## 2021-03-02 PROCEDURE — G9899 SCRN MAM PERF RSLTS DOC: HCPCS | Performed by: NURSE PRACTITIONER

## 2021-03-02 PROCEDURE — G8432 DEP SCR NOT DOC, RNG: HCPCS | Performed by: NURSE PRACTITIONER

## 2021-03-02 PROCEDURE — 99214 OFFICE O/P EST MOD 30 MIN: CPT | Performed by: NURSE PRACTITIONER

## 2021-03-02 PROCEDURE — G8755 DIAS BP > OR = 90: HCPCS | Performed by: NURSE PRACTITIONER

## 2021-03-02 PROCEDURE — 2022F DILAT RTA XM EVC RTNOPTHY: CPT | Performed by: NURSE PRACTITIONER

## 2021-03-02 PROCEDURE — 3017F COLORECTAL CA SCREEN DOC REV: CPT | Performed by: NURSE PRACTITIONER

## 2021-03-02 PROCEDURE — G8417 CALC BMI ABV UP PARAM F/U: HCPCS | Performed by: NURSE PRACTITIONER

## 2021-03-02 PROCEDURE — G8399 PT W/DXA RESULTS DOCUMENT: HCPCS | Performed by: NURSE PRACTITIONER

## 2021-03-02 PROCEDURE — 81003 URINALYSIS AUTO W/O SCOPE: CPT | Performed by: NURSE PRACTITIONER

## 2021-03-02 PROCEDURE — 3046F HEMOGLOBIN A1C LEVEL >9.0%: CPT | Performed by: NURSE PRACTITIONER

## 2021-03-02 PROCEDURE — G8428 CUR MEDS NOT DOCUMENT: HCPCS | Performed by: NURSE PRACTITIONER

## 2021-03-02 RX ORDER — TRAZODONE HYDROCHLORIDE 50 MG/1
50 TABLET ORAL
Qty: 30 TAB | Refills: 2 | Status: SHIPPED | OUTPATIENT
Start: 2021-03-02 | End: 2021-07-23 | Stop reason: SDUPTHER

## 2021-03-02 RX ORDER — INSULIN ASPART 100 [IU]/ML
INJECTION, SUSPENSION SUBCUTANEOUS
Qty: 30 ML | Refills: 5 | Status: SHIPPED | OUTPATIENT
Start: 2021-03-02 | End: 2021-07-08 | Stop reason: SDUPTHER
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
## 2021-03-02 NOTE — PROGRESS NOTES
Subjective     Patient ID:  Ventura Riddle is a 78 y.o. ( 1950) female who presents for the following:   No chief complaint on file. HPI    Urinary symptoms:   Onset was 2 weeks ago. Frequency, lower back pain, strong urine odor, nausea,   Denies fever and chills    Type 2 diabetes:   She continues to be noncompliant with her medications and her glucose monitoring. She does not know what she is taking or what her glucose levels have been. Grieving:   Her sister passed away recently. She reports she has been very sad and having low motivation and difficulty falling/staying asleep. Review of Systems   Constitutional: Negative for appetite change, chills, diaphoresis, fatigue, fever and unexpected weight change. Eyes: Negative for visual disturbance. Respiratory: Negative for cough, chest tightness and shortness of breath. Cardiovascular: Negative for chest pain, palpitations and leg swelling. Gastrointestinal: Positive for nausea. Negative for abdominal distention, abdominal pain, blood in stool, constipation, diarrhea, rectal pain and vomiting. Endocrine: Negative for polydipsia, polyphagia and polyuria. Genitourinary: Positive for frequency. Negative for decreased urine volume, difficulty urinating, dysuria, flank pain, genital sores, hematuria, menstrual problem, pelvic pain, urgency, vaginal bleeding, vaginal discharge and vaginal pain. Musculoskeletal: Positive for back pain. Negative for joint swelling and myalgias. Skin: Negative for rash and wound. Neurological: Negative for dizziness, weakness, light-headedness, numbness and headaches. Psychiatric/Behavioral: Positive for dysphoric mood and sleep disturbance. Negative for suicidal ideas. The patient is not nervous/anxious. Past Medical History, Past Surgery History, Allergies, Social History, and Family History were reviewed and updated.       Patient Active Problem List   Diagnosis Code    Diabetes mellitus with neuropathy ([address] E11.40    HTN (hypertension) I10    Chest pain R07.9    Seizure disorder ([address] G40.909    Hypercholesteremia E78.00    History of CVA (cerebrovascular accident) [id number]    Hearing impairment H91.90    Gastroesophageal reflux disease without esophagitis K21.9    Breast cancer (HCC)-right inferior, 2.2cm,2/18 nodes, + - -, C50.919    Altered mental status R41.82    Type 2 diabetes mellitus with diabetic neuropathy, with long-term current use of insulin (HCC) E11.40, Z79.4    Pulmonary nodule, right R91.1    History of breast cancer Z85.3    Type 2 diabetes mellitus with nephropathy ([facility]) E11.21    Severe obesity (BMI 35.0-39. 9) with comorbidity ([address] E66.01    PE (pulmonary thromboembolism) ([facility]) P61.43    Mild diastolic dysfunction D17.5    Heart murmur R01.1    History of noncompliance with medical treatment Z91.19     Past Medical History:   Diagnosis Date    Arthritis     Breast CA ([address]     Breast CA ([address] 2016    DDD (degenerative disc disease), cervical     Diabetes ([address]     DJD (degenerative joint disease) of cervical spine     Heart murmur     History of noncompliance with medical treatment     Hypercholesteremia     Hypertension     Menopause     Mild diastolic dysfunction     ECHO 5/2020     PE (pulmonary thromboembolism) ([address] 2019    Psychiatric disorder     DEPRESSION    PUD (peptic ulcer disease)     S/P cardiac cath 02/2015    No angiographic evidence of CAD    Seizures ([address]     LAST SEIZURE ABOUT 2 MONTHS AGO (NOV 2015)    Stroke ([address]     x2 with left side deficit     Patient Care Team:  Brannon Schroeder NP as PCP - General (Nurse Practitioner)  Brannon Schroeder NP [facility] PCP [facility]  Sandra Vogel NP (Nurse Practitioner)  Marita Smith MD (General Surgery)  Francesco Thayer, GILBERT as Nurse Navigator (Oncology)  Lacey Martinez MD (Pulmonary Disease)  Carmen Gross MD (Surgery)  Anne Thacker MD (Breast Surgery)    Past Surgical History:   Procedure Laterality Date    HX BREAST LUMPECTOMY Right 1/12/2016    Dr. Tiffany Lo Partial Mastectomy w./ axillary lymphadenectomy    HX HEENT      TONSILLECTOMY    HX MASTECTOMY Right 1/26/2016    Dr. Tiffany Lo Repeat Partial Mastectomy for positive margins    HX ORTHOPAEDIC      left arm surg    IR MEDIPORT       Family History   Problem Relation Age of Onset    Cancer Father [de-identified]        colon    Hypertension Mother     Heart Disease Mother     Diabetes Mother     Breast Cancer Maternal Aunt     Breast Cancer Other     Breast Cancer Other      Social History     Tobacco Use    Smoking status: Never Smoker    Smokeless tobacco: Never Used   Substance Use Topics    Alcohol use: No    Drug use: No     No Known Allergies  Current Outpatient Medications on File Prior to Visit   Medication Sig Dispense Refill    diclofenac (VOLTAREN) 1 % gel Apply 2 g to affected area four (4) times daily. 1 Each 0    acetaminophen (TYLENOL) 500 mg tablet Take 2 Tabs by mouth three (3) times daily as needed for Pain. 100 Tab 2    flash glucose sensor (FreeStyle Ivy 14 Day Sensor) kit Use to check blood sugar 4x/day and as needed. 1 Kit 3    flash glucose scanning reader (FreeStyle Ivy 14 Day [place]) misc Use to check blood sugar 4x/day and as needed. 1 Each 0    metFORMIN (GLUCOPHAGE) 1,000 mg tablet Take 1 Tab by mouth two (2) times daily (with meals). 180 Tab 1    divalproex DR (DEPAKOTE) 250 mg tablet Take 3 Tabs by mouth two (2) times a day. 180 Tab 3    Insulin Needles, Disposable, (TRUEplus Pen Needle) 31 gauge x 5/16\" ndle Use as directed 4 times daily 100 Pen Needle 11    losartan (COZAAR) 50 mg tablet Take 1 Tab by mouth two (2) times a day. 60 Tab 2    hydroCHLOROthiazide (HYDRODIURIL) 25 mg tablet Take 1 Tab by mouth daily.  30 Tab 2    furosemide (LASIX) 20 mg tablet One po daily 30 Tab 2    carvediloL (COREG) 12.5 mg tablet Take 1 Tab by mouth two (2) times daily (with meals). 60 Tab 2    amLODIPine (NORVASC) 5 mg tablet Take 1 Tab by mouth nightly. 30 Tab 2    pantoprazole (PROTONIX) 40 mg tablet Take 1 Tab by mouth daily. 90 Tab 1    isosorbide mononitrate ER (Imdur) 30 mg tablet Take 1 Tab by mouth daily. 90 Tab 1    famotidine (PEPCID) 20 mg tablet Take 1 Tab by mouth two (2) times daily as needed (heartburn). 180 Tab 1    cholecalciferol (Vitamin D3) (1000 Units /25 mcg) tablet Take 1 Tab by mouth daily. 90 Tab 1    atorvastatin (LIPITOR) 40 mg tablet Take 1 Tab by mouth daily. 30 Tab 2    apixaban (ELIQUIS) 5 mg tablet Take 1 Tab by mouth two (2) times a day. 180 Tab 1    capsaicin 0.075 % topical cream Apply  to affected area three (3) times daily as needed for Pain. 60 g 6    glucose blood VI test strips (GeoDigitalO) strip  Strip 0    letrozole (FEMARA) 2.5 mg tablet Take 2.5 mg by mouth daily.  SONAFINE emul topical       therapeutic multivitamin (THERAGRAN) tablet Take 1 Tab by mouth daily. No current facility-administered medications on file prior to visit. Health Maintenance Due   Topic Date Due    COVID-19 Vaccine (1) Never done    Shingrix Vaccine Age 50> (1 of 2) Never done    Foot Exam Q1  11/23/2016    Pneumococcal 65+ years (2 of 2 - PPSV23) 02/04/2020    Lipid Screen  07/01/2020    Flu Vaccine (1) 09/01/2020    A1C test (Diabetic or Prediabetic)  03/18/2021         Objective     Visit Vitals  BP (!) 163/92   Pulse 71   Temp 97 °F (36.1 °C)   Resp 20   Wt 240 lb (108.9 kg)   SpO2 99%   BMI 39.94 kg/m²     No LMP recorded. Patient is postmenopausal.    Physical Exam  Constitutional:       General: She is not in acute distress. Appearance: Normal appearance. She is well-developed. She is not diaphoretic. Eyes:      Conjunctiva/sclera: Conjunctivae normal.      Pupils: Pupils are equal, round, and reactive to light. Neck:      Vascular: No carotid bruit.    Cardiovascular:      Rate and Rhythm: Normal rate and regular rhythm. Pulses: Normal pulses. Heart sounds: Normal heart sounds. No murmur. Pulmonary:      Effort: Pulmonary effort is normal. No respiratory distress. Breath sounds: Normal breath sounds. Abdominal:      General: Bowel sounds are normal. There is no distension. Palpations: Abdomen is soft. There is no mass. Tenderness: There is no abdominal tenderness. There is no guarding. Musculoskeletal:      Lumbar back: She exhibits tenderness (paraspinal musculature). She exhibits normal range of motion, no bony tenderness, no swelling, no edema, no deformity, no laceration, no pain and no spasm. Right lower leg: Edema (1+ edema) present. Left lower leg: Edema (1+ edema) present. Skin:     General: Skin is warm and dry. Findings: No rash. Neurological:      Mental Status: She is alert and oriented to person, place, and time. Sensory: No sensory deficit. Gait: Gait normal.      Deep Tendon Reflexes: Reflexes are normal and symmetric. LABS     TESTS      Assessment and Plan     1. Type 2 diabetes mellitus with diabetic neuropathy, with long-term current use of insulin ([facility]  Routine labs ordered. Further plan after results. - insulin aspart protamine/insulin aspart (NovoLOG Mix 70-30FlexPen U-100) 100 unit/mL (70-30) inpn; INJECT 60 UNITS SUBCUTANEOUSLY TWICE DAILY 30 MINUTES BEFORE BREAKFAST AND DINNER  Dispense: 30 mL; Refill: 5    2. Urinary frequency  Not able to urinate here. Will have her bring her urine sample back tomorrow. Further plan after results. - AMB POC URINALYSIS DIP STICK AUTO W/O MICRO  - CULTURE, URINE; Future    3. Grieving  Start trazodone as needed for sleep. Encouraged to seek family support and/or join a grief share group. - traZODone (DESYREL) 50 mg tablet; Take 1 Tab by mouth nightly as needed for Sleep. Dispense: 30 Tab; Refill: 2    4. Adjustment insomnia  - traZODone (DESYREL) 50 mg tablet;  Take 1 Tab by mouth nightly as needed for Sleep. Dispense: 30 Tab; Refill: 2    Will notify her with results. Risks, benefits, and alternatives of the medications and treatment plan prescribed today were discussed, and patient expressed understanding. Printed after visit summary was given to patient and reviewed. All patient questions and concerns were addressed. Plan follow-up as discussed or as needed if any worsening symptoms or change in condition.            Signed electronically by Cora Moran DNP, NILSON-BC

## 2021-03-03 ENCOUNTER — HOSPITAL ENCOUNTER (OUTPATIENT)
Dept: LAB | Age: 71
Discharge: HOME | End: 2021-03-03
Payer: MEDICARE

## 2021-03-03 DIAGNOSIS — E55.9 VITAMIN D DEFICIENCY: ICD-10-CM

## 2021-03-03 DIAGNOSIS — R35.0 URINARY FREQUENCY: ICD-10-CM

## 2021-03-03 DIAGNOSIS — E11.40 TYPE 2 DIABETES MELLITUS WITH DIABETIC NEUROPATHY, WITH LONG-TERM CURRENT USE OF INSULIN (HCC): Primary | ICD-10-CM

## 2021-03-03 DIAGNOSIS — Z79.4 TYPE 2 DIABETES MELLITUS WITH DIABETIC NEUROPATHY, WITH LONG-TERM CURRENT USE OF INSULIN (HCC): Primary | ICD-10-CM

## 2021-03-03 DIAGNOSIS — I10 ESSENTIAL HYPERTENSION: Chronic | ICD-10-CM

## 2021-03-03 LAB
BILIRUB UR QL STRIP: NEGATIVE
GLUCOSE UR-MCNC: NORMAL MG/DL
KETONES P FAST UR STRIP-MCNC: NEGATIVE MG/DL
PH UR STRIP: 5.5 [PH] (ref 4.6–8)
PROT UR QL STRIP: NORMAL
SP GR UR STRIP: 1.01 (ref 1–1.03)
UA UROBILINOGEN AMB POC: NORMAL (ref 0.2–1)
URINALYSIS CLARITY POC: NORMAL
URINALYSIS COLOR POC: YELLOW
URINE BLOOD POC: NEGATIVE
URINE LEUKOCYTES POC: NEGATIVE
URINE NITRITES POC: NEGATIVE

## 2021-03-03 PROCEDURE — 36415 COLL VENOUS BLD VENIPUNCTURE: CPT

## 2021-03-03 PROCEDURE — 87086 URINE CULTURE/COLONY COUNT: CPT

## 2021-03-04 ENCOUNTER — TELEPHONE (OUTPATIENT)
Dept: FAMILY MEDICINE CLINIC | Age: 71
End: 2021-03-04

## 2021-03-05 LAB
BACTERIA SPEC CULT: NORMAL
CC UR VC: NORMAL
SERVICE CMNT-IMP: NORMAL

## 2021-03-22 ENCOUNTER — TELEPHONE (OUTPATIENT)
Dept: FAMILY MEDICINE CLINIC | Age: 71
End: 2021-03-22

## 2021-06-24 ENCOUNTER — OFFICE VISIT (OUTPATIENT)
Dept: FAMILY MEDICINE CLINIC | Age: 71
End: 2021-06-24
Payer: MEDICARE

## 2021-06-24 VITALS
SYSTOLIC BLOOD PRESSURE: 174 MMHG | BODY MASS INDEX: 40.89 KG/M2 | WEIGHT: 245.4 LBS | RESPIRATION RATE: 16 BRPM | HEART RATE: 87 BPM | HEIGHT: 65 IN | DIASTOLIC BLOOD PRESSURE: 75 MMHG | OXYGEN SATURATION: 100 % | TEMPERATURE: 97.1 F

## 2021-06-24 DIAGNOSIS — I26.99 ACUTE PULMONARY EMBOLISM WITHOUT ACUTE COR PULMONALE, UNSPECIFIED PULMONARY EMBOLISM TYPE (HCC): ICD-10-CM

## 2021-06-24 DIAGNOSIS — N39.41 URGENCY INCONTINENCE: ICD-10-CM

## 2021-06-24 DIAGNOSIS — Z79.4 TYPE 2 DIABETES MELLITUS WITH DIABETIC NEUROPATHY, WITH LONG-TERM CURRENT USE OF INSULIN (HCC): Primary | ICD-10-CM

## 2021-06-24 DIAGNOSIS — M25.561 CHRONIC PAIN OF RIGHT KNEE: ICD-10-CM

## 2021-06-24 DIAGNOSIS — M17.11 OSTEOARTHRITIS OF RIGHT KNEE, UNSPECIFIED OSTEOARTHRITIS TYPE: ICD-10-CM

## 2021-06-24 DIAGNOSIS — Z00.00 MEDICARE ANNUAL WELLNESS VISIT, SUBSEQUENT: ICD-10-CM

## 2021-06-24 DIAGNOSIS — D64.9 ANEMIA, UNSPECIFIED TYPE: ICD-10-CM

## 2021-06-24 DIAGNOSIS — E11.40 TYPE 2 DIABETES MELLITUS WITH DIABETIC NEUROPATHY, WITH LONG-TERM CURRENT USE OF INSULIN (HCC): Primary | ICD-10-CM

## 2021-06-24 DIAGNOSIS — G40.909 SEIZURE DISORDER (HCC): ICD-10-CM

## 2021-06-24 DIAGNOSIS — E78.00 HYPERCHOLESTEREMIA: ICD-10-CM

## 2021-06-24 DIAGNOSIS — R01.1 HEART MURMUR: ICD-10-CM

## 2021-06-24 DIAGNOSIS — N18.31 CHRONIC KIDNEY DISEASE, STAGE 3A (HCC): ICD-10-CM

## 2021-06-24 DIAGNOSIS — C50.011 MALIGNANT NEOPLASM OF AREOLA OF RIGHT BREAST IN FEMALE, UNSPECIFIED ESTROGEN RECEPTOR STATUS (HCC): ICD-10-CM

## 2021-06-24 DIAGNOSIS — R35.0 URINARY FREQUENCY: ICD-10-CM

## 2021-06-24 DIAGNOSIS — Z00.00 PREVENTATIVE HEALTH CARE: ICD-10-CM

## 2021-06-24 DIAGNOSIS — I10 ESSENTIAL HYPERTENSION: ICD-10-CM

## 2021-06-24 DIAGNOSIS — G89.29 CHRONIC PAIN OF RIGHT KNEE: ICD-10-CM

## 2021-06-24 DIAGNOSIS — Z13.29 SCREENING FOR THYROID DISORDER: ICD-10-CM

## 2021-06-24 DIAGNOSIS — Z12.31 ENCOUNTER FOR SCREENING MAMMOGRAM FOR MALIGNANT NEOPLASM OF BREAST: ICD-10-CM

## 2021-06-24 PROBLEM — R51.9 CHRONIC HEADACHES: Status: ACTIVE | Noted: 2019-12-21

## 2021-06-24 PROBLEM — Z78.9 HISTORY OF RECENT TRAVEL: Status: ACTIVE | Noted: 2019-12-21

## 2021-06-24 PROBLEM — R79.89 ELEVATED D-DIMER: Status: ACTIVE | Noted: 2019-12-21

## 2021-06-24 PROCEDURE — 3017F COLORECTAL CA SCREEN DOC REV: CPT | Performed by: NURSE PRACTITIONER

## 2021-06-24 PROCEDURE — 3046F HEMOGLOBIN A1C LEVEL >9.0%: CPT | Performed by: NURSE PRACTITIONER

## 2021-06-24 PROCEDURE — 2022F DILAT RTA XM EVC RTNOPTHY: CPT | Performed by: NURSE PRACTITIONER

## 2021-06-24 PROCEDURE — G9899 SCRN MAM PERF RSLTS DOC: HCPCS | Performed by: NURSE PRACTITIONER

## 2021-06-24 PROCEDURE — 99214 OFFICE O/P EST MOD 30 MIN: CPT | Performed by: NURSE PRACTITIONER

## 2021-06-24 PROCEDURE — G8399 PT W/DXA RESULTS DOCUMENT: HCPCS | Performed by: NURSE PRACTITIONER

## 2021-06-24 PROCEDURE — 1101F PT FALLS ASSESS-DOCD LE1/YR: CPT | Performed by: NURSE PRACTITIONER

## 2021-06-24 PROCEDURE — G8432 DEP SCR NOT DOC, RNG: HCPCS | Performed by: NURSE PRACTITIONER

## 2021-06-24 PROCEDURE — G8753 SYS BP > OR = 140: HCPCS | Performed by: NURSE PRACTITIONER

## 2021-06-24 PROCEDURE — G8754 DIAS BP LESS 90: HCPCS | Performed by: NURSE PRACTITIONER

## 2021-06-24 PROCEDURE — G8427 DOCREV CUR MEDS BY ELIG CLIN: HCPCS | Performed by: NURSE PRACTITIONER

## 2021-06-24 PROCEDURE — G8536 NO DOC ELDER MAL SCRN: HCPCS | Performed by: NURSE PRACTITIONER

## 2021-06-24 PROCEDURE — G8417 CALC BMI ABV UP PARAM F/U: HCPCS | Performed by: NURSE PRACTITIONER

## 2021-06-24 PROCEDURE — G0439 PPPS, SUBSEQ VISIT: HCPCS | Performed by: NURSE PRACTITIONER

## 2021-06-24 PROCEDURE — 1090F PRES/ABSN URINE INCON ASSESS: CPT | Performed by: NURSE PRACTITIONER

## 2021-06-24 NOTE — PROGRESS NOTES
Herlinda Polanco presents today for   Chief Complaint   Patient presents with   Elroy Carlos Establish Care    Joint Pain     Generalized pain    Leg Pain     Bilateral leg pain    Knee Pain     Bilateral knee pain       Herlinda Polanco preferred language for health care discussion is english/other. Personal Protective Equipment:   Personal Protective Equipment was used including: mask-surgical and hands-gloves. Patient was placed on no precaution(s). Patient was masked. Precautions:   Patient currently on None  Patient currently roomed with door closed    Is someone accompanying this pt? Yes; Spouse     Is the patient using any DME equipment during OV? Yes; Cane    Depression Screening:  3 most recent PHQ Screens 3/2/2021   Little interest or pleasure in doing things Not at all   Feeling down, depressed, irritable, or hopeless Not at all   Total Score PHQ 2 0       Learning Assessment:  Learning Assessment 9/26/2019   PRIMARY LEARNER Patient   HIGHEST LEVEL OF EDUCATION - PRIMARY LEARNER  GRADUATED HIGH SCHOOL OR GED   BARRIERS PRIMARY LEARNER NONE   CO-LEARNER CAREGIVER No   PRIMARY LANGUAGE ENGLISH   LEARNER PREFERENCE PRIMARY LISTENING     -     -   ANSWERED BY patient   RELATIONSHIP SELF       Abuse Screening:  Abuse Screening Questionnaire 6/15/2020   Do you ever feel afraid of your partner? N   Are you in a relationship with someone who physically or mentally threatens you? N   Is it safe for you to go home? Y       Fall Risk  Fall Risk Assessment, last 12 mths 6/24/2021   Able to walk? Yes   Fall in past 12 months? 0   Do you feel unsteady? 0   Are you worried about falling 0       Pt currently taking Anticoagulant therapy? No    Coordination of Care:  1. Have you been to the ER, urgent care clinic since your last visit? Hospitalized since your last visit? No    2. Have you seen or consulted any other health care providers outside of the [address] [place] [address] since your last visit?  Include any pap smears or colon screening.  No

## 2021-06-24 NOTE — PROGRESS NOTES
OFFICE NOTE    Helen Elizabeth is a 78 y.o. female presenting today for office visit. 6/24/2021  4:09 PM    Chief Complaint   Patient presents with    Establish Care    Joint Pain     Generalized pain    Leg Pain     Bilateral leg pain    Knee Pain     Bilateral knee pain     HPI:   Previous NP Geroge Severin patient. Patient reports she has had a history of breast cancer. She has not been seen by her previous provider and would like a referral to oncology Dr. Heena Paulino. She says she was supposed to follow-up but they never called her. Appears patient takes Femara daily, I do not see refills though. Patient somewhat of a poor historian. Her  is here with her and encounter. Patient patient reports chronic pain, knees, left worse; low back pain. Patient agreed to referral to pain management. Patient reports at times she has lower leg swelling, she uses her Lasix as needed. Patient's blood pressure is high today, she is asymptomatic. Patient reports she is not taking her meds today. Patient I discussed taking her hypertension medications as directed. Requested patient start checking her blood pressure at home. With a goal of less than 140/90. Patient agreed to plan. Diabetes, patient reports she takes insulin 70/30 60 units twice daily before breakfast and dinner. She also takes Metformin 2000 mg daily. Patient has a history of seizures, she says she has not had a recent seizure and has not had one since 2015. Patient has a history of stroke, left side deficit. Patient reports she has had a hysterectomy in the past.  Patient is due for a mammogram, ordered. Patient reports appetite is good, no urinary issues, sleeps well and regular bowel movements. Patient denies fever, chills, chest pain, shortness of breath, abdomen pain or dark tarry stool. Ordered routine labs.   Check A1c.  ROS:    · General: negative for - chills, fever, weight changes or malaise  · HEENT: no sore throat, nasal congestion, vision problems or ear problems  · Respiratory: no cough, shortness of breath, or wheezing  · Cardiovascular: no chest pain, palpitations, or dyspnea on exertion  · Gastrointestinal: no abdominal pain, N/V, change in bowel habits, or black or bloody stools  · Musculoskeletal: Positive for joint pain, bilateral knees, no muscle pain or muscle weakness  · Neurological: no numbness, tingling, headache or dizziness  · Endo:  No polyuria or polydipsia. · : no hematuria, dysuria, frequency, hesitancy, or nocturia.     · Skin: No itching or rash, no open skin, no unusual lesions   · Psychological: negative for - anxiety, depression, sleep disturbances, suicidal or homicidal ideations     PHQ Screening   3 most recent PHQ Screens 3/2/2021   Little interest or pleasure in doing things Not at all   Feeling down, depressed, irritable, or hopeless Not at all   Total Score PHQ 2 0       History  Past Medical History:   Diagnosis Date    Arthritis     Breast CA ([address]     Breast CA ([address] 2016    DDD (degenerative disc disease), cervical     Diabetes ([address]     DJD (degenerative joint disease) of cervical spine     Heart murmur     History of noncompliance with medical treatment     Hypercholesteremia     Hypertension     Menopause     Mild diastolic dysfunction     ECHO 5/2020     PE (pulmonary thromboembolism) ([address] 2019    Psychiatric disorder     DEPRESSION    PUD (peptic ulcer disease)     S/P cardiac cath 02/2015    No angiographic evidence of CAD    Seizures (HCC)     LAST SEIZURE ABOUT 2 MONTHS AGO (NOV 2015)    Stroke ([address]     x2 with left side deficit       Past Surgical History:   Procedure Laterality Date    HX BREAST LUMPECTOMY Right 1/12/2016    Dr. Gerda Montiel Partial Mastectomy w./ axillary lymphadenectomy    HX HEENT      TONSILLECTOMY    HX MASTECTOMY Right 1/26/2016    Dr. Gerda Montiel Repeat Partial Mastectomy for positive margins    HX ORTHOPAEDIC      left arm surg  TAVO ZARATE         Social History     Socioeconomic History    Marital status: SINGLE     Spouse name: Not on file    Number of children: Not on file    Years of education: Not on file    Highest education level: Not on file   Occupational History    Not on file   Tobacco Use    Smoking status: Never Smoker    Smokeless tobacco: Never Used   Substance and Sexual Activity    Alcohol use: No    Drug use: No    Sexual activity: Yes     Partners: Male   Other Topics Concern     Service No    Blood Transfusions No    Caffeine Concern No    Occupational Exposure No    Hobby Hazards No    Sleep Concern Yes    Stress Concern No    Weight Concern Not Asked    Special Diet Not Asked    Back Care Not Asked    Exercise Yes     Comment: walks using cane    Bike Helmet Not Asked    Seat Belt Yes    Self-Exams Yes   Social History Narrative    Not on file     Social Determinants of Health     Financial Resource Strain:     Difficulty of Paying Living Expenses:    Food Insecurity:     Worried About Running Out of Food in the Last Year:     Ran Out of Food in the Last Year:    Transportation Needs:     Lack of Transportation (Medical):      Lack of Transportation (Non-Medical):    Physical Activity:     Days of Exercise per Week:     Minutes of Exercise per Session:    Stress:     Feeling of Stress :    Social Connections:     Frequency of Communication with Friends and Family:     Frequency of Social Gatherings with Friends and Family:     Attends Faith Services:     Active Member of Clubs or Organizations:     Attends Club or Organization Meetings:     Marital Status:    Intimate Partner Violence:     Fear of Current or Ex-Partner:     Emotionally Abused:     Physically Abused:     Sexually Abused:        No Known Allergies    Current Outpatient Medications   Medication Sig Dispense Refill    traZODone (DESYREL) 50 mg tablet Take 1 Tab by mouth nightly as needed for Sleep. 30 Tab 2    insulin aspart protamine/insulin aspart (NovoLOG Mix 70-30FlexPen U-100) 100 unit/mL (70-30) inpn INJECT 60 UNITS SUBCUTANEOUSLY TWICE DAILY 30 MINUTES BEFORE BREAKFAST AND DINNER 30 mL 5    diclofenac (VOLTAREN) 1 % gel Apply 2 g to affected area four (4) times daily. 1 Each 0    acetaminophen (TYLENOL) 500 mg tablet Take 2 Tabs by mouth three (3) times daily as needed for Pain. 100 Tab 2    flash glucose sensor (FreeStyle Ivy 14 Day Sensor) kit Use to check blood sugar 4x/day and as needed. 1 Kit 3    flash glucose scanning reader (FreeStyle Ivy 14 Day [place]) misc Use to check blood sugar 4x/day and as needed. 1 Each 0    metFORMIN (GLUCOPHAGE) 1,000 mg tablet Take 1 Tab by mouth two (2) times daily (with meals). 180 Tab 1    divalproex DR (DEPAKOTE) 250 mg tablet Take 3 Tabs by mouth two (2) times a day. 180 Tab 3    Insulin Needles, Disposable, (TRUEplus Pen Needle) 31 gauge x 5/16\" ndle Use as directed 4 times daily 100 Pen Needle 11    losartan (COZAAR) 50 mg tablet Take 1 Tab by mouth two (2) times a day. 60 Tab 2    hydroCHLOROthiazide (HYDRODIURIL) 25 mg tablet Take 1 Tab by mouth daily. 30 Tab 2    furosemide (LASIX) 20 mg tablet One po daily 30 Tab 2    carvediloL (COREG) 12.5 mg tablet Take 1 Tab by mouth two (2) times daily (with meals). 60 Tab 2    amLODIPine (NORVASC) 5 mg tablet Take 1 Tab by mouth nightly. 30 Tab 2    pantoprazole (PROTONIX) 40 mg tablet Take 1 Tab by mouth daily. 90 Tab 1    isosorbide mononitrate ER (Imdur) 30 mg tablet Take 1 Tab by mouth daily. 90 Tab 1    famotidine (PEPCID) 20 mg tablet Take 1 Tab by mouth two (2) times daily as needed (heartburn). 180 Tab 1    cholecalciferol (Vitamin D3) (1000 Units /25 mcg) tablet Take 1 Tab by mouth daily. 90 Tab 1    atorvastatin (LIPITOR) 40 mg tablet Take 1 Tab by mouth daily. 30 Tab 2    apixaban (ELIQUIS) 5 mg tablet Take 1 Tab by mouth two (2) times a day.  180 Tab 1    capsaicin 0.075 % topical cream Apply  to affected area three (3) times daily as needed for Pain. 60 g 6    glucose blood VI test strips (TheSquareFootO) strip  Strip 0    letrozole (FEMARA) 2.5 mg tablet Take 2.5 mg by mouth daily.  SONAFINE emul topical       therapeutic multivitamin (THERAGRAN) tablet Take 1 Tab by mouth daily. Patient Care Team:  Patient Care Team:  Cesar Mcpherson NP as PCP - General (Nurse Practitioner)  Juana Mix NP (Nurse Practitioner)  Alice Gonzales MD (General Surgery)  Clay Krishnamurthy, RN as Nurse Navigator (Oncology)  Checo Rios MD (Pulmonary Disease)  Malvin Carson MD (Surgery)  Kuldeep Saab MD (Breast Surgery)    LABS:  None new to review    RADIOLOGY:  None new to review    Physical Exam    The patient appears well, she is pleasant, alert, oriented x 3, in no distress. Patient is obese. Appears to walk well, uses a cane. ENT normal.  Neck supple. No adenopathy or thyromegaly. SUBHASH. Lungs are clear, good air entry, no wheezes, rhonchi or rales. Cardiovascular ,S1 and S2 normal, no murmurs, regular rate and rhythm. No LAD. Chest wall negative for tenderness, breasts appear symmetrical  Abdomen is soft without tenderness, guarding, mass or organomegaly. /Anorectal, deferred. Muscleskeletal, slight bilateral lower extremity swelling, no tenderness, no injury. Extremities slight bilateral lower extremity swelling, normal peripheral pulses. Neurological is normal without focal findings. Skin: no concerning lesions. Psych: normal affect. Mood good. Oriented x 3. Judgement and insight intact.       Vitals:    06/24/21 1558   BP: (!) 174/75   Pulse: 87   Resp: 16   Temp: 97.1 °F (36.2 °C)   TempSrc: Temporal   SpO2: 100%   Weight: 245 lb 6.4 oz (111.3 kg)   Height: 5' 5\" (1.651 m)   PainSc:  10 - Worst pain ever   PainLoc: Generalized       Assessment and Plan    Diagnoses and all orders for this visit:    Type 2 diabetes mellitus with diabetic neuropathy, with long-term current use of insulin (HCC)  -     HEMOGLOBIN A1C WITH EAG; Future  -     MICROALBUMIN, UR, RAND W/ MICROALB/CREAT RATIO; Future  -      DIABETES FOOT EXAM  -     REFERRAL TO PODIATRY    Essential hypertension  -     METABOLIC PANEL, COMPREHENSIVE; Future    Anemia, unspecified type    Osteoarthritis of right knee, unspecified osteoarthritis type    Hypercholesteremia  -     LIPID PANEL; Future    Malignant neoplasm of areola of right breast in female, unspecified estrogen receptor status ([facility]  -     REFERRAL TO ONCOLOGY    Acute pulmonary embolism without acute cor pulmonale, unspecified pulmonary embolism type (HCC)    Seizure disorder (HCC)    Preventative health care  -     CBC WITH AUTOMATED DIFF; Future  -     URINALYSIS W/ RFLX MICROSCOPIC; Future  -     VITAMIN D, 25 HYDROXY; Future    Screening for thyroid disorder  -     T4, FREE; Future  -     TSH 3RD GENERATION; Future    Chronic kidney disease, stage 3a (HCC)   -     VITAMIN D, 25 HYDROXY; Future    Encounter for screening mammogram for malignant neoplasm of breast  -     [facility] 3D JIMY W MAMMO BI SCREENING INCL CAD; Future    Heart murmur    Chronic pain of right knee  -     REFERRAL TO PAIN MANAGEMENT    Urinary frequency  -     REFERRAL TO UROLOGY    Urgency incontinence  -     REFERRAL TO UROLOGY    Medicare annual wellness visit, subsequent         *Plan of care reviewed with patient. Patient in agreement with plan and expresses understanding. All questions answered and patient encouraged to call or RTO if further questions or concerns. 50% of minutes spent on counseling and managing her care. Follow-up and Dispositions    · Return in about 1 month (around 7/24/2021).        Ferdie Lesches, NP-C          ________________________________________________________________________________________________________  This is the Subsequent Medicare Annual Wellness Exam, performed 12 months or more after the Initial AWV or the last Subsequent AWV    I have reviewed the patient's medical history in detail and updated the computerized patient record. Assessment/Plan   Education and counseling provided:  Are appropriate based on today's review and evaluation  Screening Mammography  Colorectal cancer screening tests  Screening for glaucoma  Diabetes screening test    1. Type 2 diabetes mellitus with diabetic neuropathy, with long-term current use of insulin (HCC)  -     HEMOGLOBIN A1C WITH EAG; Future  -     MICROALBUMIN, UR, RAND W/ MICROALB/CREAT RATIO; Future  -      DIABETES FOOT EXAM  -     REFERRAL TO PODIATRY  2. Essential hypertension  -     METABOLIC PANEL, COMPREHENSIVE; Future  3. Anemia, unspecified type  4. Osteoarthritis of right knee, unspecified osteoarthritis type  5. Hypercholesteremia  -     LIPID PANEL; Future  6. Malignant neoplasm of areola of right breast in female, unspecified estrogen receptor status ([facility]  -     REFERRAL TO ONCOLOGY  7. Acute pulmonary embolism without acute cor pulmonale, unspecified pulmonary embolism type ([facility] [facility]  8. Seizure disorder ([facility] [facility]  9. Preventative health care  -     CBC WITH AUTOMATED DIFF; Future  -     URINALYSIS W/ RFLX MICROSCOPIC; Future  -     VITAMIN D, 25 HYDROXY; Future  10. Screening for thyroid disorder  -     T4, FREE; Future  -     TSH 3RD GENERATION; Future  11. Chronic kidney disease, stage 3a (HCC)   -     VITAMIN D, 25 HYDROXY; Future  12. Encounter for screening mammogram for malignant neoplasm of breast  -     [facility] 3D JIMY W MAMMO BI SCREENING INCL CAD; Future  13. Heart murmur  14.  Chronic pain of right knee  -     REFERRAL TO PAIN MANAGEMENT  15. Urinary frequency  -     REFERRAL TO UROLOGY  16. Urgency incontinence  -     REFERRAL TO UROLOGY  17. Medicare annual wellness visit, subsequent       Depression Risk Factor Screening     3 most recent PHQ Screens 3/2/2021   Little interest or pleasure in doing things Not at all   Feeling down, depressed, irritable, or hopeless Not at all   Total Score PHQ 2 0       Alcohol Risk Screen    Do you average more than 1 drink per night or more than 7 drinks a week:  No    On any one occasion in the past three months have you have had more than 3 drinks containing alcohol:  No        Functional Ability and Level of Safety    Hearing: Hearing is good. Patient has a history of hearing loss      Activities of Daily Living: The home contains: no safety equipment. Patient does total self care      Ambulation: with no difficulty     Fall Risk:  Fall Risk Assessment, last 12 mths 6/24/2021   Able to walk? Yes   Fall in past 12 months? 0   Do you feel unsteady?  0   Are you worried about falling 0      Abuse Screen:  Patient is not abused       Cognitive Screening    Has your family/caregiver stated any concerns about your memory: no     Cognitive Screening: no issues at this time, patient does have a history of altered mental status but she is alert x3 today    Health Maintenance Due     Health Maintenance Due   Topic Date Due    COVID-19 Vaccine (1) Never done    Shingrix Vaccine Age 50> (1 of 2) Never done    Pneumococcal 65+ years (2 of 2 - PPSV23) 02/04/2020    A1C test (Diabetic or Prediabetic)  03/18/2021       Patient Care Team   Patient Care Team:  José Miguel Gibbs NP as PCP - General (Nurse Practitioner)  Bishop Mcneal NP (Nurse Practitioner)  Rl Galvan MD (General Surgery)  Cherelle Nava, GILBERT as Nurse Navigator (Oncology)  Job Wang MD (Pulmonary Disease)  Sharan Felty, MD (Surgery)  Ana Castellanos MD (Breast Surgery)    History     Patient Active Problem List   Diagnosis Code    Diabetes mellitus with neuropathy ([facility] E11.40    HTN (hypertension) I10    Chest pain R07.9    Seizure disorder ([facility] G40.909    Hypercholesteremia E78.00    History of CVA (cerebrovascular accident) Z80.78    Hearing impairment H91.90    Gastroesophageal reflux disease without esophagitis K21.9    Breast cancer (HCC)-right inferior, 2.2cm,2/18 nodes, + - -, C50.919    Altered mental status R41.82    Type 2 diabetes mellitus with diabetic neuropathy, with long-term current use of insulin (HCC) E11.40, Z79.4    Pulmonary nodule, right R91.1    History of breast cancer Z85.3    Type 2 diabetes mellitus with nephropathy (HCC) E11.21    Severe obesity (BMI 35.0-39. 9) with comorbidity ([facility] E66.01    PE (pulmonary thromboembolism) ([facility]) S18.05    Mild diastolic dysfunction K65.0    Heart murmur R01.1    History of noncompliance with medical treatment Z91.19    Chronic headaches R51.9, G89.29    Elevated d-dimer R79.89    History of recent travel Z78.9    Normocytic anemia D64.9     Past Medical History:   Diagnosis Date    Arthritis     Breast CA ([facility]     Breast CA ([facility] [address] [facility] 2016    DDD (degenerative disc disease), cervical     Diabetes ([facility])     DJD (degenerative joint disease) of cervical spine     Heart murmur     History of noncompliance with medical treatment     Hypercholesteremia     Hypertension     Menopause     Mild diastolic dysfunction     ECHO 5/2020     PE (pulmonary thromboembolism) ([facility] [address] [facility] 2019    Psychiatric disorder     DEPRESSION    PUD (peptic ulcer disease)     S/P cardiac cath 02/2015    No angiographic evidence of CAD    Seizures ([facility])     LAST SEIZURE ABOUT 2 MONTHS AGO (NOV 2015)    Stroke ([facility] [address] [facility]     x2 with left side deficit      Past Surgical History:   Procedure Laterality Date    HX BREAST LUMPECTOMY Right 1/12/2016    Dr. Jennie Goltz Partial Mastectomy w./ axillary lymphadenectomy    HX HEENT      TONSILLECTOMY    HX MASTECTOMY Right 1/26/2016    Dr. Jennie Goltz Repeat Partial Mastectomy for positive margins    HX ORTHOPAEDIC      left arm surg    IR MEDIPORT       Current Outpatient Medications   Medication Sig Dispense Refill    traZODone (DESYREL) 50 mg tablet Take 1 Tab by mouth nightly as needed for Sleep.  30 Tab 2    insulin aspart protamine/insulin aspart (NovoLOG Mix 70-30FlexPen U-100) 100 unit/mL (70-30) inpn INJECT 60 UNITS SUBCUTANEOUSLY TWICE DAILY 30 MINUTES BEFORE BREAKFAST AND DINNER 30 mL 5    diclofenac (VOLTAREN) 1 % gel Apply 2 g to affected area four (4) times daily. 1 Each 0    acetaminophen (TYLENOL) 500 mg tablet Take 2 Tabs by mouth three (3) times daily as needed for Pain. 100 Tab 2    flash glucose sensor (FreeStyle Ivy 14 Day Sensor) kit Use to check blood sugar 4x/day and as needed. 1 Kit 3    flash glucose scanning reader (FreeStyle Ivy 14 Day [place]) misc Use to check blood sugar 4x/day and as needed. 1 Each 0    metFORMIN (GLUCOPHAGE) 1,000 mg tablet Take 1 Tab by mouth two (2) times daily (with meals). 180 Tab 1    divalproex DR (DEPAKOTE) 250 mg tablet Take 3 Tabs by mouth two (2) times a day. 180 Tab 3    Insulin Needles, Disposable, (TRUEplus Pen Needle) 31 gauge x 5/16\" ndle Use as directed 4 times daily 100 Pen Needle 11    losartan (COZAAR) 50 mg tablet Take 1 Tab by mouth two (2) times a day. 60 Tab 2    hydroCHLOROthiazide (HYDRODIURIL) 25 mg tablet Take 1 Tab by mouth daily. 30 Tab 2    furosemide (LASIX) 20 mg tablet One po daily 30 Tab 2    carvediloL (COREG) 12.5 mg tablet Take 1 Tab by mouth two (2) times daily (with meals). 60 Tab 2    amLODIPine (NORVASC) 5 mg tablet Take 1 Tab by mouth nightly. 30 Tab 2    pantoprazole (PROTONIX) 40 mg tablet Take 1 Tab by mouth daily. 90 Tab 1    isosorbide mononitrate ER (Imdur) 30 mg tablet Take 1 Tab by mouth daily. 90 Tab 1    famotidine (PEPCID) 20 mg tablet Take 1 Tab by mouth two (2) times daily as needed (heartburn). 180 Tab 1    cholecalciferol (Vitamin D3) (1000 Units /25 mcg) tablet Take 1 Tab by mouth daily. 90 Tab 1    atorvastatin (LIPITOR) 40 mg tablet Take 1 Tab by mouth daily. 30 Tab 2    apixaban (ELIQUIS) 5 mg tablet Take 1 Tab by mouth two (2) times a day.  180 Tab 1    capsaicin 0.075 % topical cream Apply  to affected area three (3) times daily as needed for Pain. 60 g 6    glucose blood VI test strips (WAVESENSE [place]) strip  Strip 0    letrozole (FEMARA) 2.5 mg tablet Take 2.5 mg by mouth daily.  SONAFINE emul topical       therapeutic multivitamin (THERAGRAN) tablet Take 1 Tab by mouth daily.        No Known Allergies    Family History   Problem Relation Age of Onset    Cancer Father [de-identified]        colon    Hypertension Mother     Heart Disease Mother     Diabetes Mother     Breast Cancer Maternal Aunt     Breast Cancer Other     Breast Cancer Other      Social History     Tobacco Use    Smoking status: Never Smoker    Smokeless tobacco: Never Used   Substance Use Topics    Alcohol use: No         Demetria Jones NP

## 2021-06-30 ENCOUNTER — HOSPITAL ENCOUNTER (OUTPATIENT)
Dept: LAB | Age: 71
Discharge: HOME | End: 2021-06-30
Payer: MEDICARE

## 2021-06-30 DIAGNOSIS — Z79.4 TYPE 2 DIABETES MELLITUS WITH DIABETIC NEUROPATHY, WITH LONG-TERM CURRENT USE OF INSULIN (HCC): ICD-10-CM

## 2021-06-30 DIAGNOSIS — E11.40 TYPE 2 DIABETES MELLITUS WITH DIABETIC NEUROPATHY, WITH LONG-TERM CURRENT USE OF INSULIN (HCC): ICD-10-CM

## 2021-06-30 DIAGNOSIS — E78.00 HYPERCHOLESTEREMIA: ICD-10-CM

## 2021-06-30 DIAGNOSIS — Z00.00 PREVENTATIVE HEALTH CARE: ICD-10-CM

## 2021-06-30 DIAGNOSIS — Z13.29 SCREENING FOR THYROID DISORDER: ICD-10-CM

## 2021-06-30 DIAGNOSIS — N18.31 CHRONIC KIDNEY DISEASE, STAGE 3A (HCC): ICD-10-CM

## 2021-06-30 DIAGNOSIS — I10 ESSENTIAL HYPERTENSION: ICD-10-CM

## 2021-06-30 LAB
25(OH)D3 SERPL-MCNC: 16.2 NG/ML (ref 30–100)
ALBUMIN SERPL-MCNC: 3.1 G/DL (ref 3.4–5)
ALBUMIN/GLOB SERPL: 0.7 {RATIO} (ref 0.8–1.7)
ALP SERPL-CCNC: 83 U/L (ref 45–117)
ALT SERPL-CCNC: 15 U/L (ref 13–56)
ANION GAP SERPL CALC-SCNC: 6 MMOL/L (ref 3–18)
AST SERPL-CCNC: 9 U/L (ref 10–38)
BASOPHILS # BLD: 0 K/UL (ref 0–0.1)
BASOPHILS NFR BLD: 1 % (ref 0–2)
BILIRUB SERPL-MCNC: 0.4 MG/DL (ref 0.2–1)
BUN SERPL-MCNC: 28 MG/DL (ref 7–18)
BUN/CREAT SERPL: 22 (ref 12–20)
CALCIUM SERPL-MCNC: 8.5 MG/DL (ref 8.5–10.1)
CHLORIDE SERPL-SCNC: 107 MMOL/L (ref 100–111)
CHOLEST SERPL-MCNC: 280 MG/DL
CO2 SERPL-SCNC: 27 MMOL/L (ref 21–32)
CREAT SERPL-MCNC: 1.25 MG/DL (ref 0.6–1.3)
DIFFERENTIAL METHOD BLD: ABNORMAL
EOSINOPHIL # BLD: 0.2 K/UL (ref 0–0.4)
EOSINOPHIL NFR BLD: 3 % (ref 0–5)
ERYTHROCYTE [DISTWIDTH] IN BLOOD BY AUTOMATED COUNT: 13.5 % (ref 11.6–14.5)
EST. AVERAGE GLUCOSE BLD GHB EST-MCNC: 186 MG/DL
GLOBULIN SER CALC-MCNC: 4.6 G/DL (ref 2–4)
GLUCOSE SERPL-MCNC: 158 MG/DL (ref 74–99)
HBA1C MFR BLD: 8.1 % (ref 4.2–5.6)
HCT VFR BLD AUTO: 34.9 % (ref 35–45)
HDLC SERPL-MCNC: 91 MG/DL (ref 40–60)
HDLC SERPL: 3.1 {RATIO} (ref 0–5)
HGB BLD-MCNC: 10.2 G/DL (ref 12–16)
LDLC SERPL CALC-MCNC: 166.6 MG/DL (ref 0–100)
LIPID PROFILE,FLP: ABNORMAL
LYMPHOCYTES # BLD: 3.2 K/UL (ref 0.9–3.6)
LYMPHOCYTES NFR BLD: 43 % (ref 21–52)
MCH RBC QN AUTO: 25.1 PG (ref 24–34)
MCHC RBC AUTO-ENTMCNC: 29.2 G/DL (ref 31–37)
MCV RBC AUTO: 86 FL (ref 74–97)
MONOCYTES # BLD: 0.5 K/UL (ref 0.05–1.2)
MONOCYTES NFR BLD: 6 % (ref 3–10)
NEUTS SEG # BLD: 3.4 K/UL (ref 1.8–8)
NEUTS SEG NFR BLD: 47 % (ref 40–73)
PLATELET # BLD AUTO: 233 K/UL (ref 135–420)
PMV BLD AUTO: 11.5 FL (ref 9.2–11.8)
POTASSIUM SERPL-SCNC: 4.6 MMOL/L (ref 3.5–5.5)
PROT SERPL-MCNC: 7.7 G/DL (ref 6.4–8.2)
RBC # BLD AUTO: 4.06 M/UL (ref 4.2–5.3)
SODIUM SERPL-SCNC: 140 MMOL/L (ref 136–145)
T4 FREE SERPL-MCNC: 0.9 NG/DL (ref 0.7–1.5)
TRIGL SERPL-MCNC: 112 MG/DL (ref ?–150)
TSH SERPL DL<=0.05 MIU/L-ACNC: 1.61 UIU/ML (ref 0.36–3.74)
VLDLC SERPL CALC-MCNC: 22.4 MG/DL
WBC # BLD AUTO: 7.4 K/UL (ref 4.6–13.2)

## 2021-06-30 PROCEDURE — 84443 ASSAY THYROID STIM HORMONE: CPT

## 2021-06-30 PROCEDURE — 82306 VITAMIN D 25 HYDROXY: CPT

## 2021-06-30 PROCEDURE — 84439 ASSAY OF FREE THYROXINE: CPT

## 2021-06-30 PROCEDURE — 83036 HEMOGLOBIN GLYCOSYLATED A1C: CPT

## 2021-06-30 PROCEDURE — 80053 COMPREHEN METABOLIC PANEL: CPT

## 2021-06-30 PROCEDURE — 36415 COLL VENOUS BLD VENIPUNCTURE: CPT

## 2021-06-30 PROCEDURE — 85025 COMPLETE CBC W/AUTO DIFF WBC: CPT

## 2021-06-30 PROCEDURE — 80061 LIPID PANEL: CPT

## 2021-06-30 NOTE — PATIENT INSTRUCTIONS

## 2021-07-01 ENCOUNTER — HOSPITAL ENCOUNTER (OUTPATIENT)
Dept: LAB | Age: 71
Discharge: HOME | End: 2021-07-01
Payer: MEDICARE

## 2021-07-01 LAB
APPEARANCE UR: ABNORMAL
BACTERIA URNS QL MICRO: ABNORMAL /HPF
BILIRUB UR QL: NEGATIVE
COLOR UR: YELLOW
CREAT UR-MCNC: 141 MG/DL (ref 30–125)
EPITH CASTS URNS QL MICRO: ABNORMAL /LPF (ref 0–5)
GLUCOSE UR STRIP.AUTO-MCNC: NEGATIVE MG/DL
HGB UR QL STRIP: NEGATIVE
KETONES UR QL STRIP.AUTO: NEGATIVE MG/DL
LEUKOCYTE ESTERASE UR QL STRIP.AUTO: ABNORMAL
MICROALBUMIN UR-MCNC: 28.9 MG/DL (ref 0–3)
MICROALBUMIN/CREAT UR-RTO: 205 MG/G (ref 0–30)
NITRITE UR QL STRIP.AUTO: NEGATIVE
PH UR STRIP: 5.5 [PH] (ref 5–8)
PROT UR STRIP-MCNC: 30 MG/DL
RBC #/AREA URNS HPF: ABNORMAL /HPF (ref 0–5)
SP GR UR REFRACTOMETRY: 1.01 (ref 1–1.03)
UROBILINOGEN UR QL STRIP.AUTO: 1 EU/DL (ref 0.2–1)
WBC URNS QL MICRO: ABNORMAL /HPF (ref 0–5)

## 2021-07-01 PROCEDURE — 81001 URINALYSIS AUTO W/SCOPE: CPT

## 2021-07-01 PROCEDURE — 82043 UR ALBUMIN QUANTITATIVE: CPT

## 2021-07-08 DIAGNOSIS — E11.40 TYPE 2 DIABETES MELLITUS WITH DIABETIC NEUROPATHY, WITH LONG-TERM CURRENT USE OF INSULIN (HCC): ICD-10-CM

## 2021-07-08 DIAGNOSIS — E78.00 HYPERCHOLESTEREMIA: ICD-10-CM

## 2021-07-08 DIAGNOSIS — Z79.4 TYPE 2 DIABETES MELLITUS WITH DIABETIC NEUROPATHY, WITH LONG-TERM CURRENT USE OF INSULIN (HCC): ICD-10-CM

## 2021-07-08 DIAGNOSIS — E55.9 VITAMIN D DEFICIENCY: Primary | ICD-10-CM

## 2021-07-08 RX ORDER — ATORVASTATIN CALCIUM 40 MG/1
40 TABLET, FILM COATED ORAL DAILY
Qty: 30 TABLET | Refills: 2 | Status: SHIPPED | OUTPATIENT
Start: 2021-07-08 | End: 2021-09-29 | Stop reason: SDUPTHER

## 2021-07-08 RX ORDER — ERGOCALCIFEROL 1.25 MG/1
50000 CAPSULE ORAL
Qty: 8 CAPSULE | Refills: 0 | Status: SHIPPED | OUTPATIENT
Start: 2021-07-08 | End: 2022-05-05

## 2021-07-08 RX ORDER — INSULIN ASPART 100 [IU]/ML
INJECTION, SUSPENSION SUBCUTANEOUS
Qty: 30 ML | Refills: 5 | Status: SHIPPED | OUTPATIENT
Start: 2021-07-08 | End: 2021-07-23 | Stop reason: SDUPTHER

## 2021-07-08 NOTE — PROGRESS NOTES
Vitamin D low order high-dose vitamin D2 50,000 units weekly for 8 weeks. A1c 8.1. Should discuss patient's diabetes, HTN  and cholesterol management with short follow-up appointment. Cholesterol high at 280, triglycerides normal at 112, LDL high at 166.6 and HDL good cholesterol heart protective at 91. Patient appears to be on atorvastatin 40 mg daily. I reordered a atorvastatin because she cannot be taking it as her last prescription was May 2020. CMP glucose elevated, BUN elevated GFR decreased at 52. Most effectively treat hypertension and take medications as directed and better blood glucose management, must avoid more kidney damage. TSH and T4 related to thyroid normal.  Patient CBC is similar to her CBCs over the last several years, she is anemic. Suggest daily women's multi-vitamin. Patient seen cardiologist Dr. Trinidad Quiñones in the past, advised patient to do a follow-up appointment as her blood pressure continues to be high. During her last visit June 24, 2021 order referral to podiatry for toenail maintenance. Patient reported she had not followed up with her oncologist related to breast cancer. Ordered referral to Dr. Antoine Stevens.

## 2021-07-12 NOTE — PROGRESS NOTES
Patient's recent microalbumin creatinine ratio is 205. Urinalysis showed protein, trace of leukocyte esterase and bacteria. Patient had no complaints of UTI symptoms. Patient's recent days once he was 8.1. I would like patient to make an appointment to come in to discuss better blood glucose control, hypertension and cholesterol management.

## 2021-07-15 ENCOUNTER — HOSPITAL ENCOUNTER (OUTPATIENT)
Dept: WOMENS IMAGING | Age: 71
Discharge: HOME | End: 2021-07-15
Attending: NURSE PRACTITIONER
Payer: MEDICARE

## 2021-07-15 DIAGNOSIS — Z79.4 TYPE 2 DIABETES MELLITUS WITH DIABETIC NEUROPATHY, WITH LONG-TERM CURRENT USE OF INSULIN (HCC): ICD-10-CM

## 2021-07-15 DIAGNOSIS — E11.40 TYPE 2 DIABETES MELLITUS WITH DIABETIC NEUROPATHY, WITH LONG-TERM CURRENT USE OF INSULIN (HCC): ICD-10-CM

## 2021-07-15 DIAGNOSIS — Z12.31 ENCOUNTER FOR SCREENING MAMMOGRAM FOR MALIGNANT NEOPLASM OF BREAST: ICD-10-CM

## 2021-07-15 PROCEDURE — 77063 BREAST TOMOSYNTHESIS BI: CPT

## 2021-07-15 NOTE — TELEPHONE ENCOUNTER
PCP: Judy Morgan NP    Last appt: 6/24/2021  Future Appointments   Date Time Provider Al Salmeroni   7/23/2021  2:00 PM Judy Morgan NP [facility] BS [facility]THON[facility]   7/28/2021  1:00 PM Kristian Carty MD [facility] BS AMB       Requested Prescriptions     Pending Prescriptions Disp Refills    Insulin Needles, Disposable, (TRUEplus Pen Needle) 31 gauge x 5/16\" ndle 100 Pen Needle 11     Sig: Use as directed 4 times daily       Request for a 30 or 90 day supply? Provider Discretion    Pharmacy: Confirmed    Other Comments:  Medication refill request via fax.  Thank you

## 2021-07-19 RX ORDER — PEN NEEDLE, DIABETIC 30 GX3/16"
NEEDLE, DISPOSABLE MISCELLANEOUS
Qty: 100 PEN NEEDLE | Refills: 11 | Status: SHIPPED | OUTPATIENT
Start: 2021-07-19 | End: 2021-10-22 | Stop reason: SDUPTHER

## 2021-07-23 ENCOUNTER — OFFICE VISIT (OUTPATIENT)
Dept: FAMILY MEDICINE CLINIC | Age: 71
End: 2021-07-23
Payer: MEDICARE

## 2021-07-23 VITALS
RESPIRATION RATE: 16 BRPM | HEIGHT: 65 IN | DIASTOLIC BLOOD PRESSURE: 80 MMHG | OXYGEN SATURATION: 100 % | HEART RATE: 82 BPM | TEMPERATURE: 97.1 F | WEIGHT: 247.6 LBS | BODY MASS INDEX: 41.25 KG/M2 | SYSTOLIC BLOOD PRESSURE: 151 MMHG

## 2021-07-23 DIAGNOSIS — F51.02 ADJUSTMENT INSOMNIA: ICD-10-CM

## 2021-07-23 DIAGNOSIS — G40.909 SEIZURE DISORDER (HCC): Chronic | ICD-10-CM

## 2021-07-23 DIAGNOSIS — T14.8XXA MUSCLE STRAIN: ICD-10-CM

## 2021-07-23 DIAGNOSIS — F43.21 GRIEVING: ICD-10-CM

## 2021-07-23 DIAGNOSIS — E11.40 TYPE 2 DIABETES MELLITUS WITH DIABETIC NEUROPATHY, WITH LONG-TERM CURRENT USE OF INSULIN (HCC): ICD-10-CM

## 2021-07-23 DIAGNOSIS — B37.2 YEAST INFECTION OF THE SKIN: Primary | ICD-10-CM

## 2021-07-23 DIAGNOSIS — M79.89 LEG SWELLING: ICD-10-CM

## 2021-07-23 DIAGNOSIS — Z79.4 TYPE 2 DIABETES MELLITUS WITH DIABETIC NEUROPATHY, WITH LONG-TERM CURRENT USE OF INSULIN (HCC): ICD-10-CM

## 2021-07-23 DIAGNOSIS — K29.70 GASTRITIS WITHOUT BLEEDING, UNSPECIFIED CHRONICITY, UNSPECIFIED GASTRITIS TYPE: ICD-10-CM

## 2021-07-23 DIAGNOSIS — I10 ESSENTIAL HYPERTENSION: ICD-10-CM

## 2021-07-23 PROCEDURE — G8536 NO DOC ELDER MAL SCRN: HCPCS | Performed by: NURSE PRACTITIONER

## 2021-07-23 PROCEDURE — G8753 SYS BP > OR = 140: HCPCS | Performed by: NURSE PRACTITIONER

## 2021-07-23 PROCEDURE — 1101F PT FALLS ASSESS-DOCD LE1/YR: CPT | Performed by: NURSE PRACTITIONER

## 2021-07-23 PROCEDURE — G8432 DEP SCR NOT DOC, RNG: HCPCS | Performed by: NURSE PRACTITIONER

## 2021-07-23 PROCEDURE — 1090F PRES/ABSN URINE INCON ASSESS: CPT | Performed by: NURSE PRACTITIONER

## 2021-07-23 PROCEDURE — 2022F DILAT RTA XM EVC RTNOPTHY: CPT | Performed by: NURSE PRACTITIONER

## 2021-07-23 PROCEDURE — G8417 CALC BMI ABV UP PARAM F/U: HCPCS | Performed by: NURSE PRACTITIONER

## 2021-07-23 PROCEDURE — 3017F COLORECTAL CA SCREEN DOC REV: CPT | Performed by: NURSE PRACTITIONER

## 2021-07-23 PROCEDURE — G8754 DIAS BP LESS 90: HCPCS | Performed by: NURSE PRACTITIONER

## 2021-07-23 PROCEDURE — G8427 DOCREV CUR MEDS BY ELIG CLIN: HCPCS | Performed by: NURSE PRACTITIONER

## 2021-07-23 PROCEDURE — G8399 PT W/DXA RESULTS DOCUMENT: HCPCS | Performed by: NURSE PRACTITIONER

## 2021-07-23 PROCEDURE — G9899 SCRN MAM PERF RSLTS DOC: HCPCS | Performed by: NURSE PRACTITIONER

## 2021-07-23 PROCEDURE — 3052F HG A1C>EQUAL 8.0%<EQUAL 9.0%: CPT | Performed by: NURSE PRACTITIONER

## 2021-07-23 PROCEDURE — 99213 OFFICE O/P EST LOW 20 MIN: CPT | Performed by: NURSE PRACTITIONER

## 2021-07-23 RX ORDER — ACETAMINOPHEN 500 MG
1000 TABLET ORAL
Qty: 100 TABLET | Refills: 2 | Status: SHIPPED | OUTPATIENT
Start: 2021-07-23

## 2021-07-23 RX ORDER — FUROSEMIDE 20 MG/1
TABLET ORAL
Qty: 30 TABLET | Refills: 2 | Status: SHIPPED | OUTPATIENT
Start: 2021-07-23 | End: 2021-09-29 | Stop reason: ALTCHOICE

## 2021-07-23 RX ORDER — ISOSORBIDE MONONITRATE 30 MG/1
30 TABLET, EXTENDED RELEASE ORAL DAILY
Qty: 90 TABLET | Refills: 1 | Status: SHIPPED | OUTPATIENT
Start: 2021-07-23 | End: 2021-09-29 | Stop reason: SDUPTHER

## 2021-07-23 RX ORDER — METFORMIN HYDROCHLORIDE 1000 MG/1
1000 TABLET ORAL 2 TIMES DAILY WITH MEALS
Qty: 180 TABLET | Refills: 1 | Status: SHIPPED | OUTPATIENT
Start: 2021-07-23 | End: 2021-09-29 | Stop reason: ALTCHOICE

## 2021-07-23 RX ORDER — CARVEDILOL 12.5 MG/1
12.5 TABLET ORAL 2 TIMES DAILY WITH MEALS
Qty: 60 TABLET | Refills: 2 | Status: SHIPPED | OUTPATIENT
Start: 2021-07-23 | End: 2021-09-29 | Stop reason: SDUPTHER

## 2021-07-23 RX ORDER — PANTOPRAZOLE SODIUM 40 MG/1
40 TABLET, DELAYED RELEASE ORAL DAILY
Qty: 90 TABLET | Refills: 1 | Status: SHIPPED | OUTPATIENT
Start: 2021-07-23 | End: 2021-09-29 | Stop reason: ALTCHOICE

## 2021-07-23 RX ORDER — AMLODIPINE BESYLATE 5 MG/1
5 TABLET ORAL
Qty: 30 TABLET | Refills: 2 | Status: SHIPPED | OUTPATIENT
Start: 2021-07-23 | End: 2022-01-24 | Stop reason: SDUPTHER

## 2021-07-23 RX ORDER — NYSTATIN 100000 U/G
CREAM TOPICAL 2 TIMES DAILY
Qty: 15 G | Refills: 0 | Status: SHIPPED | OUTPATIENT
Start: 2021-07-23 | End: 2022-05-05

## 2021-07-23 RX ORDER — INSULIN ASPART 100 [IU]/ML
INJECTION, SUSPENSION SUBCUTANEOUS
Qty: 30 ML | Refills: 5 | Status: SHIPPED | OUTPATIENT
Start: 2021-07-23 | End: 2021-09-29

## 2021-07-23 RX ORDER — DIVALPROEX SODIUM 250 MG/1
750 TABLET, DELAYED RELEASE ORAL 2 TIMES DAILY
Qty: 180 TABLET | Refills: 3 | Status: SHIPPED | OUTPATIENT
Start: 2021-07-23 | End: 2021-09-29

## 2021-07-23 RX ORDER — TRAZODONE HYDROCHLORIDE 50 MG/1
50 TABLET ORAL
Qty: 30 TABLET | Refills: 2 | Status: SHIPPED | OUTPATIENT
Start: 2021-07-23 | End: 2022-05-05 | Stop reason: SDUPTHER

## 2021-07-23 RX ORDER — LOSARTAN POTASSIUM 50 MG/1
50 TABLET ORAL 2 TIMES DAILY
Qty: 60 TABLET | Refills: 2 | Status: SHIPPED | OUTPATIENT
Start: 2021-07-23 | End: 2021-09-29 | Stop reason: ALTCHOICE

## 2021-07-23 RX ORDER — HYDROCHLOROTHIAZIDE 25 MG/1
25 TABLET ORAL DAILY
Qty: 30 TABLET | Refills: 2 | Status: SHIPPED | OUTPATIENT
Start: 2021-07-23 | End: 2021-09-29 | Stop reason: SDUPTHER

## 2021-07-23 NOTE — PROGRESS NOTES
Airam Finn presents today for   Chief Complaint   Patient presents with    Follow-up     x one month    Medication Refill    Leg Pain     Bilateral leg pain       Airam Finn preferred language for health care discussion is english/other. Personal Protective Equipment:   Personal Protective Equipment was used including: mask-surgical and hands-gloves. Patient was placed on no precaution(s). Patient was masked. Precautions:   Patient currently on None  Patient currently roomed with door closed    Is someone accompanying this pt? Yes; Spouse     Is the patient using any DME equipment during OV? No    Depression Screening:  3 most recent PHQ Screens 7/23/2021   Little interest or pleasure in doing things Not at all   Feeling down, depressed, irritable, or hopeless Not at all   Total Score PHQ 2 0       Learning Assessment:  Learning Assessment 9/26/2019   PRIMARY LEARNER Patient   HIGHEST LEVEL OF EDUCATION - PRIMARY LEARNER  GRADUATED HIGH SCHOOL OR GED   BARRIERS PRIMARY LEARNER NONE   CO-LEARNER CAREGIVER No   PRIMARY LANGUAGE ENGLISH   LEARNER PREFERENCE PRIMARY LISTENING     -     -   ANSWERED BY patient   RELATIONSHIP SELF       Abuse Screening:  Abuse Screening Questionnaire 6/15/2020   Do you ever feel afraid of your partner? N   Are you in a relationship with someone who physically or mentally threatens you? N   Is it safe for you to go home? Y       Fall Risk  Fall Risk Assessment, last 12 mths 6/24/2021   Able to walk? Yes   Fall in past 12 months? 0   Do you feel unsteady? 0   Are you worried about falling 0       Pt currently taking Anticoagulant therapy? No    Coordination of Care:  1. Have you been to the ER, urgent care clinic since your last visit? Hospitalized since your last visit? No    2. Have you seen or consulted any other health care providers outside of the [address] [place] [address] Jonathan since your last visit? Include any pap smears or colon screening.  No

## 2021-07-23 NOTE — PROGRESS NOTES
OFFICE NOTE    Sofia Velásquez is a 78 y.o. female presenting today for office visit. 7/23/2021  1:07 PM    Chief Complaint   Patient presents with    Follow-up     x one month    Medication Refill    Leg Pain     Bilateral leg pain     HPI:  In office visit related to lab results and diabetes. Luciana harris completed    Patient was seen by Dr. Milo Sanchez with oncology related to breast cancer. I put in a referral for [facility] but patient says she has not heard from anyone. Advised patient to take tylenol for pain, no more than 4000 mg in 24 hours. Stop using NSAIDS like ibuprofen, motrin or aleve. She has reduced kidney function. Patient unsure if she is taking her metformin as she should ran out per prescription information. I ordered more and she agreed to take it. Her A1C is 8.1. Patient reports appetite is good, no urinary issues, sleeps well and regular bowel movements. Patient denies fever, chills, chest pain, shortness of breath, abdomen pain or dark tarry stool. Recent mammogram 7/15/2021:   IMPRESSION  No evidence for malignancy. Recommend routine annual followup. ROS:    · General: negative for - chills, fever, weight changes or malaise  · HEENT: no sore throat, nasal congestion, vision problems or ear problems  · Respiratory: no cough, shortness of breath, or wheezing  · Cardiovascular: no chest pain, palpitations, or dyspnea on exertion  · Gastrointestinal: no abdominal pain, N/V, change in bowel habits, or black or bloody stools  · Musculoskeletal: no back pain, joint pain, joint stiffness, muscle pain or muscle weakness  · Neurological: no numbness, tingling, headache or dizziness  · Endo:  No polyuria or polydipsia. · : no hematuria, dysuria, frequency, hesitancy, or nocturia.     · Skin: No itching or rash, no open skin, no unusual lesions   · Psychological: negative for - anxiety, depression, sleep disturbances, suicidal or homicidal ideations     PHQ Screening 3 most recent PHQ Screens 7/23/2021   Little interest or pleasure in doing things Not at all   Feeling down, depressed, irritable, or hopeless Not at all   Total Score PHQ 2 0       History  Past Medical History:   Diagnosis Date    Arthritis     [facility]     Breast CA ([facility] 2016    DDD (degenerative disc disease), cervical     Diabetes ([facility]     DJD (degenerative joint disease) of cervical spine     Heart murmur     History of noncompliance with medical treatment     Hypercholesteremia     Hypertension     Menopause     Mild diastolic dysfunction     ECHO 5/2020     PE (pulmonary thromboembolism) ([facility] 2019    Psychiatric disorder     DEPRESSION    PUD (peptic ulcer disease)     S/P cardiac cath 02/2015    No angiographic evidence of CAD    Seizures (HCC)     LAST SEIZURE ABOUT 2 MONTHS AGO (NOV 2015)    Stroke ([facility]     x2 with left side deficit       Past Surgical History:   Procedure Laterality Date    HX BREAST LUMPECTOMY Right 1/12/2016    Dr. Margret Stoll Partial Mastectomy w./ axillary lymphadenectomy    HX HEENT      TONSILLECTOMY    HX MASTECTOMY Right 1/26/2016    Dr. Margret Stoll Repeat Partial Mastectomy for positive margins    HX ORTHOPAEDIC      left arm surg    IR MEDIPORT         Social History     Socioeconomic History    Marital status: SINGLE     Spouse name: Not on file    Number of children: Not on file    Years of education: Not on file    Highest education level: Not on file   Occupational History    Not on file   Tobacco Use    Smoking status: Never Smoker    Smokeless tobacco: Never Used   Substance and Sexual Activity    Alcohol use: No    Drug use: No    Sexual activity: Yes     Partners: Male   Other Topics Concern     Service No    Blood Transfusions No    Caffeine Concern No    Occupational Exposure No    Hobby Hazards No    Sleep Concern Yes    Stress Concern No    Weight Concern Not Asked    Special Diet Not Asked    Back Care Not Asked    Exercise Yes     Comment: walks using cane    Bike Helmet Not Asked    Seat Belt Yes    Self-Exams Yes   Social History Narrative    Not on file     Social Determinants of Health     Financial Resource Strain:     Difficulty of Paying Living Expenses:    Food Insecurity:     Worried About Running Out of Food in the Last Year:     [address] in the Last Year:    Transportation Needs:     Lack of Transportation (Medical):  Lack of Transportation (Non-Medical):    Physical Activity:     Days of Exercise per Week:     Minutes of Exercise per Session:    Stress:     Feeling of Stress :    Social Connections:     Frequency of Communication with Friends and Family:     Frequency of Social Gatherings with Friends and Family:     Attends [employer] Services:     Active Member of Clubs or Organizations:     Attends Club or Organization Meetings:     Marital Status:    Intimate Partner Violence:     Fear of Current or Ex-Partner:     Emotionally Abused:     Physically Abused:     Sexually Abused:        No Known Allergies    Current Outpatient Medications   Medication Sig Dispense Refill    insulin aspart protamine/insulin aspart (NovoLOG Mix 70-30FlexPen U-100) 100 unit/mL (70-30) inpn INJECT 60 UNITS SUBCUTANEOUSLY TWICE DAILY 30 MINUTES BEFORE BREAKFAST AND DINNER 30 mL 5    losartan (COZAAR) 50 mg tablet Take 1 Tablet by mouth two (2) times a day. 60 Tablet 2    traZODone (DESYREL) 50 mg tablet Take 1 Tablet by mouth nightly as needed for Sleep. 30 Tablet 2    pantoprazole (PROTONIX) 40 mg tablet Take 1 Tablet by mouth daily. 90 Tablet 1    metFORMIN (GLUCOPHAGE) 1,000 mg tablet Take 1 Tablet by mouth two (2) times daily (with meals). 180 Tablet 1    nystatin (MYCOSTATIN) topical cream Apply  to affected area two (2) times a day.  15 g 0    furosemide (LASIX) 20 mg tablet One po daily 30 Tablet 2    acetaminophen (TYLENOL) 500 mg tablet Take 2 Tablets by mouth three (3) times daily as needed for Pain. 100 Tablet 2    divalproex DR (DEPAKOTE) 250 mg tablet Take 3 Tablets by mouth two (2) times a day. 180 Tablet 3    amLODIPine (NORVASC) 5 mg tablet Take 1 Tablet by mouth nightly. 30 Tablet 2    carvediloL (COREG) 12.5 mg tablet Take 1 Tablet by mouth two (2) times daily (with meals). 60 Tablet 2    isosorbide mononitrate ER (Imdur) 30 mg tablet Take 1 Tablet by mouth daily. 90 Tablet 1    hydroCHLOROthiazide (HYDRODIURIL) 25 mg tablet Take 1 Tablet by mouth daily. 30 Tablet 2    Insulin Needles, Disposable, (TRUEplus Pen Needle) 31 gauge x 5/16\" ndle Use as directed 4 times daily 100 Pen Needle 11    atorvastatin (LIPITOR) 40 mg tablet Take 1 Tablet by mouth daily. 30 Tablet 2    ergocalciferol (ERGOCALCIFEROL) 1,250 mcg (50,000 unit) capsule Take 1 Capsule by mouth every seven (7) days. Indications: vitamin D deficiency (high dose therapy) 8 Capsule 0    diclofenac (VOLTAREN) 1 % gel Apply 2 g to affected area four (4) times daily. 1 Each 0    flash glucose sensor (FreeStyle Ivy 14 Day Sensor) kit Use to check blood sugar 4x/day and as needed. 1 Kit 3    flash glucose scanning reader (FreeStyle Ivy 14 Day [place]) misc Use to check blood sugar 4x/day and as needed. 1 Each 0    famotidine (PEPCID) 20 mg tablet Take 1 Tab by mouth two (2) times daily as needed (heartburn). 180 Tab 1    apixaban (ELIQUIS) 5 mg tablet Take 1 Tab by mouth two (2) times a day. 180 Tab 1    capsaicin 0.075 % topical cream Apply  to affected area three (3) times daily as needed for Pain. 60 g 6    glucose blood VI test strips (WAVESENSE PRESTO) strip  Strip 0    letrozole (FEMARA) 2.5 mg tablet Take 2.5 mg by mouth daily.  SONAFINE emul topical       therapeutic multivitamin (THERAGRAN) tablet Take 1 Tab by mouth daily.          Patient Care Team:  Patient Care Team:  Sisi Nagel NP as PCP - General (Nurse Practitioner)  Sisi Nagel NP as PCP - [address] [place], [place] [address]serg Matos Provider  Xi Solares, KANCHAN (Nurse Practitioner)  Dee Oh MD (General Surgery)  Amaury Hope., RN as Nurse Navigator (Oncology)  Sae Mandujano MD (Pulmonary Disease)  Tabitha Conley MD (Surgery)  Tone Gallagher MD (Breast Surgery)    LABS:  7/12/2021 - Patient's recent microalbumin creatinine ratio is 205. Urinalysis showed protein, trace of leukocyte esterase and bacteria. Patient had no complaints of UTI symptoms. Patient's recent days once he was 8.1. I would like patient to make an appointment to come in to discuss better blood glucose control, hypertension and cholesterol management. RADIOLOGY:  None new to review    Physical Exam  The patient appears well, she is pleasant, alert, oriented x 3, in no distress. Morbidly obese. ENT normal.  Neck supple. No adenopathy or thyromegaly. SUBHASH. Lungs are clear, good air entry, no wheezes, rhonchi or rales. Cardiovascular, S1 and S2 normal, no murmurs, regular rate and rhythm. No LAD. Chest wall negative for tenderness, breasts appear symmetrical  Abdomen is soft without tenderness, guarding, mass or organomegaly. /Anorectal, deferred. Muscleskeletal, no swelling, no tenderness, no injury. Extremities show no edema, normal peripheral pulses. Neurological is normal without focal findings. Skin: no concerning lesions. Psych: normal affect. Mood good. Oriented x 3. Judgement and insight intact.       Vitals:    07/23/21 1430   BP: (!) 151/80   Pulse: 82   Resp: 16   Temp: 97.1 °F (36.2 °C)   TempSrc: Temporal   SpO2: 100%   Weight: 247 lb 9.6 oz (112.3 kg)   Height: 5' 5\" (1.651 m)   PainSc:  10 - Worst pain ever   PainLoc: Leg       Assessment and Plan    Diagnoses and all orders for this visit:    Yeast infection of the skin  -     nystatin (MYCOSTATIN) topical cream; Apply  to affected area two (2) times a day., Normal, Disp-15 g, R-0    Type 2 diabetes mellitus with diabetic neuropathy, with long-term current use of insulin (HCC)  -     insulin aspart protamine/insulin aspart (NovoLOG Mix 70-30FlexPen U-100) 100 unit/mL (70-30) inpn; INJECT 60 UNITS SUBCUTANEOUSLY TWICE DAILY 30 MINUTES BEFORE BREAKFAST AND DINNER, Normal, Disp-30 mL, R-5  -     metFORMIN (GLUCOPHAGE) 1,000 mg tablet; Take 1 Tablet by mouth two (2) times daily (with meals). , Normal, Disp-180 Tablet, R-1    Essential hypertension  -     losartan (COZAAR) 50 mg tablet; Take 1 Tablet by mouth two (2) times a day., Normal, Disp-60 Tablet, R-2  -     amLODIPine (NORVASC) 5 mg tablet; Take 1 Tablet by mouth nightly., Normal, Disp-30 Tablet, R-2  -     carvediloL (COREG) 12.5 mg tablet; Take 1 Tablet by mouth two (2) times daily (with meals). , Normal, Disp-60 Tablet, R-2  -     isosorbide mononitrate ER (Imdur) 30 mg tablet; Take 1 Tablet by mouth daily. , Normal, Disp-90 Tablet, R-1  -     hydroCHLOROthiazide (HYDRODIURIL) 25 mg tablet; Take 1 Tablet by mouth daily. , Normal, Disp-30 Tablet, R-2    Grieving  -     traZODone (DESYREL) 50 mg tablet; Take 1 Tablet by mouth nightly as needed for Sleep., Normal, Disp-30 Tablet, R-2    Adjustment insomnia  -     traZODone (DESYREL) 50 mg tablet; Take 1 Tablet by mouth nightly as needed for Sleep., Normal, Disp-30 Tablet, R-2    Gastritis without bleeding, unspecified chronicity, unspecified gastritis type  -     pantoprazole (PROTONIX) 40 mg tablet; Take 1 Tablet by mouth daily. , Normal, Disp-90 Tablet, R-1    Leg swelling  -     furosemide (LASIX) 20 mg tablet; One po daily, Normal, Disp-30 Tablet, R-2    Muscle strain  -     acetaminophen (TYLENOL) 500 mg tablet; Take 2 Tablets by mouth three (3) times daily as needed for Pain., Normal, Disp-100 Tablet, R-2    Seizure disorder (HCC)  -     divalproex DR (DEPAKOTE) 250 mg tablet; Take 3 Tablets by mouth two (2) times a day., Normal, Disp-180 Tablet, R-3         *Plan of care reviewed with patient. Patient in agreement with plan and expresses understanding.  All questions answered and patient encouraged to call or RTO if further questions or concerns. 50% of 15 minutes spent on counseling and managing her care. Follow-up and Dispositions    · Return in about 2 months (around 9/23/2021), or if symptoms worsen or fail to improve, for make a appt w/ Dr. Tete Betancourt.      Joanna Meza NP-C

## 2021-07-28 ENCOUNTER — HOSPITAL ENCOUNTER (OUTPATIENT)
Dept: INFUSION THERAPY | Age: 71
Discharge: HOME | End: 2021-07-28
Payer: MEDICARE

## 2021-07-28 ENCOUNTER — OFFICE VISIT (OUTPATIENT)
Age: 71
End: 2021-07-28
Payer: MEDICARE

## 2021-07-28 VITALS
TEMPERATURE: 97 F | DIASTOLIC BLOOD PRESSURE: 72 MMHG | HEIGHT: 66 IN | BODY MASS INDEX: 38.89 KG/M2 | RESPIRATION RATE: 16 BRPM | OXYGEN SATURATION: 97 % | SYSTOLIC BLOOD PRESSURE: 108 MMHG | HEART RATE: 86 BPM | WEIGHT: 242 LBS

## 2021-07-28 DIAGNOSIS — I26.99 PE (PULMONARY THROMBOEMBOLISM) (HCC): ICD-10-CM

## 2021-07-28 DIAGNOSIS — D64.9 NORMOCYTIC ANEMIA: ICD-10-CM

## 2021-07-28 DIAGNOSIS — C50.011 MALIGNANT NEOPLASM OF AREOLA OF RIGHT BREAST IN FEMALE, UNSPECIFIED ESTROGEN RECEPTOR STATUS (HCC): Primary | ICD-10-CM

## 2021-07-28 LAB
ALBUMIN SERPL-MCNC: 3.2 G/DL (ref 3.4–5)
ALBUMIN/GLOB SERPL: 0.6 {RATIO} (ref 0.8–1.7)
ALP SERPL-CCNC: 99 U/L (ref 45–117)
ALT SERPL-CCNC: 14 U/L (ref 13–56)
ANION GAP SERPL CALC-SCNC: 8 MMOL/L (ref 3–18)
AST SERPL-CCNC: 8 U/L (ref 10–38)
BASOPHILS # BLD: 0.1 K/UL (ref 0–0.1)
BASOPHILS NFR BLD: 1 % (ref 0–2)
BILIRUB SERPL-MCNC: 0.4 MG/DL (ref 0.2–1)
BUN SERPL-MCNC: 28 MG/DL (ref 7–18)
BUN/CREAT SERPL: 19 (ref 12–20)
CALCIUM SERPL-MCNC: 9 MG/DL (ref 8.5–10.1)
CHLORIDE SERPL-SCNC: 107 MMOL/L (ref 100–111)
CO2 SERPL-SCNC: 23 MMOL/L (ref 21–32)
CREAT SERPL-MCNC: 1.46 MG/DL (ref 0.6–1.3)
DIFFERENTIAL METHOD BLD: ABNORMAL
EOSINOPHIL # BLD: 0.2 K/UL (ref 0–0.4)
EOSINOPHIL NFR BLD: 2 % (ref 0–5)
ERYTHROCYTE [DISTWIDTH] IN BLOOD BY AUTOMATED COUNT: 13.9 % (ref 11.6–14.5)
FERRITIN SERPL-MCNC: 150 NG/ML (ref 8–388)
FOLATE SERPL-MCNC: 8.2 NG/ML (ref 3.1–17.5)
GLOBULIN SER CALC-MCNC: 5.1 G/DL (ref 2–4)
GLUCOSE SERPL-MCNC: 168 MG/DL (ref 74–99)
HCT VFR BLD AUTO: 37.3 % (ref 35–45)
HGB BLD-MCNC: 11.2 G/DL (ref 12–16)
IRON SATN MFR SERPL: 21 % (ref 20–50)
IRON SERPL-MCNC: 55 UG/DL (ref 50–175)
LYMPHOCYTES # BLD: 3.2 K/UL (ref 0.9–3.6)
LYMPHOCYTES NFR BLD: 39 % (ref 21–52)
MCH RBC QN AUTO: 25.6 PG (ref 24–34)
MCHC RBC AUTO-ENTMCNC: 30 G/DL (ref 31–37)
MCV RBC AUTO: 85.4 FL (ref 74–97)
MONOCYTES # BLD: 0.6 K/UL (ref 0.05–1.2)
MONOCYTES NFR BLD: 7 % (ref 3–10)
NEUTS SEG # BLD: 4.2 K/UL (ref 1.8–8)
NEUTS SEG NFR BLD: 51 % (ref 40–73)
PLATELET # BLD AUTO: 256 K/UL (ref 135–420)
PMV BLD AUTO: 11.2 FL (ref 9.2–11.8)
POTASSIUM SERPL-SCNC: 4.7 MMOL/L (ref 3.5–5.5)
PROT SERPL-MCNC: 8.3 G/DL (ref 6.4–8.2)
RBC # BLD AUTO: 4.37 M/UL (ref 4.2–5.3)
SODIUM SERPL-SCNC: 138 MMOL/L (ref 136–145)
TIBC SERPL-MCNC: 262 UG/DL (ref 250–450)
VIT B12 SERPL-MCNC: 426 PG/ML (ref 211–911)
WBC # BLD AUTO: 8.2 K/UL (ref 4.6–13.2)

## 2021-07-28 PROCEDURE — G8536 NO DOC ELDER MAL SCRN: HCPCS | Performed by: INTERNAL MEDICINE

## 2021-07-28 PROCEDURE — G8417 CALC BMI ABV UP PARAM F/U: HCPCS | Performed by: INTERNAL MEDICINE

## 2021-07-28 PROCEDURE — 3017F COLORECTAL CA SCREEN DOC REV: CPT | Performed by: INTERNAL MEDICINE

## 2021-07-28 PROCEDURE — 85025 COMPLETE CBC W/AUTO DIFF WBC: CPT

## 2021-07-28 PROCEDURE — 83540 ASSAY OF IRON: CPT

## 2021-07-28 PROCEDURE — G8510 SCR DEP NEG, NO PLAN REQD: HCPCS | Performed by: INTERNAL MEDICINE

## 2021-07-28 PROCEDURE — G9899 SCRN MAM PERF RSLTS DOC: HCPCS | Performed by: INTERNAL MEDICINE

## 2021-07-28 PROCEDURE — G8399 PT W/DXA RESULTS DOCUMENT: HCPCS | Performed by: INTERNAL MEDICINE

## 2021-07-28 PROCEDURE — G8752 SYS BP LESS 140: HCPCS | Performed by: INTERNAL MEDICINE

## 2021-07-28 PROCEDURE — 82728 ASSAY OF FERRITIN: CPT

## 2021-07-28 PROCEDURE — 82607 VITAMIN B-12: CPT

## 2021-07-28 PROCEDURE — 36415 COLL VENOUS BLD VENIPUNCTURE: CPT

## 2021-07-28 PROCEDURE — 1101F PT FALLS ASSESS-DOCD LE1/YR: CPT | Performed by: INTERNAL MEDICINE

## 2021-07-28 PROCEDURE — G8427 DOCREV CUR MEDS BY ELIG CLIN: HCPCS | Performed by: INTERNAL MEDICINE

## 2021-07-28 PROCEDURE — G8754 DIAS BP LESS 90: HCPCS | Performed by: INTERNAL MEDICINE

## 2021-07-28 PROCEDURE — 1090F PRES/ABSN URINE INCON ASSESS: CPT | Performed by: INTERNAL MEDICINE

## 2021-07-28 PROCEDURE — 99204 OFFICE O/P NEW MOD 45 MIN: CPT | Performed by: INTERNAL MEDICINE

## 2021-07-28 PROCEDURE — 80053 COMPREHEN METABOLIC PANEL: CPT

## 2021-07-28 NOTE — PROGRESS NOTES
[facility]  [id number] [place] [address], [address]  [facility], [facility]  Office Phone: [phone]  Fax: [phone]      Reason for visit:  New Patient      HPI:   Maine Griffith is a 78 y.o.  female who I was asked to see in consultation at the request of Neftali Magdaleno NP for evaluation for h/o right breast cancer. Patient has history of resected right breast cancer, T2 N1 M0, hormone receptor positive, HER-2 negative. She completed CMF adjuvant chemotherapy as well as radiation therapy which was completed on 9/22/2016. Patient has been on adjuvant hormonal blockade with letrozole since 10/4/2016. She was being treated previously by Dr. Gaudencio Stephens. She is here to establish care with new oncologist.    Her latest mammogram done on 7/15/2021 was negative, BI-RADS 2. On review of system she denies any fevers, chills, shortness of breath, nausea vomiting or abdominal pain. No focal neurologic deficit. No lumps or bumps. No bleeding. All other point review of system have been reviewed and were negative. ECOG performance status 0. Independent with ADLs and IADLs. DX   Encounter Diagnoses   Name Primary?     Malignant neoplasm of areola of right breast in female, unspecified estrogen receptor status ([address] Yes    Normocytic anemia     PE (pulmonary thromboembolism) ([address]             Past Medical History:   Diagnosis Date    Arthritis     Breast CA ([address]     Breast CA ([address] 2016    DDD (degenerative disc disease), cervical     Diabetes ([address]     DJD (degenerative joint disease) of cervical spine     Heart murmur     History of noncompliance with medical treatment     Hypercholesteremia     Hypertension     Menopause     Mild diastolic dysfunction     ECHO 5/2020     PE (pulmonary thromboembolism) ([address] 2019    Psychiatric disorder     DEPRESSION    PUD (peptic ulcer disease)     S/P cardiac cath 02/2015    No angiographic evidence of CAD    Seizures ([address] LAST SEIZURE ABOUT 2 MONTHS AGO (NOV 2015)    Stroke [facility])     x2 with left side deficit     Past Surgical History:   Procedure Laterality Date    HX BREAST LUMPECTOMY Right 1/12/2016    Dr. Sarai David Partial Mastectomy w./ axillary lymphadenectomy    HX HEENT      TONSILLECTOMY    HX MASTECTOMY Right 1/26/2016    Dr. Sarai David Repeat Partial Mastectomy for positive margins    HX ORTHOPAEDIC      left arm surg    IR MEDIPORT       Social History     Socioeconomic History    Marital status: SINGLE     Spouse name: Not on file    Number of children: Not on file    Years of education: Not on file    Highest education level: Not on file   Tobacco Use    Smoking status: Never Smoker    Smokeless tobacco: Never Used   Substance and Sexual Activity    Alcohol use: No    Drug use: No    Sexual activity: Yes     Partners: Male   Other Topics Concern     Service No    Blood Transfusions No    Caffeine Concern No    Occupational Exposure No    Hobby Hazards No    Sleep Concern Yes    Stress Concern No    Exercise Yes     Comment: walks using cane    Seat Belt Yes    Self-Exams Yes     Social Determinants of Health     Financial Resource Strain:     Difficulty of Paying Living Expenses:    Food Insecurity:     Worried About Running Out of Food in the Last Year:     [address] in the Last Year:    Transportation Needs:     Lack of Transportation (Medical):      Lack of Transportation (Non-Medical):    Physical Activity:     Days of Exercise per Week:     Minutes of Exercise per Session:    Stress:     Feeling of Stress :    Social Connections:     Frequency of Communication with Friends and Family:     Frequency of Social Gatherings with Friends and Family:     Attends Congregational Services:     Active Member of Clubs or Organizations:     Attends Club or Organization Meetings:     Marital Status:      Family History   Problem Relation Age of Onset    Cancer Father [de-identified] colon    Hypertension Mother     Heart Disease Mother     Diabetes Mother     Breast Cancer Maternal Aunt     Breast Cancer Other     Breast Cancer Other        Current Outpatient Medications   Medication Sig Dispense Refill    insulin aspart protamine/insulin aspart (NovoLOG Mix 70-30FlexPen U-100) 100 unit/mL (70-30) inpn INJECT 60 UNITS SUBCUTANEOUSLY TWICE DAILY 30 MINUTES BEFORE BREAKFAST AND DINNER 30 mL 5    losartan (COZAAR) 50 mg tablet Take 1 Tablet by mouth two (2) times a day. 60 Tablet 2    traZODone (DESYREL) 50 mg tablet Take 1 Tablet by mouth nightly as needed for Sleep. 30 Tablet 2    pantoprazole (PROTONIX) 40 mg tablet Take 1 Tablet by mouth daily. 90 Tablet 1    metFORMIN (GLUCOPHAGE) 1,000 mg tablet Take 1 Tablet by mouth two (2) times daily (with meals). 180 Tablet 1    nystatin (MYCOSTATIN) topical cream Apply  to affected area two (2) times a day. 15 g 0    furosemide (LASIX) 20 mg tablet One po daily 30 Tablet 2    acetaminophen (TYLENOL) 500 mg tablet Take 2 Tablets by mouth three (3) times daily as needed for Pain. 100 Tablet 2    divalproex DR (DEPAKOTE) 250 mg tablet Take 3 Tablets by mouth two (2) times a day. 180 Tablet 3    amLODIPine (NORVASC) 5 mg tablet Take 1 Tablet by mouth nightly. 30 Tablet 2    carvediloL (COREG) 12.5 mg tablet Take 1 Tablet by mouth two (2) times daily (with meals). 60 Tablet 2    isosorbide mononitrate ER (Imdur) 30 mg tablet Take 1 Tablet by mouth daily. 90 Tablet 1    hydroCHLOROthiazide (HYDRODIURIL) 25 mg tablet Take 1 Tablet by mouth daily. 30 Tablet 2    Insulin Needles, Disposable, (TRUEplus Pen Needle) 31 gauge x 5/16\" ndle Use as directed 4 times daily 100 Pen Needle 11    atorvastatin (LIPITOR) 40 mg tablet Take 1 Tablet by mouth daily. 30 Tablet 2    ergocalciferol (ERGOCALCIFEROL) 1,250 mcg (50,000 unit) capsule Take 1 Capsule by mouth every seven (7) days.  Indications: vitamin D deficiency (high dose therapy) 8 Capsule 0    diclofenac (VOLTAREN) 1 % gel Apply 2 g to affected area four (4) times daily. 1 Each 0    flash glucose sensor (FreeStyle Ivy 14 Day Sensor) kit Use to check blood sugar 4x/day and as needed. 1 Kit 3    flash glucose scanning reader (FreeStyle Ivy 14 Day [place]) [facility] Use to check blood sugar 4x/day and as needed. 1 Each 0    famotidine (PEPCID) 20 mg tablet Take 1 Tab by mouth two (2) times daily as needed (heartburn). 180 Tab 1    apixaban (ELIQUIS) 5 mg tablet Take 1 Tab by mouth two (2) times a day. 180 Tab 1    capsaicin 0.075 % topical cream Apply  to affected area three (3) times daily as needed for Pain. 60 g 6    glucose blood VI test strips (MindStorm LLCO) strip  Strip 0    letrozole (FEMARA) 2.5 mg tablet Take 2.5 mg by mouth daily.  SONAFINE emul topical       therapeutic multivitamin (THERAGRAN) tablet Take 1 Tab by mouth daily. No Known Allergies    Review of Systems  As per HPI    Objective:  Physical Exam:  Visit Vitals  /72   Pulse 86   Temp 97 °F (36.1 °C)   Resp 16   Ht 5' 6\" (1.676 m)   Wt 109.8 kg (242 lb)   SpO2 97%   BMI 39.06 kg/m²       General:  Alert, cooperative, no distress, appears stated age. Has some slurred speech. Head:  Normocephalic, without obvious abnormality, atraumatic. Eyes:  Conjunctivae/corneas clear. PERRL, EOMs intact. Throat: Lips, mucosa, and tongue normal.    Neck: Supple, symmetrical, trachea midline, no adenopathy, thyroid: no enlargement/tenderness/nodules   Back:   Symmetric, no curvature. ROM normal. No CVA tenderness. Lungs:   Clear to auscultation bilaterally. Chest wall:  No tenderness or deformity. Heart:  Regular rate and rhythm, S1, S2 normal, positive diastolic  Murmur radiating to left axilla: Mitral regurg murmur   Abdomen:   Soft, non-tender. Bowel sounds normal. No masses,  No organomegaly. Extremities: Extremities normal, atraumatic, no cyanosis or edema.    Skin: Skin color, texture, turgor normal. No rashes or lesions. Lymph nodes: Cervical, supraclavicular, and axillary nodes normal.   Neurologic: CNII-XII intact. Breast: No mass palpated bilaterally       Diagnostic Imaging     Results for orders placed during the hospital encounter of 05/12/19    CTA CHEST W OR W WO CONT    Narrative  EXAM: CTA Chest    INDICATION: Shortness of breath. COMPARISON: Chest x-ray from earlier same day, CT chest from 12/13/2017. TECHNIQUE: Axial CT imaging from the thoracic inlet through the diaphragm with  intravenous contrast. Coronal and sagittal MIP reformats were generated. One or more dose reduction techniques were used on this CT: automated exposure  control, adjustment of the mAs and/or kVp according to patient size, and  iterative reconstruction techniques. The specific techniques used on this CT  exam have been documented in the patient's electronic medical record. Digital  Imaging and Communications in Medicine (DICOM) format image data are available  to nonaffiliated external healthcare facilities or entities on a secure, media  free, reciprocally searchable basis with patient authorization for at least a  12-month period after this study. _______________    FINDINGS:    EXAM QUALITY: Adequate. PULMONARY ARTERIES: No central, interlobar or segmental branch pulmonary  arterial filling defect. Normal sized main pulmonary artery. MEDIASTINUM: Stable heart size. Mild atherosclerotic changes of the thoracic  aorta, within normal limits in size. No significant pericardial effusion  subcentimeter. Left lobe of the thyroid subcentimeter hypodense nodule, almost  certainly representing benign process and no imaging follow-up is warranted or  recommended. LUNGS: No suspicious nodule or mass. Renal mosaic attenuation pattern mid to  lower lungs, suggestive of air trapping. No consolidation or suspicious mass or  nodule. [place]     PLEURA: Normal.    AIRWAY: Normal.    LYMPH NODES: No enlarged nodes.    UPPER ABDOMEN: Stable hepatic hypodense lesions in both lobes of liver, almost  certainly benign etiology to include cysts given long-term stability. Cholelithiasis. OTHER: No acute or aggressive osseous abnormalities identified. Small hiatus  hernia    _______________    Impression  IMPRESSION:    1. No evidence of pulmonary embolism. 2.  Mild mosaic attenuation pattern, mid to lower lung predominant distribution. Diagnostic considerations include reactive versus small airways process. No  consolidation. 3. Stable benign cysts. Note: Preliminary report was provided by the on-call radiology resident. Lab Results  Lab Results   Component Value Date/Time    WBC 7.4 06/30/2021 02:54 PM    HGB 10.2 (L) 06/30/2021 02:54 PM    HCT 34.9 (L) 06/30/2021 02:54 PM    PLATELET 629 83/44/2409 02:54 PM    MCV 86.0 06/30/2021 02:54 PM       Lab Results   Component Value Date/Time    Sodium 140 06/30/2021 02:54 PM    Potassium 4.6 06/30/2021 02:54 PM    Chloride 107 06/30/2021 02:54 PM    CO2 27 06/30/2021 02:54 PM    Anion gap 6 06/30/2021 02:54 PM    Glucose 158 (H) 06/30/2021 02:54 PM    BUN 28 (H) 06/30/2021 02:54 PM    Creatinine 1.25 06/30/2021 02:54 PM    BUN/Creatinine ratio 22 (H) 06/30/2021 02:54 PM    GFR est AA 52 (L) 06/30/2021 02:54 PM    GFR est non-AA 43 (L) 06/30/2021 02:54 PM    Calcium 8.5 06/30/2021 02:54 PM    Alk. phosphatase 83 06/30/2021 02:54 PM    Protein, total 7.7 06/30/2021 02:54 PM    Albumin 3.1 (L) 06/30/2021 02:54 PM    Globulin 4.6 (H) 06/30/2021 02:54 PM    A-G Ratio 0.7 (L) 06/30/2021 02:54 PM    ALT (SGPT) 15 06/30/2021 02:54 PM     Follow-up with PCP for health maintenance  Assessment/Plan:  78 y.o. female with   1. Malignant neoplasm of areola of right breast in female, unspecified estrogen receptor status [facility])  Mrs. Farhad Ames has history of resected right breast cancer, T2 N1 M0, hormone receptor positive, HER-2 negative.   She completed [facility]F adjuvant chemotherapy as well as radiation therapy which was completed on 9/22/2016. Patient has been on adjuvant hormonal blockade with letrozole since 10/4/2016. She was being treated previously by Dr. Ananth Cadet. Patient will be completing 5 years of adjuvant hormonal blockade with letrozole in October 2021. I told her that I will send breast cancer index testing now since it has been 5 years since after her surgery and see her back in 4 weeks to assess for the need of continuing or not for 5 extra years with letrozole. She expressed understanding. *Mammogram on 7/15/2021 was negative. BI-RADS 2  *Next mammogram due in July 2022. *DEXA scan in people 2018 was normal.  Recheck DEXA scan now. 2. Normocytic anemia  Labs as below and replace as needed. - CBC WITH AUTOMATED DIFF; Future  - FERRITIN; Future  - IRON PROFILE; Future  - METABOLIC PANEL, COMPREHENSIVE; Future  - VITAMIN B12 & FOLATE; Future    3.  PE (pulmonary thromboembolism) ([facility]  She is on Eliquis        Return in 4 weeks in office

## 2021-08-04 ENCOUNTER — HOSPITAL ENCOUNTER (OUTPATIENT)
Dept: LAB | Age: 71
Discharge: HOME | End: 2021-08-04

## 2021-08-25 ENCOUNTER — OFFICE VISIT (OUTPATIENT)
Dept: CARDIOLOGY CLINIC | Age: 71
End: 2021-08-25
Payer: MEDICARE

## 2021-08-25 VITALS
SYSTOLIC BLOOD PRESSURE: 177 MMHG | HEART RATE: 79 BPM | HEIGHT: 66 IN | BODY MASS INDEX: 39.76 KG/M2 | OXYGEN SATURATION: 98 % | DIASTOLIC BLOOD PRESSURE: 83 MMHG | WEIGHT: 247.4 LBS | TEMPERATURE: 97.6 F

## 2021-08-25 DIAGNOSIS — R07.9 CHEST PAIN, UNSPECIFIED TYPE: ICD-10-CM

## 2021-08-25 DIAGNOSIS — I51.7 LVH (LEFT VENTRICULAR HYPERTROPHY): ICD-10-CM

## 2021-08-25 DIAGNOSIS — Z98.890 HISTORY OF CARDIAC CATHETERIZATION: ICD-10-CM

## 2021-08-25 DIAGNOSIS — E11.40 TYPE 2 DIABETES MELLITUS WITH DIABETIC NEUROPATHY, WITH LONG-TERM CURRENT USE OF INSULIN (HCC): Chronic | ICD-10-CM

## 2021-08-25 DIAGNOSIS — I35.0 AORTIC STENOSIS, MILD: ICD-10-CM

## 2021-08-25 DIAGNOSIS — C50.011 MALIGNANT NEOPLASM OF AREOLA OF RIGHT BREAST IN FEMALE, UNSPECIFIED ESTROGEN RECEPTOR STATUS (HCC): ICD-10-CM

## 2021-08-25 DIAGNOSIS — R01.1 HEART MURMUR: ICD-10-CM

## 2021-08-25 DIAGNOSIS — I51.9 MILD DIASTOLIC DYSFUNCTION: ICD-10-CM

## 2021-08-25 DIAGNOSIS — I10 ESSENTIAL HYPERTENSION: Chronic | ICD-10-CM

## 2021-08-25 DIAGNOSIS — Z79.4 TYPE 2 DIABETES MELLITUS WITH DIABETIC NEUROPATHY, WITH LONG-TERM CURRENT USE OF INSULIN (HCC): Chronic | ICD-10-CM

## 2021-08-25 DIAGNOSIS — I35.0 AORTIC VALVE STENOSIS, ETIOLOGY OF CARDIAC VALVE DISEASE UNSPECIFIED: Primary | ICD-10-CM

## 2021-08-25 DIAGNOSIS — E11.21 TYPE 2 DIABETES MELLITUS WITH NEPHROPATHY (HCC): ICD-10-CM

## 2021-08-25 DIAGNOSIS — Z86.73 HISTORY OF CVA (CEREBROVASCULAR ACCIDENT): Chronic | ICD-10-CM

## 2021-08-25 DIAGNOSIS — E78.00 HYPERCHOLESTEREMIA: Chronic | ICD-10-CM

## 2021-08-25 PROCEDURE — G8417 CALC BMI ABV UP PARAM F/U: HCPCS | Performed by: INTERNAL MEDICINE

## 2021-08-25 PROCEDURE — G8536 NO DOC ELDER MAL SCRN: HCPCS | Performed by: INTERNAL MEDICINE

## 2021-08-25 PROCEDURE — G8753 SYS BP > OR = 140: HCPCS | Performed by: INTERNAL MEDICINE

## 2021-08-25 PROCEDURE — G8510 SCR DEP NEG, NO PLAN REQD: HCPCS | Performed by: INTERNAL MEDICINE

## 2021-08-25 PROCEDURE — 3017F COLORECTAL CA SCREEN DOC REV: CPT | Performed by: INTERNAL MEDICINE

## 2021-08-25 PROCEDURE — G8427 DOCREV CUR MEDS BY ELIG CLIN: HCPCS | Performed by: INTERNAL MEDICINE

## 2021-08-25 PROCEDURE — G9899 SCRN MAM PERF RSLTS DOC: HCPCS | Performed by: INTERNAL MEDICINE

## 2021-08-25 PROCEDURE — 1090F PRES/ABSN URINE INCON ASSESS: CPT | Performed by: INTERNAL MEDICINE

## 2021-08-25 PROCEDURE — G8399 PT W/DXA RESULTS DOCUMENT: HCPCS | Performed by: INTERNAL MEDICINE

## 2021-08-25 PROCEDURE — 1101F PT FALLS ASSESS-DOCD LE1/YR: CPT | Performed by: INTERNAL MEDICINE

## 2021-08-25 PROCEDURE — 99204 OFFICE O/P NEW MOD 45 MIN: CPT | Performed by: INTERNAL MEDICINE

## 2021-08-25 PROCEDURE — 3052F HG A1C>EQUAL 8.0%<EQUAL 9.0%: CPT | Performed by: INTERNAL MEDICINE

## 2021-08-25 PROCEDURE — 2022F DILAT RTA XM EVC RTNOPTHY: CPT | Performed by: INTERNAL MEDICINE

## 2021-08-25 PROCEDURE — G8754 DIAS BP LESS 90: HCPCS | Performed by: INTERNAL MEDICINE

## 2021-08-25 NOTE — PATIENT INSTRUCTIONS
Testing   Echo    Please call our office at [phone] to schedule testing after December 1, 2021. **call office 3-5 days after testing is completed for results**     Erika(48 hr holter monitor)-will be calling you to confirm your address to send your Heart Monitor. Please allow 7-10 business days for monitor to arrive at your home.   Customer Service for [facility] [address]:  [phone]

## 2021-08-25 NOTE — PROGRESS NOTES
Herb Galvez presents today for   Chief Complaint   Patient presents with   Armida Brunner New Patient       Herb Galvez preferred language for health care discussion is english/other. Personal Protective Equipment:   Personal Protective Equipment was used including: mask-surgical and hands-gloves. Patient was placed on no precaution(s). Patient was masked. Precautions:   Patient currently on None  Patient currently roomed with door closed    Is someone accompanying this pt? no    Is the patient using any DME equipment during [address]? no    Depression Screening:  3 most recent PHQ Screens 7/28/2021   Little interest or pleasure in doing things Not at all   Feeling down, depressed, irritable, or hopeless Not at all   Total Score PHQ 2 0       Learning Assessment:  Learning Assessment 8/25/2021   PRIMARY LEARNER Patient   HIGHEST LEVEL OF EDUCATION - PRIMARY LEARNER  -   BARRIERS PRIMARY LEARNER NONE   CO-LEARNER CAREGIVER No   PRIMARY LANGUAGE ENGLISH   LEARNER PREFERENCE PRIMARY DEMONSTRATION     -     -   ANSWERED BY patient   RELATIONSHIP SELF       Abuse Screening:  Abuse Screening Questionnaire 6/15/2020   Do you ever feel afraid of your partner? N   Are you in a relationship with someone who physically or mentally threatens you? N   Is it safe for you to go home? Y       Fall Risk  Fall Risk Assessment, last 12 mths 6/24/2021   Able to walk? Yes   Fall in past 12 months? 0   Do you feel unsteady? 0   Are you worried about falling 0       Pt currently taking Anticoagulant therapy? yes    Coordination of Care:  1. Have you been to the ER, urgent care clinic since your last visit? Hospitalized since your last visit? no    2. Have you seen or consulted any other health care providers outside of the [address] [place] [address] since your last visit? Include any pap smears or colon screening.  no

## 2021-08-30 ENCOUNTER — VIRTUAL VISIT (OUTPATIENT)
Age: 71
End: 2021-08-30
Payer: MEDICARE

## 2021-08-30 DIAGNOSIS — E66.01 SEVERE OBESITY (BMI 35.0-35.9 WITH COMORBIDITY) (HCC): ICD-10-CM

## 2021-08-30 DIAGNOSIS — C50.011 MALIGNANT NEOPLASM OF AREOLA OF RIGHT BREAST IN FEMALE, UNSPECIFIED ESTROGEN RECEPTOR STATUS (HCC): Primary | ICD-10-CM

## 2021-08-30 DIAGNOSIS — D64.9 NORMOCYTIC ANEMIA: ICD-10-CM

## 2021-08-30 PROCEDURE — 99442 PR PHYS/QHP TELEPHONE EVALUATION 11-20 MIN: CPT | Performed by: INTERNAL MEDICINE

## 2021-08-30 NOTE — PROGRESS NOTES
Lorenzo Litten is a 73 y.o. female who was seen by synchronous (real-time) audio-video technology on 8/30/2021 for Breast Cancer        Assessment & Plan:   Diagnoses and all orders for this visit:    1. Malignant neoplasm of areola of right breast in female, unspecified estrogen receptor status ([facility] [address]    2. Severe obesity (BMI 35.0-35.9 with comorbidity) ([facility] [address]    3. Normocytic anemia        The complexity of medical decision making for this visit is moderate       1. Malignant neoplasm of areola of right breast in female, unspecified estrogen receptor status [facility])  Mrs. Paolo Peterson has history of resected right breast cancer, T2 N1 M0, hormone receptor positive, HER-2 negative. She completed CMF adjuvant chemotherapy as well as radiation therapy which was completed on 9/22/2016. Patient has been on adjuvant hormonal blockade with letrozole since 10/4/2016. She was being treated previously by Dr. Migue Davis. Patient will be completing 5 years of adjuvant hormonal blockade with letrozole in October 2021. *Mammogram on 7/15/2021 was negative. BI-RADS 2  *Next mammogram due in July 2022. *DEXA scan in people 2018 was normal.  Recheck DEXA scan now. *BCI came back at 13%-Patient will therefore benefit from extended treatment. Since the [facility] study have shown that 7 years will offer the same benefit as 10 years with less bowel side effects I would continue extended hormonal blockade for 7 years.     2. Normocytic anemia  Labs on 7/28/2021 showed ferritin 150, transferrin saturation 21%, normal B12 and folate. BUN 28, creatinine 1.46. WBC 8.2, H&H 11.2/37.3, platelet 244. H&H is stable and anemia is mild. Continue follow-up. Patient to follow-up with PCP for chronic kidney disease.     3. PE (pulmonary thromboembolism) (HCC)  Continue Eliquis           Return in 6 months  I spent at least 15 minutes on this visit with this established patient.   712  Subjective:     Lorenzo Litten is a 78 y.o.  female who I was asked to see in consultation at the request of Kelli Menchaca NP for evaluation for h/o right breast cancer. Patient has history of resected right breast cancer, T2 N1 M0, hormone receptor positive, HER-2 negative. She completed CMF adjuvant chemotherapy as well as radiation therapy which was completed on 9/22/2016. Patient has been on adjuvant hormonal blockade with letrozole since 10/4/2016. She was being treated previously by Dr. Milo Sanchez. She is here to establish care with new oncologist.     Her latest mammogram done on 7/15/2021 was negative, BI-RADS 2.     On review of system she denies any fevers, chills, shortness of breath, nausea vomiting or abdominal pain. No focal neurologic deficit. No lumps or bumps. No bleeding. All other point review of system have been reviewed and were negative. Having diabetic neuropathic pain. ECOG performance status 0. Independent with ADLs and IADLs.        Prior to Admission medications    Medication Sig Start Date End Date Taking? Authorizing Provider   insulin aspart protamine/insulin aspart (NovoLOG Mix 70-30FlexPen U-100) 100 unit/mL (70-30) inpn INJECT 60 UNITS SUBCUTANEOUSLY TWICE DAILY 30 MINUTES BEFORE BREAKFAST AND DINNER 7/23/21  Yes Nat Wetzel NP   losartan (COZAAR) 50 mg tablet Take 1 Tablet by mouth two (2) times a day. 7/23/21  Yes Kary Wetzel NP   traZODone (DESYREL) 50 mg tablet Take 1 Tablet by mouth nightly as needed for Sleep. 7/23/21  Yes Nat Wetzel NP   pantoprazole (PROTONIX) 40 mg tablet Take 1 Tablet by mouth daily. 7/23/21  Yes Kary Wetzel NP   metFORMIN (GLUCOPHAGE) 1,000 mg tablet Take 1 Tablet by mouth two (2) times daily (with meals). 7/23/21  Yes Nat Wetzel NP   nystatin (MYCOSTATIN) topical cream Apply  to affected area two (2) times a day.  7/23/21  Yes Juany SMITH NP   furosemide (LASIX) 20 mg tablet One po daily 7/23/21  Yes Nat Wetzel NP   acetaminophen (TYLENOL) 500 mg tablet Take 2 Tablets by mouth three (3) times daily as needed for Pain. 7/23/21  Yes Nat Wetzel NP   divalproex DR (DEPAKOTE) 250 mg tablet Take 3 Tablets by mouth two (2) times a day. 7/23/21  Yes Nat Wetzel NP   amLODIPine (NORVASC) 5 mg tablet Take 1 Tablet by mouth nightly. 7/23/21  Yes Herminio Wetzel NP   carvediloL (COREG) 12.5 mg tablet Take 1 Tablet by mouth two (2) times daily (with meals). 7/23/21  Yes Herminio Wetzel NP   isosorbide mononitrate ER (Imdur) 30 mg tablet Take 1 Tablet by mouth daily. 7/23/21  Yes Nat Wetzel NP   hydroCHLOROthiazide (HYDRODIURIL) 25 mg tablet Take 1 Tablet by mouth daily. 7/23/21  Yes Nat Wetzel NP   atorvastatin (LIPITOR) 40 mg tablet Take 1 Tablet by mouth daily. 7/8/21  Yes Herminio Wetzel NP   ergocalciferol (ERGOCALCIFEROL) 1,250 mcg (50,000 unit) capsule Take 1 Capsule by mouth every seven (7) days. Indications: vitamin D deficiency (high dose therapy) 7/8/21  Yes Nat Wetzel NP   diclofenac (VOLTAREN) 1 % gel Apply 2 g to affected area four (4) times daily. 12/17/20  Yes Philly Ramos MD   flash glucose sensor (FreeStyle Ivy 14 Day Sensor) kit Use to check blood sugar 4x/day and as needed. 7/24/20  Yes Sarbjit Dailey NP   flash glucose scanning reader [facility] 14 [facility] Use to check blood sugar 4x/day and as needed. 7/24/20  Yes Sarbjit Dailey NP   famotidine (PEPCID) 20 mg tablet Take 1 Tab by mouth two (2) times daily as needed (heartburn). 5/26/20  Yes Sarbjit Dailey NP   apixaban (ELIQUIS) 5 mg tablet Take 1 Tab by mouth two (2) times a day. 5/26/20  Yes Sarbjit Dailey NP   capsaicin 0.075 % topical cream Apply  to affected area three (3) times daily as needed for Pain. 9/26/19  Yes Sarbjit Dailey NP   glucose blood VI test strips ([employer] PharmaceuticalO) strip DM 7/5/19  Yes Sarbjit Dailey NP   letrozole [facility]) 2.5 mg tablet Take 2.5 mg by mouth daily.  10/5/16  Yes Provider, Historical   SONAFINE emul topical  9/1/16  Yes Provider, Historical   therapeutic multivitamin (THERAGRAN) tablet Take 1 Tab by mouth daily. Yes Provider, Historical   Insulin Needles, Disposable, (TRUEplus Pen Needle) 31 gauge x 5/16\" ndle Use as directed 4 times daily 7/19/21   William Fang NP     Patient Active Problem List   Diagnosis Code    Diabetes mellitus with neuropathy ([facility] [address] E11.40    HTN (hypertension) I10    Chest pain R07.9    Seizure disorder ([facility] [address] G40.909    Hypercholesteremia E78.00    History of CVA (cerebrovascular accident) Z80.78    Hearing impairment H91.90    Gastroesophageal reflux disease without esophagitis K21.9    Breast cancer ([facility])-right inferior, 2.2cm,2/18 nodes, + - -, C50.919    Altered mental status R41.82    Type 2 diabetes mellitus with diabetic neuropathy, with long-term current use of insulin (HCC) E11.40, Z79.4    Pulmonary nodule, right R91.1    History of breast cancer Z85.3    Type 2 diabetes mellitus with nephropathy (HCC) E11.21    Severe obesity (BMI 35.0-39. 9) with comorbidity ([facility] [address] E66.01    PE (pulmonary thromboembolism) ([facility]) I33.33    Mild diastolic dysfunction T14.7    Heart murmur R01.1    History of noncompliance with medical treatment Z91.19    Chronic headaches R51.9, G89.29    Elevated d-dimer R79.89    History of recent travel Z78.9    Normocytic anemia D64.9     Patient Active Problem List    Diagnosis Date Noted    History of noncompliance with medical treatment     Mild diastolic dysfunction     Heart murmur     Chronic headaches 12/21/2019    Elevated d-dimer 12/21/2019    History of recent travel 12/21/2019    Normocytic anemia 12/21/2019    PE (pulmonary thromboembolism) ([facility] [address] 2019    Severe obesity (BMI 35.0-39. 9) with comorbidity ([facility] [address] 04/17/2018    Type 2 diabetes mellitus with nephropathy ([facility] [address] 12/14/2017    Pulmonary nodule, right 11/15/2016    History of breast cancer 11/15/2016    Type 2 diabetes mellitus with diabetic neuropathy, with long-term current use of insulin ([facility] 11/10/2016    Altered mental status 10/24/2016    Breast cancer (HCC)-right inferior, 2.2cm,2/18 nodes, + - -, 01/12/2016    Gastroesophageal reflux disease without esophagitis 01/05/2016    Hearing impairment 11/23/2015    History of CVA (cerebrovascular accident) 05/18/2015    Hypercholesteremia 03/04/2015    Seizure disorder ([facility] 02/11/2015    Chest pain 01/31/2015    Diabetes mellitus with neuropathy ([facility] 01/30/2015    HTN (hypertension) 01/30/2015     Current Outpatient Medications   Medication Sig Dispense Refill    insulin aspart protamine/insulin aspart (NovoLOG Mix 70-30FlexPen U-100) 100 unit/mL (70-30) inpn INJECT 60 UNITS SUBCUTANEOUSLY TWICE DAILY 30 MINUTES BEFORE BREAKFAST AND DINNER 30 mL 5    losartan (COZAAR) 50 mg tablet Take 1 Tablet by mouth two (2) times a day. 60 Tablet 2    traZODone (DESYREL) 50 mg tablet Take 1 Tablet by mouth nightly as needed for Sleep. 30 Tablet 2    pantoprazole (PROTONIX) 40 mg tablet Take 1 Tablet by mouth daily. 90 Tablet 1    metFORMIN (GLUCOPHAGE) 1,000 mg tablet Take 1 Tablet by mouth two (2) times daily (with meals). 180 Tablet 1    nystatin (MYCOSTATIN) topical cream Apply  to affected area two (2) times a day. 15 g 0    furosemide (LASIX) 20 mg tablet One po daily 30 Tablet 2    acetaminophen (TYLENOL) 500 mg tablet Take 2 Tablets by mouth three (3) times daily as needed for Pain. 100 Tablet 2    divalproex DR (DEPAKOTE) 250 mg tablet Take 3 Tablets by mouth two (2) times a day. 180 Tablet 3    amLODIPine (NORVASC) 5 mg tablet Take 1 Tablet by mouth nightly. 30 Tablet 2    carvediloL (COREG) 12.5 mg tablet Take 1 Tablet by mouth two (2) times daily (with meals). 60 Tablet 2    isosorbide mononitrate ER (Imdur) 30 mg tablet Take 1 Tablet by mouth daily. 90 Tablet 1    hydroCHLOROthiazide (HYDRODIURIL) 25 mg tablet Take 1 Tablet by mouth daily.  30 Tablet 2    atorvastatin (LIPITOR) 40 mg tablet Take 1 Tablet by mouth daily. 30 Tablet 2    ergocalciferol (ERGOCALCIFEROL) 1,250 mcg (50,000 unit) capsule Take 1 Capsule by mouth every seven (7) days. Indications: vitamin D deficiency (high dose therapy) 8 Capsule 0    diclofenac (VOLTAREN) 1 % gel Apply 2 g to affected area four (4) times daily. 1 Each 0    flash glucose sensor (FreeStyle Ivy 14 Day Sensor) kit Use to check blood sugar 4x/day and as needed. 1 Kit 3    flash glucose scanning reader (FreeStyle Ivy 14 Day [place]) misc Use to check blood sugar 4x/day and as needed. 1 Each 0    famotidine (PEPCID) 20 mg tablet Take 1 Tab by mouth two (2) times daily as needed (heartburn). 180 Tab 1    apixaban (ELIQUIS) 5 mg tablet Take 1 Tab by mouth two (2) times a day. 180 Tab 1    capsaicin 0.075 % topical cream Apply  to affected area three (3) times daily as needed for Pain. 60 g 6    glucose blood VI test strips (WAVES[employer] PRESTO) strip  Strip 0    letrozole (FEMARA) 2.5 mg tablet Take 2.5 mg by mouth daily.  SONAFINE emul topical       therapeutic multivitamin (THERAGRAN) tablet Take 1 Tab by mouth daily.       Insulin Needles, Disposable, (TRUEplus Pen Needle) 31 gauge x 5/16\" ndle Use as directed 4 times daily 100 Pen Needle 11     No Known Allergies  Past Medical History:   Diagnosis Date    Arthritis     Breast CA ([address]     Breast CA ([address] 2016    DDD (degenerative disc disease), cervical     Diabetes ([address]     DJD (degenerative joint disease) of cervical spine     Heart murmur     History of noncompliance with medical treatment     Hypercholesteremia     Hypertension     Menopause     Mild diastolic dysfunction     ECHO 5/2020     PE (pulmonary thromboembolism) ([address] 2019    Psychiatric disorder     DEPRESSION    PUD (peptic ulcer disease)     S/P cardiac cath 02/2015    No angiographic evidence of CAD    Seizures (HCC)     LAST SEIZURE ABOUT 2 MONTHS AGO (NOV 2015)    Stroke [facility])     x2 with left side deficit     Past Surgical History:   Procedure Laterality Date    HX BREAST LUMPECTOMY Right 1/12/2016    Dr. Ben Paulino Partial Mastectomy w./ axillary lymphadenectomy    HX HEENT      TONSILLECTOMY    HX MASTECTOMY Right 1/26/2016    Dr. Ben Pualino Repeat Partial Mastectomy for positive margins    HX ORTHOPAEDIC      left arm surg    IR MEDIPORT       Family History   Problem Relation Age of Onset    Cancer Father [de-identified]        colon    Hypertension Mother     Heart Disease Mother     Diabetes Mother     Breast Cancer Maternal Aunt     Breast Cancer Other     Breast Cancer Other      Social History     Tobacco Use    Smoking status: Never Smoker    Smokeless tobacco: Never Used   Substance Use Topics    Alcohol use: No           Objective:   No flowsheet data found. General: alert, cooperative, no distress     Additional exam findings: We discussed the expected course, resolution and complications of the diagnosis(es) in detail. Medication risks, benefits, costs, interactions, and alternatives were discussed as indicated. I advised her to contact the office if her condition worsens, changes or fails to improve as anticipated. She expressed understanding with the diagnosis(es) and plan. Airam Finn, was evaluated through a synchronous (real-time) audio-video encounter. The patient (or guardian if applicable) is aware that this is a billable service. Verbal consent to proceed has been obtained within the past 12 months. The visit was conducted pursuant to the emergency declaration under the [facility] [address] [facility] authority and the [employer] Act. Patient identification was verified, and a caregiver was present when appropriate. The patient was located in a state where the provider was credentialed to provide care.     Yecenia Mario MD

## 2021-09-02 PROBLEM — I51.7 LVH (LEFT VENTRICULAR HYPERTROPHY): Status: ACTIVE | Noted: 2021-09-02

## 2021-09-02 PROBLEM — I35.0 AORTIC STENOSIS, MILD: Status: ACTIVE | Noted: 2021-09-02

## 2021-09-02 PROBLEM — Z98.890 HISTORY OF CARDIAC CATHETERIZATION: Status: ACTIVE | Noted: 2021-09-02

## 2021-09-02 NOTE — PROGRESS NOTES
Subjective:      Rogelio Santos is in the office today for cardiac evaluation. She is a 53-year-old hypertensive diabetic woman that was referred for further evaluation of chest pain and palpitations. The patient has a history of mild aortic stenosis previously documented on echocardiogram in 2019 in 2020. The patient has been experiencing left-sided chest pain  for greater than 1 year. She describes it as \"sharp \". The discomfort is generally very brief in duration. There have been no clear inciting factors. It has not changed in any way over that period of time. She has had no shortness of breath. She has had no PND. She does have some swelling in her left ankle. She is very sedentary. She also describes a fast heartbeat at times. She has had no near syncope or syncope. Patient's cardiac risk factors are dyslipidemia, diabetes mellitus, hypertension. Patient Active Problem List    Diagnosis Date Noted    Chest pain 01/31/2015    LVH (left ventricular hypertrophy) 09/02/2021    Aortic stenosis, mild 09/02/2021    History of cardiac catheterization 09/02/2021    History of noncompliance with medical treatment     Mild diastolic dysfunction     Chronic headaches 12/21/2019    Elevated d-dimer 12/21/2019    History of recent travel 12/21/2019    Normocytic anemia 12/21/2019    PE (pulmonary thromboembolism) ([address] 2019    Severe obesity (BMI 35.0-39. 9) with comorbidity ([address] 04/17/2018    Type 2 diabetes mellitus with nephropathy ([address] 12/14/2017    Pulmonary nodule, right 11/15/2016    History of breast cancer 11/15/2016    Type 2 diabetes mellitus with diabetic neuropathy, with long-term current use of insulin ([address] 11/10/2016    Altered mental status 10/24/2016    Breast cancer (HCC)-right inferior, 2.2cm,2/18 nodes, + - -, 01/12/2016    Gastroesophageal reflux disease without esophagitis 01/05/2016    Hearing impairment 11/23/2015    History of CVA (cerebrovascular accident) 05/18/2015    Hypercholesteremia 03/04/2015    Seizure disorder ([facility] [address] 02/11/2015    Diabetes mellitus with neuropathy ([facility] 01/30/2015    HTN (hypertension) 01/30/2015     Current Outpatient Medications   Medication Sig Dispense Refill    insulin aspart protamine/insulin aspart (NovoLOG Mix 70-30FlexPen U-100) 100 unit/mL (70-30) inpn INJECT 60 UNITS SUBCUTANEOUSLY TWICE DAILY 30 MINUTES BEFORE BREAKFAST AND DINNER 30 mL 5    losartan (COZAAR) 50 mg tablet Take 1 Tablet by mouth two (2) times a day. 60 Tablet 2    traZODone (DESYREL) 50 mg tablet Take 1 Tablet by mouth nightly as needed for Sleep. 30 Tablet 2    pantoprazole (PROTONIX) 40 mg tablet Take 1 Tablet by mouth daily. 90 Tablet 1    metFORMIN (GLUCOPHAGE) 1,000 mg tablet Take 1 Tablet by mouth two (2) times daily (with meals). 180 Tablet 1    nystatin (MYCOSTATIN) topical cream Apply  to affected area two (2) times a day. 15 g 0    furosemide (LASIX) 20 mg tablet One po daily 30 Tablet 2    acetaminophen (TYLENOL) 500 mg tablet Take 2 Tablets by mouth three (3) times daily as needed for Pain. 100 Tablet 2    divalproex DR (DEPAKOTE) 250 mg tablet Take 3 Tablets by mouth two (2) times a day. 180 Tablet 3    amLODIPine (NORVASC) 5 mg tablet Take 1 Tablet by mouth nightly. 30 Tablet 2    carvediloL (COREG) 12.5 mg tablet Take 1 Tablet by mouth two (2) times daily (with meals). 60 Tablet 2    isosorbide mononitrate ER (Imdur) 30 mg tablet Take 1 Tablet by mouth daily. 90 Tablet 1    hydroCHLOROthiazide (HYDRODIURIL) 25 mg tablet Take 1 Tablet by mouth daily. 30 Tablet 2    Insulin Needles, Disposable, (TRUEplus Pen Needle) 31 gauge x 5/16\" ndle Use as directed 4 times daily 100 Pen Needle 11    atorvastatin (LIPITOR) 40 mg tablet Take 1 Tablet by mouth daily. 30 Tablet 2    ergocalciferol (ERGOCALCIFEROL) 1,250 mcg (50,000 unit) capsule Take 1 Capsule by mouth every seven (7) days.  Indications: vitamin D deficiency (high dose therapy) 8 Capsule 0    diclofenac (VOLTAREN) 1 % gel Apply 2 g to affected area four (4) times daily. 1 Each 0    flash glucose sensor (FreeStyle Ivy 14 Day Sensor) kit Use to check blood sugar 4x/day and as needed. 1 Kit 3    flash glucose scanning reader (FreeStyle Ivy 14 Day [place]) misc Use to check blood sugar 4x/day and as needed. 1 Each 0    famotidine (PEPCID) 20 mg tablet Take 1 Tab by mouth two (2) times daily as needed (heartburn). 180 Tab 1    apixaban (ELIQUIS) 5 mg tablet Take 1 Tab by mouth two (2) times a day. 180 Tab 1    capsaicin 0.075 % topical cream Apply  to affected area three (3) times daily as needed for Pain. 60 g 6    glucose blood VI test strips ([employer]O) strip  Strip 0    letrozole (FEMARA) 2.5 mg tablet Take 2.5 mg by mouth daily.  SONAFINE emul topical       therapeutic multivitamin (THERAGRAN) tablet Take 1 Tab by mouth daily.        No Known Allergies  Past Medical History:   Diagnosis Date    Arthritis     Breast CA ([address]     Breast CA ([address] 2016    DDD (degenerative disc disease), cervical     Diabetes (HCC)     DJD (degenerative joint disease) of cervical spine     Heart murmur     History of noncompliance with medical treatment     Hypercholesteremia     Hypertension     Menopause     Mild diastolic dysfunction     ECHO 5/2020     PE (pulmonary thromboembolism) ([address] 2019    Psychiatric disorder     DEPRESSION    PUD (peptic ulcer disease)     S/P cardiac cath 02/2015    No angiographic evidence of CAD    Seizures (HCC)     LAST SEIZURE ABOUT 2 MONTHS AGO (NOV 2015)    Stroke ([address]     x2 with left side deficit     Past Surgical History:   Procedure Laterality Date    HX BREAST LUMPECTOMY Right 1/12/2016    Dr. Carmen Saucedo Partial Mastectomy w./ axillary lymphadenectomy    HX HEENT      TONSILLECTOMY    HX MASTECTOMY Right 1/26/2016    Dr. Carmen Saucedo Repeat Partial Mastectomy for positive margins    HX ORTHOPAEDIC      left arm surg  [facility]       Family History   Problem Relation Age of Onset    Cancer Father [de-identified]        colon    Hypertension Mother     Heart Disease Mother     Diabetes Mother     Breast Cancer Maternal Aunt     Breast Cancer Other     Breast Cancer Other      Social History     Tobacco Use   Smoking Status Never Smoker   Smokeless Tobacco Never Used          Review of Systems, additional:  Constitutional: negative  Eyes: negative  Respiratory: negative  Cardiovascular: positive for chest pain, palpitations, fatigue  Gastrointestinal: negative  Musculoskeletal:negative  Neurological: negative  Behvioral/Psych: negative  Endocrine: negative  ENT: negative    Objective:     Visit Vitals  BP (!) 177/83 (BP 1 Location: Left upper arm, BP Patient Position: Sitting, BP Cuff Size: Large adult long)   Pulse 79   Temp 97.6 °F (36.4 °C) (Oral)   Ht 5' 6\" (1.676 m)   Wt 112.2 kg (247 lb 6.4 oz)   SpO2 98%   BMI 39.93 kg/m²     General:  alert, cooperative, no distress   Chest Wall: inspection normal - no chest wall deformities or tenderness, respiratory effort normal   Lung: clear to auscultation bilaterally   Heart:  normal rate and regular rhythm, systolic murmur 26 at 2nd right intercostal space   Abdomen: soft, non-tender. Bowel sounds normal. No masses,  no organomegaly   Extremities: extremities normal, atraumatic, no cyanosis or edema Skin: no rashes   Neuro: alert, oriented, normal speech, no focal findings or movement disorder noted         Assessment/Plan:       ICD-10-CM ICD-9-CM    1. Aortic valve stenosis, etiology of cardiac valve disease unspecified  I35.0 424.1 ECHO ADULT COMPLETE      CARDIAC HOLTER MONITOR   2. Heart murmur , mild AS R01.1 785.2 CARDIAC HOLTER MONITOR   3. Essential hypertension , controlled in the office today I10 401.9    4. Type 2 diabetes mellitus with diabetic neuropathy, with long-term current use of insulin ([facility])  E11.40 250.60     Z79.4 357.2      V58.67    5.  Type 2 diabetes mellitus with nephropathy ([facility] [address]  E11.21 250.40      583.81    6. Malignant neoplasm of areola of right breast in female, unspecified estrogen receptor status (HCC)  C50.011 174.0    7. Chest pain, unspecified type , typical for cardiac origin. Will repeat echocardiogram and ECG. We will also order 48-hour Holter in light of patient's palpitations. Return in 6 weeks. R07.9 786.50    8. History of CVA (cerebrovascular accident)  Z86.73 V12.54    9. LVH (left ventricular hypertrophy) , moderate I51.7 429.3    10. Aortic stenosis, mild  I35.0 424.1    11. Hypercholesteremia  E78.00 272.0    12. Mild diastolic dysfunction  P99.1 429.9    13.  History of cardiac catheterization  Z98.890 V45.89

## 2021-09-23 ENCOUNTER — HOME HEALTH ADMISSION (OUTPATIENT)
Dept: HOME HEALTH SERVICES | Facility: HOME HEALTH | Age: 71
End: 2021-09-23
Payer: MEDICARE

## 2021-09-24 ENCOUNTER — HOME CARE VISIT (OUTPATIENT)
Dept: SCHEDULING | Facility: HOME HEALTH | Age: 71
End: 2021-09-24
Payer: MEDICARE

## 2021-09-24 VITALS
TEMPERATURE: 96.7 F | SYSTOLIC BLOOD PRESSURE: 112 MMHG | OXYGEN SATURATION: 100 % | HEART RATE: 77 BPM | DIASTOLIC BLOOD PRESSURE: 66 MMHG | RESPIRATION RATE: 16 BRPM

## 2021-09-24 PROCEDURE — G0299 HHS/HOSPICE OF RN EA 15 MIN: HCPCS

## 2021-09-24 PROCEDURE — 400013 HH SOC

## 2021-09-24 NOTE — HOME HEALTH
Skilled services/Home bound verification:     Skilled Reason for admission/summary of clinical condition:DM, HTN, STROKE, METABOLIC ENCEPHALOPATHY, SEIZURES, AND METABOLIC ENCEPHALOPATHY. This patient is homebound for the following reasons Requires considerable and taxing effort to leave the home , Requires the assistance of 1 or more persons to leave the home  and CHAIR BOUND. Caregiver: spouse. Caregiver assists with ADLS/IADLS/MEDICATION MANAGEMENT, MEAL PREP AND TRANSPORTATION PRIOR TO ADMISSION. Medications reconciled and all medications are available in the home this visit. The following education was provided regarding medications, medication interactions, and look alike medications (specify): MODERATE INTERACTIONS AND [facility] WAS MADE AWARE. Medications  are too early to assess effectiveness. at this time. High risk medication teaching regarding anticoagulants, hyperglycemic agents or Opiod narcotics performed (specify) ELIQUIS: Report any s/sx of abnormal bleeding to MD immediately/seek medical treatment for any: black tarry stools, dark/tea/blood, hematoma, dizziness, frequent gum/nose bleeds, unusual bruising, or prolong bleeding. [facility] NP (SPOKE TO Slava Keith) notified of any discrepancies/medication interactions SPOKE TO BRENT PHARMACIST AT [address] [place] [address] NP OFFICE NEEDS TO CALL WITH WHAT MEDICATIONS THAT NP WANTS TO TAKE/REFILLS. BRENT REPORTED THAT MEDICATIONS ON DISCHARGE SUMMARY THAT WAS TO CONT HAVE TO BEEN  MONTHLY AND SOME HAVE NOT BEEN  IN  OVER A YEAR. HAD MULTIPLE MEDICATION BOTTLES IN THE HOME THAT WAS  FROM 2019 ( COREG 12.5 1 TAB BID, LIPITOR 40 MG 1 TAB DAILY ,IMDUR 30 MG 1 TAB DAILY, AND FEMARA 2.5 MG 1 TAB DAILY). ELIQUIS WILL BE READY TODAY AND GLUCOMETER IS NOT COVERED BY INSURANCE. Slava Keith AT [facility] NP OFFICE WILL DISCUSS WITH ANOTHER PROVIDER WHAT MEDICATIONS TO CALL INTO PHARMACY TODAY AS [facility] IS OUT THE OFFICE,.  BOYFRIEND WILL  ALL MEDICATIONS PRESCRIBED TODAY AND NANCY WILL SEE ON Monday FOR HOSPITAL FOLLOW UP. NURSE WILL SEE TOMORROW FOR MEDICATION TEACHING. Home health supplies by type and quantity ordered/delivered this visit include:N/A    Patient education provided this visit to include: Reviewed fall precautions and safety:dangle, uses assistive device at all times, non skid foot wear,and night light. Verbalized understanding. Reviewed s/sx to seek medical treatment immediately: drooping face, one sided weakness, frequent headaches, blurred vision, frequent falls, numbness or tingling on one side, slurred speech, or loss of consciousness. Seek medical treatment immediately/911. Reviewed UTI S/SX: BURNING, FOUL ODOR, FEVER, CHILLS, INCREASED HEART/RESPIRATORY, BACK PAIN,ABDOMINAL PAIN,TENDERNESS AROUND ARDEN AREA, OR BLOOD/DARK URINE REPORT TO MD IMMEDIATELY/SEEK MEDICAL TREATMENT. SEIZURE PRECAUTIONS:DATE TIME OF ONSET/DURATION, BODY PART INVOLVEMENT OR WHAT WAS DOING AT THE TIME, MOTOR ACTIVITY, REMOVE ANY DANGEROUS OBJECTS, DON'T RESTRICT OR HOLD DOWN, NOTHING INSIDE OF THE MOUTH, CALL FOR HELP IMMEDIATELY, CALL FOR HELP, AND KEEP A MEDICAL CARD OR BRACELET ALERTING THAT H/O OF SEIZURES CURRENT MEDICATIONS.reviewed monitoring BS daily and record results. REVIEWED INSULIN AND TO ROTATE SITES. REVIEWED DM AND COMPLICATIONS: REVIEWED HYPO/HYPERGLYCEMIA. HYPOGLYCEMIA (BS LESS THAN 70): SWEATING, JITTERY, NERVOUS, ANXIETY, HUNGER, CONFUSION, OR FATIGUE. EAT 15 GRAM OF CARBOHYDRATES AND RECHECK BS EVERY 15 MIN. IF BS DOESN'T INCREASE SEEK MEDICAL TREATMENT IMMEDIATELY. HYPERGLYCEMIA (BS GREATER THAN 200): HEADACHE, FREQUENT URINATION, BLURRED VISION, FRUITY SMELLING URINE, DRY MOUTH,FATIGUE, OR CONFUSION. Reviewed ADA diet: watch carbohydrates, sugars and starches. REVIEWED TO KEEP SKIN CLEAN, DRY, SKIN PROTECTANT, TURN REPOSITION EVERY 2 HOURS AND IMPORTANCE OF PROTEIN IN DIET TO PREVENT SKIN BREAKDOWN. USE SKIN BARRIER AS PRESCRIBED AND INCREASE PROTEIN IN DIET. Report any s/sx of abnormal bleeding to MD immediately/seek medical treatment for any: black tarry stools, dark/tea/blood, hematoma, dizziness, frequent gum/nose bleeds, unusual bruising, or prolong bleeding. Patient/caregiver degree of understanding:fair    Home exercise program/Homework provided: admission packet in the home and medication list in booklet. reviewed to keep admission in place where can locate for education. Pt/Caregiver instructed on plan of care and are agreeable to plan of care at this time. Physician DR Partida [phone] NP notified of patient admission to home health and plan of care including anticipated frequency of 2 week 2, 1 week 1 and treatments/interventions/modalities of DM, HTN, STROKE, METABOLIC ENCEPHALOPATHY, SEIZURES    Discharge planning discussed with patient and caregiver. Discharge planning as follows: Will discharge when all DM, HTN, STROKE, METABOLIC ENCEPHALOPATHY, SEIZURES disease process, complication and dietary goals are met and understands all medication purpose/frequencies/side effects. Pt/Caregiver did verbalize understanding of discharge planning. Next MD appointment 9/27/2021 (date) with alok HEMPHILL MD/NP/PA. Patient/caregiver encouraged/instructed to keep appointment as lack of follow through with physician appointment could result in discontinuation of home care services for non-compliance.

## 2021-09-25 ENCOUNTER — HOME CARE VISIT (OUTPATIENT)
Dept: SCHEDULING | Facility: HOME HEALTH | Age: 71
End: 2021-09-25
Payer: MEDICARE

## 2021-09-25 PROCEDURE — G0299 HHS/HOSPICE OF RN EA 15 MIN: HCPCS

## 2021-09-27 ENCOUNTER — HOME CARE VISIT (OUTPATIENT)
Dept: SCHEDULING | Facility: HOME HEALTH | Age: 71
End: 2021-09-27
Payer: MEDICARE

## 2021-09-27 VITALS
OXYGEN SATURATION: 100 % | RESPIRATION RATE: 14 BRPM | HEART RATE: 72 BPM | DIASTOLIC BLOOD PRESSURE: 72 MMHG | SYSTOLIC BLOOD PRESSURE: 142 MMHG | TEMPERATURE: 98.3 F

## 2021-09-27 VITALS
TEMPERATURE: 96.8 F | RESPIRATION RATE: 15 BRPM | OXYGEN SATURATION: 95 % | SYSTOLIC BLOOD PRESSURE: 118 MMHG | HEART RATE: 84 BPM | DIASTOLIC BLOOD PRESSURE: 65 MMHG

## 2021-09-27 PROCEDURE — G0151 HHCP-SERV OF PT,EA 15 MIN: HCPCS

## 2021-09-27 NOTE — HOME HEALTH
S: patient reports that she has been checking her sugars regularly since she has been home   she reports that her sugars have been ok - this morning it was 157   h/o L breast cancer with lumpectomy   O: PAIN: L knee is painful - 10/10 all of the time   she reports that she does not take any medication for it and she has not had it xrayed   (note pain did not prevent her from doing any of the evaluation activities and she did not complain about it after reporting this)  WOUND:no wounds noted or reported   ROM: B hip flexion, abduction, adduction WFL  B knee flexion, extension WFL   STRENGTH: R knee ext 4-/5, R knee flexion 4-/5, R hip flex 3+/5, R hip abd/adduction 4-/5  L knee flexion 4-/5 and extension 4-/5, L hip flexion 3+/5 abduction and adduction 4-/5  BED MOBILITY: patient is independent with bed mobility - she needs increased time and effort to get her LEs into the bed and to position herself in the bed. she is able to bridge and get full glut clearance. supine to sit is faster and more efficienct   EQUIPMENT: cane, shower chair, commode, power w/c/scooter  TRANSFERS: requires SBA and B UEs to complete sit to stand - maintains a wide base of support and uses an exaggerated anterior lean to facilitate getting off of the chair/bed/toilet  she transfers to and from the toilet with SBA   GAIT: ambulating with in the home with the single point cane - she demonstrated slow rene, genu valgum of both knees, reduced foot clearance bilaterally. full L step length and reduced R step length, and increased trunk lateral flexion/hip hiking to advance B LEs.  She also demonstrates she ambulated in the home for 20 feet x 2 with close SBA to CGA  STEPS: NA   BALANCE: tinetti 15/28 high fall risk  (note that feet together testing was skewed as patient was not able to get her feet together due to valgus deformity  A:ASSESSMENT AND PROGRESS TOWARD GOALS:  Patient demonstrated an improvement in gait pattern with cues for improving symmetry and foot clearance, an understanding of safety and fallr isk and patient expressing an understanding of the rehab plan due to therapy verbal and written instructions. Goals established for increased independence in the home, safe mobility in the home, improvement in strength - all designed to reduce fall risk and progress toward independence. Patient will benefit from continued PT intervention to progress toward meeting all established goals    P:.continue with progression of mobility, ther ex for strength, balance, safety, mobility, provide education to patient and caregivers to maximize her recovery and reduce the fall risk to progress toward a return to independent function      PMH/Summary of clinical condition: per epic \"Khushbu Arguelles is a 73 y.o. female with PMHx prior CVA, seizure d/o, DM2, HTN and others as noted below who is primarily being admitted for Acute metabolic encephalopathy due to hypog lycemia. Presented with confusion for past couple of days, apparently has a chronic hx of intermittent confusion which has been attributed to her underlying seizure disorder, but today when she asked to go to the doctor, they decided to come to the ED for further evaluation. Upon arrival, globally confused, says a few words though seemed to have what looked like receptive aphasia, also \"looking around sort of spacey\", but movi ng extremities as per ED and given duration of symptoms did not call CVA alert and was also considering seizure disorder for which the ED gave a dose of Ativan and Keppra. Initial delay in getting labs due to difficult vascular access so ultimately the ED placed an EJ line. BG was down to 29, but otherwise labs, UA, CXR, and CT head unrevealing. Patient had run out of test strips about a week ago per the significant other/yao lee, but he was was still giving her insulin at 60 units BID.  BG improved to 209 with 2amps of D50, but still mildly lethargic and not at her baseline, so was referred for admission\"  PMH per epic \" Acute metabolic encephalopathy due to hypoglycemia [G93.41, E16.2]    Morbid obesity ([facility]) [E66.01]    Type 2 diabetes mellitus with hypoglycemia and coma, with long-term current use of insulin (HCC) [W72.816, Z79.4]    Essential hypertension [I10]    H/O medication noncompliance [Z91.14]    History of breast cancer: s/p lumpectomy, chemo, and radiation 2017 (managed by Dr. Kaye Hairston) [Z85.3]    History of stroke: 2002 and had seizures as a result afterwards [Z86.73]    Seizure disorder ([facility] [address] [G40.909]\"    Medications review completed. Adverse reactions noted for the following:  medications are effective at this time    social history/ caregivers:patient lives with her boyfriend in a first floor single level apartment. her boyfriend is her main caregiver and assists wtih meals, medications, ADLs,, mobility and transportation     Pt/Caregiver instructed on plan of care and are agreeable to plan of care at this time. Plan of care and admission to home health status called to attending physician:Caden Wetzel NP - goes back to MD 9/29 at 3:30 p,    Discharge planning discussed with patient and caregiver. Discharge planning as follows: DC to self and family under MD supervision when all goals are met or maximum potential is reached  Pt/Caregiver did verbalize understanding. Patient education provided this visit:safety, fall risk, HEP,     Home exercise program: stand/walk every hour  seated hip flexion, LAQ and ankle pumps 10 reps 3x per day       Continued need for the following skills: MD referral for HHPT following recent hospital stay.  HHPT is medically necessary to address  dx and clinical findings: decreased strength, impaired gait, decreased ability w stair negotiation, increased swelling, decreased transfer status, decreased endurance, decreased balance and decreased safety, increased pain in order to improve functional mobility/quality of life, decrease burden of care, reduce risk for re-hospitalization, work towards patient's personal goals of return to PLOF w decrease risk for falls.

## 2021-09-28 ENCOUNTER — HOME CARE VISIT (OUTPATIENT)
Dept: SCHEDULING | Facility: HOME HEALTH | Age: 71
End: 2021-09-28
Payer: MEDICARE

## 2021-09-28 VITALS
SYSTOLIC BLOOD PRESSURE: 104 MMHG | TEMPERATURE: 98.2 F | DIASTOLIC BLOOD PRESSURE: 60 MMHG | RESPIRATION RATE: 16 BRPM | HEART RATE: 85 BPM | OXYGEN SATURATION: 100 %

## 2021-09-28 PROCEDURE — G0299 HHS/HOSPICE OF RN EA 15 MIN: HCPCS

## 2021-09-28 NOTE — HOME HEALTH
Caregiver involvement: Boyfriend is care giver, he,  Assists with ADLs, Medication management, Transportation to appointments, Meal prep and assists with ambulation. Medications reconciled and all medications are available in the home this visit. There were meds missing at start of care, Now all including Insulin are in the home. The only missing medications are Lipitor,  MVI and Trazadone. Patient has appointment with Joan Bain NP on 9/27/21 to address. Medications  are somewhat effective at this time. Home health supplies by type and quantity ordered/delivered this visit include: n/a    Patient education provided this visit:the importance of regular blood sugar monitoring for good blood sugar control. Went over the different types of stroke and the increased risk for stroke, and the 5 signs and symptoms of CVA. Pt instructed to follow with diabetic diet- monitoring sugar intake, limiting foods with high sugar content. MEDICATION ADHERENCE IS AN IMPORTANT COMPONENT OF HTN MANAGEMENT. PATIENT STATES THE IMPORTANCE TO TAKE HTN MEDS SAME TIME EACH DAY. Continue a heart Healthy ADA diet. Watch out for high sodium foods, read labels. Observe for signs of infection. Monitor B/P, take meds as ordered and f/u with PCP. Went over the need to understand the signs of hypoglycemia and hyperglycemia, also to check sugar on a daily basis. Rotate injections to different areas of body, dispose of syringes in a non-porus container    Progress toward goals: Patient taking insulin now and bs today was 188  Home exercise program: I reviewed the  5 signs and symptoms of stroke and when to call 911. Maintain healthy low sodium ADA diet, Continue to monitor B/P and Blood sugars, Observe for signs of infection. Be alert for signs of stroke. Work with PT to increase strength and f/u with PCP. Discussed s/s of infection to monitor for, s/s of UTI, who to report to/when.  Instructed cg to notify staff/md/seek tx if complications occur. Patient instructed to maintain clear pathways in home and to minimize clutter to prevent falls from occurring/minimize fall potential.   Patient needing a well balanced diet with the 5 food groups, patient to increase fiber in diet, fresh fruits and vegetables, whole grains and increase water intake. I went over the importance of taking all prescriptions as ordered. I discussed how to avoid extra sodium in her diet. We discussed the signs of infection and when to call MD.  We discussed the high risk for falls and ways to prevent falls in the future. We discussed taking B/P daily and keeping a log. We also discussed the need of a heart healthy diet, and the need to change positions frequently. Gaining strength with PT for increased mobility. Continued need for the following skills: Nursing, Physical Therapy and Occupational Therapy    The following discharge planning was discussed with the pt/caregiver: Will discharge from nursing when education is complete and patient is medically stable.

## 2021-09-28 NOTE — HOME HEALTH
Pt had appt on 09/27 with PCP to discuss meds that are missing from her list and if they will be continued. Appt was rescheduled for 09/29 @ 1530. Pt has paper RX for diabetic supplies that are being held until PCP appt. Skilled reason for visit: VS, Meds, BS      Caregiver involvement: patients cg is spouse/bf and is available as needed or asistance with IADL's, ADL's, meal prep, medication management, and taking patient to all doctors appointments. Medications reviewed and all medications are available in the home this visit. Pending PCP appt 09/29  The following education was provided regarding medications, medication interactions, and look alike medications (specify): Eliquis  Medications  are somewhat effective at this time. Home health supplies by type and quantity ordered/delivered this visit include: NA    Patient education provided this visit:MEDICATION ADHERENCE IS AN IMPORTANT COMPONENT OF HTN MANAGEMENT. PATIENT STATES THE IMPORTANCE TO TAKE HTN MEDS SAME TIME EACH DAY. pt instructed to follow with diabetic diet- monitoring sugar intake, limiting foods with high sugar content. discussed diabetic diet, s/s of hypoglycemia, hyperglycemia- who to report to/when, DM management with glucometer and BS checks, insulin management, preparation and injection administration. patient/cg instructed to monitor for edema/increase in edema, to elevate extremity when edema occurs and to notify md if edema exceeds normal limits for patient. patient to follow eliquis regimen and to monitor for s/s of [EXCESSIVE BLEDDING, BRUSING, BLEEDING GUMS, BLACK TARY STOOLS, BLOODY STOOLS] and report to MD.Instruct patient/caregiver in methods to conserve energy. TAKE FREQUENT BREAKS, USE ASSISTIVE DEVICE. Reviewed signs and symptoms of a stroke with patient/cg: slurred speech, facail drooping on one side, right or left sided weakness. Patient is a fall risk.  Educated pateint to sit on the side of the chair/bed, take a slow deep breaths, have feet firmly planted before standing up, use cane/walker/wheelchair if available, or have someone to assist.    Progress toward goals: PATIENT IS STEADILY PROGRESSING TOWARDS GOALS, STILL NEEDS REINFORCEMENT/ENCOURAGEMENT. WILL CONTINUE TO MONITOR. Home exercise program: activity as tolerated, trying to get physical activity 4-5 x weekly. stopping activity if causing shortness of breath or chest pain, dizziness or weakness, AND PER PT ORDERS. Continued need for the following skills: Nursing    Patient and/or caregiver notified and agrees to changes in the Plan of Care N/A      The following discharge planning was discussed with the pt/caregiver: Patient will be discharged once education is complete, pt is medically stable and is able to independently manage dressing change/incisional care.  Pt is aware that they will be discharged

## 2021-09-29 ENCOUNTER — HOME CARE VISIT (OUTPATIENT)
Dept: SCHEDULING | Facility: HOME HEALTH | Age: 71
End: 2021-09-29
Payer: MEDICARE

## 2021-09-29 ENCOUNTER — OFFICE VISIT (OUTPATIENT)
Dept: FAMILY MEDICINE CLINIC | Age: 71
End: 2021-09-29
Payer: MEDICARE

## 2021-09-29 VITALS
OXYGEN SATURATION: 92 % | TEMPERATURE: 98.2 F | HEART RATE: 77 BPM | RESPIRATION RATE: 16 BRPM | DIASTOLIC BLOOD PRESSURE: 73 MMHG | SYSTOLIC BLOOD PRESSURE: 120 MMHG

## 2021-09-29 VITALS
BODY MASS INDEX: 38.57 KG/M2 | DIASTOLIC BLOOD PRESSURE: 61 MMHG | TEMPERATURE: 97.2 F | WEIGHT: 240 LBS | HEART RATE: 76 BPM | RESPIRATION RATE: 16 BRPM | OXYGEN SATURATION: 100 % | HEIGHT: 66 IN | SYSTOLIC BLOOD PRESSURE: 130 MMHG

## 2021-09-29 DIAGNOSIS — E78.00 HYPERCHOLESTEREMIA: ICD-10-CM

## 2021-09-29 DIAGNOSIS — E11.40 TYPE 2 DIABETES MELLITUS WITH DIABETIC NEUROPATHY, WITH LONG-TERM CURRENT USE OF INSULIN (HCC): ICD-10-CM

## 2021-09-29 DIAGNOSIS — I10 ESSENTIAL HYPERTENSION: ICD-10-CM

## 2021-09-29 DIAGNOSIS — Z86.73 HISTORY OF CVA (CEREBROVASCULAR ACCIDENT): ICD-10-CM

## 2021-09-29 DIAGNOSIS — Z79.4 TYPE 2 DIABETES MELLITUS WITH DIABETIC NEUROPATHY, WITH LONG-TERM CURRENT USE OF INSULIN (HCC): ICD-10-CM

## 2021-09-29 DIAGNOSIS — E11.21 TYPE 2 DIABETES MELLITUS WITH NEPHROPATHY (HCC): ICD-10-CM

## 2021-09-29 DIAGNOSIS — G40.909 SEIZURE DISORDER (HCC): ICD-10-CM

## 2021-09-29 DIAGNOSIS — Z09 HOSPITAL DISCHARGE FOLLOW-UP: Primary | ICD-10-CM

## 2021-09-29 PROCEDURE — G0152 HHCP-SERV OF OT,EA 15 MIN: HCPCS

## 2021-09-29 PROCEDURE — G8752 SYS BP LESS 140: HCPCS | Performed by: NURSE PRACTITIONER

## 2021-09-29 PROCEDURE — G9899 SCRN MAM PERF RSLTS DOC: HCPCS | Performed by: NURSE PRACTITIONER

## 2021-09-29 PROCEDURE — 3017F COLORECTAL CA SCREEN DOC REV: CPT | Performed by: NURSE PRACTITIONER

## 2021-09-29 PROCEDURE — 2022F DILAT RTA XM EVC RTNOPTHY: CPT | Performed by: NURSE PRACTITIONER

## 2021-09-29 PROCEDURE — G8432 DEP SCR NOT DOC, RNG: HCPCS | Performed by: NURSE PRACTITIONER

## 2021-09-29 PROCEDURE — 99214 OFFICE O/P EST MOD 30 MIN: CPT | Performed by: NURSE PRACTITIONER

## 2021-09-29 PROCEDURE — G8427 DOCREV CUR MEDS BY ELIG CLIN: HCPCS | Performed by: NURSE PRACTITIONER

## 2021-09-29 PROCEDURE — G0157 HHC PT ASSISTANT EA 15: HCPCS

## 2021-09-29 PROCEDURE — 1090F PRES/ABSN URINE INCON ASSESS: CPT | Performed by: NURSE PRACTITIONER

## 2021-09-29 PROCEDURE — G8754 DIAS BP LESS 90: HCPCS | Performed by: NURSE PRACTITIONER

## 2021-09-29 PROCEDURE — G8399 PT W/DXA RESULTS DOCUMENT: HCPCS | Performed by: NURSE PRACTITIONER

## 2021-09-29 PROCEDURE — G8536 NO DOC ELDER MAL SCRN: HCPCS | Performed by: NURSE PRACTITIONER

## 2021-09-29 PROCEDURE — 3052F HG A1C>EQUAL 8.0%<EQUAL 9.0%: CPT | Performed by: NURSE PRACTITIONER

## 2021-09-29 PROCEDURE — G8417 CALC BMI ABV UP PARAM F/U: HCPCS | Performed by: NURSE PRACTITIONER

## 2021-09-29 PROCEDURE — 1101F PT FALLS ASSESS-DOCD LE1/YR: CPT | Performed by: NURSE PRACTITIONER

## 2021-09-29 RX ORDER — FLASH GLUCOSE SENSOR
KIT MISCELLANEOUS
Qty: 1 KIT | Refills: 3 | Status: SHIPPED | OUTPATIENT
Start: 2021-09-29 | End: 2021-10-20 | Stop reason: SDUPTHER

## 2021-09-29 RX ORDER — BLOOD SUGAR DIAGNOSTIC
STRIP MISCELLANEOUS
Qty: 100 STRIP | Refills: 5 | Status: SHIPPED | OUTPATIENT
Start: 2021-09-29

## 2021-09-29 RX ORDER — DIVALPROEX SODIUM 500 MG/1
1000 TABLET, DELAYED RELEASE ORAL EVERY 12 HOURS
COMMUNITY
Start: 2021-09-23 | End: 2021-10-20 | Stop reason: SDUPTHER

## 2021-09-29 RX ORDER — ISOSORBIDE MONONITRATE 30 MG/1
30 TABLET, EXTENDED RELEASE ORAL DAILY
Qty: 90 TABLET | Refills: 1 | Status: SHIPPED | OUTPATIENT
Start: 2021-09-29 | End: 2022-05-05 | Stop reason: SDUPTHER

## 2021-09-29 RX ORDER — ATORVASTATIN CALCIUM 40 MG/1
40 TABLET, FILM COATED ORAL DAILY
Qty: 90 TABLET | Refills: 1 | Status: SHIPPED | OUTPATIENT
Start: 2021-09-29 | End: 2022-05-05 | Stop reason: SDUPTHER

## 2021-09-29 RX ORDER — INSULIN ASPART 100 [IU]/ML
10 INJECTION, SUSPENSION SUBCUTANEOUS EVERY 12 HOURS
COMMUNITY
Start: 2021-09-23 | End: 2021-09-29 | Stop reason: SDUPTHER

## 2021-09-29 RX ORDER — CARVEDILOL 12.5 MG/1
12.5 TABLET ORAL 2 TIMES DAILY WITH MEALS
Qty: 60 TABLET | Refills: 2 | Status: SHIPPED | OUTPATIENT
Start: 2021-09-29 | End: 2022-05-05 | Stop reason: SDUPTHER

## 2021-09-29 RX ORDER — HYDROCHLOROTHIAZIDE 25 MG/1
25 TABLET ORAL DAILY
Qty: 30 TABLET | Refills: 2 | Status: SHIPPED | OUTPATIENT
Start: 2021-09-29 | End: 2022-01-24 | Stop reason: SDUPTHER

## 2021-09-29 RX ORDER — FLASH GLUCOSE SCANNING READER
EACH MISCELLANEOUS
Qty: 1 EACH | Refills: 0 | Status: SHIPPED | OUTPATIENT
Start: 2021-09-29 | End: 2021-10-20 | Stop reason: SDUPTHER

## 2021-09-29 RX ORDER — INSULIN ASPART 100 [IU]/ML
10 INJECTION, SUSPENSION SUBCUTANEOUS EVERY 12 HOURS
Qty: 5 PEN | Refills: 2 | Status: SHIPPED | OUTPATIENT
Start: 2021-09-29 | End: 2021-10-20 | Stop reason: SDUPTHER

## 2021-09-29 RX ORDER — ONDANSETRON 4 MG/1
4 TABLET, ORALLY DISINTEGRATING ORAL
COMMUNITY
Start: 2021-09-15 | End: 2022-05-05 | Stop reason: SINTOL

## 2021-09-29 NOTE — PATIENT INSTRUCTIONS
Discharge Medications:  Current Discharge Medication List     START taking these medications   Details   Blood-Glucose Meter Misc MISC One Touch Ultra 2 Blood Glucose Meter; E 11.9 Type 2 Diabetes; On Insulin: Yes Z279.4 Long Term (current) Insulin Use. Qty: 1 Each, Refills: 0     Insulin Asp Prt-Insulin Aspart (NOVOLOG 70/30 FLEXPEN) 100 unit/mL (70-30) SC insulin pen Inject 10 Units beneath the skin Every 12 hours. Qty: 9 mL, Refills: 0     Lancets Misc MISC 1 Each by Misc. (Non-Drug; Combo Route) route Every 12 hours. Delica Lancets; ICD 10 Code: E 11.9 Type 2 Diabetes; On Insulin: Yes Z279.4 Long Term (current) Insulin Use. Qty: 100 Each, Refills: 0     senna-docusate (SENOKOT-S) 8.6-50 mg PO TABS Take 1 Tab by Mouth Once a Day. Qty: 30 Tab, Refills: 0       CONTINUE these medications which have CHANGED   Details   apixaban (ELIQUIS) 5 mg PO TABS Take 1 Tab by Mouth Twice Daily. Indications: blood clot in a deep vein of the extremities, a clot in the lung  Qty: 60 Tab, Refills: 0     divalproex DR (DEPAKOTE) 500 mg PO TBEC Take 2 Tabs by Mouth Every 12 hours. Qty: 240 Tab, Refills: 0       CONTINUE these medications which have NOT CHANGED   Details   acetaminophen (TYLENOL) 325 mg PO TABS Take 650 mg by Mouth 2 Times Daily As Needed (knee pain). amLODIPine (NORVASC) 5 mg PO TABS Take 5 mg by Mouth Once a Day. aspirin EC 81 mg PO TBEC Take 1 Tab by Mouth Once a Day. Qty: 30 Tab, Refills: 5     atorvastatin (LIPITOR) 40 mg PO TABS Take 40 mg by Mouth Once a Day. Blood Sugar Diagnostic Misc STRP 1 Each by Misc. (Non-Drug; Combo Route) route Every 12 hours. One Touch Ultra Strips; ICD 10 Code: E 11.9 Type 2 Diabetes; On Insulin: Yes Z279.4 Long Term (current) Insulin Use. Qty: 50 Strip, Refills: 0     carvedilol (COREG) 12.5 mg PO TABS Take 12.5 mg by Mouth 2 Times Daily with Meals. hydroCHLOROthiazide (HYDRODIURIL) 25 mg PO TABS Take 25 mg by Mouth Once a Day.      Insulin Pen Needles, Disposable, 32 gauge x 5/32\" Misc Ndle Inject 1 Each beneath the skin Every 12 hours. Qty: 100 Each, Refills: 0     isosorbide mononitrate CR (IMDUR) 30 mg PO TB24 Take 30 mg by Mouth Once a Day. letrozole (FEMARA) 2.5 mg PO TABS Take 2.5 mg by Mouth Once a Day. ondansetron (ZOFRAN) 4 mg PO TbDL Take 1 Tab by Mouth Every 8 Hours As Needed for Nausea (PRN FOR NAUSEA AND VOMITING). ALLOW TABLET TO DISSOLVE ON TONGUE.   Qty: 12 Tab, Refills: 0       STOP taking these medications     famotidine (PEPCID) 20 mg PO TABS Comments:   Reason for Stopping:     furosemide (LASIX) 20 mg PO TABS Comments:   Reason for Stopping:     insulin ASPART PROTAMINE & ASPART 70/30 (NOVOLOG 70/30 VIAL) 100 unit/ml SC SOLN Comments:   Reason for Stopping:     losartan (COZAAR) 50 mg PO TABS Comments:   Reason for Stopping:     pantoprazole (PROTONIX) 40 mg PO TBEC Comments:   Reason for Stopping:

## 2021-09-29 NOTE — PROGRESS NOTES
Leeanne Conte presents today for   Chief Complaint   Patient presents with    Follow-up     x 2 months    Medication Evaluation     medication management    Leg Pain     Left leg pain       Leeanne Conte preferred language for health care discussion is english/other. Personal Protective Equipment:   Personal Protective Equipment was used including: mask-surgical and hands-gloves. Patient was placed on no precaution(s). Patient was masked. Precautions:   Patient currently on None  Patient currently roomed with door closed    Is someone accompanying this pt? Yes; Spouse    Is the patient using any DME equipment during OV? Yes; Cane    Depression Screening:  3 most recent PHQ Screens 9/29/2021   Little interest or pleasure in doing things Not at all   Feeling down, depressed, irritable, or hopeless Not at all   Total Score PHQ 2 0       Learning Assessment:  Learning Assessment 8/25/2021   PRIMARY LEARNER Patient   HIGHEST LEVEL OF EDUCATION - PRIMARY LEARNER  -   BARRIERS PRIMARY LEARNER NONE   CO-LEARNER CAREGIVER No   PRIMARY LANGUAGE ENGLISH   LEARNER PREFERENCE PRIMARY DEMONSTRATION     -     -   ANSWERED BY patient   RELATIONSHIP SELF       Abuse Screening:  Abuse Screening Questionnaire 6/15/2020   Do you ever feel afraid of your partner? N   Are you in a relationship with someone who physically or mentally threatens you? N   Is it safe for you to go home? Y       Fall Risk  Fall Risk Assessment, last 12 mths 9/29/2021   Able to walk? Yes   Fall in past 12 months? 0   Do you feel unsteady? 0   Are you worried about falling 0       Pt currently taking Anticoagulant therapy? Yes: Eliquis    Coordination of Care:  1. Have you been to the ER, urgent care clinic since your last visit? [facility] your [facility], Vomiting     2. Have you seen or consulted any other health care providers outside of the [address] [place] [address] since your last visit?  Include any pap smears or colon screening.  No

## 2021-09-29 NOTE — PROGRESS NOTES
OFFICE NOTE    Yovanny Barrera is a 73 y.o. female presenting today for office visit. 9/29/2021  3:57 PM    Chief Complaint   Patient presents with    Follow-up     x 2 months    Medication Evaluation     medication management    Leg Pain     Left leg pain     HPI:   Hospital follow-up due to to hospital visits 9/15/2021 and 9/19/2021. Patient's boyfriend Tremayne Perdomo present. Patient says she is doing okay today. Patient boyfriend requested a freestyle flavio, ordered. Advised patient I would like to order a neurology referral related patient's seizure disorder history of CVA patient has a past history of diabetes, hypertension, prior CVA 2002, seizure disorder taking Depakote and breast cancer. September 15, 2021, patient presented to ED with fever, vomiting, suspicion of Covid (neg)and patient stated low blood sugar. Patient was negative for UA, negative chest x-ray given physical exam.  Patient's lab work showed leukocytosis WBC 17.7, CT of chest and abdomen unrevealing. Suspected patient had unfound infection. Patient administered ceftriaxone and patient. Patient discussed admission going home patient wanted to go. Patient's last 6/30/2021 A1c here in our office was 8.1%. 9/19/2021 patient presented back to ED with altered mental status found of acute encephalopathy due to hypoglycemia. Speech therapy evaluation, no acute findings resume diet. Per note patient was given diabetic education by registered nurse insulin administration education and roommate Earlee. Patient had EEG and renal ultrasound  Home health was ordered for patient. In addition patient ordered physical therapy. EKG  Impression - BORDERLINE ECG -   Impression SR-Sinus rhythm-normal P axis, V-rate 50-99   Impression   LVH-Left ventricular hypertrophy-multiple voltage criteria     CT head no contrast  Impression   No intracranial hemorrhage or mass effect. Large remote right MCA infarct.       Patient is to follow-up with Dr. Saurabh Anne, cardiology. Patient  and I went over patient's meds that she was prescribed at discharge and I reprinted them for her. Patient reports appetite is good, no urinary issues, sleeps well and regular bowel movements. Patient denies fever, chills, chest pain, shortness of breath, abdomen pain or dark tarry stool. Covid vaccine, completed     EEG  Abnormal EEG recorded during the awake state. Right hemispheric slowing was consistent with brain lesion and   postictal changes. Right central sharp waves were consistent with focal cortical irritability  and partial epilepsy      Discharge Medications:  Current Discharge Medication List     START taking these medications   Details   Blood-Glucose Meter Misc MISC One Touch Ultra 2 Blood Glucose Meter; E 11.9 Type 2 Diabetes; On Insulin: Yes Z279.4 Long Term (current) Insulin Use. Qty: 1 Each, Refills: 0     Insulin Asp Prt-Insulin Aspart (NOVOLOG 70/30 FLEXPEN) 100 unit/mL (70-30) SC insulin pen Inject 10 Units beneath the skin Every 12 hours. Qty: 9 mL, Refills: 0     Lancets Misc MISC 1 Each by Misc. (Non-Drug; Combo Route) route Every 12 hours. Delica Lancets; ICD 10 Code: E 11.9 Type 2 Diabetes; On Insulin: Yes Z279.4 Long Term (current) Insulin Use. Qty: 100 Each, Refills: 0     senna-docusate (SENOKOT-S) 8.6-50 mg PO TABS Take 1 Tab by Mouth Once a Day. Qty: 30 Tab, Refills: 0     CONTINUE these medications which have CHANGED   Details   apixaban (ELIQUIS) 5 mg PO TABS Take 1 Tab by Mouth Twice Daily. Indications: blood clot in a deep vein of the extremities, a clot in the lung  Qty: 60 Tab, Refills: 0     divalproex DR (DEPAKOTE) 500 mg PO TBEC Take 2 Tabs by Mouth Every 12 hours. Qty: 240 Tab, Refills: 0       CONTINUE these medications which have NOT CHANGED   Details   acetaminophen (TYLENOL) 325 mg PO TABS Take 650 mg by Mouth 2 Times Daily As Needed (knee pain).      amLODIPine (NORVASC) 5 mg PO TABS Take 5 mg by Mouth Once a Day.     aspirin EC 81 mg PO TBEC Take 1 Tab by Mouth Once a Day. Qty: 30 Tab, Refills: 5     atorvastatin (LIPITOR) 40 mg PO TABS Take 40 mg by Mouth Once a Day. Blood Sugar Diagnostic Misc STRP 1 Each by Misc. (Non-Drug; Combo Route) route Every 12 hours. One Touch Ultra Strips; ICD 10 Code: E 11.9 Type 2 Diabetes; On Insulin: Yes Z279.4 Long Term (current) Insulin Use. Qty: 50 Strip, Refills: 0     carvedilol (COREG) 12.5 mg PO TABS Take 12.5 mg by Mouth 2 Times Daily with Meals. hydroCHLOROthiazide (HYDRODIURIL) 25 mg PO TABS Take 25 mg by Mouth Once a Day. Insulin Pen Needles, Disposable, 32 gauge x 5/32\" Misc Ndle Inject 1 Each beneath the skin Every 12 hours. Qty: 100 Each, Refills: 0     isosorbide mononitrate CR (IMDUR) 30 mg PO TB24 Take 30 mg by Mouth Once a Day. letrozole (FEMARA) 2.5 mg PO TABS Take 2.5 mg by Mouth Once a Day. ondansetron (ZOFRAN) 4 mg PO TbDL Take 1 Tab by Mouth Every 8 Hours As Needed for Nausea (PRN FOR NAUSEA AND VOMITING). ALLOW TABLET TO DISSOLVE ON TONGUE.   Qty: 12 Tab, Refills: 0       STOP taking these medications     famotidine (PEPCID) 20 mg PO TABS Comments:   Reason for Stopping:     furosemide (LASIX) 20 mg PO TABS Comments:   Reason for Stopping:     insulin ASPART PROTAMINE & ASPART 70/30 (NOVOLOG 70/30 VIAL) 100 unit/ml SC SOLN Comments:   Reason for Stopping:     losartan (COZAAR) 50 mg PO TABS Comments:   Reason for Stopping:     pantoprazole (PROTONIX) 40 mg PO TBEC Comments:   Reason for Stopping:       ROS:    · General: negative for - chills, fever, weight changes or malaise  · HEENT: no sore throat, nasal congestion, vision problems or ear problems  · Respiratory: no cough, shortness of breath, or wheezing  · Cardiovascular: no chest pain, palpitations, or dyspnea on exertion  · Gastrointestinal: no abdominal pain, N/V, change in bowel habits, or black or bloody stools  · Musculoskeletal: no back pain, joint pain, joint stiffness, muscle pain or muscle weakness  · Neurological: no numbness, tingling, headache or dizziness  · Endo:  No polyuria or polydipsia. · : no hematuria, dysuria, frequency, hesitancy, or nocturia.     · Skin: No itching or rash, no open skin, no unusual lesions   · Psychological: negative for - anxiety, depression, sleep disturbances, suicidal or homicidal ideations       History  Past Medical History:   Diagnosis Date    Arthritis     [facility]     Breast CA ([facility] [address] [facility] 2016    DDD (degenerative disc disease), cervical     Diabetes ([facility] [address] [facility]     DJD (degenerative joint disease) of cervical spine     Heart murmur     History of noncompliance with medical treatment     Hypercholesteremia     Hypertension     Menopause     Mild diastolic dysfunction     ECHO 5/2020     PE (pulmonary thromboembolism) ([facility] [address] 2019    Psychiatric disorder     DEPRESSION    PUD (peptic ulcer disease)     S/P cardiac cath 02/2015    No angiographic evidence of CAD    Seizures (HCC)     LAST SEIZURE ABOUT 2 MONTHS AGO (NOV 2015)    Stroke ([facility] [address]     x2 with left side deficit       Past Surgical History:   Procedure Laterality Date    HX BREAST LUMPECTOMY Right 1/12/2016    Dr. Geeta Gilman Partial Mastectomy w./ axillary lymphadenectomy    HX HEENT      TONSILLECTOMY    HX MASTECTOMY Right 1/26/2016    Dr. Geeta Gilman Repeat Partial Mastectomy for positive margins    HX ORTHOPAEDIC      left arm surg    IR MEDIPORT         Social History     Socioeconomic History    Marital status: SINGLE     Spouse name: Not on file    Number of children: Not on file    Years of education: Not on file    Highest education level: Not on file   Occupational History    Not on file   Tobacco Use    Smoking status: Never Smoker    Smokeless tobacco: Never Used   Substance and Sexual Activity    Alcohol use: No    Drug use: No    Sexual activity: Yes     Partners: Male   Other Topics Concern     Service No    Blood Transfusions No    Caffeine Concern No    Occupational Exposure No    Hobby Hazards No    Sleep Concern Yes    Stress Concern No    Weight Concern Not Asked    Special Diet Not Asked    Back Care Not Asked    Exercise Yes     Comment: walks using cane    Bike Helmet Not Asked    Seat Belt Yes    Self-Exams Yes   Social History Narrative    Not on file     Social Determinants of Health     Financial Resource Strain:     Difficulty of Paying Living Expenses:    Food Insecurity:     Worried About Running Out of Food in the Last Year:     [address] in the Last Year:    Transportation Needs:     Lack of Transportation (Medical):  Lack of Transportation (Non-Medical):    Physical Activity:     Days of Exercise per Week:     Minutes of Exercise per Session:    Stress:     Feeling of Stress :    Social Connections:     Frequency of Communication with Friends and Family:     Frequency of Social Gatherings with Friends and Family:     Attends Church Services:     Active Member of Clubs or Organizations:     Attends Club or Organization Meetings:     Marital Status:    Intimate Partner Violence:     Fear of Current or Ex-Partner:     Emotionally Abused:     Physically Abused:     Sexually Abused:        No Known Allergies    Current Outpatient Medications   Medication Sig Dispense Refill    glucose blood VI test strips (OneTouch Verio test strips) strip Check your blood sugar in the mornings and when you have symptoms of hypoglycemia 100 Strip 5    apixaban (ELIQUIS) 5 mg tablet Take 5 mg by mouth two (2) times a day.  divalproex DR (DEPAKOTE) 500 mg tablet Take 1,000 mg by mouth every twelve (12) hours.  ondansetron (ZOFRAN ODT) 4 mg disintegrating tablet Take 4 mg by mouth every eight (8) hours as needed.  atorvastatin (LIPITOR) 40 mg tablet Take 1 Tablet by mouth daily.  90 Tablet 1    flash glucose sensor (FreeStyle Ivy 14 Day Sensor) kit Use to check blood sugar 4x/day and as needed. 1 Kit 3    flash glucose scanning reader (FreeStyle Ivy 14 Day [place]) [facility] [place] to check blood sugar 4x/day and as needed. 1 Each 0    insulin aspart protamine/insulin aspart (NOVOLOG MIX 70/30) 100 unit/mL (70-30) inpn 10 Units by SubCUTAneous route every twelve (12) hours. 5 Pen 2    isosorbide mononitrate ER (Imdur) 30 mg tablet Take 1 Tablet by mouth daily. 90 Tablet 1    hydroCHLOROthiazide (HYDRODIURIL) 25 mg tablet Take 1 Tablet by mouth daily. 30 Tablet 2    carvediloL (COREG) 12.5 mg tablet Take 1 Tablet by mouth two (2) times daily (with meals). 60 Tablet 2    traZODone (DESYREL) 50 mg tablet Take 1 Tablet by mouth nightly as needed for Sleep. 30 Tablet 2    nystatin (MYCOSTATIN) topical cream Apply  to affected area two (2) times a day. 15 g 0    acetaminophen (TYLENOL) 500 mg tablet Take 2 Tablets by mouth three (3) times daily as needed for Pain. 100 Tablet 2    amLODIPine (NORVASC) 5 mg tablet Take 1 Tablet by mouth nightly. 30 Tablet 2    Insulin Needles, Disposable, (TRUEplus Pen Needle) 31 gauge x 5/16\" ndle Use as directed 4 times daily 100 Pen Needle 11    ergocalciferol (ERGOCALCIFEROL) 1,250 mcg (50,000 unit) capsule Take 1 Capsule by mouth every seven (7) days. Indications: vitamin D deficiency (high dose therapy) 8 Capsule 0    diclofenac (VOLTAREN) 1 % gel Apply 2 g to affected area four (4) times daily. 1 Each 0    capsaicin 0.075 % topical cream Apply  to affected area three (3) times daily as needed for Pain. 60 g 6    letrozole (FEMARA) 2.5 mg tablet Take 2.5 mg by mouth daily.  SONAFINE emul topical       therapeutic multivitamin (THERAGRAN) tablet Take 1 Tab by mouth daily.          Patient Care Team:  Patient Care Team:  Precious Greer NP as PCP - General (Nurse Practitioner)  Precious Greer NP as PCP - [facility] Provider  Myke Aquino NP (Nurse Practitioner)  Estrada Grande MD (General Surgery)  Denisha Villalpando, RN as Nurse Navigator (Oncology)  Jack Ontiveros MD (Pulmonary Disease)  Aniya Aguilera MD (Surgery)  Alex Martinez MD (Breast Surgery)    LABS 6/21/2021:  Patient's recent microalbumin creatinine ratio is 205. Urinalysis showed protein, trace of leukocyte esterase and bacteria. Patient had no complaints of UTI symptoms. Patient's recent A1C 8.1. I would like patient to make an appointment to come in to discuss better blood glucose control, hypertension and cholesterol management. RADIOLOGY:  None new to review    Physical Exam  Patient appears well, she is pleasant, alert, oriented x 3, in no distress. Boyfriend Beatriz Saravia present for encounter. ENT normal.  Neck supple. No adenopathy or thyromegaly. SUBHASH. Lungs are clear, good air entry, no wheezes, rhonchi or rales. Cardiovascular, S1 and S2 normal, no murmurs, regular rate and rhythm. No LAD. Chest wall negative for tenderness  Abdomen is soft without tenderness, guarding, mass or organomegaly. /Anorectal, deferred. Muscleskeletal, no swelling, no tenderness, no injury. Extremities show no edema, normal peripheral pulses. Neurological left-sided deficits due to history of stroke  Skin: no concerning lesions. Psych: normal affect. Mood good. Oriented x 3. Judgement and insight intact.       Vitals:    09/29/21 1558   BP: 130/61   Pulse: 76   Resp: 16   Temp: 97.2 °F (36.2 °C)   TempSrc: Temporal   SpO2: 100%   Weight: 240 lb (108.9 kg)   Height: 5' 6\" (1.676 m)   PainSc:   8   PainLoc: Leg       Assessment and Plan    Diagnoses and all orders for this visit:    Hospital discharge follow-up    Type 2 diabetes mellitus with diabetic neuropathy, with long-term current use of insulin (HCC)  -     flash glucose sensor (FreeStyle Ivy 14 Day Sensor) kit; Use to check blood sugar 4x/day and as needed., Normal, Disp-1 Kit, R-3  -     flash glucose scanning reader (FreeStyle Ivy 14 Day [place]) misc; Use to check blood sugar 4x/day and as needed., Normal, Disp-1 Each, R-0  -     insulin aspart protamine/insulin aspart (NOVOLOG MIX 70/30) 100 unit/mL (70-30) inpn; 10 Units by SubCUTAneous route every twelve (12) hours. , Normal, Disp-5 Pen, R-2  -     REFERRAL TO ENDOCRINOLOGY    Essential hypertension  -     isosorbide mononitrate ER (Imdur) 30 mg tablet; Take 1 Tablet by mouth daily. , Normal, Disp-90 Tablet, R-1  -     hydroCHLOROthiazide (HYDRODIURIL) 25 mg tablet; Take 1 Tablet by mouth daily. , Normal, Disp-30 Tablet, R-2  -     carvediloL (COREG) 12.5 mg tablet; Take 1 Tablet by mouth two (2) times daily (with meals). , Normal, Disp-60 Tablet, R-2    Type 2 diabetes mellitus with nephropathy (HCC)  -     glucose blood VI test strips (OneTouch Verio test strips) strip; Check your blood sugar in the mornings and when you have symptoms of hypoglycemia, Normal, Disp-100 Strip, R-5    Hypercholesteremia  -     atorvastatin (LIPITOR) 40 mg tablet; Take 1 Tablet by mouth daily. , Normal, Disp-90 Tablet, R-1    *Plan of care reviewed with patient. Patient in agreement with plan and expresses understanding. All questions answered and patient encouraged to call or RTO if further questions or concerns. 50% of 30  minutes spent on counseling and managing her care. Follow-up and Dispositions    · Return in about 2 weeks (around 10/13/2021).        DIEGO Pope

## 2021-09-29 NOTE — HOME HEALTH
SUBJECTIVE: \"I take my blood suger every day. It was 135 today. \"  CAREGIVER INVOLVMENT/ASSISTANCE NEEDED FOR meals, medications, ADLs,, mobility and transportation  LIVING SITUATION: patient lives with her boyfriend in a first floor single level apartment. her boyfriend is her main caregiver  [address] [place] [address], shower chair, commode, power w/c/scooter   HEP:stand/walk every hour  seated hip flexion, LAQ and ankle pumps 10 reps 3x per day     OBJECTIVE: SEE INTERVENTIONS  GAIT: Pt amb in level household suraces with decreased rene, genu valgum noted both knees. decreased heelstrike and toe off but able to clear foot, decreased step length RLE,  and increased trunk lateral flexion/hip hiking to assist with  advancing B LEs close CGA/S 15x1   TRANSFERS: Sit to stand from chair and high stool using BUE's to push off, wide TRENA S level. THER EX. Pt instructed in sitting ther ex of HEP  AROM with verbal cueing for quality of repitition. Ther ex to include DFPF, HIP FLEXION, LAQ 2x10 repititions. . Pt tolerated ther ex with good tolerance to activity. SA02 on RA 99, 87 HR  ASSESSMENT OF PROGRESS TOWARDS GOALS: Pt was educated in diabetic foot care, prevention of falls, and also review of HEP with return demonstration. Pt was able to perform all task with good tolerance to activity. Pts boyfriend appears to be supportive and involved in pts care. Pt encouraged to continue with HEP intermittantly throughout the day while watching TV during commercials to increase compliance. Pt verbalizes understanding.    CONTINUED NEED FOR THE FOLLOWING SKILLS;HHPT is medically necessary to address  dx and clinical findings: decreased strength, impaired gait, decreased ability w stair negotiation, increased swelling, decreased transfer status, decreased endurance, decreased balance and decreased safety, increased pain in order to improve functional mobility/quality of life, decrease burden of care, reduce risk for re-hospitalization, work towards patient's personal goals of return to PLOF w decrease risk for falls.   PLAN FOR NEXT VISIT: THER EX, GAIT  THE FOLLOWING DISCHARGE PLANNING WAS DISCUSSED WITH THE PATIENT/CAREGIVERDischarge to self and family under MD supervision once all goals have been met or patient has reached max potential. Patient/caregiver verbalized understanding

## 2021-09-30 ENCOUNTER — HOME CARE VISIT (OUTPATIENT)
Dept: HOME HEALTH SERVICES | Facility: HOME HEALTH | Age: 71
End: 2021-09-30
Payer: MEDICARE

## 2021-09-30 VITALS
OXYGEN SATURATION: 92 % | HEART RATE: 77 BPM | TEMPERATURE: 98.2 F | SYSTOLIC BLOOD PRESSURE: 120 MMHG | DIASTOLIC BLOOD PRESSURE: 73 MMHG

## 2021-09-30 NOTE — HOME HEALTH
Caregiver involvement: Early (Significant other) lives with pt and provides daily emotional support and assists pt as needed daily with self care. Medications reconciled and all medications are available in the home this visit. The following education was provided regarding medications, medication interactions, and look a like medications: Continue as directed by MD.  Medications  are effective at this time. Home health supplies by type and quantity ordered/delivered this visit include: na    Patient education provided this visit:  Educated pt on need to use SC with all transfers/mobility to reduce risk of falls. Advised pt to use stable surface such as a counter top or grab bar for added balance when performing dynamic standing tasks such as dressing after toileting. Progress toward goals: Ms. Sincere Syed has guarded potential to progress with self care to her independent PLOF. She lacks insight to her deficits and is declining further skilled OT despite needing min A for safety with ADL and CGA/SBA for safety with mobility and transfers in the home. Home exercise program: na    Continued need for the following skills: Nursing and Physical Therapy    The following discharge planning was discussed with the pt/caregiver: 1w1, with plans to discharge to self at pt request.  She declines further skilled OT services at this time. MD office notified. Chief Complaint:  ALTERED MENTAL STATUS         History primarily provided by the ED staff and patient's significant other/roommate, Parish Baez. Kal Galvan is a 73 y.o. female with PMHx prior CVA, seizure d/o, DM2, HTN and others as noted below who is primarily being admitted for Acute metabolic encephalopathy due to hypog lycemia.  Presented with confusion for past couple of days, apparently has a chronic hx of intermittent confusion which has been attributed to her underlying seizure disorder, but today when she asked to go to the doctor, they decided to come to the ED for further evaluation. Upon arrival, globally confused, says a few words though seemed to have what looked like receptive aphasia, also \"looking around sort of spacey\", but eli scott extremities as per ED and given duration of symptoms did not call CVA alert and was also considering seizure disorder for which the ED gave a dose of Ativan and Keppra. Initial delay in getting labs due to difficult vascular access so ultimately the ED placed an EJ line. BG was down to 29, but otherwise labs, UA, CXR, and CT head unrevealing. Patient had run out of test strips about a week ago per the significant other/yao lee, but he was was still giving her insulin at 60 units BID. BG improved to 209 with 2amps of D50, but still mildly lethargic and not at her baseline, so was referred for admission. Upon my arrival, the patient is lethargic, not following commands, pupils 2mm bilaterally though reactive so I called her significant other/roommate listed in the chart who confirms this history. He denies noticing any symptoms to suggest il lness. He did not notice any slurred speech or new focal weakness or seizure activity. He states she never smokes or uses drugs and rarely drinks EtOH. He confirms he has been giving her the 60 units of insulin BID, but he is not sure of her other meds.

## 2021-10-01 ENCOUNTER — HOME CARE VISIT (OUTPATIENT)
Dept: SCHEDULING | Facility: HOME HEALTH | Age: 71
End: 2021-10-01
Payer: MEDICARE

## 2021-10-01 VITALS
DIASTOLIC BLOOD PRESSURE: 66 MMHG | OXYGEN SATURATION: 99 % | RESPIRATION RATE: 16 BRPM | HEART RATE: 77 BPM | TEMPERATURE: 97.7 F | SYSTOLIC BLOOD PRESSURE: 109 MMHG

## 2021-10-01 VITALS
SYSTOLIC BLOOD PRESSURE: 125 MMHG | TEMPERATURE: 98 F | DIASTOLIC BLOOD PRESSURE: 74 MMHG | HEART RATE: 78 BPM | OXYGEN SATURATION: 98 % | RESPIRATION RATE: 18 BRPM

## 2021-10-01 PROCEDURE — G0157 HHC PT ASSISTANT EA 15: HCPCS

## 2021-10-01 PROCEDURE — G0300 HHS/HOSPICE OF LPN EA 15 MIN: HCPCS

## 2021-10-01 NOTE — HOME HEALTH
SUBJECTIVE: \"The nurse just left. \"    CAREGIVER INVOLVMENT/ASSISTANCE NEEDED FOR meals, medications, ADLs,, mobility and transportation    LIVING SITUATION: patient lives with her boyfriend in a first floor single level apartment. her boyfriend is her main caregiver    [id number], shower chair, commode, power w/c/scooter     HEP:stand/walk every hour    seated hip flexion, LAQ and ankle pumps 10 reps 3x per day       UPON TEST. Boyfriend reports she just gave pt  10 UNITS INSULAN    OBJECTIVE: SEE INTERVENTIONS    GAIT: Pt amb in level household suraces with no AD with decreased erne, genu valgum noted both knees. decreased heelstrike and toe off but able to clear foot, decreased step length RLE,  and increased trunk lateral flexion/hip hiking to assist with  advancing B LEs close close S/CGA. 20x2. Recommended to pt to always use cane or walker support to increase safety with gait. Pt reports she prefers to use a cane in home and scooter w/c outside    TRANSFERS: Sit to stand from couch and high stool using BUE's to push off, wide TRENA S level.  UPON TEST. Boyfriend states he just gave pt 10 units of insulan. THER EX. Pt instructed in standing ther ex of HEP  AROM with verbal cueing for quality of repitition. Ther ex to include DFPF, HIP FLEXION, HS CURLS, MARCI, SHORT SQUATS 2x10 repititions. . Pt tolerated ther ex with good tolerance to activity. SA02 on RA 99, 82 HR  ADDED STANDING HIP FLEXION, MARCI, DFPF, HS CURLS, SHORT SQUATS 2X10 DAILY    ASSESSMENT OF PROGRESS TOWARDS GOALS: Pt was instructed in standing ther ex program and given written instructions for said exercise. Pt performed all ther ex with good tolerance to activity. Pt was ambulating in home today with no AD with many gait deviations. Pt encouraged to always use an AD during gait to prevent falling. Pt reports she uses a scooter for all outside navigation.     CONTINUED NEED FOR THE FOLLOWING SKILLS;HHPT is medically necessary to address  dx and clinical findings: decreased strength, impaired gait, decreased ability w stair negotiation, increased swelling, decreased transfer status, decreased endurance, decreased balance and decreased safety, increased pain in order to improve functional mobility/quality of life, decrease burden of care, reduce risk for re-hospitalization, work towards patient's personal goals of return to PLOF w decrease risk for falls.     PLAN FOR NEXT VISIT: THER EX, GAIT    THE FOLLOWING DISCHARGE PLANNING WAS DISCUSSED WITH THE PATIENT/CAREGIVERDischarge to self and family under MD supervision once all goals have been met or patient has reached max potential. Patient/caregiver verbalized understanding

## 2021-10-01 NOTE — HOME HEALTH
Skilled reason for visit: Skilled nursing assessment, medication review and education    Caregiver involvement:  is in the home to assist..    Medications reviewed and all medications are available in the home this visit. The following education was provided regarding medications, medication interactions, and look alike medications (specify): Education was provided on carvedilol and to monitor for dizziness and lightheadedness. Medications  are effective at this time. Home health supplies by type and quantity ordered/delivered this visit include: n/a    Patient education provided this visit: Patient educated on how to use her new glucose monitor. Lifestyle Ivy 14   Skilled Care Performed this visit: Skilled nursing assessment and medication education    Patient's Progress towards personal goals:Patient continues to work toward discharge and complete goals. Home exercise program: Deep breathing exercises to expand the lungs and prevent pneumonia    Continued need for the following skills: Nursing    Plan for next visit: Continue to work toward completing goals and manage medications independently. Patient and/or caregiver notified and agrees to changes in the Plan of Care N/A      The following discharge planning was discussed with the pt/caregiver: Discharge patient when goals are met.

## 2021-10-04 ENCOUNTER — HOME CARE VISIT (OUTPATIENT)
Dept: SCHEDULING | Facility: HOME HEALTH | Age: 71
End: 2021-10-04
Payer: MEDICARE

## 2021-10-04 VITALS
SYSTOLIC BLOOD PRESSURE: 121 MMHG | TEMPERATURE: 98.1 F | RESPIRATION RATE: 16 BRPM | OXYGEN SATURATION: 99 % | DIASTOLIC BLOOD PRESSURE: 69 MMHG | HEART RATE: 72 BPM

## 2021-10-04 PROCEDURE — G0157 HHC PT ASSISTANT EA 15: HCPCS

## 2021-10-04 NOTE — HOME HEALTH
SUBJECTIVE: \"The nurse just left. \"         CAREGIVER INVOLVMENT/ASSISTANCE NEEDED FOR meals, medications, ADLs,, mobility and transportation         LIVING SITUATION: patient lives with her boyfriend in a first floor single level apartment. her boyfriend is her main caregiver         [address], shower chair, commode, power w/c/scooter          HEP:stand/walk every hour         seated hip flexion, LAQ and ankle pumps 10 reps 3x per day                    OBJECTIVE: SEE INTERVENTIONS         GAIT: Pt started to amb with no AD but was reminded to always use cane or walker in the home. Pt amb in level household suraces with 1 SPC suppport with decreased rene, genu valgum noted both knees. decreased heelstrike and toe off but able to clear foot, decreased step length RLE,  and very slightly  increased trunk lateral flexion/hip hiking to assist with  advancing B LEs close close S level household surfaces. Pt uses motorized chair for all navigation outside. Recommended to pt to always use cane or walker support to increase safety with gait. TRANSFERS: Sit to stand from couch , high stool, commode and tub transfer using BUE's to push off, pt stepping into tub with handails and has a shower bench,  MOD I. GOAL MET FOR ALL TRANSFERS IN HOME.              this am  per pt and Boyfriend . THER EX. Review of HEP. Pt reports she is performing HEP daily. ASSESSMENT OF PROGRESS TOWARDS GOALS: Pt continues to make progress with therapy due to compliance with HEP as reported by pt and boyfriend. Pt has also met goals for all tranfers in the home. Pt is often leaving cane behind to walk in apt with no AD, reporting that she does not feel she needs it. Pt educated that I feel that she does need the cane to increase safety and also give one more contact on floor to increase stability, and I recommend her to use it 100% of time in the home.  Pt verbalizes understanding and reports she will try to remember. . Pt reports she uses a scooter for all outside navigation. CONTINUED NEED FOR THE FOLLOWING SKILLS;HHPT is medically necessary to address  dx and clinical findings: decreased strength, impaired gait, decreased ability w stair negotiation, increased swelling, decreased transfer status, decreased endurance, decreased balance and decreased safety, increased pain in order to improve functional mobility/quality of life, decrease burden of care, reduce risk for re-hospitalization, work towards patient's personal goals of return to PLOF w decrease risk for falls.          PLAN FOR NEXT VISIT: BALANCE: Increase static stance balance by completing a 3 minute activity with weight shifts    LTG patient will demonstrate improved dynamic balance as evidenced by completion of a 4 minutes dynamic activity involving changes in direction, surface changes, reaching in/out base of support without loss of balance THER EX, GAIT         THE FOLLOWING DISCHARGE PLANNING WAS DISCUSSED WITH THE PATIENT/CAREGIVERDischarge to self and family under MD supervision once all goals have been met or patient has reached max potential. Patient/caregiver verbalized understanding

## 2021-10-05 ENCOUNTER — HOME CARE VISIT (OUTPATIENT)
Dept: SCHEDULING | Facility: HOME HEALTH | Age: 71
End: 2021-10-05
Payer: MEDICARE

## 2021-10-05 PROCEDURE — G0299 HHS/HOSPICE OF RN EA 15 MIN: HCPCS

## 2021-10-06 ENCOUNTER — HOME CARE VISIT (OUTPATIENT)
Dept: HOME HEALTH SERVICES | Facility: HOME HEALTH | Age: 71
End: 2021-10-06
Payer: MEDICARE

## 2021-10-06 VITALS
DIASTOLIC BLOOD PRESSURE: 67 MMHG | TEMPERATURE: 98.5 F | HEART RATE: 67 BPM | SYSTOLIC BLOOD PRESSURE: 121 MMHG | OXYGEN SATURATION: 100 % | RESPIRATION RATE: 16 BRPM

## 2021-10-06 NOTE — HOME HEALTH
next appt NP Damari 10/20/2021 1345  On 09/27 pt stopped taking per NP Damari famotidine 20mg furosemide 20mg losartan 50mg metformin 1,000mg and pantoprazole 40mg    appt with cardiology Amy 10/13  pt has freestyle flavio 14 day reader and sensor kit started 09/29. no complications noted    Med profile updated and complete  Skilled reason for visit: VS, Meds, BS   Caregiver involvement: patients cg is spouse/bf and is available as needed or asistance with IADL's, ADL's, meal prep, medication management, and taking patient to all doctors appointments. Medications reviewed and all medications are available in the home this visit. Pending PCP appt 09/29   The following education was provided regarding medications, medication interactions, and look alike medications (specify): Eliquis   Medications are somewhat effective at this time. Home health supplies by type and quantity ordered/delivered this visit include: NA   Patient education provided this visit:MEDICATION ADHERENCE IS AN IMPORTANT COMPONENT OF HTN MANAGEMENT. PATIENT STATES THE IMPORTANCE TO TAKE HTN MEDS SAME TIME EACH DAY. pt instructed to follow with diabetic diet- monitoring sugar intake, limiting foods with high sugar content. discussed diabetic diet, s/s of hypoglycemia, hyperglycemia- who to report to/when, DM management with glucometer and BS checks, insulin management, preparation and injection administration. patient/cg instructed to monitor for edema/increase in edema, to elevate extremity when edema occurs and to notify md if edema exceeds normal limits for patient. patient to follow eliquis regimen and to monitor for s/s of [EXCESSIVE BLEDDING, BRUSING, BLEEDING GUMS, BLACK TARY STOOLS, BLOODY STOOLS] and report to MD.Instruct patient/caregiver in methods to conserve energy. TAKE FREQUENT BREAKS, USE ASSISTIVE DEVICE.  Reviewed signs and symptoms of a stroke with patient/cg: slurred speech, facail drooping on one side, right or left sided weakness. Patient is a fall risk. Educated pateint to sit on the side of the chair/bed, take a slow deep breaths, have feet firmly planted before standing up, use cane/walker/wheelchair if available, or have someone to assist.   Progress toward goals: PATIENT IS STEADILY PROGRESSING TOWARDS GOALS, STILL NEEDS REINFORCEMENT/ENCOURAGEMENT. WILL CONTINUE TO MONITOR. Home exercise program: activity as tolerated, trying to get physical activity 4-5 x weekly. stopping activity if causing shortness of breath or chest pain, dizziness or weakness, AND PER PT ORDERS. Continued need for the following skills: Nursing   Patient and/or caregiver notified and agrees to changes in the Plan of Care N/A   The following discharge planning was discussed with the pt/caregiver: Patient will be discharged once education is complete, pt is medically stable and is able to independently manage dressing change/incisional care.  Pt is aware that they will be discharged

## 2021-10-11 ENCOUNTER — HOME CARE VISIT (OUTPATIENT)
Dept: SCHEDULING | Facility: HOME HEALTH | Age: 71
End: 2021-10-11
Payer: MEDICARE

## 2021-10-11 VITALS
HEART RATE: 79 BPM | SYSTOLIC BLOOD PRESSURE: 102 MMHG | TEMPERATURE: 98.2 F | RESPIRATION RATE: 16 BRPM | OXYGEN SATURATION: 99 % | DIASTOLIC BLOOD PRESSURE: 72 MMHG

## 2021-10-11 PROCEDURE — G0157 HHC PT ASSISTANT EA 15: HCPCS

## 2021-10-11 NOTE — HOME HEALTH
SUBJECTIVE: Pt states, \"I'm sorry I missed a visit last week. We went to [facility] and I got diarrea. \"  CAREGIVER INVOLVMENT/ASSISTANCE NEEDED FOR meals, medications, ADLs,, mobility and transportation  LIVING SITUATION: patient lives with her boyfriend in a first floor single level apartment. her boyfriend is her main caregiver  [address], shower chair, commode, power w/c/scooter   HEP:stand/walk every hour  seated hip flexion, LAQ and ankle pumps 10 reps 3x per day   OBJECTIVE: SEE INTERVENTIONS  GAIT: GAIT:  Pt amb in level household suraces with 1 SPC suppport with decreased rene, genu valgum noted both knees. decreased heelstrike and toe off but able to clear foot, equal step length,  and very slightly  increased trunk lateral flexion/hip hiking to assist with  advancing B LEs  MOD I level household surfaces. Pt uses motorized chair for all navigation outside. Recommended to pt to always use cane or walker support to increase safety with gait. BALANCE: Pt instructed in static and mild dynamic balance activities from standing position with varying 1 UE support at [facility] or no AD. Pt instructed in reaching activities from L to R and high and low surface, alternating R and LUE and crossing midline to of body, reaching BUE's up and over head with no UE support, and stepping forward and backward via short lunges L to RLE 2x10. Pt able to perform all tasks with G to G- balance notes 4.5 minutes . SA02 on RA 99%, HR 76 BPM  TRANSFERS: Sit to stand from couch , high stool, commode and tub transfer using BUE's to push off, pt stepping into tub with handails and has a shower bench,  MOD I. GOAL MET FOR ALL TRANSFERS IN HOME.    this am  per pt and Boyfriend . THER EX. Review of HEP. Pt reports she is performing HEP daily.    ASSESSMENT OF PROGRESS TOWARDS GOALS: Pt has made good progress with therapy by GOAL MET for gait in home, short distances with cane or walker support, and also GOAL met for transfers in home  Pt performed static and mild dynamic balance activity today with good tolerance to activity and G to G- balance noted, and no LOB. Pt educated to continue with HEP as directed , to maintain strength. . Pt verbalizes understanding and reports she will try to remember. . Pt reports she uses a scooter for all outside navigation. CONTINUED NEED FOR THE FOLLOWING SKILLS;HHPT is medically necessary to address  dx and clinical findings: decreased strength,  decreased ability w stair negotiation, decreased endurance, decreased balance and decreased safety, increased pain in order to improve functional mobility/quality of life, decrease burden of care, reduce risk for re-hospitalization, work towards patient's personal goals of return to PLOF w decrease risk for falls. PT SIGNED [place]. Pt was aware of discharge and agrees. Pt missed visit last week.      PLAN FOR NEXT VISIT: FINAL PT MMT, FINAL PT TINETTI BALANCE TEST, FINAL PT DISCHARGE         LTG patient will demonstrate improved dynamic balance as evidenced by completion of a 4 minutes dynamic activity involving changes in direction, surface changes, reaching in/out base of support without loss of balance THER EX, GAIT                   THE FOLLOWING DISCHARGE PLANNING WAS DISCUSSED WITH THE PATIENT/CAREGIVERDischarge to self and family under MD supervision once all goals have been met or patient has reached max potential. Patient/caregiver verbalized understanding

## 2021-10-11 NOTE — Clinical Note
ASSESSMENT OF PROGRESS TOWARDS GOALS: Pt has made good progress with therapy by GOAL MET for gait in home, short distances with cane or walker support, and also GOAL met for transfers in home  Pt performed static and mild dynamic balance activity today with good tolerance to activity and G to G- balance noted, and no LOB. Pt educated to continue with HEP as directed , to maintain strength. . Pt verbalizes understanding and reports she will try to remember. . Pt reports she uses a scooter for all outside navigation.

## 2021-10-11 NOTE — Clinical Note
You are very welcome:)  ----- Message -----  From: Cee Daomn NP  Sent: 10/12/2021   1:48 PM EDT  To: Zaida Cormier PTA      Thank you   ----- Message -----  From: Juwan Marquez PTA  Sent: 10/12/2021   8:43 AM EDT  To: Jennifer Laura NP    ASSESSMENT OF PROGRESS TOWARDS GOALS: Pt has made good progress with therapy by GOAL MET for gait in home, short distances with cane or walker support, and also GOAL met for transfers in home  Pt performed static and mild dynamic balance activity today with good tolerance to activity and G to G- balance noted, and no LOB. Pt educated to continue with HEP as directed , to maintain strength. . Pt verbalizes understanding and reports she will try to remember. . Pt reports she uses a scooter for all outside navigation.

## 2021-10-13 ENCOUNTER — OFFICE VISIT (OUTPATIENT)
Dept: CARDIOLOGY CLINIC | Age: 71
End: 2021-10-13
Payer: MEDICARE

## 2021-10-13 VITALS
SYSTOLIC BLOOD PRESSURE: 142 MMHG | WEIGHT: 242 LBS | HEART RATE: 67 BPM | OXYGEN SATURATION: 100 % | DIASTOLIC BLOOD PRESSURE: 61 MMHG | TEMPERATURE: 97.2 F | BODY MASS INDEX: 39.06 KG/M2 | RESPIRATION RATE: 16 BRPM

## 2021-10-13 DIAGNOSIS — I35.0 AORTIC VALVE STENOSIS, ETIOLOGY OF CARDIAC VALVE DISEASE UNSPECIFIED: Primary | ICD-10-CM

## 2021-10-13 PROCEDURE — G9899 SCRN MAM PERF RSLTS DOC: HCPCS | Performed by: INTERNAL MEDICINE

## 2021-10-13 PROCEDURE — G8754 DIAS BP LESS 90: HCPCS | Performed by: INTERNAL MEDICINE

## 2021-10-13 PROCEDURE — 1090F PRES/ABSN URINE INCON ASSESS: CPT | Performed by: INTERNAL MEDICINE

## 2021-10-13 PROCEDURE — G8427 DOCREV CUR MEDS BY ELIG CLIN: HCPCS | Performed by: INTERNAL MEDICINE

## 2021-10-13 PROCEDURE — G8417 CALC BMI ABV UP PARAM F/U: HCPCS | Performed by: INTERNAL MEDICINE

## 2021-10-13 PROCEDURE — G8432 DEP SCR NOT DOC, RNG: HCPCS | Performed by: INTERNAL MEDICINE

## 2021-10-13 PROCEDURE — 99214 OFFICE O/P EST MOD 30 MIN: CPT | Performed by: INTERNAL MEDICINE

## 2021-10-13 PROCEDURE — 3017F COLORECTAL CA SCREEN DOC REV: CPT | Performed by: INTERNAL MEDICINE

## 2021-10-13 PROCEDURE — G8399 PT W/DXA RESULTS DOCUMENT: HCPCS | Performed by: INTERNAL MEDICINE

## 2021-10-13 PROCEDURE — 1101F PT FALLS ASSESS-DOCD LE1/YR: CPT | Performed by: INTERNAL MEDICINE

## 2021-10-13 PROCEDURE — G8753 SYS BP > OR = 140: HCPCS | Performed by: INTERNAL MEDICINE

## 2021-10-13 PROCEDURE — G8536 NO DOC ELDER MAL SCRN: HCPCS | Performed by: INTERNAL MEDICINE

## 2021-10-13 NOTE — PROGRESS NOTES
Gena Ojeda presents today for   Chief Complaint   Patient presents with   [facility] Follow Up       Gena Ojeda preferred language for health care discussion is english/other. Personal Protective Equipment:   Personal Protective Equipment was used including: mask-surgical and hands-gloves. Patient was placed on no precaution(s). Patient was masked. Precautions:   Patient currently on None  Patient currently roomed with door closed    Is someone accompanying this pt? Yes male friend    Is the patient using any DME equipment during [address]? Yes cane    Depression Screening:  3 most recent PHQ Screens 10/13/2021   Little interest or pleasure in doing things Not at all   Feeling down, depressed, irritable, or hopeless Not at all   Total Score PHQ 2 0       Learning Assessment:  Learning Assessment 8/25/2021   PRIMARY LEARNER Patient   HIGHEST LEVEL OF EDUCATION - PRIMARY LEARNER  -   BARRIERS PRIMARY LEARNER NONE   CO-LEARNER CAREGIVER No   PRIMARY LANGUAGE ENGLISH   LEARNER PREFERENCE PRIMARY DEMONSTRATION     -     -   ANSWERED BY patient   RELATIONSHIP SELF       Abuse Screening:  Abuse Screening Questionnaire 6/15/2020   Do you ever feel afraid of your partner? N   Are you in a relationship with someone who physically or mentally threatens you? N   Is it safe for you to go home? Y       Fall Risk  Fall Risk Assessment, last 12 mths 10/13/2021   Able to walk? Yes   Fall in past 12 months? -   Do you feel unsteady? -   Are you worried about falling -       Pt currently taking Anticoagulant therapy? no    Coordination of Care:  1. Have you been to the ER, urgent care clinic since your last visit? Hospitalized since your last visit? yes    2. Have you seen or consulted any other health care providers outside of the [address] [place] [address] since your last visit? Include any pap smears or colon screening.  no

## 2021-10-14 ENCOUNTER — HOME CARE VISIT (OUTPATIENT)
Dept: SCHEDULING | Facility: HOME HEALTH | Age: 71
End: 2021-10-14
Payer: MEDICARE

## 2021-10-14 VITALS
TEMPERATURE: 96.8 F | DIASTOLIC BLOOD PRESSURE: 65 MMHG | RESPIRATION RATE: 15 BRPM | SYSTOLIC BLOOD PRESSURE: 152 MMHG | OXYGEN SATURATION: 96 % | HEART RATE: 85 BPM

## 2021-10-14 PROCEDURE — G0151 HHCP-SERV OF PT,EA 15 MIN: HCPCS

## 2021-10-14 NOTE — Clinical Note
awesome. Thanks!  ----- Message -----  From: Kierra Noriega PT  Sent: 10/14/2021   9:06 PM EDT  To: Zaida Cormier PTA      Patient has been seen for home care physical therapy following a hospitalization for metabolic encephalopathy. She has made great progress in all areas. She is independent with bed mobility and all transfers in the home to/from the chair, recliner, toilet and shower. She is ambulating in the home with or without a cane with independence while demonstrating a symmetrical gait pattern and full foot clearance. When going outside continue to recommend use of the cane and supervision due to uneven surfaces. Her tinetti balance score improved from 15/28 (high fall risk) to 25/28 (low fall risk) with a greater than a 4 point increase in the tinetti score indicating a statistically significant reduction in fall risk. She continues to have significant pain in the R knee due to osteoarthritis per her report. She is independent with her ADLs at this time. She has been educated on safety, HEP and the need to continue to walk throughout the day. She is ready for discharge from home care PT at this time.

## 2021-10-14 NOTE — Clinical Note
Patient has been seen for home care physical therapy following a hospitalization for metabolic encephalopathy. She has made great progress in all areas. She is independent with bed mobility and all transfers in the home to/from the chair, recliner, toilet and shower. She is ambulating in the home with or without a cane with independence while demonstrating a symmetrical gait pattern and full foot clearance. When going outside continue to recommend use of the cane and supervision due to uneven surfaces. Her tinetti balance score improved from 15/28 (high fall risk) to 25/28 (low fall risk) with a greater than a 4 point increase in the tinetti score indicating a statistically significant reduction in fall risk. She continues to have significant pain in the R knee due to osteoarthritis per her report. She is independent with her ADLs at this time. She has been educated on safety, HEP and the need to continue to walk throughout the day. She is ready for discharge from home care PT at this time.

## 2021-10-15 NOTE — HOME HEALTH
S: patient reports that she has been doing really well and is independent in the home   sugar 135 this morning     O: PAIN: L knee is painful - 10/10 at the worst  0/10 at night when sleeping   WOUND:no wounds noted or reported     ROM: B hip flexion, abduction, adduction WFL  B knee flexion, extension WFL     STRENGTH: R knee ext 4-/5, R knee flexion 4-/5, R hip flex 3+/5, R hip abd/adduction 4-/5 AT EVALUATION  L knee flexion 4-/5 and extension 4-/5, L hip flexion 3+/5 abduction and adduction 4-/5 AT EVALUATION  R knee ext 4+/5, R knee flexion 4+/5, R hip flex 4+/5, R hip abd/adduction 4+/5 AT DISCHARGE  L knee flexion 4+/5 and extension 4+/5, L hip flexion 4+/5 abduction and adduction 4+/5 AT DISCHARGE    BED MOBILITY:independent with bed mobility     EQUIPMENT: cane, shower chair, commode, power w/c/scooter    TRANSFERS: independent with all transfers in the home     GAIT: ambulating in the home with or without a cane with independence while demonstrating a symmetrical gait pattern and full foot clearance. When going outside continue to recommend use of the cane and supervision due to uneven surfaces  STEPS: NA     BALANCE: tinetti 15/28 high fall risk  tinetti 25/28 low fall risk at discharge  A:ASSESSMENT 24 Virgen Castillo has been seen for home care physical therapy following a hospitalization for metabolic encephalopathy. She has made great progress in all areas. She is independent with bed mobility and all transfers in the home to/from the chair, recliner, toilet and shower. She is ambulating in the home with or without a cane with independence while demonstrating a symmetrical gait pattern and full foot clearance. When going outside continue to recommend use of the cane and supervision due to uneven surfaces.  Her tinetti balance score improved from 15/28 (high fall risk) to 25/28 (low fall risk) with a greater than a 4 point increase in the tinetti score indicating a statistically significant reduction in fall risk. She continues to have significant pain in the R knee due to osteoarthritis per her report. She is independent with her ADLs at this time. She has been educated on safety, HEP and the need to continue to walk throughout the day. She is ready for discharge from home care PT at this time. P: DC PT    PMH/Summary of clinical condition: per epic \"Khushbu Santoyo is a 73 y.o. female with PMHx prior CVA, seizure d/o, DM2, HTN and others as noted below who is primarily being admitted for Acute metabolic encephalopathy due to hypog lycemia. Presented with confusion for past couple of days, apparently has a chronic hx of intermittent confusion which has been attributed to her underlying seizure disorder, but today when she asked to go to the doctor, they decided to come to the ED for further evaluation. Upon arrival, globally confused, says a few words though seemed to have what looked like receptive aphasia, also \"looking around sort of spacey\", but alleni ng extremities as per ED and given duration of symptoms did not call CVA alert and was also considering seizure disorder for which the ED gave a dose of Ativan and Keppra. Initial delay in getting labs due to difficult vascular access so ultimately the ED placed an EJ line. BG was down to 29, but otherwise labs, UA, CXR, and CT head unrevealing. Patient had run out of test strips about a week ago per the significant other/yao lee, but he was was still giving her insulin at 60 units BID.  BG improved to 209 with 2amps of D50, but still mildly lethargic and not at her baseline, so was referred for admission\"    PMH per epic \" Acute metabolic encephalopathy due to hypoglycemia [G93.41, E16.2]    Morbid obesity (HC C) [E66.01]    Type 2 diabetes mellitus with hypoglycemia and coma, with long-term current use of insulin (HCC) [Z97.224, Z79.4]    Essential hypertension [I10]    H/O medication noncompliance [Z91.14]    History of breast cancer: s/p lumpectomy, chemo, and radiation 2017 (managed by Dr. Lela Dias) [Z85.3]    History of stroke: 2002 and had seizures as a result afterwards [Z86.73]    [facility] ([facility] [G40.909]\"    Medication reconcilliation completed. medications are effective at this time    social history/ caregivers:patient lives with her boyfriend in a first floor single level apartment.  her boyfriend is her main caregiver and assists [facility] meals, medications, ADLs,, mobility and transportation     Patient education provided this visit:safety, fall risk, HEP,     Home exercise program:stand/walk every hour  seated hip flexion, LAQ and ankle pumps 10 reps 3x per day   STANDING HIP FLEXION, MARCI, DFPF, HS CURLS, SHORT SQUATS 2X10 DAILY

## 2021-10-20 ENCOUNTER — OFFICE VISIT (OUTPATIENT)
Dept: FAMILY MEDICINE CLINIC | Age: 71
End: 2021-10-20
Payer: MEDICARE

## 2021-10-20 VITALS
OXYGEN SATURATION: 95 % | WEIGHT: 246 LBS | SYSTOLIC BLOOD PRESSURE: 148 MMHG | BODY MASS INDEX: 39.53 KG/M2 | HEIGHT: 66 IN | HEART RATE: 54 BPM | DIASTOLIC BLOOD PRESSURE: 61 MMHG | TEMPERATURE: 97.5 F | RESPIRATION RATE: 15 BRPM

## 2021-10-20 DIAGNOSIS — E11.40 TYPE 2 DIABETES MELLITUS WITH DIABETIC NEUROPATHY, WITH LONG-TERM CURRENT USE OF INSULIN (HCC): ICD-10-CM

## 2021-10-20 DIAGNOSIS — Z79.4 TYPE 2 DIABETES MELLITUS WITH DIABETIC NEUROPATHY, WITH LONG-TERM CURRENT USE OF INSULIN (HCC): ICD-10-CM

## 2021-10-20 DIAGNOSIS — Z09 HOSPITAL DISCHARGE FOLLOW-UP: ICD-10-CM

## 2021-10-20 DIAGNOSIS — Z86.73 HISTORY OF CVA (CEREBROVASCULAR ACCIDENT): ICD-10-CM

## 2021-10-20 DIAGNOSIS — G40.909 SEIZURE DISORDER (HCC): ICD-10-CM

## 2021-10-20 DIAGNOSIS — Z79.4 TYPE 2 DIABETES MELLITUS WITH DIABETIC NEUROPATHY, WITH LONG-TERM CURRENT USE OF INSULIN (HCC): Primary | ICD-10-CM

## 2021-10-20 DIAGNOSIS — E11.40 TYPE 2 DIABETES MELLITUS WITH DIABETIC NEUROPATHY, WITH LONG-TERM CURRENT USE OF INSULIN (HCC): Primary | ICD-10-CM

## 2021-10-20 PROCEDURE — G8753 SYS BP > OR = 140: HCPCS | Performed by: NURSE PRACTITIONER

## 2021-10-20 PROCEDURE — G8432 DEP SCR NOT DOC, RNG: HCPCS | Performed by: NURSE PRACTITIONER

## 2021-10-20 PROCEDURE — G8427 DOCREV CUR MEDS BY ELIG CLIN: HCPCS | Performed by: NURSE PRACTITIONER

## 2021-10-20 PROCEDURE — G8754 DIAS BP LESS 90: HCPCS | Performed by: NURSE PRACTITIONER

## 2021-10-20 PROCEDURE — 99212 OFFICE O/P EST SF 10 MIN: CPT | Performed by: NURSE PRACTITIONER

## 2021-10-20 PROCEDURE — 2022F DILAT RTA XM EVC RTNOPTHY: CPT | Performed by: NURSE PRACTITIONER

## 2021-10-20 PROCEDURE — G8399 PT W/DXA RESULTS DOCUMENT: HCPCS | Performed by: NURSE PRACTITIONER

## 2021-10-20 PROCEDURE — G8536 NO DOC ELDER MAL SCRN: HCPCS | Performed by: NURSE PRACTITIONER

## 2021-10-20 PROCEDURE — G9899 SCRN MAM PERF RSLTS DOC: HCPCS | Performed by: NURSE PRACTITIONER

## 2021-10-20 PROCEDURE — 3052F HG A1C>EQUAL 8.0%<EQUAL 9.0%: CPT | Performed by: NURSE PRACTITIONER

## 2021-10-20 PROCEDURE — 1090F PRES/ABSN URINE INCON ASSESS: CPT | Performed by: NURSE PRACTITIONER

## 2021-10-20 PROCEDURE — G8417 CALC BMI ABV UP PARAM F/U: HCPCS | Performed by: NURSE PRACTITIONER

## 2021-10-20 PROCEDURE — 1101F PT FALLS ASSESS-DOCD LE1/YR: CPT | Performed by: NURSE PRACTITIONER

## 2021-10-20 PROCEDURE — 3017F COLORECTAL CA SCREEN DOC REV: CPT | Performed by: NURSE PRACTITIONER

## 2021-10-20 RX ORDER — FLASH GLUCOSE SENSOR
KIT MISCELLANEOUS
Qty: 1 KIT | Refills: 3 | Status: SHIPPED | OUTPATIENT
Start: 2021-10-20 | End: 2021-10-20 | Stop reason: SDUPTHER

## 2021-10-20 RX ORDER — DIVALPROEX SODIUM 500 MG/1
1000 TABLET, DELAYED RELEASE ORAL EVERY 12 HOURS
Qty: 180 TABLET | Refills: 1 | Status: SHIPPED | OUTPATIENT
Start: 2021-10-20 | End: 2022-05-05 | Stop reason: SDUPTHER

## 2021-10-20 RX ORDER — FLASH GLUCOSE SENSOR
KIT MISCELLANEOUS
Qty: 1 KIT | Refills: 3 | Status: SHIPPED | OUTPATIENT
Start: 2021-10-20 | End: 2022-01-24 | Stop reason: SDUPTHER

## 2021-10-20 RX ORDER — INSULIN ASPART 100 [IU]/ML
10 INJECTION, SUSPENSION SUBCUTANEOUS EVERY 12 HOURS
Qty: 5 PEN | Refills: 2 | Status: SHIPPED | OUTPATIENT
Start: 2021-10-20 | End: 2022-01-24 | Stop reason: SDUPTHER

## 2021-10-20 RX ORDER — FLASH GLUCOSE SENSOR
KIT MISCELLANEOUS
Qty: 1 KIT | Refills: 3 | Status: SHIPPED | OUTPATIENT
Start: 2021-10-20 | End: 2021-10-20 | Stop reason: CLARIF

## 2021-10-20 NOTE — PROGRESS NOTES
Zenon Rivers presents today for   Chief Complaint   Patient presents with    Follow-up     Visit Vitals  BP (!) 148/61 (BP 1 Location: Left upper arm, BP Patient Position: Sitting, BP Cuff Size: Large adult)   Pulse (!) 54   Temp 97.5 °F (36.4 °C) (Temporal)   Resp 15   Ht 5' 6\" (1.676 m)   Wt 246 lb (111.6 kg)   SpO2 95%   BMI 39.71 kg/m²       Is someone accompanying this pt? Yes     Is the patient using any DME equipment during OV? Yes     Depression Screening:  3 most recent PHQ Screens 10/13/2021   Little interest or pleasure in doing things Not at all   Feeling down, depressed, irritable, or hopeless Not at all   Total Score PHQ 2 0       Learning Assessment:  Learning Assessment 8/25/2021   PRIMARY LEARNER Patient   HIGHEST LEVEL OF EDUCATION - PRIMARY LEARNER  -   BARRIERS PRIMARY LEARNER NONE   CO-LEARNER CAREGIVER No   PRIMARY LANGUAGE ENGLISH   LEARNER PREFERENCE PRIMARY DEMONSTRATION     -     -   ANSWERED BY patient   RELATIONSHIP SELF       Travel Screening:    Travel Screening     Question   Response    In the last month, have you been in contact with someone who was confirmed or suspected to have [address] / COVID-19? No / Unsure    Have you had a COVID-19 viral test in the last 14 days? No    Do you have any of the following new or worsening symptoms? None of these    Have you traveled internationally or domestically in the last month? No      Travel History   Travel since 09/20/21     No documented travel since 09/20/21          Health Maintenance reviewed and discussed and ordered per Provider. Health Maintenance Due   Topic Date Due    Shingrix Vaccine Age 49> (1 of 2) Never done    Pneumococcal 65+ years (2 of 2 - PPSV23) 02/04/2020    Flu Vaccine (1) 09/01/2021    COVID-19 Vaccine (3 - Pfizer booster) 10/22/2021   . Coordination of Care:  1. Have you been to the ER, urgent care clinic since your last visit? Hospitalized since your last visit? no    2.  Have you seen or consulted any other health care providers outside of the [address] [place] [address] since your last visit? Include any pap smears or colon screening.  no

## 2021-10-20 NOTE — PROGRESS NOTES
OFFICE NOTE    Yovanny Barrera is a 73 y.o. female presenting today for office visit. 10/20/2021  2:42 PM    Chief Complaint   Patient presents with    Follow-up       HPI:   Patient in office for type 2 diabetes follow-up. Last Friday patient's blood sugar was 129. Her FreeStyle Steffany sensor placed wrong and not reading. correctly. Patient has a neurology appt in Jan 2021. Patient sess Dr. Irma Michaelots with cardiology. She was ordered to heart monitor. Patient reports appetite is good, no urinary issues, sleeps well and regular bowel movements. Patient denies fever, chills, chest pain, shortness of breath, abdomen pain or dark tarry stool. ROS:    · General: negative for - chills, fever, weight changes or malaise  · HEENT: no sore throat, nasal congestion, vision problems or ear problems  · Respiratory: no cough, shortness of breath, or wheezing  · Cardiovascular: no chest pain, palpitations, or dyspnea on exertion  · Gastrointestinal: no abdominal pain, N/V, change in bowel habits, or black or bloody stools  · Musculoskeletal: no back pain, joint pain, joint stiffness, muscle pain or muscle weakness  · Neurological: no numbness, tingling, headache or dizziness  · Endo:  No polyuria or polydipsia. · : no hematuria, dysuria, frequency, hesitancy, or nocturia.     · Skin: No itching or rash, no open skin, no unusual lesions   · Psychological: negative for - anxiety, depression, sleep disturbances, suicidal or homicidal ideations     PHQ Screening   3 most recent PHQ Screens 10/20/2021   Little interest or pleasure in doing things Not at all   Feeling down, depressed, irritable, or hopeless -   Total Score PHQ 2 -       History  Past Medical History:   Diagnosis Date    Arthritis     [facility]     Breast CA [facility] 2016    DDD (degenerative disc disease), cervical     Diabetes ([facility]     DJD (degenerative joint disease) of cervical spine     Heart murmur     History of noncompliance with medical treatment     Hypercholesteremia     Hypertension     Menopause     Mild diastolic dysfunction     ECHO 5/2020     PE [facility] throm[facility]embolism) [facility] 2019    Psychiatric disorder     DEPRESSION    PUD (peptic ulcer disease)     S/P cardiac cath 02/2015    No angiographic evidence of CAD    Seizures (HCC)     LAST SEIZURE ABOUT 2 MONTHS AGO (NOV 2015)    Stroke ([facility]     x2 with left side deficit       Past Surgical History:   Procedure Laterality Date    HX BREAST LUMPECTOMY Right 1/12/2016    Dr. Nancy Patino Partial Mastectomy w./ axillary lymphadenectomy    HX HEENT      TONSILLECTOMY    HX MASTECTOMY Right 1/26/2016    Dr. Nancy Patino Repeat Partial Mastectomy for positive margins    HX ORTHOPAEDIC      left arm surg    IR MEDIPORT         Social History     Socioeconomic History    Marital status: SINGLE     Spouse name: Not on file    Number of children: Not on file    Years of education: Not on file    Highest education level: Not on file   Occupational History    Not on file   Tobacco Use    Smoking status: Never Smoker    Smokeless tobacco: Never Used   Substance and Sexual Activity    Alcohol use: No    Drug use: No    Sexual activity: Yes     Partners: Male   Other Topics Concern     Service No    Blood Transfusions No    Caffeine Concern No    Occupational Exposure No    Hobby Hazards No    Sleep Concern Yes    Stress Concern No    Weight Concern Not Asked    Special Diet Not Asked    Back Care Not Asked    Exercise Yes     Comment: walks using cane    Bike Helmet Not Asked    Seat Belt Yes    Self-Exams Yes   Social History Narrative    Not on file     Social Determinants of Health     Financial Resource Strain:     Difficulty of Paying Living Expenses:    Food Insecurity:     Worried About Running Out of Food in the Last Year:     Ran Out of Food in the Last Year:    Transportation Needs:     Lack of Transportation (Medical):      Lack of Transportation (Non-Medical):    Physical Activity:     Days of Exercise per Week:     Minutes of Exercise per Session:    Stress:     Feeling of Stress :    Social Connections:     Frequency of Communication with Friends and Family:     Frequency of Social Gatherings with Friends and Family:     Attends Confucianism Services:     Active Member of Clubs or Organizations:     Attends Club or Organization Meetings:     Marital Status:    Intimate Partner Violence:     Fear of Current or Ex-Partner:     Emotionally Abused:     Physically Abused:     Sexually Abused:        No Known Allergies    Current Outpatient Medications   Medication Sig Dispense Refill    flash glucose sensor (FreeStyle Ivy 14 Day Sensor) kit Use to check blood sugar 4x/day and as needed. 1 Kit 3    divalproex DR (DEPAKOTE) 500 mg tablet Take 2 Tablets by mouth every twelve (12) hours. 180 Tablet 1    insulin aspart protamine/insulin aspart (NOVOLOG MIX 70/30) 100 unit/mL (70-30) inpn 10 Units by SubCUTAneous route every twelve (12) hours. 5 Pen 2    glucose blood VI test strips (OneTouch Verio test strips) strip Check your blood sugar in the mornings and when you have symptoms of hypoglycemia 100 Strip 5    apixaban (ELIQUIS) 5 mg tablet Take 5 mg by mouth two (2) times a day.  ondansetron (ZOFRAN ODT) 4 mg disintegrating tablet Take 4 mg by mouth every eight (8) hours as needed.  atorvastatin (LIPITOR) 40 mg tablet Take 1 Tablet by mouth daily. 90 Tablet 1    flash glucose scanning reader (FreeStyle Ivy 14 Day [place]) [facility] Use to check blood sugar 4x/day and as needed. 1 Each 0    isosorbide mononitrate ER (Imdur) 30 mg tablet Take 1 Tablet by mouth daily. 90 Tablet 1    hydroCHLOROthiazide (HYDRODIURIL) 25 mg tablet Take 1 Tablet by mouth daily. 30 Tablet 2    carvediloL (COREG) 12.5 mg tablet Take 1 Tablet by mouth two (2) times daily (with meals).  60 Tablet 2    traZODone (DESYREL) 50 mg tablet Take 1 Tablet by mouth nightly as needed for Sleep. 30 Tablet 2    nystatin (MYCOSTATIN) topical cream Apply  to affected area two (2) times a day. 15 g 0    acetaminophen (TYLENOL) 500 mg tablet Take 2 Tablets by mouth three (3) times daily as needed for Pain. 100 Tablet 2    amLODIPine (NORVASC) 5 mg tablet Take 1 Tablet by mouth nightly. 30 Tablet 2    Insulin Needles, Disposable, (TRUEplus Pen Needle) 31 gauge x 5/16\" ndle Use as directed 4 times daily 100 Pen Needle 11    ergocalciferol (ERGOCALCIFEROL) 1,250 mcg (50,000 unit) capsule Take 1 Capsule by mouth every seven (7) days. Indications: vitamin D deficiency (high dose therapy) 8 Capsule 0    diclofenac (VOLTAREN) 1 % gel Apply 2 g to affected area four (4) times daily. 1 Each 0    capsaicin 0.075 % topical cream Apply  to affected area three (3) times daily as needed for Pain. 60 g 6    letrozole (FEMARA) 2.5 mg tablet Take 2.5 mg by mouth daily.  SONAFINE emul topical       therapeutic multivitamin (THERAGRAN) tablet Take 1 Tab by mouth daily. Patient Care Team:  Patient Care Team:  Cee Damon NP as PCP - General (Nurse Practitioner)  Cee Damon NP as PCP - REHABILITATION [facility]  Kenji Parekh NP (Nurse Practitioner)  Jesus Nava MD (General Surgery)  Raji Way, RN as Nurse Navigator (Oncology)  Radha Gonzalez MD (Pulmonary Disease)  Danita Franco MD (Surgery)  Kate Walker MD (Breast Surgery)    LABS:  None new to review    RADIOLOGY:  None new to review    Physical Exam  Patient appears well, she is pleasant, alert, oriented x 3, in no distress. ENT normal.  Neck supple. No adenopathy or thyromegaly. SUBHASH. Lungs are clear, good air entry, no wheezes, rhonchi or rales. Cardiovascular, S1 and S2 normal, no murmurs, regular rate and rhythm. No LAD. Chest wall negative for tenderness  Abdomen is soft without tenderness, guarding, mass or organomegaly. /Anorectal, deferred.   Muscleskeletal, no swelling, no tenderness, no injury. Extremities show no edema, normal peripheral pulses. Neurological is normal without focal findings. Skin: no concerning lesions. Psych: normal affect. Mood good. Oriented x 3. Judgement and insight intact. Vitals:    10/20/21 1345 10/20/21 1416   BP: (!) 146/67 (!) 148/61   Pulse: (!) 54    Resp: 15    Temp: 97.5 °F (36.4 °C)    TempSrc: Temporal    SpO2: 95%    Weight: 246 lb (111.6 kg)    Height: 5' 6\" (1.676 m)    PainSc:   0 - No pain        Assessment and Plan    Diagnoses and all orders for this visit:    Type 2 diabetes mellitus with diabetic neuropathy, with long-term current use of insulin ([facility])  -     Discontinue: flash glucose sensor (FreeStyle Ivy 14 Day Sensor) kit; Use to check blood sugar 4x/day and as needed., Normal, Disp-1 Kit, R-3  -     REFERRAL TO HOME HEALTH  -     flash glucose sensor (FreeStyle Ivy 14 Day Sensor) kit; Use to check blood sugar 4x/day and as needed., Normal, Disp-1 Kit, R-3  -     insulin aspart protamine/insulin aspart (NOVOLOG MIX 70/30) 100 unit/mL (70-30) inpn; 10 Units by SubCUTAneous route every twelve (12) hours. , Normal, Disp-5 Pen, R-2    History of CVA (cerebrovascular accident)  -     [facility] discharge follow-up  -     REFERRAL TO HOME HEALTH    Se[facility]ure disorder ([email or web address]  -     divalproex DR (DEPAKOTE) 500 mg tablet; Take 2 Tablets by mouth every twelve (12) hours. , Normal, Disp-180 Tablet, R-1     *Plan of care reviewed with patient. Patient in agreement with plan and expresses understanding. All questions answered and patient encouraged to call or RTO if further questions or concerns. 50% of 15 minutes spent on counseling and managing her care.       Saintclair Harry, NP-C

## 2021-10-22 DIAGNOSIS — E11.40 TYPE 2 DIABETES MELLITUS WITH DIABETIC NEUROPATHY, WITH LONG-TERM CURRENT USE OF INSULIN (HCC): ICD-10-CM

## 2021-10-22 DIAGNOSIS — Z79.4 TYPE 2 DIABETES MELLITUS WITH DIABETIC NEUROPATHY, WITH LONG-TERM CURRENT USE OF INSULIN (HCC): ICD-10-CM

## 2021-10-22 RX ORDER — PEN NEEDLE, DIABETIC 30 GX3/16"
NEEDLE, DISPOSABLE MISCELLANEOUS
Qty: 100 PEN NEEDLE | Refills: 11 | Status: SHIPPED | OUTPATIENT
Start: 2021-10-22

## 2021-10-22 RX ORDER — FLASH GLUCOSE SCANNING READER
EACH MISCELLANEOUS
Qty: 1 EACH | Refills: 0 | Status: SHIPPED | OUTPATIENT
Start: 2021-10-22

## 2021-10-22 NOTE — TELEPHONE ENCOUNTER
LOV 10/2021    Future Appointments   Date Time Provider Al Vaughan   11/3/2021  1:30 PM Cee Damon NP [facility] BS AMB   1/17/2022  1:00 PM Aman Barakat MD [facility] BS AMB   2/28/2022  3:00 PM Lissa Bonilla MD [facility] BS AMB

## 2021-10-23 NOTE — PROGRESS NOTES
Subjective:      Kerri Abdul is in the office today for cardiac reevaluation. She is a 53-year-old hypertensive diabetic woman that was referred for further evaluation of chest pain and palpitations. The patient has a history of mild aortic stenosis previously documented on echocardiography in 2019. At her initial visit of 8/25/2021, the patient reported experiencing left-sided chest pain  for greater than 1 year. She described it as \"sharp \" in quality. The discomfort was generally very brief in duration. There had been no clear inciting factors. It had not changed in any particular way over that period of time. She had no shortness of breath. She  had no PND. She report swelling in her left ankle. She reported that she was very sedentary. She described a fast heartbeat at times. She  had no near syncope or syncope. An echocardiogram and 48-hour Holter monitor were ordered. She did have the echocardiogram.  Results are as listed below. For some unknown reason she did not have the monitor. In the office today she reports a recent hospitalization [facility].  She was felt to have had an acute metabolic encephalopathy secondary to hypoglycemia and a breakthrough seizure. She reports feeling better today. Her  says she is \"back to normal \". She still has some occasional right-sided and left-sided chest discomfort primarily at rest.  She reports that \"sometimes I can breathe real good \". She has had no peripheral swelling. She has had no PND or orthopnea. Her furosemide was discontinued and was felt the patient was dehydrated during the time of hospitalization. Patient's cardiac risk factors are dyslipidemia, diabetes mellitus, hypertension.         Patient Active Problem List    Diagnosis Date Noted    Chest pain 01/31/2015    LVH (left ventricular hypertrophy) 09/02/2021    Aortic stenosis, mild 09/02/2021    History of cardiac catheterization 09/02/2021    History of noncompliance with medical treatment     Mild diastolic dysfunction     Chronic headaches 12/21/2019    Elevated d-dimer 12/21/2019    History of recent travel 12/21/2019    Normocytic anemia 12/21/2019    [facility]    Severe obesity (BMI 35.0-39. 9) with comorbidity [facility] 04/17/2018    Type 2 diabetes mellitus with nephropathy [facility] 12/14/2017    Pulmonary nodule, right 11/15/2016    History of breast cancer 11/15/2016    Type 2 diabetes mellitus with diabetic neuropathy, with long-term current use of insulin [facility] 11/10/2016    Altered mental status 10/24/2016    Breast cancer (HCC)-right inferior, 2.2cm,2/18 nodes, + - -, 01/12/2016    Gastroesophageal reflux disease without esophagitis 01/05/2016    Hearing impairment 11/23/2015    History of CVA (cerebrovascular accident) 05/18/2015    Hypercholesteremia 03/04/2015    Seizure disorder ([facility] 02/11/2015    Diabetes mellitus with neuropathy ([facility] 01/30/2015    HTN (hypertension) 01/30/2015     Current Outpatient Medications   Medication Sig Dispense Refill    glucose blood VI test strips (OneTouch Verio test strips) strip Check your blood sugar in the mornings and when you have symptoms of hypoglycemia 100 Strip 5    apixaban (ELIQUIS) 5 mg tablet Take 5 mg by mouth two (2) times a day.  ondansetron (ZOFRAN ODT) 4 mg disintegrating tablet Take 4 mg by mouth every eight (8) hours as needed.  atorvastatin (LIPITOR) 40 mg tablet Take 1 Tablet by mouth daily. 90 Tablet 1    isosorbide mononitrate ER (Imdur) 30 mg tablet Take 1 Tablet by mouth daily. 90 Tablet 1    hydroCHLOROthiazide (HYDRODIURIL) 25 mg tablet Take 1 Tablet by mouth daily. 30 Tablet 2    carvediloL (COREG) 12.5 mg tablet Take 1 Tablet by mouth two (2) times daily (with meals). 60 Tablet 2    traZODone (DESYREL) 50 mg tablet Take 1 Tablet by mouth nightly as needed for Sleep.  30 Tablet 2    nystatin (MYCOSTATIN) topical cream Apply to affected area two (2) times a day. 15 g 0    acetaminophen (TYLENOL) 500 mg tablet Take 2 Tablets by mouth three (3) times daily as needed for Pain. 100 Tablet 2    amLODIPine (NORVASC) 5 mg tablet Take 1 Tablet by mouth nightly. 30 Tablet 2    ergocalciferol (ERGOCALCIFEROL) 1,250 mcg (50,000 unit) capsule Take 1 Capsule by mouth every seven (7) days. Indications: vitamin D deficiency (high dose therapy) 8 Capsule 0    diclofenac (VOLTAREN) 1 % gel Apply 2 g to affected area four (4) times daily. 1 Each 0    capsaicin 0.075 % topical cream Apply  to affected area three (3) times daily as needed for Pain. 60 g 6    letrozole (FEMARA) 2.5 mg tablet Take 2.5 mg by mouth daily.  SONAFINE emul topical       therapeutic multivitamin (THERAGRAN) tablet Take 1 Tab by mouth daily.  flash glucose scanning reader (FreeStyle Ivy 14 Day [place]) misc Use to check blood sugar 4x/day and as needed. 1 Each 0    Insulin Needles, Disposable, (TRUEplus Pen Needle) 31 gauge x 5/16\" ndle Use as directed 4 times daily 100 Pen Needle 11    divalproex DR (DEPAKOTE) 500 mg tablet Take 2 Tablets by mouth every twelve (12) hours. 180 Tablet 1    insulin aspart protamine/insulin aspart (NOVOLOG MIX 70/30) 100 unit/mL (70-30) inpn 10 Units by SubCUTAneous route every twelve (12) hours. 5 Pen 2    flash glucose sensor (FreeStyle Ivy 14 Day Sensor) kit Use to check blood sugar 4x/day and as needed.  1 Kit 3     No Known Allergies  Past Medical History:   Diagnosis Date    Arthritis     [facility]     [facility]    DDD (degenerative disc disease), cervical     Diabetes (HCC)     DJD (degenerative joint disease) of cervical spine     Heart murmur     History of noncompliance with medical treatment     Hypercholesteremia     Hypertension     Menopause     Mild diastolic dysfunction     ECHO 5/2020     PE (pulmonary thromboembolism) ([facility] 2019    Psychiatric disorder     DEPRESSION    PUD (peptic ulcer disease)     S/P cardiac cath 02/2015    No angiographic evidence of CAD    Seizures (HCC)     LAST SEIZURE ABOUT 2 MONTHS AGO (NOV 2015)    Stroke ([facility]     x2 with left side deficit     Past Surgical History:   Procedure Laterality Date    HX BREAST LUMPECTOMY Right 1/12/2016    Dr. Flash Rowland Partial Mastectomy w./ axillary lymphadenectomy    HX HEENT      TONSILLECTOMY    HX MASTECTOMY Right 1/26/2016    Dr. Flash Rowland Repeat Partial Mastectomy for positive margins    HX ORTHOPAEDIC      left arm surg    IR MEDIPORT       Family History   Problem Relation Age of Onset    Cancer Father [de-identified]        colon    Hypertension Mother     Heart Disease Mother     Diabetes Mother     Breast Cancer Maternal Aunt     Breast Cancer Other     Breast Cancer Other      Social History     Tobacco Use   Smoking Status Never Smoker   Smokeless Tobacco Never Used          Review of Systems, additional:  Constitutional: negative  Eyes: negative  Respiratory: negative  Cardiovascular: positive for chest pain, palpitations, fatigue  Gastrointestinal: negative  Musculoskeletal:negative  Neurological: negative  Behvioral/Psych: negative  Endocrine: negative  ENT: negative    Objective:     Visit Vitals  BP (!) 142/61   Pulse 67   Temp 97.2 °F (36.2 °C) (Temporal)   Resp 16   Wt 109.8 kg (242 lb)   SpO2 100%   BMI 39.06 kg/m²     General:  alert, cooperative, no distress   Chest Wall: inspection normal - no chest wall deformities or tenderness, respiratory effort normal   Lung: clear to auscultation bilaterally   Heart:  normal rate and regular rhythm, systolic murmur 26 at 2nd right intercostal space   Abdomen: soft, non-tender. Bowel sounds normal. No masses,  no organomegaly   Extremities: extremities normal, atraumatic, no cyanosis or edema Skin: no rashes   Neuro: alert, oriented, normal speech, no focal findings or movement disorder noted         Assessment/Plan:       ICD-10-CM ICD-9-CM    1.  Aortic valve stenosis, etiology of cardiac valve disease unspecified. Echo 9/20/2021 demonstrated mild AS. Mean gradient 13 mmHg. Valve area 0.87 cm². Peak velocity 2.55 m/s I35.0 424.1 ECHO ADULT COMPLETE      CARDIAC HOLTER MONITOR   2. Heart murmur , mild AS R01.1 785.2 CARDIAC HOLTER MONITOR   3. Essential hypertension , mildly elevated systolic BP in the office today I10 401.9    4. Type 2 diabetes mellitus with diabetic neuropathy, with long-term current use of insulin ([facility])  E11.40 250.60     Z79.4 357.2      V58.67    5. Type 2 diabetes mellitus with nephropathy ([facility])  E11.21 250.40      583.81    6. Malignant neoplasm of areola of right breast in female, unspecified estrogen receptor status ([facility])  C50.011 174.0    7. Chest pain, unspecified type , atypical for cardiac origin. Echocardiogram 9/20/2021. EF 60%. Mild AS. A 48-hour Holter was also ordered in light of patient's palpitations but not completed. Return in 3 months  R07.9 786.50    8. History of CVA (cerebrovascular accident) , MR brain 9/20/2021 demonstrating no acute infarct, hemorrhage or mass-effect. Chronic right MCA territory infarct. Z86.73 V12.54    9. LVH (left ventricular hypertrophy) , moderate I51.7 429.3           10. Hypercholesteremia  E78.00 272.0    11. Mild diastolic dysfunction  W32.9 429.9    12.  History of cardiac catheterization  Z98.890 V45.89

## 2021-11-03 ENCOUNTER — OFFICE VISIT (OUTPATIENT)
Dept: FAMILY MEDICINE CLINIC | Age: 71
End: 2021-11-03
Payer: MEDICARE

## 2021-11-03 ENCOUNTER — HOSPITAL ENCOUNTER (OUTPATIENT)
Dept: LAB | Age: 71
Discharge: HOME | End: 2021-11-03
Payer: MEDICARE

## 2021-11-03 VITALS
BODY MASS INDEX: 38.57 KG/M2 | TEMPERATURE: 97.2 F | HEIGHT: 66 IN | WEIGHT: 240 LBS | RESPIRATION RATE: 18 BRPM | HEART RATE: 77 BPM | DIASTOLIC BLOOD PRESSURE: 64 MMHG | SYSTOLIC BLOOD PRESSURE: 146 MMHG | OXYGEN SATURATION: 99 %

## 2021-11-03 DIAGNOSIS — E11.40 TYPE 2 DIABETES MELLITUS WITH DIABETIC NEUROPATHY, WITH LONG-TERM CURRENT USE OF INSULIN (HCC): ICD-10-CM

## 2021-11-03 DIAGNOSIS — E11.40 TYPE 2 DIABETES MELLITUS WITH DIABETIC NEUROPATHY, WITH LONG-TERM CURRENT USE OF INSULIN (HCC): Primary | ICD-10-CM

## 2021-11-03 DIAGNOSIS — Z23 ENCOUNTER FOR IMMUNIZATION: ICD-10-CM

## 2021-11-03 DIAGNOSIS — Z79.4 TYPE 2 DIABETES MELLITUS WITH DIABETIC NEUROPATHY, WITH LONG-TERM CURRENT USE OF INSULIN (HCC): Primary | ICD-10-CM

## 2021-11-03 DIAGNOSIS — Z79.4 TYPE 2 DIABETES MELLITUS WITH DIABETIC NEUROPATHY, WITH LONG-TERM CURRENT USE OF INSULIN (HCC): ICD-10-CM

## 2021-11-03 LAB
ALBUMIN SERPL-MCNC: 2.9 G/DL (ref 3.4–5)
ALBUMIN/GLOB SERPL: 0.6 {RATIO} (ref 0.8–1.7)
ALP SERPL-CCNC: 59 U/L (ref 45–117)
ALT SERPL-CCNC: 13 U/L (ref 13–56)
ANION GAP SERPL CALC-SCNC: 6 MMOL/L (ref 3–18)
AST SERPL-CCNC: 11 U/L (ref 10–38)
BILIRUB SERPL-MCNC: 0.4 MG/DL (ref 0.2–1)
BUN SERPL-MCNC: 43 MG/DL (ref 7–18)
BUN/CREAT SERPL: 27 (ref 12–20)
CALCIUM SERPL-MCNC: 8.8 MG/DL (ref 8.5–10.1)
CHLORIDE SERPL-SCNC: 106 MMOL/L (ref 100–111)
CO2 SERPL-SCNC: 28 MMOL/L (ref 21–32)
CREAT SERPL-MCNC: 1.57 MG/DL (ref 0.6–1.3)
GLOBULIN SER CALC-MCNC: 4.8 G/DL (ref 2–4)
GLUCOSE SERPL-MCNC: 197 MG/DL (ref 74–99)
POTASSIUM SERPL-SCNC: 4.4 MMOL/L (ref 3.5–5.5)
PROT SERPL-MCNC: 7.7 G/DL (ref 6.4–8.2)
SODIUM SERPL-SCNC: 140 MMOL/L (ref 136–145)

## 2021-11-03 PROCEDURE — G8432 DEP SCR NOT DOC, RNG: HCPCS | Performed by: NURSE PRACTITIONER

## 2021-11-03 PROCEDURE — 80053 COMPREHEN METABOLIC PANEL: CPT

## 2021-11-03 PROCEDURE — 90732 PPSV23 VACC 2 YRS+ SUBQ/IM: CPT | Performed by: NURSE PRACTITIONER

## 2021-11-03 PROCEDURE — 36415 COLL VENOUS BLD VENIPUNCTURE: CPT

## 2021-11-03 PROCEDURE — G0008 ADMIN INFLUENZA VIRUS VAC: HCPCS | Performed by: NURSE PRACTITIONER

## 2021-11-03 PROCEDURE — G9899 SCRN MAM PERF RSLTS DOC: HCPCS | Performed by: NURSE PRACTITIONER

## 2021-11-03 PROCEDURE — 1090F PRES/ABSN URINE INCON ASSESS: CPT | Performed by: NURSE PRACTITIONER

## 2021-11-03 PROCEDURE — G8417 CALC BMI ABV UP PARAM F/U: HCPCS | Performed by: NURSE PRACTITIONER

## 2021-11-03 PROCEDURE — G0009 ADMIN PNEUMOCOCCAL VACCINE: HCPCS | Performed by: NURSE PRACTITIONER

## 2021-11-03 PROCEDURE — 99212 OFFICE O/P EST SF 10 MIN: CPT | Performed by: NURSE PRACTITIONER

## 2021-11-03 PROCEDURE — G8399 PT W/DXA RESULTS DOCUMENT: HCPCS | Performed by: NURSE PRACTITIONER

## 2021-11-03 PROCEDURE — G8427 DOCREV CUR MEDS BY ELIG CLIN: HCPCS | Performed by: NURSE PRACTITIONER

## 2021-11-03 PROCEDURE — 3017F COLORECTAL CA SCREEN DOC REV: CPT | Performed by: NURSE PRACTITIONER

## 2021-11-03 PROCEDURE — G8753 SYS BP > OR = 140: HCPCS | Performed by: NURSE PRACTITIONER

## 2021-11-03 PROCEDURE — G8754 DIAS BP LESS 90: HCPCS | Performed by: NURSE PRACTITIONER

## 2021-11-03 PROCEDURE — 2022F DILAT RTA XM EVC RTNOPTHY: CPT | Performed by: NURSE PRACTITIONER

## 2021-11-03 PROCEDURE — 90694 VACC AIIV4 NO PRSRV 0.5ML IM: CPT | Performed by: NURSE PRACTITIONER

## 2021-11-03 PROCEDURE — 3052F HG A1C>EQUAL 8.0%<EQUAL 9.0%: CPT | Performed by: NURSE PRACTITIONER

## 2021-11-03 PROCEDURE — 1101F PT FALLS ASSESS-DOCD LE1/YR: CPT | Performed by: NURSE PRACTITIONER

## 2021-11-03 PROCEDURE — G8536 NO DOC ELDER MAL SCRN: HCPCS | Performed by: NURSE PRACTITIONER

## 2021-11-03 NOTE — PROGRESS NOTES
Marina Sosa is a 73 y.o. female who presents for routine immunizations. She denies any symptoms , reactions or allergies that would exclude them from being immunized today. Risks and adverse reactions were discussed and the VIS was given to them. All questions were addressed. She was observed for 15 min post injection. There were no reactions observed.     Delma Hill

## 2021-11-03 NOTE — PROGRESS NOTES
ROOM # 20  Identified pt with two pt identifiers(name and ). Reviewed record in preparation for visit and have obtained necessary documentation. Chief Complaint   Patient presents with    Diabetes     f/u      Ruthie Rocha preferred language for health care discussion is english/other. Is the patient using any DME equipment during OV? YES    Ruthie Rocha is due for:  Health Maintenance Due   Topic    Shingrix Vaccine Age 50> (1 of 2)    Pneumococcal 65+ years (2 of 2 - PPSV23)    Flu Vaccine (1)    COVID-19 Vaccine (3 - Pfizer booster)     Health Maintenance reviewed and discussed per provider  Please order/place referral if appropriate. Advance Directive:  1. Do you have an advance directive in place? Patient Reply: NO    2. If not, would you like material regarding how to put one in place? NO    Coordination of Care:  1. Have you been to the ER, urgent care clinic since your last visit? Hospitalized since your last visit? NO    2. Have you seen or consulted any other health care providers outside of the [address] [place] [address] since your last visit? Include any pap smears or colon screening. NO    Patient is accompanied by  I have received verbal consent from Ruthie Rocha to discuss any/all medical information while they are present in the room.     Learning Assessment:  Learning Assessment 2021 2019 2016 3/4/2015   PRIMARY LEARNER Patient Patient Patient Patient   HIGHEST LEVEL OF EDUCATION - PRIMARY LEARNER  - GRADUATED HIGH SCHOOL OR GED - GRADUATED HIGH SCHOOL OR GED   BARRIERS PRIMARY LEARNER NONE NONE - NONE   CO-LEARNER CAREGIVER No No - No   PRIMARY LANGUAGE ENGLISH ENGLISH ENGLISH ENGLISH   LEARNER PREFERENCE PRIMARY DEMONSTRATION LISTENING DEMONSTRATION READING     - - - LISTENING     - - - PICTURES   ANSWERED BY patient patient patient patient   RELATIONSHIP SELF SELF SELF SELF     Depression Screening:  3 most recent [facility] [place] [facility] Screens 10/20/2021 10/13/2021 2021 8/25/2021 7/28/2021 7/23/2021 3/2/2021   Little interest or pleasure in doing things Not at all Not at all Not at all Not at all Not at all Not at all Not at all   Feeling down, depressed, irritable, or hopeless - Not at all Not at all Not at all Not at all Not at all Not at all   Total Score PHQ 2 - 0 0 0 0 0 0     Abuse Screening:  Abuse Screening Questionnaire 6/15/2020 9/26/2019 1/19/2016 1/4/2016 3/4/2015   Do you ever feel afraid of your partner? N N N N N   Are you in a relationship with someone who physically or mentally threatens you? N N N N N   Is it safe for you to go home? [place] UP     Fall Risk  Fall Risk Assessment, last 12 mths 10/20/2021 10/13/2021 9/29/2021 8/25/2021 6/24/2021 3/2/2021 7/29/2020   Able to walk? Yes Yes Yes Yes Yes Yes Yes   Fall in past 12 months? 0 - 0 0 0 0 No   Do you feel unsteady? 1 - 0 0 0 - -   Are you worried about falling 1 - 0 0 0 - -   Is the gait abnormal? 0 - - - - - -   Number of falls in past 12 months 0 - - - - - -   Fall with injury? 0 - - - - - -     Recent Travel Screening and Travel History documentation     Travel Screening     Question   Response    In the last month, have you been in contact with someone who was confirmed or suspected to have Coronavirus / COVID-19? No / Unsure    Have you had a COVID-19 viral test in the last 14 days? No    Do you have any of the following new or worsening symptoms? None of these    Have you traveled internationally or domestically in the last month?         Travel History   Travel since 10/03/21    No documented travel since 10/03/21

## 2021-11-03 NOTE — PROGRESS NOTES
OFFICE NOTE    Ventura Riddle is a 73 y.o. female presenting today for office visit. 11/3/2021  1:33 PM    Chief Complaint   Patient presents with    Diabetes     f/u     HPI:   In office visit related to diabetes follow-up. She has her FreeStyle sensor on and her blood sugar morning was 130. Taking Novolog 70/30 10 units every 12 hours. She is suppose to have a heart monitor on but says it was itching too much. I asked patient to call Dr. Agapito Main office to see if they have a different patch with different adhesive. Her BP is elevated and asymptomatic. She says she took all her HTN medications today. Patient's last visit 10/20/2018 2021 her freestyle flavio sensor was placed currently not reading. Patient did not have another sensor with her so I could adhere for her.    9/23/2021 GFR 52. We will recheck today. Previous visit 9/29/2021, hospital follow-up due to to hospital visits 9/15/2021 and 9/19/2021. Patient's boyfriend Rosendo Giron present. Patient says she is doing okay today. Patient boyfriend requested a freestyle flavio, ordered. Advised patient I would like to order a neurology referral related patient's seizure disorder history of CVA patient has a past history of diabetes, hypertension, prior CVA 2002, seizure disorder taking Depakote and breast cancer. September 15, 2021, patient presented to ED with fever, vomiting, suspicion of Covid (neg)and patient stated low blood sugar. Patient was negative for UA, negative chest x-ray given physical exam.  Patient's lab work showed leukocytosis WBC 17.7, CT of chest and abdomen unrevealing. Suspected patient had unfound infection. Patient administered ceftriaxone and patient. Patient discussed admission going home patient wanted to go.     Patient's last 6/30/2021 A1c here in our office was 8.1%.     9/19/2021 patient presented back to ED with altered mental status found of acute encephalopathy due to hypoglycemia.   Speech therapy evaluation, no acute findings resume diet. Per note patient was given diabetic education by registered nurse insulin administration education and roommate Chaya. Patient had EEG and renal ultrasound  Home health was ordered for patient. In addition patient ordered physical therapy.       Smokes tobacco, drinks alcohol or illicit drugs marijuana. Sexually active  single children lives    Patient reports appetite is good, no urinary issues, sleeps well and regular bowel movements. Patient denies fever, chills, chest pain, shortness of breath, abdomen pain or dark tarry stool. Covid vaccine, done, will need booster   Flu vaccine, today  PPSV23, today    ROS:    · General: negative for - chills, fever, weight changes or malaise  · HEENT: no sore throat, nasal congestion, vision problems or ear problems  · Respiratory: no cough, shortness of breath, or wheezing  · Cardiovascular: no chest pain, palpitations, or dyspnea on exertion  · Gastrointestinal: no abdominal pain, N/V, change in bowel habits, or black or bloody stools  · Musculoskeletal: no back pain, joint pain, joint stiffness, muscle pain or muscle weakness  · Neurological: no numbness, tingling, headache or dizziness  · Endo:  No polyuria or polydipsia. · : no hematuria, dysuria, frequency, hesitancy, or nocturia.     · Skin: No itching or rash, no open skin, no unusual lesions   · Psychological: negative for - anxiety, depression, sleep disturbances, suicidal or homicidal ideations     PHQ Screening   3 most recent PHQ Screens 11/3/2021   Little interest or pleasure in doing things Not at all   Feeling down, depressed, irritable, or hopeless Not at all   Total Score PHQ 2 0       History  Past Medical History:   Diagnosis Date    Arthritis     [facility]     Breast CA ([facility] 2016    DDD (degenerative disc disease), cervical     Diabetes ([facility]     DJD (degenerative joint disease) of cervical spine     Heart murmur     History of noncompliance with medical treatment     Hypercholesteremia     Hypertension     Menopause     Mild diastolic dysfunction     ECHO 5/2020     PE (pulmonary thromboembolism) ([facility] 2019    Psychiatric disorder     DEPRESSION    PUD (peptic ulcer disease)     S/P cardiac cath 02/2015    No angiographic evidence of CAD    Seizures (HCC)     LAST SEIZURE ABOUT 2 MONTHS AGO (NOV 2015)    Stroke ([facility]     x2 with left side deficit       Past Surgical History:   Procedure Laterality Date    HX BREAST LUMPECTOMY Right 1/12/2016    Dr. Fer Arango Partial Mastectomy w./ axillary lymphadenectomy    HX HEENT      TONSILLECTOMY    HX MASTECTOMY Right 1/26/2016    Dr. Fer Arango Repeat Partial Mastectomy for positive margins    HX ORTHOPAEDIC      left arm surg    IR MEDIPORT         Social History     Socioeconomic History    Marital status: SINGLE     Spouse name: Not on file    Number of children: Not on file    Years of education: Not on file    Highest education level: Not on file   Occupational History    Not on file   Tobacco Use    Smoking status: Never Smoker    Smokeless tobacco: Never Used   Substance and Sexual Activity    Alcohol use: No    Drug use: No    Sexual activity: Yes     Partners: Male   Other Topics Concern     Service No    Blood Transfusions No    Caffeine Concern No    Occupational Exposure No    Hobby Hazards No    Sleep Concern Yes    Stress Concern No    Weight Concern Not Asked    Special Diet Not Asked    Back Care Not Asked    Exercise Yes     Comment: walks using cane    Bike Helmet Not Asked    Seat Belt Yes    Self-Exams Yes   Social History Narrative    Not on file     Social Determinants of Health     Financial Resource Strain:     Difficulty of Paying Living Expenses: Not on file   Food Insecurity:     Worried About Running Out of Food in the Last Year: Not on file    Yamila of Food in the Last Year: Not on file   Transportation Needs:  Lack of Transportation (Medical): Not on file    Lack of Transportation (Non-Medical): Not on file   Physical Activity:     Days of Exercise per Week: Not on file    Minutes of Exercise per Session: Not on file   Stress:     Feeling of Stress : Not on file   Social Connections:     Frequency of Communication with Friends and Family: Not on file    Frequency of Social Gatherings with Friends and Family: Not on file    Attends Restorationism Services: Not on file    Active Member of [address] [place] [address] or Organizations: Not on file    Attends Club or Organization Meetings: Not on file    Marital Status: Not on file   Intimate Partner Violence:     Fear of Current or Ex-Partner: Not on file    Emotionally Abused: Not on file    Physically Abused: Not on file    Sexually Abused: Not on file   Housing Stability:     Unable to Pay for Housing in the Last Year: Not on file    Number of [place] in the Last Year: Not on file    Unstable Housing in the Last Year: Not on file       No Known Allergies    Current Outpatient Medications   Medication Sig Dispense Refill    flash glucose scanning reader (FreeStyle Ivy 14 Day [place]) misc Use to check blood sugar 4x/day and as needed. 1 Each 0    Insulin Needles, Disposable, (TRUEplus Pen Needle) 31 gauge x 5/16\" ndle Use as directed 4 times daily 100 Pen Needle 11    divalproex DR (DEPAKOTE) 500 mg tablet Take 2 Tablets by mouth every twelve (12) hours. 180 Tablet 1    insulin aspart protamine/insulin aspart (NOVOLOG MIX 70/30) 100 unit/mL (70-30) inpn 10 Units by SubCUTAneous route every twelve (12) hours. 5 Pen 2    flash glucose sensor (FreeStyle Ivy 14 Day Sensor) kit Use to check blood sugar 4x/day and as needed. 1 Kit 3    glucose blood VI test strips (OneTouch Verio test strips) strip Check your blood sugar in the mornings and when you have symptoms of hypoglycemia 100 Strip 5    apixaban (ELIQUIS) 5 mg tablet Take 5 mg by mouth two (2) times a day.       ondansetron (ZOFRAN ODT) 4 mg disintegrating tablet Take 4 mg by mouth every eight (8) hours as needed.  atorvastatin (LIPITOR) 40 mg tablet Take 1 Tablet by mouth daily. 90 Tablet 1    isosorbide mononitrate ER (Imdur) 30 mg tablet Take 1 Tablet by mouth daily. 90 Tablet 1    hydroCHLOROthiazide (HYDRODIURIL) 25 mg tablet Take 1 Tablet by mouth daily. 30 Tablet 2    carvediloL (COREG) 12.5 mg tablet Take 1 Tablet by mouth two (2) times daily (with meals). 60 Tablet 2    traZODone (DESYREL) 50 mg tablet Take 1 Tablet by mouth nightly as needed for Sleep. 30 Tablet 2    nystatin (MYCOSTATIN) topical cream Apply  to affected area two (2) times a day. 15 g 0    acetaminophen (TYLENOL) 500 mg tablet Take 2 Tablets by mouth three (3) times daily as needed for Pain. 100 Tablet 2    amLODIPine (NORVASC) 5 mg tablet Take 1 Tablet by mouth nightly. 30 Tablet 2    ergocalciferol (ERGOCALCIFEROL) 1,250 mcg (50,000 unit) capsule Take 1 Capsule by mouth every seven (7) days. Indications: vitamin D deficiency (high dose therapy) 8 Capsule 0    diclofenac (VOLTAREN) 1 % gel Apply 2 g to affected area four (4) times daily. 1 Each 0    capsaicin 0.075 % topical cream Apply  to affected area three (3) times daily as needed for Pain. 60 g 6    letrozole (FEMARA) 2.5 mg tablet Take 2.5 mg by mouth daily.  SONAFINE emul topical       therapeutic multivitamin (THERAGRAN) tablet Take 1 Tab by mouth daily.          Patient Care Team:  Patient Care Team:  Shanna Blount NP as PCP - General (Nurse Practitioner)  Shanna Blount NP as PCP - [address] [place] [address] [facility]  Elsa Tucker NP (Nurse Practitioner)  Rosemary Mcbride MD (General Surgery)  Caroline Reid, GILBERT as Nurse Navigator (Oncology)  Param Snowden MD (Pulmonary Disease)  Radha Bazzi MD (Surgery)  Molly Victoria MD (Breast Surgery)    LABS:  None new to review    RADIOLOGY:  None new to review    Physical Exam  Patient appears well, she is pleasant, alert, oriented x 3, in no distress. ENT normal.  Neck supple. No adenopathy or thyromegaly. SUBHASH. Lungs are clear, good air entry, no wheezes, rhonchi or rales. Cardiovascular, S1 and S2 normal, no murmurs, regular rate and rhythm. No LAD. Chest wall negative for tenderness  Abdomen is soft without tenderness  /Anorectal, deferred. Muscleskeletal, no swelling, no tenderness, no injury. Extremities show no edema  Neurological is normal without focal findings. Skin: no concerning lesions. Psych: normal affect. Mood good. Oriented x 3. Judgement and insight intact. Vitals:    11/03/21 1353   BP: (!) 146/64   Pulse: 77   Resp: 18   Temp: 97.2 °F (36.2 °C)   TempSrc: Temporal   SpO2: 99%   Weight: 240 lb (108.9 kg)   Height: 5' 6\" (1.676 m)       Assessment and Plan    Diagnoses and all orders for this visit:    Type 2 diabetes mellitus with diabetic neuropathy, with long-term current use of insulin ([facility]  -     METABOLIC PANEL, COMPREHENSIVE; Future    Encounter for immunization  -     PNEUMOCOCCAL POLYSACCHARIDE VACCINE, 23-VALENT, ADULT OR IMMUNOSUPPRESSED PT DOSE,  -     INFLUENZA VIRUS VACCINE, HIGH DOSE SEASONAL, PRESERVATIVE FREE         *Plan of care reviewed with patient. Patient in agreement with plan and expresses understanding. All questions answered and patient encouraged to call or RTO if further questions or concerns. 50% of 15 minutes spent on counseling and managing her care. Follow-up and Dispositions    · Return in about 2 months (around 1/3/2022) for follow-up diabetes and BP.        DIEGO Monge

## 2021-11-11 NOTE — PROGRESS NOTES
At the time of your blood work your blood sugar was high at 197. Your kidney function is at 40 and creatinine is high at 1.57.

## 2021-11-12 DIAGNOSIS — Z79.4 TYPE 2 DIABETES MELLITUS WITH DIABETIC NEUROPATHY, WITH LONG-TERM CURRENT USE OF INSULIN (HCC): Primary | ICD-10-CM

## 2021-11-12 DIAGNOSIS — E11.40 TYPE 2 DIABETES MELLITUS WITH DIABETIC NEUROPATHY, WITH LONG-TERM CURRENT USE OF INSULIN (HCC): Primary | ICD-10-CM

## 2021-11-16 ENCOUNTER — TELEPHONE (OUTPATIENT)
Dept: FAMILY MEDICINE CLINIC | Age: 71
End: 2021-11-16

## 2021-11-16 NOTE — TELEPHONE ENCOUNTER
Pharmacy Progress Note - Telephone Call    Ms. Mey Owens 73 y.o. was contacted via an outbound telephone call today to schedule an appointment for PharmD diabetes management and education per referral from PCP Gerhardt Stake, NP. A voicemail was left for patient to return my call.       Thank you,  Hortencia Jacobsen, PharmD

## 2021-12-17 ENCOUNTER — OFFICE VISIT (OUTPATIENT)
Dept: CARDIOLOGY CLINIC | Age: 71
End: 2021-12-17
Payer: MEDICARE

## 2021-12-17 VITALS
DIASTOLIC BLOOD PRESSURE: 72 MMHG | SYSTOLIC BLOOD PRESSURE: 127 MMHG | HEART RATE: 74 BPM | OXYGEN SATURATION: 96 % | BODY MASS INDEX: 37.93 KG/M2 | WEIGHT: 236 LBS | HEIGHT: 66 IN

## 2021-12-17 DIAGNOSIS — I35.0 AORTIC STENOSIS, MILD: Primary | ICD-10-CM

## 2021-12-17 PROCEDURE — G8432 DEP SCR NOT DOC, RNG: HCPCS | Performed by: INTERNAL MEDICINE

## 2021-12-17 PROCEDURE — G8536 NO DOC ELDER MAL SCRN: HCPCS | Performed by: INTERNAL MEDICINE

## 2021-12-17 PROCEDURE — G8754 DIAS BP LESS 90: HCPCS | Performed by: INTERNAL MEDICINE

## 2021-12-17 PROCEDURE — G9899 SCRN MAM PERF RSLTS DOC: HCPCS | Performed by: INTERNAL MEDICINE

## 2021-12-17 PROCEDURE — 3017F COLORECTAL CA SCREEN DOC REV: CPT | Performed by: INTERNAL MEDICINE

## 2021-12-17 PROCEDURE — G8399 PT W/DXA RESULTS DOCUMENT: HCPCS | Performed by: INTERNAL MEDICINE

## 2021-12-17 PROCEDURE — 99214 OFFICE O/P EST MOD 30 MIN: CPT | Performed by: INTERNAL MEDICINE

## 2021-12-17 PROCEDURE — G8417 CALC BMI ABV UP PARAM F/U: HCPCS | Performed by: INTERNAL MEDICINE

## 2021-12-17 PROCEDURE — G8427 DOCREV CUR MEDS BY ELIG CLIN: HCPCS | Performed by: INTERNAL MEDICINE

## 2021-12-17 PROCEDURE — G8752 SYS BP LESS 140: HCPCS | Performed by: INTERNAL MEDICINE

## 2021-12-17 PROCEDURE — 1101F PT FALLS ASSESS-DOCD LE1/YR: CPT | Performed by: INTERNAL MEDICINE

## 2021-12-17 PROCEDURE — 1090F PRES/ABSN URINE INCON ASSESS: CPT | Performed by: INTERNAL MEDICINE

## 2021-12-20 NOTE — PROGRESS NOTES
Subjective:      Johnnie is in the office today for cardiac reevaluation. She is a 62-year-old hypertensive diabetic woman that was referred for further evaluation of chest pain and palpitations. The patient has a history of mild aortic stenosis previously documented on echocardiography in 2019. At her initial visit of 8/25/2021, the patient reported experiencing left-sided chest pain  for greater than 1 year. She described it as \"sharp \" in quality. The discomfort was generally very brief in duration. There had been no clear inciting factors. It had not changed in any particular way over that period of time. She had no shortness of breath. She  had no PND. She report swelling in her left ankle. She reported that she was very sedentary. She described a fast heartbeat at times. She  had no near syncope or syncope. An echocardiogram and 48-hour Holter monitor were ordered. .  Results are as listed below. In the office 10/13/2021 she reported a prior hospitalization at [facility].  She was felt to have had an acute metabolic encephalopathy secondary to hypoglycemia and a breakthrough seizure. Her  says she is \"back to normal in a way\". She still has some occasional  left-sided chest discomfort primarily at rest.  She has had no peripheral swelling. She has had no PND or orthopnea. Her furosemide was discontinued and was felt the patient was dehydrated during the time of hospitalization. Patient's cardiac risk factors are dyslipidemia, diabetes mellitus, hypertension.         Patient Active Problem List    Diagnosis Date Noted    Chest pain 01/31/2015    LVH (left ventricular hypertrophy) 09/02/2021    Aortic stenosis, mild 09/02/2021    History of cardiac catheterization 09/02/2021    History of noncompliance with medical treatment     Mild diastolic dysfunction     Chronic headaches 12/21/2019    Elevated d-dimer 12/21/2019    History of recent travel 12/21/2019    Normocytic anemia 12/21/2019    [facility]    Severe obesity (BMI 35.0-39. 9) with comorbidity ([facility] 04/17/2018    Type 2 diabetes mellitus with nephropathy ([facility] 12/14/2017    Pulmonary nodule, right 11/15/2016    History of breast cancer 11/15/2016    Type 2 diabetes mellitus with diabetic neuropathy, with long-term current use of insulin ([facility] 11/10/2016    Altered mental status 10/24/2016    Breast cancer (HCC)-right inferior, 2.2cm,2/18 nodes, + - -, 01/12/2016    Gastroesophageal reflux disease without esophagitis 01/05/2016    Hearing impairment 11/23/2015    History of CVA (cerebrovascular accident) 05/18/2015    Hypercholesteremia 03/04/2015    Seizure disorder ([facility] 02/11/2015    Diabetes mellitus with neuropathy ([facility] 01/30/2015    HTN (hypertension) 01/30/2015     Current Outpatient Medications   Medication Sig Dispense Refill    flash glucose scanning reader (FreeStyle Ivy 14 Day [place]) misc Use to check blood sugar 4x/day and as needed. 1 Each 0    Insulin Needles, Disposable, (TRUEplus Pen Needle) 31 gauge x 5/16\" ndle Use as directed 4 times daily 100 Pen Needle 11    divalproex DR (DEPAKOTE) 500 mg tablet Take 2 Tablets by mouth every twelve (12) hours. 180 Tablet 1    insulin aspart protamine/insulin aspart (NOVOLOG MIX 70/30) 100 unit/mL (70-30) inpn 10 Units by SubCUTAneous route every twelve (12) hours. 5 Pen 2    flash glucose sensor (FreeStyle Ivy 14 Day Sensor) kit Use to check blood sugar 4x/day and as needed. 1 Kit 3    glucose blood VI test strips (OneTouch Verio test strips) strip Check your blood sugar in the mornings and when you have symptoms of hypoglycemia 100 Strip 5    ondansetron (ZOFRAN ODT) 4 mg disintegrating tablet Take 4 mg by mouth every eight (8) hours as needed.  atorvastatin (LIPITOR) 40 mg tablet Take 1 Tablet by mouth daily.  90 Tablet 1    isosorbide mononitrate ER (Imdur) 30 mg tablet Take 1 Tablet by mouth daily. 90 Tablet 1    hydroCHLOROthiazide (HYDRODIURIL) 25 mg tablet Take 1 Tablet by mouth daily. 30 Tablet 2    carvediloL (COREG) 12.5 mg tablet Take 1 Tablet by mouth two (2) times daily (with meals). 60 Tablet 2    traZODone (DESYREL) 50 mg tablet Take 1 Tablet by mouth nightly as needed for Sleep. 30 Tablet 2    nystatin (MYCOSTATIN) topical cream Apply  to affected area two (2) times a day. 15 g 0    acetaminophen (TYLENOL) 500 mg tablet Take 2 Tablets by mouth three (3) times daily as needed for Pain. 100 Tablet 2    amLODIPine (NORVASC) 5 mg tablet Take 1 Tablet by mouth nightly. 30 Tablet 2    ergocalciferol (ERGOCALCIFEROL) 1,250 mcg (50,000 unit) capsule Take 1 Capsule by mouth every seven (7) days. Indications: vitamin D deficiency (high dose therapy) 8 Capsule 0    diclofenac (VOLTAREN) 1 % gel Apply 2 g to affected area four (4) times daily. 1 Each 0    capsaicin 0.075 % topical cream Apply  to affected area three (3) times daily as needed for Pain. 60 g 6    letrozole (FEMARA) 2.5 mg tablet Take 2.5 mg by mouth daily.  SONAFINE emul topical       therapeutic multivitamin (THERAGRAN) tablet Take 1 Tab by mouth daily.  apixaban (ELIQUIS) 5 mg tablet Take 1 Tablet by mouth two (2) times a day.  180 Tablet 3     No Known Allergies  Past Medical History:   Diagnosis Date    Arthritis     [facility]     Breast CA ([facility] 2016    DDD (degenerative disc disease), cervical     Diabetes ([address]     DJD (degenerative joint disease) of cervical spine     Heart murmur     History of noncompliance with medical treatment     Hypercholesteremia     Hypertension     Menopause     Mild diastolic dysfunction     ECHO 5/2020     PE (pulmonary thromboembolism) ([address] 2019    Psychiatric disorder     DEPRESSION    PUD (peptic ulcer disease)     S/P cardiac cath 02/2015    No angiographic evidence of CAD    Seizures (HCC)     LAST SEIZURE ABOUT 2 MONTHS AGO (NOV 2015)  Stroke [facility])     x2 with left side deficit     Past Surgical History:   Procedure Laterality Date    HX BREAST LUMPECTOMY Right 1/12/2016    Dr. Mana Oliveros Partial Mastectomy w./ axillary lymphadenectomy    HX HEENT      TONSILLECTOMY    HX MASTECTOMY Right 1/26/2016    Dr. Mana Oliveros Repeat Partial Mastectomy for positive margins    HX ORTHOPAEDIC      left arm surg    IR MEDIPORT       Family History   Problem Relation Age of Onset    Cancer Father [de-identified]        colon    Hypertension Mother     Heart Disease Mother     Diabetes Mother     Breast Cancer Maternal Aunt     Breast Cancer Other     Breast Cancer Other      Social History     Tobacco Use   Smoking Status Never Smoker   Smokeless Tobacco Never Used          Review of Systems, additional:  Constitutional: negative  Eyes: negative  Respiratory: negative  Cardiovascular: positive for chest pain, palpitations, fatigue  Gastrointestinal: negative  Musculoskeletal:negative  Neurological: negative  Behvioral/Psych: negative  Endocrine: negative  ENT: negative    Objective:     Visit Vitals  /72 (BP 1 Location: Left upper arm, BP Patient Position: Sitting, BP Cuff Size: Large adult)   Pulse 74   Ht 5' 6\" (1.676 m)   Wt 107 kg (236 lb)   SpO2 96%   BMI 38.09 kg/m²     General:  alert, cooperative, no distress   Chest Wall: inspection normal - no chest wall deformities or tenderness, respiratory effort normal   Lung: clear to auscultation bilaterally   Heart:  normal rate and regular rhythm, systolic murmur 26 at 2nd right intercostal space   Abdomen: soft, non-tender. Bowel sounds normal. No masses,  no organomegaly   Extremities: extremities normal, atraumatic, no cyanosis or edema Skin: no rashes   Neuro: alert, oriented, normal speech, no focal findings or movement disorder noted         Assessment/Plan:       ICD-10-CM ICD-9-CM    1. Aortic valve stenosis, etiology of cardiac valve disease unspecified.   Echo 9/20/2021 demonstrated mild AS.  Mean gradient 13 mmHg. Valve area 0.87 cm². Peak velocity 2.55 m/s. Return in 6 months. Will arrange for echocardiogram at that time I35.0 424.1 ECHO ADULT COMPLETE      CARDIAC HOLTER MONITOR   2. Heart murmur , mild to moderate AS R01.1 785.2 CARDIAC HOLTER MONITOR   3. Essential hypertension , mildly elevated systolic BP in the office today I10 401.9    4. Type 2 diabetes mellitus with diabetic neuropathy, with long-term current use of insulin ([facility])  E11.40 250.60     Z79.4 357.2      V58.67    5. Type 2 diabetes mellitus with nephropathy ([facility])  E11.21 250.40      583.81    6. Malignant neoplasm of areola of right breast in female, unspecified estrogen receptor status ([facility])  C50.011 174.0    7. Chest pain, unspecified type , atypical for cardiac origin. Echocardiogram 9/20/2021. EF 60%. Mild AS. R07.9 786.50    8. History of CVA (cerebrovascular accident) , MR brain 9/20/2021 demonstrating no acute infarct, hemorrhage or mass-effect. Chronic right MCA territory infarct. Z86.73 V12.54    9. LVH (left ventricular hypertrophy) , moderate I51.7 429.3           10. Hypercholesteremia  E78.00 272.0    11. Mild diastolic dysfunction  P06.3 429.9    12. History of cardiac catheterization  Z98.890 V45.89    13     History of pulmonary thromboembolism, lifelong anticoagulation.   Eliquis

## 2022-01-24 ENCOUNTER — OFFICE VISIT (OUTPATIENT)
Dept: FAMILY MEDICINE CLINIC | Age: 72
End: 2022-01-24
Payer: MEDICARE

## 2022-01-24 VITALS
WEIGHT: 232 LBS | DIASTOLIC BLOOD PRESSURE: 57 MMHG | RESPIRATION RATE: 16 BRPM | TEMPERATURE: 97.2 F | HEART RATE: 71 BPM | SYSTOLIC BLOOD PRESSURE: 124 MMHG | HEIGHT: 66 IN | BODY MASS INDEX: 37.28 KG/M2 | OXYGEN SATURATION: 98 %

## 2022-01-24 DIAGNOSIS — G89.29 CHRONIC PAIN OF BOTH KNEES: ICD-10-CM

## 2022-01-24 DIAGNOSIS — E11.40 TYPE 2 DIABETES MELLITUS WITH DIABETIC NEUROPATHY, WITH LONG-TERM CURRENT USE OF INSULIN (HCC): ICD-10-CM

## 2022-01-24 DIAGNOSIS — G40.909 SEIZURE DISORDER (HCC): ICD-10-CM

## 2022-01-24 DIAGNOSIS — I10 ESSENTIAL HYPERTENSION: ICD-10-CM

## 2022-01-24 DIAGNOSIS — N18.31 CHRONIC KIDNEY DISEASE, STAGE 3A (HCC): ICD-10-CM

## 2022-01-24 DIAGNOSIS — Z79.4 TYPE 2 DIABETES MELLITUS WITH DIABETIC NEUROPATHY, WITH LONG-TERM CURRENT USE OF INSULIN (HCC): ICD-10-CM

## 2022-01-24 DIAGNOSIS — E66.01 SEVERE OBESITY (BMI 35.0-35.9 WITH COMORBIDITY) (HCC): ICD-10-CM

## 2022-01-24 DIAGNOSIS — Z09 HOSPITAL DISCHARGE FOLLOW-UP: Primary | ICD-10-CM

## 2022-01-24 DIAGNOSIS — M25.562 CHRONIC PAIN OF BOTH KNEES: ICD-10-CM

## 2022-01-24 DIAGNOSIS — M25.561 CHRONIC PAIN OF BOTH KNEES: ICD-10-CM

## 2022-01-24 DIAGNOSIS — T14.8XXA MUSCLE STRAIN: ICD-10-CM

## 2022-01-24 LAB — HBA1C MFR BLD HPLC: 7.1 %

## 2022-01-24 PROCEDURE — 83036 HEMOGLOBIN GLYCOSYLATED A1C: CPT | Performed by: NURSE PRACTITIONER

## 2022-01-24 PROCEDURE — 99214 OFFICE O/P EST MOD 30 MIN: CPT | Performed by: NURSE PRACTITIONER

## 2022-01-24 PROCEDURE — G8427 DOCREV CUR MEDS BY ELIG CLIN: HCPCS | Performed by: NURSE PRACTITIONER

## 2022-01-24 PROCEDURE — 2022F DILAT RTA XM EVC RTNOPTHY: CPT | Performed by: NURSE PRACTITIONER

## 2022-01-24 PROCEDURE — 1101F PT FALLS ASSESS-DOCD LE1/YR: CPT | Performed by: NURSE PRACTITIONER

## 2022-01-24 PROCEDURE — 3051F HG A1C>EQUAL 7.0%<8.0%: CPT | Performed by: NURSE PRACTITIONER

## 2022-01-24 PROCEDURE — 3017F COLORECTAL CA SCREEN DOC REV: CPT | Performed by: NURSE PRACTITIONER

## 2022-01-24 PROCEDURE — G9899 SCRN MAM PERF RSLTS DOC: HCPCS | Performed by: NURSE PRACTITIONER

## 2022-01-24 PROCEDURE — G8417 CALC BMI ABV UP PARAM F/U: HCPCS | Performed by: NURSE PRACTITIONER

## 2022-01-24 PROCEDURE — G8399 PT W/DXA RESULTS DOCUMENT: HCPCS | Performed by: NURSE PRACTITIONER

## 2022-01-24 PROCEDURE — G8536 NO DOC ELDER MAL SCRN: HCPCS | Performed by: NURSE PRACTITIONER

## 2022-01-24 PROCEDURE — 1090F PRES/ABSN URINE INCON ASSESS: CPT | Performed by: NURSE PRACTITIONER

## 2022-01-24 PROCEDURE — G8752 SYS BP LESS 140: HCPCS | Performed by: NURSE PRACTITIONER

## 2022-01-24 PROCEDURE — G8432 DEP SCR NOT DOC, RNG: HCPCS | Performed by: NURSE PRACTITIONER

## 2022-01-24 PROCEDURE — G8754 DIAS BP LESS 90: HCPCS | Performed by: NURSE PRACTITIONER

## 2022-01-24 RX ORDER — HYDROCHLOROTHIAZIDE 25 MG/1
25 TABLET ORAL DAILY
Qty: 90 TABLET | Refills: 1 | Status: SHIPPED | OUTPATIENT
Start: 2022-01-24 | End: 2022-05-05 | Stop reason: SDUPTHER

## 2022-01-24 RX ORDER — DICLOFENAC SODIUM 10 MG/G
2 GEL TOPICAL 4 TIMES DAILY
Qty: 1 EACH | Refills: 0 | Status: SHIPPED | OUTPATIENT
Start: 2022-01-24 | End: 2022-05-05 | Stop reason: SDUPTHER

## 2022-01-24 RX ORDER — INSULIN ASPART 100 [IU]/ML
10 INJECTION, SUSPENSION SUBCUTANEOUS EVERY 12 HOURS
Qty: 5 PEN | Refills: 5 | Status: SHIPPED | OUTPATIENT
Start: 2022-01-24 | End: 2022-05-05 | Stop reason: SDUPTHER

## 2022-01-24 RX ORDER — AMLODIPINE BESYLATE 5 MG/1
5 TABLET ORAL
Qty: 90 TABLET | Refills: 1 | Status: SHIPPED | OUTPATIENT
Start: 2022-01-24 | End: 2022-05-05 | Stop reason: SDUPTHER

## 2022-01-24 RX ORDER — FLASH GLUCOSE SENSOR
KIT MISCELLANEOUS
Qty: 1 KIT | Refills: 3 | Status: SHIPPED | OUTPATIENT
Start: 2022-01-24 | End: 2022-01-24

## 2022-01-24 RX ORDER — FLASH GLUCOSE SENSOR
KIT MISCELLANEOUS
Qty: 1 KIT | Refills: 3 | Status: SHIPPED | OUTPATIENT
Start: 2022-01-24 | End: 2022-05-05 | Stop reason: SDUPTHER

## 2022-01-24 NOTE — PROGRESS NOTES
OFFICE NOTE    Josh Mao is a 73 y.o. female presenting today for office visit. Patient Active Problem List   Diagnosis Code    Diabetes mellitus with neuropathy ([facility] E11.40    HTN (hypertension) I10    Chest pain R07.9    Seizure disorder ([facility] G40.909    Hypercholesteremia E78.00    History of CVA (cerebrovascular accident) Z80.78    Hearing impairment H91.90    Gastroesophageal reflux disease without esophagitis K21.9    Breast cancer ([facility])-right inferior, 2.2cm,2/18 nodes, + - -, C50.919    Altered mental status R41.82    Type 2 diabetes mellitus with diabetic neuropathy, with long-term current use of insulin ([facility]) E11.40, Z79.4    Pulmonary nodule, right R91.1    History of breast cancer Z85.3    Type 2 diabetes mellitus with nephropathy ([facility]) E11.21    Severe obesity (BMI 35.0-39. 9) with comorbidity ([facility] E66.01    PE (pulmonary thromboembolism) ([facility]) R58.74    Mild diastolic dysfunction D43.3    History of noncompliance with medical treatment Z91.19    Chronic headaches R51.9, G89.29    Elevated d-dimer R79.89    History of recent travel Z78.9    Normocytic anemia D64.9    LVH (left ventricular hypertrophy) I51.7    Aortic stenosis, mild I35.0    History of cardiac catheterization Z98.890     1/24/2022  11:28 AM    Chief Complaint   Patient presents with    Follow-up     pt would like a refill of insulin injection pen and benzoyl peroxide        HPI:   In office follow up related seizure and ED visit 1/5/20222, released the same day. Patient denies having another seizure. Patient denies seeing a neurologist recently and I do not see any encounters in patient's chart with the neurologist.  Patient advised she follow-up with neurology, ordered referral. ED notes indicate normal neuro exam.  Labs indicate electrolyte abnormality. Patient appears well today and in good spirits. She is in the office with her boyfriend Tyrese Davies. .    Patient has chronic seizure disorder. Patient takes Depakote 1000 mg twice daily. Patient's Depakote was increased when she was in hospital November 2021. BMP 1/5/2022 indicated GFR 47.5. Advised patient is important to continue taking her hypertension medication and diabetes medication as indicated. Type 2 diabetes, Blood glucose was 90 this morning before eating. A1c today 7.1%. Patient using NovoLog 70/30 10 units in the morning and 10 units in the evening. Hypertension, patient is taking her medications as directed. Patient sees Dr. Maximo Rivas with cardiology regularly. Patient complains of chronic bilateral knee pain. Ordered referral to orthopedics. Patient reports appetite is good, no urinary issues, sleeps well and regular bowel movements. Patient denies fever, chills, chest pain, shortness of breath, abdomen pain or dark tarry stool. ROS:    · General: negative for - chills, fever, weight changes or malaise  · HEENT: no sore throat, nasal congestion, vision problems or ear problems  · Respiratory: no cough, shortness of breath, or wheezing  · Cardiovascular: no chest pain, palpitations, or dyspnea on exertion  · Gastrointestinal: no abdominal pain, N/V, change in bowel habits, or black or bloody stools  · Musculoskeletal: Positive for chronic bilateral knee pain, No back pain, joint pain, joint stiffness, muscle pain or muscle weakness  · Neurological: no numbness, tingling, headache or dizziness  · Endo:  No polyuria or polydipsia. · : no hematuria, dysuria, frequency, hesitancy, or nocturia.     · Skin: No itching or rash, no open skin, no unusual lesions   · Psychological: negative for - anxiety, depression, sleep disturbances, suicidal or homicidal ideations     PHQ Screening   3 most recent PHQ Screens 1/24/2022   Little interest or pleasure in doing things Not at all   Feeling down, depressed, irritable, or hopeless Not at all   Total Score PHQ 2 0       History  Past Medical History:   Diagnosis Date    Arthritis     [facility] CA [facility]     Breast CA ([facility] 2016    DDD (degenerative disc disease), cervical     Diabetes ([facility]     DJD (degenerative joint disease) of cervical spine     Heart murmur     History of noncompliance with medical treatment     Hypercholesteremia     Hypertension     Menopause     Mild diastolic dysfunction     ECHO 5/2020     PE (pulmonary thromboembolism) ([facility] 2019    Psychiatric disorder     DEPRESSION    PUD (peptic ulcer disease)     S/P cardiac cath 02/2015    No angiographic evidence of CAD    Seizures (HCC)     LAST SEIZURE ABOUT 2 MONTHS AGO (NOV 2015)    Stroke ([facility]     x2 with left side deficit       Past Surgical History:   Procedure Laterality Date    HX BREAST LUMPECTOMY Right 1/12/2016    Dr. Radhika Salinas Partial Mastectomy w./ axillary lymphadenectomy    HX HEENT      TONSILLECTOMY    HX MASTECTOMY Right 1/26/2016    Dr. Radhika Salinas Repeat Partial Mastectomy for positive margins    HX ORTHOPAEDIC      left arm surg    IR MEDIPORT         Social History     Socioeconomic History    Marital status: SINGLE     Spouse name: Not on file    Number of children: Not on file    Years of education: Not on file    Highest education level: Not on file   Occupational History    Not on file   Tobacco Use    Smoking status: Never Smoker    Smokeless tobacco: Never Used   Substance and Sexual Activity    Alcohol use: No    Drug use: No    Sexual activity: Yes     Partners: Male   Other Topics Concern     Service No    Blood Transfusions No    Caffeine Concern No    Occupational Exposure No    Hobby Hazards No    Sleep Concern Yes    Stress Concern No    Weight Concern Not Asked    Special Diet Not Asked    Back Care Not Asked    Exercise Yes     Comment: walks using cane    Bike Helmet Not Asked    Seat Belt Yes    Self-Exams Yes   Social History Narrative    Not on file     Social Determinants of Health     Financial Resource Strain:     Difficulty of Paying Living Expenses: Not on file   Food Insecurity:     Worried About Running Out of Food in the Last Year: Not on file    Ran Out of Food in the Last Year: Not on file   Transportation Needs:     Lack of Transportation (Medical): Not on file    Lack of Transportation (Non-Medical): Not on file   Physical Activity:     Days of Exercise per Week: Not on file    Minutes of Exercise per Session: Not on file   Stress:     Feeling of Stress : Not on file   Social Connections:     Frequency of Communication with Friends and Family: Not on file    Frequency of Social Gatherings with Friends and Family: Not on file    Attends Jewish Services: Not on file    Active Member of [address] [place] [address] or Organizations: Not on file    Attends Club or Organization Meetings: Not on file    Marital Status: Not on file   Intimate Partner Violence:     Fear of Current or Ex-Partner: Not on file    Emotionally Abused: Not on file    Physically Abused: Not on file    Sexually Abused: Not on file   Housing Stability:     Unable to Pay for Housing in the Last Year: Not on file    Number of [place] in the Last Year: Not on file    Unstable Housing in the Last Year: Not on file       No Known Allergies    Current Outpatient Medications   Medication Sig Dispense Refill    benzoyl peroxide 10 % topical lotion Apply  to affected area nightly.  amLODIPine (NORVASC) 5 mg tablet Take 1 Tablet by mouth nightly. 90 Tablet 1    hydroCHLOROthiazide (HYDRODIURIL) 25 mg tablet Take 1 Tablet by mouth daily. 90 Tablet 1    insulin aspart protamine/insulin aspart (NOVOLOG MIX 70/30) 100 unit/mL (70-30) inpn 10 Units by SubCUTAneous route every twelve (12) hours. Indications: type 2 diabetes mellitus 5 Pen 5    flash glucose sensor (FreeStyle Ivy 14 Day Sensor) kit Use to check blood sugar 4x/day and as needed. 1 Kit 3    diclofenac (VOLTAREN) 1 % gel Apply 2 g to affected area four (4) times daily.  1 Each 0    apixaban (ELIQUIS) 5 mg tablet Take 1 Tablet by mouth two (2) times a day. 180 Tablet 3    flash glucose scanning reader (FreeStyle Ivy 14 Day [place]) [facility] Use to check blood sugar 4x/day and as needed. 1 Each 0    Insulin Needles, Disposable, (TRUEplus Pen Needle) 31 gauge x 5/16\" ndle Use as directed 4 times daily 100 Pen Needle 11    divalproex DR (DEPAKOTE) 500 mg tablet Take 2 Tablets by mouth every twelve (12) hours. 180 Tablet 1    glucose blood VI test strips (OneTouch Verio test strips) strip Check your blood sugar in the mornings and when you have symptoms of hypoglycemia 100 Strip 5    ondansetron (ZOFRAN ODT) 4 mg disintegrating tablet Take 4 mg by mouth every eight (8) hours as needed.  atorvastatin (LIPITOR) 40 mg tablet Take 1 Tablet by mouth daily. 90 Tablet 1    isosorbide mononitrate ER (Imdur) 30 mg tablet Take 1 Tablet by mouth daily. 90 Tablet 1    carvediloL (COREG) 12.5 mg tablet Take 1 Tablet by mouth two (2) times daily (with meals). 60 Tablet 2    traZODone (DESYREL) 50 mg tablet Take 1 Tablet by mouth nightly as needed for Sleep. 30 Tablet 2    nystatin (MYCOSTATIN) topical cream Apply  to affected area two (2) times a day. 15 g 0    acetaminophen (TYLENOL) 500 mg tablet Take 2 Tablets by mouth three (3) times daily as needed for Pain. 100 Tablet 2    ergocalciferol (ERGOCALCIFEROL) 1,250 mcg (50,000 unit) capsule Take 1 Capsule by mouth every seven (7) days. Indications: vitamin D deficiency (high dose therapy) 8 Capsule 0    capsaicin 0.075 % topical cream Apply  to affected area three (3) times daily as needed for Pain. 60 g 6    letrozole (FEMARA) 2.5 mg tablet Take 2.5 mg by mouth daily.  SONAFINE emul topical       therapeutic multivitamin (THERAGRAN) tablet Take 1 Tab by mouth daily.          Patient Care Team:  Patient Care Team:  Benedicto Hatfield NP as PCP - General (Nurse Practitioner)  Benedicto Hatfield NP as PCP - [facility]  Tc Hubbard, Dieter David NP (Nurse Practitioner)  Torsten Kee MD (General Surgery)  Autumn Wiseman, RN as Nurse Navigator (Oncology)  Jason Larson MD (Pulmonary Disease)  Sriram Blue MD (Surgery)  Lulu Griffith MD (Breast Surgery)    Physical Exam  Patient appears well, she is pleasant, alert, oriented x 3, in no distress. Obese. ENT normal.  Neck supple. No adenopathy or thyromegaly. SUBHASH. Lungs are clear, good air entry, no wheezes, rhonchi or rales. Cardiovascular, S1 and S2 normal, no murmurs, regular rate and rhythm. No LAD. Chest wall negative for tenderness  Abdomen is soft without tenderness  /Anorectal, deferred. Muscleskeletal, no swelling  Extremities show no edema  Neurological is normal without focal findings. Skin: no concerning lesions. Psych: normal affect. Mood good. Oriented x 3. Judgement and insight intact. Vitals:    01/24/22 1010   BP: (!) 124/57   Pulse: 71   Resp: 16   Temp: 97.2 °F (36.2 °C)   SpO2: 98%   Weight: 232 lb (105.2 kg)   Height: 5' 6\" (1.676 m)   PainSc:   0 - No pain       Assessment and Plan    Diagnoses and all orders for this visit:    Hospital discharge follow-up    Essential hypertension  -     amLODIPine (NORVASC) 5 mg tablet; Take 1 Tablet by mouth nightly., Normal, Disp-90 Tablet, R-1  -     hydroCHLOROthiazide (HYDRODIURIL) 25 mg tablet; Take 1 Tablet by mouth daily. , Normal, Disp-90 Tablet, R-1    Type 2 diabetes mellitus with diabetic neuropathy, with long-term current use of insulin (HCC)  -     insulin aspart protamine/insulin aspart (NOVOLOG MIX 70/30) 100 unit/mL (70-30) inpn; 10 Units by SubCUTAneous route every twelve (12) hours.  Indications: type 2 diabetes mellitus, Normal, Disp-5 Pen, R-5  -     Discontinue: flash glucose sensor (FreeStyle Ivy 14 Day Sensor) kit; Use to check blood sugar 4x/day and as needed., Normal, Disp-1 Kit, R-3  -     flash glucose sensor (FreeStyle Ivy 14 Day Sensor) kit; Use to check blood sugar 4x/day and as needed., Normal, Disp-1 Kit, R-3  -     AMB POC HEMOGLOBIN A1C    Severe obesity (BMI 35.0-35.9 with comorbidity) (HCC)    Seizure disorder (HCC)  -     REFERRAL TO NEUROLOGY    Chronic kidney disease, stage 3a (HCC)    Muscle strain  -     diclofenac (VOLTAREN) 1 % gel; Apply 2 g to affected area four (4) times daily. , Normal, Disp-1 Each, R-0    Chronic pain of both knees  -     diclofenac (VOLTAREN) 1 % gel; Apply 2 g to affected area four (4) times daily. , Normal, Disp-1 Each, R-0  -     REFERRAL TO ORTHOPEDICS         *Plan of care reviewed with patient. Patient in agreement with plan and expresses understanding. All questions answered and patient encouraged to call or RTO if further questions or concerns. 50% of 30 minutes spent on counseling and managing her care.       Eva Cruz, KANCHAN-C

## 2022-01-24 NOTE — PROGRESS NOTES
Bela Mc is a 73 y.o. female (: 1950) presenting to address:    Chief Complaint   Patient presents with    Follow-up     pt would like a refill of insulin injection pen and benzoyl peroxide        Vitals:    22 1010   BP: (!) 124/57   Pulse: 71   Resp: 16   Temp: 97.2 °F (36.2 °C)   SpO2: 98%   Weight: 232 lb (105.2 kg)   Height: 5' 6\" (1.676 m)   PainSc:   0 - No pain       Hearing/Vision:   No exam data present    Learning Assessment:     Learning Assessment 2021   PRIMARY LEARNER Patient   HIGHEST LEVEL OF EDUCATION - PRIMARY LEARNER  -   BARRIERS PRIMARY LEARNER NONE   CO-LEARNER CAREGIVER No   PRIMARY LANGUAGE ENGLISH   LEARNER PREFERENCE PRIMARY DEMONSTRATION     -     -   ANSWERED BY patient   RELATIONSHIP SELF     Depression Screening:     3 most recent PHQ Screens 2022   Little interest or pleasure in doing things Not at all   Feeling down, depressed, irritable, or hopeless Not at all   Total Score PHQ 2 0     Fall Risk Assessment:     Fall Risk Assessment, last 12 mths 10/20/2021   Able to walk? Yes   Fall in past 12 months? 0   Do you feel unsteady? 1   Are you worried about falling 1   Is the gait abnormal? 0   Number of falls in past 12 months 0   Fall with injury? 0     Abuse Screening:     Abuse Screening Questionnaire 6/15/2020   Do you ever feel afraid of your partner? N   Are you in a relationship with someone who physically or mentally threatens you? N   Is it safe for you to go home?  Y     ADL Assessment:     ADL Assessment 6/15/2020   Feeding yourself No Help Needed   Getting from bed to chair No Help Needed   Getting dressed No Help Needed   Bathing or showering No Help Needed   Walk across the room (includes cane/walker) No Help Needed   Using the telphone No Help Needed   Taking your medications No Help Needed   Preparing meals No Help Needed   Managing money (expenses/bills) No Help Needed   Moderately strenuous housework (laundry) No Help Needed   Shopping for personal items (toiletries/medicines) No Help Needed   Shopping for groceries No Help Needed   Driving No Help Needed   Climbing a flight of stairs No Help Needed   Getting to places beyond walking distances No Help Needed        Coordination of Care Questionaire:   1. \"Have you been to the ER, urgent care clinic since your last visit? Hospitalized since your last visit? \" Yes yes pt went to the er     2. \"Have you seen or consulted any other health care providers outside of the [address] [place] [address] [place] since your last visit? \" No     3. For patients aged 39-70: Has the patient had a colonoscopy? No     If the patient is female:    4. For patients aged 41-77: Has the patient had a mammogram within the past 2 years? Yes - no Care Gap present    5. For patients aged 21-65: Has the patient had a pap smear? NA - based on age    Advanced Directive:   1. Do you have an Advanced Directive? NO    2. Would you like information on Advanced Directives?  NO

## 2022-02-03 ENCOUNTER — OFFICE VISIT (OUTPATIENT)
Dept: ORTHOPEDIC SURGERY | Age: 72
End: 2022-02-03
Payer: MEDICARE

## 2022-02-03 VITALS — TEMPERATURE: 97.1 F | HEART RATE: 73 BPM | OXYGEN SATURATION: 100 %

## 2022-02-03 DIAGNOSIS — G89.29 CHRONIC PAIN OF RIGHT KNEE: Primary | ICD-10-CM

## 2022-02-03 DIAGNOSIS — M25.562 CHRONIC PAIN OF LEFT KNEE: ICD-10-CM

## 2022-02-03 DIAGNOSIS — M25.561 CHRONIC PAIN OF RIGHT KNEE: Primary | ICD-10-CM

## 2022-02-03 DIAGNOSIS — Z86.73 HISTORY OF CVA (CEREBROVASCULAR ACCIDENT): ICD-10-CM

## 2022-02-03 DIAGNOSIS — M17.11 PRIMARY OSTEOARTHRITIS OF RIGHT KNEE: ICD-10-CM

## 2022-02-03 DIAGNOSIS — G81.94 LEFT HEMIPARESIS (HCC): ICD-10-CM

## 2022-02-03 DIAGNOSIS — G89.29 CHRONIC PAIN OF LEFT KNEE: ICD-10-CM

## 2022-02-03 DIAGNOSIS — M17.12 PRIMARY OSTEOARTHRITIS OF LEFT KNEE: ICD-10-CM

## 2022-02-03 PROCEDURE — 1101F PT FALLS ASSESS-DOCD LE1/YR: CPT | Performed by: SPECIALIST

## 2022-02-03 PROCEDURE — G8427 DOCREV CUR MEDS BY ELIG CLIN: HCPCS | Performed by: SPECIALIST

## 2022-02-03 PROCEDURE — G8536 NO DOC ELDER MAL SCRN: HCPCS | Performed by: SPECIALIST

## 2022-02-03 PROCEDURE — G9899 SCRN MAM PERF RSLTS DOC: HCPCS | Performed by: SPECIALIST

## 2022-02-03 PROCEDURE — G8417 CALC BMI ABV UP PARAM F/U: HCPCS | Performed by: SPECIALIST

## 2022-02-03 PROCEDURE — G8432 DEP SCR NOT DOC, RNG: HCPCS | Performed by: SPECIALIST

## 2022-02-03 PROCEDURE — G8756 NO BP MEASURE DOC: HCPCS | Performed by: SPECIALIST

## 2022-02-03 PROCEDURE — 99203 OFFICE O/P NEW LOW 30 MIN: CPT | Performed by: SPECIALIST

## 2022-02-03 PROCEDURE — 1090F PRES/ABSN URINE INCON ASSESS: CPT | Performed by: SPECIALIST

## 2022-02-03 PROCEDURE — G8399 PT W/DXA RESULTS DOCUMENT: HCPCS | Performed by: SPECIALIST

## 2022-02-03 PROCEDURE — 3017F COLORECTAL CA SCREEN DOC REV: CPT | Performed by: SPECIALIST

## 2022-02-03 PROCEDURE — 73562 X-RAY EXAM OF KNEE 3: CPT | Performed by: SPECIALIST

## 2022-02-03 PROCEDURE — 20610 DRAIN/INJ JOINT/BURSA W/O US: CPT | Performed by: SPECIALIST

## 2022-02-03 RX ORDER — BLOOD-GLUCOSE METER
EACH MISCELLANEOUS
COMMUNITY
Start: 2021-09-23

## 2022-02-03 RX ORDER — AMOXICILLIN 250 MG
1 CAPSULE ORAL DAILY
COMMUNITY
Start: 2021-09-22 | End: 2022-05-05

## 2022-02-03 RX ORDER — BETAMETHASONE SODIUM PHOSPHATE AND BETAMETHASONE ACETATE 3; 3 MG/ML; MG/ML
3 INJECTION, SUSPENSION INTRA-ARTICULAR; INTRALESIONAL; INTRAMUSCULAR; SOFT TISSUE ONCE
Status: COMPLETED | OUTPATIENT
Start: 2022-02-03 | End: 2022-02-03

## 2022-02-03 RX ORDER — LANCETS
1 EACH MISCELLANEOUS EVERY 12 HOURS
COMMUNITY
Start: 2021-09-23

## 2022-02-03 RX ADMIN — BETAMETHASONE SODIUM PHOSPHATE AND BETAMETHASONE ACETATE 3 MG: 3; 3 INJECTION, SUSPENSION INTRA-ARTICULAR; INTRALESIONAL; INTRAMUSCULAR; SOFT TISSUE at 16:47
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
## 2022-02-18 ENCOUNTER — TELEPHONE (OUTPATIENT)
Age: 72
End: 2022-02-18

## 2022-02-21 DIAGNOSIS — C50.011 MALIGNANT NEOPLASM OF AREOLA OF RIGHT BREAST IN FEMALE, UNSPECIFIED ESTROGEN RECEPTOR STATUS (HCC): Primary | ICD-10-CM

## 2022-02-21 DIAGNOSIS — D51.8 VITAMIN B12 DEFICIENCY (DIETARY) ANEMIA: ICD-10-CM

## 2022-02-21 DIAGNOSIS — D64.9 NORMOCYTIC ANEMIA: ICD-10-CM

## 2022-02-21 DIAGNOSIS — I26.99 PE (PULMONARY THROMBOEMBOLISM) (HCC): ICD-10-CM

## 2022-02-22 ENCOUNTER — HOSPITAL ENCOUNTER (OUTPATIENT)
Dept: PHYSICAL THERAPY | Age: 72
Discharge: HOME | End: 2022-02-22
Payer: MEDICARE

## 2022-02-22 PROCEDURE — 97162 PT EVAL MOD COMPLEX 30 MIN: CPT

## 2022-02-22 PROCEDURE — 97110 THERAPEUTIC EXERCISES: CPT
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
## 2022-02-25 ENCOUNTER — TELEPHONE (OUTPATIENT)
Age: 72
End: 2022-02-25

## 2022-02-25 ENCOUNTER — HOSPITAL ENCOUNTER (OUTPATIENT)
Dept: INFUSION THERAPY | Age: 72
Discharge: HOME | End: 2022-02-25
Payer: MEDICARE

## 2022-02-25 DIAGNOSIS — D51.8 VITAMIN B12 DEFICIENCY (DIETARY) ANEMIA: ICD-10-CM

## 2022-02-25 DIAGNOSIS — C50.011 MALIGNANT NEOPLASM OF AREOLA OF RIGHT BREAST IN FEMALE, UNSPECIFIED ESTROGEN RECEPTOR STATUS (HCC): ICD-10-CM

## 2022-02-25 DIAGNOSIS — D64.9 NORMOCYTIC ANEMIA: ICD-10-CM

## 2022-02-25 DIAGNOSIS — I26.99 PE (PULMONARY THROMBOEMBOLISM) (HCC): ICD-10-CM

## 2022-02-25 LAB
ALBUMIN SERPL-MCNC: 2.7 G/DL (ref 3.4–5)
ALBUMIN/GLOB SERPL: 0.6 {RATIO} (ref 0.8–1.7)
ALP SERPL-CCNC: 55 U/L (ref 45–117)
ALT SERPL-CCNC: 16 U/L (ref 13–56)
ANION GAP SERPL CALC-SCNC: 6 MMOL/L (ref 3–18)
AST SERPL-CCNC: 13 U/L (ref 10–38)
BASO+EOS+MONOS # BLD AUTO: 0.7 K/UL (ref 0–2.3)
BASO+EOS+MONOS NFR BLD AUTO: 10 % (ref 0.1–17)
BILIRUB SERPL-MCNC: 0.5 MG/DL (ref 0.2–1)
BUN SERPL-MCNC: 38 MG/DL (ref 7–18)
BUN/CREAT SERPL: 24 (ref 12–20)
CALCIUM SERPL-MCNC: 8.6 MG/DL (ref 8.5–10.1)
CHLORIDE SERPL-SCNC: 105 MMOL/L (ref 100–111)
CO2 SERPL-SCNC: 28 MMOL/L (ref 21–32)
CREAT SERPL-MCNC: 1.6 MG/DL (ref 0.6–1.3)
DIFFERENTIAL METHOD BLD: ABNORMAL
ERYTHROCYTE [DISTWIDTH] IN BLOOD BY AUTOMATED COUNT: 13.9 % (ref 11.5–14.5)
FERRITIN SERPL-MCNC: 408 NG/ML (ref 8–388)
GLOBULIN SER CALC-MCNC: 4.9 G/DL (ref 2–4)
GLUCOSE SERPL-MCNC: 167 MG/DL (ref 74–99)
HCT VFR BLD AUTO: 35.3 % (ref 36–48)
HGB BLD-MCNC: 10.9 G/DL (ref 12–16)
IRON SATN MFR SERPL: 22 % (ref 20–50)
IRON SERPL-MCNC: 57 UG/DL (ref 50–175)
LYMPHOCYTES # BLD: 3.9 K/UL (ref 1.1–5.9)
LYMPHOCYTES NFR BLD: 53 % (ref 14–44)
MCH RBC QN AUTO: 27.7 PG (ref 25–35)
MCHC RBC AUTO-ENTMCNC: 30.9 G/DL (ref 31–37)
MCV RBC AUTO: 89.6 FL (ref 78–102)
NEUTS SEG # BLD: 2.8 K/UL (ref 1.8–9.5)
NEUTS SEG NFR BLD: 37 % (ref 40–70)
PLATELET # BLD AUTO: 113 K/UL (ref 140–440)
POTASSIUM SERPL-SCNC: 4 MMOL/L (ref 3.5–5.5)
PROT SERPL-MCNC: 7.6 G/DL (ref 6.4–8.2)
RBC # BLD AUTO: 3.94 M/UL (ref 4.1–5.1)
SODIUM SERPL-SCNC: 139 MMOL/L (ref 136–145)
TIBC SERPL-MCNC: 256 UG/DL (ref 250–450)
WBC # BLD AUTO: 7.4 K/UL (ref 4.5–13)

## 2022-02-25 PROCEDURE — 82607 VITAMIN B-12: CPT

## 2022-02-25 PROCEDURE — 82728 ASSAY OF FERRITIN: CPT

## 2022-02-25 PROCEDURE — 85025 COMPLETE CBC W/AUTO DIFF WBC: CPT

## 2022-02-25 PROCEDURE — 80053 COMPREHEN METABOLIC PANEL: CPT

## 2022-02-25 PROCEDURE — 83540 ASSAY OF IRON: CPT

## 2022-02-25 PROCEDURE — 36415 COLL VENOUS BLD VENIPUNCTURE: CPT

## 2022-02-25 NOTE — PROGRESS NOTES
[facility] Progress Note    Date: 2022    Name: Jeri Rivas    MRN: [id number]         : 1950    Peripheral Lab Draw    Recent Results (from the past 12 hour(s))   CBC WITH 3 PART DIFF    Collection Time: 22  8:50 AM   Result Value Ref Range    WBC 7.4 4.5 - 13.0 K/uL    RBC 3.94 (L) 4.10 - 5.10 M/uL    HGB 10.9 (L) 12.0 - 16.0 g/dL    HCT 35.3 (L) 36 - 48 %    MCV 89.6 78 - 102 FL    MCH 27.7 25.0 - 35.0 PG    MCHC 30.9 (L) 31 - 37 g/dL    RDW 13.9 11.5 - 14.5 %    PLATELET 299 (L) 899 - 440 K/uL    NEUTROPHILS 37 (L) 40 - 70 %    Mixed cells 10 0.1 - 17 %    LYMPHOCYTES 53 (H) 14 - 44 %    ABS. NEUTROPHILS 2.8 1.8 - 9.5 K/UL    ABS. MIXED CELLS 0.7 0.0 - 2.3 K/uL    ABS. LYMPHOCYTES 3.9 1.1 - 5.9 K/UL    DF AUTOMATED         Ms. Pranav Judge to [facility], ambulatory at 3384 accompanied by self. Pt was assessed and education was provided. Ms. Bailey Sarmiento vitals were reviewed and patient was observed for 5 minutes prior to treatment. There were no vitals taken for this visit. Blood obtained peripherally from left arm x 1 attempt with butterfly needle and sent to lab per written orders. No bleeding or hematoma noted at site. Gauze and coban applied. Ms. Pranav Judge tolerated the phlebotomy, and had no complaints. Patient armband removed and shredded. Ms. Pranav Judge was discharged from [place] [address] in stable condition at [phone].      Bull Ramirez Phlebotomist PCT  2022  10:47 AM

## 2022-02-26 LAB
FOLATE SERPL-MCNC: 3.7 NG/ML (ref 3.1–17.5)
VIT B12 SERPL-MCNC: 484 PG/ML (ref 211–911)

## 2022-02-28 ENCOUNTER — OFFICE VISIT (OUTPATIENT)
Age: 72
End: 2022-02-28
Payer: MEDICARE

## 2022-02-28 ENCOUNTER — NURSE NAVIGATOR (OUTPATIENT)
Dept: OTHER | Age: 72
End: 2022-02-28

## 2022-02-28 VITALS
RESPIRATION RATE: 17 BRPM | HEART RATE: 75 BPM | TEMPERATURE: 97 F | HEIGHT: 66 IN | SYSTOLIC BLOOD PRESSURE: 133 MMHG | OXYGEN SATURATION: 100 % | DIASTOLIC BLOOD PRESSURE: 83 MMHG | BODY MASS INDEX: 35.39 KG/M2 | WEIGHT: 220.2 LBS

## 2022-02-28 DIAGNOSIS — I10 HYPERTENSION, UNSPECIFIED TYPE: ICD-10-CM

## 2022-02-28 DIAGNOSIS — C50.011 MALIGNANT NEOPLASM OF AREOLA OF RIGHT BREAST IN FEMALE, UNSPECIFIED ESTROGEN RECEPTOR STATUS (HCC): ICD-10-CM

## 2022-02-28 DIAGNOSIS — I26.99 PE (PULMONARY THROMBOEMBOLISM) (HCC): ICD-10-CM

## 2022-02-28 DIAGNOSIS — E66.01 SEVERE OBESITY (BMI 35.0-35.9 WITH COMORBIDITY) (HCC): Primary | ICD-10-CM

## 2022-02-28 PROCEDURE — 99214 OFFICE O/P EST MOD 30 MIN: CPT | Performed by: INTERNAL MEDICINE

## 2022-02-28 PROCEDURE — G0463 HOSPITAL OUTPT CLINIC VISIT: HCPCS | Performed by: INTERNAL MEDICINE

## 2022-02-28 RX ORDER — LETROZOLE 2.5 MG/1
2.5 TABLET, FILM COATED ORAL DAILY
Qty: 90 TABLET | Refills: 3 | Status: SHIPPED | OUTPATIENT
Start: 2022-02-28 | End: 2023-02-23

## 2022-02-28 NOTE — NURSE NAVIGATOR
Saw pt in clinic with Dr. Pedro Driver for f/u, pt walking with a cane post stroke. Continue Letrozol, RTC in 6 months with labs prior to visit. All questions answered.

## 2022-02-28 NOTE — PROGRESS NOTES
Hematology/Oncology  Progress Note    Name: Basia Arm  Date: 2022  : 1950  Primary Care Provider: Osvaldo Musa NP    Ms. Meryl Thurman is a 73y.o. year old female with right breast cancer ER positive HER-2/ravi not amplified. Stage T2 N1 M0. Patient had a stroke and she follows with Neurology    Current Therapy: Letrozole as of 2016. BCI indicates continuation of letrozole will be helpful. The plan was for 2 additional years    Subjective:     1. Malignant neoplasm of areola of right breast in female, unspecified estrogen receptor status ([facility]  Mrs. Brad De Luna history of resected right breast cancer, T2 N1 M0, hormone receptor positive, HER-2 negative.  She completed [facility] adjuvant chemotherapy as well as radiation therapy which was completed on 2016. Taylor Manning has been on adjuvant hormonal blockade with letrozole since 10/4/2016. Donaldo Key was being treated previously by Dr. Mary Guillen. Patient will be completing 5 years of adjuvant hormonal blockade with letrozole in 2021. *Mammogram on 7/15/2021 was negative.  BI-RADS 2  *Next mammogram due in 2022. *DEXA scan in people 2018 was normal.  Recheck DEXA scan now. *BCI came back at 13%-Patient will therefore benefit from extended treatment. Since the [facility] study have shown that 7 years will offer the same benefit as 10 years with less bowel side effects I would continue extended hormonal blockade for 7 years.     2. Normocytic anemia  Labs on 2021 showed ferritin 150, transferrin saturation 21%, normal B12 and folate. BUN 28, creatinine 1.46. WBC 8.2, H&H 11.2/37.3, platelet 214. H&H is stable and anemia is mild. Continue follow-up. Patient to follow-up with PCP for chronic kidney disease.     3. PE (pulmonary thromboembolism) ([facility]  Continue Eliquis    The past medical, surgical and social history has been reviewed and remains unchanged.    Past Medical History:   Diagnosis Date    Arthritis     Breast CA [facility]     Breast CA ([facility] 2016    DDD (degenerative disc disease), cervical     Diabetes ([facility]     DJD (degenerative joint disease) of cervical spine     Heart murmur     History of noncompliance with medical treatment     Hypercholesteremia     Hypertension     Menopause     Mild diastolic dysfunction     ECHO 5/2020     PE (pulmonary thromboembolism) ([facility] 2019    Psychiatric disorder     DEPRESSION    PUD (peptic ulcer disease)     S/P cardiac cath 02/2015    No angiographic evidence of CAD    Seizures (HCC)     LAST SEIZURE ABOUT 2 MONTHS AGO (NOV 2015)    Stroke ([facility]     x2 with left side deficit     Past Surgical History:   Procedure Laterality Date    HX BREAST LUMPECTOMY Right 1/12/2016    Dr. Slim Hurtado Partial Mastectomy w./ axillary lymphadenectomy    HX HEENT      TONSILLECTOMY    HX MASTECTOMY Right 1/26/2016    Dr. Slim Hurtado Repeat Partial Mastectomy for positive margins    HX ORTHOPAEDIC      left arm surg    IR MEDIPORT       Social History     Socioeconomic History    Marital status: SINGLE     Spouse name: Not on file    Number of children: Not on file    Years of education: Not on file    Highest education level: Not on file   Occupational History    Not on file   Tobacco Use    Smoking status: Never Smoker    Smokeless tobacco: Never Used   Vaping Use    Vaping Use: Never used   Substance and Sexual Activity    Alcohol use: No    Drug use: No    Sexual activity: Yes     Partners: Male   Other Topics Concern     Service No    Blood Transfusions No    Caffeine Concern No    Occupational Exposure No    Hobby Hazards No    Sleep Concern Yes    Stress Concern No    Weight Concern Not Asked    Special Diet Not Asked    Back Care Not Asked    Exercise Yes     Comment: walks using cane    Bike Helmet Not Asked    Seat Belt Yes    Self-Exams Yes   Social History Narrative    Not on file     Social Determinants of Health     Financial Resource Strain:     Difficulty of Paying Living Expenses: Not on file   Food Insecurity:     Worried About Running Out of Food in the Last Year: Not on file    Yamila of Food in the Last Year: Not on file   Transportation Needs:     Lack of Transportation (Medical): Not on file    Lack of Transportation (Non-Medical): Not on file   Physical Activity:     Days of Exercise per Week: Not on file    Minutes of Exercise per Session: Not on file   Stress:     Feeling of Stress : Not on file   Social Connections:     Frequency of Communication with Friends and Family: Not on file    Frequency of Social Gatherings with Friends and Family: Not on file    Attends Scientologist Services: Not on file    Active Member of [address] [place] [address] [email or web address] or Organizations: Not on file    Attends Club or Organization Meetings: Not on file    Marital Status: Not on file   Intimate Partner Violence:     Fear of Current or Ex-Partner: Not on file    Emotionally Abused: Not on file    Physically Abused: Not on file    Sexually Abused: Not on file   Housing Stability:     Unable to Pay for Housing in the Last Year: Not on file    Number of [place] in the Last Year: Not on file    Unstable Housing in the Last Year: Not on file     Family History   Problem Relation Age of Onset    Cancer Father [de-identified]        colon    Hypertension Mother     Heart Disease Mother     Diabetes Mother     Breast Cancer Maternal Aunt     Breast Cancer Other     Breast Cancer Other      Current Outpatient Medications   Medication Sig Dispense Refill    letrozole (FEMARA) 2.5 mg tablet Take 1 Tablet by mouth daily for 360 days. Indications: hormone receptor positive postmenopausal early breast cancer 90 Tablet 3    Blood-Glucose Meter misc One Touch Ultra 2 Blood Glucose  Meter; E 11.9 Type 2 Diabetes; On Insulin: Yes Z279.4 Long Term (current) Insulin Use.  lancets misc 1 Each every twelve (12) hours.       senna-docusate (PERICOLACE) 8.6-50 mg per tablet Take 1 Tablet by mouth daily.  benzoyl peroxide 10 % topical lotion Apply  to affected area nightly.  amLODIPine (NORVASC) 5 mg tablet Take 1 Tablet by mouth nightly. 90 Tablet 1    hydroCHLOROthiazide (HYDRODIURIL) 25 mg tablet Take 1 Tablet by mouth daily. 90 Tablet 1    insulin aspart protamine/insulin aspart (NOVOLOG MIX 70/30) 100 unit/mL (70-30) inpn 10 Units by SubCUTAneous route every twelve (12) hours. Indications: type 2 diabetes mellitus 5 Pen 5    flash glucose sensor (FreeStyle Ivy 14 Day Sensor) kit Use to check blood sugar 4x/day and as needed. 1 Kit 3    diclofenac (VOLTAREN) 1 % gel Apply 2 g to affected area four (4) times daily. 1 Each 0    apixaban (ELIQUIS) 5 mg tablet Take 1 Tablet by mouth two (2) times a day. 180 Tablet 3    flash glucose scanning reader (FreeStyle Ivy 14 Day [place]) misc Use to check blood sugar 4x/day and as needed. 1 Each 0    Insulin Needles, Disposable, (TRUEplus Pen Needle) 31 gauge x 5/16\" ndle Use as directed 4 times daily 100 Pen Needle 11    divalproex DR (DEPAKOTE) 500 mg tablet Take 2 Tablets by mouth every twelve (12) hours. 180 Tablet 1    glucose blood VI test strips (OneTouch Verio test strips) strip Check your blood sugar in the mornings and when you have symptoms of hypoglycemia 100 Strip 5    ondansetron (ZOFRAN ODT) 4 mg disintegrating tablet Take 4 mg by mouth every eight (8) hours as needed.  atorvastatin (LIPITOR) 40 mg tablet Take 1 Tablet by mouth daily. 90 Tablet 1    isosorbide mononitrate ER (Imdur) 30 mg tablet Take 1 Tablet by mouth daily. 90 Tablet 1    carvediloL (COREG) 12.5 mg tablet Take 1 Tablet by mouth two (2) times daily (with meals). 60 Tablet 2    traZODone (DESYREL) 50 mg tablet Take 1 Tablet by mouth nightly as needed for Sleep. 30 Tablet 2    nystatin (MYCOSTATIN) topical cream Apply  to affected area two (2) times a day.  15 g 0    acetaminophen (TYLENOL) 500 mg tablet Take 2 Tablets by mouth three (3) times daily as needed for Pain. 100 Tablet 2    ergocalciferol (ERGOCALCIFEROL) 1,250 mcg (50,000 unit) capsule Take 1 Capsule by mouth every seven (7) days. Indications: vitamin D deficiency (high dose therapy) 8 Capsule 0    capsaicin 0.075 % topical cream Apply  to affected area three (3) times daily as needed for Pain. 60 g 6    SONAFINE emul topical       therapeutic multivitamin (THERAGRAN) tablet Take 1 Tab by mouth daily. Review of Systems:    General :The patient has no new complaints    Psychological : patient denies having any psychological symptoms such as hallucinations depression or anxiety. Ophthalmic:the patient denies having any visual impairment or eye discomfort. Allergy and Immunology:the patient denies having any seasonal allergies or allergies to medications other than those already outlined above. Hematological and Lymphatic: the patient denies having any bruising, bleeding or lymphadenopathy. Endocrine: the patient denies having any heat or cold intolerance. There is no history of diabetes or thyroid disorders. Breast: the patient denies having any new breast mass or lumps. Recent mammo and US benign    Respiratory:the patient denies having any cough, shortness of breath, or dyspnea on exertion. Cardiovascular: there are no complaints of chest pain, palpitations, chest pounding, or dyspnea on exertion. Gastrointestinal: the patient denies having nausea, emesis, diarrhea, constipation, or blood in the stool. Genito-Urinary: the patient denies having urinary urgency, frequency, or dysuria. Musculoskeletal: with the exception of mild arthralgias the patient has no other musculoskeletal complaints. Neurological:  denies having any numbness, tingling, or neurologic deficits. Dermatological: patient denies having any unexplained rash, skin ulcerations, or hives.     Objective:     Visit Vitals  /83   Pulse 75   Temp 97 °F (36.1 °C)   Resp 17   Ht 5' 6\" (1.676 m)   Wt 99.9 kg (220 lb 3.2 oz)   SpO2 100%   BMI 35.54 kg/m²     Pain Score: 2    Physical Exam:     ECO    General: Chronically ill appearing, in NAD    Psychologic: mood and affect are appropriate, no anxiety or depression noted    Skin: examination of the skin reveals no bruising, rash or petechiae    HEENT: Normocephalic, atraumatic. Conjunctiva and sclera are clear. Pupils are equal, round and reactive to light. EOMs are intact. ENT without oral mucosal lesions, stomatitis or thrush    Neck: supple without lymphadenopathy, JVD or thyromegaly    Lymphatics: no palpable cervical, supraclavicular, infraclavicular, axillary or inguinal lymphadenopathy    There is a scar on the: Both breasts are examined in upright and supine position and left breast no masses no scars normal nipple areolar complex no skin changes. Right breast scar at 10 o'clock and port scar. No masses, no sckin changes except for some hyperpigmentation, no masses and normal niple areolar complex. Lungs: clear breath sounds bilaterally, no rhonchi or wheezes noted    Heart: Regular rate and rhythm, no murmurs, rubs or gallops, S1-S2 noted. Positive peripheral pulses bilaterally upper and lower extremities    Abdomen: soft, non-tender, non-distended, no HSM    Extremities: without clubbing, cyanosis or edema    Neurologic: no focal deficits, steady gait, Alert and oriented x 3.     Laboratory Data:     Results for orders placed or performed during the hospital encounter of 22   CBC WITH 3 PART DIFF     Status: Abnormal   Result Value Ref Range Status    WBC 7.4 4.5 - 13.0 K/uL Final    RBC 3.94 (L) 4.10 - 5.10 M/uL Final    HGB 10.9 (L) 12.0 - 16.0 g/dL Final    HCT 35.3 (L) 36 - 48 % Final    MCV 89.6 78 - 102 FL Final    MCH 27.7 25.0 - 35.0 PG Final    MCHC 30.9 (L) 31 - 37 g/dL Final    RDW 13.9 11.5 - 14.5 % Final    PLATELET 640 (L) 123 - 440 K/uL Final    NEUTROPHILS 37 (L) 40 - 70 % Final    Mixed cells 10 0.1 - 17 % Final    LYMPHOCYTES 53 (H) 14 - 44 % Final    ABS. NEUTROPHILS 2.8 1.8 - 9.5 K/UL Final    ABS. MIXED CELLS 0.7 0.0 - 2.3 K/uL Final    ABS. LYMPHOCYTES 3.9 1.1 - 5.9 K/UL Final     Comment: Test performed at [address] [place] [address] or Outpatient Infusion Center Location. Reviewed by Medical Director. DF AUTOMATED   Final       Patient Active Problem List   Diagnosis Code    Diabetes mellitus with neuropathy ([facility] E11.40    Hypertension I10    Chest pain R07.9    Seizure disorder [facility] G40.909    Hypercholesteremia E78.00    History of CVA (cerebrovascular accident) Z80.78    Hearing impairment H91.90    Gastroesophageal reflux disease without esophagitis K21.9    Malignant neoplasm of areola of right breast in female ([facility] C50.011    Altered mental status R41.82    Type 2 diabetes mellitus with diabetic neuropathy, with long-term current use of insulin ([facility]) E11.40, Z79.4    Pulmonary nodule, right R91.1    History of breast cancer Z85.3    Type 2 diabetes mellitus with nephropathy ([facility]) E11.21    Severe obesity (BMI 35.0-35.9 with comorbidity) ([facility]) E66.01, Z68.35    PE (pulmonary thromboembolism) ([facility]) E29.84    Mild diastolic dysfunction M88.5    History of noncompliance with medical treatment Z91.19    Chronic headaches R51.9, G89.29    Elevated d-dimer R79.89    History of recent travel Z78.9    Normocytic anemia D64.9    LVH (left ventricular hypertrophy) I51.7    Aortic stenosis, mild I35.0    History of cardiac catheterization Z98.890         Assessment:     1. Severe obesity (BMI 35.0-35.9 with comorbidity) ([facility]    2. Hypertension, unspecified type Chronic   3. PE (pulmonary thromboembolism) ([facility]    4. Malignant neoplasm of areola of right breast in female, unspecified estrogen receptor status ([facility]        Plan:     1. Continue letrozole for an additional 2 years, as per  test  [facility]  2.  Patient will follow up with neurology and PCP.  3. Patient will follow up with us in 6 months with blood test earlier if needed. 4. Patient and her  had opportunity to ask questions which were answered. 5. Then agreement with the plan above    Follow-up and Dispositions  ·   Return in about 6 months (around 8/28/2022) for Follow up with labs, Follow_up In Person. Orders Placed This Encounter    CBC WITH AUTOMATED DIFF     Standing Status:   Future     Standing Expiration Date:   3/1/2023    FERRITIN     Standing Status:   Future     Standing Expiration Date:   3/1/2023    VITAMIN D, 25 HYDROXY     Standing Status:   Future     Standing Expiration Date:   0/4/7349    METABOLIC PANEL, BASIC     Standing Status:   Future     Standing Expiration Date:   3/1/2023    IRON PROFILE     Standing Status:   Future     Standing Expiration Date:   3/1/2023    letrozole [facility]) 2.5 mg tablet     Sig: Take 1 Tablet by mouth daily for 360 days.  Indications: hormone receptor positive postmenopausal early breast cancer     Dispense:  90 Tablet     Refill:  3       Chantal Ordonez MD  2/28/2022

## 2022-03-15 ENCOUNTER — HOSPITAL ENCOUNTER (OUTPATIENT)
Dept: PHYSICAL THERAPY | Age: 72
Discharge: HOME | End: 2022-03-15
Payer: MEDICARE

## 2022-03-15 PROCEDURE — 97110 THERAPEUTIC EXERCISES: CPT

## 2022-03-15 PROCEDURE — 97116 GAIT TRAINING THERAPY: CPT

## 2022-03-15 NOTE — PROGRESS NOTES
PT DAILY TREATMENT NOTE     Patient Name: Merline Espinosa  Date:3/15/2022  : 1950  [x]  Patient  Verified  Payor: Elizabeth Sy / Plan: Jj Finch / Product Type: Managed Care Medicare /    In time:332  Out time:420  Total Treatment Time (min): 48  Total Timed Codes (min): 38  1:1 Treatment Time (min): 38   Visit #: 2 of 8-10    Treatment Area: Pain in right knee [M25.561]  Left knee pain [M25.562]    SUBJECTIVE  Pain Level (0-10 scale): 10/10  Any medication changes, allergies to medications, adverse drug reactions, diagnosis change, or new procedure performed?: [x] No    [] Yes (see summary sheet for update)  Subjective functional status/changes:   [] No changes reported  \"Bad\"    OBJECTIVE  Modality rationale: decrease inflammation and decrease pain to improve the patients ability to improve post treatment soreness    Min Type Additional Details    [] Estim: []Att   []Unatt        []TENS instruct                  []IFC  []Premod   []NMES                     []Other:  []w/US   []w/ice   []w/heat  Position:  Location:    []  Traction: [] Cervical       []Lumbar                       [] Prone          []Supine                       []Intermittent   []Continuous Lbs:  [] before manual  [] after manual    []  Ultrasound: []Continuous   [] Pulsed                           []1MHz   []3MHz Location:  W/cm2:    []  Iontophoresis with dexamethasone         Location: [] Take home patch   [] In clinic   10 [x]  Ice     []  heat  []  Ice massage Position:  Semi reclined  Location: B knee     []  Vasopneumatic Device  If using vaso (only need to measure limb vaso being performed on)      pre-treatment girth :       post-treatment girth :       measured at (landmark location)  Pressure:       [] lo [] med [] hi   Temperature: [] lo [] med [] hi   [x] Skin assessment post-treatment:  [x]intact []redness- no adverse reaction       []redness  adverse reaction:       26 min Therapeutic Exercise: [x] See flow sheet :Initiated ther ex per flow sheet , Miquel Fus / Ellen Shiver while on Nustep    Rationale: increase ROM and increase strength to improve the patients ability to improve knee alignment to decrease joint pain     12 min Gait :  Amb with patient from waiting room to gym with Ellen Shiver for step length and SBA for balance   80 ft around clinic with Ellen Shiver for step  Length, heel strike, shuffling gait - SPC and SBA    Rationale: improve balance and increase proprioception and improve gait pattern  to improve the patients ability to decrease fall risk in the home and community     X with TE/MT min Patient Education: [x] Review HEP from IE - increase compliance and use ice         Other Objective/Functional Measures: Therex initiated today - first FU post eval   HEP : non compliant     VCing - keep knees apart during Nustep (\"dont break the egg\")  Sit to stand - 22 in table     Gait  Quality - poor, decreased foot clearance, R knee locked, decreased heel strike      Pain Level (0-10 scale) post treatment: 9    ASSESSMENT/Changes in Function: Patient tolerated initiation of therex fair with 100% VC for form and technique for all newly introduced therex. Patient require max encouragement for participation at times. Poor glut recruitment with min palpable contraction with glut set despite patient reporting max effort - unable to tolerate bridge sec to cramping. Encouraged ice at home and initiating HEP      Patient will continue to benefit from skilled PT services to modify and progress therapeutic interventions, address functional mobility deficits, address ROM deficits, address strength deficits, analyze and address soft tissue restrictions, analyze and cue movement patterns, analyze and modify body mechanics/ergonomics and assess and modify postural abnormalities to attain remaining goals.      [x]  See Plan of Care  []  See progress note/recertification  []  See Discharge Summary         Progress towards goals / Updated goals:  FIRST FU TREATMENT - CONT PER POT TO EST GOALS 3/15/2022   · Short Term Goals: To be accomplished in  4-6  treatments:  1. Pt will be independent and compliant with HEP for carryover of strength and mobility gains  Status at last assessment: Issued at   Current: Goal progressing - HEP est with no questions. HEP will be modified and progressed as appropriate - patient reports non compliance  3/15/2022  · Long Term Goals: To be accomplished in  8-10  treatments:  1. Pt will score >10 points higher on FOTO to show significant improvement in functional mobility and QOL   Status at last assessment: TBA  2. Pt will perform 5xSTS from chair height <1 minute to show improved functional LE strength   Status at last assessment: 59sec from 20\" height  3. Pt will demo ability to hold MSR for 30 seconds for carryover to improved balance for gait and ADL's  Status at last assessment: ~5 seconds at bunion, bilaterally   4.  Pt will report >50% improvement in overall pain and functional mobility for improved activity tolerance  Status at last assessment : significant impairment with ADL's    PLAN  [x]  Upgrade activities as tolerated     []  Continue plan of care  [x]  Update interventions per flow sheet       []  Discharge due to:_  [x]  Other:_assess response to first FU treatment       Chrissiejerry Guzman PT 3/15/2022     Future Appointments   Date Time Provider Al Vaughan   3/17/2022  3:30 PM Richarda Marker., PT [facility]   3/22/2022  3:30 PM Richarda Marker., PT [facility]   3/24/2022  3:30 PM Richarda Marker., PT [facility]   3/29/2022  3:30 PM Richarda Marker., PT [facility]   3/31/2022  3:30 PM Richarda Marker., PT [facility]   4/25/2022  3:00 PM Diony Daigle NP DMA BS AMB   6/17/2022  1:00 PM MD Layla Rowe 108 BS AMB   8/29/2022  1:00 PM [facility] INFUSION PHLEBOTOMIST [facility] [address] [place] [address]   9/6/2022  1:00 PM Rae Mata MD BSMO BS AMB

## 2022-03-18 PROBLEM — I26.99 PE (PULMONARY THROMBOEMBOLISM) (HCC): Status: ACTIVE | Noted: 2019-01-01

## 2022-03-18 PROBLEM — E66.01 SEVERE OBESITY (BMI 35.0-35.9 WITH COMORBIDITY) (HCC): Status: ACTIVE | Noted: 2018-04-17

## 2022-03-18 PROBLEM — D64.9 NORMOCYTIC ANEMIA: Status: ACTIVE | Noted: 2019-12-21

## 2022-03-18 PROBLEM — I51.7 LVH (LEFT VENTRICULAR HYPERTROPHY): Status: ACTIVE | Noted: 2021-09-02

## 2022-03-18 PROBLEM — Z98.890 HISTORY OF CARDIAC CATHETERIZATION: Status: ACTIVE | Noted: 2021-09-02

## 2022-03-18 PROBLEM — E11.21 TYPE 2 DIABETES MELLITUS WITH NEPHROPATHY (HCC): Status: ACTIVE | Noted: 2017-12-14

## 2022-03-19 PROBLEM — Z78.9 HISTORY OF RECENT TRAVEL: Status: ACTIVE | Noted: 2019-12-21

## 2022-03-19 PROBLEM — I35.0 AORTIC STENOSIS, MILD: Status: ACTIVE | Noted: 2021-09-02

## 2022-03-19 PROBLEM — R79.89 ELEVATED D-DIMER: Status: ACTIVE | Noted: 2019-12-21

## 2022-03-19 PROBLEM — R51.9 CHRONIC HEADACHES: Status: ACTIVE | Noted: 2019-12-21

## 2022-03-19 PROBLEM — G89.29 CHRONIC HEADACHES: Status: ACTIVE | Noted: 2019-12-21

## 2022-03-22 ENCOUNTER — APPOINTMENT (OUTPATIENT)
Dept: PHYSICAL THERAPY | Age: 72
End: 2022-03-22
Payer: MEDICARE

## 2022-03-24 ENCOUNTER — APPOINTMENT (OUTPATIENT)
Dept: PHYSICAL THERAPY | Age: 72
End: 2022-03-24
Payer: MEDICARE

## 2022-03-24 ENCOUNTER — HOSPITAL ENCOUNTER (OUTPATIENT)
Dept: PHYSICAL THERAPY | Age: 72
Discharge: HOME | End: 2022-03-24
Payer: MEDICARE

## 2022-03-24 PROCEDURE — 97112 NEUROMUSCULAR REEDUCATION: CPT | Performed by: GENERAL ACUTE CARE HOSPITAL

## 2022-03-24 PROCEDURE — 97116 GAIT TRAINING THERAPY: CPT | Performed by: GENERAL ACUTE CARE HOSPITAL

## 2022-03-24 PROCEDURE — 97110 THERAPEUTIC EXERCISES: CPT | Performed by: GENERAL ACUTE CARE HOSPITAL

## 2022-03-24 NOTE — PROGRESS NOTES
PT DAILY TREATMENT NOTE     Patient Name: Merline Espinosa  Date:3/24/2022  : 1950  [x]  Patient  Verified  Payor: Elizabeth Sy / Plan: Jj Finch / Product Type: Managed Care Medicare /    In time: 636  Out time: 1012  Total Treatment Time (min): 52  Total Timed Codes (min): 42  1:1 Treatment Time (min): 42  Visit #: 3 of 8-10    Treatment Area: Pain in right knee [M25.561]  Left knee pain [M25.562]    SUBJECTIVE  Pain Level (0-10 scale): 9/10   Any medication changes, allergies to medications, adverse drug reactions, diagnosis change, or new procedure performed?: [x] No    [] Yes (see summary sheet for update)  Subjective functional status/changes:   [] No changes reported  \"I'm alright. Since I've been doing the therapy, I've been doing okay. \"  Pt denies compliance with HEP. Denies any falls.      OBJECTIVE  Modality rationale: decrease inflammation and decrease pain to improve the patients ability to improve post treatment soreness    Min Type Additional Details    [] Estim: []Att   []Unatt        []TENS instruct                  []IFC  []Premod   []NMES                     []Other:  []w/US   []w/ice   []w/heat  Position:  Location:    []  Traction: [] Cervical       []Lumbar                       [] Prone          []Supine                       []Intermittent   []Continuous Lbs:  [] before manual  [] after manual    []  Ultrasound: []Continuous   [] Pulsed                           []1MHz   []3MHz Location:  W/cm2:    []  Iontophoresis with dexamethasone         Location: [] Take home patch   [] In clinic   10 [x]  Ice     []  heat  []  Ice massage Position:  Supine with wedge   Location: B knee     []  Vasopneumatic Device  If using vaso (only need to measure limb vaso being performed on)      pre-treatment girth :       post-treatment girth :       measured at (landmark location)  Pressure:       [] lo [] med [] hi   Temperature: [] lo [] med [] hi   [x] Skin assessment post-treatment:  [x]intact []redness- no adverse reaction       []redness  adverse reaction:       24 min Therapeutic Exercise:  [x] See flow sheet :   Rationale: increase ROM and increase strength to improve the patients ability to improve knee alignment to decrease joint pain     10 min Neuromuscular Re-education:  []  See flow sheet : standing balance   Rationale: improve coordination, improve balance and increase proprioception  to improve the patients ability to reduce fall risk        8 min Gait :  Amb using SPC 2x80ft  with VCing for step  Length, heel strike, shuffling gait, SBA   Rationale: improve balance and increase proprioception and improve gait pattern  to improve the patients ability to decrease fall risk in the home and community     X with TE/MT min Patient Education: [x] Review HEP: issued a new copy of HEP      Other Objective/Functional Measures: FOTO: 52/100   Subjective % improvement: 50%  5xSTS: 30 seconds from 20\" height   Strength: hip flexion R 4+/5, L 4-/5, otherwise grossly 5/5 at knee and ankle   Balance: ROM EO/EC 30/30, foam EO/EC 15/2, MSR at bunion R 22 sec, L 4-5 sec  Gait quality: poor with decreased foot clearance, R knee locked, decreased heel strike, slow rene      Pain Level (0-10 scale) post treatment: 8/10    ASSESSMENT/Changes in Function: See PN    Patient will continue to benefit from skilled PT services to modify and progress therapeutic interventions, address functional mobility deficits, address ROM deficits, address strength deficits, analyze and address soft tissue restrictions, analyze and cue movement patterns, analyze and modify body mechanics/ergonomics and assess and modify postural abnormalities to attain remaining goals. []  See Plan of Care  [x]  See progress note/recertification 1/73/58  []  See Discharge Summary         Progress towards goals / Updated goals: · Short Term Goals: To be accomplished in  4-6  treatments:  1.   Pt will be independent and compliant with HEP for carryover of strength and mobility gains  Status at last assessment: Issued at IE  Current: Not Met: pt denies compliance (3/24/22)  · Long Term Goals: To be accomplished in  8-10  treatments:  1. Pt will score >10 points higher on FOTO to show significant improvement in functional mobility and QOL   Status at last assessment: TBA  Current:  Met - 52/100 (3/24/22)  2. Pt will perform 5xSTS from chair height <1 minute to show improved functional LE strength   Status at last assessment: 59sec from 20\" height   Current: progressing - 30 seconds from 20\" height (3/24/22)  3. Pt will demo ability to hold MSR for 30 seconds for carryover to improved balance for gait and ADL's  Status at last assessment: ~5 seconds at bunion, bilaterally   Current: progressing: R 22 second, L 4-5 seconds (3/24/22)  4.  Pt will report >50% improvement in overall pain and functional mobility for improved activity tolerance  Status at last assessment : significant impairment with ADL's  Current: met - reports 50% improvement (3/24/22)    PLAN  [x]  Upgrade activities as tolerated     []  Continue plan of care  []  Update interventions per flow sheet       []  Discharge due to:_   [x]  Other:_ See PN - cont 1x/wk for 4 more weeks; discussed compliance concerns     Joannie Rinne, PT 3/24/2022     Future Appointments   Date Time Provider Al Vaughan   3/24/2022  9:15 AM [facility] [address] [facility]   4/20/2022  2:30 PM Em Mitchell NP DMA BS AMB   6/17/2022  1:00 PM Aidee Hawley MD [address] BS AMB   8/29/2022  1:00 PM [address] [place] [address] [address]   9/6/2022  1:00 PM Vane Beverly MD BSMO BS AMB

## 2022-03-24 NOTE — PROGRESS NOTES
[facility] AT [address]  Phone: [phone] Fax: [phone]  PROGRESS NOTE  Patient Name: Molly Ledbetter : 1950   Treatment/Medical Diagnosis: Pain in right knee [M25.561]  Left knee pain [M25.562]   Referral Source: Houston Armenta MD     Date of Initial Visit: 2022 Attended Visits: 3 Missed Visits: 2     SUMMARY OF TREATMENT   Pt is a 74 y/o female who presents with co B knee pain secondary to OA. Treatment Plan may include any combination of the following: Therapeutic exercise, Therapeutic activities, Neuromuscular re-education, Physical agent/modality, Gait/balance training, Manual therapy, Aquatic therapy, Patient education, Self Care training, Functional mobility training, Home safety training and Stair training    CURRENT STATUS  Pt has completed 3 PT sessions including initial evaluation. Pt has missed 2 sessions; note that clinic attendance and HEP compliance are both very poor which is a barrier to progress. Despite lack of compliance, patient has met 2/4 LTG's and progressing towards other remaining goals. Note improvement in sit to stand performance and subjective reports of improvement. Little to no carryover for improved gait mechanics, as these remain poor (see below). Pt denies any recent falls. She continues to use a [facility] for ambulation in the community, but does not use an AD at home. Plan to continue 1x/wk pending adherence to our compliance policy. Gave patient another handout of HEP and encouraged compliance and increased activity levels at home. FOTO: 52/100   Subjective % improvement: 50%  HEP compliance: very poor. Sedentary lifestyle at home.    5xSTS: 30 seconds from 20\" height   Strength: hip flexion R 4+/5, L 4-/5, otherwise grossly 5/5 at knee and ankle   Balance: ROM EO/EC 30/30, foam EO/EC 15/2, MSR at bunion R 22 sec, L 4-5 sec  Gait quality: poor with decreased foot clearance, R knee locked, decreased heel strike, slow rene     Progress towards goals / Updated goals: · Short Term Goals: To be accomplished in  4-6  treatments:  1.  Pt will be independent and compliant with HEP for carryover of strength and mobility gains  Status at last assessment: Issued at   Current: Not Met: pt denies compliance (3/24/22)  · Long Term Goals: To be accomplished in  8-10  treatments:  1.  Pt will score >10 points higher on FOTO to show significant improvement in functional mobility and QOL   Status at last assessment: TBA  Current:  Met - 52/100 (3/24/22)  2. Pt will perform 5xSTS from chair height <1 minute to show improved functional LE strength   Status at last assessment: 59sec from 20\" height   Current: progressing - 30 seconds from 20\" height (3/24/22)  3. Pt will demo ability to hold MSR for 30 seconds for carryover to improved balance for gait and ADL's  Status at last assessment: ~5 seconds at bunion, bilaterally   Current: progressing: R 22 second, L 4-5 seconds (3/24/22)  4. Pt will report >50% improvement in overall pain and functional mobility for improved activity tolerance  Status at last assessment : significant impairment with ADL's  Current: met - reports 50% improvement (3/24/22)       New goals to be completed in 4 treatment sessions:   1. Pt will complete TUG in <13 seconds to show reduced fall risk   2. Pt will demo ability to complete 5xSTS  from chair height <1 minute to show improved functional LE strength   3. Pt will demo ability to hold MSR for 30 seconds for carryover to improved balance for gait and ADL's    RECOMMENDATIONS  Cont 1x/wk for an additional 4 weeks to address impairments of reduced strength, balance, gait kinematics, and coordination. If you have any questions/comments please contact us directly at [phone]   Thank you for allowing us to assist in the care of your patient.   Therapist Signature: Kade Andrews, PT Date: 3/24/2022 Reporting Period  2/22/2022 to 3/24/2022 Time: 7:28 AM   NOTE TO PHYSICIAN:  PLEASE COMPLETE THE ORDERS BELOW AND FAX TO   [facility]: [phone]. If you are unable to process this request in 24 hours please contact our office: [phone].  ___ I have read the above report and request that my patient continue as recommended.   ___ I have read the above report and request that my patient continue therapy with the following changes/special instructions:_________________________________________________________   ___ I have read the above report and request that my patient be discharged from therapy.      Physician Signature:        Date:       Time:                                                        Katya Wadsworth MD.

## 2022-03-29 ENCOUNTER — HOSPITAL ENCOUNTER (OUTPATIENT)
Dept: PHYSICAL THERAPY | Age: 72
Discharge: HOME | End: 2022-03-29
Payer: MEDICARE

## 2022-03-29 ENCOUNTER — APPOINTMENT (OUTPATIENT)
Dept: PHYSICAL THERAPY | Age: 72
End: 2022-03-29
Payer: MEDICARE

## 2022-03-29 PROCEDURE — 97530 THERAPEUTIC ACTIVITIES: CPT

## 2022-03-29 PROCEDURE — 97110 THERAPEUTIC EXERCISES: CPT

## 2022-03-29 NOTE — PROGRESS NOTES
PT DAILY TREATMENT NOTE     Patient Name: Gume Davis  Date:3/29/2022  : 1950  [x]  Patient  Verified  Payor: Pushpa Flores / Plan: [employer]  / Product Type: Managed Care Medicare /    In time:4:25  Out time:5:10  Total Treatment Time (min): 45  Visit #: 1 of 4    Medicare/BCBS Only   Total Timed Codes (min):  35 1:1 Treatment Time:  28       Treatment Area: Pain in right knee [M25.561]  Left knee pain [M25.562]    SUBJECTIVE  Pain Level (0-10 scale): 9  Any medication changes, allergies to medications, adverse drug reactions, diagnosis change, or new procedure performed?: [x] No    [] Yes (see summary sheet for update)  Subjective functional status/changes:   [] No changes reported  \"My left knee hurts the worst today. \"    OBJECTIVE    Modality rationale: decrease pain and increase tissue extensibility to improve the patients ability to relax and sleep following therapy session to improve restorative sleep patterns.     Min Type Additional Details    [x] Estim:  []Unatt       []IFC  []Premod                        []Other:  []w/ice   []w/heat  Position:  Location:     [] Estim: []Att    []TENS instruct  []NMES                    []Other:  []w/US   []w/ice   []w/heat  Position:  Location:    []  Traction: [] Cervical       []Lumbar                       [] Prone          []Supine                       []Intermittent   []Continuous Lbs:  [] before manual  [] after manual    []  Ultrasound: []Continuous   [] Pulsed                           []1MHz   []3MHz W/cm2:  Location:    []  Iontophoresis with dexamethasone         Location: [] Take home patch   [] In clinic   10 []  Ice     [x]  heat  []  Ice massage  []  Laser   []  Anodyne Position: supine  Location: B knees    []  Laser with stim  []  Other:  Position:  Location:    []  Vasopneumatic Device    []  Right     []  Left  Pre-treatment girth:  Post-treatment girth:  Measured at (location):  Pressure:       [] lo [] med [] hi Temperature: [] lo [] med [] hi   [x] Skin assessment post-treatment:  [x]intact []redness- no adverse reaction    []redness  adverse reaction:     25 min Therapeutic Exercise:  [x] See flow sheet :   Rationale: increase ROM and increase strength to improve the patients ability to perform gait and transfers with improved LE strength and mobility. 10 min Therapeutic Activity:  [x]  See flow sheet :   Rationale: increase strength, improve coordination, improve balance, and increase proprioception  to improve the patients ability to perform transfers, stair negotiation, and floor <> waist lifts. Patient education: sit<>stand technique       With   [x] TE   [x] TA   [] neuro   [] other: Patient Education: [x] Review HEP    [] Progressed/Changed HEP based on:   [] positioning   [] body mechanics   [] transfers   [] heat/ice application    [] other:      Other Objective/Functional Measures: -Added SLR and bridges to program today per pt tolerance with decreased reps     Pain Level (0-10 scale) post treatment: 8    ASSESSMENT/Changes in Function: Verbal cues provided during sit<>stand training from elevated plinth to increased weight shift forward (\"nose over toes\") to allow for improved initial hip hinge motion during lift off phase of sit<>stand. Pt also benefitted from cue to \"throw hands forward\" to generate anterior weight shift. Patient will continue to benefit from skilled PT services to modify and progress therapeutic interventions, address functional mobility deficits, address ROM deficits, address strength deficits, analyze and address soft tissue restrictions, analyze and cue movement patterns, analyze and modify body mechanics/ergonomics, assess and modify postural abnormalities and address imbalance/dizziness to attain remaining goals. []  See Plan of Care  []  See progress note/recertification  []  See Discharge Summary         Progress towards goals / Updated goals:  1.  Pt will complete TUG in <13 seconds to show reduced fall risk   2.  Pt will demo ability to complete 5xSTS  from chair height <1 minute to show improved functional LE strength   3. Pt will demo ability to hold MSR for 30 seconds for carryover to improved balance for gait and ADL's    PLAN  [x]  Upgrade activities as tolerated     [x]  Continue plan of care  []  Update interventions per flow sheet       []  Discharge due to:_  []  Other:_      Ro Francois 3/29/2022  10:44 AM    Future Appointments   Date Time Provider Al Alexus   3/29/2022  4:15 PM Dolores Hair [facility]   4/4/2022  4:15 PM Allyson Corrales, PTA [facility]   4/12/2022  1:15 PM Allyson Banantolin, PTA [facility]   4/20/2022  2:30 PM Prema Yadav NP [facility] BS AMB   4/21/2022  1:15 PM Allyson Corrales, PTA [facility]   4/26/2022  1:15 PM Allyson Banantolin, PTA [facility]   6/17/2022  1:00 PM Karol Mcgowan MD [facility] BS AMB   8/29/2022  1:00 PM [facility]LE[facility] FLET[place]ER [place] [address] [place] [address]   9/6/2022  1:00 PM Roseanna Nguyen MD [facility] BS AMB

## 2022-03-31 ENCOUNTER — APPOINTMENT (OUTPATIENT)
Dept: PHYSICAL THERAPY | Age: 72
End: 2022-03-31
Payer: MEDICARE

## 2022-04-05 ENCOUNTER — HOSPITAL ENCOUNTER (OUTPATIENT)
Dept: PHYSICAL THERAPY | Age: 72
Discharge: HOME | End: 2022-04-05
Payer: MEDICARE

## 2022-04-05 PROCEDURE — 97530 THERAPEUTIC ACTIVITIES: CPT

## 2022-04-05 PROCEDURE — 97110 THERAPEUTIC EXERCISES: CPT

## 2022-04-05 PROCEDURE — 97112 NEUROMUSCULAR REEDUCATION: CPT

## 2022-04-05 NOTE — PROGRESS NOTES
PT DAILY TREATMENT NOTE     Patient Name: Gume Davis  Date:2022  : 1950  [x]  Patient  Verified  Payor: Pushpa Flores / Plan: Kelechi Arrington / Product Type: Managed Care Medicare /    In time:3:34  Out time:4:16  Total Treatment Time (min): 42  Visit #: 2 of 4    Medicare/BCBS Only   Total Timed Codes (min):  42 1:1 Treatment Time:  41       Treatment Area: Pain in right knee [M25.561]  Left knee pain [M25.562]    SUBJECTIVE  Pain Level (0-10 scale): 9  Any medication changes, allergies to medications, adverse drug reactions, diagnosis change, or new procedure performed?: [x] No    [] Yes (see summary sheet for update)  Subjective functional status/changes:   [] No changes reported  \"Feeling about the same as usual.\"    OBJECTIVE    17 min Therapeutic Exercise:  [x] See flow sheet :   Rationale: increase ROM and increase strength to improve the patients ability to perform gait and transfers with improved LE strength and mobility. 13 min Therapeutic Activity:  [x]  See flow sheet :   Rationale: increase strength, improve coordination, improve balance, and increase proprioception  to improve the patients ability to perform transfers, stair negotiation, and floor <> waist lifts. Patient education: squat technique, safety with sit<>stands     11 min Neuromuscular Re-education:  [x]  See flow sheet :   Rationale: increase strength, improve coordination, improve balance and increase proprioception  to improve the patients ability to perform stair negotiation and functional mobility in the home/community with improved quadriceps control and decreased falls risk.             With   [] TE   [] TA   [] neuro   [] other: Patient Education: [x] Review HEP    [] Progressed/Changed HEP based on:   [] positioning   [] body mechanics   [] transfers   [] heat/ice application    [] other:      Other Objective/Functional Measures: -Pt reports increased hamstring cramping during bridges requiring frequent rest break to resolve     Pain Level (0-10 scale) post treatment: 9    ASSESSMENT/Changes in Function: Patient requires frequent verbal and visual cues to re-orient to tasks with frequent variability noted in performance between exercise repetitions today. Tactile cuing required for correct technique with straight leg raises to improve quad contraction. Frequent rest breaks required during standing balance interventions after 2-3 minutes of activity limiting her ability to participate in balance training. Patient will continue to benefit from skilled PT services to modify and progress therapeutic interventions, address functional mobility deficits, address ROM deficits, address strength deficits, analyze and address soft tissue restrictions, analyze and cue movement patterns, analyze and modify body mechanics/ergonomics, assess and modify postural abnormalities and address imbalance/dizziness to attain remaining goals. []  See Plan of Care  []  See progress note/recertification  []  See Discharge Summary         Progress towards goals / Updated goals:  1. Pt will complete TUG in <13 seconds to show reduced fall risk   2. Pt will demo ability to complete 5xSTS  from chair height <1 minute to show improved functional LE strength.   Current: progressing, pt able to utilize increased anterior trunk translations during sit<>stands to decreased amount of UE assist needed during her transfers (4/5/22)   3. Pt will demo ability to hold MSR for 30 seconds for carryover to improved balance for gait and ADL's  Current: progressing, able to maintain MSR for approximately 7\" B before reaching for bars (4/5/22)    PLAN  [x]  Upgrade activities as tolerated     [x]  Continue plan of care  []  Update interventions per flow sheet       []  Discharge due to:_  []  Other:_      Page Lynette 4/5/2022  1:45 PM    Future Appointments   Date Time Provider Al Vaughan   4/5/2022  3:30 PM Bebeto Solano [facility]   4/20/2022  2:30 PM Collette Pao, NP DMA BS AMB   6/17/2022  1:00 PM Kalyn Harden MD [facility] BS AMB   8/29/2022  1:00 PM Florence Aguust [address]   9/6/2022  1:00 PM Gilda Mcfarland MD [facility] BS AMB

## 2022-04-12 ENCOUNTER — APPOINTMENT (OUTPATIENT)
Dept: PHYSICAL THERAPY | Age: 72
End: 2022-04-12
Payer: MEDICARE

## 2022-04-21 ENCOUNTER — APPOINTMENT (OUTPATIENT)
Dept: PHYSICAL THERAPY | Age: 72
End: 2022-04-21
Payer: MEDICARE

## 2022-04-26 ENCOUNTER — APPOINTMENT (OUTPATIENT)
Dept: PHYSICAL THERAPY | Age: 72
End: 2022-04-26
Payer: MEDICARE

## 2022-05-05 ENCOUNTER — OFFICE VISIT (OUTPATIENT)
Dept: FAMILY MEDICINE CLINIC | Age: 72
End: 2022-05-05
Payer: MEDICARE

## 2022-05-05 VITALS
OXYGEN SATURATION: 98 % | BODY MASS INDEX: 33.91 KG/M2 | DIASTOLIC BLOOD PRESSURE: 52 MMHG | TEMPERATURE: 97.3 F | HEART RATE: 80 BPM | HEIGHT: 66 IN | WEIGHT: 211 LBS | RESPIRATION RATE: 16 BRPM | SYSTOLIC BLOOD PRESSURE: 106 MMHG

## 2022-05-05 DIAGNOSIS — M25.561 CHRONIC PAIN OF BOTH KNEES: ICD-10-CM

## 2022-05-05 DIAGNOSIS — Z79.899 ON VALPROIC ACID THERAPY: ICD-10-CM

## 2022-05-05 DIAGNOSIS — E11.40 TYPE 2 DIABETES MELLITUS WITH DIABETIC NEUROPATHY, WITH LONG-TERM CURRENT USE OF INSULIN (HCC): ICD-10-CM

## 2022-05-05 DIAGNOSIS — K21.9 GASTROESOPHAGEAL REFLUX DISEASE WITHOUT ESOPHAGITIS: ICD-10-CM

## 2022-05-05 DIAGNOSIS — F51.02 ADJUSTMENT INSOMNIA: ICD-10-CM

## 2022-05-05 DIAGNOSIS — M25.562 CHRONIC PAIN OF BOTH KNEES: ICD-10-CM

## 2022-05-05 DIAGNOSIS — Z00.00 PREVENTATIVE HEALTH CARE: ICD-10-CM

## 2022-05-05 DIAGNOSIS — Z13.220 SCREENING FOR HYPERLIPIDEMIA: ICD-10-CM

## 2022-05-05 DIAGNOSIS — Z79.4 TYPE 2 DIABETES MELLITUS WITH DIABETIC NEUROPATHY, WITH LONG-TERM CURRENT USE OF INSULIN (HCC): ICD-10-CM

## 2022-05-05 DIAGNOSIS — G89.29 CHRONIC PAIN OF BOTH KNEES: ICD-10-CM

## 2022-05-05 DIAGNOSIS — Z86.73 HISTORY OF CVA (CEREBROVASCULAR ACCIDENT): ICD-10-CM

## 2022-05-05 DIAGNOSIS — E78.00 HYPERCHOLESTEREMIA: ICD-10-CM

## 2022-05-05 DIAGNOSIS — F43.21 GRIEVING: ICD-10-CM

## 2022-05-05 DIAGNOSIS — Z13.29 SCREENING FOR THYROID DISORDER: Primary | ICD-10-CM

## 2022-05-05 DIAGNOSIS — I10 ESSENTIAL HYPERTENSION: ICD-10-CM

## 2022-05-05 DIAGNOSIS — T14.8XXA MUSCLE STRAIN: ICD-10-CM

## 2022-05-05 DIAGNOSIS — M54.50 CHRONIC MIDLINE LOW BACK PAIN WITHOUT SCIATICA: ICD-10-CM

## 2022-05-05 DIAGNOSIS — E55.9 VITAMIN D DEFICIENCY: ICD-10-CM

## 2022-05-05 DIAGNOSIS — G40.909 SEIZURE DISORDER (HCC): ICD-10-CM

## 2022-05-05 DIAGNOSIS — G89.29 CHRONIC MIDLINE LOW BACK PAIN WITHOUT SCIATICA: ICD-10-CM

## 2022-05-05 PROCEDURE — 1101F PT FALLS ASSESS-DOCD LE1/YR: CPT | Performed by: NURSE PRACTITIONER

## 2022-05-05 PROCEDURE — 1090F PRES/ABSN URINE INCON ASSESS: CPT | Performed by: NURSE PRACTITIONER

## 2022-05-05 PROCEDURE — G8754 DIAS BP LESS 90: HCPCS | Performed by: NURSE PRACTITIONER

## 2022-05-05 PROCEDURE — 99214 OFFICE O/P EST MOD 30 MIN: CPT | Performed by: NURSE PRACTITIONER

## 2022-05-05 PROCEDURE — G8417 CALC BMI ABV UP PARAM F/U: HCPCS | Performed by: NURSE PRACTITIONER

## 2022-05-05 PROCEDURE — 2022F DILAT RTA XM EVC RTNOPTHY: CPT | Performed by: NURSE PRACTITIONER

## 2022-05-05 PROCEDURE — 3051F HG A1C>EQUAL 7.0%<8.0%: CPT | Performed by: NURSE PRACTITIONER

## 2022-05-05 PROCEDURE — 3017F COLORECTAL CA SCREEN DOC REV: CPT | Performed by: NURSE PRACTITIONER

## 2022-05-05 PROCEDURE — G8399 PT W/DXA RESULTS DOCUMENT: HCPCS | Performed by: NURSE PRACTITIONER

## 2022-05-05 PROCEDURE — G8432 DEP SCR NOT DOC, RNG: HCPCS | Performed by: NURSE PRACTITIONER

## 2022-05-05 PROCEDURE — G8536 NO DOC ELDER MAL SCRN: HCPCS | Performed by: NURSE PRACTITIONER

## 2022-05-05 PROCEDURE — G8752 SYS BP LESS 140: HCPCS | Performed by: NURSE PRACTITIONER

## 2022-05-05 PROCEDURE — G8427 DOCREV CUR MEDS BY ELIG CLIN: HCPCS | Performed by: NURSE PRACTITIONER

## 2022-05-05 PROCEDURE — G9899 SCRN MAM PERF RSLTS DOC: HCPCS | Performed by: NURSE PRACTITIONER

## 2022-05-05 RX ORDER — DIVALPROEX SODIUM 500 MG/1
TABLET, EXTENDED RELEASE ORAL
Status: CANCELLED | OUTPATIENT
Start: 2022-05-05

## 2022-05-05 RX ORDER — DICLOFENAC SODIUM 10 MG/G
2 GEL TOPICAL 4 TIMES DAILY
Qty: 1 EACH | Refills: 0 | Status: SHIPPED | OUTPATIENT
Start: 2022-05-05

## 2022-05-05 RX ORDER — TRAZODONE HYDROCHLORIDE 50 MG/1
50 TABLET ORAL
Qty: 30 TABLET | Refills: 2 | Status: SHIPPED | OUTPATIENT
Start: 2022-05-05

## 2022-05-05 RX ORDER — ACETAMINOPHEN 500 MG
4000 TABLET ORAL DAILY
Qty: 180 CAPSULE | Refills: 3 | Status: SHIPPED | OUTPATIENT
Start: 2022-05-05

## 2022-05-05 RX ORDER — CARVEDILOL 12.5 MG/1
12.5 TABLET ORAL 2 TIMES DAILY WITH MEALS
Qty: 60 TABLET | Refills: 2 | Status: SHIPPED | OUTPATIENT
Start: 2022-05-05

## 2022-05-05 RX ORDER — AMLODIPINE BESYLATE 5 MG/1
5 TABLET ORAL
Qty: 90 TABLET | Refills: 1 | Status: SHIPPED | OUTPATIENT
Start: 2022-05-05

## 2022-05-05 RX ORDER — FLASH GLUCOSE SENSOR
KIT MISCELLANEOUS
Qty: 1 KIT | Refills: 4 | Status: SHIPPED | OUTPATIENT
Start: 2022-05-05

## 2022-05-05 RX ORDER — DIVALPROEX SODIUM 500 MG/1
1000 TABLET, DELAYED RELEASE ORAL EVERY 12 HOURS
Qty: 180 TABLET | Refills: 1 | Status: SHIPPED | OUTPATIENT
Start: 2022-05-05

## 2022-05-05 RX ORDER — HYDROCHLOROTHIAZIDE 25 MG/1
25 TABLET ORAL DAILY
Qty: 90 TABLET | Refills: 1 | Status: SHIPPED | OUTPATIENT
Start: 2022-05-05

## 2022-05-05 RX ORDER — INSULIN ASPART 100 [IU]/ML
10 INJECTION, SUSPENSION SUBCUTANEOUS EVERY 12 HOURS
Qty: 5 PEN | Refills: 5 | Status: SHIPPED | OUTPATIENT
Start: 2022-05-05

## 2022-05-05 RX ORDER — ISOSORBIDE MONONITRATE 30 MG/1
30 TABLET, EXTENDED RELEASE ORAL DAILY
Qty: 90 TABLET | Refills: 1 | Status: SHIPPED | OUTPATIENT
Start: 2022-05-05 | End: 2022-06-23 | Stop reason: SDUPTHER

## 2022-05-05 RX ORDER — AMOXICILLIN 250 MG
1 CAPSULE ORAL DAILY
Status: CANCELLED | OUTPATIENT
Start: 2022-05-05

## 2022-05-05 RX ORDER — ATORVASTATIN CALCIUM 40 MG/1
40 TABLET, FILM COATED ORAL DAILY
Qty: 90 TABLET | Refills: 1 | Status: SHIPPED | OUTPATIENT
Start: 2022-05-05

## 2022-05-05 RX ORDER — PANTOPRAZOLE SODIUM 40 MG/1
40 TABLET, DELAYED RELEASE ORAL DAILY
Qty: 90 TABLET | Refills: 1 | Status: SHIPPED | OUTPATIENT
Start: 2022-05-05

## 2022-05-05 RX ORDER — DIVALPROEX SODIUM 500 MG/1
TABLET, EXTENDED RELEASE ORAL
COMMUNITY
Start: 2022-04-14 | End: 2022-05-05

## 2022-05-05 RX ORDER — LETROZOLE 2.5 MG/1
2.5 TABLET, FILM COATED ORAL DAILY
Qty: 90 TABLET | Refills: 3 | Status: CANCELLED | OUTPATIENT
Start: 2022-05-05 | End: 2023-04-30

## 2022-05-05 RX ORDER — FLASH GLUCOSE SENSOR
KIT MISCELLANEOUS
Qty: 1 KIT | Refills: 3 | Status: SHIPPED | OUTPATIENT
Start: 2022-05-05 | End: 2022-05-05 | Stop reason: CLARIF

## 2022-05-05 NOTE — PROGRESS NOTES
Mauri Zazueta is a 73 y.o. female (: 1950) presenting to address:    Chief Complaint   Patient presents with    Follow-up       There were no vitals filed for this visit. Hearing/Vision:   No exam data present    Learning Assessment:     Learning Assessment 2021   PRIMARY LEARNER Patient   HIGHEST LEVEL OF EDUCATION - PRIMARY LEARNER  -   BARRIERS PRIMARY LEARNER NONE   CO-LEARNER CAREGIVER No   PRIMARY LANGUAGE ENGLISH   LEARNER PREFERENCE PRIMARY DEMONSTRATION     -     -   ANSWERED BY patient   RELATIONSHIP SELF     Depression Screening:     3 most recent PHQ Screens 2022   PHQ Not Done -   Little interest or pleasure in doing things Not at all   Feeling down, depressed, irritable, or hopeless Not at all   Total Score PHQ 2 0     Fall Risk Assessment:     Fall Risk Assessment, last 12 mths 2022   Able to walk? Yes   Fall in past 12 months? 0   Do you feel unsteady? 0   Are you worried about falling 0   Is the gait abnormal? -   Number of falls in past 12 months -   Fall with injury? -     Abuse Screening:     Abuse Screening Questionnaire 2022   Do you ever feel afraid of your partner? N   Are you in a relationship with someone who physically or mentally threatens you? N   Is it safe for you to go home?  Y     ADL Assessment:     ADL Assessment 6/15/2020   Feeding yourself No Help Needed   Getting from bed to chair No Help Needed   Getting dressed No Help Needed   Bathing or showering No Help Needed   Walk across the room (includes cane/walker) No Help Needed   Using the telphone No Help Needed   Taking your medications No Help Needed   Preparing meals No Help Needed   Managing money (expenses/bills) No Help Needed   Moderately strenuous housework (laundry) No Help Needed   Shopping for personal items (toiletries/medicines) No Help Needed   Shopping for groceries No Help Needed   Driving No Help Needed   Climbing a flight of stairs No Help Needed   Getting to places beyond walking distances No Help Needed        Coordination of Care Questionaire:   1. \"Have you been to the ER, urgent care clinic since your last visit? Hospitalized since your last visit? \" No    2. \"Have you seen or consulted any other health care providers outside of the [address] [place] [address] since your last visit? \" No     3. For patients aged 39-70: Has the patient had a colonoscopy? Yes - no Care Gap present     If the patient is female:    4. For patients aged 41-77: Has the patient had a mammogram within the past 2 years? Yes - no Care Gap present    5. For patients aged 21-65: Has the patient had a pap smear? NA - based on age    Advanced Directive:   1. Do you have an Advanced Directive? NO    2. Would you like information on Advanced Directives?  NO

## 2022-05-05 NOTE — PROGRESS NOTES
OFFICE NOTE    Moni Jones is a 73 y.o. female presenting today for office visit. Patient Active Problem List   Diagnosis Code    Diabetes mellitus with neuropathy ([facility] E11.40    Hypertension I10    Chest pain R07.9    [facility] G40.909    Hypercholesteremia E78.00    History of CVA (cerebrovascular accident) Z80.78    Hearing impairment H91.90    Gastroesophageal reflux disease without esophagitis K21.9    Malignant neoplasm of areola of right breast in female [facility]) C50.011    Altered mental status R41.82    Type 2 diabetes mellitus with diabetic neuropathy, with long-term current use of insulin ([facility]) E11.40, Z79.4    Pulmonary nodule, right R91.1    History of breast cancer Z85.3    Type 2 diabetes mellitus with nephropathy ([facility]) E11.21    Severe obesity (BMI 35.0-35.9 with comorbidity) ([facility]) E66.01, Z68.35    PE (pulmonary thromboembolism) ([facility]) N69.80    Mild diastolic dysfunction N28.7    History of noncompliance with medical treatment Z91.19    Chronic headaches R51.9, G89.29    Elevated d-dimer R79.89    History of recent travel Z78.9    Normocytic anemia D64.9    LVH (left ventricular hypertrophy) I51.7    Aortic stenosis, mild I35.0    History of cardiac catheterization Z98.890     5/5/2022  4:09 PM    Chief Complaint   Patient presents with    Follow-up     HPI:   In office visit. Patient says she is well and she looks well. Patient in office with her longtime male partner. Patient has pending labs + valproic acid level. Type 2 diabetes, blood glucose 118 this morning. Patient uses freestyle flavio and needs more sensors. Patient takes NovoLog 70/30 10 units twice daily. Hypertension, patient takes hydrochlorothiazide 25 mg daily, Coreg 12.5 mg twice daily, Norvasc 5 mg daily and Imdur 30 mg daily. Patient sees Dr. Jojo Puentes with cardiology. Patient has history of seizure disorder and takes Depakote 500 mg 2 tabs every 12 hours.   Patient has referrals to see Dr. Melissa Kearns with neurology. Patient follows up with oncology Dr. Jai Heller related to her history pf breast cancer in 6 months. Advised patient to follow-up with her ophthalmologist for her current eye exam.  Patient will schedule. Patient reports appetite is good, no urinary issues, sleeps well and regular bowel movements. Patient denies fever, chills, chest pain, shortness of breath, abdomen pain or dark tarry stool. ROS:    · General: negative for - chills, fever, weight changes or malaise  · HEENT: no sore throat, nasal congestion, vision problems or ear problems  · Respiratory: no cough, shortness of breath, or wheezing  · Cardiovascular: no chest pain, palpitations, or dyspnea on exertion  · Gastrointestinal: no abdominal pain, N/V, change in bowel habits, or black or bloody stools  · Musculoskeletal: no back pain, joint pain, joint stiffness, muscle pain or muscle weakness  · Neurological: no numbness, tingling, headache or dizziness  · Endo:  No polyuria or polydipsia. · : no hematuria, dysuria, frequency, hesitancy, or nocturia.     · Skin: No itching or rash, no open skin, no unusual lesions   · Psychological: negative for - anxiety, depression, sleep disturbances, suicidal or homicidal ideations     PHQ Screening   3 most recent PHQ Screens 5/5/2022   PHQ Not Done -   Little interest or pleasure in doing things Not at all   Feeling down, depressed, irritable, or hopeless Not at all   Total Score PHQ 2 0     History  Past Medical History:   Diagnosis Date    Arthritis     Breast CA ([facility]     Breast CA ([facility] 2016    DDD (degenerative disc disease), cervical     Diabetes ([facility]     DJD (degenerative joint disease) of cervical spine     Heart murmur     History of noncompliance with medical treatment     Hypercholesteremia     Hypertension     Menopause     Mild diastolic dysfunction     ECHO 5/2020     PE (pulmonary thromboembolism) ([facility] 2019    Psychiatric disorder     DEPRESSION    PUD (peptic ulcer disease)     S/P cardiac cath 02/2015    No angiographic evidence of CAD    Seizures (HCC)     LAST SEIZURE ABOUT 2 MONTHS AGO (NOV 2015)    Stroke ([address]     x2 with left side deficit       Past Surgical History:   Procedure Laterality Date    HX BREAST LUMPECTOMY Right 1/12/2016    Dr. Sonu Smith Partial Mastectomy w./ axillary lymphadenectomy    HX HEENT      TONSILLECTOMY    HX MASTECTOMY Right 1/26/2016    Dr. Sonu Smith Repeat Partial Mastectomy for positive margins    HX ORTHOPAEDIC      left arm surg    IR MEDIPORT         Social History     Socioeconomic History    Marital status: SINGLE     Spouse name: Not on file    Number of children: Not on file    Years of education: Not on file    Highest education level: Not on file   Occupational History    Not on file   Tobacco Use    Smoking status: Never Smoker    Smokeless tobacco: Never Used   Vaping Use    Vaping Use: Never used   Substance and Sexual Activity    Alcohol use: No    Drug use: No    Sexual activity: Yes     Partners: Male   Other Topics Concern     Service No    Blood Transfusions No    Caffeine Concern No    Occupational Exposure No    Hobby Hazards No    Sleep Concern Yes    Stress Concern No    Weight Concern Not Asked    Special Diet Not Asked    Back Care Not Asked    Exercise Yes     Comment: walks using cane    Bike Helmet Not Asked    Seat Belt Yes    Self-Exams Yes   Social History Narrative    Not on file     Social Determinants of Health     Financial Resource Strain:     Difficulty of Paying Living Expenses: Not on file   Food Insecurity:     Worried About Running Out of Food in the Last Year: Not on file    Yamila of Food in the Last Year: Not on file   Transportation Needs:     Lack of Transportation (Medical): Not on file    Lack of Transportation (Non-Medical):  Not on file   Physical Activity:     Days of Exercise per Week: Not on file   DrFirst of Exercise per Session: Not on file   Stress:     Feeling of Stress : Not on file   Social Connections:     Frequency of Communication with Friends and Family: Not on file    Frequency of Social Gatherings with Friends and Family: Not on file    Attends Anabaptist Services: Not on file    Active Member of [address] [place] [address] or Organizations: Not on file    Attends Club or Organization Meetings: Not on file    Marital Status: Not on file   Intimate Partner Violence:     Fear of Current or Ex-Partner: Not on file    Emotionally Abused: Not on file    Physically Abused: Not on file    Sexually Abused: Not on file   Housing Stability:     Unable to Pay for Housing in the Last Year: Not on file    Number of [place] in the Last Year: Not on file    Unstable Housing in the Last Year: Not on file       No Known Allergies    Current Outpatient Medications   Medication Sig Dispense Refill    insulin aspart protamine/insulin aspart (NOVOLOG MIX 70/30) 100 unit/mL (70-30) inpn 10 Units by SubCUTAneous route every twelve (12) hours. Indications: type 2 diabetes mellitus 5 Pen 5    traZODone (DESYREL) 50 mg tablet Take 1 Tablet by mouth nightly as needed for Sleep. 30 Tablet 2    hydroCHLOROthiazide (HYDRODIURIL) 25 mg tablet Take 1 Tablet by mouth daily. 90 Tablet 1    diclofenac (VOLTAREN) 1 % gel Apply 2 g to affected area four (4) times daily. 1 Each 0    carvediloL (COREG) 12.5 mg tablet Take 1 Tablet by mouth two (2) times daily (with meals). 60 Tablet 2    atorvastatin (LIPITOR) 40 mg tablet Take 1 Tablet by mouth daily. 90 Tablet 1    apixaban (ELIQUIS) 5 mg tablet Take 1 Tablet by mouth two (2) times a day. 180 Tablet 3    amLODIPine (NORVASC) 5 mg tablet Take 1 Tablet by mouth nightly. 90 Tablet 1    isosorbide mononitrate ER (Imdur) 30 mg tablet Take 1 Tablet by mouth daily. 90 Tablet 1    pantoprazole (PROTONIX) 40 mg tablet Take 1 Tablet by mouth daily.  90 Tablet 1    divalproex DR (DEPAKOTE) 500 mg tablet Take 2 Tablets by mouth every twelve (12) hours. 180 Tablet 1    flash glucose sensor (FreeStyle Ivy 14 Day Sensor) kit Use to check blood sugar 4x/day and as needed. 1 Kit 4    cholecalciferol (VITAMIN D3) (2,000 UNITS /50 MCG) cap capsule Take 2 Capsules by mouth daily. Indications: low vitamin D levels 180 Capsule 3    letrozole (FEMARA) 2.5 mg tablet Take 1 Tablet by mouth daily for 360 days. Indications: hormone receptor positive postmenopausal early breast cancer 90 Tablet 3    Blood-Glucose Meter misc One Touch Ultra 2 Blood Glucose  Meter; E 11.9 Type 2 Diabetes; On Insulin: Yes Z279.4 Long Term (current) Insulin Use.  lancets misc 1 Each every twelve (12) hours.  flash glucose scanning reader (FreeStyle Ivy 14 Day [place]) misc Use to check blood sugar 4x/day and as needed. 1 Each 0    Insulin Needles, Disposable, (TRUEplus Pen Needle) 31 gauge x 5/16\" ndle Use as directed 4 times daily 100 Pen Needle 11    glucose blood VI test strips (OneTouch Verio test strips) strip Check your blood sugar in the mornings and when you have symptoms of hypoglycemia 100 Strip 5    acetaminophen (TYLENOL) 500 mg tablet Take 2 Tablets by mouth three (3) times daily as needed for Pain. 100 Tablet 2       Patient Care Team:  Patient Care Team:  Librado Chamorro NP as PCP - General (Nurse Practitioner)  Librado Chamorro NP as PCP - [facility]  Jordy Simmons NP (Nurse Practitioner)  Katie Cevallos MD (General Surgery)  Rj Mirza, RN as Nurse Navigator (Oncology)  Benita Coe MD (Pulmonary Disease)  Rony Kaplan MD (Surgery Physician)  Sharda De La Fuente MD (Surgery General)    Physical Exam  Patient appears well, she is pleasant, alert, oriented x 3, in no distress. Obese. ENT normal.  Neck supple. No adenopathy or thyromegaly. SUBHASH. Lungs are clear, good air entry, no wheezes, rhonchi or rales.    Cardiovascular, S1 and S2 normal, no murmurs, regular rate and rhythm. No LAD. Chest wall negative for tenderness  Abdomen is soft without tenderness  /Anorectal, deferred. Muscleskeletal, no swelling, no tenderness, no injury. Extremities show no edema  Neurological is normal without focal findings. Skin: no concerning lesions. Psych: normal affect. Mood good. Oriented x 3. Judgement and insight intact. Vitals:    05/05/22 1559   BP: (!) 106/52   Pulse: 80   Resp: 16   Temp: 97.3 °F (36.3 °C)   SpO2: 98%   Weight: 211 lb (95.7 kg)   Height: 5' 6\" (1.676 m)       Assessment and Plan    Diagnoses and all orders for this visit:    Screening for thyroid disorder  -     Cancel: T4, FREE; Future  -     Cancel: TSH 3RD GENERATION; Future    Type 2 diabetes mellitus with diabetic neuropathy, with long-term current use of insulin (HCC)  -     insulin aspart protamine/insulin aspart (NOVOLOG MIX 70/30) 100 unit/mL (70-30) inpn; 10 Units by SubCUTAneous route every twelve (12) hours. Indications: type 2 diabetes mellitus, Normal, Disp-5 Pen, R-5  -     Cancel: HEMOGLOBIN A1C WITH EAG; Future  -     Discontinue: flash glucose sensor (FreeStyle Ivy 14 Day Sensor) kit; Use to check blood sugar 4x/day and as needed., Normal, Disp-1 Kit, R-3  -     flash glucose sensor (FreeStyle Ivy 14 Day Sensor) kit; Use to check blood sugar 4x/day and as needed., Normal, Disp-1 Kit, R-4    Grieving  -     traZODone (DESYREL) 50 mg tablet; Take 1 Tablet by mouth nightly as needed for Sleep., Normal, Disp-30 Tablet, R-2    Adjustment insomnia  -     traZODone (DESYREL) 50 mg tablet; Take 1 Tablet by mouth nightly as needed for Sleep., Normal, Disp-30 Tablet, R-2    Essential hypertension  -     hydroCHLOROthiazide (HYDRODIURIL) 25 mg tablet; Take 1 Tablet by mouth daily. , Normal, Disp-90 Tablet, R-1  -     carvediloL (COREG) 12.5 mg tablet; Take 1 Tablet by mouth two (2) times daily (with meals). , Normal, Disp-60 Tablet, R-2  -     amLODIPine (NORVASC) 5 mg tablet; Take 1 Tablet by mouth nightly., Normal, Disp-90 Tablet, R-1  -     isosorbide mononitrate ER (Imdur) 30 mg tablet; Take 1 Tablet by mouth daily. , Normal, Disp-90 Tablet, R-1    Muscle strain  -     diclofenac (VOLTAREN) 1 % gel; Apply 2 g to affected area four (4) times daily. , Normal, Disp-1 Each, R-0    Chronic pain of both knees  -     diclofenac (VOLTAREN) 1 % gel; Apply 2 g to affected area four (4) times daily. , Normal, Disp-1 Each, R-0  -     REFERRAL TO PHYSICAL THERAPY    Hypercholesteremia  -     atorvastatin (LIPITOR) 40 mg tablet; Take 1 Tablet by mouth daily. , Normal, Disp-90 Tablet, R-1    Screening for hyperlipidemia  -     Cancel: LIPID PANEL; Future    Preventative health care  -     Cancel: URINALYSIS W/ RFLX MICROSCOPIC; Future    Gastroesophageal reflux disease without esophagitis  -     pantoprazole (PROTONIX) 40 mg tablet; Take 1 Tablet by mouth daily. , Normal, Disp-90 Tablet, R-1    Seizure disorder (HCC)  -     divalproex DR (DEPAKOTE) 500 mg tablet; Take 2 Tablets by mouth every twelve (12) hours. , Normal, Disp-180 Tablet, R-1    History of CVA (cerebrovascular accident)  -     apixaban (ELIQUIS) 5 mg tablet; Take 1 Tablet by mouth two (2) times a day., Normal, Disp-180 Tablet, R-3    Vitamin D deficiency  -     cholecalciferol (VITAMIN D3) (2,000 UNITS /50 MCG) cap capsule; Take 2 Capsules by mouth daily. Indications: low vitamin D levels, Normal, Disp-180 Capsule, R-3    Chronic midline low back pain without sciatica  -     REFERRAL TO PHYSICAL THERAPY    On valproic acid therapy  -     VALPROIC ACID; Future         *Plan of care reviewed with patient. Patient in agreement with plan and expresses understanding. All questions answered and patient encouraged to call or RTO if further questions or concerns. 50% of 39 minutes spent on counseling and managing her care.     Follow-up and Dispositions    · Return in about 3 months (around 8/5/2022) for Medicare wellness, diabetes, A1C, 35 mins.   Follow-up and Disposition History       Michael Camarena NP-C

## 2022-05-16 ENCOUNTER — HOSPITAL ENCOUNTER (OUTPATIENT)
Dept: LAB | Age: 72
Discharge: HOME | End: 2022-05-16
Payer: MEDICARE

## 2022-05-16 LAB
CHOLEST SERPL-MCNC: 189 MG/DL
HBA1C MFR BLD: 6.7 % (ref 4.2–5.6)
HDLC SERPL-MCNC: 66 MG/DL (ref 40–60)
HDLC SERPL: 2.9 {RATIO} (ref 0–5)
LDLC SERPL CALC-MCNC: 108.4 MG/DL (ref 0–100)
LIPID PROFILE,FLP: ABNORMAL
T4 FREE SERPL-MCNC: 1 NG/DL (ref 0.7–1.5)
TRIGL SERPL-MCNC: 73 MG/DL (ref ?–150)
TSH SERPL DL<=0.05 MIU/L-ACNC: 2.35 UIU/ML (ref 0.36–3.74)
VALPROATE SERPL-MCNC: 78 UG/ML (ref 50–100)
VLDLC SERPL CALC-MCNC: 14.6 MG/DL

## 2022-05-16 PROCEDURE — 83036 HEMOGLOBIN GLYCOSYLATED A1C: CPT

## 2022-05-16 PROCEDURE — 84443 ASSAY THYROID STIM HORMONE: CPT

## 2022-05-16 PROCEDURE — 80061 LIPID PANEL: CPT

## 2022-05-16 PROCEDURE — 80164 ASSAY DIPROPYLACETIC ACD TOT: CPT

## 2022-05-16 PROCEDURE — 84439 ASSAY OF FREE THYROXINE: CPT

## 2022-05-16 PROCEDURE — 36415 COLL VENOUS BLD VENIPUNCTURE: CPT

## 2022-05-19 ENCOUNTER — HOSPITAL ENCOUNTER (OUTPATIENT)
Dept: PHYSICAL THERAPY | Age: 72
Discharge: HOME | End: 2022-05-19
Payer: MEDICARE

## 2022-05-19 PROCEDURE — 97162 PT EVAL MOD COMPLEX 30 MIN: CPT

## 2022-05-19 NOTE — PROGRESS NOTES
[address] [place] [address] PHYSICAL THERAPY  [address] [place] [address] [facility] [address] - Phone: [phone]  Fax: [phone] / 2[address] [facility]  Patient Name: Taryn Liu : 1950   Medical   Diagnosis: Other low back pain [M54.59]  Left knee pain [M25.562]  Right knee pain [M25.561] Treatment Diagnosis: Gait Instability, Bilateral Knee Pain, LBP   Onset Date: Chronic     Referral Source: Jeannine Strange NP Start of Care [facility]): 2022   Prior Hospitalization: See medical history Provider #: [id number]   Prior Level of Function: Lives with partner who assists her with medical visits   Comorbidities: Arthritis, tobacco use, R CVA, HTN, Diabetes, Stroke, Cancer   Medications: Verified on Patient Summary List   The Plan of Care and following information is based on the information from the initial evaluation.   ==========================================================================================  Assessment / key information:  Pt is a 73year old female who presents to PT today with complaints of bilateral knee and low back pain. She also demonstrates severe gait instability and increased falls risk. She was previously being seen at our [place] location where she was non-compliant in regards to attendance. She provided no reasons for why she stopped going to other clinic. She relied on partner during subjective portion of evaluation due to speech impairments related to stroke. Assessment revealed decreased strength, decreased ROM, impaired gait instability and increased pain. The resulting condition has affected their ability to walk for prolonged distances . Patient will benefit from skilled PT services to address these issues. Pt was educated regarding their diagnosis and prognosis. She was also educated regarding our attendance and lat policy. She understand that if she misses 3 appointments she will be discharged. Thank you for this referral.   ==========================================================================================  Eval Complexity: History: HIGH Complexity :3+ comorbidities / personal factors will impact the outcome/ POC Exam:MEDIUM Complexity : 3 Standardized tests and measures addressing body structure, function, activity limitation and / or participation in recreation  Presentation: MEDIUM Complexity : Evolving with changing characteristics  Overall Complexity:MEDIUM    Problem List: pain affecting function, decrease ROM, decrease strength, edema affecting function, impaired gait/ balance, decrease ADL/ functional abilitiies, decrease activity tolerance, decrease flexibility/ joint mobility and decrease transfer abilities   Treatment Plan may include any combination of the following: Therapeutic exercise, Therapeutic activities, Neuromuscular re-education, Physical agent/modality, Gait/balance training, Aquatic therapy, Patient education, Self Care training, Functional mobility training, Home safety training and Stair training  Patient / Family readiness to learn indicated by: asking questions, trying to perform skills and interest  Persons(s) to be included in education: patient (P)  Barriers to Learning/Limitations: None  Patient Goal (s): \"decrease pain with walking\"   Patient self reported health status: poor  Rehabilitation Potential: fair/poor   Short Term Goals: To be accomplished in  3  weeks:  1. Pt will be compliant with HEP for symptom management at home. 2. Pt will demonstrate B knee flexion AROM to at least 112 to improve gait training. 3. Pt will attend 80% of scheduled appointments to improve rehabilitative outcomes.  Long Term Goals: To be accomplished in  5  weeks:  1. Pt will be independent with HEP at D/C for self management. 2. Pt will demonstrate sit to stand from 17 inch height without UE support to improve stair negotiation.   3. Pt will ambulate 300 feet with LRAD to improve quality of life. 4. Pt will demonstrate B knee extension strength of at least 4+/5 to engage in age appropriate activities. Frequency / Duration:   Patient to be seen  1-2  times per week for 5  weeks:  Patient / Caregiver education and instruction: provided education regarding diagnosis and prognosis as well as details regarding treatment plain. Therapist Signature: Natasha Lewis DPT Date: 7/41/5433   Certification Period: 5/19/2022 -8/18/2022 Time: 9:04 AM   ===========================================================================================  I certify that the above Physical Therapy Services are being furnished while the patient is under my care. I agree with the treatment plan and certify that this therapy is necessary. Physician Signature:        Date:       Time:     Hilda Mittal NP  Please sign and return to [facility] or you may fax the signed copy to [phone]. Thank you.
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
## 2022-05-19 NOTE — PROGRESS NOTES
[facility] AT [address] [facility] [address], [place], [address]  Phone: [phone] Fax [phone]  DISCHARGE SUMMARY  Patient Name: Dennys Amor : 1950   Treatment/Medical Diagnosis: Pain in right knee [M25.561]  Left knee pain [M25.562]   Referral Source: Derald Hamman, MD     Date of Initial Visit: 22 Attended Visits: 5 Missed Visits: 3     SUMMARY OF TREATMENT   Pt is a 72 y/o female who presents with co B knee pain secondary to OA. Treatment Plan may include any combination of the following: Therapeutic exercise, Therapeutic activities, Neuromuscular re-education, Physical agent/modality, Gait/balance training, Manual therapy, Aquatic therapy, Patient education, Self Care training, Functional mobility training, Home safety training and Stair training    [address]  The pt attended skilled therapy for 5 sessions for treatment of B knee pain. The patient demonstrated little improvement in knee pain levels at time of last session, likely due to continued strength deficits and lack of regular therapy attendance. She continued to demo significant variability in her exercise technique with her interventions and impaired transfer ability/standing balance. She is being discharged at this time due to noncompliance. 1. Pt will complete TUG in <13 seconds to show reduced fall risk   Current: unable to reassess due to unexpected discharge  2. Pt will demo ability to complete 5xSTS  from chair height <1 minute to show improved functional LE strength.   Current: progressing, pt able to utilize increased anterior trunk translations during sit<>stands to decreased amount of UE assist needed during her transfers (22)   3. Pt will demo ability to hold MSR for 30 seconds for carryover to improved balance for gait and ADL's  Current: progressing, able to maintain MSR for approximately 7\" B before reaching for bars (4/5/22)    RECOMMENDATIONS  Discontinue therapy due to lack of attendance or compliance. If you have any questions/comments please contact us directly at [phone]. Thank you for allowing us to assist in the care of your patient.   Therapist Signature: Geno White Date: 5/19/22   Reporting Period: 3/25/22-4/5/22 Time: 10:14 AM

## 2022-06-08 ENCOUNTER — APPOINTMENT (OUTPATIENT)
Dept: PHYSICAL THERAPY | Age: 72
End: 2022-06-08

## 2022-06-10 ENCOUNTER — TELEPHONE (OUTPATIENT)
Dept: FAMILY MEDICINE CLINIC | Age: 72
End: 2022-06-10

## 2022-06-10 NOTE — TELEPHONE ENCOUNTER
Pt called stating that she has frequent urination, she is going to the bathroom frequently at night which is interrupting her sleep. Advised her darryl is out of the office and schedule is fully booked today. Advised her to go to urgent care.  She verbalized understanding

## 2022-06-22 ENCOUNTER — APPOINTMENT (OUTPATIENT)
Dept: PHYSICAL THERAPY | Age: 72
End: 2022-06-22

## 2022-06-23 DIAGNOSIS — I10 ESSENTIAL HYPERTENSION: ICD-10-CM

## 2022-06-23 NOTE — TELEPHONE ENCOUNTER
Requested Prescriptions     Pending Prescriptions Disp Refills    isosorbide mononitrate ER (Imdur) 30 mg tablet 90 Tablet 1     Sig: Take 1 Tablet by mouth daily.

## 2022-06-24 RX ORDER — ISOSORBIDE MONONITRATE 30 MG/1
30 TABLET, EXTENDED RELEASE ORAL DAILY
Qty: 90 TABLET | Refills: 1 | Status: SHIPPED | OUTPATIENT
Start: 2022-06-24

## 2022-06-29 NOTE — PROGRESS NOTES
[address] [place] [address] PHYSICAL THERAPY  [address] [place] [address] Maritza Medina, [address] - Phone: [phone]  Fax: [phone] SUMMARY  Patient Name: Melecio Diaz : 1950   Treatment/Medical Diagnosis: Other low back pain [M54.59]  Left knee pain [M25.562]  Right knee pain [M25.561]   Referral Source: Rizwana Ballard NP     Date of Initial Visit: 2022 Attended Visits: 1 Missed Visits: 4     SUMMARY OF TREATMENT  Treatment consisted of initial evaluation only. CURRENT STATUS  Patient cancelled appointment scheduled for 2022 and did not show for appointments scheduled for 2022, 6/15/2022 and 2022. Patient was advised after no shows on 2022 and 6/15/2022 that she would be discharged if she no showed 3 times. Will discharge. RECOMMENDATIONS  Discontinue therapy due to lack of attendance or compliance. If you have any questions/comments please contact us directly at [phone]. Thank you for allowing us to assist in the care of your patient. Therapist Signature: Angelina Fernandes, PT Date: 2022     Time: 2:22 PM     NOTE TO PHYSICIAN:  Via Jarvis Aponte  AND FAX TO   [facility]: [phone] If you are unable to process this request in 24 hours please contact our office: [phone].    ___ I have read the above report and request that my patient be discharged from therapy.      Physician Signature:        Date:       Time:

## 2022-09-02 ENCOUNTER — TELEPHONE (OUTPATIENT)
Age: 72
End: 2022-09-02

## 2023-05-31 ENCOUNTER — OFFICE VISIT (OUTPATIENT)
Facility: CLINIC | Age: 73
End: 2023-05-31
Payer: MEDICARE

## 2023-05-31 ENCOUNTER — HOSPITAL ENCOUNTER (OUTPATIENT)
Facility: HOSPITAL | Age: 73
Setting detail: SPECIMEN
Discharge: HOME | End: 2023-06-03
Payer: MEDICARE

## 2023-05-31 VITALS
DIASTOLIC BLOOD PRESSURE: 71 MMHG | OXYGEN SATURATION: 97 % | TEMPERATURE: 97.2 F | HEART RATE: 97 BPM | SYSTOLIC BLOOD PRESSURE: 136 MMHG | RESPIRATION RATE: 16 BRPM

## 2023-05-31 DIAGNOSIS — I10 ESSENTIAL (PRIMARY) HYPERTENSION: ICD-10-CM

## 2023-05-31 DIAGNOSIS — G40.909 NONINTRACTABLE EPILEPSY WITHOUT STATUS EPILEPTICUS, UNSPECIFIED EPILEPSY TYPE (HCC): ICD-10-CM

## 2023-05-31 DIAGNOSIS — G40.911 INTRACTABLE EPILEPSY WITH STATUS EPILEPTICUS, UNSPECIFIED EPILEPSY TYPE (HCC): ICD-10-CM

## 2023-05-31 DIAGNOSIS — R41.3 MEMORY DEFICIT: ICD-10-CM

## 2023-05-31 DIAGNOSIS — Z86.73 HISTORY OF CVA (CEREBROVASCULAR ACCIDENT): ICD-10-CM

## 2023-05-31 DIAGNOSIS — E11.21 TYPE 2 DIABETES MELLITUS WITH NEPHROPATHY (HCC): ICD-10-CM

## 2023-05-31 DIAGNOSIS — D64.9 NORMOCYTIC ANEMIA: ICD-10-CM

## 2023-05-31 DIAGNOSIS — Z85.3 HISTORY OF BREAST CANCER: ICD-10-CM

## 2023-05-31 DIAGNOSIS — Z00.00 PREVENTATIVE HEALTH CARE: ICD-10-CM

## 2023-05-31 DIAGNOSIS — Z13.29 SCREENING FOR THYROID DISORDER: ICD-10-CM

## 2023-05-31 DIAGNOSIS — N39.46 MIXED STRESS AND URGE URINARY INCONTINENCE: ICD-10-CM

## 2023-05-31 DIAGNOSIS — E78.00 HYPERCHOLESTEREMIA: ICD-10-CM

## 2023-05-31 DIAGNOSIS — E11.40 TYPE 2 DIABETES MELLITUS WITH DIABETIC NEUROPATHY, WITHOUT LONG-TERM CURRENT USE OF INSULIN (HCC): ICD-10-CM

## 2023-05-31 DIAGNOSIS — G40.909 SEIZURE DISORDER (HCC): ICD-10-CM

## 2023-05-31 DIAGNOSIS — R01.1 MURMUR, HEART: ICD-10-CM

## 2023-05-31 DIAGNOSIS — Z74.09 DOES NOT WALK: ICD-10-CM

## 2023-05-31 DIAGNOSIS — I10 PRIMARY HYPERTENSION: ICD-10-CM

## 2023-05-31 DIAGNOSIS — I10 PRIMARY HYPERTENSION: Primary | ICD-10-CM

## 2023-05-31 DIAGNOSIS — Z98.890 HISTORY OF CARDIAC CATHETERIZATION: ICD-10-CM

## 2023-05-31 DIAGNOSIS — I51.7 LVH (LEFT VENTRICULAR HYPERTROPHY): ICD-10-CM

## 2023-05-31 DIAGNOSIS — I50.32 CHRONIC DIASTOLIC (CONGESTIVE) HEART FAILURE (HCC): ICD-10-CM

## 2023-05-31 PROBLEM — I38 ENDOCARDITIS: Status: ACTIVE | Noted: 2022-10-31

## 2023-05-31 LAB
ALBUMIN SERPL-MCNC: 2.8 G/DL (ref 3.4–5)
ALBUMIN/GLOB SERPL: 0.7 (ref 0.8–1.7)
ALP SERPL-CCNC: 80 U/L (ref 45–117)
ALT SERPL-CCNC: 19 U/L (ref 13–56)
ANION GAP SERPL CALC-SCNC: 5 MMOL/L (ref 3–18)
AST SERPL-CCNC: 19 U/L (ref 10–38)
BASOPHILS # BLD: 0.1 K/UL (ref 0–0.1)
BASOPHILS NFR BLD: 1 % (ref 0–2)
BILIRUB SERPL-MCNC: 0.3 MG/DL (ref 0.2–1)
BUN SERPL-MCNC: 21 MG/DL (ref 7–18)
BUN/CREAT SERPL: 20 (ref 12–20)
CALCIUM SERPL-MCNC: 8.9 MG/DL (ref 8.5–10.1)
CHLORIDE SERPL-SCNC: 109 MMOL/L (ref 100–111)
CHOLEST SERPL-MCNC: 221 MG/DL
CO2 SERPL-SCNC: 26 MMOL/L (ref 21–32)
CREAT SERPL-MCNC: 1.03 MG/DL (ref 0.6–1.3)
DIFFERENTIAL METHOD BLD: ABNORMAL
EOSINOPHIL # BLD: 0.3 K/UL (ref 0–0.4)
EOSINOPHIL NFR BLD: 4 % (ref 0–5)
ERYTHROCYTE [DISTWIDTH] IN BLOOD BY AUTOMATED COUNT: 14.7 % (ref 11.6–14.5)
EST. AVERAGE GLUCOSE BLD GHB EST-MCNC: 100 MG/DL
GLOBULIN SER CALC-MCNC: 4.3 G/DL (ref 2–4)
GLUCOSE SERPL-MCNC: 107 MG/DL (ref 74–99)
HBA1C MFR BLD: 5.1 % (ref 4.2–5.6)
HCT VFR BLD AUTO: 31.3 % (ref 35–45)
HDLC SERPL-MCNC: 94 MG/DL (ref 40–60)
HDLC SERPL: 2.4 (ref 0–5)
HGB BLD-MCNC: 9.4 G/DL (ref 12–16)
IMM GRANULOCYTES # BLD AUTO: 0 K/UL (ref 0–0.04)
IMM GRANULOCYTES NFR BLD AUTO: 0 % (ref 0–0.5)
LDLC SERPL CALC-MCNC: 102.2 MG/DL (ref 0–100)
LIPID PANEL: ABNORMAL
LYMPHOCYTES # BLD: 3.4 K/UL (ref 0.9–3.6)
LYMPHOCYTES NFR BLD: 46 % (ref 21–52)
MCH RBC QN AUTO: 26.6 PG (ref 24–34)
MCHC RBC AUTO-ENTMCNC: 30 G/DL (ref 31–37)
MCV RBC AUTO: 88.7 FL (ref 78–100)
MONOCYTES # BLD: 0.5 K/UL (ref 0.05–1.2)
MONOCYTES NFR BLD: 7 % (ref 3–10)
NEUTS SEG # BLD: 3.1 K/UL (ref 1.8–8)
NEUTS SEG NFR BLD: 43 % (ref 40–73)
NRBC # BLD: 0 K/UL (ref 0–0.01)
NRBC BLD-RTO: 0 PER 100 WBC
PLATELET # BLD AUTO: 232 K/UL (ref 135–420)
PMV BLD AUTO: 11.2 FL (ref 9.2–11.8)
POTASSIUM SERPL-SCNC: 4.1 MMOL/L (ref 3.5–5.5)
PROT SERPL-MCNC: 7.1 G/DL (ref 6.4–8.2)
RBC # BLD AUTO: 3.53 M/UL (ref 4.2–5.3)
SODIUM SERPL-SCNC: 140 MMOL/L (ref 136–145)
T4 FREE SERPL-MCNC: 1 NG/DL (ref 0.7–1.5)
TRIGL SERPL-MCNC: 124 MG/DL
TSH SERPL DL<=0.05 MIU/L-ACNC: 2.77 UIU/ML (ref 0.36–3.74)
VLDLC SERPL CALC-MCNC: 24.8 MG/DL
WBC # BLD AUTO: 7.4 K/UL (ref 4.6–13.2)

## 2023-05-31 PROCEDURE — 99215 OFFICE O/P EST HI 40 MIN: CPT | Performed by: NURSE PRACTITIONER

## 2023-05-31 PROCEDURE — 36415 COLL VENOUS BLD VENIPUNCTURE: CPT

## 2023-05-31 PROCEDURE — 3044F HG A1C LEVEL LT 7.0%: CPT | Performed by: NURSE PRACTITIONER

## 2023-05-31 PROCEDURE — 84443 ASSAY THYROID STIM HORMONE: CPT

## 2023-05-31 PROCEDURE — 83036 HEMOGLOBIN GLYCOSYLATED A1C: CPT

## 2023-05-31 PROCEDURE — 80061 LIPID PANEL: CPT

## 2023-05-31 PROCEDURE — 80053 COMPREHEN METABOLIC PANEL: CPT

## 2023-05-31 PROCEDURE — 1123F ACP DISCUSS/DSCN MKR DOCD: CPT | Performed by: NURSE PRACTITIONER

## 2023-05-31 PROCEDURE — 84439 ASSAY OF FREE THYROXINE: CPT

## 2023-05-31 PROCEDURE — 85025 COMPLETE CBC W/AUTO DIFF WBC: CPT

## 2023-05-31 PROCEDURE — 3074F SYST BP LT 130 MM HG: CPT | Performed by: NURSE PRACTITIONER

## 2023-05-31 PROCEDURE — 3078F DIAST BP <80 MM HG: CPT | Performed by: NURSE PRACTITIONER

## 2023-05-31 RX ORDER — ISOSORBIDE MONONITRATE 30 MG/1
30 TABLET, EXTENDED RELEASE ORAL DAILY
COMMUNITY
Start: 2023-05-08

## 2023-05-31 RX ORDER — LETROZOLE 2.5 MG/1
TABLET, FILM COATED ORAL
COMMUNITY
Start: 2023-05-08

## 2023-05-31 RX ORDER — ASPIRIN 81 MG/1
81 TABLET ORAL DAILY
COMMUNITY
Start: 2019-12-25

## 2023-05-31 RX ORDER — SENNOSIDES 8.6 MG
TABLET ORAL
COMMUNITY
Start: 2023-05-09

## 2023-05-31 RX ORDER — ATORVASTATIN CALCIUM 40 MG/1
TABLET, FILM COATED ORAL
COMMUNITY
Start: 2023-05-08

## 2023-05-31 RX ORDER — LACOSAMIDE 100 MG/1
100 TABLET ORAL 2 TIMES DAILY
COMMUNITY
Start: 2022-06-28

## 2023-05-31 SDOH — ECONOMIC STABILITY: HOUSING INSECURITY
IN THE LAST 12 MONTHS, WAS THERE A TIME WHEN YOU DID NOT HAVE A STEADY PLACE TO SLEEP OR SLEPT IN A SHELTER (INCLUDING NOW)?: NO

## 2023-05-31 SDOH — ECONOMIC STABILITY: FOOD INSECURITY: WITHIN THE PAST 12 MONTHS, THE FOOD YOU BOUGHT JUST DIDN'T LAST AND YOU DIDN'T HAVE MONEY TO GET MORE.: NEVER TRUE

## 2023-05-31 SDOH — ECONOMIC STABILITY: INCOME INSECURITY: HOW HARD IS IT FOR YOU TO PAY FOR THE VERY BASICS LIKE FOOD, HOUSING, MEDICAL CARE, AND HEATING?: SOMEWHAT HARD

## 2023-05-31 SDOH — ECONOMIC STABILITY: FOOD INSECURITY: WITHIN THE PAST 12 MONTHS, YOU WORRIED THAT YOUR FOOD WOULD RUN OUT BEFORE YOU GOT MONEY TO BUY MORE.: NEVER TRUE

## 2023-05-31 ASSESSMENT — PATIENT HEALTH QUESTIONNAIRE - PHQ9
SUM OF ALL RESPONSES TO PHQ QUESTIONS 1-9: 2
SUM OF ALL RESPONSES TO PHQ9 QUESTIONS 1 & 2: 2
SUM OF ALL RESPONSES TO PHQ QUESTIONS 1-9: 2
SUM OF ALL RESPONSES TO PHQ QUESTIONS 1-9: 2
2. FEELING DOWN, DEPRESSED OR HOPELESS: 2
SUM OF ALL RESPONSES TO PHQ QUESTIONS 1-9: 2
1. LITTLE INTEREST OR PLEASURE IN DOING THINGS: 0

## 2023-06-01 ENCOUNTER — HOME HEALTH ADMISSION (OUTPATIENT)
Age: 73
End: 2023-06-01
Payer: MEDICARE

## 2023-06-02 ENCOUNTER — HOME CARE VISIT (OUTPATIENT)
Age: 73
End: 2023-06-02
Payer: MEDICARE

## 2023-06-02 VITALS
SYSTOLIC BLOOD PRESSURE: 118 MMHG | OXYGEN SATURATION: 99 % | DIASTOLIC BLOOD PRESSURE: 70 MMHG | RESPIRATION RATE: 16 BRPM | HEART RATE: 76 BPM | TEMPERATURE: 97.4 F

## 2023-06-02 VITALS
TEMPERATURE: 97.4 F | RESPIRATION RATE: 16 BRPM | HEART RATE: 76 BPM | DIASTOLIC BLOOD PRESSURE: 70 MMHG | OXYGEN SATURATION: 99 % | SYSTOLIC BLOOD PRESSURE: 118 MMHG

## 2023-06-02 PROCEDURE — G0151 HHCP-SERV OF PT,EA 15 MIN: HCPCS

## 2023-06-02 PROCEDURE — G0299 HHS/HOSPICE OF RN EA 15 MIN: HCPCS

## 2023-06-02 ASSESSMENT — ENCOUNTER SYMPTOMS
HEMOPTYSIS: 0
PAIN LOCATION - PAIN QUALITY: TENDERNESS

## 2023-06-02 NOTE — HOME HEALTH
PT vernon  Pt is a 80 y/o female who is a community referral by her PCP NP Ms Leena Gilbert with diagnosis of DM and history of CVA with residual L hemiparesis in June 2022. Pt has been in the nursing home until 2 weeks ago when she moved to live in brother's house. Pt  is referred to home care PT to help in her transition back to community. PMH:  Primary hypertension;  Type 2 diabetes mellitus with nephropathy;  Type 2 diabetes mellitus with diabetic neuropathy, without long-term current use of insulin;  Essential (primary) hypertension; Seizure disorder; History of CVA (cerebrovascular accident); Intractable epilepsy with status epilepticus, unspecified epilepsy type; History of cardiac catheterization;  Nonintractable epileps y without status epilepticus, unspecified epilepsy type; Chronic diastolic (congestive) heart failure; LVH (left ventricular hypertrophy); Mixed stress and urge urinary incontinence; Does not walk;  Memory deficit; Hypercholesteremia; Preventative health care; Screening for thyroid disorder; Murmur, heart; Normocytic anemia; History of breast cancer. PLOF/living environment:  Non- ambulatory/WC level x 1 year;  pt and her boyfriend (who is disabled) live in pt's brother's one story house equipped with  portable WC  ramp;  has a personal caregiver aide x 2 days a week. Brother and sister in law are supportive. PREC: forgetful;  fall risk; Needs repetition of instructions;   S:  Pt. denies falls since coming home from nursing home  and reported change in meds to SN this morning. Pt's goal in PT is  \"to walk\". Ptstates that she never received PT in hospital nor in nursing home. She has a power WC for use outdoor and has a manual WC for indoors. O:  Pain:  buttocks  =  1-5  /10  Integumentary:  sacral pressure injury managed by nursing.   Pt instruction: sacral pressure relief technique =  roll side to side every hour when in bed and do weight shifting when in WC.   ROM:  all peripheral

## 2023-06-04 ASSESSMENT — ENCOUNTER SYMPTOMS
PAIN LOCATION - PAIN QUALITY: DULL ACHE
DYSPNEA ACTIVITY LEVEL: AFTER AMBULATING 10 - 20 FT
BOWEL INCONTINENCE: 1

## 2023-06-05 ENCOUNTER — HOME CARE VISIT (OUTPATIENT)
Age: 73
End: 2023-06-05
Payer: MEDICARE

## 2023-06-05 VITALS
TEMPERATURE: 97.6 F | OXYGEN SATURATION: 97 % | HEART RATE: 73 BPM | SYSTOLIC BLOOD PRESSURE: 132 MMHG | DIASTOLIC BLOOD PRESSURE: 74 MMHG | RESPIRATION RATE: 16 BRPM

## 2023-06-05 PROCEDURE — G0155 HHCP-SVS OF CSW,EA 15 MIN: HCPCS

## 2023-06-05 PROCEDURE — G0157 HHC PT ASSISTANT EA 15: HCPCS

## 2023-06-05 NOTE — HOME HEALTH
Subjective: I want to be able to walk again. Caregiver involvement: Pt's brother, sister in law and pt's boyfriend assist with household tasks, meal prep and care as needed. Medications reviewed and all medications are available in the home this visit. Medications are effective at this time. no new medication    Patient education provided this visit: pressure relief, fall prevention, body mechanics with mobility    Patient level of understanding of education provided: pt requires further education. Patient response to procedure performed:  pt fatigued at end of session. Patient's Progress towards personal goals: Pt reports no falls. She performed supine and seated LE exercises with vc and tactile cues for correct body mechanics. Pt performed 5 sit <-> stand from w/c with vc for sequencing and body mechanics. She was able to assist with pushing into standing, but required assistance with weight shifting forward over LINDA. Continued need for the following skills: HHPT medically necessary to address decreased LE strength, decreased mobility, increased risk of falls, increased risk of skin breakdown.     Plan for next visit: transfers    The following discharge planning was discussed with the pt/caregiver:

## 2023-06-06 ENCOUNTER — HOME CARE VISIT (OUTPATIENT)
Age: 73
End: 2023-06-06
Payer: MEDICARE

## 2023-06-06 VITALS
SYSTOLIC BLOOD PRESSURE: 110 MMHG | TEMPERATURE: 98 F | RESPIRATION RATE: 17 BRPM | HEART RATE: 78 BPM | OXYGEN SATURATION: 97 % | DIASTOLIC BLOOD PRESSURE: 64 MMHG

## 2023-06-06 VITALS
OXYGEN SATURATION: 99 % | TEMPERATURE: 98.6 F | DIASTOLIC BLOOD PRESSURE: 70 MMHG | HEART RATE: 76 BPM | SYSTOLIC BLOOD PRESSURE: 120 MMHG

## 2023-06-06 PROCEDURE — G0299 HHS/HOSPICE OF RN EA 15 MIN: HCPCS

## 2023-06-06 PROCEDURE — G0153 HHCP-SVS OF S/L PATH,EA 15MN: HCPCS

## 2023-06-06 ASSESSMENT — ENCOUNTER SYMPTOMS
PAIN LOCATION - PAIN QUALITY: SHARP
BOWEL INCONTINENCE: 1
DYSPNEA ACTIVITY LEVEL: AFTER AMBULATING 10 - 20 FT
TROUBLE SWALLOWING: 1

## 2023-06-06 NOTE — HOME HEALTH
RECENT HX OF CURRENT ILLNESS/REASON FOR REFERRAL:     PLOF/DIET/COMMUNICATION: dementia, aphasia    PAST MEDICAL HISTORY: Radiologist report date 6- is remarkable for Large R MCA territory infarct with encephalomalacia, chronic left basal ganglia lacunar infarct and mild white matter change. CURRENT RESIDENCE/LIVING SITUATION: Lives with family, caregiver daily     EDUCATIONAL LEVEL: high school     SUBJECTIVE (PT/FAMILY COMMENTS, THERAPIST OBSERVATIONS): Patient states she didn't know SLP was coming but agreeable to visit once educated to services SLP can provide. Patient denies difficulty swallowing admits to memory deficit. Patient states she lived in a nursing home for approximately 1 year prior to moving in to current home with brother, sister in law, and significant other. CAREGIVER INVOLVEMENT: provide assistance with medications, meals, and ADLs    ASSESSMENT / Mer Brenna / PLAN: Patient scored a 4 of 30 on the [address] Status Examination  which is remarkable for dementia. She can state her full name and birth date as well as President. The patient also gives history of current residence. She has good caregiver support but fair rehab potential due to prior level of function in setting of CVA X2. Patient states she is motivated to increase memory function. Speech therapy is medically necessary to increase functional language skills for safe completion of ADLs.      PATIENT RESPONSE TO TREATMENT:  Pt is cooperative     PATIENT LEVEL OF UNDERSTANDING OF EDUCATION PROVIDED: Pt and caregiver agrees with [facility] NOTIFIED OF POC AND FREQUENCY     PT GOALS: improved memory     HOME EXERCISE PROGRAM: deferred to next visit     DISCHARGE PLANNING - (Radha Tiesha [address]): ST to d/c in 7 more visits or when goals met/max potential achieved, Pt/caregiver verbalized understanding

## 2023-06-06 NOTE — HOME HEALTH
Skilled reason for visit: skilled ongoing assessement, medication management, education    Caregiver involvement: family assists ADL's, medications, meals, transportation, errands. .    Medications reviewed and all medications are  available in the home this visit. The following education was provided regarding medications: Instructed on medication Tylenol to manage mild pain or fever. In addition, warned of possible S/E such as hemolytuc anemia, neutropenia, leukopenia, pancytopenia, liver damage, jaundice, hypoglycemia, rash and urticaria. Warned that high doses or unsupervised long-term use can cause hepatic damage. Excessive ingestion of alcohol may increase the risk of hepatotoxicity. Drug may be used safely if therapy is short-term and does not exceed recommended doses. MD notified of any discrepancies/look a-like medications/medication interactions: na  Medications are effective at this time. Home health supplies by type and quantity ordered/delivered this visit include: na    Patient education provided this visit: Skilled nurse instructed patient on safety measures to avoid injuries and falls such as keeping adequate lighting during the day and night and was educated on proper use of assistive device to prevent falls. Intructed in home ambulation with cane, clear pathways, falls precautions, good lighting, non skid shoes, etc.    Sharps education provided: na    Patient level of understanding of education provided: patient/caregiver verbalized 100% understanding. Patient response to procedure performed:  patient denied pain and discomfort during procedure/visit    Agency Progress toward goals: patient steadily progressing towards all goals      Patient's Progress towards personal goals: patient steadily progressing towards all goals    Home exercise program: Sn instructed patient on pursed lip breathing. Pursed lip breathing is one of the simplest ways to control shortness of breath.  It

## 2023-06-07 ENCOUNTER — HOME CARE VISIT (OUTPATIENT)
Age: 73
End: 2023-06-07
Payer: MEDICARE

## 2023-06-07 VITALS
HEART RATE: 72 BPM | TEMPERATURE: 98.9 F | SYSTOLIC BLOOD PRESSURE: 170 MMHG | OXYGEN SATURATION: 99 % | DIASTOLIC BLOOD PRESSURE: 80 MMHG

## 2023-06-07 PROCEDURE — G0153 HHCP-SVS OF S/L PATH,EA 15MN: HCPCS

## 2023-06-07 PROCEDURE — G0156 HHCP-SVS OF AIDE,EA 15 MIN: HCPCS

## 2023-06-07 ASSESSMENT — ENCOUNTER SYMPTOMS: PAIN LOCATION - PAIN QUALITY: SHARP

## 2023-06-07 NOTE — HOME HEALTH
SUBJECTIVE: Patient states \"I feel happy today\"    CAREGIVER INVOLVEMENT / ASSISTANCE NEEDED FOR: medications, meals, and ADLs       HOME HEALTH SUPPLIES BY TYPE AND QUANTITY ORDERED/DELIVERED THIS VISIT INCLUDE: N/A         OBJECTIVE / PATIENT RESPONSE TO TREATMENT / PATIENT LEVEL OF UNDERSTANDING OF EDUCATION PROVIDED: see interventions         ASSESSMENT OF PROGRESS TOWARD GOALS: Pt is making appropriate progress toward goals as indicated by significant improvement in word finding skills and use of calendar to increase orientation. Patient is motivated this day to complete tasks and compete HEP. Speech therapy is medically necessary to increase safe completion of ADLs for communication and cognition needed to complete ADLs. CONTINUED NEED FOR THE FOLLOWING SKILLS: Pt presents with moderate cognitive linguistic dysfunction which impairs patient's ability to communicate for ADLs and maintain orientation.          PLAN FOR NEXT VISIT: homework provided for cognitive skills and verbal problem solving skills       THE FOLLOWING DISCHARGE PLANNING WAS DISCUSSED WITH THE PATIENT/CAREGIVER: ST to d/c in 7 more visits/ wks or when goals met/max potential achieved, Pt/caregiver verbalized understanding

## 2023-06-07 NOTE — HOME HEALTH
MSW met with the pt, her brother/USMAN Chandra, partner Hortencia Murphy, and sister-in-law Kristina Meier. Pt deferred to her brother for answering many questions and noted she has difficulty communicating due to past strokes. Pt has a hospital bed and other DME and is dependent for most ADLs. MSW provided the pt/family documentation of social service resources, 211 (resource hotline), [employer] [facility] [employer], and support programs. MSW also provided information about care assistance options/costs/funding sources, to include contact information for several [employer] companies, and discussed transit resources. MSW encouraged contacting pt's Medicaid Care Coordinator for assistance as needed and invited a return call if resource questions arise.

## 2023-06-08 ENCOUNTER — HOME CARE VISIT (OUTPATIENT)
Age: 73
End: 2023-06-08
Payer: MEDICARE

## 2023-06-08 VITALS
TEMPERATURE: 97.9 F | DIASTOLIC BLOOD PRESSURE: 78 MMHG | RESPIRATION RATE: 16 BRPM | SYSTOLIC BLOOD PRESSURE: 142 MMHG | HEART RATE: 74 BPM | OXYGEN SATURATION: 97 %

## 2023-06-08 PROCEDURE — G0157 HHC PT ASSISTANT EA 15: HCPCS

## 2023-06-08 ASSESSMENT — ENCOUNTER SYMPTOMS: PAIN LOCATION - PAIN QUALITY: SHARP

## 2023-06-09 ENCOUNTER — HOME CARE VISIT (OUTPATIENT)
Age: 73
End: 2023-06-09
Payer: MEDICARE

## 2023-06-09 VITALS
SYSTOLIC BLOOD PRESSURE: 120 MMHG | TEMPERATURE: 98 F | HEART RATE: 80 BPM | DIASTOLIC BLOOD PRESSURE: 86 MMHG | OXYGEN SATURATION: 97 % | RESPIRATION RATE: 17 BRPM

## 2023-06-09 PROCEDURE — G0156 HHCP-SVS OF AIDE,EA 15 MIN: HCPCS

## 2023-06-09 PROCEDURE — G0299 HHS/HOSPICE OF RN EA 15 MIN: HCPCS

## 2023-06-09 ASSESSMENT — ENCOUNTER SYMPTOMS
BOWEL INCONTINENCE: 1
DYSPNEA ACTIVITY LEVEL: AFTER AMBULATING MORE THAN 20 FT

## 2023-06-09 NOTE — HOME HEALTH
Subjective: I'm okay today. I have medicine that helps my knees feel better. Caregiver involvement: Pt's family and her boyfriend assist with all care as needed. Medications reviewed and all medications are available in the home this visit. Medications are effective at this time. no new medication    Patient education provided this visit: pressure relief, fall prevention, body mechanics with mobility    Patient level of understanding of education provided: pt requires further education. Patient response to procedure performed:  Pt fatigued at end of session. Patient's Progress towards personal goals:  Pt reports no falls. She performed seated LE exercises sitting EOB using B UE support to maintain sitting balance. Pt required vc for maintaining upright posture vs rocking backward while performing exercises. Pt performed scooting along EOB. She required vc for sequencing and body mechanics. Pt unable to obtain B foot flat/ neutral ankle position when sitting EOB. Instructed pt to practice heel slides in sitting to increase B ankle ROM. Continued need for the following skills: HHPT medically necessary to address decreased LE strength, decreased standing balance and increased risk of skin breakdown and falls. Plan for next visit: LE strengthening and transfers.     The following discharge planning was discussed with the pt/caregiver: d/c HHPT in 6 more visits

## 2023-06-09 NOTE — HOME HEALTH
Skilled reason for visit: skilled ongoing assessement, medication management, education, wound care  Caregiver involvement: family assists ADL's, medications, meals, transportation, errands. .   Medications reviewed and all medications are available in the home this visit. The following education was provided regarding medications: SN Instructed patient about the Eliquis ( apixaban ) this is helps to prevent that platelets in your blood from sticking together and forming a blood clot. Eliquis is used to lower the risk of stroke caused by a blood clot in people with a heart rhythm disorder called atrial fibrillation. Because Eliquis keeps your blood from coagulating ( clotting ) to prevent unwanted blood clots, this medicine can also make it easier for you to bleed, even from a minor injury such as a fall or a bump on the head. Do not stop taking Eliquis unless your doctor tells you to. Stopping suddenly can increase your risk of blood clot or stroke  MD notified of any discrepancies/look a-like medications/medication interactions: na   Medications are effective at this time. Home health supplies by type and quantity ordered/delivered this visit include: na   Patient education provided this visit: Patient was instructed on another leading type of chronic wounds is pressure ulcers. That occurs when pressure on the tissue is grater than the pressure in capillaries, and thus restricts blood flow into the area. Muscle tissues, which needs more oxygen and nutrients than skin does, show the worst effects from prolonged pressure. As in other chronic ulcers, reperfusion injury damage tissue. Sharps education provided: na   Patient level of understanding of education provided: patient/caregiver verbalized 100% understanding.    Patient response to procedure performed: patient denied pain and discomfort during procedure/visit   Agency Progress toward goals: patient steadily progressing towards all goals   Patient's Progress

## 2023-06-12 DIAGNOSIS — K21.9 GASTROESOPHAGEAL REFLUX DISEASE WITHOUT ESOPHAGITIS: ICD-10-CM

## 2023-06-12 DIAGNOSIS — Z85.3 HISTORY OF BREAST CANCER: ICD-10-CM

## 2023-06-12 DIAGNOSIS — G40.909 NONINTRACTABLE EPILEPSY WITHOUT STATUS EPILEPTICUS, UNSPECIFIED EPILEPSY TYPE (HCC): ICD-10-CM

## 2023-06-12 DIAGNOSIS — E78.00 PURE HYPERCHOLESTEROLEMIA, UNSPECIFIED: ICD-10-CM

## 2023-06-12 DIAGNOSIS — R11.0 NAUSEA: ICD-10-CM

## 2023-06-12 DIAGNOSIS — Z86.73 HISTORY OF CVA (CEREBROVASCULAR ACCIDENT): Primary | ICD-10-CM

## 2023-06-12 DIAGNOSIS — G40.909 SEIZURE DISORDER (HCC): ICD-10-CM

## 2023-06-12 NOTE — TELEPHONE ENCOUNTER
PLEASE FORWARD TO PCP WITH MEDICATIONS ATTACHED TO MESSAGE. This patient contacted the office for the following prescriptions to be refilled:    Medication requested :     DrugName: ONDANSETRON  Dosage- 4 MG    2. DrugName: ATORVASTATIN  Dosage- 40 MG    3. DrugName: LETROZOLE  Dosage- 2.5 MG    4. DrugName: OMEPRAZOLE PO    5. LACOSAMIDE 100 MG    6. APIXABAN 5MG    7. ISOSORBIDE MONONITRATE 30 MG    Pharmacy: [address]    PCP: SHANNON Baldwin CNP  LOV: (look in previous encounters if not listed)  NOV DMA: 6/19/2023  FUTURE APPT:   Future Appointments   Date Time Provider [address]   6/12/2023  9:30 AM Wilma Romp,  [address]   6/13/2023 To Be Determined JADA Mcadams [address]   6/13/2023 To Be Determined Elenita Ramey  [address]   6/13/2023 To Be Determined [employer],  [address]   6/14/2023  9:30 AM Wilma Romp,  [address]   6/15/2023 To Be Determined JADA Mcadams [address]   6/16/2023 To Be Determined Elenita Ramey  [address]   6/16/2023 To Be Determined [employer],  [address]   6/19/2023  9:00 AM Wilma Romp,  [address]   6/19/2023  2:40 PM SHANNON Baldwin CNP [facility]   6/20/2023 To Be Determined JADA Mcadams [address]   6/20/2023 To Be Determined Elenita Ramey  [address]   6/20/2023 To Be Determined [employer],  [address]   6/22/2023 To Be Determined Wilma Romp,  [address]   6/22/2023 To Be Determined JADA Mcadams [address]   6/23/2023 To Be Determined Elenita Ramey  [address]   6/23/2023 To Be Determined [facility], RN [facility] HR HOME HEAL   6/26/2023 To Be Determined Wilma Thayer,  [facility]   6/27/2023 To Be Determined Nuris Pereira,  [facility]   6/30/2023 To Be Determined Gaurang Jones,  [facility]   7/6/2023 To

## 2023-06-13 RX ORDER — ONDANSETRON 4 MG/1
4 TABLET, ORALLY DISINTEGRATING ORAL EVERY 8 HOURS PRN
Qty: 60 TABLET | Refills: 1 | Status: SHIPPED | OUTPATIENT
Start: 2023-06-13 | End: 2023-07-24 | Stop reason: SDUPTHER

## 2023-06-13 RX ORDER — OMEPRAZOLE 40 MG/1
40 CAPSULE, DELAYED RELEASE ORAL
Qty: 90 CAPSULE | Refills: 1 | OUTPATIENT
Start: 2023-06-13

## 2023-06-13 RX ORDER — ATORVASTATIN CALCIUM 40 MG/1
TABLET, FILM COATED ORAL
Qty: 90 TABLET | Refills: 1 | Status: SHIPPED | OUTPATIENT
Start: 2023-06-13 | End: 2023-07-24 | Stop reason: SDUPTHER

## 2023-06-13 RX ORDER — LACOSAMIDE 100 MG/1
100 TABLET ORAL 2 TIMES DAILY
Qty: 180 TABLET | Refills: 1 | Status: SHIPPED | OUTPATIENT
Start: 2023-06-13 | End: 2023-06-17 | Stop reason: SDUPTHER

## 2023-06-13 RX ORDER — LETROZOLE 2.5 MG/1
TABLET, FILM COATED ORAL
Qty: 30 TABLET | Refills: 1 | Status: SHIPPED | OUTPATIENT
Start: 2023-06-13 | End: 2023-07-15 | Stop reason: SDUPTHER

## 2023-06-13 NOTE — TELEPHONE ENCOUNTER
Patient's caregiver states she is out of meds. I advised I would send a note back tot he provider to advise and asked that she please call a few days prior to running out int he future. Caregiver verbalized understanding.

## 2023-06-13 NOTE — TELEPHONE ENCOUNTER
Requested Prescriptions     Pending Prescriptions Disp Refills    apixaban (ELIQUIS) 5 MG TABS tablet 60 tablet 5     Sig: Take 1 tablet by mouth 2 times daily    atorvastatin (LIPITOR) 40 MG tablet 30 tablet 5     Sig: TAKE 1 TABLET BY MOUTH AT BEDTIME FOR HYPERLIPEDEMIA    letrozole (FEMARA) 2.5 MG tablet 30 tablet 5     Sig: TAKE 1 TABLET BY MOUTH ONCE DAILY FOR CHEMOTHERAPY    lacosamide (VIMPAT) 100 MG TABS tablet 60 tablet 5     Sig: Take 1 tablet by mouth 2 times daily for 180 days.  Max Daily Amount: 200 mg    omeprazole (PRILOSEC) 40 MG delayed release capsule 30 capsule 5     Sig: Take 1 capsule by mouth daily    ondansetron (ZOFRAN-ODT) 4 MG disintegrating tablet 60 tablet 1     Sig: Take 1 tablet by mouth every 8 hours as needed for Nausea or Vomiting

## 2023-06-14 ENCOUNTER — HOME CARE VISIT (OUTPATIENT)
Age: 73
End: 2023-06-14
Payer: MEDICARE

## 2023-06-14 VITALS
RESPIRATION RATE: 16 BRPM | TEMPERATURE: 98 F | HEART RATE: 68 BPM | DIASTOLIC BLOOD PRESSURE: 70 MMHG | SYSTOLIC BLOOD PRESSURE: 130 MMHG

## 2023-06-14 PROCEDURE — G0300 HHS/HOSPICE OF LPN EA 15 MIN: HCPCS

## 2023-06-17 ENCOUNTER — HOSPITAL ENCOUNTER (EMERGENCY)
Facility: HOSPITAL | Age: 73
Discharge: HOME | End: 2023-06-17
Attending: STUDENT IN AN ORGANIZED HEALTH CARE EDUCATION/TRAINING PROGRAM
Payer: MEDICARE

## 2023-06-17 ENCOUNTER — APPOINTMENT (OUTPATIENT)
Facility: HOSPITAL | Age: 73
End: 2023-06-17
Payer: MEDICARE

## 2023-06-17 VITALS
TEMPERATURE: 98.6 F | BODY MASS INDEX: 26.4 KG/M2 | HEART RATE: 74 BPM | WEIGHT: 164.24 LBS | DIASTOLIC BLOOD PRESSURE: 60 MMHG | HEIGHT: 66 IN | SYSTOLIC BLOOD PRESSURE: 163 MMHG | RESPIRATION RATE: 16 BRPM | OXYGEN SATURATION: 98 %

## 2023-06-17 DIAGNOSIS — R56.9 SEIZURE (HCC): Primary | ICD-10-CM

## 2023-06-17 DIAGNOSIS — G40.909 NONINTRACTABLE EPILEPSY WITHOUT STATUS EPILEPTICUS, UNSPECIFIED EPILEPSY TYPE (HCC): ICD-10-CM

## 2023-06-17 DIAGNOSIS — G40.909 SEIZURE DISORDER (HCC): ICD-10-CM

## 2023-06-17 LAB
ANION GAP SERPL CALC-SCNC: 4 MMOL/L (ref 3–18)
BASOPHILS # BLD: 0.1 K/UL (ref 0–0.1)
BASOPHILS NFR BLD: 1 % (ref 0–2)
BUN SERPL-MCNC: 20 MG/DL (ref 7–18)
BUN/CREAT SERPL: 21 (ref 12–20)
CALCIUM SERPL-MCNC: 9.2 MG/DL (ref 8.5–10.1)
CHLORIDE SERPL-SCNC: 108 MMOL/L (ref 100–111)
CK SERPL-CCNC: 57 U/L (ref 26–192)
CO2 SERPL-SCNC: 28 MMOL/L (ref 21–32)
CREAT SERPL-MCNC: 0.94 MG/DL (ref 0.6–1.3)
DIFFERENTIAL METHOD BLD: ABNORMAL
EOSINOPHIL # BLD: 0.3 K/UL (ref 0–0.4)
EOSINOPHIL NFR BLD: 3 % (ref 0–5)
ERYTHROCYTE [DISTWIDTH] IN BLOOD BY AUTOMATED COUNT: 14.2 % (ref 11.6–14.5)
ETHANOL SERPL-MCNC: <3 MG/DL (ref 0–3)
GLUCOSE SERPL-MCNC: 86 MG/DL (ref 74–99)
HCT VFR BLD AUTO: 34.4 % (ref 35–45)
HGB BLD-MCNC: 10.7 G/DL (ref 12–16)
IMM GRANULOCYTES # BLD AUTO: 0 K/UL (ref 0–0.04)
IMM GRANULOCYTES NFR BLD AUTO: 0 % (ref 0–0.5)
LYMPHOCYTES # BLD: 5.1 K/UL (ref 0.9–3.6)
LYMPHOCYTES NFR BLD: 61 % (ref 21–52)
MAGNESIUM SERPL-MCNC: 1.9 MG/DL (ref 1.6–2.6)
MCH RBC QN AUTO: 26.8 PG (ref 24–34)
MCHC RBC AUTO-ENTMCNC: 31.1 G/DL (ref 31–37)
MCV RBC AUTO: 86.2 FL (ref 78–100)
MONOCYTES # BLD: 0.4 K/UL (ref 0.05–1.2)
MONOCYTES NFR BLD: 4 % (ref 3–10)
NEUTS SEG # BLD: 2.6 K/UL (ref 1.8–8)
NEUTS SEG NFR BLD: 31 % (ref 40–73)
NRBC # BLD: 0 K/UL (ref 0–0.01)
NRBC BLD-RTO: 0 PER 100 WBC
PLATELET # BLD AUTO: 217 K/UL (ref 135–420)
PMV BLD AUTO: 11 FL (ref 9.2–11.8)
POTASSIUM SERPL-SCNC: 4.3 MMOL/L (ref 3.5–5.5)
RBC # BLD AUTO: 3.99 M/UL (ref 4.2–5.3)
SODIUM SERPL-SCNC: 140 MMOL/L (ref 136–145)
TROPONIN I SERPL HS-MCNC: 6 NG/L (ref 0–54)
WBC # BLD AUTO: 8.4 K/UL (ref 4.6–13.2)

## 2023-06-17 PROCEDURE — 82077 ASSAY SPEC XCP UR&BREATH IA: CPT

## 2023-06-17 PROCEDURE — 85025 COMPLETE CBC W/AUTO DIFF WBC: CPT

## 2023-06-17 PROCEDURE — 84484 ASSAY OF TROPONIN QUANT: CPT

## 2023-06-17 PROCEDURE — 6370000000 HC RX 637 (ALT 250 FOR IP): Performed by: STUDENT IN AN ORGANIZED HEALTH CARE EDUCATION/TRAINING PROGRAM

## 2023-06-17 PROCEDURE — 82550 ASSAY OF CK (CPK): CPT

## 2023-06-17 PROCEDURE — 83735 ASSAY OF MAGNESIUM: CPT

## 2023-06-17 PROCEDURE — 80048 BASIC METABOLIC PNL TOTAL CA: CPT

## 2023-06-17 PROCEDURE — 70450 CT HEAD/BRAIN W/O DYE: CPT

## 2023-06-17 PROCEDURE — 99284 EMERGENCY DEPT VISIT MOD MDM: CPT

## 2023-06-17 PROCEDURE — 94762 N-INVAS EAR/PLS OXIMTRY CONT: CPT

## 2023-06-17 RX ORDER — ACETAMINOPHEN 500 MG
1000 TABLET ORAL
Status: COMPLETED | OUTPATIENT
Start: 2023-06-17 | End: 2023-06-17

## 2023-06-17 RX ORDER — LACOSAMIDE 150 MG/1
150 TABLET ORAL 2 TIMES DAILY
Qty: 90 TABLET | Refills: 1 | Status: SHIPPED | OUTPATIENT
Start: 2023-06-17 | End: 2023-12-14

## 2023-06-17 RX ORDER — LACOSAMIDE 50 MG/1
150 TABLET ORAL ONCE
Status: COMPLETED | OUTPATIENT
Start: 2023-06-17 | End: 2023-06-17

## 2023-06-17 RX ORDER — LACOSAMIDE 150 MG/1
150 TABLET ORAL 2 TIMES DAILY
Qty: 90 TABLET | Refills: 1 | Status: SHIPPED | OUTPATIENT
Start: 2023-06-17 | End: 2023-06-17 | Stop reason: SDUPTHER

## 2023-06-17 RX ADMIN — LACOSAMIDE 150 MG: 50 TABLET, FILM COATED ORAL at 17:54

## 2023-06-17 RX ADMIN — ACETAMINOPHEN 1000 MG: 500 TABLET ORAL at 17:54

## 2023-06-17 NOTE — ED TRIAGE NOTES
Patient presents to the ed for stroke like symptoms. According to EMS patient last known normal was around 1155 this morning. Patient had a witnesses seizure with family after the seizure patient became non verbal and had a left side facial droop. Patient has a history of previous strokes and has left side deficients due to the stroke. Patient at this time verbal and responsive. Tele neuro evaluated patient and declared not a stroke.

## 2023-06-17 NOTE — ED PROVIDER NOTES
[facility] EMERGENCY DEPT  EMERGENCY DEPARTMENT ENCOUNTER      Pt Name: Carl Nguyen  MRN: [id number]  [place] 1950  Date of evaluation: 6/17/2023  Provider: Ernesto Chahal MD    CHIEF COMPLAINT     No chief complaint on file. HISTORY OF PRESENT ILLNESS   (Location/Symptom, Timing/Onset, Context/Setting, Quality, Duration, Modifying Factors, Severity)  Note limiting factors. Carl Nguyen is a 81 y.o. female who presents to the emergency department after she was found unable to talk. Per EMS family witnessed a seizure however this was just her lip shaking and she remained alert. Afterwards she was unable to communicate. Patient has a headache to her posterior head. Started last night. Nursing Notes were reviewed. REVIEW OF SYSTEMS    (2-9 systems for level 4, 10 or more for level 5)   Unable to obtain    Except as noted above the remainder of the review of systems was reviewed and negative.        PAST MEDICAL HISTORY     Past Medical History:   Diagnosis Date    Arthritis     Breast CA ([address] 2016    Breast CA ([address]     DDD (degenerative disc disease), cervical     Diabetes ([address]     DJD (degenerative joint disease) of cervical spine     Heart murmur     History of noncompliance with medical treatment     Hypercholesteremia     Hypertension     Menopause     Mild diastolic dysfunction     ECHO 5/2020     PE (pulmonary thromboembolism) ([address] 2019    Psychiatric disorder     DEPRESSION    PUD (peptic ulcer disease)     S/P cardiac cath 02/2015    No angiographic evidence of CAD    Seizures (HCC)     LAST SEIZURE ABOUT 2 MONTHS AGO (NOV 2015)    Stroke ([address]     x2 with left side deficit         SURGICAL HISTORY       Past Surgical History:   Procedure Laterality Date    BREAST LUMPECTOMY Right 1/12/2016    Dr. Abdoulaye Ochoa Partial Mastectomy w./ axillary lymphadenectomy    HEENT      TONSILLECTOMY    IR MEDIPORT      MASTECTOMY Right 1/26/2016    Dr. Abdoulaye Ochoa Repeat Partial Mastectomy for

## 2023-06-19 ENCOUNTER — TELEMEDICINE (OUTPATIENT)
Facility: CLINIC | Age: 73
End: 2023-06-19
Payer: MEDICARE

## 2023-06-19 ENCOUNTER — HOME CARE VISIT (OUTPATIENT)
Age: 73
End: 2023-06-19
Payer: MEDICARE

## 2023-06-19 DIAGNOSIS — G40.909 SEIZURE DISORDER (HCC): ICD-10-CM

## 2023-06-19 DIAGNOSIS — D64.9 NORMOCYTIC ANEMIA: ICD-10-CM

## 2023-06-19 DIAGNOSIS — I50.32 CHRONIC DIASTOLIC (CONGESTIVE) HEART FAILURE (HCC): ICD-10-CM

## 2023-06-19 DIAGNOSIS — I10 ESSENTIAL (PRIMARY) HYPERTENSION: ICD-10-CM

## 2023-06-19 DIAGNOSIS — Z86.73 HISTORY OF CVA (CEREBROVASCULAR ACCIDENT): ICD-10-CM

## 2023-06-19 DIAGNOSIS — Z85.3 HISTORY OF BREAST CANCER: ICD-10-CM

## 2023-06-19 DIAGNOSIS — Z00.00 PREVENTATIVE HEALTH CARE: ICD-10-CM

## 2023-06-19 DIAGNOSIS — E11.21 TYPE 2 DIABETES MELLITUS WITH NEPHROPATHY (HCC): Primary | ICD-10-CM

## 2023-06-19 PROCEDURE — 99214 OFFICE O/P EST MOD 30 MIN: CPT | Performed by: NURSE PRACTITIONER

## 2023-06-19 PROCEDURE — 3044F HG A1C LEVEL LT 7.0%: CPT | Performed by: NURSE PRACTITIONER

## 2023-06-19 PROCEDURE — G0153 HHCP-SVS OF S/L PATH,EA 15MN: HCPCS

## 2023-06-19 PROCEDURE — 1123F ACP DISCUSS/DSCN MKR DOCD: CPT | Performed by: NURSE PRACTITIONER

## 2023-06-19 RX ORDER — DULAGLUTIDE 0.75 MG/.5ML
0.75 INJECTION, SOLUTION SUBCUTANEOUS WEEKLY
Qty: 4 ADJUSTABLE DOSE PRE-FILLED PEN SYRINGE | Refills: 1 | Status: SHIPPED | OUTPATIENT
Start: 2023-06-19

## 2023-06-19 NOTE — HOME HEALTH
aSkilled reason for visit: Patient is a 81year old female with history of DM, CVA being seen for wound, medication and disease education and managent. Caregiver involvement: Family does all shopping, food prep, appointments. CG daily in to assist with ADL'S AND IADL'S. .    Medications reviewed and all medications are available in the home this visit. The following education was provided regarding medications: Instructed patient/caregiver to notify SN/PT of signs and symptoms of anticoagulant adverse effects including bleeding gums, blood in urine or stool, easily bruising. Instructed on importance of fall prevention due to risk for bleeding. Instructed patient/caregiver that hypoglycemic medications may result in lower blood sugars than body is accustomed to, notify SN/PT of dizziness, confusion, nervousness, or any other symptoms of low blood sugar. .    MD notified of any discrepancies/look a-like medications/medication interactions: n/a  Medications are effective at this time. Home health supplies by type and quantity ordered/delivered this visit include: n/a    Patient education provided this visit: INSTRUCTED PATIENT AND CG THAT SHOULD ANY NEEDS OR CONCERNS ARISE TO FIRST CALL OUR OFFICE, OR THE DR'S OFFICE  OR GO TO AN URGENT CARE CENTER AND NOT TO THE ED FOR NON-LIFE THREATENING EVENTS. IF IT IS LIFE THREATENING THEN CALL 911 OR GO TO THE CLOSEST ER.     Sharps education provided: Clinician instructed patient/CG on proper disposal of sharps: Containers should be made of hard plastic, be puncture-resistant and leakproof, such as a laundry detergent or bleach bottle.  When the container is ¾ full, it should be sealed with tape and labeled DO NOT RECYCLE prior to discarding in the regular trash.      Patient level of understanding of education provided: CG/PT able to asks questions and answer appropiatley, able to repeat back instructions    Patient response to procedure performed: positive

## 2023-06-19 NOTE — PROGRESS NOTES
Florina Durbin is a 81 y.o. female  established patient, here for evaluation of the following chief complaint(s):  Chief Complaint   Patient presents with    Follow-up     Seizures, T2D and pending referrals       Assessment and Plan  1. Type 2 diabetes mellitus with nephropathy (HCC)  -     Dulaglutide (TRULICITY) 5.46 YE/6.6DS SOPN; Inject 0.75 mg into the skin once a week, Disp-4 Adjustable Dose Pre-filled Pen Syringe, R-1Normal  2. Seizure disorder ([address]  3. Preventative health care  4. History of CVA (cerebrovascular accident)  5. Essential (primary) hypertension  6. Chronic diastolic (congestive) heart failure (HCC)  7. Normocytic anemia  8. History of breast cancer       Return in about 1 month (around 7/19/2023), or if symptoms worsen or fail to improve, for Routine, 20. HPI:   Virtual visit. Pt appears well and her brother is in virtual.    Pt ED had ED visit 6/17/2023 related to seizure, reviewed notes, no medication changes   Pt was given info for a neurologist and has a pending referral    Pot T2D appears to be controlled, will Trulicity and stop Humalog. Pt's brother's wife does the cooking. They limit pt's sugary drinks and carbohydrates. 5/31/2023 A1C 5.1    Pt given numbers for cardiology, neuro and oncology to make appts    ROS:    General: negative for - chills, fever, weight changes or malaise  HEENT: no sore throat, nasal congestion, vision problems or ear problems  Respiratory: no cough, shortness of breath, or wheezing  Cardiovascular: no chest pain, palpitations, or dyspnea on exertion  Gastrointestinal: no abdominal pain, N/V, change in bowel habits  Musculoskeletal: no back pain or joint pain  Neurological: no headache or dizziness  Endo:  No polyuria or polydipsia  : no urinary  Skin: none  Psychological: negative for - anxiety, depression, sleeps issues    Prior to Admission medications    Medication Sig Start Date End Date Taking?  Authorizing Provider   Dulaglutide (TRULICITY) 6.15

## 2023-06-20 ENCOUNTER — HOME CARE VISIT (OUTPATIENT)
Age: 73
End: 2023-06-20
Payer: MEDICARE

## 2023-06-20 VITALS
HEART RATE: 76 BPM | RESPIRATION RATE: 17 BRPM | TEMPERATURE: 97.6 F | DIASTOLIC BLOOD PRESSURE: 68 MMHG | OXYGEN SATURATION: 98 % | SYSTOLIC BLOOD PRESSURE: 130 MMHG

## 2023-06-20 VITALS
OXYGEN SATURATION: 98 % | DIASTOLIC BLOOD PRESSURE: 78 MMHG | RESPIRATION RATE: 16 BRPM | SYSTOLIC BLOOD PRESSURE: 142 MMHG | TEMPERATURE: 97.8 F | HEART RATE: 65 BPM

## 2023-06-20 VITALS
HEART RATE: 80 BPM | SYSTOLIC BLOOD PRESSURE: 135 MMHG | OXYGEN SATURATION: 98 % | DIASTOLIC BLOOD PRESSURE: 75 MMHG | TEMPERATURE: 98.1 F

## 2023-06-20 PROCEDURE — G0299 HHS/HOSPICE OF RN EA 15 MIN: HCPCS

## 2023-06-20 PROCEDURE — G0157 HHC PT ASSISTANT EA 15: HCPCS

## 2023-06-20 PROCEDURE — G0153 HHCP-SVS OF S/L PATH,EA 15MN: HCPCS

## 2023-06-20 ASSESSMENT — ENCOUNTER SYMPTOMS
BOWEL INCONTINENCE: 1
PAIN LOCATION - PAIN QUALITY: ACHE
DYSPNEA ACTIVITY LEVEL: AFTER AMBULATING MORE THAN 20 FT
TROUBLE SWALLOWING: 1
PAIN LOCATION - PAIN QUALITY: THROBBING

## 2023-06-20 NOTE — HOME HEALTH
Subjective: I'm feeling better than yesterday. Caregiver involvement: Pt's brother and sister in law assist with household tasks, meal prep and care as needed. Medications reviewed and all medications are available in the home this visit. Medications are effective at this time. no new medication. Patient education provided this visit: fall prevention, body mechanics with mobility, pressure relief    Patient level of understanding of education provided: pt requires further education. Patient response to procedure performed:  Pt had no change in pain levels at end of session. Patient's Progress towards personal goals: Pt reports no falls. Pt progressing with rolling and sit <-> stand transfers from mod A to now CGA/ min A. She requires increased time and effort for transition. Pt progressing with sit <->stand and has improved with weight shifting forward over LINDA. She continues to require vc for correct hand placement, and for foot placement. Pt reports changing positions more frequently t/o the day to decrease risk of skin breakdown. Continued need for the following skills: HHPT medically necessary to address decreased LE strength, decreased mobility, increased risk for falls and skin breakdown    Plan for next visit: transfers    The following discharge planning was discussed with the pt/caregiver: pt currently scheduled for d/c in 3 more visits.

## 2023-06-20 NOTE — HOME HEALTH
Skilled reason for visit: skilled assessment, education, medication management, wound care   Caregiver involvement: Family assists ADL's, medications, meals, transportation, errands. Medications reviewed and all medications are available in the home this visit. The following education was provided regarding medications: patient knowledgeable about all medications, has no questions or concerns at this time. .   MD notified of any discrepancies/look a-like medications/medication interactions: na   Medications are effective at this time. Home health supplies by type and quantity ordered/delivered this visit include: na   Patient education provided this visit: See careplan   Sharps education provided: na   Patient level of understanding of education provided: patient/caregiver verbalized 100% understanding.    Skilled Care Performed this visit: skilled assessment, education, medication management   Patient response to procedure performed: patient denied pain and discomfort during procedure/visit   Agency Progress toward goals: progressing   Patient's Progress towards personal goals: progressing   Home exercise program: See careplan   Continued need for the following skills: Nursing, slp, pt  Plan for next visit: skilled assessment, education, medication management   Patient and/or caregiver notified and agrees to changes in the Plan of Care YES/NO/NA: N/A   The following discharge planning was discussed with the pt/caregiver: Discharge planning as follows: when goals met, patient/caregiver able to manage disease process, medications and pain

## 2023-06-21 ENCOUNTER — HOME CARE VISIT (OUTPATIENT)
Age: 73
End: 2023-06-21
Payer: MEDICARE

## 2023-06-21 VITALS
DIASTOLIC BLOOD PRESSURE: 64 MMHG | SYSTOLIC BLOOD PRESSURE: 126 MMHG | OXYGEN SATURATION: 100 % | RESPIRATION RATE: 16 BRPM | HEART RATE: 65 BPM | TEMPERATURE: 97.9 F

## 2023-06-21 PROCEDURE — G0157 HHC PT ASSISTANT EA 15: HCPCS

## 2023-06-21 ASSESSMENT — ENCOUNTER SYMPTOMS: PAIN LOCATION - PAIN QUALITY: ACHE

## 2023-06-21 NOTE — HOME HEALTH
Subjective: My L knee is okay today, but my R knee hurts    Caregiver involvement: Pt's family and a paid CG assist with all care as needed. Medications reviewed and all medications are available in the home this visit. Medications are effective at this time. no new medication    Patient education provided this visit: fall prevention, pressure relief, body mechanics with mobility    Patient level of understanding of education provided: Pt requires further education. Patient response to procedure performed:  No change in pain levels at end of session. Patient's Progress towards personal goals: Pt reports no falls. She is improving with bed mobility today was able to perform supine <-> sit with CGA. She required vc for improving body mechanics to facilitate easier transition. Pt improving with transition sit <-> stand as she is able to weight shift farther forward over LINDA to facilitate transition to standing. Pt continues to work on obtaining B foot flat in standing and obtaining full upright posture. Instructed pt and her CG, pt to be OOB daily in w/c or sitting EOB with assistance daily.     Continued need for the following skills: HHPT medically necessary to address decreased LE strength, decreased standing balance, decreased mobility, increased risk of falls and increased risk of skin breakdown    The following discharge planning was discussed with the pt/caregiver: pt currently has 2 more scheduled PT visits, awaiting orders for further visits

## 2023-06-22 VITALS
HEART RATE: 66 BPM | RESPIRATION RATE: 16 BRPM | SYSTOLIC BLOOD PRESSURE: 157 MMHG | DIASTOLIC BLOOD PRESSURE: 74 MMHG | TEMPERATURE: 98.6 F | OXYGEN SATURATION: 98 %

## 2023-06-22 ASSESSMENT — ENCOUNTER SYMPTOMS: PAIN LOCATION - PAIN QUALITY: ACHE

## 2023-06-22 NOTE — HOME HEALTH
SUBJECTIVE:   Patient asks for her calendar and homework sheet to share with SLP    CAREGIVER INVOLVEMENT / ASSISTANCE NEEDED FOR:        [address] ORDERED/DELIVERED THIS VISIT INCLUDE: N/A         OBJECTIVE / PATIENT RESPONSE TO TREATMENT / PATIENT LEVEL OF UNDERSTANDING OF EDUCATION PROVIDED: see interventions         ASSESSMENT OF PROGRESS TOWARD GOALS: Pt is making appropriate progress toward goals as indicated by increased accuracy in completion of all tasks with decreased assistance required. Increased complexity is tolerated for task completion. Short term recall is increasing at task level. Patient continues to benefit from skilled speech therapy to increase functional communication skills with caregivers and community.          CONTINUED NEED FOR THE FOLLOWING SKILLS: Pt presents with cognitive linguistic deficit decreasing her ability to effectively communicate with her family and community         PLAN FOR NEXT VISIT: continue ST POC       THE FOLLOWING DISCHARGE PLANNING WAS DISCUSSED WITH THE PATIENT/CAREGIVER: ST to d/c in 2 more visits/ wks or when goals met/max potential achieved, Pt/caregiver verbalized understanding

## 2023-06-23 ENCOUNTER — HOME CARE VISIT (OUTPATIENT)
Age: 73
End: 2023-06-23
Payer: MEDICARE

## 2023-06-26 ENCOUNTER — HOME CARE VISIT (OUTPATIENT)
Age: 73
End: 2023-06-26
Payer: MEDICARE

## 2023-06-26 VITALS
HEART RATE: 67 BPM | DIASTOLIC BLOOD PRESSURE: 70 MMHG | RESPIRATION RATE: 16 BRPM | SYSTOLIC BLOOD PRESSURE: 125 MMHG | OXYGEN SATURATION: 99 % | TEMPERATURE: 96.8 F

## 2023-06-26 VITALS
HEART RATE: 72 BPM | DIASTOLIC BLOOD PRESSURE: 74 MMHG | OXYGEN SATURATION: 99 % | TEMPERATURE: 97.9 F | RESPIRATION RATE: 16 BRPM | SYSTOLIC BLOOD PRESSURE: 122 MMHG

## 2023-06-26 PROCEDURE — G0151 HHCP-SERV OF PT,EA 15 MIN: HCPCS

## 2023-06-26 PROCEDURE — G0152 HHCP-SERV OF OT,EA 15 MIN: HCPCS

## 2023-06-26 ASSESSMENT — ENCOUNTER SYMPTOMS: PAIN LOCATION - PAIN QUALITY: SORENESS

## 2023-06-27 ENCOUNTER — HOME CARE VISIT (OUTPATIENT)
Age: 73
End: 2023-06-27
Payer: MEDICARE

## 2023-06-30 ENCOUNTER — HOME CARE VISIT (OUTPATIENT)
Age: 73
End: 2023-06-30
Payer: MEDICARE

## 2023-06-30 VITALS
RESPIRATION RATE: 98 BRPM | DIASTOLIC BLOOD PRESSURE: 87 MMHG | TEMPERATURE: 98.2 F | SYSTOLIC BLOOD PRESSURE: 140 MMHG | HEART RATE: 86 BPM | OXYGEN SATURATION: 98 %

## 2023-06-30 PROCEDURE — G0151 HHCP-SERV OF PT,EA 15 MIN: HCPCS

## 2023-06-30 PROCEDURE — G0158 HHC OT ASSISTANT EA 15: HCPCS

## 2023-06-30 ASSESSMENT — ENCOUNTER SYMPTOMS: PAIN LOCATION - PAIN QUALITY: ACHING

## 2023-07-01 VITALS
DIASTOLIC BLOOD PRESSURE: 87 MMHG | SYSTOLIC BLOOD PRESSURE: 140 MMHG | OXYGEN SATURATION: 98 % | TEMPERATURE: 98.2 F | RESPIRATION RATE: 16 BRPM | HEART RATE: 86 BPM

## 2023-07-01 ASSESSMENT — ENCOUNTER SYMPTOMS: PAIN LOCATION - PAIN QUALITY: ACHY

## 2023-07-03 ENCOUNTER — HOME CARE VISIT (OUTPATIENT)
Age: 73
End: 2023-07-03
Payer: MEDICARE

## 2023-07-03 VITALS — HEART RATE: 72 BPM | SYSTOLIC BLOOD PRESSURE: 149 MMHG | DIASTOLIC BLOOD PRESSURE: 71 MMHG | OXYGEN SATURATION: 98 %

## 2023-07-03 PROCEDURE — G0153 HHCP-SVS OF S/L PATH,EA 15MN: HCPCS

## 2023-07-03 PROCEDURE — G0158 HHC OT ASSISTANT EA 15: HCPCS

## 2023-07-04 VITALS
TEMPERATURE: 98 F | DIASTOLIC BLOOD PRESSURE: 86 MMHG | SYSTOLIC BLOOD PRESSURE: 130 MMHG | RESPIRATION RATE: 17 BRPM | HEART RATE: 88 BPM | OXYGEN SATURATION: 98 %

## 2023-07-04 NOTE — HOME HEALTH
SUBJECTIVE:MS. [facility] stated she is following her BUE HEP addressing BUE strength and ROM in her both arms. [facility] CAREGIVER INVOLVEMENT/ASSISTANCE NEEDED FOR: The pt family helps with all I/ADLS due to her inability to do so. [facility] HOME HEALTH SUPPLIES BY TYPE AND QUANTITY ORDERED/DELIVERED THIS VISIT INCLUDE: NONE     . OBJECTIVE:  See interventions. .    Patient education provided this visit: CARR ROLE, Energy conservation techniques,and Purse-lip breathing techniques. .         Patient level of understanding of education provided:  was able to teach-back education provided from [address] requiring two verbal cuing form CARR for reenforcement of safety to prevent confusion. .    RESPONSE TO TREATMENT: Pt reported an 3/10 on numeric scale after performing Bed-mobility/Optimal level of function with use of energy conservation techniques. Pt required two periods of rest while performing functional mobility tasks due to exertion when performing functional activity. (Please see interventions for more details of goal previously mentioned)     . ASSESSMENT OF PROGRESS TOWARD GOALS((Please see interventions for more details)):CARR Modeled PNF(D1 and D2)  and NDT techniques with client to increase BUE ROM and proception input to perform daily meaningful task. CARR provided complete instruction on the proper hand placement on arm and wrist to demo task for success on tone modulation and increase ROM  In Return, client was able to process each exercises and demostration with SUP. Pt reported no pain or concerns . Pt attempted to complete BUE exercises( tricep and bicep curls) while dynamic sitting with the head of the bed semi-recliner  for an duration of 5 mins with fair- balance with SBA, CARR provided verbal prompting for purse-lip breathing execises due to patient holding her breathe while demostrating BUE gross exercises. The pt able to verbalize and demo  energy conservation

## 2023-07-04 NOTE — HOME HEALTH
SUBJECTIVE:   Patient states she had exercise today and therefor no pain. CAREGIVER INVOLVEMENT / ASSISTANCE NEEDED FOR: medications, meals, and ADLs       HOME HEALTH SUPPLIES BY TYPE AND QUANTITY ORDERED/DELIVERED THIS VISIT INCLUDE: N/A         OBJECTIVE / PATIENT RESPONSE TO TREATMENT / PATIENT LEVEL OF UNDERSTANDING OF EDUCATION PROVIDED: see interventions         ASSESSMENT OF PROGRESS TOWARD GOALS: Pt is making appropriate progress toward goals as indicated by goals met today. Patient requires decreased assistance with completion of all tasks. She demonstrates decreased confusion and increased recall without the need for rehearsal or strategies. Patient is ready for discharge today due to goals met. CONTINUED NEED FOR THE FOLLOWING SKILLS: n/a         PLAN FOR NEXT VISIT: n/a discharge today. THE FOLLOWING DISCHARGE PLANNING WAS DISCUSSED WITH THE PATIENT/CAREGIVER: ST to d/c today.   Pt/caregiver verbalized understanding English

## 2023-07-05 ENCOUNTER — HOME CARE VISIT (OUTPATIENT)
Age: 73
End: 2023-07-05
Payer: MEDICARE

## 2023-07-05 PROCEDURE — G0157 HHC PT ASSISTANT EA 15: HCPCS

## 2023-07-06 ENCOUNTER — HOME CARE VISIT (OUTPATIENT)
Age: 73
End: 2023-07-06
Payer: MEDICARE

## 2023-07-06 VITALS
DIASTOLIC BLOOD PRESSURE: 74 MMHG | HEART RATE: 91 BPM | RESPIRATION RATE: 16 BRPM | OXYGEN SATURATION: 100 % | SYSTOLIC BLOOD PRESSURE: 156 MMHG | TEMPERATURE: 97.7 F

## 2023-07-06 VITALS
RESPIRATION RATE: 17 BRPM | HEART RATE: 88 BPM | OXYGEN SATURATION: 98 % | DIASTOLIC BLOOD PRESSURE: 87 MMHG | SYSTOLIC BLOOD PRESSURE: 132 MMHG | TEMPERATURE: 98 F

## 2023-07-06 VITALS
OXYGEN SATURATION: 100 % | DIASTOLIC BLOOD PRESSURE: 70 MMHG | TEMPERATURE: 97.6 F | HEART RATE: 83 BPM | RESPIRATION RATE: 17 BRPM | SYSTOLIC BLOOD PRESSURE: 150 MMHG

## 2023-07-06 PROCEDURE — G0299 HHS/HOSPICE OF RN EA 15 MIN: HCPCS

## 2023-07-06 PROCEDURE — G0158 HHC OT ASSISTANT EA 15: HCPCS

## 2023-07-06 ASSESSMENT — ENCOUNTER SYMPTOMS
PAIN LOCATION - PAIN QUALITY: ACHE
BOWEL INCONTINENCE: 1
PAIN LOCATION - PAIN QUALITY: ACHING

## 2023-07-06 NOTE — HOME HEALTH
Skilled reason for visit: DM teaching    Caregiver involvement: family assist with ADLs, meal prep, meds and transportation. Medications reviewed and all medications are available in the home this visit. The following education was provided regarding medications:   SN instructed on proper method of medication intake, as many people taking prescription medications do not follow their doctors orders. SN instruct on medication compliance to better control the patients disease process, to refill medication on time to prevent missed/skipped doses. do not take any medication that does not belong to you. Also ask your doctor before taking any over the counter medication to avoid interactions. Patient verbalized understanding. .    MD notified of any discrepancies/look a-like medications/medication interactions: none  Medications are effective at this time. Home health supplies by type and quantity ordered/delivered this visit include: n/a    Patient education provided this visit: see intervention    Daron education provided: n/a    Patient level of understanding of education provided: Patient and caregiver verbalized understanding  . Patient response to procedure performed:  Patient tolerated w/o any increased level of pain      Agency Progress toward goals: goals met    Patient's Progress towards personal goals: goals met    Home exercise program:Take all medications as prescribed, follow all fall precaution measures, attend all future medical appointments        Continued need for the following skills: Physical Therapy and Occupational Therapy.

## 2023-07-06 NOTE — HOME HEALTH
Subjective:  My arm hurts today. Caregiver involvement: Pt and her boyfriend live with pt's brother and sister in law who assist with care as needed. She also has a paid CG who assists with care during the day. Medications reviewed and all medications are available in the home this visit. Medications are effective at this time. no new medication. Patient education provided this visit: breathing exercises for relaxation, fall prevention, body mechanics with mobility,     Patient level of understanding of education provided: pt requires further education. Patient response to procedure performed:  Pt somewhat agitated today due to CG mentioning pt had a seizure last week. Frustration and agitation somewhat limiting participation today. Patient's Progress towards personal goals: Pt reports no falls. After conversation about reported seizure pt was very agitated and asked CG to leave the room. Pt stating she wanted to answer questions and not have CG adding information. Pt's BP at this time was 168/78. Pt performed deep breathing ex and redirection for activity for 15-20 minutes to calm pt down,  BP decreased to 156/74. Instructed pt on importance of relaxation techniques when frustrated or angry to help manage BP. Pt declined transfers and w/c mobility, however willing to participate on EOB sitting balance and scooting. Pt requires constant vc and tactile cues for all scooting activity, but is able to static sit EOB with supervision. Continued need for the following skills: HHPT medically necessary to address decreased LE strength, decreased mobility, increased risk of skin breakdown and increased risk of falls.     Plan for next visit: transfers    The following discharge planning was discussed with the pt/caregiver: d/c HHPT in 6 more visits

## 2023-07-07 ENCOUNTER — HOME CARE VISIT (OUTPATIENT)
Age: 73
End: 2023-07-07
Payer: MEDICARE

## 2023-07-07 VITALS
HEART RATE: 76 BPM | DIASTOLIC BLOOD PRESSURE: 70 MMHG | SYSTOLIC BLOOD PRESSURE: 138 MMHG | OXYGEN SATURATION: 100 % | TEMPERATURE: 97.9 F

## 2023-07-07 PROCEDURE — G0157 HHC PT ASSISTANT EA 15: HCPCS

## 2023-07-07 ASSESSMENT — ENCOUNTER SYMPTOMS: PAIN LOCATION - PAIN QUALITY: ACHE

## 2023-07-07 NOTE — HOME HEALTH
Subjective:  I sat up in my w/c yesterday for about an hour. Caregiver involvement: Pt and her boyfriend (handicapped) live with pt's brother and sister in law who assist with pt's care. Pt has a paid CG that assist with care as needed during the day. Medications reviewed and all medications are available in the home this visit. Medications are effective at this time. Patient education provided this visit: pressure relief, fall prevention, body mechanics with mobility    Patient level of understanding of education provided: pt requires further education. Patient response to procedure performed:  no change in pain at end of session. Patient's Progress towards personal goals: Pt reports no falls. She performed supine<-> sit and requires vc for rolling into side lying 1st to facilitate improved positioning of B LE when transitioning to sitting. She performed sliding board transfers to R and L side bed <-> w/c. She is CGA with transfers to her R side and min A with transfers to her L side. She requires vc and repeated tactile cues for correct hand placement and for weight shifting forward to facilitate scooting. Pt performed propelling w/c and continues to work on locking brakes and use of B UE and feet to propel w/c. Continued need for the following skills: HHPT medically necessary to address reducing burden of care on CG due to pt's decreased LE strength, decreased balance, increased risk of skin breakdown and increased risk of falls.     Plan for next visit: transfers    The following discharge planning was discussed with the pt/caregiver: d/c HHPT in 5 more visits

## 2023-07-07 NOTE — HOME HEALTH
SUBJECTIVE:MS. Alejandro Blue stated she is following her BUE HEP addressing BUE strength and ROM in her both arms. Shady Galvez CAREGIVER INVOLVEMENT/ASSISTANCE NEEDED FOR: The pt family helps with all I/ADLS due to her inability to do so. Shady Galvez HOME HEALTH SUPPLIES BY TYPE AND QUANTITY ORDERED/DELIVERED THIS VISIT INCLUDE: NONE          . OBJECTIVE:  See interventions. .         Patient education provided this visit: CARR ROLE, Energy conservation techniques,and Purse-lip breathing techniques. .                   Patient level of understanding of education provided:  was able to teach-back education provided from [address] requiring two verbal cuing form CARR for reenforcement of safety to prevent confusion. .         RESPONSE TO TREATMENT: Pt reported an 3/10 on numeric scale after performing Bed-mobility/Optimal level of function with use of energy conservation techniques. Pt required two periods of rest while performing functional mobility tasks due to exertion when performing functional activity. (Please see interventions for more details of goal previously mentioned)          . ASSESSMENT OF PROGRESS TOWARD GOALS((Please see interventions for more details))Therapist instrusted client on bed-mobilty transfers(Supine to sit, Sit to supine and log rolling) to promote safety and educate caregiver on safety; in return client able to demo transfers with Min/ Mod x2; While therapist provided the other 50% by implementing tactile cueing d/t client unstable equilibrium, SOB, fear of falling and decrease activity tolerance, therapist also provided Vc's for proper hand placement on chair  when performing transfers from the couch to ensure safety. Client able to employ Dorothy Gonzalez for AROM exercises such as (Straight arm swings, Shoulder Shrugs, Shoulder rotation backwards and forwards, Flexion, extenstion)  to increase BUE

## 2023-07-10 ENCOUNTER — HOME CARE VISIT (OUTPATIENT)
Age: 73
End: 2023-07-10
Payer: MEDICARE

## 2023-07-10 VITALS
OXYGEN SATURATION: 99 % | HEART RATE: 78 BPM | RESPIRATION RATE: 16 BRPM | TEMPERATURE: 97.8 F | DIASTOLIC BLOOD PRESSURE: 68 MMHG | SYSTOLIC BLOOD PRESSURE: 138 MMHG

## 2023-07-10 PROCEDURE — G0157 HHC PT ASSISTANT EA 15: HCPCS

## 2023-07-10 PROCEDURE — G0158 HHC OT ASSISTANT EA 15: HCPCS

## 2023-07-10 ASSESSMENT — ENCOUNTER SYMPTOMS: PAIN LOCATION - PAIN QUALITY: ACHE

## 2023-07-10 NOTE — HOME HEALTH
Subjective: I sat up in my w/c on Friday. Caregiver involvement: Pt lives with her brother and sister in law who assist with household tasks, meal prep and care. She also has a paid CG that assists with her care during the day. Medications reviewed and all medications are are available in the home this visit. Medications are effective at this time. no new medication. Patient education provided this visit: fall prevention, body mechanics with transfers, use of sliding board, pressure relief    Patient level of understanding of education provided:  requires further education. Patient response to procedure performed: no change in pain at end of session. Patient's Progress towards personal goals: Pt reports no falls. She has increased in independence with bed mobility from min/ mod A to now supervision. Pt demonstrated improved weight shifting forward during sliding board transfers with each attempt and shoes decreased sliding of feet in transition. Pt requires vc for placement of R hand to increase safety of fingers with edges of the board. Instructed pt to sit up in w/c daily for no more than 2 hours at a time. She requires assistance to get in and out of w/c at this time. Continued need for the following skills: HHPT medically necessary to address decreased LE strength, decreased balance, decreased mobility, increased risk of falls and increased risk of pressure sores. Pt would benefit from addressing these issues to decrease burden of care on CG. Plan for next visit: transfers    The following discharge planning was discussed with the pt/caregiver: d/c HHPT in 4 more visits.

## 2023-07-11 NOTE — HOME HEALTH
chair  when performing transfers from the couch to ensure safety. The pt able to verbalize and demo  energy conservation techniques when performing I/ADL tasks such as sitting down when performing bathing and dressing tasks. sitting down when performing meals, pacing oneself when performing a functional activity to prevent exertion, allowing adequate periods of rest after a tasks, sitting down when performing LB dressing, Sitting down when performing energy conservation techniques. Pt reports her RPE on this date was 5/10 on the modified Tracy scale . Kaila Cheney CONTINUED NEED FOR THE FOLLOWING SKILLS: HH OT is medically necessary to address pain, decreased functional strength, decreased independence and safety with functional transfers, decreased independence and safety performing ADL/IADL tasks, decreased activity and standing tolerance, decreased functional endurance, and impaired balance in order to improve functional independence, obtain set goals, reduce risk of falls, reduce pain, improve quality of life, and return to PLOF. Kaila Cheney PLAN FOR NEXT VISIT: Jerod Warren will address functional transfers and optimal level of functioning   .   THE FOLLOWING DISCHARGE PLANNING WAS DISCUSSED WITH THE PATIENT/CAREGIVER: Tentative d/c 2w1

## 2023-07-12 ENCOUNTER — HOME CARE VISIT (OUTPATIENT)
Age: 73
End: 2023-07-12
Payer: MEDICARE

## 2023-07-12 VITALS
DIASTOLIC BLOOD PRESSURE: 66 MMHG | OXYGEN SATURATION: 100 % | RESPIRATION RATE: 16 BRPM | TEMPERATURE: 97.7 F | SYSTOLIC BLOOD PRESSURE: 146 MMHG | HEART RATE: 83 BPM

## 2023-07-12 DIAGNOSIS — Z85.3 HISTORY OF BREAST CANCER: ICD-10-CM

## 2023-07-12 PROCEDURE — G0157 HHC PT ASSISTANT EA 15: HCPCS

## 2023-07-12 ASSESSMENT — ENCOUNTER SYMPTOMS: PAIN LOCATION - PAIN QUALITY: ACHE

## 2023-07-12 NOTE — HOME HEALTH
Subjective: I can't sleep at night, my arm hurts so bad. Caregiver involvement: Pt's brother, sister in law and her boyfriend assist with household tasks, meal prep and care as needed. She also has a paid CG during the day to assist with care. Medications reviewed and all medications are available in the home this visit. Medications are effective at this time. no new medication    Patient education provided this visit: pressure relief, fall prevention, body mechanics with sliding board transfers, manipulation of parts of w/c, pain management    Patient level of understanding of education provided: pt requires further education. Patient response to procedure performed:  Pt's c/o L UE pain decreased from 9/10 to 0/10 at end of session. Patient's Progress towards personal goals: Pt reports no falls. Pt continues to work on sliding board transfers from bed <-> w/c. She continues to require min A with initial weight shifting forward to facilitate scooting onto sliding board, then she is CGA scooting into w/c. She requires vc t/o transfer with with shifting forward over LINDA and for correct hand placement. Pt c/o 9/10 L arm pain at beginning of session, but after moving L UE with transfers and w//c propulsion pt reported 0/10 pain. Educated pt that changing positions and using L UE can help manage pain. Continued need for the following skills: HHPT medically necessary to address decreased LE strength, decreased balance, increased risk of falls, increased risk of pressure sores. Plan for next visit: sliding board transfers    The following discharge planning was discussed with the pt/caregiver: d/c HHPT in 3 more visits.

## 2023-07-12 NOTE — TELEPHONE ENCOUNTER
This patient contacted the office for the following prescriptions to be refilled:    Medication requested : 90 DAYS REQUEST   Requested Prescriptions     Pending Prescriptions Disp Refills    letrozole ([address]) 2.5 MG tablet 30 tablet 1     Sig: TAKE 1 TABLET BY MOUTH ONCE DAILY FOR CHEMOTHERAPY      PCP: SHANNON Dang CNP  LOV:           06/19/2023 an IN OFFICE  NOV DMA: 7/24/2023  FUTURE APPT:   Future Appointments   Date Time Provider [address]   7/17/2023 To Be Determined Bishop Sheldon, [address]   7/18/2023  9:00 AM [facility]   7/20/2023 To Be Determined Catalina Rodriguez,  [facility]   7/20/2023  9:00 AM Mi Ware,  [place]   7/24/2023 To Be Determined Pelra Pineda,  [facility]   7/24/2023  3:40 PM SHANNON Dang CNP [facility] AMB   7/25/2023  3:15 PM Ronny Estrada MD [facility] AMB         Thank you.

## 2023-07-14 ENCOUNTER — APPOINTMENT (OUTPATIENT)
Facility: HOSPITAL | Age: 73
End: 2023-07-14
Payer: MEDICARE

## 2023-07-14 ENCOUNTER — HOSPITAL ENCOUNTER (EMERGENCY)
Facility: HOSPITAL | Age: 73
Discharge: HOME | End: 2023-07-15
Attending: STUDENT IN AN ORGANIZED HEALTH CARE EDUCATION/TRAINING PROGRAM
Payer: MEDICARE

## 2023-07-14 DIAGNOSIS — R51.9 ACUTE NONINTRACTABLE HEADACHE, UNSPECIFIED HEADACHE TYPE: ICD-10-CM

## 2023-07-14 DIAGNOSIS — I16.1 HYPERTENSIVE EMERGENCY: Primary | ICD-10-CM

## 2023-07-14 PROBLEM — G81.94 LEFT HEMIPARESIS (HCC): Chronic | Status: ACTIVE | Noted: 2023-07-14

## 2023-07-14 PROBLEM — Z87.11 HISTORY OF PEPTIC ULCER DISEASE: Chronic | Status: ACTIVE | Noted: 2023-07-14

## 2023-07-14 PROBLEM — Z98.890 HISTORY OF CARDIAC CATHETERIZATION: Chronic | Status: ACTIVE | Noted: 2021-09-02

## 2023-07-14 PROBLEM — Z86.711 HISTORY OF PULMONARY EMBOLISM: Chronic | Status: ACTIVE | Noted: 2023-07-14

## 2023-07-14 PROBLEM — I50.32 CHRONIC DIASTOLIC (CONGESTIVE) HEART FAILURE (HCC): Chronic | Status: ACTIVE | Noted: 2022-07-18

## 2023-07-14 LAB
ANION GAP SERPL CALC-SCNC: 4 MMOL/L (ref 3–18)
BASOPHILS # BLD: 0.1 K/UL (ref 0–0.1)
BASOPHILS NFR BLD: 1 % (ref 0–2)
BUN SERPL-MCNC: 27 MG/DL (ref 7–18)
BUN/CREAT SERPL: 28 (ref 12–20)
CALCIUM SERPL-MCNC: 8.6 MG/DL (ref 8.5–10.1)
CHLORIDE SERPL-SCNC: 109 MMOL/L (ref 100–111)
CO2 SERPL-SCNC: 28 MMOL/L (ref 21–32)
CREAT SERPL-MCNC: 0.95 MG/DL (ref 0.6–1.3)
DIFFERENTIAL METHOD BLD: ABNORMAL
EKG ATRIAL RATE: 71 BPM
EKG DIAGNOSIS: NORMAL
EKG P AXIS: 63 DEGREES
EKG P-R INTERVAL: 132 MS
EKG Q-T INTERVAL: 380 MS
EKG QRS DURATION: 82 MS
EKG QTC CALCULATION (BAZETT): 412 MS
EKG R AXIS: -1 DEGREES
EKG T AXIS: 42 DEGREES
EKG VENTRICULAR RATE: 71 BPM
EOSINOPHIL # BLD: 0.2 K/UL (ref 0–0.4)
EOSINOPHIL NFR BLD: 2 % (ref 0–5)
ERYTHROCYTE [DISTWIDTH] IN BLOOD BY AUTOMATED COUNT: 14 % (ref 11.6–14.5)
GLUCOSE SERPL-MCNC: 148 MG/DL (ref 74–99)
HCT VFR BLD AUTO: 31.6 % (ref 35–45)
HGB BLD-MCNC: 9.8 G/DL (ref 12–16)
IMM GRANULOCYTES # BLD AUTO: 0 K/UL (ref 0–0.04)
IMM GRANULOCYTES NFR BLD AUTO: 0 % (ref 0–0.5)
LYMPHOCYTES # BLD: 3.5 K/UL (ref 0.9–3.6)
LYMPHOCYTES NFR BLD: 44 % (ref 21–52)
MCH RBC QN AUTO: 26.3 PG (ref 24–34)
MCHC RBC AUTO-ENTMCNC: 31 G/DL (ref 31–37)
MCV RBC AUTO: 84.9 FL (ref 78–100)
MONOCYTES # BLD: 0.5 K/UL (ref 0.05–1.2)
MONOCYTES NFR BLD: 6 % (ref 3–10)
NEUTS SEG # BLD: 3.7 K/UL (ref 1.8–8)
NEUTS SEG NFR BLD: 47 % (ref 40–73)
NRBC # BLD: 0 K/UL (ref 0–0.01)
NRBC BLD-RTO: 0 PER 100 WBC
PLATELET # BLD AUTO: 200 K/UL (ref 135–420)
PMV BLD AUTO: 11.2 FL (ref 9.2–11.8)
POTASSIUM SERPL-SCNC: 4.1 MMOL/L (ref 3.5–5.5)
RBC # BLD AUTO: 3.72 M/UL (ref 4.2–5.3)
SODIUM SERPL-SCNC: 141 MMOL/L (ref 136–145)
TROPONIN I SERPL HS-MCNC: 8 NG/L (ref 0–54)
WBC # BLD AUTO: 7.9 K/UL (ref 4.6–13.2)

## 2023-07-14 PROCEDURE — 85025 COMPLETE CBC W/AUTO DIFF WBC: CPT

## 2023-07-14 PROCEDURE — 84484 ASSAY OF TROPONIN QUANT: CPT

## 2023-07-14 PROCEDURE — 6360000002 HC RX W HCPCS: Performed by: STUDENT IN AN ORGANIZED HEALTH CARE EDUCATION/TRAINING PROGRAM

## 2023-07-14 PROCEDURE — 96375 TX/PRO/DX INJ NEW DRUG ADDON: CPT

## 2023-07-14 PROCEDURE — 96374 THER/PROPH/DIAG INJ IV PUSH: CPT

## 2023-07-14 PROCEDURE — 99285 EMERGENCY DEPT VISIT HI MDM: CPT

## 2023-07-14 PROCEDURE — 80048 BASIC METABOLIC PNL TOTAL CA: CPT

## 2023-07-14 PROCEDURE — 93010 ELECTROCARDIOGRAM REPORT: CPT | Performed by: INTERNAL MEDICINE

## 2023-07-14 PROCEDURE — 2700000000 HC OXYGEN THERAPY PER DAY

## 2023-07-14 PROCEDURE — 71045 X-RAY EXAM CHEST 1 VIEW: CPT

## 2023-07-14 PROCEDURE — 93005 ELECTROCARDIOGRAM TRACING: CPT | Performed by: STUDENT IN AN ORGANIZED HEALTH CARE EDUCATION/TRAINING PROGRAM

## 2023-07-14 PROCEDURE — 70450 CT HEAD/BRAIN W/O DYE: CPT

## 2023-07-14 PROCEDURE — 94761 N-INVAS EAR/PLS OXIMETRY MLT: CPT

## 2023-07-14 RX ORDER — DIPHENHYDRAMINE HYDROCHLORIDE 50 MG/ML
25 INJECTION INTRAMUSCULAR; INTRAVENOUS
Status: COMPLETED | OUTPATIENT
Start: 2023-07-14 | End: 2023-07-14

## 2023-07-14 RX ORDER — METOCLOPRAMIDE HYDROCHLORIDE 5 MG/ML
10 INJECTION INTRAMUSCULAR; INTRAVENOUS
Status: COMPLETED | OUTPATIENT
Start: 2023-07-14 | End: 2023-07-14

## 2023-07-14 RX ORDER — LABETALOL HYDROCHLORIDE 5 MG/ML
10 INJECTION, SOLUTION INTRAVENOUS
Status: COMPLETED | OUTPATIENT
Start: 2023-07-14 | End: 2023-07-14

## 2023-07-14 RX ORDER — KETOROLAC TROMETHAMINE 15 MG/ML
15 INJECTION, SOLUTION INTRAMUSCULAR; INTRAVENOUS
Status: COMPLETED | OUTPATIENT
Start: 2023-07-14 | End: 2023-07-14

## 2023-07-14 RX ADMIN — LABETALOL HYDROCHLORIDE 10 MG: 5 INJECTION, SOLUTION INTRAVENOUS at 12:45

## 2023-07-14 RX ADMIN — METOCLOPRAMIDE HYDROCHLORIDE 10 MG: 5 INJECTION INTRAMUSCULAR; INTRAVENOUS at 13:36

## 2023-07-14 RX ADMIN — KETOROLAC TROMETHAMINE 15 MG: 15 INJECTION, SOLUTION INTRAMUSCULAR; INTRAVENOUS at 13:33

## 2023-07-14 RX ADMIN — DIPHENHYDRAMINE HYDROCHLORIDE 25 MG: 50 INJECTION, SOLUTION INTRAMUSCULAR; INTRAVENOUS at 13:36

## 2023-07-14 ASSESSMENT — LIFESTYLE VARIABLES
HOW OFTEN DO YOU HAVE A DRINK CONTAINING ALCOHOL: NEVER
HOW MANY STANDARD DRINKS CONTAINING ALCOHOL DO YOU HAVE ON A TYPICAL DAY: PATIENT DOES NOT DRINK

## 2023-07-14 ASSESSMENT — ENCOUNTER SYMPTOMS
CHEST TIGHTNESS: 0
EYE PAIN: 0
SHORTNESS OF BREATH: 0
EYE REDNESS: 0
VOMITING: 0
NAUSEA: 0
ABDOMINAL PAIN: 0
PHOTOPHOBIA: 0
DIARRHEA: 0

## 2023-07-14 ASSESSMENT — PAIN DESCRIPTION - LOCATION: LOCATION: HEAD

## 2023-07-14 ASSESSMENT — PAIN SCALES - GENERAL: PAINLEVEL_OUTOF10: 9

## 2023-07-14 ASSESSMENT — PAIN DESCRIPTION - DESCRIPTORS: DESCRIPTORS: ACHING

## 2023-07-14 NOTE — ED PROVIDER NOTES
EMERGENCY DEPARTMENT HISTORY AND PHYSICAL EXAM      Date: 7/14/2023  Patient Name: Celeste Miles    History of Presenting Illness     Chief Complaint   Patient presents with    Dizziness     Pt to ED for HA, cough, dizziness. HTN to 200/90s upon arrival       The patient is a 81-year-old female with past medical history of prior stroke with left-sided deficits, seizure, hypertension, hypercholesterolemia, presenting to the emergency department for evaluation of headache and high blood pressure. Patient reporting global.  The blood pressure is greater than 922 systolic. Denies any vision changes, new weakness, sensory deficits. ,  Abdominal pain, NVD, chest pain or shortness of breath        PCP: SHANNON Lares - CNP    No current facility-administered medications for this encounter. Current Outpatient Medications   Medication Sig Dispense Refill    Dulaglutide (TRULICITY) 2.65 XA/7.5XS SOPN Inject 0.75 mg into the skin once a week 4 Adjustable Dose Pre-filled Pen Syringe 1    lacosamide (VIMPAT) 150 MG TABS tablet Take 1 tablet by mouth 2 times daily for 180 days. Max Daily Amount: 300 mg 90 tablet 1    apixaban (ELIQUIS) 5 MG TABS tablet Take 1 tablet by mouth 2 times daily 180 tablet 1    atorvastatin (LIPITOR) 40 MG tablet TAKE 1 TABLET BY MOUTH AT BEDTIME FOR HYPERLIPEDEMIA 90 tablet 1    letrozole (FEMARA) 2.5 MG tablet TAKE 1 TABLET BY MOUTH ONCE DAILY FOR CHEMOTHERAPY 30 tablet 1    ondansetron (ZOFRAN-ODT) 4 MG disintegrating tablet Take 1 tablet by mouth every 8 hours as needed for Nausea or Vomiting Take 4 mg by mouth every 8 hours as needed for Nausea or Vomiting. 60 tablet 1    omeprazole (PRILOSEC) 40 MG delayed release capsule Take 1 capsule by mouth every morning (before breakfast) 90 capsule 1    acetaminophen (TYLENOL) 500 MG tablet Take 1,000 mg by mouth every 4 hours as needed for Fever or Pain.       aspirin 81 MG EC tablet Take 1 tablet by mouth daily      [facility] SENNA 8.6 MG tablet TAKE

## 2023-07-14 NOTE — ED NOTES
Chief Complaint   Patient presents with    Dizziness     Pt to ED for HA, cough, dizziness. HTN to 200/90s upon arrival     Past Medical History:   Diagnosis Date    Arthritis     Breast CA ([address]) 2016    Breast CA ([address])     DDD (degenerative disc disease), cervical     Diabetes ([address])     DJD (degenerative joint disease) of cervical spine     Heart murmur     History of noncompliance with medical treatment     Hypercholesteremia     Hypertension     Menopause     Mild diastolic dysfunction     ECHO 5/2020     PE (pulmonary thromboembolism) ([address]) 2019    Psychiatric disorder     DEPRESSION    PUD (peptic ulcer disease)     S/P cardiac cath 02/2015    No angiographic evidence of CAD    Seizures ([address])     LAST SEIZURE ABOUT 2 MONTHS AGO (NOV 2015)    Stroke ([address])     x2 with left side deficit     BIBA d/t initial CC of cough. Upon EMS arrival, pt c/o of HA, dizziness and is HTN to 220/90s. Hx of previous CVA w/ l. Sided deficits per EMS. Pt is alert. Difficulty with speech. Pt states that she always has issues w/ speech. A&Ox3. Disoriented to time.       Sara Ambriz, RN  07/14/23 [address]268 [address], RN  07/14/23 9941

## 2023-07-14 NOTE — ED NOTES
[address] - Patient was not able to confirm is the address if the system is her current address. Called patient's brother (Mr. Isaiah Capellan) and he confirmed her address.      Tiago Coley RN  07/14/23 6906

## 2023-07-15 VITALS
TEMPERATURE: 98.1 F | OXYGEN SATURATION: 99 % | DIASTOLIC BLOOD PRESSURE: 58 MMHG | SYSTOLIC BLOOD PRESSURE: 154 MMHG | HEART RATE: 67 BPM | RESPIRATION RATE: 19 BRPM

## 2023-07-15 RX ORDER — LETROZOLE 2.5 MG/1
TABLET, FILM COATED ORAL
Qty: 30 TABLET | Refills: 0 | Status: SHIPPED | OUTPATIENT
Start: 2023-07-15 | End: 2023-07-24 | Stop reason: SDUPTHER

## 2023-07-15 NOTE — ED NOTES
Food served. Patient was able to eat the whole meal without difficulty.      Shazia Garcia RN  07/14/23 2028

## 2023-07-15 NOTE — ED NOTES
[facility] here to transport patient home  IV removed, VS WNL  No pain or distress at this time.    Family called and is present at home to receive patient      Mahogany Connolly, MARCELINA  07/15/23 0036

## 2023-07-17 ENCOUNTER — HOME CARE VISIT (OUTPATIENT)
Age: 73
End: 2023-07-17
Payer: MEDICARE

## 2023-07-17 VITALS
OXYGEN SATURATION: 99 % | RESPIRATION RATE: 16 BRPM | SYSTOLIC BLOOD PRESSURE: 150 MMHG | HEART RATE: 68 BPM | DIASTOLIC BLOOD PRESSURE: 80 MMHG | TEMPERATURE: 97.4 F

## 2023-07-17 PROCEDURE — G0152 HHCP-SERV OF OT,EA 15 MIN: HCPCS

## 2023-07-17 NOTE — HOME HEALTH
SUBJECTIVE: Pt reports going to emergency room on 7/14/23 due to increased blood pressure. Pt reports no pain on this date. Pt caregiver present throughout occupational therapy session. CAREGIVER INVOLVEMENT/ASSISTANCE NEEDED FOR: Pt family and boyfriend, assists with ADLs, IADLs, functional mobility, and transportation. HOME HEALTH SUPPLIES BY TYPE AND QUANTITY ORDERED/DELIVERED THIS VISIT INCLUDE: Drop arm commode    OBJECTIVE: See interventions    PATIENT RESPONSE TO TREATMENT: Pt responded well to skilled home health occupational therapy services    PATIENT LEVEL OF UNDERSTANDING OF EDUCATION PROVIDED: Pt educated on continuing to do BUE strengthening exercises to increase strength required to complete ADLs with independence. Pt verbalized understanding of need. Pt educated on obtaining drop arm commode to facilitate engagement of toileting at toilet level rather than bed level. Pt verbalized understanding of education. Pt educated on removing wheelchair piece that prevents her and caregivers from moving wheelchair backwards for     [facility]: Mrs. Rory Verdugo has been seen by home health occupational therapy services to address deficits in ADLs, functional mobility and BUE function. ADLs: Pt progressing her Barthel Index for ADLs score for ADLs to 30/100 compared to 10/100 on initial evaluation. Pt educated on completing ADLs seated edge of bed vs up in wheelchair. Pt educated on obtaining drop arm commode for increased independence with toileting and to toilet out of bed level. Functional Mobility: Pt has progressed to completing bed mobility with contact guard assistance using bed rails. Pt completing lateral transfers with minimal assistance using slide board. BUE function: Pt is able to complete BUE HEP with SBA from caregivers for completion and compliance with BUE HEP.  Pt with plans to discharge 2nd visit this week with her near her maximal potenital until additional equipment

## 2023-07-19 ENCOUNTER — HOME CARE VISIT (OUTPATIENT)
Age: 73
End: 2023-07-19
Payer: MEDICARE

## 2023-07-19 VITALS
DIASTOLIC BLOOD PRESSURE: 88 MMHG | HEART RATE: 83 BPM | TEMPERATURE: 99.3 F | RESPIRATION RATE: 16 BRPM | SYSTOLIC BLOOD PRESSURE: 139 MMHG | OXYGEN SATURATION: 98 %

## 2023-07-20 NOTE — HOME HEALTH
transportation. A:  Patient demonstrated very limited progress in therapy this date as evidenced by improved transfers, however, most goals not met but PT has to end early because her medical insurance has denied PT payments. .  P: DC PT and pt agreed. Pt was instructed to continue with HEP, pressure relief strategies, up on WC daily for meals. Next visit with PCP Kiki Love NP on 7/24/23.   Informed NP Kiki Love  and home care team via [employer] regarding DC from home care PT.

## 2023-07-21 ENCOUNTER — HOME CARE VISIT (OUTPATIENT)
Age: 73
End: 2023-07-21
Payer: MEDICARE

## 2023-07-21 VITALS
SYSTOLIC BLOOD PRESSURE: 138 MMHG | TEMPERATURE: 97.8 F | RESPIRATION RATE: 16 BRPM | OXYGEN SATURATION: 98 % | HEART RATE: 67 BPM | DIASTOLIC BLOOD PRESSURE: 68 MMHG

## 2023-07-21 PROCEDURE — G0152 HHCP-SERV OF OT,EA 15 MIN: HCPCS

## 2023-07-21 ASSESSMENT — ENCOUNTER SYMPTOMS: DYSPNEA ACTIVITY LEVEL: AFTER AMBULATING 10 - 20 FT

## 2023-07-21 NOTE — HOME HEALTH
SUBJECTIVE: Pt reports no pain upon OT arrival. Pt  present throughout    CAREGIVER INVOLVEMENT/ASSISTANCE NEEDED FOR: Pt has personal care aid and son assist with IADLs, ADLs and functional mobility as needed    [address] ORDERED/DELIVERED THIS VISIT INCLUDE: N/A    OBJECTIVE: See interventions    PATIENT RESPONSE TO TREATMENT: *Pt responded well to skilled home health occupational therapy services    PATIENT LEVEL OF UNDERSTANDING OF EDUCATION PROVIDED:  Pt educated on obtaining drop arm commode. Pt educated to continue with BUE HEP as tolerated    OCCUPATIONAL THERAPY DISCHARGE: Mrs. Machelle Prieto has been seen by home health occupational therapy services to address deficits in ADLs, functional mobility, and BUE function. Pt has reached her maximal potential and Is ready/agreeable to discharge. ADLs: Pt progressing her Barthel Index for ADLs score for ADLs to 30/100 compared to 10/100 on initial evaluation. Pt educated on obtaining drop arm commode for toileting progression. Pt educated on completing ADLs seated up in wheelchair to increase her active engagement. Functional mobility: Pt completed bed mobility with CGA with use of bed hand rails. Pt completing lateral transfers with slide board use with minimal assistance. BUE Function: Pt is completing her BUE HEP with SBA from caregivers for completion with cues/motivation for daily compliance. Pt medications have been reconciled. Pt to discharge home with family and personal caregiver assistance available as needed.

## 2023-07-23 ENCOUNTER — HOSPITAL ENCOUNTER (EMERGENCY)
Facility: HOSPITAL | Age: 73
Discharge: HOME | End: 2023-07-23
Attending: EMERGENCY MEDICINE
Payer: MEDICARE

## 2023-07-23 ENCOUNTER — APPOINTMENT (OUTPATIENT)
Facility: HOSPITAL | Age: 73
End: 2023-07-23
Payer: MEDICARE

## 2023-07-23 VITALS
SYSTOLIC BLOOD PRESSURE: 180 MMHG | DIASTOLIC BLOOD PRESSURE: 84 MMHG | HEART RATE: 72 BPM | HEIGHT: 66 IN | RESPIRATION RATE: 22 BRPM | OXYGEN SATURATION: 100 % | BODY MASS INDEX: 26.36 KG/M2 | TEMPERATURE: 98.1 F | WEIGHT: 164 LBS

## 2023-07-23 DIAGNOSIS — R06.02 SHORTNESS OF BREATH: Primary | ICD-10-CM

## 2023-07-23 DIAGNOSIS — I10 ESSENTIAL HYPERTENSION: ICD-10-CM

## 2023-07-23 LAB
ALBUMIN SERPL-MCNC: 2.8 G/DL (ref 3.4–5)
ALBUMIN/GLOB SERPL: 0.6 (ref 0.8–1.7)
ALP SERPL-CCNC: 80 U/L (ref 45–117)
ALT SERPL-CCNC: 39 U/L (ref 13–56)
ANION GAP SERPL CALC-SCNC: 5 MMOL/L (ref 3–18)
AST SERPL-CCNC: 22 U/L (ref 10–38)
BASOPHILS # BLD: 0.1 K/UL (ref 0–0.1)
BASOPHILS NFR BLD: 1 % (ref 0–2)
BILIRUB SERPL-MCNC: 0.4 MG/DL (ref 0.2–1)
BUN SERPL-MCNC: 31 MG/DL (ref 7–18)
BUN/CREAT SERPL: 26 (ref 12–20)
CALCIUM SERPL-MCNC: 8.6 MG/DL (ref 8.5–10.1)
CHLORIDE SERPL-SCNC: 109 MMOL/L (ref 100–111)
CO2 SERPL-SCNC: 27 MMOL/L (ref 21–32)
CREAT SERPL-MCNC: 1.18 MG/DL (ref 0.6–1.3)
DIFFERENTIAL METHOD BLD: ABNORMAL
EKG ATRIAL RATE: 72 BPM
EKG DIAGNOSIS: NORMAL
EKG P AXIS: 61 DEGREES
EKG P-R INTERVAL: 148 MS
EKG Q-T INTERVAL: 372 MS
EKG QRS DURATION: 84 MS
EKG QTC CALCULATION (BAZETT): 407 MS
EKG R AXIS: 13 DEGREES
EKG T AXIS: 58 DEGREES
EKG VENTRICULAR RATE: 72 BPM
EOSINOPHIL # BLD: 0.2 K/UL (ref 0–0.4)
EOSINOPHIL NFR BLD: 2 % (ref 0–5)
ERYTHROCYTE [DISTWIDTH] IN BLOOD BY AUTOMATED COUNT: 13.7 % (ref 11.6–14.5)
GLOBULIN SER CALC-MCNC: 4.4 G/DL (ref 2–4)
GLUCOSE SERPL-MCNC: 119 MG/DL (ref 74–99)
HCT VFR BLD AUTO: 32 % (ref 35–45)
HGB BLD-MCNC: 9.9 G/DL (ref 12–16)
IMM GRANULOCYTES # BLD AUTO: 0 K/UL (ref 0–0.04)
IMM GRANULOCYTES NFR BLD AUTO: 0 % (ref 0–0.5)
LYMPHOCYTES # BLD: 2.8 K/UL (ref 0.9–3.6)
LYMPHOCYTES NFR BLD: 36 % (ref 21–52)
MAGNESIUM SERPL-MCNC: 1.8 MG/DL (ref 1.6–2.6)
MCH RBC QN AUTO: 26.4 PG (ref 24–34)
MCHC RBC AUTO-ENTMCNC: 30.9 G/DL (ref 31–37)
MCV RBC AUTO: 85.3 FL (ref 78–100)
MONOCYTES # BLD: 0.5 K/UL (ref 0.05–1.2)
MONOCYTES NFR BLD: 6 % (ref 3–10)
NEUTS SEG # BLD: 4.3 K/UL (ref 1.8–8)
NEUTS SEG NFR BLD: 55 % (ref 40–73)
NRBC # BLD: 0 K/UL (ref 0–0.01)
NRBC BLD-RTO: 0 PER 100 WBC
NT PRO BNP: 469 PG/ML (ref 0–900)
PLATELET # BLD AUTO: 201 K/UL (ref 135–420)
PMV BLD AUTO: 10.5 FL (ref 9.2–11.8)
POTASSIUM SERPL-SCNC: 4 MMOL/L (ref 3.5–5.5)
PROT SERPL-MCNC: 7.2 G/DL (ref 6.4–8.2)
RBC # BLD AUTO: 3.75 M/UL (ref 4.2–5.3)
SODIUM SERPL-SCNC: 141 MMOL/L (ref 136–145)
TROPONIN I SERPL HS-MCNC: 10 NG/L (ref 0–54)
TROPONIN I SERPL HS-MCNC: 8 NG/L (ref 0–54)
WBC # BLD AUTO: 7.8 K/UL (ref 4.6–13.2)

## 2023-07-23 PROCEDURE — 71045 X-RAY EXAM CHEST 1 VIEW: CPT

## 2023-07-23 PROCEDURE — 6370000000 HC RX 637 (ALT 250 FOR IP): Performed by: STUDENT IN AN ORGANIZED HEALTH CARE EDUCATION/TRAINING PROGRAM

## 2023-07-23 PROCEDURE — 83735 ASSAY OF MAGNESIUM: CPT

## 2023-07-23 PROCEDURE — 80053 COMPREHEN METABOLIC PANEL: CPT

## 2023-07-23 PROCEDURE — 85025 COMPLETE CBC W/AUTO DIFF WBC: CPT

## 2023-07-23 PROCEDURE — 84484 ASSAY OF TROPONIN QUANT: CPT

## 2023-07-23 PROCEDURE — 93005 ELECTROCARDIOGRAM TRACING: CPT | Performed by: EMERGENCY MEDICINE

## 2023-07-23 PROCEDURE — 83880 ASSAY OF NATRIURETIC PEPTIDE: CPT

## 2023-07-23 PROCEDURE — 93010 ELECTROCARDIOGRAM REPORT: CPT | Performed by: INTERNAL MEDICINE

## 2023-07-23 PROCEDURE — 99285 EMERGENCY DEPT VISIT HI MDM: CPT

## 2023-07-23 RX ORDER — ACETAMINOPHEN 325 MG/1
650 TABLET ORAL
Status: COMPLETED | OUTPATIENT
Start: 2023-07-23 | End: 2023-07-23

## 2023-07-23 RX ADMIN — ACETAMINOPHEN 325MG 650 MG: 325 TABLET ORAL at 14:17

## 2023-07-23 NOTE — ED PROVIDER NOTES
Violence: Not on file   Housing Stability: Unknown    Unable to Pay for Housing in the Last Year: Not on file    Number of Places Lived in the Last Year: Not on file    Unstable Housing in the Last Year: No         ALLERGIES: Patient has no known allergies. Vitals:    07/23/23 1315 07/23/23 1330 07/23/23 1345 07/23/23 1400   BP: (!) 160/86 (!) 180/103 (!) 180/79 (!) 170/154   Pulse: 69 65 67 72   Resp: 13 21 13 22   Temp:       TempSrc:       SpO2: 100% 100% 100% 100%   Weight:       Height:                Physical Exam   Gen: Elderly female in no acute distress  Card: Regular rate and rhythm  Pulm: Normal work of breathing. Lungs clear bilaterally. Abd: Soft, nontender  Extremities: No lower extremity edema  Neuro: Alert and oriented. Has mild slurred speech that is chronic and unchanged. He mild facial droop that is also unchanged. This was confirmed by patient's significant other in the ER with her.   Skin: Warm, dry      Procedures                                   Imani Nichols MD  Emergency Medicine PGY-4        Tova Meyer MD  Resident  07/23/23 4784

## 2023-07-23 NOTE — ED TRIAGE NOTES
Pt in ED via medics with c/o a seizure per family. Pt does has a Hx of seizure, CVA.  Pt states she did not have a seizure and she is here for difficulty breathing

## 2023-07-23 NOTE — ED NOTES
Transport arrived to get patient, discharge instructions reviewed with patient and family.        Raul Griffin RN  07/23/23 8064

## 2023-07-24 ENCOUNTER — OFFICE VISIT (OUTPATIENT)
Facility: CLINIC | Age: 73
End: 2023-07-24
Payer: MEDICARE

## 2023-07-24 VITALS
HEART RATE: 95 BPM | DIASTOLIC BLOOD PRESSURE: 83 MMHG | RESPIRATION RATE: 17 BRPM | SYSTOLIC BLOOD PRESSURE: 154 MMHG | TEMPERATURE: 97.2 F | OXYGEN SATURATION: 99 %

## 2023-07-24 DIAGNOSIS — Z85.3 HISTORY OF BREAST CANCER: ICD-10-CM

## 2023-07-24 DIAGNOSIS — I50.32 CHRONIC DIASTOLIC (CONGESTIVE) HEART FAILURE (HCC): Chronic | ICD-10-CM

## 2023-07-24 DIAGNOSIS — G40.909 SEIZURE DISORDER (HCC): ICD-10-CM

## 2023-07-24 DIAGNOSIS — G81.94 LEFT HEMIPARESIS (HCC): Primary | Chronic | ICD-10-CM

## 2023-07-24 DIAGNOSIS — K21.9 GASTROESOPHAGEAL REFLUX DISEASE WITHOUT ESOPHAGITIS: ICD-10-CM

## 2023-07-24 DIAGNOSIS — Z86.73 HISTORY OF CVA (CEREBROVASCULAR ACCIDENT): ICD-10-CM

## 2023-07-24 DIAGNOSIS — I10 ESSENTIAL (PRIMARY) HYPERTENSION: ICD-10-CM

## 2023-07-24 DIAGNOSIS — G40.909 NONINTRACTABLE EPILEPSY WITHOUT STATUS EPILEPTICUS, UNSPECIFIED EPILEPSY TYPE (HCC): ICD-10-CM

## 2023-07-24 DIAGNOSIS — Z74.09 IMPAIRED MOBILITY AND ADLS: ICD-10-CM

## 2023-07-24 DIAGNOSIS — G40.911 INTRACTABLE EPILEPSY WITH STATUS EPILEPTICUS, UNSPECIFIED EPILEPSY TYPE (HCC): ICD-10-CM

## 2023-07-24 DIAGNOSIS — R11.0 NAUSEA: ICD-10-CM

## 2023-07-24 DIAGNOSIS — E78.00 PURE HYPERCHOLESTEROLEMIA, UNSPECIFIED: ICD-10-CM

## 2023-07-24 DIAGNOSIS — R68.89 NOT YET WALKING: ICD-10-CM

## 2023-07-24 DIAGNOSIS — E11.21 TYPE 2 DIABETES MELLITUS WITH NEPHROPATHY (HCC): ICD-10-CM

## 2023-07-24 DIAGNOSIS — Z78.9 IMPAIRED MOBILITY AND ADLS: ICD-10-CM

## 2023-07-24 DIAGNOSIS — R26.2 DIFFICULTY WALKING: ICD-10-CM

## 2023-07-24 PROCEDURE — 99214 OFFICE O/P EST MOD 30 MIN: CPT | Performed by: NURSE PRACTITIONER

## 2023-07-24 PROCEDURE — 1123F ACP DISCUSS/DSCN MKR DOCD: CPT | Performed by: NURSE PRACTITIONER

## 2023-07-24 PROCEDURE — 3079F DIAST BP 80-89 MM HG: CPT | Performed by: NURSE PRACTITIONER

## 2023-07-24 PROCEDURE — 3077F SYST BP >= 140 MM HG: CPT | Performed by: NURSE PRACTITIONER

## 2023-07-24 PROCEDURE — 3044F HG A1C LEVEL LT 7.0%: CPT | Performed by: NURSE PRACTITIONER

## 2023-07-24 RX ORDER — AMLODIPINE BESYLATE 5 MG/1
5 TABLET ORAL DAILY
COMMUNITY
End: 2023-07-24 | Stop reason: SDUPTHER

## 2023-07-24 RX ORDER — OMEPRAZOLE 40 MG/1
40 CAPSULE, DELAYED RELEASE ORAL
Qty: 90 CAPSULE | Refills: 1 | Status: SHIPPED | OUTPATIENT
Start: 2023-07-24

## 2023-07-24 RX ORDER — AMLODIPINE BESYLATE 5 MG/1
5 TABLET ORAL DAILY
Qty: 90 TABLET | Refills: 1 | Status: SHIPPED | OUTPATIENT
Start: 2023-07-24

## 2023-07-24 RX ORDER — ONDANSETRON 4 MG/1
4 TABLET, ORALLY DISINTEGRATING ORAL EVERY 8 HOURS PRN
Qty: 60 TABLET | Refills: 1 | Status: SHIPPED | OUTPATIENT
Start: 2023-07-24

## 2023-07-24 RX ORDER — LOSARTAN POTASSIUM 50 MG/1
TABLET ORAL
COMMUNITY
Start: 2016-08-02 | End: 2023-07-24 | Stop reason: SDUPTHER

## 2023-07-24 RX ORDER — LETROZOLE 2.5 MG/1
TABLET, FILM COATED ORAL
Qty: 30 TABLET | Refills: 1 | Status: SHIPPED | OUTPATIENT
Start: 2023-07-24

## 2023-07-24 RX ORDER — LACOSAMIDE 150 MG/1
150 TABLET ORAL 2 TIMES DAILY
Qty: 180 TABLET | Refills: 1 | Status: SHIPPED | OUTPATIENT
Start: 2023-07-24 | End: 2024-01-20

## 2023-07-24 RX ORDER — LOSARTAN POTASSIUM 50 MG/1
TABLET ORAL
Qty: 90 TABLET | Refills: 1 | Status: SHIPPED | OUTPATIENT
Start: 2023-07-24

## 2023-07-24 RX ORDER — ISOSORBIDE MONONITRATE 30 MG/1
30 TABLET, EXTENDED RELEASE ORAL DAILY
Qty: 90 TABLET | Refills: 1 | Status: SHIPPED | OUTPATIENT
Start: 2023-07-24

## 2023-07-24 RX ORDER — DULAGLUTIDE 0.75 MG/.5ML
0.75 INJECTION, SOLUTION SUBCUTANEOUS WEEKLY
Qty: 4 ADJUSTABLE DOSE PRE-FILLED PEN SYRINGE | Refills: 1 | Status: SHIPPED | OUTPATIENT
Start: 2023-07-24

## 2023-07-24 RX ORDER — ATORVASTATIN CALCIUM 40 MG/1
TABLET, FILM COATED ORAL
Qty: 90 TABLET | Refills: 1 | Status: SHIPPED | OUTPATIENT
Start: 2023-07-24

## 2023-07-24 NOTE — PROGRESS NOTES
Poncho Casas is a 81 y.o. female  established patient, here for evaluation of the following chief complaint(s):  Chief Complaint   Patient presents with    Medication Refill        Assessment and Plan  1. Left hemiparesis ([address])  -     [address] [facility] [address] - [employer] [facility]  2. History of CVA (cerebrovascular accident)  -     apixaban (ELIQUIS) 5 MG TABS tablet; Take 1 tablet by mouth 2 times daily, Disp-180 tablet, R-1Normal  -     [address] [facility] [employer] [facility]  3. Pure hypercholesterolemia, unspecified  -     atorvastatin (LIPITOR) 40 MG tablet; TAKE 1 TABLET BY MOUTH AT BEDTIME FOR HYPERLIPEDEMIA, Disp-90 tablet, R-1Normal  4. Type 2 diabetes mellitus with nephropathy (HCC)  -     Dulaglutide (TRULICITY) 3.79 JH/5.2VO SOPN; Inject 0.75 mg into the skin once a week, Disp-4 Adjustable Dose Pre-filled Pen Syringe, R-1Normal  5. Seizure disorder (HCC)  -     lacosamide (VIMPAT) 150 MG TABS tablet; Take 1 tablet by mouth 2 times daily for 180 days. Max Daily Amount: 300 mg, Disp-180 tablet, R-1Normal  6. Nonintractable epilepsy without status epilepticus, unspecified epilepsy type ([address])  -     lacosamide (VIMPAT) 150 MG TABS tablet; Take 1 tablet by mouth 2 times daily for 180 days. Max Daily Amount: 300 mg, Disp-180 tablet, R-1Normal  7. History of breast cancer  -     letrozole (FEMARA) 2.5 MG tablet; TAKE 1 TABLET BY MOUTH ONCE DAILY FOR CHEMOTHERAPY, Disp-30 tablet, R-1Normal  8. Gastroesophageal reflux disease without esophagitis  -     omeprazole (PRILOSEC) 40 MG delayed release capsule; Take 1 capsule by mouth every morning (before breakfast), Disp-90 capsule, R-1Normal  9. Nausea  -     ondansetron (ZOFRAN-ODT) 4 MG disintegrating tablet;  Take 1 tablet by mouth every 8 hours as needed for Nausea or Vomiting Take

## 2023-07-24 NOTE — PATIENT INSTRUCTIONS
History of breast, [employer], [phone]    Cardiology [address] [facility], [address] [place], [place] [address]jeremias Balderas, [address] [place] [address]   [phone], [facility]

## 2023-08-08 ENCOUNTER — HOSPITAL ENCOUNTER (EMERGENCY)
Facility: HOSPITAL | Age: 73
Discharge: HOME | End: 2023-08-09
Attending: EMERGENCY MEDICINE
Payer: MEDICARE

## 2023-08-08 ENCOUNTER — APPOINTMENT (OUTPATIENT)
Facility: HOSPITAL | Age: 73
End: 2023-08-08
Payer: MEDICARE

## 2023-08-08 VITALS
WEIGHT: 164 LBS | HEIGHT: 66 IN | RESPIRATION RATE: 11 BRPM | HEART RATE: 72 BPM | BODY MASS INDEX: 26.36 KG/M2 | OXYGEN SATURATION: 99 % | SYSTOLIC BLOOD PRESSURE: 158 MMHG | DIASTOLIC BLOOD PRESSURE: 56 MMHG | TEMPERATURE: 98.9 F

## 2023-08-08 DIAGNOSIS — K59.00 CONSTIPATION, UNSPECIFIED CONSTIPATION TYPE: Primary | ICD-10-CM

## 2023-08-08 LAB
ALBUMIN SERPL-MCNC: 2.8 G/DL (ref 3.4–5)
ALBUMIN/GLOB SERPL: 0.6 (ref 0.8–1.7)
ALP SERPL-CCNC: 75 U/L (ref 45–117)
ALT SERPL-CCNC: 32 U/L (ref 13–56)
ANION GAP SERPL CALC-SCNC: 4 MMOL/L (ref 3–18)
AST SERPL-CCNC: 26 U/L (ref 10–38)
BASOPHILS # BLD: 0.1 K/UL (ref 0–0.1)
BASOPHILS NFR BLD: 1 % (ref 0–2)
BILIRUB SERPL-MCNC: 0.5 MG/DL (ref 0.2–1)
BUN SERPL-MCNC: 44 MG/DL (ref 7–18)
BUN/CREAT SERPL: 30 (ref 12–20)
CALCIUM SERPL-MCNC: 8.9 MG/DL (ref 8.5–10.1)
CHLORIDE SERPL-SCNC: 111 MMOL/L (ref 100–111)
CO2 SERPL-SCNC: 24 MMOL/L (ref 21–32)
CREAT SERPL-MCNC: 1.49 MG/DL (ref 0.6–1.3)
DIFFERENTIAL METHOD BLD: ABNORMAL
EOSINOPHIL # BLD: 0.2 K/UL (ref 0–0.4)
EOSINOPHIL NFR BLD: 3 % (ref 0–5)
ERYTHROCYTE [DISTWIDTH] IN BLOOD BY AUTOMATED COUNT: 13.7 % (ref 11.6–14.5)
GLOBULIN SER CALC-MCNC: 5 G/DL (ref 2–4)
GLUCOSE SERPL-MCNC: 102 MG/DL (ref 74–99)
HCT VFR BLD AUTO: 32.9 % (ref 35–45)
HGB BLD-MCNC: 10.2 G/DL (ref 12–16)
IMM GRANULOCYTES # BLD AUTO: 0 K/UL (ref 0–0.04)
IMM GRANULOCYTES NFR BLD AUTO: 0 % (ref 0–0.5)
LYMPHOCYTES # BLD: 4.5 K/UL (ref 0.9–3.6)
LYMPHOCYTES NFR BLD: 54 % (ref 21–52)
MCH RBC QN AUTO: 26.4 PG (ref 24–34)
MCHC RBC AUTO-ENTMCNC: 31 G/DL (ref 31–37)
MCV RBC AUTO: 85.2 FL (ref 78–100)
MONOCYTES # BLD: 0.6 K/UL (ref 0.05–1.2)
MONOCYTES NFR BLD: 7 % (ref 3–10)
NEUTS SEG # BLD: 3.1 K/UL (ref 1.8–8)
NEUTS SEG NFR BLD: 36 % (ref 40–73)
NRBC # BLD: 0 K/UL (ref 0–0.01)
NRBC BLD-RTO: 0 PER 100 WBC
PLATELET # BLD AUTO: 176 K/UL (ref 135–420)
PMV BLD AUTO: 11.5 FL (ref 9.2–11.8)
POTASSIUM SERPL-SCNC: 4.9 MMOL/L (ref 3.5–5.5)
PROT SERPL-MCNC: 7.8 G/DL (ref 6.4–8.2)
RBC # BLD AUTO: 3.86 M/UL (ref 4.2–5.3)
SODIUM SERPL-SCNC: 139 MMOL/L (ref 136–145)
WBC # BLD AUTO: 8.5 K/UL (ref 4.6–13.2)

## 2023-08-08 PROCEDURE — 74177 CT ABD & PELVIS W/CONTRAST: CPT

## 2023-08-08 PROCEDURE — 6360000004 HC RX CONTRAST MEDICATION: Performed by: EMERGENCY MEDICINE

## 2023-08-08 PROCEDURE — 99285 EMERGENCY DEPT VISIT HI MDM: CPT

## 2023-08-08 PROCEDURE — 80053 COMPREHEN METABOLIC PANEL: CPT

## 2023-08-08 PROCEDURE — 85025 COMPLETE CBC W/AUTO DIFF WBC: CPT

## 2023-08-08 RX ADMIN — IOPAMIDOL 60 ML: 612 INJECTION, SOLUTION INTRAVENOUS at 23:13

## 2023-08-08 NOTE — ED PROVIDER NOTES
EMERGENCY DEPARTMENT HISTORY AND PHYSICAL EXAM    7:40 PM EDT seen at this time in room 15        Date: 8/8/2023  Patient Name: Serene Escobar    History of Presenting Illness     Chief Complaint   Patient presents with    Constipation         History Provided By: patient    Additional History (Context): Serene Escobar is a 72 y.o. female presents with history of a very significant stroke leaving her bedridden and right-sided hemiparesis, complains of no bowel movement and pain in her abdomen. She did try Ex-Lax with little result. Very difficult for her to communicate with expressive aphasia and slurring of her words difficult to get further history no vomiting. No fever. Apolonio Fothergill PCP: SHANNON Amos CNP    Chief Complaint:   Duration:    Timing:    Location:   Quality:   Severity:   Modifying Factors:   Associated Symptoms:       No current facility-administered medications for this encounter. Current Outpatient Medications   Medication Sig Dispense Refill    polyethylene glycol (GLYCOLAX) 17 GM/SCOOP powder Take 17 g by mouth daily for 7 days 7 each 0    apixaban (ELIQUIS) 5 MG TABS tablet Take 1 tablet by mouth 2 times daily 180 tablet 1    atorvastatin (LIPITOR) 40 MG tablet TAKE 1 TABLET BY MOUTH AT BEDTIME FOR HYPERLIPEDEMIA 90 tablet 1    Dulaglutide (TRULICITY) 0.85 DO/8.9MF SOPN Inject 0.75 mg into the skin once a week 4 Adjustable Dose Pre-filled Pen Syringe 1    lacosamide (VIMPAT) 150 MG TABS tablet Take 1 tablet by mouth 2 times daily for 180 days. Max Daily Amount: 300 mg 180 tablet 1    letrozole (FEMARA) 2.5 MG tablet TAKE 1 TABLET BY MOUTH ONCE DAILY FOR CHEMOTHERAPY 30 tablet 1    omeprazole (PRILOSEC) 40 MG delayed release capsule Take 1 capsule by mouth every morning (before breakfast) 90 capsule 1    ondansetron (ZOFRAN-ODT) 4 MG disintegrating tablet Take 1 tablet by mouth every 8 hours as needed for Nausea or Vomiting Take 4 mg by mouth every 8 hours as needed for Nausea or Vomiting.

## 2023-08-08 NOTE — ED NOTES
Soiled brief and linens changed. Patient tolerated well. Patient placed in gown and hooked up to monitor.      Madeline Santoyo RN  08/08/23 6104

## 2023-08-09 RX ORDER — POLYETHYLENE GLYCOL 3350 17 G/17G
17 POWDER, FOR SOLUTION ORAL DAILY
Qty: 7 EACH | Refills: 0 | Status: SHIPPED | OUTPATIENT
Start: 2023-08-09 | End: 2023-08-16

## 2023-08-09 NOTE — ED NOTES
Patient refused to complete her CT scan until her purse was brought over to CT. Purse brought to CT by this RN.      Aida Blanco RN  08/08/23 9575

## 2023-08-09 NOTE — ED NOTES
US IV placed by this tech, CT and RN made aware. Pt's brother left bedside, contact information correct in the chart. Elton Monday [phone] request notification of admission or discharge. Pt requires ambulance transport due to hemiparesis following a stroke, pt has ramp going into the residence.      Michel Jordan  08/08/23 9230

## 2023-08-09 NOTE — ED NOTES
Advised brother via phone that patient is being discharged and transport being set up. Unable to give eta but advised brother I would call when transport left facility.       Margarita Lange RN  08/09/23 5714

## 2023-08-10 ENCOUNTER — APPOINTMENT (OUTPATIENT)
Facility: HOSPITAL | Age: 73
End: 2023-08-10
Payer: MEDICARE

## 2023-08-10 ENCOUNTER — HOSPITAL ENCOUNTER (EMERGENCY)
Facility: HOSPITAL | Age: 73
Discharge: HOME | End: 2023-08-10
Attending: STUDENT IN AN ORGANIZED HEALTH CARE EDUCATION/TRAINING PROGRAM
Payer: MEDICARE

## 2023-08-10 VITALS
BODY MASS INDEX: 26.36 KG/M2 | DIASTOLIC BLOOD PRESSURE: 76 MMHG | WEIGHT: 164 LBS | SYSTOLIC BLOOD PRESSURE: 173 MMHG | HEART RATE: 69 BPM | OXYGEN SATURATION: 100 % | RESPIRATION RATE: 17 BRPM | HEIGHT: 66 IN | TEMPERATURE: 98.3 F

## 2023-08-10 DIAGNOSIS — R41.0 CONFUSION: Primary | ICD-10-CM

## 2023-08-10 LAB
ALBUMIN SERPL-MCNC: 3.4 G/DL (ref 3.4–5)
ALBUMIN/GLOB SERPL: 0.6 (ref 0.8–1.7)
ALP SERPL-CCNC: 96 U/L (ref 45–117)
ALT SERPL-CCNC: 29 U/L (ref 13–56)
ANION GAP SERPL CALC-SCNC: 7 MMOL/L (ref 3–18)
APPEARANCE UR: CLEAR
AST SERPL-CCNC: 30 U/L (ref 10–38)
BACTERIA URNS QL MICRO: ABNORMAL /HPF
BASOPHILS # BLD: 0.1 K/UL (ref 0–0.1)
BASOPHILS NFR BLD: 1 % (ref 0–2)
BILIRUB SERPL-MCNC: 0.8 MG/DL (ref 0.2–1)
BILIRUB UR QL: NEGATIVE
BUN SERPL-MCNC: 31 MG/DL (ref 7–18)
BUN/CREAT SERPL: 29 (ref 12–20)
CALCIUM SERPL-MCNC: 9.8 MG/DL (ref 8.5–10.1)
CHLORIDE SERPL-SCNC: 105 MMOL/L (ref 100–111)
CHP ED QC CHECK: YES
CO2 SERPL-SCNC: 23 MMOL/L (ref 21–32)
COLOR UR: YELLOW
CREAT SERPL-MCNC: 1.08 MG/DL (ref 0.6–1.3)
DIFFERENTIAL METHOD BLD: ABNORMAL
EKG ATRIAL RATE: 71 BPM
EKG DIAGNOSIS: NORMAL
EKG P AXIS: 61 DEGREES
EKG P-R INTERVAL: 152 MS
EKG Q-T INTERVAL: 374 MS
EKG QRS DURATION: 80 MS
EKG QTC CALCULATION (BAZETT): 406 MS
EKG R AXIS: -5 DEGREES
EKG T AXIS: 24 DEGREES
EKG VENTRICULAR RATE: 71 BPM
EOSINOPHIL # BLD: 0.1 K/UL (ref 0–0.4)
EOSINOPHIL NFR BLD: 2 % (ref 0–5)
EPITH CASTS URNS QL MICRO: ABNORMAL /LPF (ref 0–5)
ERYTHROCYTE [DISTWIDTH] IN BLOOD BY AUTOMATED COUNT: 13.5 % (ref 11.6–14.5)
GLOBULIN SER CALC-MCNC: 5.4 G/DL (ref 2–4)
GLUCOSE BLD STRIP.AUTO-MCNC: 95 MG/DL (ref 70–110)
GLUCOSE BLD-MCNC: 95 MG/DL
GLUCOSE SERPL-MCNC: 89 MG/DL (ref 74–99)
GLUCOSE UR STRIP.AUTO-MCNC: NEGATIVE MG/DL
HCT VFR BLD AUTO: 36.6 % (ref 35–45)
HGB BLD-MCNC: 11.4 G/DL (ref 12–16)
HGB UR QL STRIP: NEGATIVE
IMM GRANULOCYTES # BLD AUTO: 0 K/UL (ref 0–0.04)
IMM GRANULOCYTES NFR BLD AUTO: 0 % (ref 0–0.5)
KETONES UR QL STRIP.AUTO: NEGATIVE MG/DL
LEUKOCYTE ESTERASE UR QL STRIP.AUTO: NEGATIVE
LYMPHOCYTES # BLD: 2.5 K/UL (ref 0.9–3.6)
LYMPHOCYTES NFR BLD: 43 % (ref 21–52)
MCH RBC QN AUTO: 26.1 PG (ref 24–34)
MCHC RBC AUTO-ENTMCNC: 31.1 G/DL (ref 31–37)
MCV RBC AUTO: 83.9 FL (ref 78–100)
MONOCYTES # BLD: 0.3 K/UL (ref 0.05–1.2)
MONOCYTES NFR BLD: 5 % (ref 3–10)
NEUTS SEG # BLD: 2.9 K/UL (ref 1.8–8)
NEUTS SEG NFR BLD: 50 % (ref 40–73)
NITRITE UR QL STRIP.AUTO: NEGATIVE
NRBC # BLD: 0 K/UL (ref 0–0.01)
NRBC BLD-RTO: 0 PER 100 WBC
PH UR STRIP: 5.5 (ref 5–8)
PLATELET # BLD AUTO: 215 K/UL (ref 135–420)
PMV BLD AUTO: 11.2 FL (ref 9.2–11.8)
POTASSIUM SERPL-SCNC: 5.3 MMOL/L (ref 3.5–5.5)
PROT SERPL-MCNC: 8.8 G/DL (ref 6.4–8.2)
PROT UR STRIP-MCNC: 100 MG/DL
RBC # BLD AUTO: 4.36 M/UL (ref 4.2–5.3)
RBC #/AREA URNS HPF: ABNORMAL /HPF (ref 0–5)
SODIUM SERPL-SCNC: 135 MMOL/L (ref 136–145)
SP GR UR REFRACTOMETRY: 1.02 (ref 1–1.03)
TROPONIN I SERPL HS-MCNC: 8 NG/L (ref 0–54)
UROBILINOGEN UR QL STRIP.AUTO: 0.2 EU/DL (ref 0.2–1)
WBC # BLD AUTO: 5.9 K/UL (ref 4.6–13.2)
WBC URNS QL MICRO: ABNORMAL /HPF (ref 0–4)

## 2023-08-10 PROCEDURE — 94761 N-INVAS EAR/PLS OXIMETRY MLT: CPT

## 2023-08-10 PROCEDURE — 81001 URINALYSIS AUTO W/SCOPE: CPT

## 2023-08-10 PROCEDURE — 71045 X-RAY EXAM CHEST 1 VIEW: CPT

## 2023-08-10 PROCEDURE — 84484 ASSAY OF TROPONIN QUANT: CPT

## 2023-08-10 PROCEDURE — 93010 ELECTROCARDIOGRAM REPORT: CPT | Performed by: INTERNAL MEDICINE

## 2023-08-10 PROCEDURE — 85025 COMPLETE CBC W/AUTO DIFF WBC: CPT

## 2023-08-10 PROCEDURE — 80053 COMPREHEN METABOLIC PANEL: CPT

## 2023-08-10 PROCEDURE — 99285 EMERGENCY DEPT VISIT HI MDM: CPT

## 2023-08-10 PROCEDURE — 93005 ELECTROCARDIOGRAM TRACING: CPT | Performed by: STUDENT IN AN ORGANIZED HEALTH CARE EDUCATION/TRAINING PROGRAM

## 2023-08-10 PROCEDURE — 96374 THER/PROPH/DIAG INJ IV PUSH: CPT

## 2023-08-10 PROCEDURE — 70450 CT HEAD/BRAIN W/O DYE: CPT

## 2023-08-10 PROCEDURE — 82962 GLUCOSE BLOOD TEST: CPT

## 2023-08-10 PROCEDURE — 6360000002 HC RX W HCPCS: Performed by: STUDENT IN AN ORGANIZED HEALTH CARE EDUCATION/TRAINING PROGRAM

## 2023-08-10 RX ORDER — HYDRALAZINE HYDROCHLORIDE 20 MG/ML
5 INJECTION INTRAMUSCULAR; INTRAVENOUS
Status: COMPLETED | OUTPATIENT
Start: 2023-08-10 | End: 2023-08-10

## 2023-08-10 RX ADMIN — HYDRALAZINE HYDROCHLORIDE 5 MG: 20 INJECTION, SOLUTION INTRAMUSCULAR; INTRAVENOUS at 13:12

## 2023-08-10 ASSESSMENT — ENCOUNTER SYMPTOMS
BACK PAIN: 0
ABDOMINAL PAIN: 0
CONSTIPATION: 0
NAUSEA: 0
DIARRHEA: 0
SHORTNESS OF BREATH: 0

## 2023-08-10 ASSESSMENT — PAIN - FUNCTIONAL ASSESSMENT: PAIN_FUNCTIONAL_ASSESSMENT: PAIN ASSESSMENT IN ADVANCED DEMENTIA (PAINAD)

## 2023-08-10 ASSESSMENT — PAIN SCALES - PAIN ASSESSMENT IN ADVANCED DEMENTIA (PAINAD)
BODYLANGUAGE: 1
CONSOLABILITY: 0
BREATHING: 0
NEGVOCALIZATION: 0
FACIALEXPRESSION: 1
TOTALSCORE: 2

## 2023-08-10 ASSESSMENT — LIFESTYLE VARIABLES
HOW MANY STANDARD DRINKS CONTAINING ALCOHOL DO YOU HAVE ON A TYPICAL DAY: PATIENT DOES NOT DRINK
HOW OFTEN DO YOU HAVE A DRINK CONTAINING ALCOHOL: NEVER

## 2023-08-10 NOTE — ED NOTES
Mary De León, patient's daughter, may be reached at [phone]        Reports she was seen well at around 1800 last night. Per daughter, they were overcelebraitng her bday but the patient feeling around 1800 and went inside to rest. Family reports they were unsure if she was adequately hydrated and thought \"the sun just got to her\". Updated on POC, no further questions/concerns/requests.              Jagdish Mathews RN  08/10/23 1017

## 2023-08-10 NOTE — FLOWSHEET NOTE
08/10/23 1330   Vital Signs   Pulse 70   Respirations 16   BP (!) 173/76   MAP (Calculated) 108   MAP (mmHg) 103     VSS, still HTN but brother reports getting BP meds from pharmacy. Aware transport within hour and reports someone will be home.

## 2023-08-10 NOTE — ED NOTES
Patient difficult stick and reports cannot use RUE d/t hx of CA. This RN and charge RN unable to find a good place for PIV. Warm pack to left hand. Charge RN reports will try to look again. VSS on cont monitoring, patient changed of large urine (incontinent) brief, new disposable brief & purwick initiated. Patient more cooperative with care and reports remember this RN and charge RN and is smiling.       Lore, [address] [place] [address]  08/10/23 0975

## 2023-08-10 NOTE — ED NOTES
Brother at bedside reports HTN and asking to be medicated prior to D/C. Patient reports she took her BP meds this morning, son unaware d/t home health nurse cares for patient at home.       Elias Solomon RN  08/10/23 1009 [address], RN  08/10/23 3532

## 2023-08-10 NOTE — ED TRIAGE NOTES
Per EMS, patient from home. Caretaker reports around 815am patient was attempting to get out of bed, not speaking, \"off baseline\". When EMS arrives, patient back to baseline, denies complaints. Hx of strokes in past w/ left sided weakness. Dementia and dislike of hospitals/nursing homes. , VSS.

## 2023-08-10 NOTE — ED NOTES
MD at bedside explains permissive HTN. Brother clarifies patient out of BP meds and did not take her BP meds this morning, reports will go to [facility] to get, reports he cannot take patient home but she uses transportation and requests a set up.       Stuart Cowden, RN  08/10/23 7208

## 2023-08-10 NOTE — DISCHARGE INSTRUCTIONS
You were evaluated for confusion and change in behavior . Based on your work-up it was deemed that she was stable for discharge. Please follow-up with your primary care physician if you have any further concerns and go over your work-up. If you experience any chest pain, shortness of breath, worsening abdominal pain, vomiting blood, worsening headache, seizures, or any worsening of your symptoms please return to the emergency department immediately. If you have any pending results or any further questions please contact the emergency department at [phone].

## 2023-08-10 NOTE — ED NOTES
Patient tolerated sandwich, crackers and juice. Patient D/C in stable condition with all belongings. Transport handoff completed.         Lacy Shelley  08/10/23 6085

## 2023-08-18 ENCOUNTER — APPOINTMENT (OUTPATIENT)
Facility: HOSPITAL | Age: 73
End: 2023-08-18
Payer: MEDICARE

## 2023-08-18 ENCOUNTER — HOSPITAL ENCOUNTER (EMERGENCY)
Facility: HOSPITAL | Age: 73
Discharge: HOME | End: 2023-08-19
Attending: STUDENT IN AN ORGANIZED HEALTH CARE EDUCATION/TRAINING PROGRAM
Payer: MEDICARE

## 2023-08-18 DIAGNOSIS — N39.0 URINARY TRACT INFECTION WITHOUT HEMATURIA, SITE UNSPECIFIED: Primary | ICD-10-CM

## 2023-08-18 DIAGNOSIS — R41.82 ALTERED MENTAL STATUS, UNSPECIFIED ALTERED MENTAL STATUS TYPE: ICD-10-CM

## 2023-08-18 LAB
ANION GAP SERPL CALC-SCNC: 5 MMOL/L (ref 3–18)
APPEARANCE UR: CLEAR
BACTERIA URNS QL MICRO: ABNORMAL /HPF
BASOPHILS # BLD: 0.1 K/UL (ref 0–0.1)
BASOPHILS NFR BLD: 1 % (ref 0–2)
BILIRUB UR QL: NEGATIVE
BUN SERPL-MCNC: 23 MG/DL (ref 7–18)
BUN/CREAT SERPL: 22 (ref 12–20)
CALCIUM SERPL-MCNC: 8.7 MG/DL (ref 8.5–10.1)
CHLORIDE SERPL-SCNC: 109 MMOL/L (ref 100–111)
CO2 SERPL-SCNC: 27 MMOL/L (ref 21–32)
COLOR UR: YELLOW
CREAT SERPL-MCNC: 1.04 MG/DL (ref 0.6–1.3)
DIFFERENTIAL METHOD BLD: ABNORMAL
EOSINOPHIL # BLD: 0.2 K/UL (ref 0–0.4)
EOSINOPHIL NFR BLD: 3 % (ref 0–5)
EPITH CASTS URNS QL MICRO: ABNORMAL /LPF (ref 0–5)
ERYTHROCYTE [DISTWIDTH] IN BLOOD BY AUTOMATED COUNT: 13.4 % (ref 11.6–14.5)
GLUCOSE SERPL-MCNC: 98 MG/DL (ref 74–99)
GLUCOSE UR STRIP.AUTO-MCNC: NEGATIVE MG/DL
HCT VFR BLD AUTO: 29.4 % (ref 35–45)
HGB BLD-MCNC: 9.2 G/DL (ref 12–16)
HGB UR QL STRIP: NEGATIVE
IMM GRANULOCYTES # BLD AUTO: 0 K/UL (ref 0–0.04)
IMM GRANULOCYTES NFR BLD AUTO: 0 % (ref 0–0.5)
KETONES UR QL STRIP.AUTO: NEGATIVE MG/DL
LEUKOCYTE ESTERASE UR QL STRIP.AUTO: ABNORMAL
LYMPHOCYTES # BLD: 2.8 K/UL (ref 0.9–3.6)
LYMPHOCYTES NFR BLD: 39 % (ref 21–52)
MCH RBC QN AUTO: 26.4 PG (ref 24–34)
MCHC RBC AUTO-ENTMCNC: 31.3 G/DL (ref 31–37)
MCV RBC AUTO: 84.5 FL (ref 78–100)
MONOCYTES # BLD: 0.6 K/UL (ref 0.05–1.2)
MONOCYTES NFR BLD: 8 % (ref 3–10)
NEUTS SEG # BLD: 3.6 K/UL (ref 1.8–8)
NEUTS SEG NFR BLD: 49 % (ref 40–73)
NITRITE UR QL STRIP.AUTO: NEGATIVE
NRBC # BLD: 0 K/UL (ref 0–0.01)
NRBC BLD-RTO: 0 PER 100 WBC
PH UR STRIP: 5.5 (ref 5–8)
PLATELET # BLD AUTO: 140 K/UL (ref 135–420)
PLATELET COMMENT: ABNORMAL
PMV BLD AUTO: 11.6 FL (ref 9.2–11.8)
POTASSIUM SERPL-SCNC: 4.2 MMOL/L (ref 3.5–5.5)
PROT UR STRIP-MCNC: 100 MG/DL
RBC # BLD AUTO: 3.48 M/UL (ref 4.2–5.3)
RBC #/AREA URNS HPF: ABNORMAL /HPF (ref 0–5)
RBC MORPH BLD: ABNORMAL
SODIUM SERPL-SCNC: 141 MMOL/L (ref 136–145)
SP GR UR REFRACTOMETRY: 1.02 (ref 1–1.03)
TROPONIN I SERPL HS-MCNC: 9 NG/L (ref 0–54)
UROBILINOGEN UR QL STRIP.AUTO: 1 EU/DL (ref 0.2–1)
WBC # BLD AUTO: 7.3 K/UL (ref 4.6–13.2)
WBC URNS QL MICRO: ABNORMAL /HPF (ref 0–4)

## 2023-08-18 PROCEDURE — 93005 ELECTROCARDIOGRAM TRACING: CPT | Performed by: STUDENT IN AN ORGANIZED HEALTH CARE EDUCATION/TRAINING PROGRAM

## 2023-08-18 PROCEDURE — 6360000002 HC RX W HCPCS: Performed by: STUDENT IN AN ORGANIZED HEALTH CARE EDUCATION/TRAINING PROGRAM

## 2023-08-18 PROCEDURE — 80048 BASIC METABOLIC PNL TOTAL CA: CPT

## 2023-08-18 PROCEDURE — 71045 X-RAY EXAM CHEST 1 VIEW: CPT

## 2023-08-18 PROCEDURE — 99285 EMERGENCY DEPT VISIT HI MDM: CPT

## 2023-08-18 PROCEDURE — 84484 ASSAY OF TROPONIN QUANT: CPT

## 2023-08-18 PROCEDURE — 2580000003 HC RX 258: Performed by: STUDENT IN AN ORGANIZED HEALTH CARE EDUCATION/TRAINING PROGRAM

## 2023-08-18 PROCEDURE — 96374 THER/PROPH/DIAG INJ IV PUSH: CPT

## 2023-08-18 PROCEDURE — 85025 COMPLETE CBC W/AUTO DIFF WBC: CPT

## 2023-08-18 PROCEDURE — 70450 CT HEAD/BRAIN W/O DYE: CPT

## 2023-08-18 PROCEDURE — 81001 URINALYSIS AUTO W/SCOPE: CPT

## 2023-08-18 RX ORDER — CEPHALEXIN 500 MG/1
500 CAPSULE ORAL 2 TIMES DAILY
Qty: 14 CAPSULE | Refills: 0 | Status: SHIPPED | OUTPATIENT
Start: 2023-08-18 | End: 2023-08-25

## 2023-08-18 RX ADMIN — CEFTRIAXONE 1000 MG: 1 INJECTION, POWDER, FOR SOLUTION INTRAMUSCULAR; INTRAVENOUS at 21:53

## 2023-08-18 ASSESSMENT — ENCOUNTER SYMPTOMS
NAUSEA: 0
SHORTNESS OF BREATH: 0
ABDOMINAL PAIN: 0
DIARRHEA: 0
VOMITING: 0
CHEST TIGHTNESS: 0

## 2023-08-18 NOTE — ED PROVIDER NOTES
EMERGENCY DEPARTMENT HISTORY AND PHYSICAL EXAM      Date: 8/18/2023  Patient Name: Hortencia Patterson    History of Presenting Illness     Chief Complaint   Patient presents with    Hallucinations       53-year-old female with history as below presenting to the emergency department via EMS for evaluation of hallucinations. States that she was having hallucinations earlier today and wanted her evaluated for UTI. Has experienced hallucinations in the past with urinary tract infections. Patient has no complaints and is alert and oriented x4 here. PCP: Ayleen Pi, APRN - CNP    Current Facility-Administered Medications   Medication Dose Route Frequency Provider Last Rate Last Admin    cefTRIAXone (ROCEPHIN) 1,000 mg in sterile water 10 mL IV syringe  1,000 mg IntraVENous NOW Morro Amaya,          Current Outpatient Medications   Medication Sig Dispense Refill    cephALEXin (KEFLEX) 500 MG capsule Take 1 capsule by mouth 2 times daily for 7 days 14 capsule 0    apixaban (ELIQUIS) 5 MG TABS tablet Take 1 tablet by mouth 2 times daily 180 tablet 1    atorvastatin (LIPITOR) 40 MG tablet TAKE 1 TABLET BY MOUTH AT BEDTIME FOR HYPERLIPEDEMIA 90 tablet 1    Dulaglutide (TRULICITY) 4.83 AJ/3.9VH SOPN Inject 0.75 mg into the skin once a week 4 Adjustable Dose Pre-filled Pen Syringe 1    lacosamide (VIMPAT) 150 MG TABS tablet Take 1 tablet by mouth 2 times daily for 180 days. Max Daily Amount: 300 mg 180 tablet 1    letrozole (FEMARA) 2.5 MG tablet TAKE 1 TABLET BY MOUTH ONCE DAILY FOR CHEMOTHERAPY 30 tablet 1    omeprazole (PRILOSEC) 40 MG delayed release capsule Take 1 capsule by mouth every morning (before breakfast) 90 capsule 1    ondansetron (ZOFRAN-ODT) 4 MG disintegrating tablet Take 1 tablet by mouth every 8 hours as needed for Nausea or Vomiting Take 4 mg by mouth every 8 hours as needed for Nausea or Vomiting.  60 tablet 1    losartan (COZAAR) 50 MG tablet 08/02/2016 Losartan Oral  PO 1.0 TABLET(S) BID

## 2023-08-18 NOTE — ED TRIAGE NOTES
Pt arriving via EMS. Per medic, family stated that pt was having hallucinations at home and wanted pt to be evaluated for a UTI.  Pt is alert and oriented upon arrival.

## 2023-08-19 VITALS
SYSTOLIC BLOOD PRESSURE: 160 MMHG | DIASTOLIC BLOOD PRESSURE: 67 MMHG | OXYGEN SATURATION: 100 % | HEART RATE: 87 BPM | WEIGHT: 172 LBS | RESPIRATION RATE: 14 BRPM | BODY MASS INDEX: 27.76 KG/M2 | TEMPERATURE: 99.2 F

## 2023-08-19 LAB
EKG ATRIAL RATE: 84 BPM
EKG DIAGNOSIS: NORMAL
EKG P AXIS: 66 DEGREES
EKG P-R INTERVAL: 148 MS
EKG Q-T INTERVAL: 348 MS
EKG QRS DURATION: 70 MS
EKG QTC CALCULATION (BAZETT): 411 MS
EKG R AXIS: 14 DEGREES
EKG T AXIS: 41 DEGREES
EKG VENTRICULAR RATE: 84 BPM

## 2023-08-19 PROCEDURE — 93010 ELECTROCARDIOGRAM REPORT: CPT | Performed by: INTERNAL MEDICINE

## 2023-08-19 PROCEDURE — 94761 N-INVAS EAR/PLS OXIMETRY MLT: CPT

## 2023-08-19 NOTE — ED NOTES
PATIENT HAD DIAPER CHANGED, SMALL FORMED STOOL NOTED, PERINEAL CARE DONE. PATIENT IV ACCESS REMOVED. PATIENT TAKEN TO [facility] AWAITING FOR TRANSPORT.       Laura Gonzalez, MARCELINA  08/19/23 5328

## 2023-08-19 NOTE — ED NOTES
RECEIVED PATIENT LYING IN BED, COMPLAINT OF FEELING COLD, PATIENT GIVEN WARM BLANKET FOR COMFORT. PATIENT BREATHES FREELY ON ROOM AIR, CHEST EXPANSION EQUAL AND ADEQUATE. IV ACCESS NOTED TO LEFT HAND, PATIENT IN DIAPER, NIL MOVEMENT NOTED TO LOWER EXTREMITIES. PATIENT FOR TRANSPORT FOR HOME, PATIENT AWARE OF SAME.       Melissa Garcia RN  08/19/23 0001

## 2023-08-19 NOTE — ED NOTES
Pt awaiting transport back home. Given a meal and call bell within reach. No further needs at this time.       Kim Bernal RN  08/19/23 0106

## 2023-08-19 NOTE — ACP (ADVANCE CARE PLANNING)
Advance Care Planning     Advance Care Planning Inpatient Note  [facility] Department    Today's Date: 8/19/2023  Unit: [facility] EMERGENCY DEPT    Received request from . Upon review of chart and communication with care team, patient's decision making abilities are not in question. Daniel Gatica Healthcare Decision Maker was/were present in the room during visit. Goals of ACP Conversation:  Discuss advance care planning documents    Health Care Decision Makers:     No healthcare decision makers have been documented. Click here to complete [address] including selection of the Healthcare Decision Maker Relationship (ie \"Primary\")  Summary:  No Decision Maker named by patient at this time    Advance Care Planning Documents (Patient Wishes):  None     Assessment:  Patient seen as a new admit to the hospital in bed 1 of the emergency room. Patient was found resting peacefully at this time and was not able to communicate at this time. There is no advance directive on file here.     Interventions:  Patient DECLINED ACP conversation    Care Preferences Communicated:   No    Outcomes/Plan:      Electronically signed by Sharon Ruvalcaba, [facility] on 8/19/2023 at 9:32 AM

## 2023-08-19 NOTE — ED NOTES
Attempted, unsuccessfully, for an ultrasound line. Pt's xray completed and pt off the floor for CT.      Moraima Chen  08/18/23 2003

## 2023-08-23 ENCOUNTER — HOSPITAL ENCOUNTER (EMERGENCY)
Facility: HOSPITAL | Age: 73
Discharge: HOME | End: 2023-08-23
Attending: EMERGENCY MEDICINE
Payer: MEDICARE

## 2023-08-23 ENCOUNTER — APPOINTMENT (OUTPATIENT)
Facility: HOSPITAL | Age: 73
End: 2023-08-23
Payer: MEDICARE

## 2023-08-23 VITALS
BODY MASS INDEX: 27.64 KG/M2 | OXYGEN SATURATION: 100 % | TEMPERATURE: 98 F | DIASTOLIC BLOOD PRESSURE: 87 MMHG | HEART RATE: 68 BPM | RESPIRATION RATE: 18 BRPM | SYSTOLIC BLOOD PRESSURE: 145 MMHG | WEIGHT: 172 LBS | HEIGHT: 66 IN

## 2023-08-23 DIAGNOSIS — R07.9 CENTRAL CHEST PAIN: Primary | ICD-10-CM

## 2023-08-23 LAB
ALBUMIN SERPL-MCNC: 3 G/DL (ref 3.4–5)
ALBUMIN/GLOB SERPL: 0.6 (ref 0.8–1.7)
ALP SERPL-CCNC: 87 U/L (ref 45–117)
ALT SERPL-CCNC: 46 U/L (ref 13–56)
ANION GAP SERPL CALC-SCNC: 5 MMOL/L (ref 3–18)
AST SERPL-CCNC: 29 U/L (ref 10–38)
BASOPHILS # BLD: 0 K/UL (ref 0–0.1)
BASOPHILS NFR BLD: 1 % (ref 0–2)
BILIRUB SERPL-MCNC: 0.4 MG/DL (ref 0.2–1)
BUN SERPL-MCNC: 32 MG/DL (ref 7–18)
BUN/CREAT SERPL: 31 (ref 12–20)
CALCIUM SERPL-MCNC: 8.9 MG/DL (ref 8.5–10.1)
CHLORIDE SERPL-SCNC: 110 MMOL/L (ref 100–111)
CO2 SERPL-SCNC: 25 MMOL/L (ref 21–32)
CREAT SERPL-MCNC: 1.04 MG/DL (ref 0.6–1.3)
DIFFERENTIAL METHOD BLD: ABNORMAL
EKG ATRIAL RATE: 79 BPM
EKG DIAGNOSIS: NORMAL
EKG P AXIS: 87 DEGREES
EKG P-R INTERVAL: 148 MS
EKG Q-T INTERVAL: 352 MS
EKG QRS DURATION: 74 MS
EKG QTC CALCULATION (BAZETT): 403 MS
EKG R AXIS: -1 DEGREES
EKG T AXIS: 24 DEGREES
EKG VENTRICULAR RATE: 79 BPM
EOSINOPHIL # BLD: 0.3 K/UL (ref 0–0.4)
EOSINOPHIL NFR BLD: 3 % (ref 0–5)
ERYTHROCYTE [DISTWIDTH] IN BLOOD BY AUTOMATED COUNT: 13.5 % (ref 11.6–14.5)
GLOBULIN SER CALC-MCNC: 4.7 G/DL (ref 2–4)
GLUCOSE SERPL-MCNC: 91 MG/DL (ref 74–99)
HCT VFR BLD AUTO: 33.1 % (ref 35–45)
HGB BLD-MCNC: 10.3 G/DL (ref 12–16)
IMM GRANULOCYTES # BLD AUTO: 0 K/UL (ref 0–0.04)
IMM GRANULOCYTES NFR BLD AUTO: 0 % (ref 0–0.5)
LYMPHOCYTES # BLD: 4.3 K/UL (ref 0.9–3.6)
LYMPHOCYTES NFR BLD: 51 % (ref 21–52)
MCH RBC QN AUTO: 26.4 PG (ref 24–34)
MCHC RBC AUTO-ENTMCNC: 31.1 G/DL (ref 31–37)
MCV RBC AUTO: 84.9 FL (ref 78–100)
MONOCYTES # BLD: 0.4 K/UL (ref 0.05–1.2)
MONOCYTES NFR BLD: 5 % (ref 3–10)
NEUTS SEG # BLD: 3.4 K/UL (ref 1.8–8)
NEUTS SEG NFR BLD: 40 % (ref 40–73)
NRBC # BLD: 0 K/UL (ref 0–0.01)
NRBC BLD-RTO: 0 PER 100 WBC
PLATELET # BLD AUTO: 204 K/UL (ref 135–420)
PMV BLD AUTO: 10.7 FL (ref 9.2–11.8)
POTASSIUM SERPL-SCNC: 4.3 MMOL/L (ref 3.5–5.5)
PROT SERPL-MCNC: 7.7 G/DL (ref 6.4–8.2)
RBC # BLD AUTO: 3.9 M/UL (ref 4.2–5.3)
SODIUM SERPL-SCNC: 140 MMOL/L (ref 136–145)
TROPONIN I SERPL HS-MCNC: 9 NG/L (ref 0–54)
TROPONIN I SERPL HS-MCNC: 9 NG/L (ref 0–54)
WBC # BLD AUTO: 8.5 K/UL (ref 4.6–13.2)

## 2023-08-23 PROCEDURE — 6370000000 HC RX 637 (ALT 250 FOR IP): Performed by: EMERGENCY MEDICINE

## 2023-08-23 PROCEDURE — 71045 X-RAY EXAM CHEST 1 VIEW: CPT

## 2023-08-23 PROCEDURE — 93005 ELECTROCARDIOGRAM TRACING: CPT | Performed by: EMERGENCY MEDICINE

## 2023-08-23 PROCEDURE — 99285 EMERGENCY DEPT VISIT HI MDM: CPT

## 2023-08-23 PROCEDURE — 94760 N-INVAS EAR/PLS OXIMETRY 1: CPT

## 2023-08-23 PROCEDURE — 93010 ELECTROCARDIOGRAM REPORT: CPT | Performed by: INTERNAL MEDICINE

## 2023-08-23 PROCEDURE — 84484 ASSAY OF TROPONIN QUANT: CPT

## 2023-08-23 PROCEDURE — 80053 COMPREHEN METABOLIC PANEL: CPT

## 2023-08-23 PROCEDURE — 85025 COMPLETE CBC W/AUTO DIFF WBC: CPT

## 2023-08-23 RX ORDER — ACETAMINOPHEN 325 MG/1
650 TABLET ORAL
Status: COMPLETED | OUTPATIENT
Start: 2023-08-23 | End: 2023-08-23

## 2023-08-23 RX ORDER — MAG HYDROX/ALUMINUM HYD/SIMETH 400-400-40
15 SUSPENSION, ORAL (FINAL DOSE FORM) ORAL EVERY 6 HOURS PRN
Qty: 355 ML | Refills: 0 | Status: SHIPPED | OUTPATIENT
Start: 2023-08-23

## 2023-08-23 RX ORDER — CALCIUM CARBONATE 500 MG/1
1 TABLET, CHEWABLE ORAL DAILY PRN
Qty: 30 TABLET | Refills: 0 | Status: SHIPPED | OUTPATIENT
Start: 2023-08-23 | End: 2023-09-22

## 2023-08-23 RX ADMIN — ALUMINUM HYDROXIDE, MAGNESIUM HYDROXIDE, AND SIMETHICONE 40 ML: 200; 200; 20 SUSPENSION ORAL at 10:00

## 2023-08-23 RX ADMIN — ACETAMINOPHEN 325MG 650 MG: 325 TABLET ORAL at 10:01

## 2023-08-23 ASSESSMENT — PAIN SCALES - GENERAL: PAINLEVEL_OUTOF10: 9

## 2023-08-23 ASSESSMENT — PAIN DESCRIPTION - LOCATION
LOCATION: CHEST
LOCATION: CHEST

## 2023-08-23 ASSESSMENT — PAIN DESCRIPTION - PAIN TYPE: TYPE: ACUTE PAIN

## 2023-08-23 ASSESSMENT — PAIN - FUNCTIONAL ASSESSMENT: PAIN_FUNCTIONAL_ASSESSMENT: 0-10

## 2023-08-23 NOTE — ED PROVIDER NOTES
Disposition:  Destination: Home    Discharge Rx:   New Prescriptions    ALUMINUM & MAGNESIUM HYDROXIDE-SIMETHICONE (MAALOX MAX) 400-400-40 MG/5ML SUSP    Take 15 mLs by mouth every 6 hours as needed (heartburn)    CALCIUM CARBONATE (TUMS) 500 MG CHEWABLE TABLET    Take 1 tablet by mouth daily as needed for Heartburn         Dictation disclaimer: Please note that this dictation was completed with [employer], the computer voice recognition software. Quite often unanticipated grammatical, syntax, homophones, and other interpretive errors are inadvertently transcribed by the computer software. Please disregard these errors. Please excuse any errors that have escaped final proofreading. Amando Hutton D.O.   Emergency Physician  [address] [place] [address]  08/23/23 1159

## 2023-08-23 NOTE — DISCHARGE INSTRUCTIONS
I prescribed Maalox and Tums needed for recurrent symptom relief. Can also take Tylenol.   Would avoid alcohol, spicy foods, ibuprofen, Aleve or aspirin, excessive caffeine as these can worsen heartburn symptoms

## 2023-08-23 NOTE — ED NOTES
Pt is for discharged, pt explained discharge instructions and verbalized understanding. Pt is in stable condition, NAD,IV line removed. pt accompanied by transportation staffs thru stretcher.       [address] [facility], [address]  08/23/23 2396

## 2023-08-23 NOTE — ED TRIAGE NOTES
Pt came from home via EMS , complains chest pain 30-40 mins ago ,hx of stroke  with R sided weakness and breast cancer, pt took Eliquis and aspirin home meds this morning, pt is bed bound

## 2023-08-24 ENCOUNTER — OFFICE VISIT (OUTPATIENT)
Facility: CLINIC | Age: 73
End: 2023-08-24
Payer: MEDICARE

## 2023-08-24 VITALS
TEMPERATURE: 97.2 F | HEART RATE: 92 BPM | OXYGEN SATURATION: 98 % | RESPIRATION RATE: 16 BRPM | DIASTOLIC BLOOD PRESSURE: 68 MMHG | SYSTOLIC BLOOD PRESSURE: 107 MMHG

## 2023-08-24 DIAGNOSIS — R26.2 DIFFICULTY WALKING: ICD-10-CM

## 2023-08-24 DIAGNOSIS — E11.21 TYPE 2 DIABETES MELLITUS WITH NEPHROPATHY (HCC): ICD-10-CM

## 2023-08-24 DIAGNOSIS — I50.32 CHRONIC DIASTOLIC (CONGESTIVE) HEART FAILURE (HCC): ICD-10-CM

## 2023-08-24 DIAGNOSIS — G40.909 SEIZURE DISORDER (HCC): ICD-10-CM

## 2023-08-24 DIAGNOSIS — R11.0 NAUSEA: ICD-10-CM

## 2023-08-24 DIAGNOSIS — Z09 HOSPITAL DISCHARGE FOLLOW-UP: Primary | ICD-10-CM

## 2023-08-24 DIAGNOSIS — Z86.2 HISTORY OF ANEMIA: ICD-10-CM

## 2023-08-24 DIAGNOSIS — Z86.73 HISTORY OF CVA (CEREBROVASCULAR ACCIDENT): ICD-10-CM

## 2023-08-24 DIAGNOSIS — E78.00 PURE HYPERCHOLESTEROLEMIA, UNSPECIFIED: ICD-10-CM

## 2023-08-24 PROCEDURE — 3074F SYST BP LT 130 MM HG: CPT | Performed by: NURSE PRACTITIONER

## 2023-08-24 PROCEDURE — 3044F HG A1C LEVEL LT 7.0%: CPT | Performed by: NURSE PRACTITIONER

## 2023-08-24 PROCEDURE — 1123F ACP DISCUSS/DSCN MKR DOCD: CPT | Performed by: NURSE PRACTITIONER

## 2023-08-24 PROCEDURE — 99215 OFFICE O/P EST HI 40 MIN: CPT | Performed by: NURSE PRACTITIONER

## 2023-08-24 PROCEDURE — 3078F DIAST BP <80 MM HG: CPT | Performed by: NURSE PRACTITIONER

## 2023-08-24 RX ORDER — ONDANSETRON 4 MG/1
4 TABLET, ORALLY DISINTEGRATING ORAL EVERY 8 HOURS PRN
Qty: 60 TABLET | Refills: 1 | Status: SHIPPED | OUTPATIENT
Start: 2023-08-24

## 2023-08-24 NOTE — PATIENT INSTRUCTIONS
Yahir Beckwith MD   [address] [facility] [address] [facility], [facility] [address]   [phone]

## 2023-08-29 ENCOUNTER — TELEPHONE (OUTPATIENT)
Facility: CLINIC | Age: 73
End: 2023-08-29

## 2023-08-29 NOTE — TELEPHONE ENCOUNTER
Patient called stating she has to pay out-of-pocket for the Home Health Nurse and it is very expensive. She would like to know if there is any help Jerrell Lennon can provide? Advised patient to call her insurance company to find out what is covered under her insurance. Also, patient is requesting a return call from [employer], at earliest convenience, because she also is requesting medication refills on all of her medications, but could not provide the names. Please call. Thank you.

## 2023-08-30 ENCOUNTER — CARE COORDINATION (OUTPATIENT)
Facility: CLINIC | Age: 73
End: 2023-08-30

## 2023-08-30 ENCOUNTER — TELEPHONE (OUTPATIENT)
Facility: CLINIC | Age: 73
End: 2023-08-30

## 2023-08-30 SDOH — ECONOMIC STABILITY: TRANSPORTATION INSECURITY
IN THE PAST 12 MONTHS, HAS THE LACK OF TRANSPORTATION KEPT YOU FROM MEDICAL APPOINTMENTS OR FROM GETTING MEDICATIONS?: NO

## 2023-08-30 SDOH — HEALTH STABILITY: PHYSICAL HEALTH: ON AVERAGE, HOW MANY DAYS PER WEEK DO YOU ENGAGE IN MODERATE TO STRENUOUS EXERCISE (LIKE A BRISK WALK)?: 0 DAYS

## 2023-08-30 SDOH — ECONOMIC STABILITY: INCOME INSECURITY: IN THE LAST 12 MONTHS, WAS THERE A TIME WHEN YOU WERE NOT ABLE TO PAY THE MORTGAGE OR RENT ON TIME?: NO

## 2023-08-30 SDOH — HEALTH STABILITY: PHYSICAL HEALTH: ON AVERAGE, HOW MANY MINUTES DO YOU ENGAGE IN EXERCISE AT THIS LEVEL?: 0 MIN

## 2023-08-30 SDOH — ECONOMIC STABILITY: HOUSING INSECURITY: IN THE LAST 12 MONTHS, HOW MANY PLACES HAVE YOU LIVED?: 2

## 2023-08-30 SDOH — ECONOMIC STABILITY: TRANSPORTATION INSECURITY
IN THE PAST 12 MONTHS, HAS LACK OF TRANSPORTATION KEPT YOU FROM MEETINGS, WORK, OR FROM GETTING THINGS NEEDED FOR DAILY LIVING?: NO

## 2023-08-30 ASSESSMENT — SOCIAL DETERMINANTS OF HEALTH (SDOH)
IN A TYPICAL WEEK, HOW MANY TIMES DO YOU TALK ON THE PHONE WITH FAMILY, FRIENDS, OR NEIGHBORS?: MORE THAN THREE TIMES A WEEK
ARE YOU MARRIED, WIDOWED, DIVORCED, SEPARATED, NEVER MARRIED, OR LIVING WITH A PARTNER?: NEVER MARRIED
DO YOU BELONG TO ANY CLUBS OR ORGANIZATIONS SUCH AS CHURCH GROUPS UNIONS, FRATERNAL OR ATHLETIC GROUPS, OR SCHOOL GROUPS?: NO
IN A TYPICAL WEEK, HOW MANY TIMES DO YOU TALK ON THE PHONE WITH FAMILY, FRIENDS, OR NEIGHBORS?: MORE THAN THREE TIMES A WEEK
HOW OFTEN DO YOU ATTEND CHURCH OR RELIGIOUS SERVICES?: NEVER
WITHIN THE LAST YEAR, HAVE TO BEEN RAPED OR FORCED TO HAVE ANY KIND OF SEXUAL ACTIVITY BY YOUR PARTNER OR EX-PARTNER?: NO
WITHIN THE LAST YEAR, HAVE YOU BEEN HUMILIATED OR EMOTIONALLY ABUSED IN OTHER WAYS BY YOUR PARTNER OR EX-PARTNER?: NO
DO YOU BELONG TO ANY CLUBS OR ORGANIZATIONS SUCH AS CHURCH GROUPS UNIONS, FRATERNAL OR ATHLETIC GROUPS, OR SCHOOL GROUPS?: NO
HOW OFTEN DO YOU GET TOGETHER WITH FRIENDS OR RELATIVES?: MORE THAN THREE TIMES A WEEK
WITHIN THE LAST YEAR, HAVE YOU BEEN KICKED, HIT, SLAPPED, OR OTHERWISE PHYSICALLY HURT BY YOUR PARTNER OR EX-PARTNER?: NO
HOW OFTEN DO YOU ATTEND CHURCH OR RELIGIOUS SERVICES?: NEVER
HOW OFTEN DO YOU GET TOGETHER WITH FRIENDS OR RELATIVES?: MORE THAN THREE TIMES A WEEK
HOW HARD IS IT FOR YOU TO PAY FOR THE VERY BASICS LIKE FOOD, HOUSING, MEDICAL CARE, AND HEATING?: NOT HARD AT ALL
WITHIN THE LAST YEAR, HAVE YOU BEEN AFRAID OF YOUR PARTNER OR EX-PARTNER?: NO

## 2023-08-30 ASSESSMENT — PATIENT HEALTH QUESTIONNAIRE - PHQ9
SUM OF ALL RESPONSES TO PHQ QUESTIONS 1-9: 2

## 2023-08-30 NOTE — CARE COORDINATION
.Ambulatory Care Coordination Note  2023    Patient Current Location:  Home: [address] [place] [address] Drive [address]    ACM contacted the family and caregiver by telephone. Verified name and  with family and caregiver as identifiers. Provided introduction to self, and explanation of the ACM role. Patient's brother is patients POA,care giver. He returned ACM call , ACM checked PHI and he is on it. ACM informed brother that Patient contacted office concerning a need for assistance with help to pay an PCA as her care is out of pocket. ACM started to tell him the process how to get an PCA in the home. Brother voiced patient has 57 hours of aides per week. Who cooks clean her room, bath her and makes sure she takes her medications. He don't know who called the office as patient don't have to pay for any aide it comes thru her Medicaid part of her insurance. ACM asked about the missed appointments to specialist and PCP, brother spoke that was during the time patient had [facility]. she has not missed an appointment since . Patient voiced he feels he is doing better for patient because her daughter placed her in a nursing home and left her there. He took her in his home not only her but her boyfriend as well. His sister use to be a diabetic ,but now she is not on any medication because she is eating right. ACM: Karla Olivo RN    Challenges to be reviewed by the provider   Additional needs identified to be addressed with provider: Yes  Per brother patient gets 57 hours of PCA per week 8 her per day ,7 days a week. Thru her [place] Medicaid  free to the patient. Brother voiced not sure who contacted the office, for sure it was not his sister. ACM voiced maybe patient had someone else to call the office for her but someone made a call on  to the office . ACM asked him about pass missed appointments and no follow thru with specialists.  He voiced that they were going thru changes with VIVIANA Knight so

## 2023-08-30 NOTE — TELEPHONE ENCOUNTER
ECC transferred me a call with patient sister Payton Berman on the phone, saying that patient is hallucinating, stating that the furniture and TV is moving around the room. Talked with Yo Zapata and she asked had patients sugar been checked and, Payton Berman said that her sugar 98 this morning before eating anything. Scheduled an appt with Elsie tomorrow 8.31 at 2:20 pm. Minerva Duran if the patient's symptoms worsen over night she should go to the ER.

## 2023-08-30 NOTE — CARE COORDINATION
.Attempted to contact patient for referral from PCP and add to Complex Care management. No answer, left message with contact information provided. Will attempt to contact at a later time and send letter if no response.

## 2023-08-31 ENCOUNTER — CLINICAL DOCUMENTATION (OUTPATIENT)
Facility: CLINIC | Age: 73
End: 2023-08-31

## 2023-08-31 DIAGNOSIS — N39.0 RECURRENT UTI: Primary | ICD-10-CM

## 2023-08-31 DIAGNOSIS — R41.0 CONFUSED BUT ORIENTS EASILY: ICD-10-CM

## 2023-08-31 RX ORDER — SULFAMETHOXAZOLE AND TRIMETHOPRIM 400; 80 MG/1; MG/1
1 TABLET ORAL 2 TIMES DAILY
Qty: 10 TABLET | Refills: 0 | Status: SHIPPED | OUTPATIENT
Start: 2023-08-31 | End: 2023-09-05

## 2023-08-31 NOTE — PROGRESS NOTES
Spoke w/ pt's brother. Pt had intermittent confusion yesterday and non today. Pt lives in [place], [place] and has to pay $100 for medical transport if not given 5 days notice.  Pt does not

## 2023-08-31 NOTE — PROGRESS NOTES
Spoke to pt's brother by phone. Pt has a HX of recent UTI. Pt wears briefs because she does not ambulate related to right sided weakness and HX of stroke. Per her brother pt has some confusion yesterday but none today. Pt's blood sugar has been WNL. Pt had an appt today but medical transport cost $100 if not given 5 days notice, it is rainy and windy outside. They live in [place], [place]. I will send a antibiotic to pharmacy for suspicion of a UTI.

## 2023-09-05 ENCOUNTER — CARE COORDINATION (OUTPATIENT)
Facility: CLINIC | Age: 73
End: 2023-09-05

## 2023-09-05 NOTE — CARE COORDINATION
.Attempted to contact patient for Complex Care follow up. No answer, left message with contact information provided. Brother returned ACM called last time. Will update on up coming PCP and New Neurology appointments so as he can get medical transport lined up. ACM will stress the importance of following up with new patient neurology appointments. Will ask how is patient tolerating antibiotics for the UTI any further symptoms.

## 2023-09-06 ENCOUNTER — CARE COORDINATION (OUTPATIENT)
Facility: CLINIC | Age: 73
End: 2023-09-06

## 2023-09-06 NOTE — CARE COORDINATION
.  Challenges to be reviewed by the provider   Additional needs identified to be addressed with provider Yes  Patient brother contacted [facility] as PCA services thru [address] dropped patient as they said her Medicaid had been dropped. He contacted patients  and [employer] rep and they said the only decreased her hours to 40 from 62 as she was not using them. Brother voiced he had called [facility] [employer] ,but they will not take his word for it. [facility] made call to [employer] [phone] had to leave a message for return call will call again before 5 pm if no return call .

## 2023-09-07 ENCOUNTER — CARE COORDINATION (OUTPATIENT)
Facility: CLINIC | Age: 73
End: 2023-09-07

## 2023-09-07 NOTE — CARE COORDINATION
.Ambulatory Care Coordination Note  9/7/2023    Patient Current Location:  Home: [facility] [address] [address]     ACM contacted the  [address], patient's [address] by telephone. Spoke with [facility]. She took message for billing to return call as she see's a note with MICHAEL but don't understand what it is for. She voiced it's 4 of them working so one should be getting back with me shortly. Challenges to be reviewed by the provider   Additional needs identified to be addressed with provider: No  none               Method of communication with provider: none.     ACM: Jared Ventura, MARCELINA      Offered patient enrollment in the Remote Patient Monitoring (RPM) program for in-home monitoring: .    Lab Results       None                 Goals Addressed    None         Future Appointments   Date Time Provider [address]   9/25/2023  3:40 PM SHANNON Levin - CNP [facility] BS AMB   10/26/2023  2:50 PM Jessy Bledsoe MD [facility] BS AMB

## 2023-09-09 ENCOUNTER — APPOINTMENT (OUTPATIENT)
Facility: HOSPITAL | Age: 73
End: 2023-09-09
Payer: MEDICARE

## 2023-09-09 ENCOUNTER — HOSPITAL ENCOUNTER (EMERGENCY)
Facility: HOSPITAL | Age: 73
Discharge: HOME | End: 2023-09-10
Attending: EMERGENCY MEDICINE
Payer: MEDICARE

## 2023-09-09 DIAGNOSIS — G40.909 SEIZURE DISORDER (HCC): ICD-10-CM

## 2023-09-09 DIAGNOSIS — G40.909 NONINTRACTABLE EPILEPSY WITHOUT STATUS EPILEPTICUS, UNSPECIFIED EPILEPSY TYPE (HCC): ICD-10-CM

## 2023-09-09 DIAGNOSIS — G40.919 BREAKTHROUGH SEIZURE (HCC): Primary | ICD-10-CM

## 2023-09-09 LAB
ALBUMIN SERPL-MCNC: 3.2 G/DL (ref 3.4–5)
ALBUMIN/GLOB SERPL: 0.7 (ref 0.8–1.7)
ALP SERPL-CCNC: 85 U/L (ref 45–117)
ALT SERPL-CCNC: 54 U/L (ref 13–56)
AMORPH CRY URNS QL MICRO: ABNORMAL
ANION GAP SERPL CALC-SCNC: 6 MMOL/L (ref 3–18)
APPEARANCE UR: CLEAR
APTT PPP: 25.5 SEC (ref 23–36.4)
AST SERPL-CCNC: 43 U/L (ref 10–38)
BACTERIA URNS QL MICRO: NEGATIVE /HPF
BASOPHILS # BLD: 0 K/UL (ref 0–0.1)
BASOPHILS NFR BLD: 1 % (ref 0–2)
BILIRUB SERPL-MCNC: 0.4 MG/DL (ref 0.2–1)
BILIRUB UR QL: NEGATIVE
BUN SERPL-MCNC: 29 MG/DL (ref 7–18)
BUN/CREAT SERPL: 19 (ref 12–20)
CALCIUM SERPL-MCNC: 9 MG/DL (ref 8.5–10.1)
CHLORIDE SERPL-SCNC: 110 MMOL/L (ref 100–111)
CO2 SERPL-SCNC: 22 MMOL/L (ref 21–32)
COLOR UR: YELLOW
CREAT SERPL-MCNC: 1.52 MG/DL (ref 0.6–1.3)
DIFFERENTIAL METHOD BLD: ABNORMAL
EOSINOPHIL # BLD: 0.1 K/UL (ref 0–0.4)
EOSINOPHIL NFR BLD: 1 % (ref 0–5)
EPITH CASTS URNS QL MICRO: ABNORMAL /LPF (ref 0–5)
ERYTHROCYTE [DISTWIDTH] IN BLOOD BY AUTOMATED COUNT: 13.5 % (ref 11.6–14.5)
FLUAV RNA SPEC QL NAA+PROBE: NOT DETECTED
FLUBV RNA SPEC QL NAA+PROBE: NOT DETECTED
GLOBULIN SER CALC-MCNC: 4.6 G/DL (ref 2–4)
GLUCOSE SERPL-MCNC: 182 MG/DL (ref 74–99)
GLUCOSE UR STRIP.AUTO-MCNC: NEGATIVE MG/DL
HCT VFR BLD AUTO: 35.7 % (ref 35–45)
HGB BLD-MCNC: 10.8 G/DL (ref 12–16)
HGB UR QL STRIP: NEGATIVE
IMM GRANULOCYTES # BLD AUTO: 0 K/UL (ref 0–0.04)
IMM GRANULOCYTES NFR BLD AUTO: 0 % (ref 0–0.5)
INR PPP: 1.5 (ref 0.9–1.1)
KETONES UR QL STRIP.AUTO: NEGATIVE MG/DL
LEUKOCYTE ESTERASE UR QL STRIP.AUTO: ABNORMAL
LYMPHOCYTES # BLD: 3 K/UL (ref 0.9–3.6)
LYMPHOCYTES NFR BLD: 38 % (ref 21–52)
MAGNESIUM SERPL-MCNC: 1.9 MG/DL (ref 1.6–2.6)
MCH RBC QN AUTO: 25.8 PG (ref 24–34)
MCHC RBC AUTO-ENTMCNC: 30.3 G/DL (ref 31–37)
MCV RBC AUTO: 85.4 FL (ref 78–100)
MONOCYTES # BLD: 0.3 K/UL (ref 0.05–1.2)
MONOCYTES NFR BLD: 4 % (ref 3–10)
NEUTS SEG # BLD: 4.3 K/UL (ref 1.8–8)
NEUTS SEG NFR BLD: 56 % (ref 40–73)
NITRITE UR QL STRIP.AUTO: NEGATIVE
NRBC # BLD: 0 K/UL (ref 0–0.01)
NRBC BLD-RTO: 0 PER 100 WBC
NT PRO BNP: 231 PG/ML (ref 0–900)
PH UR STRIP: 5.5 (ref 5–8)
PLATELET # BLD AUTO: 217 K/UL (ref 135–420)
PMV BLD AUTO: 10.4 FL (ref 9.2–11.8)
POTASSIUM SERPL-SCNC: 4.6 MMOL/L (ref 3.5–5.5)
PROT SERPL-MCNC: 7.8 G/DL (ref 6.4–8.2)
PROT UR STRIP-MCNC: 30 MG/DL
PROTHROMBIN TIME: 18.6 SEC (ref 11.9–14.7)
RBC # BLD AUTO: 4.18 M/UL (ref 4.2–5.3)
RBC #/AREA URNS HPF: NEGATIVE /HPF (ref 0–5)
SARS-COV-2 RNA RESP QL NAA+PROBE: NOT DETECTED
SODIUM SERPL-SCNC: 138 MMOL/L (ref 136–145)
SP GR UR REFRACTOMETRY: 1.02 (ref 1–1.03)
TROPONIN I SERPL HS-MCNC: 9 NG/L (ref 0–54)
UROBILINOGEN UR QL STRIP.AUTO: 1 EU/DL (ref 0.2–1)
WBC # BLD AUTO: 7.8 K/UL (ref 4.6–13.2)
WBC URNS QL MICRO: ABNORMAL /HPF (ref 0–4)

## 2023-09-09 PROCEDURE — 70450 CT HEAD/BRAIN W/O DYE: CPT

## 2023-09-09 PROCEDURE — 87086 URINE CULTURE/COLONY COUNT: CPT

## 2023-09-09 PROCEDURE — 81001 URINALYSIS AUTO W/SCOPE: CPT

## 2023-09-09 PROCEDURE — 96374 THER/PROPH/DIAG INJ IV PUSH: CPT

## 2023-09-09 PROCEDURE — 87636 SARSCOV2 & INF A&B AMP PRB: CPT

## 2023-09-09 PROCEDURE — 83605 ASSAY OF LACTIC ACID: CPT

## 2023-09-09 PROCEDURE — 2580000003 HC RX 258: Performed by: EMERGENCY MEDICINE

## 2023-09-09 PROCEDURE — 84484 ASSAY OF TROPONIN QUANT: CPT

## 2023-09-09 PROCEDURE — 85610 PROTHROMBIN TIME: CPT

## 2023-09-09 PROCEDURE — 85025 COMPLETE CBC W/AUTO DIFF WBC: CPT

## 2023-09-09 PROCEDURE — 87040 BLOOD CULTURE FOR BACTERIA: CPT

## 2023-09-09 PROCEDURE — 71045 X-RAY EXAM CHEST 1 VIEW: CPT

## 2023-09-09 PROCEDURE — 80053 COMPREHEN METABOLIC PANEL: CPT

## 2023-09-09 PROCEDURE — 83735 ASSAY OF MAGNESIUM: CPT

## 2023-09-09 PROCEDURE — 6360000002 HC RX W HCPCS: Performed by: EMERGENCY MEDICINE

## 2023-09-09 PROCEDURE — 85730 THROMBOPLASTIN TIME PARTIAL: CPT

## 2023-09-09 PROCEDURE — 83880 ASSAY OF NATRIURETIC PEPTIDE: CPT

## 2023-09-09 PROCEDURE — 93005 ELECTROCARDIOGRAM TRACING: CPT | Performed by: EMERGENCY MEDICINE

## 2023-09-09 PROCEDURE — 99285 EMERGENCY DEPT VISIT HI MDM: CPT

## 2023-09-09 RX ORDER — SODIUM CHLORIDE, SODIUM LACTATE, POTASSIUM CHLORIDE, AND CALCIUM CHLORIDE .6; .31; .03; .02 G/100ML; G/100ML; G/100ML; G/100ML
30 INJECTION, SOLUTION INTRAVENOUS ONCE
Status: COMPLETED | OUTPATIENT
Start: 2023-09-09 | End: 2023-09-10

## 2023-09-09 RX ADMIN — WATER 2000 MG: 1 INJECTION INTRAMUSCULAR; INTRAVENOUS; SUBCUTANEOUS at 22:49

## 2023-09-09 RX ADMIN — SODIUM CHLORIDE, SODIUM LACTATE, POTASSIUM CHLORIDE, AND CALCIUM CHLORIDE 2400 ML: 600; 310; 30; 20 INJECTION, SOLUTION INTRAVENOUS at 22:50

## 2023-09-10 VITALS
BODY MASS INDEX: 27.6 KG/M2 | HEART RATE: 57 BPM | RESPIRATION RATE: 10 BRPM | WEIGHT: 171 LBS | OXYGEN SATURATION: 100 % | SYSTOLIC BLOOD PRESSURE: 174 MMHG | TEMPERATURE: 97.6 F | DIASTOLIC BLOOD PRESSURE: 69 MMHG

## 2023-09-10 LAB
EKG ATRIAL RATE: 76 BPM
EKG DIAGNOSIS: NORMAL
EKG P AXIS: 81 DEGREES
EKG P-R INTERVAL: 154 MS
EKG Q-T INTERVAL: 372 MS
EKG QRS DURATION: 88 MS
EKG QTC CALCULATION (BAZETT): 418 MS
EKG R AXIS: 11 DEGREES
EKG T AXIS: 49 DEGREES
EKG VENTRICULAR RATE: 76 BPM
LACTATE SERPL-SCNC: 1.7 MMOL/L (ref 0.4–2)
TROPONIN I SERPL HS-MCNC: 12 NG/L (ref 0–54)

## 2023-09-10 PROCEDURE — 6360000002 HC RX W HCPCS: Performed by: EMERGENCY MEDICINE

## 2023-09-10 PROCEDURE — 96374 THER/PROPH/DIAG INJ IV PUSH: CPT

## 2023-09-10 PROCEDURE — 96375 TX/PRO/DX INJ NEW DRUG ADDON: CPT

## 2023-09-10 PROCEDURE — 93010 ELECTROCARDIOGRAM REPORT: CPT | Performed by: INTERNAL MEDICINE

## 2023-09-10 PROCEDURE — C9254 INJECTION, LACOSAMIDE: HCPCS | Performed by: EMERGENCY MEDICINE

## 2023-09-10 RX ORDER — LACOSAMIDE 200 MG/1
200 TABLET ORAL 2 TIMES DAILY
Qty: 60 TABLET | Refills: 0 | Status: SHIPPED | OUTPATIENT
Start: 2023-09-10 | End: 2023-11-09

## 2023-09-10 RX ORDER — LACOSAMIDE 10 MG/ML
150 INJECTION, SOLUTION INTRAVENOUS
Status: COMPLETED | OUTPATIENT
Start: 2023-09-10 | End: 2023-09-10

## 2023-09-10 RX ADMIN — LACOSAMIDE 150 MG: 10 INJECTION INTRAVENOUS at 02:46

## 2023-09-10 NOTE — ED NOTES
Patient comes via EMS from home c/o AMS more then her usual baseline per family onset 1 hour ago.    Patient was not verbally responding to family  Patient saying hello and her name to RN  Patient cold to the touch, rectal temp 97.6  Blood work obtained, urine obtained  Patient placed on cardiac monitor  Medicated per [facility]     Bushra Gonzalez RN  09/09/23 5774

## 2023-09-10 NOTE — ED NOTES
Patient incontinent of urine, brief changed purwick replaced. Patient repositioned.       Charlotte Michael RN  09/10/23 9859

## 2023-09-10 NOTE — ED PROVIDER NOTES
EMERGENCY DEPARTMENT HISTORY AND PHYSICAL EXAM    2:31 AM      Date: 9/9/2023  Patient Name: Deonna Harper    History of Presenting Illness     Chief Complaint   Patient presents with    Altered Mental Status       History From: EMS  Patient is a 79-year-old female with a history of seizure disorder, diabetes, stroke, hypertension, pulmonary embolism, aortic stenosis, diastolic heart failure, depression, breast cancer, presents emergency department with complaint of altered mental status. According to EMS who provides the majority of the report the patient was doing well and 1 hour prior to arrival became poorly responsive. Patient cannot provide history at this time there is no family to provide any further history. Patient is not a smoker, drinker, nor drug user. Patient does come from home. Nursing Notes were all reviewed and agreed with or any disagreements were addressed in the HPI. PCP: SHANNON Robles - CNP    Current Facility-Administered Medications   Medication Dose Route Frequency Provider Last Rate Last Admin    lacosamide (VIMPAT) injection 150 mg  150 mg IntraVENous NOW Audra Escobar MD         Current Outpatient Medications   Medication Sig Dispense Refill    ondansetron (ZOFRAN-ODT) 4 MG disintegrating tablet Take 1 tablet by mouth every 8 hours as needed for Nausea or Vomiting Take 4 mg by mouth every 8 hours as needed for Nausea or Vomiting.  60 tablet 1    aluminum & magnesium hydroxide-simethicone (MAALOX MAX) 400-400-40 MG/5ML SUSP Take 15 mLs by mouth every 6 hours as needed (heartburn) 355 mL 0    calcium carbonate (TUMS) 500 MG chewable tablet Take 1 tablet by mouth daily as needed for Heartburn 30 tablet 0    apixaban (ELIQUIS) 5 MG TABS tablet Take 1 tablet by mouth 2 times daily 180 tablet 1    atorvastatin (LIPITOR) 40 MG tablet TAKE 1 TABLET BY MOUTH AT BEDTIME FOR HYPERLIPEDEMIA 90 tablet 1    Dulaglutide (TRULICITY) 2.40 XN/9.3VM SOPN Inject 0.75 mg into

## 2023-09-10 NOTE — DISCHARGE INSTRUCTIONS
It appears you have had a seizure. You are on Vimpat 150 mg twice a day and after speaking with neurologist we will increase that to 200 mg twice a day. Make sure to follow-up with your primary doctor this week and also your neurologist to discuss the dosing of your medication. I have given you a 30-day supply of the higher dose to give you time to have the medications adjusted by your neurologist.  There is no evidence of infection during your evaluation and we will follow the cultures from your visit if there is any growth we will contact you. If you have any worsening please return immediately or if if at all concerned.

## 2023-09-10 NOTE — ED NOTES
Patient discharged by provider,   Transport scheduled to take her home     Lacy Barroso  09/10/23 3756

## 2023-09-11 LAB
BACTERIA SPEC CULT: NORMAL
CC UR VC: NORMAL
SERVICE CMNT-IMP: NORMAL

## 2023-09-12 ENCOUNTER — CARE COORDINATION (OUTPATIENT)
Facility: CLINIC | Age: 73
End: 2023-09-12

## 2023-09-12 NOTE — CARE COORDINATION
.Ambulatory Care Coordination Note  9/12/2023    Patient Current Location:  Home: [address] [place] [address] Drive [address]     ACM contacted the family and caregiver by telephone. ACM left message with brother caregiver for return call. ACM noted ED [facility] visit 9/9-10/23 break through seizures. Challenges to be reviewed by the provider   Additional needs identified to be addressed with provider: Yes  1. Seen @ [facility] break through Seizures 9/9-10/23               Method of communication with provider: chart routing.     ACM: Chucky Freeman RN      Offered patient enrollment in the Remote Patient Monitoring (RPM) program for in-home monitoring: .    Lab Results       None                 Goals Addressed    None         Future Appointments   Date Time Provider [address]   9/25/2023  3:40 PM SHANNON Teague - CNP [facility] BS AMB   10/26/2023  2:50 PM Mariana Nicolas MD [facility] BS AMB

## 2023-09-12 NOTE — PROGRESS NOTES
Tyler Booker is a 72 y.o. female  established patient, here for evaluation of the following chief complaint(s):  Chief Complaint   Patient presents with    Follow-up     1 Month F/u       Assessment and Plan  1. Slow transit constipation  -     polyethylene glycol (GLYCOLAX) 17 GM/SCOOP powder; Take 17 g by mouth daily, Disp-510 g, R-5Normal  2. History of breast cancer  -     letrozole (FEMARA) 2.5 MG tablet; TAKE 1 TABLET BY MOUTH ONCE DAILY FOR CHEMOTHERAPY, Disp-30 tablet, R-1Normal  3. Seizure disorder (HCC)  -     lacosamide (VIMPAT) 200 MG tablet; Take 1 tablet by mouth in the morning and at bedtime for 60 days. This is an increased dose from your 150 mg twice daily. Please make sure to review your dosing with your primary neurology provider. Max Daily Amount: 400 mg, Disp-60 tablet, R-0Normal  4. Nail deformity  -     External Referral To Podiatry  5. Type 2 diabetes mellitus with nephropathy ([address] [facility])  -     External Referral To Podiatry  6. Immunization due  -     Influenza, FLUAD, (age 72 y+), IM, Preservative Free, 0.5 mL  7. Yeast dermatitis  -     nystatin (MYCOSTATIN) 650766 UNIT/GM powder; Apply 3 times daily. , Disp-60 g, R-3, Normal  8. Malignant neoplasm of areola of right breast in female, unspecified estrogen receptor status ([address])  -     [facility] W OR WO CAD BILATERAL; Future  9. Breast pain, left  -     [facility] W OR WO CAD BILATERAL; Future  10. History of right breast cancer  -     [facility] W OR WO CAD BILATERAL; Future  11. Essential (primary) hypertension       Return in about 3 months (around 12/25/2023) for Routine, 20. HPI:   In office visit. Pt appears well and in office her brother and boyfriend. Hospital visit 9/9/2023-9/10/2023 related break through seizures   150 mg of Vimpat now IV and then go home on an increased dose of 200 mg twice daily is at the max dose . Pt est care w/ neurology 10/26/2023.   HTN, takes medication as directed     Pt

## 2023-09-15 LAB
BACTERIA SPEC CULT: NORMAL
BACTERIA SPEC CULT: NORMAL
SERVICE CMNT-IMP: NORMAL
SERVICE CMNT-IMP: NORMAL

## 2023-09-19 ENCOUNTER — CARE COORDINATION (OUTPATIENT)
Facility: CLINIC | Age: 73
End: 2023-09-19

## 2023-09-19 NOTE — CARE COORDINATION
.Attempted to contact patient for Complex Care follow up. No answer, left message with contact information provided. Will attempt to contact at a later time. Second weekly attempt at outreach . No more ED visit since last out reach notification.

## 2023-09-25 ENCOUNTER — OFFICE VISIT (OUTPATIENT)
Facility: CLINIC | Age: 73
End: 2023-09-25
Payer: MEDICARE

## 2023-09-25 VITALS
OXYGEN SATURATION: 99 % | TEMPERATURE: 97.6 F | DIASTOLIC BLOOD PRESSURE: 72 MMHG | HEART RATE: 92 BPM | SYSTOLIC BLOOD PRESSURE: 112 MMHG

## 2023-09-25 DIAGNOSIS — Z85.3 HISTORY OF BREAST CANCER: ICD-10-CM

## 2023-09-25 DIAGNOSIS — G40.909 SEIZURE DISORDER (HCC): ICD-10-CM

## 2023-09-25 DIAGNOSIS — K59.01 SLOW TRANSIT CONSTIPATION: Primary | ICD-10-CM

## 2023-09-25 DIAGNOSIS — L60.8 NAIL DEFORMITY: ICD-10-CM

## 2023-09-25 DIAGNOSIS — N64.4 BREAST PAIN, LEFT: ICD-10-CM

## 2023-09-25 DIAGNOSIS — I10 ESSENTIAL (PRIMARY) HYPERTENSION: ICD-10-CM

## 2023-09-25 DIAGNOSIS — Z23 IMMUNIZATION DUE: ICD-10-CM

## 2023-09-25 DIAGNOSIS — E11.21 TYPE 2 DIABETES MELLITUS WITH NEPHROPATHY (HCC): ICD-10-CM

## 2023-09-25 DIAGNOSIS — B37.2 YEAST DERMATITIS: ICD-10-CM

## 2023-09-25 DIAGNOSIS — Z85.3 HISTORY OF RIGHT BREAST CANCER: ICD-10-CM

## 2023-09-25 DIAGNOSIS — C50.011 MALIGNANT NEOPLASM OF AREOLA OF RIGHT BREAST IN FEMALE, UNSPECIFIED ESTROGEN RECEPTOR STATUS (HCC): ICD-10-CM

## 2023-09-25 PROCEDURE — 3078F DIAST BP <80 MM HG: CPT | Performed by: NURSE PRACTITIONER

## 2023-09-25 PROCEDURE — 3044F HG A1C LEVEL LT 7.0%: CPT | Performed by: NURSE PRACTITIONER

## 2023-09-25 PROCEDURE — G0008 ADMIN INFLUENZA VIRUS VAC: HCPCS | Performed by: NURSE PRACTITIONER

## 2023-09-25 PROCEDURE — 3074F SYST BP LT 130 MM HG: CPT | Performed by: NURSE PRACTITIONER

## 2023-09-25 PROCEDURE — 90694 VACC AIIV4 NO PRSRV 0.5ML IM: CPT | Performed by: NURSE PRACTITIONER

## 2023-09-25 PROCEDURE — 99214 OFFICE O/P EST MOD 30 MIN: CPT | Performed by: NURSE PRACTITIONER

## 2023-09-25 PROCEDURE — 1123F ACP DISCUSS/DSCN MKR DOCD: CPT | Performed by: NURSE PRACTITIONER

## 2023-09-25 RX ORDER — POLYETHYLENE GLYCOL 3350 17 G/17G
17 POWDER, FOR SOLUTION ORAL DAILY
Qty: 510 G | Refills: 5 | Status: SHIPPED | OUTPATIENT
Start: 2023-09-25 | End: 2024-03-23

## 2023-09-25 RX ORDER — LACOSAMIDE 200 MG/1
200 TABLET ORAL 2 TIMES DAILY
Qty: 60 TABLET | Refills: 0 | Status: SHIPPED | OUTPATIENT
Start: 2023-09-25 | End: 2023-11-24

## 2023-09-25 RX ORDER — NYSTATIN 100000 [USP'U]/G
POWDER TOPICAL
Qty: 60 G | Refills: 3 | Status: SHIPPED | OUTPATIENT
Start: 2023-09-25

## 2023-09-25 RX ORDER — LETROZOLE 2.5 MG/1
TABLET, FILM COATED ORAL
Qty: 30 TABLET | Refills: 1 | Status: SHIPPED | OUTPATIENT
Start: 2023-09-25

## 2023-09-26 ENCOUNTER — CARE COORDINATION (OUTPATIENT)
Facility: CLINIC | Age: 73
End: 2023-09-26

## 2023-09-26 NOTE — CARE COORDINATION
.Ambulatory Care Coordination Note  2023    Patient Current Location:  Home: [address] [place] [address],[address] [phone]     ACM contacted the patient by telephone. Verified name and  with patient as identifiers. Provided introduction to self, and explanation of the ACM role. Patient doing well no more seizure activity. Has seen PCP for f/u 23. Yeasty rash groining perineum due to using brief orders received. Challenges to be reviewed by the provider   Additional needs identified to be addressed with provider: No  none               Method of communication with provider: none. ACM: Rohit Amado RN      Offered patient enrollment in the Remote Patient Monitoring (RPM) program for in-home monitoring: Patient is not eligible for RPM program.    Lab Results       None                 Goals Addressed                   This Visit's Progress     Care Coordination Self Management   On track     CC Self Management Goal  Patient Goal (What steps will patient take to achieve goal?): Care giver assist  Patient is able to discuss self-management of condition(s):  Pt demonstrates adherence to medications  Pt demonstrates understanding of self-monitoring  Patient is able to identify Red Flags:  Alert to potential adverse drug reactions(s) or side effects and actions to take should they arise  Discuss target symptoms and actions to take should they arise  Identify problems that require immediate PCP or specialist visit  Patient demonstrates understanding of access to PCP/Specialist:  Understands about scheduling routine Follow Up appointments   Understands about sick day appointment options for worsening of symptoms/progression (Same Day, E Visits)       Follows nutrition recommendations and maintains goal weight (small/ frequent meals)   On track     Patient needs to adhere to a Low saturated fat, no concentrated sweets, no salt diet.   23  ongoing       Relieve psychological distress and prepare

## 2023-09-29 ENCOUNTER — CARE COORDINATION (OUTPATIENT)
Facility: CLINIC | Age: 73
End: 2023-09-29

## 2023-09-29 NOTE — CARE COORDINATION
.Ambulatory Care Coordination Note  2023    Patient Current Location:  Home: [address] [place] [address],Building [facility] [phone]     ACM contacted the patient by telephone. Verified name and  with patient as identifiers. Provided introduction to self, and explanation of the ACM role. Brother Madyson Doherty voiced patient is doing better. She is taking her medications and is compliant with her aide. She is not ready to discuss ACP as she gets upset about it. Challenges to be reviewed by the provider   Additional needs identified to be addressed with provider: No  none               Method of communication with provider: none.     ACM: Ish Bejarano RN    Offered patient enrollment in the Remote Patient Monitoring (RPM) program for in-home monitoring: Patient is not eligible for RPM program.    Lab Results       None                 Goals Addressed    None         Future Appointments   Date Time Provider [address] [place] [address]   2023  4:00 PM SHANNON Reyes CNP, [facility] BS AMB

## 2023-10-03 ENCOUNTER — CARE COORDINATION (OUTPATIENT)
Facility: CLINIC | Age: 73
End: 2023-10-03

## 2023-10-03 NOTE — CARE COORDINATION
.Ambulatory Care Coordination Note  10/3/2023    Patient Current Location:  Home: [address] [place] [address] Drive [address]     ACM contacted the  Brother PHIDora  by telephone. Verified name and  with caregiver as identifiers. Provided introduction to self, and explanation of the ACM role. Sister is doing fine. New aide is doing well with her. No seizure activities noted. Challenges to be reviewed by the provider   Additional needs identified to be addressed with provider: Yes  Neurologist Dr Pricila Frias   cancelled appointment for 10/26, brother is contacting as to why he was not informed to reschedule. Method of communication with provider: chart routing.     ACM: Jerri Chilel, RN      Offered patient enrollment in the Remote Patient Monitoring (RPM) program for in-home monitoring: Patient is not eligible for RPM program.    Lab Results       None                 Goals Addressed    None         Future Appointments   Date Time Provider [address]   2023  4:00 PM SHANNON Watson - CNP [facility] BS AMB

## 2023-10-08 ENCOUNTER — APPOINTMENT (OUTPATIENT)
Facility: HOSPITAL | Age: 73
End: 2023-10-08
Payer: MEDICARE

## 2023-10-08 ENCOUNTER — HOSPITAL ENCOUNTER (EMERGENCY)
Facility: HOSPITAL | Age: 73
Discharge: HOME | End: 2023-10-08
Attending: EMERGENCY MEDICINE
Payer: MEDICARE

## 2023-10-08 VITALS
HEART RATE: 68 BPM | RESPIRATION RATE: 13 BRPM | WEIGHT: 180 LBS | OXYGEN SATURATION: 100 % | BODY MASS INDEX: 29.05 KG/M2 | TEMPERATURE: 98.3 F | DIASTOLIC BLOOD PRESSURE: 61 MMHG | SYSTOLIC BLOOD PRESSURE: 151 MMHG

## 2023-10-08 DIAGNOSIS — R07.89 CHEST WALL PAIN: Primary | ICD-10-CM

## 2023-10-08 LAB
ANION GAP SERPL CALC-SCNC: 3 MMOL/L (ref 3–18)
BASOPHILS # BLD: 0 K/UL (ref 0–0.1)
BASOPHILS NFR BLD: 1 % (ref 0–2)
BUN SERPL-MCNC: 29 MG/DL (ref 7–18)
BUN/CREAT SERPL: 27 (ref 12–20)
CALCIUM SERPL-MCNC: 9.1 MG/DL (ref 8.5–10.1)
CHLORIDE SERPL-SCNC: 111 MMOL/L (ref 100–111)
CO2 SERPL-SCNC: 27 MMOL/L (ref 21–32)
CREAT SERPL-MCNC: 1.07 MG/DL (ref 0.6–1.3)
DIFFERENTIAL METHOD BLD: ABNORMAL
EKG ATRIAL RATE: 79 BPM
EKG DIAGNOSIS: NORMAL
EKG P AXIS: 65 DEGREES
EKG P-R INTERVAL: 150 MS
EKG Q-T INTERVAL: 360 MS
EKG QRS DURATION: 88 MS
EKG QTC CALCULATION (BAZETT): 412 MS
EKG R AXIS: -3 DEGREES
EKG T AXIS: 48 DEGREES
EKG VENTRICULAR RATE: 79 BPM
EOSINOPHIL # BLD: 0.2 K/UL (ref 0–0.4)
EOSINOPHIL NFR BLD: 3 % (ref 0–5)
ERYTHROCYTE [DISTWIDTH] IN BLOOD BY AUTOMATED COUNT: 14.6 % (ref 11.6–14.5)
GLUCOSE SERPL-MCNC: 163 MG/DL (ref 74–99)
HCT VFR BLD AUTO: 35.2 % (ref 35–45)
HGB BLD-MCNC: 10.8 G/DL (ref 12–16)
IMM GRANULOCYTES # BLD AUTO: 0 K/UL (ref 0–0.04)
IMM GRANULOCYTES NFR BLD AUTO: 0 % (ref 0–0.5)
LYMPHOCYTES # BLD: 3.4 K/UL (ref 0.9–3.6)
LYMPHOCYTES NFR BLD: 54 % (ref 21–52)
MCH RBC QN AUTO: 26.3 PG (ref 24–34)
MCHC RBC AUTO-ENTMCNC: 30.7 G/DL (ref 31–37)
MCV RBC AUTO: 85.6 FL (ref 78–100)
MONOCYTES # BLD: 0.3 K/UL (ref 0.05–1.2)
MONOCYTES NFR BLD: 5 % (ref 3–10)
NEUTS SEG # BLD: 2.3 K/UL (ref 1.8–8)
NEUTS SEG NFR BLD: 37 % (ref 40–73)
NRBC # BLD: 0 K/UL (ref 0–0.01)
NRBC BLD-RTO: 0 PER 100 WBC
PLATELET # BLD AUTO: 186 K/UL (ref 135–420)
PMV BLD AUTO: 11.3 FL (ref 9.2–11.8)
POTASSIUM SERPL-SCNC: 4.8 MMOL/L (ref 3.5–5.5)
RBC # BLD AUTO: 4.11 M/UL (ref 4.2–5.3)
SODIUM SERPL-SCNC: 141 MMOL/L (ref 136–145)
TROPONIN I SERPL HS-MCNC: 8 NG/L (ref 0–54)
TROPONIN I SERPL HS-MCNC: 9 NG/L (ref 0–54)
WBC # BLD AUTO: 6.2 K/UL (ref 4.6–13.2)

## 2023-10-08 PROCEDURE — 93005 ELECTROCARDIOGRAM TRACING: CPT | Performed by: EMERGENCY MEDICINE

## 2023-10-08 PROCEDURE — 84484 ASSAY OF TROPONIN QUANT: CPT

## 2023-10-08 PROCEDURE — 71275 CT ANGIOGRAPHY CHEST: CPT

## 2023-10-08 PROCEDURE — 85025 COMPLETE CBC W/AUTO DIFF WBC: CPT

## 2023-10-08 PROCEDURE — 6370000000 HC RX 637 (ALT 250 FOR IP): Performed by: EMERGENCY MEDICINE

## 2023-10-08 PROCEDURE — 80048 BASIC METABOLIC PNL TOTAL CA: CPT

## 2023-10-08 PROCEDURE — 6360000004 HC RX CONTRAST MEDICATION: Performed by: EMERGENCY MEDICINE

## 2023-10-08 PROCEDURE — 99285 EMERGENCY DEPT VISIT HI MDM: CPT

## 2023-10-08 PROCEDURE — 93010 ELECTROCARDIOGRAM REPORT: CPT | Performed by: INTERNAL MEDICINE

## 2023-10-08 RX ADMIN — ALUMINUM HYDROXIDE AND MAGNESIUM HYDROXIDE 30 ML: 200; 200 SUSPENSION ORAL at 14:07

## 2023-10-08 RX ADMIN — IOPAMIDOL 80 ML: 612 INJECTION, SOLUTION INTRAVENOUS at 12:32

## 2023-10-08 NOTE — ED NOTES
PATIENT RETURNED TO THE DEPT AND SETTLED IN BED. PATIENT REATTACHED TO THE MONITORS.        Bertram Rendon RN  10/08/23 9339

## 2023-10-08 NOTE — ED TRIAGE NOTES
PATIENT TAKEN TO THE EMERGENCY DEPT BY EMS WITH COMPLAINT OF CHEST PAIN X THIS MORNING. PATIENT DENIES ALL OTHER SYMPTOMS       PATIENT ATTACHED TO MONITOR, EKG DONE. PATIENT ALERT AND ORIENTED X 4.  BREATHING ON ROOM AIR IN NIL DISTRESS

## 2023-10-08 NOTE — ED PROVIDER NOTES
PM)    Procedures:   Critical Care Time: 0  If critical care time is note it is exclusive of any separately billable procedures. Aspirin: (was aspirin given for stroke?)    Diagnosis     Clinical Impression:   1. Chest wall pain        Disposition: DISPOSITION Decision To Discharge 10/08/2023 01:59:41 PM       New for 2023:  DISCHARGE MEDICATIONS:  Current Discharge Medication List             Details   letrozole ([address]) 2.5 MG tablet TAKE 1 TABLET BY MOUTH ONCE DAILY FOR CHEMOTHERAPY  Qty: 30 tablet, Refills: 1    Associated Diagnoses: History of breast cancer      lacosamide (VIMPAT) 200 MG tablet Take 1 tablet by mouth in the morning and at bedtime for 60 days. This is an increased dose from your 150 mg twice daily. Please make sure to review your dosing with your primary neurology provider. Max Daily Amount: 400 mg  Qty: 60 tablet, Refills: 0    Associated Diagnoses: Seizure disorder (HCC)      polyethylene glycol (GLYCOLAX) 17 GM/SCOOP powder Take 17 g by mouth daily  Qty: 510 g, Refills: 5    Associated Diagnoses: Slow transit constipation      nystatin (MYCOSTATIN) 835481 UNIT/GM powder Apply 3 times daily. Qty: 60 g, Refills: 3    Associated Diagnoses: Yeast dermatitis      ondansetron (ZOFRAN-ODT) 4 MG disintegrating tablet Take 1 tablet by mouth every 8 hours as needed for Nausea or Vomiting Take 4 mg by mouth every 8 hours as needed for Nausea or Vomiting.   Qty: 60 tablet, Refills: 1    Associated Diagnoses: Nausea      aluminum & magnesium hydroxide-simethicone (MAALOX MAX) 400-400-40 MG/5ML SUSP Take 15 mLs by mouth every 6 hours as needed (heartburn)  Qty: 355 mL, Refills: 0      apixaban (ELIQUIS) 5 MG TABS tablet Take 1 tablet by mouth 2 times daily  Qty: 180 tablet, Refills: 1    Associated Diagnoses: History of CVA (cerebrovascular accident)      atorvastatin (LIPITOR) 40 MG tablet TAKE 1 TABLET BY MOUTH AT BEDTIME FOR HYPERLIPEDEMIA  Qty: 90 tablet, Refills: 1    Associated Diagnoses: Pure

## 2023-10-08 NOTE — ED NOTES
PATIENT HAD PERINEAL CARE DONE AND DIAPER CHANGED.  PATIENT PLACED ON Yasmine Baldwin RN  10/08/23 1256

## 2023-10-08 NOTE — ED NOTES
PATIENT MEDICATED AS PER MAR, PATIENT INFORMED OF EFFECTS OF MEDICATION. ALLERGIES VERIFIED AT THIS TIME BY PATIENT.       PATIENT REVIEW AND DISCHARGE FOR HOME, PATIENT AWAITS Lynette Millan RN  10/08/23 2196

## 2023-10-09 ENCOUNTER — CARE COORDINATION (OUTPATIENT)
Facility: CLINIC | Age: 73
End: 2023-10-09

## 2023-10-09 NOTE — CARE COORDINATION
.Ambulatory Care Coordination Note  10/9/2023    Patient Current Location:  Home: [address] [place] [address],[address] [phone]     ACM contacted the caregiver by telephone. Verified name and  with caregiver as identifiers. Provided introduction to self, and explanation of the ACM role. Challenges to be reviewed by the provider   Additional needs identified to be addressed with provider: Yes  1. Patient seen in [facility] ED 10/8/23 C/O chest pains Cardiac issues r/o Indigestions. Declines 3 day f/u at present. Gave Maalox 30 cc. Before going to the ED she was asked questions concerning SOB,Diaphoresis heart palpitations, even no to all she still wanted to be seen. Due to her anxiety and alertness EMS took her over ruled her brother's input. Method of communication with provider: chart routing.     ACM: Rohit Amado RN    Offered patient enrollment in the Remote Patient Monitoring (RPM) program for in-home monitoring: .    Lab Results       None                 Goals Addressed                   This Visit's Progress     Care Coordination Self Management   On track     CC Self Management Goal  Patient Goal (What steps will patient take to achieve goal?): Care giver assist  Patient is able to discuss self-management of condition(s):  Pt demonstrates adherence to medications  Pt demonstrates understanding of self-monitoring  Patient is able to identify Red Flags:  Alert to potential adverse drug reactions(s) or side effects and actions to take should they arise  Discuss target symptoms and actions to take should they arise  Identify problems that require immediate PCP or specialist visit  Patient demonstrates understanding of access to PCP/Specialist:  Understands about scheduling routine Follow Up appointments   Understands about sick day appointment options for worsening of symptoms/progression (Same Day, E Visits)       Follows nutrition recommendations and maintains goal weight (small/ frequent

## 2023-10-24 ENCOUNTER — CARE COORDINATION (OUTPATIENT)
Facility: CLINIC | Age: 73
End: 2023-10-24

## 2023-10-24 NOTE — CARE COORDINATION
.Ambulatory Care Coordination Note  10/24/2023    Patient Current Location:  Home: [address] [place] [address] Drive [phone]     ACM contacted the  brother/caregiver   by telephone. Verified name and  with caregiver as identifiers. Provided introduction to self, and explanation of the ACM role. Challenges to be reviewed by the provider   Additional needs identified to be addressed with provider: No  none               Method of communication with provider: none.     ACM: Margarita Wick RN    Offered patient enrollment in the Remote Patient Monitoring (RPM) program for in-home monitoring: Patient is not eligible for RPM program.    Lab Results       None                 Goals Addressed    None         Future Appointments   Date Time Provider [address]   2023  4:00 PM SHANNON Green CNP, DMA BS AMB

## 2023-11-07 ENCOUNTER — CARE COORDINATION (OUTPATIENT)
Facility: CLINIC | Age: 73
End: 2023-11-07

## 2023-11-07 NOTE — CARE COORDINATION
.Ambulatory Care Coordination Note  2023    Patient Current Location:  Home: [address] [place] [address] Drive [phone]     ACM contacted the family and caregiver by telephone. Verified name and  with family and caregiver as identifiers. Provided introduction to self, and explanation of the ACM role. Challenges to be reviewed by the provider   Additional needs identified to be addressed with provider: Yes  1. Brother voicing the neurologist, Dr Cande Barba  has cancelled patients appointment x 2 and this time has not rescheduled it. 2. No break thru seizures  , no ED visits since 10/8/23   C/P ( Indigestion) . 3. Patient is doing well so far so good per brother. Method of communication with provider: chart routing. ACM: Mahamed Khalil RN    Offered patient enrollment in the Remote Patient Monitoring (RPM) program for in-home monitoring: .    Lab Results       None            Care Coordination Interventions    Referral from Primary Care Provider: Yes  Suggested Interventions and [address]: In Process  Medi Set or Pill Pack: In Process  Occupational Therapy: In Process  Physical Therapy: In Process  Other Services or Interventions: Per brother caregiver patient has 57 hours of PCA per week inaddition to [facility] therapies.           Goals Addressed    None         Future Appointments   Date Time Provider [address]   2023  4:00 PM SHANNON Baldwin - CNP DMA BS AMB

## 2023-11-21 ENCOUNTER — CARE COORDINATION (OUTPATIENT)
Facility: CLINIC | Age: 73
End: 2023-11-21

## 2023-11-21 NOTE — CARE COORDINATION
.Ambulatory Care Coordination Note  2023    Patient Current Location:  Home: [address] [place] [address],[address] [phone]     ACM contacted the patient by telephone. Verified name and  with caregiver as identifiers. Provided introduction to self, and explanation of the ACM role. Challenges to be reviewed by the provider   Additional needs identified to be addressed with provider: No  none               Method of communication with provider: none. ACM: Judson Rae RN      Offered patient enrollment in the Remote Patient Monitoring (RPM) program for in-home monitoring: Patient declined. Lab Results       None            Care Coordination Interventions    Referral from Primary Care Provider: Yes  Suggested Interventions and [address]: In Process  Medi Set or Pill Pack: In Process  Occupational Therapy: In Process  Physical Therapy: In Process  Other Services or Interventions: Per brother caregiver patient has 57 hours of PCA per week inaddition to [facility] therapies.        .   Goals Addressed                   This Visit's Progress     Care Coordination Self Management   On track     CC Self Management Goal  Patient Goal (What steps will patient take to achieve goal?): Care giver assist  Patient is able to discuss self-management of condition(s):  Pt demonstrates adherence to medications  Pt demonstrates understanding of self-monitoring  Patient is able to identify Red Flags:  Alert to potential adverse drug reactions(s) or side effects and actions to take should they arise  Discuss target symptoms and actions to take should they arise  Identify problems that require immediate PCP or specialist visit  Patient demonstrates understanding of access to PCP/Specialist:  Understands about scheduling routine Follow Up appointments   Understands about sick day appointment options for worsening of symptoms/progression (Same Day, E Visits)       Follows nutrition recommendations

## 2023-11-24 DIAGNOSIS — I10 ESSENTIAL (PRIMARY) HYPERTENSION: ICD-10-CM

## 2023-11-24 NOTE — TELEPHONE ENCOUNTER
Medication(s) requesting:   Requested Prescriptions     Pending Prescriptions Disp Refills    losartan (COZAAR) 50 MG tablet 90 tablet 1     Si2016 Losartan Oral  PO 1.0 TABLET(S) BID  2016  active       Last office visit:  2023  Next office visit DMA: 2023  FUTURE APPT:   Future Appointments   Date Time Provider [address]   2023  9:00 AM Catia Luna MD [facility] BS AMB   2023  4:00 PM SHANNON Smith - CNP [facility] BS AMB

## 2023-11-27 RX ORDER — LOSARTAN POTASSIUM 50 MG/1
TABLET ORAL
Qty: 90 TABLET | Refills: 1 | Status: SHIPPED | OUTPATIENT
Start: 2023-11-27

## 2023-11-28 ENCOUNTER — CARE COORDINATION (OUTPATIENT)
Facility: CLINIC | Age: 73
End: 2023-11-28

## 2023-11-28 NOTE — CARE COORDINATION
.Attempted to contact patient for [facility] ( High Risk Case Management ). No answer, left message with contact information provided. Will attempt to contact at a later time .  EMR reviewed No ED or hospital admissions noted

## 2023-12-06 ENCOUNTER — TELEPHONE (OUTPATIENT)
Facility: CLINIC | Age: 73
End: 2023-12-06

## 2023-12-06 NOTE — TELEPHONE ENCOUNTER
Ashley with [employer] states they are not able to reach patient at the number listed in chart and wanted to know if we had an emergency contact name and number.    Provided her with the information for [facility], as Demario Quintero has the same number as patient.    Thank you.

## 2023-12-08 ENCOUNTER — TELEPHONE (OUTPATIENT)
Age: 73
End: 2023-12-08

## 2023-12-08 NOTE — TELEPHONE ENCOUNTER
Called the patient to get scheduled with cardiology per the referral from the pcp's office. Pts brother answered and said he would have her call back.

## 2023-12-12 ENCOUNTER — CARE COORDINATION (OUTPATIENT)
Facility: CLINIC | Age: 73
End: 2023-12-12

## 2023-12-12 NOTE — CARE COORDINATION
.Patient has graduated from the Complex Case Management  program on 12/12/23. Patient/family has the ability to self-manage at this time. Care management goals have been completed. No further Ambulatory Care Manager follow up scheduled. Goals Addressed                   This Visit's Progress     COMPLETED: Care Coordination Self Management        CC Self Management Goal  Patient Goal (What steps will patient take to achieve goal?): Care giver assist  Patient is able to discuss self-management of condition(s):  Pt demonstrates adherence to medications  Pt demonstrates understanding of self-monitoring  Patient is able to identify Red Flags:  Alert to potential adverse drug reactions(s) or side effects and actions to take should they arise  Discuss target symptoms and actions to take should they arise  Identify problems that require immediate PCP or specialist visit  Patient demonstrates understanding of access to PCP/Specialist:  Understands about scheduling routine Follow Up appointments   Understands about sick day appointment options for worsening of symptoms/progression (Same Day, E Visits)       COMPLETED: Follows nutrition recommendations and maintains goal weight (small/ frequent meals)        Patient needs to adhere to a Low saturated fat, no concentrated sweets, no salt diet. 9/26/23  Ongoing  10/9/23  ACM reviewed diet again including low acid ,spicy before bedtime. COMPLETED: Relieve psychological distress and prepare patient/family for challenges with disease process. Patient has multiple health issues and dependent on others. edication changes ( Increased)  9/26/23  Patient was seen in ED for break thru seizures   10/8/23  Patient seen in ED for chest pains cardiac issues ruled out indigestion relived by Maalox antacid 30 ml. ACM suggested to do a small evaluation with simple questions, are you sob,do you have sweating, check heart rate. Give OTC antacid ( MOM or Maalox.  No relief call

## 2023-12-18 ENCOUNTER — APPOINTMENT (OUTPATIENT)
Facility: HOSPITAL | Age: 73
DRG: 072 | End: 2023-12-18
Payer: MEDICARE

## 2023-12-18 ENCOUNTER — HOSPITAL ENCOUNTER (EMERGENCY)
Facility: HOSPITAL | Age: 73
Discharge: HOME | DRG: 072 | End: 2023-12-18
Payer: MEDICARE

## 2023-12-18 VITALS
OXYGEN SATURATION: 98 % | DIASTOLIC BLOOD PRESSURE: 80 MMHG | WEIGHT: 180 LBS | HEART RATE: 80 BPM | BODY MASS INDEX: 28.93 KG/M2 | RESPIRATION RATE: 20 BRPM | TEMPERATURE: 97.2 F | HEIGHT: 66 IN | SYSTOLIC BLOOD PRESSURE: 160 MMHG

## 2023-12-18 DIAGNOSIS — G40.909 SEIZURE DISORDER (HCC): ICD-10-CM

## 2023-12-18 DIAGNOSIS — R56.9 SEIZURE (HCC): Primary | ICD-10-CM

## 2023-12-18 LAB
ALBUMIN SERPL-MCNC: 3.3 G/DL (ref 3.4–5)
ALBUMIN/GLOB SERPL: 0.6 (ref 0.8–1.7)
ALP SERPL-CCNC: 95 U/L (ref 45–117)
ALT SERPL-CCNC: 25 U/L (ref 13–56)
ANION GAP SERPL CALC-SCNC: 4 MMOL/L (ref 3–18)
AST SERPL-CCNC: 16 U/L (ref 10–38)
BASOPHILS # BLD: 0.1 K/UL (ref 0–0.1)
BASOPHILS NFR BLD: 1 % (ref 0–2)
BILIRUB SERPL-MCNC: 0.4 MG/DL (ref 0.2–1)
BUN SERPL-MCNC: 34 MG/DL (ref 7–18)
BUN/CREAT SERPL: 30 (ref 12–20)
CALCIUM SERPL-MCNC: 9.7 MG/DL (ref 8.5–10.1)
CHLORIDE SERPL-SCNC: 110 MMOL/L (ref 100–111)
CO2 SERPL-SCNC: 26 MMOL/L (ref 21–32)
CREAT SERPL-MCNC: 1.15 MG/DL (ref 0.6–1.3)
DIFFERENTIAL METHOD BLD: ABNORMAL
EOSINOPHIL # BLD: 0.1 K/UL (ref 0–0.4)
EOSINOPHIL NFR BLD: 1 % (ref 0–5)
ERYTHROCYTE [DISTWIDTH] IN BLOOD BY AUTOMATED COUNT: 13.5 % (ref 11.6–14.5)
GLOBULIN SER CALC-MCNC: 5.1 G/DL (ref 2–4)
GLUCOSE SERPL-MCNC: 134 MG/DL (ref 74–99)
HCT VFR BLD AUTO: 32.9 % (ref 35–45)
HGB BLD-MCNC: 10.4 G/DL (ref 12–16)
IMM GRANULOCYTES # BLD AUTO: 0.1 K/UL (ref 0–0.04)
IMM GRANULOCYTES NFR BLD AUTO: 1 % (ref 0–0.5)
LACTATE SERPL-SCNC: 1.3 MMOL/L (ref 0.4–2)
LIPASE SERPL-CCNC: 30 U/L (ref 13–75)
LYMPHOCYTES # BLD: 1.7 K/UL (ref 0.9–3.6)
LYMPHOCYTES NFR BLD: 19 % (ref 21–52)
MCH RBC QN AUTO: 27.6 PG (ref 24–34)
MCHC RBC AUTO-ENTMCNC: 31.6 G/DL (ref 31–37)
MCV RBC AUTO: 87.3 FL (ref 78–100)
MONOCYTES # BLD: 0.4 K/UL (ref 0.05–1.2)
MONOCYTES NFR BLD: 4 % (ref 3–10)
NEUTS SEG # BLD: 6.9 K/UL (ref 1.8–8)
NEUTS SEG NFR BLD: 75 % (ref 40–73)
NRBC # BLD: 0 K/UL (ref 0–0.01)
NRBC BLD-RTO: 0 PER 100 WBC
PLATELET # BLD AUTO: 236 K/UL (ref 135–420)
PMV BLD AUTO: 10.3 FL (ref 9.2–11.8)
POTASSIUM SERPL-SCNC: 4.6 MMOL/L (ref 3.5–5.5)
PROT SERPL-MCNC: 8.4 G/DL (ref 6.4–8.2)
RBC # BLD AUTO: 3.77 M/UL (ref 4.2–5.3)
SODIUM SERPL-SCNC: 140 MMOL/L (ref 136–145)
WBC # BLD AUTO: 9.2 K/UL (ref 4.6–13.2)

## 2023-12-18 PROCEDURE — 80053 COMPREHEN METABOLIC PANEL: CPT

## 2023-12-18 PROCEDURE — 99284 EMERGENCY DEPT VISIT MOD MDM: CPT

## 2023-12-18 PROCEDURE — 83690 ASSAY OF LIPASE: CPT

## 2023-12-18 PROCEDURE — 70450 CT HEAD/BRAIN W/O DYE: CPT

## 2023-12-18 PROCEDURE — 83605 ASSAY OF LACTIC ACID: CPT

## 2023-12-18 PROCEDURE — 85025 COMPLETE CBC W/AUTO DIFF WBC: CPT

## 2023-12-18 RX ORDER — LACOSAMIDE 200 MG/1
200 TABLET ORAL 2 TIMES DAILY
Qty: 60 TABLET | Refills: 1 | Status: SHIPPED | OUTPATIENT
Start: 2023-12-18 | End: 2024-02-16

## 2023-12-18 ASSESSMENT — PAIN - FUNCTIONAL ASSESSMENT: PAIN_FUNCTIONAL_ASSESSMENT: NONE - DENIES PAIN

## 2023-12-18 NOTE — ED NOTES
6:07 PM :Pt care assumed from Dr. Shady Galvez , ED provider. Pt complaint(s), current treatment plan, progression and available diagnostic results have been discussed thoroughly. The patient was seen and evaluated on my shift.    Rounding occurred: {Yes No:58790}  Intended Disposition: ***  Pending diagnostic reports and/or labs (please list): ***        Hx seizures, vimpat, two seizures today  No pain, at baseline, dementia, appt 12/27

## 2023-12-18 NOTE — ED PROVIDER NOTES
[facility] EMERGENCY DEPT  EMERGENCY DEPARTMENT ENCOUNTER      Pt Name: Alyssa Reese  MRN: [id number]  [address] [facility] 1950  Date of evaluation: 12/18/2023  Provider: KERI Cantu  5:45 PM    CHIEF COMPLAINT       Chief Complaint   Patient presents with    Seizures         HISTORY OF PRESENT ILLNESS    Alyssa Reese is a 72 y.o. female who presents to the emergency department with witnessed seizure x 2 today. Her significant other and brother are in the waiting room. They said that she had 2 seizures today witnessed by significant other who called brother. Patient is compliant with her Vimpat. Has a neurology appointment on the 27th of this month. Denies any medication changes. She did not hit her head. Has not had a seizure in a couple of months. History of vascular dementia per brother. Is almost out of her Vimpat. Significant other said he gave her her medications today. Brother and SO are in [place]. HPI    Nursing Notes were reviewed. REVIEW OF SYSTEMS       Review of Systems   Constitutional: Negative. HENT: Negative. Eyes: Negative. Respiratory: Negative. Cardiovascular: Negative. Gastrointestinal: Negative. Endocrine: Negative. Genitourinary: Negative. Musculoskeletal: Negative. Allergic/Immunologic: Negative. Neurological:  Positive for seizures. Hematological:  Bruises/bleeds easily. Psychiatric/Behavioral: Negative. Except as noted above the remainder of the review of systems was reviewed and negative.        PAST MEDICAL HISTORY     Past Medical History:   Diagnosis Date    Arthritis     Breast CA ([address]) 2016    DDD (degenerative disc disease), cervical     Depression     Diabetes ([address])     DJD (degenerative joint disease) of cervical spine     Heart murmur     History of noncompliance with medical treatment     Hypercholesteremia     Hypertension     Menopause     Mild diastolic dysfunction     ECHO 5/2020     PE (pulmonary thromboembolism) ([address]) 2019

## 2023-12-20 ENCOUNTER — APPOINTMENT (OUTPATIENT)
Facility: HOSPITAL | Age: 73
DRG: 072 | End: 2023-12-20
Payer: MEDICARE

## 2023-12-20 ENCOUNTER — HOSPITAL ENCOUNTER (INPATIENT)
Facility: HOSPITAL | Age: 73
LOS: 3 days | Discharge: HOME | DRG: 072 | End: 2023-12-24
Attending: EMERGENCY MEDICINE | Admitting: STUDENT IN AN ORGANIZED HEALTH CARE EDUCATION/TRAINING PROGRAM
Payer: MEDICARE

## 2023-12-20 DIAGNOSIS — R41.82 ALTERED MENTAL STATUS, UNSPECIFIED ALTERED MENTAL STATUS TYPE: Primary | ICD-10-CM

## 2023-12-20 LAB
ALBUMIN SERPL-MCNC: 2.8 G/DL (ref 3.4–5)
ALBUMIN/GLOB SERPL: 0.6 (ref 0.8–1.7)
ALP SERPL-CCNC: 77 U/L (ref 45–117)
ALT SERPL-CCNC: 21 U/L (ref 13–56)
AMPHET UR QL SCN: NEGATIVE
ANION GAP SERPL CALC-SCNC: 5 MMOL/L (ref 3–18)
AST SERPL-CCNC: 26 U/L (ref 10–38)
BARBITURATES UR QL SCN: NEGATIVE
BENZODIAZ UR QL: NEGATIVE
BILIRUB SERPL-MCNC: 0.5 MG/DL (ref 0.2–1)
BUN SERPL-MCNC: 26 MG/DL (ref 7–18)
BUN/CREAT SERPL: 28 (ref 12–20)
CALCIUM SERPL-MCNC: 8.9 MG/DL (ref 8.5–10.1)
CANNABINOIDS UR QL SCN: NEGATIVE
CHLORIDE SERPL-SCNC: 110 MMOL/L (ref 100–111)
CO2 SERPL-SCNC: 24 MMOL/L (ref 21–32)
COCAINE UR QL SCN: NEGATIVE
CREAT SERPL-MCNC: 0.92 MG/DL (ref 0.6–1.3)
ERYTHROCYTE [DISTWIDTH] IN BLOOD BY AUTOMATED COUNT: 13.4 % (ref 11.6–14.5)
ETHANOL SERPL-MCNC: <3 MG/DL (ref 0–3)
GLOBULIN SER CALC-MCNC: 5 G/DL (ref 2–4)
GLUCOSE BLD STRIP.AUTO-MCNC: 154 MG/DL (ref 70–110)
GLUCOSE SERPL-MCNC: 145 MG/DL (ref 74–99)
HCT VFR BLD AUTO: 28.2 % (ref 35–45)
HGB BLD-MCNC: 9 G/DL (ref 12–16)
Lab: NORMAL
MCH RBC QN AUTO: 27.2 PG (ref 24–34)
MCHC RBC AUTO-ENTMCNC: 31.9 G/DL (ref 31–37)
MCV RBC AUTO: 85.2 FL (ref 78–100)
METHADONE UR QL: NEGATIVE
NRBC # BLD: 0 K/UL (ref 0–0.01)
NRBC BLD-RTO: 0 PER 100 WBC
OPIATES UR QL: NEGATIVE
PCP UR QL: NEGATIVE
PLATELET # BLD AUTO: 219 K/UL (ref 135–420)
PMV BLD AUTO: 10.7 FL (ref 9.2–11.8)
POTASSIUM SERPL-SCNC: 3.9 MMOL/L (ref 3.5–5.5)
PROT SERPL-MCNC: 7.8 G/DL (ref 6.4–8.2)
RBC # BLD AUTO: 3.31 M/UL (ref 4.2–5.3)
SODIUM SERPL-SCNC: 139 MMOL/L (ref 136–145)
WBC # BLD AUTO: 10.4 K/UL (ref 4.6–13.2)

## 2023-12-20 PROCEDURE — 81001 URINALYSIS AUTO W/SCOPE: CPT

## 2023-12-20 PROCEDURE — 80053 COMPREHEN METABOLIC PANEL: CPT

## 2023-12-20 PROCEDURE — 85027 COMPLETE CBC AUTOMATED: CPT

## 2023-12-20 PROCEDURE — 93005 ELECTROCARDIOGRAM TRACING: CPT | Performed by: EMERGENCY MEDICINE

## 2023-12-20 PROCEDURE — 99285 EMERGENCY DEPT VISIT HI MDM: CPT

## 2023-12-20 PROCEDURE — 94762 N-INVAS EAR/PLS OXIMTRY CONT: CPT

## 2023-12-20 PROCEDURE — 80307 DRUG TEST PRSMV CHEM ANLYZR: CPT

## 2023-12-20 PROCEDURE — 87086 URINE CULTURE/COLONY COUNT: CPT

## 2023-12-20 PROCEDURE — 82962 GLUCOSE BLOOD TEST: CPT

## 2023-12-20 PROCEDURE — 82077 ASSAY SPEC XCP UR&BREATH IA: CPT

## 2023-12-21 ENCOUNTER — APPOINTMENT (OUTPATIENT)
Facility: HOSPITAL | Age: 73
DRG: 072 | End: 2023-12-21
Payer: MEDICARE

## 2023-12-21 PROBLEM — Z91.148 H/O MEDICATION NONCOMPLIANCE: Chronic | Status: RESOLVED | Noted: 2019-12-21 | Resolved: 2023-12-21

## 2023-12-21 PROBLEM — G93.40 ENCEPHALOPATHY, UNSPECIFIED: Status: RESOLVED | Noted: 2022-11-03 | Resolved: 2023-12-21

## 2023-12-21 PROBLEM — F02.80 DEMENTIA IN OTHER DISEASES CLASSIFIED ELSEWHERE, UNSPECIFIED SEVERITY, WITHOUT BEHAVIORAL DISTURBANCE, PSYCHOTIC DISTURBANCE, MOOD DISTURBANCE, AND ANXIETY (HCC): Status: ACTIVE | Noted: 2022-07-18

## 2023-12-21 PROBLEM — B96.89 UTI DUE TO KLEBSIELLA SPECIES: Status: RESOLVED | Noted: 2022-10-31 | Resolved: 2023-12-21

## 2023-12-21 PROBLEM — Z98.890 HISTORY OF CARDIAC CATHETERIZATION: Chronic | Status: RESOLVED | Noted: 2021-09-02 | Resolved: 2023-12-21

## 2023-12-21 PROBLEM — R60.0 LOCALIZED EDEMA: Status: RESOLVED | Noted: 2022-11-03 | Resolved: 2023-12-21

## 2023-12-21 PROBLEM — E16.2 ACUTE METABOLIC ENCEPHALOPATHY DUE TO HYPOGLYCEMIA: Status: RESOLVED | Noted: 2021-09-19 | Resolved: 2023-12-21

## 2023-12-21 PROBLEM — R79.89 ELEVATED D-DIMER: Status: RESOLVED | Noted: 2019-12-21 | Resolved: 2023-12-21

## 2023-12-21 PROBLEM — I35.0 AORTIC STENOSIS, MILD: Status: RESOLVED | Noted: 2021-09-02 | Resolved: 2023-12-21

## 2023-12-21 PROBLEM — R41.82 ALTERED MENTAL STATUS, UNSPECIFIED: Status: RESOLVED | Noted: 2022-11-03 | Resolved: 2023-12-21

## 2023-12-21 PROBLEM — E44.0 MODERATE MALNUTRITION (HCC): Status: RESOLVED | Noted: 2022-06-21 | Resolved: 2023-12-21

## 2023-12-21 PROBLEM — M79.661 PAIN IN RIGHT LOWER LEG: Status: RESOLVED | Noted: 2022-07-18 | Resolved: 2023-12-21

## 2023-12-21 PROBLEM — D64.9 ANEMIA, UNSPECIFIED: Status: ACTIVE | Noted: 2019-12-21

## 2023-12-21 PROBLEM — I38 ENDOCARDITIS, VALVE UNSPECIFIED: Status: RESOLVED | Noted: 2022-11-03 | Resolved: 2023-12-21

## 2023-12-21 PROBLEM — R47.01 APHASIA: Status: ACTIVE | Noted: 2022-11-03

## 2023-12-21 PROBLEM — I26.99 ACUTE PULMONARY EMBOLISM (HCC): Status: ACTIVE | Noted: 2019-01-01

## 2023-12-21 PROBLEM — G93.40 ENCEPHALOPATHY ACUTE: Status: RESOLVED | Noted: 2023-12-21 | Resolved: 2023-12-21

## 2023-12-21 PROBLEM — R41.89 COGNITIVE DECLINE: Status: ACTIVE | Noted: 2023-12-21

## 2023-12-21 PROBLEM — G93.41 ACUTE METABOLIC ENCEPHALOPATHY DUE TO HYPOGLYCEMIA: Status: RESOLVED | Noted: 2021-09-19 | Resolved: 2023-12-21

## 2023-12-21 PROBLEM — N17.9 AKI (ACUTE KIDNEY INJURY) (HCC): Status: RESOLVED | Noted: 2022-06-16 | Resolved: 2023-12-21

## 2023-12-21 PROBLEM — R41.82 AMS (ALTERED MENTAL STATUS): Status: ACTIVE | Noted: 2023-12-21

## 2023-12-21 PROBLEM — R40.0 SOMNOLENCE: Status: RESOLVED | Noted: 2022-10-28 | Resolved: 2023-12-21

## 2023-12-21 PROBLEM — E66.01 MORBID OBESITY (HCC): Status: ACTIVE | Noted: 2018-04-17

## 2023-12-21 PROBLEM — N39.0 UTI DUE TO KLEBSIELLA SPECIES: Status: RESOLVED | Noted: 2022-10-31 | Resolved: 2023-12-21

## 2023-12-21 LAB
ANION GAP SERPL CALC-SCNC: 2 MMOL/L (ref 3–18)
APPEARANCE UR: CLEAR
BACTERIA URNS QL MICRO: ABNORMAL /HPF
BILIRUB UR QL: NEGATIVE
BUN SERPL-MCNC: 20 MG/DL (ref 7–18)
BUN/CREAT SERPL: 23 (ref 12–20)
CALCIUM SERPL-MCNC: 9.1 MG/DL (ref 8.5–10.1)
CHLORIDE SERPL-SCNC: 105 MMOL/L (ref 100–111)
CO2 SERPL-SCNC: 26 MMOL/L (ref 21–32)
COLOR UR: YELLOW
CREAT SERPL-MCNC: 0.87 MG/DL (ref 0.6–1.3)
EKG ATRIAL RATE: 88 BPM
EKG DIAGNOSIS: NORMAL
EKG P AXIS: 77 DEGREES
EKG P-R INTERVAL: 156 MS
EKG Q-T INTERVAL: 358 MS
EKG QRS DURATION: 78 MS
EKG QTC CALCULATION (BAZETT): 433 MS
EKG R AXIS: 26 DEGREES
EKG T AXIS: 51 DEGREES
EKG VENTRICULAR RATE: 88 BPM
EPITH CASTS URNS QL MICRO: ABNORMAL /LPF (ref 0–5)
GLUCOSE BLD STRIP.AUTO-MCNC: 108 MG/DL (ref 70–110)
GLUCOSE BLD STRIP.AUTO-MCNC: 120 MG/DL (ref 70–110)
GLUCOSE BLD STRIP.AUTO-MCNC: 93 MG/DL (ref 70–110)
GLUCOSE SERPL-MCNC: 146 MG/DL (ref 74–99)
GLUCOSE UR STRIP.AUTO-MCNC: NEGATIVE MG/DL
HGB UR QL STRIP: NEGATIVE
KETONES UR QL STRIP.AUTO: NEGATIVE MG/DL
LEUKOCYTE ESTERASE UR QL STRIP.AUTO: NEGATIVE
NITRITE UR QL STRIP.AUTO: NEGATIVE
PH UR STRIP: 5 (ref 5–8)
POTASSIUM SERPL-SCNC: 4.1 MMOL/L (ref 3.5–5.5)
PROT UR STRIP-MCNC: 100 MG/DL
RBC #/AREA URNS HPF: NEGATIVE /HPF (ref 0–5)
SODIUM SERPL-SCNC: 133 MMOL/L (ref 136–145)
SP GR UR REFRACTOMETRY: 1.02 (ref 1–1.03)
UROBILINOGEN UR QL STRIP.AUTO: 0.2 EU/DL (ref 0.2–1)
WBC URNS QL MICRO: ABNORMAL /HPF (ref 0–4)

## 2023-12-21 PROCEDURE — 2580000003 HC RX 258: Performed by: STUDENT IN AN ORGANIZED HEALTH CARE EDUCATION/TRAINING PROGRAM

## 2023-12-21 PROCEDURE — 94761 N-INVAS EAR/PLS OXIMETRY MLT: CPT

## 2023-12-21 PROCEDURE — 70498 CT ANGIOGRAPHY NECK: CPT

## 2023-12-21 PROCEDURE — 99223 1ST HOSP IP/OBS HIGH 75: CPT | Performed by: STUDENT IN AN ORGANIZED HEALTH CARE EDUCATION/TRAINING PROGRAM

## 2023-12-21 PROCEDURE — 2580000003 HC RX 258: Performed by: EMERGENCY MEDICINE

## 2023-12-21 PROCEDURE — 97166 OT EVAL MOD COMPLEX 45 MIN: CPT

## 2023-12-21 PROCEDURE — 97535 SELF CARE MNGMENT TRAINING: CPT

## 2023-12-21 PROCEDURE — 82962 GLUCOSE BLOOD TEST: CPT

## 2023-12-21 PROCEDURE — 6370000000 HC RX 637 (ALT 250 FOR IP): Performed by: STUDENT IN AN ORGANIZED HEALTH CARE EDUCATION/TRAINING PROGRAM

## 2023-12-21 PROCEDURE — 1100000003 HC PRIVATE W/ TELEMETRY

## 2023-12-21 PROCEDURE — 6360000004 HC RX CONTRAST MEDICATION: Performed by: STUDENT IN AN ORGANIZED HEALTH CARE EDUCATION/TRAINING PROGRAM

## 2023-12-21 PROCEDURE — 71045 X-RAY EXAM CHEST 1 VIEW: CPT

## 2023-12-21 PROCEDURE — 70450 CT HEAD/BRAIN W/O DYE: CPT

## 2023-12-21 PROCEDURE — 92610 EVALUATE SWALLOWING FUNCTION: CPT

## 2023-12-21 PROCEDURE — 93010 ELECTROCARDIOGRAM REPORT: CPT | Performed by: INTERNAL MEDICINE

## 2023-12-21 PROCEDURE — 6360000002 HC RX W HCPCS: Performed by: EMERGENCY MEDICINE

## 2023-12-21 PROCEDURE — 80048 BASIC METABOLIC PNL TOTAL CA: CPT

## 2023-12-21 PROCEDURE — 92526 ORAL FUNCTION THERAPY: CPT

## 2023-12-21 PROCEDURE — 6360000002 HC RX W HCPCS: Performed by: HOSPITALIST

## 2023-12-21 RX ORDER — DEXTROSE MONOHYDRATE 100 MG/ML
INJECTION, SOLUTION INTRAVENOUS CONTINUOUS PRN
Status: DISCONTINUED | OUTPATIENT
Start: 2023-12-21 | End: 2023-12-24 | Stop reason: HOSPADM

## 2023-12-21 RX ORDER — ACETAMINOPHEN 325 MG/1
650 TABLET ORAL EVERY 6 HOURS PRN
Status: DISCONTINUED | OUTPATIENT
Start: 2023-12-21 | End: 2023-12-24 | Stop reason: HOSPADM

## 2023-12-21 RX ORDER — AMLODIPINE BESYLATE 5 MG/1
5 TABLET ORAL DAILY
Status: DISCONTINUED | OUTPATIENT
Start: 2023-12-21 | End: 2023-12-24 | Stop reason: HOSPADM

## 2023-12-21 RX ORDER — ISOSORBIDE MONONITRATE 30 MG/1
30 TABLET, EXTENDED RELEASE ORAL DAILY
Status: DISCONTINUED | OUTPATIENT
Start: 2023-12-21 | End: 2023-12-24 | Stop reason: HOSPADM

## 2023-12-21 RX ORDER — LETROZOLE 2.5 MG/1
2.5 TABLET, FILM COATED ORAL DAILY
Status: DISCONTINUED | OUTPATIENT
Start: 2023-12-21 | End: 2023-12-24 | Stop reason: HOSPADM

## 2023-12-21 RX ORDER — SODIUM CHLORIDE 0.9 % (FLUSH) 0.9 %
5-40 SYRINGE (ML) INJECTION PRN
Status: DISCONTINUED | OUTPATIENT
Start: 2023-12-21 | End: 2023-12-24 | Stop reason: HOSPADM

## 2023-12-21 RX ORDER — ASPIRIN 81 MG/1
81 TABLET ORAL DAILY
Status: DISCONTINUED | OUTPATIENT
Start: 2023-12-21 | End: 2023-12-24 | Stop reason: HOSPADM

## 2023-12-21 RX ORDER — SODIUM CHLORIDE 9 MG/ML
INJECTION, SOLUTION INTRAVENOUS PRN
Status: DISCONTINUED | OUTPATIENT
Start: 2023-12-21 | End: 2023-12-24 | Stop reason: HOSPADM

## 2023-12-21 RX ORDER — POTASSIUM CHLORIDE 7.45 MG/ML
10 INJECTION INTRAVENOUS PRN
Status: DISCONTINUED | OUTPATIENT
Start: 2023-12-21 | End: 2023-12-24 | Stop reason: HOSPADM

## 2023-12-21 RX ORDER — PANTOPRAZOLE SODIUM 40 MG/1
40 TABLET, DELAYED RELEASE ORAL
Status: DISCONTINUED | OUTPATIENT
Start: 2023-12-21 | End: 2023-12-24 | Stop reason: HOSPADM

## 2023-12-21 RX ORDER — HYDRALAZINE HYDROCHLORIDE 20 MG/ML
10 INJECTION INTRAMUSCULAR; INTRAVENOUS EVERY 6 HOURS PRN
Status: DISCONTINUED | OUTPATIENT
Start: 2023-12-21 | End: 2023-12-24 | Stop reason: HOSPADM

## 2023-12-21 RX ORDER — POTASSIUM CHLORIDE 20 MEQ/1
40 TABLET, EXTENDED RELEASE ORAL PRN
Status: DISCONTINUED | OUTPATIENT
Start: 2023-12-21 | End: 2023-12-24 | Stop reason: HOSPADM

## 2023-12-21 RX ORDER — ACETAMINOPHEN 650 MG/1
650 SUPPOSITORY RECTAL EVERY 6 HOURS PRN
Status: DISCONTINUED | OUTPATIENT
Start: 2023-12-21 | End: 2023-12-24 | Stop reason: HOSPADM

## 2023-12-21 RX ORDER — LANOLIN ALCOHOL/MO/W.PET/CERES
3 CREAM (GRAM) TOPICAL NIGHTLY PRN
Status: DISCONTINUED | OUTPATIENT
Start: 2023-12-21 | End: 2023-12-24 | Stop reason: HOSPADM

## 2023-12-21 RX ORDER — ONDANSETRON 2 MG/ML
4 INJECTION INTRAMUSCULAR; INTRAVENOUS EVERY 6 HOURS PRN
Status: DISCONTINUED | OUTPATIENT
Start: 2023-12-21 | End: 2023-12-24 | Stop reason: HOSPADM

## 2023-12-21 RX ORDER — ONDANSETRON 4 MG/1
4 TABLET, ORALLY DISINTEGRATING ORAL EVERY 8 HOURS PRN
Status: DISCONTINUED | OUTPATIENT
Start: 2023-12-21 | End: 2023-12-24 | Stop reason: HOSPADM

## 2023-12-21 RX ORDER — SODIUM CHLORIDE 0.9 % (FLUSH) 0.9 %
5-40 SYRINGE (ML) INJECTION EVERY 12 HOURS SCHEDULED
Status: DISCONTINUED | OUTPATIENT
Start: 2023-12-21 | End: 2023-12-24 | Stop reason: HOSPADM

## 2023-12-21 RX ORDER — LOSARTAN POTASSIUM 50 MG/1
50 TABLET ORAL DAILY
Status: DISCONTINUED | OUTPATIENT
Start: 2023-12-21 | End: 2023-12-24 | Stop reason: HOSPADM

## 2023-12-21 RX ORDER — MAGNESIUM SULFATE IN WATER 40 MG/ML
2000 INJECTION, SOLUTION INTRAVENOUS PRN
Status: DISCONTINUED | OUTPATIENT
Start: 2023-12-21 | End: 2023-12-24 | Stop reason: HOSPADM

## 2023-12-21 RX ORDER — INSULIN LISPRO 100 [IU]/ML
0-8 INJECTION, SOLUTION INTRAVENOUS; SUBCUTANEOUS
Status: DISCONTINUED | OUTPATIENT
Start: 2023-12-21 | End: 2023-12-24 | Stop reason: HOSPADM

## 2023-12-21 RX ORDER — INSULIN LISPRO 100 [IU]/ML
0-4 INJECTION, SOLUTION INTRAVENOUS; SUBCUTANEOUS NIGHTLY
Status: DISCONTINUED | OUTPATIENT
Start: 2023-12-21 | End: 2023-12-24 | Stop reason: HOSPADM

## 2023-12-21 RX ORDER — POLYETHYLENE GLYCOL 3350 17 G/17G
17 POWDER, FOR SOLUTION ORAL DAILY PRN
Status: DISCONTINUED | OUTPATIENT
Start: 2023-12-21 | End: 2023-12-24 | Stop reason: HOSPADM

## 2023-12-21 RX ORDER — LACOSAMIDE 50 MG/1
200 TABLET ORAL 2 TIMES DAILY
Status: DISCONTINUED | OUTPATIENT
Start: 2023-12-21 | End: 2023-12-24 | Stop reason: HOSPADM

## 2023-12-21 RX ADMIN — SODIUM CHLORIDE, PRESERVATIVE FREE 10 ML: 5 INJECTION INTRAVENOUS at 22:06

## 2023-12-21 RX ADMIN — LACOSAMIDE 200 MG: 50 TABLET, FILM COATED ORAL at 22:05

## 2023-12-21 RX ADMIN — LACOSAMIDE 200 MG: 50 TABLET, FILM COATED ORAL at 03:51

## 2023-12-21 RX ADMIN — AMLODIPINE BESYLATE 5 MG: 5 TABLET ORAL at 15:20

## 2023-12-21 RX ADMIN — APIXABAN 5 MG: 5 TABLET, FILM COATED ORAL at 15:19

## 2023-12-21 RX ADMIN — APIXABAN 5 MG: 5 TABLET, FILM COATED ORAL at 03:51

## 2023-12-21 RX ADMIN — SODIUM CHLORIDE, PRESERVATIVE FREE 10 ML: 5 INJECTION INTRAVENOUS at 10:02

## 2023-12-21 RX ADMIN — HYDRALAZINE HYDROCHLORIDE 10 MG: 20 INJECTION, SOLUTION INTRAMUSCULAR; INTRAVENOUS at 17:57

## 2023-12-21 RX ADMIN — WATER 1000 MG: 1 INJECTION INTRAMUSCULAR; INTRAVENOUS; SUBCUTANEOUS at 02:23

## 2023-12-21 RX ADMIN — ASPIRIN 81 MG: 81 TABLET, COATED ORAL at 15:20

## 2023-12-21 RX ADMIN — APIXABAN 5 MG: 5 TABLET, FILM COATED ORAL at 22:05

## 2023-12-21 RX ADMIN — IOPAMIDOL 90 ML: 755 INJECTION, SOLUTION INTRAVENOUS at 04:48

## 2023-12-21 NOTE — ED NOTES
Attempted to medicate pt. Pt is not A&O to take meds. Attempted to page provider 3 times no response.

## 2023-12-21 NOTE — ED NOTES
Patient agitated anf combative. Grabbing and digging nails into RN. Hospitalist notified and laid visual on patient. Reviewed CT scan and states that this behavior is expected.

## 2023-12-21 NOTE — ED NOTES
Attempted bedside swallow with pt. Pt not swallowing  sip of water or apple sauce in mouth. Will fail for now, MD aware, Formal bedside swallow placed. Pt noted to be incontinent of urine, pt and bed cleaned, new depends provided, with new abelino wick. Family at bedside and updated on POC.

## 2023-12-21 NOTE — PLAN OF CARE
Problem: Occupational Therapy - Adult  Goal: By Discharge: Performs self-care activities at highest level of function for planned discharge setting. See evaluation for individualized goals. Description: Occupational Therapy Goals:  Initiated 12/21/2023 to be met within 7-10 days. 1.  Patient will perform self-feeding with supervision/set-up using adaptive equipment and compensatory techniques. 2.  Patient will perform grooming with supervision/set-up using adaptive equipment and compensatory techniques. 3.  Patient will participate in upper extremity therapeutic exercise/activities with supervision/set-up for 8-10 minutes to increase strength/endurance for ADLs. PLOF: per spouse, pt has PCA to assist with ADLs and functional transfer with Max A bed <-> w/c, pt was able to perform self feeding and basic grooming tasks with set up assist       Outcome: [facility] EVALUATION    Patient: Grzegorz Molina (23 y.o. female)  Date: 12/21/2023  Primary Diagnosis: AMS (altered mental status) [R41.82]       Precautions: Fall Risk, General Precautions,  ,  ,  ,  ,  ,  ,      ASSESSMENT :  Pt sitting upright on ED stretcher, appears with dysarthria and not happy with current situation of being in hospital when she was supposed to go on a cruise tomorrow and spouse reports he plans to go without her. BUE edema and incoordination noted, Max A with hand over hand assist for hold juice cup and bring to mouth to drink through straw, dep with self feeding using standard utensil, nursing notified. Pt sitting upright on ED stretcher at end of tx session, call bell within reach & pt verbalized understanding to utilize for assist e.g. functional transfers in order to prevent falls. DEFICITS/IMPAIRMENTS:  Performance deficits / Impairments: Decreased functional mobility ; Decreased strength;Decreased endurance;Decreased coordination;Decreased ADL status; Decreased ROM; Decreased balance;Decreased

## 2023-12-21 NOTE — ED PROVIDER NOTES
EMERGENCY DEPARTMENT HISTORY AND PHYSICAL EXAM      Patient Name: Ivonne Taylor  MRN: [id number]  YOB: 1950  Provider: Marnie Yeh MD  PCP: Shanon Kim APRN - CNP   Time/Date of evaluation: 11:00 PM EST on 12/20/23    History of Presenting Illness     Chief Complaint   Patient presents with    Altered Mental Status       History Provided By: EMS     History (Narative):   Ivonne Taylor is a 73 y.o. female with a PMHX of arthritis, breast cancer, depression, diabetes, hyperlipidemia, hypertension, prior PEs, peptic ulcer disease, seizures, and 2 strokes with left-sided deficits  who presents to the emergency department  by EMS C/O altered mental status for the past 24 hours.  She was seen 2 days ago for similar symptoms.  She does have a history of a urinary tract infection.    Her blood sugar was 166 when EMS arrived.  She does have a seizure history as well.  Her family was concerned that she was having seizures.    Per EMS, she is a full code.    The patient is unable to provide more history at this time.        Past History     Past Medical History:  Past Medical History:   Diagnosis Date    Arthritis     Breast CA ([facility]) 2016    DDD (degenerative disc disease), cervical     Depression     Diabetes (HCC)     DJD (degenerative joint disease) of cervical spine     Heart murmur     History of noncompliance with medical treatment     Hypercholesteremia     Hypertension     Menopause     Mild diastolic dysfunction     ECHO 5/2020     PE (pulmonary thromboembolism) ([facility]) 2019    PUD (peptic ulcer disease)     S/P cardiac cath 02/2015    No angiographic evidence of CAD    Seizures (HCC)     LAST SEIZURE ABOUT 2 MONTHS AGO (NOV 2015)    Stroke ([facility])     x2 with left side deficit       Past Surgical History:  Past Surgical History:   Procedure Laterality Date    BREAST LUMPECTOMY Right 1/12/2016    Dr. Demario Layton Partial Mastectomy w./ axillary lymphadenectomy    HEENT      TONSILLECTOMY    [facility]    diagnosis found. DISPOSITION        PATIENT REFERRED TO:  No follow-up provider specified. DISCHARGE MEDICATIONS:  New Prescriptions    No medications on file       DISCONTINUED MEDICATIONS:  Discontinued Medications    No medications on file              (Please note that portions of this note were completed with a voice recognition program.  Efforts were made to edit the dictations but occasionally words are mis-transcribed.)    Jenni Rock MD (electronically signed)        Dragon Disclaimer     Please note that this dictation was completed with Qiro, the computer voice recognition software. Quite often unanticipated grammatical, syntax, homophones, and other interpretive errors are inadvertently transcribed by the computer software. Please disregard these errors. Please excuse any errors that have escaped final proofreading.

## 2023-12-21 NOTE — PLAN OF CARE
Problem: SLP Adult - Impaired Swallowing  Goal: By Discharge: Advance to least restrictive diet without signs or symptoms of aspiration for planned discharge setting. See evaluation for individualized goals. Description: Patient will:  1. Tolerate PO trials with 0 s/s overt distress in 4/5 trials  2. Utilize compensatory swallow strategies/maneuvers (decrease bite/sip, size/rate, alt. liq/sol) with min cues in 4/5 trials  3. Perform oral-motor/laryngeal exercises to increase oropharyngeal swallow function with min cues  4. Complete an objective swallow study (i.e., MBSS) to assess swallow integrity, r/o aspiration, and determine of safest LRD, min A as indicated/ordered by MD     Recommend:   Puree, thin liquids  Meds crushed in puree   Feeding assistance   Aspiration precautions  HOB >45 degrees during all intake and for at least 30 min after po   Small bites/sips, slow rate of intake, alternating bites/sips  Oral care post meals     Outcome: [address] [facility] EVALUATION/TREATMENT    Patient: Paulino Levin (72 y.o. female)  Date: 12/21/2023  Primary Diagnosis: AMS (altered mental status) [R41.82]  Precautions: Aspiration  PLOF: As per H&P  ASSESSMENT:  Based on the objective data described below, the patient presents with mod oral dysphagia. Per RN, pt with no attempt to swallow puree or water offered earlier this am, made NPO. Pt seen with  present at bedside who reports pt consumes regular, thin liquid diet at home. Pt confused, oriented to person only. Oral mech exam revealed decreased orolingual strength/coordination with poor dentition/missing teeth. Pt with intermittent anomia and dysarthria-unclear if baseline as  did not provide good history regarding speech/language baseline. Pt tolerating thin liquids with timely swallow initiation, adequate laryngeal elevation to palpation and no overt s/sx aspiration.   Able to manipulate and clear puree with

## 2023-12-21 NOTE — PROGRESS NOTES
Patient seen and evaluated, lying in bed, no acute distress. Patient admitted by my colleague earlier this morning. 62-year-old female with a history of breast cancer, depression, CVA, type 2 diabetes, hyperlipidemia, hypertension, prior PE, PUD, GERD, seizures with seizure disorder presents to the emergency room secondary to an altered mental status of unclear etiology. CT head did not show any acute changes. Patient's chronic home medications have been resumed. Acute metabolic encephalopathy-unclear etiology. When I evaluated patient in the ER she was more awake and responsive to verbal commands.,  SLP vernon ordered. Will continue to follow.   Further treatment plan as outlined in H&P.

## 2023-12-21 NOTE — ED TRIAGE NOTES
Pt arrived to facility via EMS from private residence. EMS reported she was initially alert and now she is unresponsive. Family stated that the pt has a history of seizures, stroke and hypertension. The deficit she currently has is from a previous stroke. EMS reported a BG of 166. Patient is currently in bed and does not respond to commands. However, she opens her eyes to painful stimuli.

## 2023-12-21 NOTE — ACP (ADVANCE CARE PLANNING)
Advance Care Planning     Advance Care Planning Inpatient Note  [facility] Department    Today's Date: 12/21/2023  Unit: [facility] EMERGENCY DEPT    Received request from . Upon review of chart and communication with care team, patient's decision making abilities are not in question. . Patient was/were present in the room during visit. Goals of ACP Conversation:  Discuss advance care planning documents    Health Care Decision Makers:     No healthcare decision makers have been documented. Click here to complete [address] including selection of the Healthcare Decision Maker Relationship (ie \"Primary\")  Summary:  No Decision Maker named by patient at this time    Advance Care Planning Documents (Patient Wishes):  None     Assessment:    Patient seen in bed 8 of the emergency room where she will be admitted to the hospital from . Patient is not responsive at this time due to altered mental status. There is no advance directive on file.     Interventions:  Patient DECLINED ACP conversation    Care Preferences Communicated:   No    Outcomes/Plan:      Electronically signed by Claudette Anna., [facility] on 12/21/2023 at 12:21 PM

## 2023-12-21 NOTE — H&P
History & Physical    Patient: Ivonne Taylor MRN: [id number]  CSN: [id number]    YOB: 1950  Age: 73 y.o.  Sex: female      DOA: 12/20/2023    Chief Complaint:   Chief Complaint   Patient presents with    Altered Mental Status          HPI:     Ivonne Taylor is a 73 y.o. female with hx of breast cancer, depression, CVA, diabetes, hyperlipidemia, hypertension, prior PE, PUD, GERD, seizures and seizure disorder, altered mental status.  Patient had recently been seen for similar altered mental status however has been discharged home after presumed return to neurologic baseline.  Patient once again found by family to have altered mentation today and as such was sent to emergency room with concerns for potential organic causes including possibility of seizures of which she does have a history.  Patient herself is unable to provide further history at this time.  Patient was worked up in emergency room in usual fashion including imaging and labs.  CT head did not show any new findings.  Chest x-ray without any obvious signs of infection.  No witnessed seizure activity in the emergency room.  No obvious electrolyte abnormalities, no hypoglycemia, UDS negative, hemoglobin low lower not so much so that would explain alteration of mentation.  3+ bacteria without pyuria less likely to be UTI at this time.  EKG overall similar to prior.  Patient admitted for altered mental status workup    Past Medical History:   Diagnosis Date    Arthritis     Breast CA ([facility]) 2016    DDD (degenerative disc disease), cervical     Depression     Diabetes ([facility])     DJD (degenerative joint disease) of cervical spine     Heart murmur     History of noncompliance with medical treatment     Hypercholesteremia     Hypertension     Menopause     Mild diastolic dysfunction     ECHO 5/2020     PE (pulmonary thromboembolism) ([facility]) 2019    PUD (peptic ulcer disease)     S/P cardiac cath 02/2015    No angiographic evidence of  Family: More than three times a week     Frequency of Social Gatherings with Friends and Family: More than three times a week     Attends Restorationist Services: Never     Active Member of Clubs or Organizations: No     Marital Status: Never    Intimate Partner Violence: Not At Risk (8/30/2023)    Humiliation, Afraid, Rape, and Kick questionnaire     Fear of Current or Ex-Partner: No     Emotionally Abused: No     Physically Abused: No     Sexually Abused: No   Housing Stability: Low Risk  (8/30/2023)    Housing Stability Vital Sign     Unable to Pay for Housing in the Last Year: No     Number of [address] in the Last Year: 2     Unstable Housing in the Last Year: No       Prior to Admission medications    Medication Sig Start Date End Date Taking? Authorizing Provider   lacosamide (VIMPAT) 200 MG tablet Take 1 tablet by mouth in the morning and at bedtime for 60 days. This is an increased dose from your 150 mg twice daily. Please make sure to review your dosing with your primary neurology provider. Max Daily Amount: 400 mg 12/18/23 2/16/24  KERI Garcia   losartan (COZAAR) 50 MG tablet 08/02/2016 Losartan Oral  PO 1.0 TABLET(S) BID  08/02/2016  active 11/27/23   SHANNON Carcamo CNP   letrozole [facility]) 2.5 MG tablet TAKE 1 TABLET BY MOUTH ONCE DAILY FOR CHEMOTHERAPY 9/25/23   SHANNON Carcamo CNP   polyethylene glycol [facility]) 17 GM/SCOOP powder Take 17 g by mouth daily 9/25/23 3/23/24  SHANNON Carcamo CNP   nystatin (MYCOSTATIN) 208926 UNIT/GM powder Apply 3 times daily.  9/25/23   SHANNON Carcamo CNP   ondansetron (ZOFRAN-ODT) 4 MG disintegrating tablet Take 1 tablet by mouth every 8 hours as needed for Nausea or Vomiting Take 4 mg by mouth every 8 hours as needed for Nausea or Vomiting. 8/24/23   SHANNON Carcamo CNP   aluminum & magnesium hydroxide-simethicone (MAALOX MAX) 400-400-40 MG/5ML SUSP Take 15 mLs by mouth every 6 hours as needed (heartburn) 8/23/23

## 2023-12-22 ENCOUNTER — APPOINTMENT (OUTPATIENT)
Facility: HOSPITAL | Age: 73
DRG: 072 | End: 2023-12-22
Payer: MEDICARE

## 2023-12-22 LAB
ALBUMIN SERPL-MCNC: 3 G/DL (ref 3.4–5)
ALBUMIN/GLOB SERPL: 0.6 (ref 0.8–1.7)
ALP SERPL-CCNC: 80 U/L (ref 45–117)
ALT SERPL-CCNC: 16 U/L (ref 13–56)
ANION GAP SERPL CALC-SCNC: 5 MMOL/L (ref 3–18)
AST SERPL-CCNC: 12 U/L (ref 10–38)
BACTERIA SPEC CULT: NORMAL
BASOPHILS # BLD: 0 K/UL (ref 0–0.1)
BASOPHILS NFR BLD: 1 % (ref 0–2)
BILIRUB SERPL-MCNC: 0.4 MG/DL (ref 0.2–1)
BUN SERPL-MCNC: 23 MG/DL (ref 7–18)
BUN/CREAT SERPL: 21 (ref 12–20)
CALCIUM SERPL-MCNC: 9.4 MG/DL (ref 8.5–10.1)
CHLORIDE SERPL-SCNC: 109 MMOL/L (ref 100–111)
CO2 SERPL-SCNC: 25 MMOL/L (ref 21–32)
CREAT SERPL-MCNC: 1.12 MG/DL (ref 0.6–1.3)
DIFFERENTIAL METHOD BLD: ABNORMAL
EOSINOPHIL # BLD: 0.1 K/UL (ref 0–0.4)
EOSINOPHIL NFR BLD: 1 % (ref 0–5)
ERYTHROCYTE [DISTWIDTH] IN BLOOD BY AUTOMATED COUNT: 13.5 % (ref 11.6–14.5)
GLOBULIN SER CALC-MCNC: 5.1 G/DL (ref 2–4)
GLUCOSE BLD STRIP.AUTO-MCNC: 171 MG/DL (ref 70–110)
GLUCOSE BLD STRIP.AUTO-MCNC: 99 MG/DL (ref 70–110)
GLUCOSE SERPL-MCNC: 91 MG/DL (ref 74–99)
HCT VFR BLD AUTO: 33.2 % (ref 35–45)
HGB BLD-MCNC: 10.4 G/DL (ref 12–16)
IMM GRANULOCYTES # BLD AUTO: 0 K/UL (ref 0–0.04)
IMM GRANULOCYTES NFR BLD AUTO: 0 % (ref 0–0.5)
LYMPHOCYTES # BLD: 2.8 K/UL (ref 0.9–3.6)
LYMPHOCYTES NFR BLD: 35 % (ref 21–52)
MCH RBC QN AUTO: 26.9 PG (ref 24–34)
MCHC RBC AUTO-ENTMCNC: 31.3 G/DL (ref 31–37)
MCV RBC AUTO: 85.8 FL (ref 78–100)
MONOCYTES # BLD: 0.6 K/UL (ref 0.05–1.2)
MONOCYTES NFR BLD: 7 % (ref 3–10)
NEUTS SEG # BLD: 4.4 K/UL (ref 1.8–8)
NEUTS SEG NFR BLD: 56 % (ref 40–73)
NRBC # BLD: 0 K/UL (ref 0–0.01)
NRBC BLD-RTO: 0 PER 100 WBC
PLATELET # BLD AUTO: 242 K/UL (ref 135–420)
PMV BLD AUTO: 10.5 FL (ref 9.2–11.8)
POTASSIUM SERPL-SCNC: 4 MMOL/L (ref 3.5–5.5)
PROT SERPL-MCNC: 8.1 G/DL (ref 6.4–8.2)
RBC # BLD AUTO: 3.87 M/UL (ref 4.2–5.3)
SERVICE CMNT-IMP: NORMAL
SODIUM SERPL-SCNC: 139 MMOL/L (ref 136–145)
WBC # BLD AUTO: 8 K/UL (ref 4.6–13.2)

## 2023-12-22 PROCEDURE — 85025 COMPLETE CBC W/AUTO DIFF WBC: CPT

## 2023-12-22 PROCEDURE — 80053 COMPREHEN METABOLIC PANEL: CPT

## 2023-12-22 PROCEDURE — 36415 COLL VENOUS BLD VENIPUNCTURE: CPT

## 2023-12-22 PROCEDURE — 2580000003 HC RX 258: Performed by: STUDENT IN AN ORGANIZED HEALTH CARE EDUCATION/TRAINING PROGRAM

## 2023-12-22 PROCEDURE — 6370000000 HC RX 637 (ALT 250 FOR IP): Performed by: STUDENT IN AN ORGANIZED HEALTH CARE EDUCATION/TRAINING PROGRAM

## 2023-12-22 PROCEDURE — 1100000003 HC PRIVATE W/ TELEMETRY

## 2023-12-22 PROCEDURE — 99232 SBSQ HOSP IP/OBS MODERATE 35: CPT | Performed by: HOSPITALIST

## 2023-12-22 PROCEDURE — 97162 PT EVAL MOD COMPLEX 30 MIN: CPT

## 2023-12-22 PROCEDURE — 82962 GLUCOSE BLOOD TEST: CPT

## 2023-12-22 PROCEDURE — 94761 N-INVAS EAR/PLS OXIMETRY MLT: CPT

## 2023-12-22 PROCEDURE — 70551 MRI BRAIN STEM W/O DYE: CPT

## 2023-12-22 RX ADMIN — ASPIRIN 81 MG: 81 TABLET, COATED ORAL at 09:07

## 2023-12-22 RX ADMIN — LACOSAMIDE 200 MG: 50 TABLET, FILM COATED ORAL at 09:07

## 2023-12-22 RX ADMIN — APIXABAN 5 MG: 5 TABLET, FILM COATED ORAL at 09:06

## 2023-12-22 RX ADMIN — PANTOPRAZOLE SODIUM 40 MG: 40 TABLET, DELAYED RELEASE ORAL at 07:46

## 2023-12-22 RX ADMIN — SODIUM CHLORIDE, PRESERVATIVE FREE 10 ML: 5 INJECTION INTRAVENOUS at 09:13

## 2023-12-22 RX ADMIN — ISOSORBIDE MONONITRATE 30 MG: 30 TABLET, EXTENDED RELEASE ORAL at 10:38

## 2023-12-22 RX ADMIN — LOSARTAN POTASSIUM 50 MG: 50 TABLET, FILM COATED ORAL at 09:07

## 2023-12-22 RX ADMIN — LETROZOLE 2.5 MG: 2.5 TABLET ORAL at 14:33

## 2023-12-22 RX ADMIN — AMLODIPINE BESYLATE 5 MG: 5 TABLET ORAL at 09:07

## 2023-12-22 NOTE — PROGRESS NOTES
MRI screening form needs to be filled out and faxed to [phone] BEFORE MRI can be scheduled. If unable to obtain information from patient , [facility] needs to be contacted .  If patient is claustrophobic or will needs pain meds, please have ordered in advance in order to facilitate exam.

## 2023-12-22 NOTE — CARE COORDINATION
Case Management Assessment  Initial Evaluation    Date/Time of Evaluation: 12/22/2023 2:22 PM  Assessment Completed by: [facility]    If patient is discharged prior to next notation, then this note serves as note for discharge by case management. Patient Name: Ann-Marie Harris                   YOB: 1950  Diagnosis: AMS (altered mental status) [R41.82]                   Date / Time: 12/20/2023  9:51 PM    Patient Admission Status: Inpatient   Readmission Risk (Low < 19, Mod (19-27), High > 27): Readmission Risk Score: 14.6    Current PCP: SHANNON Warren CNP  PCP verified by CM? (P) Yes    Chart Reviewed: Yes      History Provided by: Other (see comment) (Brother)  Patient Orientation: Unable to Assess (patient was unable to answer assessment questions.)    Patient Cognition: Alert    Hospitalization in the last 30 days (Readmission):  No    If yes, Readmission Assessment in CM Navigator will be completed.     Advance Directives:      Code Status: Full Code   Patient's Primary Decision Maker is:        Discharge Planning:    Patient lives with: (P) Other (Comment), Spouse/Significant Other (brother, sister in law) Type of Home: (P) House (with family assistance and personal care aid)  Primary Care Giver: (P) Other (Comment) (Brother, boyfriend, and caregiver)  Patient Support Systems include: (P) Spouse/Significant Other, Other (Comment) (Brother, sister in law, and caregiver)   Current Financial resources: (P) Medicare  Current community resources: (P) Other (Comment) (Has a personal caregiver)  Current services prior to admission: (P) Other (Comment), Durable Medical Equipment (personal caregiver monday to friday)            Current DME: (P) Wheelchair            Type of Home Care services:       ADLS  Prior functional level: (P) Assistance with the following:, Bathing, Dressing, Toileting, Mobility, Feeding, Cooking, Housework, Shopping  Current functional level: (P) Assistance with

## 2023-12-22 NOTE — CARE COORDINATION
CM contacted patient's brother Gurdeep Parks who reports that patient is not ambulatory and uses wheelchair at baseline. Patient gets assistance from personal care aide Monday to Friday, brother, sister in law and significant other. Patient will be discharged home with family and personal care aid assistance. Patient will need medical transport at discharge.        Royce Brizuela RN  Case Management

## 2023-12-22 NOTE — PROGRESS NOTES
Comprehensive Nutrition Assessment    Type and Reason for Visit:  Initial, Positive Nutrition Screen    Nutrition Recommendations/Plan:   Add supplement: Glucerna Shake once daily (220 kcal, 10 gm protein)  Continue all other nutrition interventions. Encourage/ monitor po intake of meals and supplements. Malnutrition Assessment:  Malnutrition Status:  Mild malnutrition (23 1300)    Context:  Acute Illness     Findings of the 6 clinical characteristics of malnutrition:  Energy Intake:  Unable to assess  Weight Loss:  No significant weight loss (wt loss over 1.5 year PTA per chart hx)     Body Fat Loss:  Unable to assess Triceps, Fat Overlying Ribs   Muscle Mass Loss:  Mild muscle mass loss Temples (temporalis), Calf (gastrocnemius)  Fluid Accumulation:  No significant fluid accumulation     Strength:  Not Performed    Nutrition History and Allergies:   Past medical hx: breast cancer s/p right mastectomy, depression, DM, HTN, hypercholesterolemia, PUD, seizures, stroke. Wt trends PTA per chart hx: 240 lb on 11/3/21,  211 lb on 22;  180 lb upon admission. Wt loss of 31 lb, 14.9% x 1.5 years PTA. Unable to obtain oral nutrition intake history from pt; noted per nutrition screen, pt denied having decreased appetite/ po intake PTA. No known food allergies     Nutrition Assessment:    Pt admitted with altered mentation; also has dementia. Pt with difficulty speaking. Pt evaluated by SLP; cleared for pureed consistency diet on . Pt with questions about SLP; explained to pt that they are working with her on her swallowing ability, that pureed consistency diet is safest diet consistency for her per SLP, and this writer encouraged pt to also keep practicing her speech. Pt verbalized understanding; discussed report with SLP. Pt missing some teeth. Plan to add nutrition supplement. Nutrition Related Findings:    Last BM not reported. no edema. Recent POC B- 120 mg/dL x 24 hours.   Pertinent meds: SSI, cozaar, protonix.  Pertinent labs:  Na 139 wnl, K 4 wnl, Ca 9.4 wnl, BUN/ Cr 23/ 1.12 high/ wnl. Wound Type: None       Current Nutrition Intake & Therapies:    Average Meal Intake: Unable to assess  Average Supplements Intake: None Ordered  ADULT DIET; Dysphagia - Pureed    Anthropometric Measures:  Height: 167.6 cm (5' 6\")  Ideal Body Weight (IBW): 130 lbs (59 kg)    Admission Body Weight: 81.6 kg (180 lb)  Current Body Weight: 81.6 kg (180 lb), 138.5 % IBW.    Current BMI (kg/m2): 29.1  Usual Body Weight: 95.7 kg (211 lb)  % Weight Change (Calculated): -14.7  Weight Adjustment For: No Adjustment  BMI Categories: Overweight (BMI 25.0-29.9)    Estimated Daily Nutrient Needs:  Energy Requirements Based On: Formula (MSJ x1-1.3)  Weight Used for Energy Requirements: Admission  Energy (kcal/day): 8136-2101  Weight Used for Protein Requirements: Admission  Protein (g/day): 82-93 (wt x1-1.3)  Method Used for Fluid Requirements: 1 ml/kcal  Fluid (ml/day): 7512-9975    Nutrition Diagnosis:   Predicted inadequate energy intake related to cognitive or neurological impairment, swallowing difficulty as evidenced by swallow study results    Nutrition Interventions:   Food and/or Nutrient Delivery: Continue Current Diet, Start Oral Nutrition Supplement  Nutrition Education/Counseling: No recommendation at this time  Coordination of Nutrition Care: Continue to monitor while inpatient  Plan of Care discussed with: pt and SLP    Goals:     Goals: Meet at least 75% of estimated needs, by next RD assessment       Nutrition Monitoring and Evaluation:   Behavioral-Environmental Outcomes: None Identified  Food/Nutrient Intake Outcomes: Diet Advancement/Tolerance, Food and Nutrient Intake, Supplement Intake  Physical Signs/Symptoms Outcomes: Biochemical Data, Chewing or Swallowing, GI Status, Meal Time Behavior, Nutrition Focused Physical Findings, Weight    Discharge Planning:    Continue current diet     Marcy Sellers

## 2023-12-22 NOTE — PLAN OF CARE
Problem: Physical Therapy - Adult  Goal: By Discharge: Performs mobility at highest level of function for planned discharge setting. See evaluation for individualized goals. Description: Physical Therapy Goals:  Initiated 12/22/2023 to be met within 7-10 days. 1.  Patient will move from supine to sit and sit to supine with maximal assistance. 2.  Patient will roll side to side in bed with maximal assistance. 3.  Patient will transfer from bed to chair and chair to bed with maximal assistance using the least restrictive device. 4.  Patient will sit EOB with moderate assistance for 8 minutes. PLOF: Patient lives with spouse and has caregiver. She needs assistance with all mobility. Has hospital bed and WC. Outcome: Progressing    PHYSICAL THERAPY EVALUATION    Patient: Maria Eugenia Castillo (57 y.o. female)  Date: 12/22/2023  Primary Diagnosis: AMS (altered mental status) [R41.82]       Precautions: Fall Risk,  ,  ,  ,  ,  ,  ,      ASSESSMENT :  Patient leaning onto right railing semi reclined in bed upon entry. Patient is able to reposition trunk using bed rails with min A. Pillow placed to maintain neutral alignment. Generally decreased BLE strength. Rolling with mod A. Patient then leaning towards the left rail, so used pillows to reposition to neutral for comfort. All needs left within reach and bed alarm activated for safety. DEFICITS/IMPAIRMENTS:    , Body Structures, Functions, Activity Limitations Requiring Skilled Therapeutic Intervention: Decreased functional mobility ; Decreased strength;Decreased cognition;Decreased balance;Decreased endurance;Decreased posture    Patient will benefit from skilled intervention to address the above impairments. Patient's rehabilitation potential/Therapy Prognosis: Fair.   Factors which may influence rehabilitation potential include:   []         None noted  [x]         Mental ability/status  [x]         Medical condition  [x]         Home/family situation

## 2023-12-23 LAB
GLUCOSE BLD STRIP.AUTO-MCNC: 107 MG/DL (ref 70–110)
GLUCOSE BLD STRIP.AUTO-MCNC: 122 MG/DL (ref 70–110)
GLUCOSE BLD STRIP.AUTO-MCNC: 91 MG/DL (ref 70–110)

## 2023-12-23 PROCEDURE — 99239 HOSP IP/OBS DSCHRG MGMT >30: CPT | Performed by: HOSPITALIST

## 2023-12-23 PROCEDURE — 6370000000 HC RX 637 (ALT 250 FOR IP): Performed by: STUDENT IN AN ORGANIZED HEALTH CARE EDUCATION/TRAINING PROGRAM

## 2023-12-23 PROCEDURE — 1100000003 HC PRIVATE W/ TELEMETRY

## 2023-12-23 PROCEDURE — 82962 GLUCOSE BLOOD TEST: CPT

## 2023-12-23 PROCEDURE — 94761 N-INVAS EAR/PLS OXIMETRY MLT: CPT

## 2023-12-23 RX ADMIN — LACOSAMIDE 200 MG: 50 TABLET, FILM COATED ORAL at 20:35

## 2023-12-23 RX ADMIN — ISOSORBIDE MONONITRATE 30 MG: 30 TABLET, EXTENDED RELEASE ORAL at 09:02

## 2023-12-23 RX ADMIN — AMLODIPINE BESYLATE 5 MG: 5 TABLET ORAL at 09:02

## 2023-12-23 RX ADMIN — ASPIRIN 81 MG: 81 TABLET, COATED ORAL at 09:02

## 2023-12-23 RX ADMIN — LOSARTAN POTASSIUM 50 MG: 50 TABLET, FILM COATED ORAL at 09:02

## 2023-12-23 RX ADMIN — LETROZOLE 2.5 MG: 2.5 TABLET ORAL at 09:28

## 2023-12-23 RX ADMIN — PANTOPRAZOLE SODIUM 40 MG: 40 TABLET, DELAYED RELEASE ORAL at 09:03

## 2023-12-23 RX ADMIN — LACOSAMIDE 200 MG: 50 TABLET, FILM COATED ORAL at 09:03

## 2023-12-23 NOTE — PLAN OF CARE
Problem: Discharge Planning  Goal: Discharge to home or other facility with appropriate resources  Outcome: Adequate for Discharge  Flowsheets (Taken 12/23/2023 0800)  Discharge to home or other facility with appropriate resources: Identify barriers to discharge with patient and caregiver     Problem: Discharge Planning  Goal: Discharge to home or other facility with appropriate resources  Outcome: Adequate for Discharge  Flowsheets (Taken 12/23/2023 0800)  Discharge to home or other facility with appropriate resources: Identify barriers to discharge with patient and caregiver     Problem: Skin/Tissue Integrity  Goal: Absence of new skin breakdown  Description: 1. Monitor for areas of redness and/or skin breakdown  2. Assess vascular access sites hourly  3. Every 4-6 hours minimum:  Change oxygen saturation probe site  4. Every 4-6 hours:  If on nasal continuous positive airway pressure, respiratory therapy assess nares and determine need for appliance change or resting period. Outcome: Adequate for Discharge     Problem: Confusion  Goal: Confusion, delirium, dementia, or psychosis is improved or at baseline  Description: INTERVENTIONS:  1. Assess for possible contributors to thought disturbance, including medications, impaired vision or hearing, underlying metabolic abnormalities, dehydration, psychiatric diagnoses, and notify attending LIP  2. [place] high risk fall precautions, as indicated  3. Provide frequent short contacts to provide reality reorientation, refocusing and direction  4. Decrease environmental stimuli, including noise as appropriate  5. Monitor and intervene to maintain adequate nutrition, hydration, elimination, sleep and activity  6. If unable to ensure safety without constant attention obtain sitter and review sitter guidelines with assigned personnel  7.  Initiate Psychosocial CNS and Spiritual Care consult, as indicated  Outcome: Adequate for Discharge     Problem: Safety -

## 2023-12-23 NOTE — PROGRESS NOTES
Patient seen and evaluated, sitting up in bed, no acute distress. Patient is currently at baseline. MRI negative for acute CVA. Patient admitted to the hospital for altered mental status which is currently resolved and patient is at baseline. I tried to reach patient's  however was able to reach patient's brother who mentioned that the  is out of town. I discussed with him about discharge planning and he mentions that she usually gets medical transport to come home. Case management on board for discharge planning.

## 2023-12-23 NOTE — PROGRESS NOTES
Medical transportation will be delayed for pick of patient at 2145 via stretcher;  Pt stable; VSS; no signs of distress and denies pain;

## 2023-12-23 NOTE — CARE COORDINATION
Per Dr. Shelby Harris, pt will need transport home and he spoke with pt's brother Mr. Agustin. [facility] [phone] and [facility] Called pt's brother Mr. Monica Stallings and he is ok with  time. Transport papers faxed to .     STEPH MoranN RN  Care Management

## 2023-12-23 NOTE — PROGRESS NOTES
Medication List        CONTINUE taking these medications      amLODIPine 5 MG tablet  Commonly known as: NORVASC  Take 1 tablet by mouth daily     apixaban 5 MG Tabs tablet  Commonly known as: ELIQUIS  Take 1 tablet by mouth 2 times daily     aspirin 81 MG EC tablet     atorvastatin 40 MG tablet  Commonly known as: LIPITOR  TAKE 1 TABLET BY MOUTH AT BEDTIME FOR HYPERLIPEDEMIA     [facility] Senna 8.6 MG tablet  Generic drug: senna     isosorbide mononitrate 30 MG extended release tablet  Commonly known as: IMDUR  Take 1 tablet by mouth daily     lacosamide 200 MG tablet  Commonly known as: Vimpat  Take 1 tablet by mouth in the morning and at bedtime for 60 days. This is an increased dose from your 150 mg twice daily. Please make sure to review your dosing with your primary neurology provider. Max Daily Amount: 400 mg     letrozole 2.5 MG tablet  Commonly known as: FEMARA  TAKE 1 TABLET BY MOUTH ONCE DAILY FOR CHEMOTHERAPY     losartan 50 MG tablet  Commonly known as: Cozaar  08/02/2016 Losartan Oral  PO 1.0 TABLET(S) BID  08/02/2016  active     Maalox Max 059-335-92 MG/5ML Susp  Generic drug: aluminum & magnesium hydroxide-simethicone  Take 15 mLs by mouth every 6 hours as needed (heartburn)     nystatin 304024 UNIT/GM powder  Commonly known as: MYCOSTATIN  Apply 3 times daily. omeprazole 40 MG delayed release capsule  Commonly known as: PRILOSEC  Take 1 capsule by mouth every morning (before breakfast)     ondansetron 4 MG disintegrating tablet  Commonly known as: ZOFRAN-ODT  Take 1 tablet by mouth every 8 hours as needed for Nausea or Vomiting Take 4 mg by mouth every 8 hours as needed for Nausea or Vomiting.      polyethylene glycol 17 GM/SCOOP powder  Commonly known as: GLYCOLAX  Take 17 g by mouth daily     Trulicity 4.90 KT/5.5VU Sopn  Generic drug: Dulaglutide  Inject 0.75 mg into the skin once a week            STOP taking these medications      acetaminophen 500 MG tablet  Commonly known as: TYLENOL

## 2023-12-24 VITALS
BODY MASS INDEX: 28.93 KG/M2 | HEIGHT: 66 IN | OXYGEN SATURATION: 100 % | SYSTOLIC BLOOD PRESSURE: 158 MMHG | WEIGHT: 180 LBS | DIASTOLIC BLOOD PRESSURE: 73 MMHG | HEART RATE: 80 BPM | TEMPERATURE: 97.4 F | RESPIRATION RATE: 20 BRPM

## 2023-12-24 NOTE — PLAN OF CARE
Problem: Confusion  Goal: Confusion, delirium, dementia, or psychosis is improved or at baseline  Description: INTERVENTIONS:  1. Assess for possible contributors to thought disturbance, including medications, impaired vision or hearing, underlying metabolic abnormalities, dehydration, psychiatric diagnoses, and notify attending LIP  2. [place] high risk fall precautions, as indicated  3. Provide frequent short contacts to provide reality reorientation, refocusing and direction  4. Decrease environmental stimuli, including noise as appropriate  5. Monitor and intervene to maintain adequate nutrition, hydration, elimination, sleep and activity  6. If unable to ensure safety without constant attention obtain sitter and review sitter guidelines with assigned personnel  7.  Initiate Psychosocial CNS and Spiritual Care consult, as indicated  Outcome: [address] Resolved Not Met

## 2023-12-24 NOTE — PROGRESS NOTES
0420-transport  here to  pt. Confirmation made with brother, Ryan Nye, to be present at home for pt's arrival. Vitals taken-WNL. Discharge packet given to transport with discharge instructions. Cardiac monitor removed and 1 bag of belongings with socks and pajama pants.

## 2023-12-26 ENCOUNTER — CLINICAL DOCUMENTATION (OUTPATIENT)
Age: 73
End: 2023-12-26

## 2023-12-26 NOTE — PROGRESS NOTES
PATIENT REQUIRES TCM OUTREACH    MRN: [id number]     PATIENT:  Villa Walker    ADMIT DATE:  12/20/23    DISCHARGE DATE:  12/24/23     ADMITTING DX: AMS    MEDICATION CHANGES:   Start: n/a  Stop: Tylenol  Change: n/a    SPECIALISTS:   n/a    HOME HEALTH/WOUND CARE/PT/OT:  none    SCHEDULED FOLLOW UP APPTS:    Future Appointments   Date Time Provider [address] [place] [address]   12/27/2023  9:00 AM Charan Luna MD [facility] BS AMB   1/4/2024  3:15 PM SHANNON Means CNP [facility] BS AMB       *Please contact patient within 2 business days and document under . TCMOFFICE.   A scheduled Hospital Follow-up for patient within 7 days is preferred*

## 2023-12-28 ENCOUNTER — TELEPHONE (OUTPATIENT)
Age: 73
End: 2023-12-28

## 2024-01-02 ENCOUNTER — APPOINTMENT (OUTPATIENT)
Facility: HOSPITAL | Age: 74
DRG: 884 | End: 2024-01-02
Payer: MEDICARE

## 2024-01-02 ENCOUNTER — HOSPITAL ENCOUNTER (INPATIENT)
Facility: HOSPITAL | Age: 74
LOS: 1 days | Discharge: NURSING, REHAB OR LONG-TERM CARE | DRG: 884 | End: 2024-01-11
Attending: EMERGENCY MEDICINE | Admitting: STUDENT IN AN ORGANIZED HEALTH CARE EDUCATION/TRAINING PROGRAM
Payer: MEDICARE

## 2024-01-02 DIAGNOSIS — F03.918 DEMENTIA WITH BEHAVIORAL DISTURBANCE (HCC): Primary | ICD-10-CM

## 2024-01-02 LAB
ALBUMIN SERPL-MCNC: 3.2 G/DL (ref 3.4–5)
ALBUMIN/GLOB SERPL: 0.6 (ref 0.8–1.7)
ALP SERPL-CCNC: 81 U/L (ref 45–117)
ALT SERPL-CCNC: 13 U/L (ref 13–56)
AMORPH CRY URNS QL MICRO: ABNORMAL
ANION GAP SERPL CALC-SCNC: 4 MMOL/L (ref 3–18)
APPEARANCE UR: ABNORMAL
AST SERPL-CCNC: 18 U/L (ref 10–38)
BACTERIA URNS QL MICRO: ABNORMAL /HPF
BASOPHILS # BLD: 0.1 K/UL (ref 0–0.1)
BASOPHILS NFR BLD: 1 % (ref 0–2)
BILIRUB SERPL-MCNC: 0.5 MG/DL (ref 0.2–1)
BILIRUB UR QL: NEGATIVE
BUN SERPL-MCNC: 20 MG/DL (ref 7–18)
BUN/CREAT SERPL: 22 (ref 12–20)
CALCIUM SERPL-MCNC: 9.3 MG/DL (ref 8.5–10.1)
CHLORIDE SERPL-SCNC: 111 MMOL/L (ref 100–111)
CO2 SERPL-SCNC: 25 MMOL/L (ref 21–32)
COLOR UR: YELLOW
CREAT SERPL-MCNC: 0.89 MG/DL (ref 0.6–1.3)
DIFFERENTIAL METHOD BLD: ABNORMAL
EOSINOPHIL # BLD: 0.2 K/UL (ref 0–0.4)
EOSINOPHIL NFR BLD: 2 % (ref 0–5)
EPITH CASTS URNS QL MICRO: ABNORMAL /LPF (ref 0–5)
ERYTHROCYTE [DISTWIDTH] IN BLOOD BY AUTOMATED COUNT: 13.2 % (ref 11.6–14.5)
GLOBULIN SER CALC-MCNC: 5 G/DL (ref 2–4)
GLUCOSE BLD STRIP.AUTO-MCNC: 125 MG/DL (ref 70–110)
GLUCOSE SERPL-MCNC: 113 MG/DL (ref 74–99)
GLUCOSE UR STRIP.AUTO-MCNC: NEGATIVE MG/DL
HCT VFR BLD AUTO: 32.8 % (ref 35–45)
HGB BLD-MCNC: 10.3 G/DL (ref 12–16)
HGB UR QL STRIP: NEGATIVE
IMM GRANULOCYTES # BLD AUTO: 0 K/UL (ref 0–0.04)
IMM GRANULOCYTES NFR BLD AUTO: 0 % (ref 0–0.5)
KETONES UR QL STRIP.AUTO: NEGATIVE MG/DL
LACTATE SERPL-SCNC: 0.8 MMOL/L (ref 0.4–2)
LEUKOCYTE ESTERASE UR QL STRIP.AUTO: NEGATIVE
LYMPHOCYTES # BLD: 3.5 K/UL (ref 0.9–3.6)
LYMPHOCYTES NFR BLD: 42 % (ref 21–52)
MCH RBC QN AUTO: 27 PG (ref 24–34)
MCHC RBC AUTO-ENTMCNC: 31.4 G/DL (ref 31–37)
MCV RBC AUTO: 85.9 FL (ref 78–100)
MONOCYTES # BLD: 0.5 K/UL (ref 0.05–1.2)
MONOCYTES NFR BLD: 6 % (ref 3–10)
NEUTS SEG # BLD: 4.2 K/UL (ref 1.8–8)
NEUTS SEG NFR BLD: 50 % (ref 40–73)
NITRITE UR QL STRIP.AUTO: NEGATIVE
NRBC # BLD: 0 K/UL (ref 0–0.01)
NRBC BLD-RTO: 0 PER 100 WBC
PH UR STRIP: 5 (ref 5–8)
PLATELET # BLD AUTO: 228 K/UL (ref 135–420)
PMV BLD AUTO: 10.3 FL (ref 9.2–11.8)
POTASSIUM SERPL-SCNC: 4.6 MMOL/L (ref 3.5–5.5)
PROCALCITONIN SERPL-MCNC: <0.05 NG/ML
PROT SERPL-MCNC: 8.2 G/DL (ref 6.4–8.2)
PROT UR STRIP-MCNC: 300 MG/DL
RBC # BLD AUTO: 3.82 M/UL (ref 4.2–5.3)
RBC #/AREA URNS HPF: NEGATIVE /HPF (ref 0–5)
SODIUM SERPL-SCNC: 140 MMOL/L (ref 136–145)
SP GR UR REFRACTOMETRY: 1.02 (ref 1–1.03)
TROPONIN I SERPL HS-MCNC: 7 NG/L (ref 0–54)
UROBILINOGEN UR QL STRIP.AUTO: 0.2 EU/DL (ref 0.2–1)
WBC # BLD AUTO: 8.3 K/UL (ref 4.6–13.2)
WBC URNS QL MICRO: NEGATIVE /HPF (ref 0–4)

## 2024-01-02 PROCEDURE — 87040 BLOOD CULTURE FOR BACTERIA: CPT

## 2024-01-02 PROCEDURE — 80053 COMPREHEN METABOLIC PANEL: CPT

## 2024-01-02 PROCEDURE — 85025 COMPLETE CBC W/AUTO DIFF WBC: CPT

## 2024-01-02 PROCEDURE — 93005 ELECTROCARDIOGRAM TRACING: CPT | Performed by: EMERGENCY MEDICINE

## 2024-01-02 PROCEDURE — 82962 GLUCOSE BLOOD TEST: CPT

## 2024-01-02 PROCEDURE — 70450 CT HEAD/BRAIN W/O DYE: CPT

## 2024-01-02 PROCEDURE — 81001 URINALYSIS AUTO W/SCOPE: CPT

## 2024-01-02 PROCEDURE — 71045 X-RAY EXAM CHEST 1 VIEW: CPT

## 2024-01-02 PROCEDURE — 83605 ASSAY OF LACTIC ACID: CPT

## 2024-01-02 PROCEDURE — 84484 ASSAY OF TROPONIN QUANT: CPT

## 2024-01-02 PROCEDURE — 94761 N-INVAS EAR/PLS OXIMETRY MLT: CPT

## 2024-01-02 PROCEDURE — 99285 EMERGENCY DEPT VISIT HI MDM: CPT

## 2024-01-02 PROCEDURE — 2580000003 HC RX 258: Performed by: EMERGENCY MEDICINE

## 2024-01-02 PROCEDURE — 6370000000 HC RX 637 (ALT 250 FOR IP): Performed by: EMERGENCY MEDICINE

## 2024-01-02 PROCEDURE — 84145 PROCALCITONIN (PCT): CPT

## 2024-01-02 RX ORDER — 0.9 % SODIUM CHLORIDE 0.9 %
500 INTRAVENOUS SOLUTION INTRAVENOUS ONCE
Status: COMPLETED | OUTPATIENT
Start: 2024-01-02 | End: 2024-01-02

## 2024-01-02 RX ORDER — QUETIAPINE FUMARATE 100 MG/1
100 TABLET, FILM COATED ORAL
Status: COMPLETED | OUTPATIENT
Start: 2024-01-02 | End: 2024-01-02

## 2024-01-02 RX ADMIN — SODIUM CHLORIDE 500 ML: 9 INJECTION, SOLUTION INTRAVENOUS at 11:50

## 2024-01-02 RX ADMIN — QUETIAPINE FUMARATE 100 MG: 100 TABLET ORAL at 16:42

## 2024-01-02 ASSESSMENT — PAIN SCALES - WONG BAKER: WONGBAKER_NUMERICALRESPONSE: 4;0

## 2024-01-02 NOTE — ED NOTES
Assumed care of patient. Laying semi herndon position, wearing home clothes, infusing fluid in right IV. Blankets covering up to waist. Skin is warm and dry to touch. No respiratory distress observed. Connected to bedside monitor. Vital signs monitoring.

## 2024-01-02 NOTE — ED NOTES
Demario Quintero, brother, may be reached at [phone].  Reports unable to care for patient at home, looking for nursing home placement.

## 2024-01-02 NOTE — ED TRIAGE NOTES
Chief Complaint   Patient presents with    Altered Mental Status     Hasn't been eating much x 3 days, Per EMS ST EKG, , + HA and high BP    Headache     Aox1 at baseline, pt non-verbal during assessment. Nods head or very slightly shakes head at questions

## 2024-01-02 NOTE — ED NOTES
Patient's family at bedside. Endorses they can not take her home and are in need of Case Management to place her. Dr. Cindy nolan.

## 2024-01-02 NOTE — ED PROVIDER NOTES
EMERGENCY DEPARTMENT HISTORY AND PHYSICAL EXAM    10:25 AM EST        Date: 1/2/2024  Patient Name: Ivonne Taylor    History of Presenting Illness     Chief Complaint   Patient presents with   • Altered Mental Status     Hasn't been eating much x 3 days, Per EMS ST EKG, , + HA and high BP   • Headache         History Provided By: EMS and nursing notes    Additional History (Context): Ivonne Taylor is a 73 y.o. female presents with from home, has not been eating much last 3 days.  Alert and oriented x 1 which is her baseline.  Seems very confused to me.  Keeps reaching for her head.  Alert in regards..    PCP: Shanon Kim APRN - CNP    Chief Complaint:   Duration:    Timing:    Location:   Quality:   Severity:   Modifying Factors:   Associated Symptoms:       No current facility-administered medications for this encounter.     Current Outpatient Medications   Medication Sig Dispense Refill   • lacosamide (VIMPAT) 200 MG tablet Take 1 tablet by mouth in the morning and at bedtime for 60 days. This is an increased dose from your 150 mg twice daily.  Please make sure to review your dosing with your primary neurology provider. Max Daily Amount: 400 mg 60 tablet 1   • losartan (COZAAR) 50 MG tablet 08/02/2016 Losartan Oral  PO 1.0 TABLET(S) BID  08/02/2016  active 90 tablet 1   • letrozole (FEMARA) 2.5 MG tablet TAKE 1 TABLET BY MOUTH ONCE DAILY FOR CHEMOTHERAPY 30 tablet 1   • polyethylene glycol (GLYCOLAX) 17 GM/SCOOP powder Take 17 g by mouth daily 510 g 5   • nystatin (MYCOSTATIN) 357029 UNIT/GM powder Apply 3 times daily. 60 g 3   • ondansetron (ZOFRAN-ODT) 4 MG disintegrating tablet Take 1 tablet by mouth every 8 hours as needed for Nausea or Vomiting Take 4 mg by mouth every 8 hours as needed for Nausea or Vomiting. 60 tablet 1   • aluminum & magnesium hydroxide-simethicone (MAALOX MAX) 400-400-40 MG/5ML SUSP Take 15 mLs by mouth every 6 hours as needed (heartburn) 355 mL 0   • apixaban (ELIQUIS) 5 MG

## 2024-01-03 LAB
CHP ED QC CHECK: NORMAL
EKG ATRIAL RATE: 100 BPM
EKG DIAGNOSIS: NORMAL
EKG P AXIS: 30 DEGREES
EKG P-R INTERVAL: 114 MS
EKG Q-T INTERVAL: 330 MS
EKG QRS DURATION: 72 MS
EKG QTC CALCULATION (BAZETT): 425 MS
EKG R AXIS: 11 DEGREES
EKG T AXIS: 52 DEGREES
EKG VENTRICULAR RATE: 100 BPM
GLUCOSE BLD STRIP.AUTO-MCNC: 78 MG/DL (ref 70–110)
GLUCOSE BLD-MCNC: 78 MG/DL

## 2024-01-03 PROCEDURE — 93010 ELECTROCARDIOGRAM REPORT: CPT | Performed by: INTERNAL MEDICINE

## 2024-01-03 PROCEDURE — 97535 SELF CARE MNGMENT TRAINING: CPT

## 2024-01-03 PROCEDURE — 97166 OT EVAL MOD COMPLEX 45 MIN: CPT

## 2024-01-03 PROCEDURE — 97162 PT EVAL MOD COMPLEX 30 MIN: CPT

## 2024-01-03 PROCEDURE — 94761 N-INVAS EAR/PLS OXIMETRY MLT: CPT

## 2024-01-03 PROCEDURE — 82962 GLUCOSE BLOOD TEST: CPT

## 2024-01-03 PROCEDURE — 6370000000 HC RX 637 (ALT 250 FOR IP): Performed by: EMERGENCY MEDICINE

## 2024-01-03 RX ORDER — LACOSAMIDE 50 MG/1
200 TABLET ORAL 2 TIMES DAILY
Status: DISCONTINUED | OUTPATIENT
Start: 2024-01-03 | End: 2024-01-11 | Stop reason: HOSPADM

## 2024-01-03 RX ORDER — LOSARTAN POTASSIUM 50 MG/1
50 TABLET ORAL DAILY
Status: DISCONTINUED | OUTPATIENT
Start: 2024-01-03 | End: 2024-01-11 | Stop reason: HOSPADM

## 2024-01-03 RX ORDER — ISOSORBIDE MONONITRATE 30 MG/1
30 TABLET, EXTENDED RELEASE ORAL DAILY
Status: DISCONTINUED | OUTPATIENT
Start: 2024-01-03 | End: 2024-01-11 | Stop reason: HOSPADM

## 2024-01-03 RX ORDER — AMLODIPINE BESYLATE 5 MG/1
5 TABLET ORAL DAILY
Status: DISCONTINUED | OUTPATIENT
Start: 2024-01-03 | End: 2024-01-11 | Stop reason: HOSPADM

## 2024-01-03 RX ADMIN — LACOSAMIDE 200 MG: 50 TABLET, FILM COATED ORAL at 21:10

## 2024-01-03 RX ADMIN — ISOSORBIDE MONONITRATE 30 MG: 30 TABLET, EXTENDED RELEASE ORAL at 09:04

## 2024-01-03 RX ADMIN — AMLODIPINE BESYLATE 5 MG: 5 TABLET ORAL at 09:04

## 2024-01-03 RX ADMIN — LOSARTAN POTASSIUM 50 MG: 50 TABLET, FILM COATED ORAL at 09:04

## 2024-01-03 RX ADMIN — APIXABAN 5 MG: 5 TABLET, FILM COATED ORAL at 21:10

## 2024-01-03 RX ADMIN — APIXABAN 5 MG: 5 TABLET, FILM COATED ORAL at 09:04

## 2024-01-03 RX ADMIN — LACOSAMIDE 200 MG: 50 TABLET, FILM COATED ORAL at 09:04

## 2024-01-03 NOTE — ED NOTES
Patient tolerated orange juice, half of the eggs, a few bites of oatmeal and sugar. Patient then refused more food. Closed mouth, turns head and repeats \"no! Go away!\" RN offer other food options but patient cont to refuse more PO.   Patient adjusted in bed, NAD, call bell in reach. Patient dry and clean.

## 2024-01-03 NOTE — PROGRESS NOTES
completed the initial Spiritual Assessment of the patient, and offered Pastoral Care support to the patient even though she was not responsive at this time. Prayer was offered at bedside on behalf of the patient., There is no advance directive found.. Patient does not have any [place]/cultural needs that will affect patient’s preferences in health care. Chaplains will continue to follow and will provide pastoral care on an as needed/requested basis.    Chaplain Aries Sahu  Board Certified   Spiritual Care Department  [phone]

## 2024-01-03 NOTE — ED NOTES
PT resting with both eyes closed at this time. Chest rise and fall observed. Breathing even and unlabored. NAD. PT attached to continuous cardiac monitoring. Call bell in reach.

## 2024-01-03 NOTE — ED NOTES
Lunch tray arrives, RN wakes patient who is easily resting with eyes closed. Patient raises voice at RN \"sleeping! No! Let me sleep\" RN shows patient lunch, patient makes angry expression at RN. RN tries to offer juice or tea, patient continues to make angry face \"No! Sleeping!\". RN covers food and places near patient. Call bell in reach. Patient closes eyes, NAD, VSS.

## 2024-01-03 NOTE — ED PROVIDER NOTES
8:50 AM :Pt care assumed from Dr. Osorio , ED provider. Pt complaint(s), current treatment plan, progression and available diagnostic results have been discussed thoroughly. The patient was seen and evaluated on my shift.   Rounding occurred: No  Intended Disposition: Placement by case management  Pending diagnostic reports and/or labs (please list): Patient with a history of dementia with behavioral disturbance, seems to be worsening recently per the off going physician.  On my assessment, patient is resting comfortably.  I have ordered her important daily medications.  Awaiting case management assessment     Torrey Vu MD  01/04/24 0706    1250: Patient seen by OT, meets criteria for skilled nursing.  Resting comfortably.  No issues overnight       Torrey Vu MD  01/04/24 1251      1/5/2024  Resume care of the patient at 6:30 AM today.  Still awaiting placement, resting comfortably this morning.  No issues overnight     Torrey Vu MD  01/05/24 7480

## 2024-01-03 NOTE — ED NOTES
Patient's brother, Demario Quintero, answers phone & reports is able to bring a list of the medications she takes. Pleasant & no further questions/concerns/requests.

## 2024-01-03 NOTE — ACP (ADVANCE CARE PLANNING)
Advance Care Planning     Advance Care Planning Inpatient Note  [facility] Department    Today's Date: 1/3/2024  Unit: [facility] EMERGENCY DEPT    Received request from .  Upon review of chart and communication with care team, patient's decision making abilities are not in question.. Patient was/were present in the room during visit.    Goals of ACP Conversation:  Discuss advance care planning documents    Health Care Decision Makers:     No healthcare decision makers have been documented.  Click here to complete HealthCare Decision Makers including selection of the Healthcare Decision Maker Relationship (ie \"Primary\")  Summary:  No Decision Maker named by patient at this time    Advance Care Planning Documents (Patient Wishes):  None     Assessment:  Patient seen in bed 14 of the emergency room where she is non responsive at this time and appears very weak. There is no advance directive on file for this patient.    Interventions:  Patient DECLINED ACP conversation    Care Preferences Communicated:   No    Outcomes/Plan:      Electronically signed by Yoel Sahu Jr., [facility] on 1/3/2024 at 9:45 AM

## 2024-01-03 NOTE — ED NOTES
Patient resting in bed with eyes closed. Wakes easily to RN in room adjusting pulse ox. Patient back to resting easily with eyes closed, VSS, NAD.

## 2024-01-03 NOTE — ED NOTES
Changed brief and purwick, new applied. New gown and chux pad. Patient adjusted in bed. Patient call bell in reach & cont VS monitoring & NAD. PT in to work with patient.

## 2024-01-03 NOTE — ED NOTES
RN attempted to call family (number in chart) and pharmacy but unable to reach family, pharmacy isn't open until 9am.

## 2024-01-03 NOTE — ED NOTES
Assumed care of PT from previous nurse. Chart reviewed. PT condition assessed. All lines and IVs traced and assessed. Call bell in reach. All needs addressed

## 2024-01-03 NOTE — ED NOTES
Patient repositioned. Linens changed, diaper changed. cleaned with CHG wipes from warmer. Vital signs monitored by bedside monitor.

## 2024-01-03 NOTE — PLAN OF CARE
potential/Prognosis: Fair.  Factors which may influence rehabilitation potential include:   []             None noted  []             Mental ability/status  [x]             Medical condition  []             Home/family situation and support systems  []             Safety awareness  []             Pain tolerance/management  []             Other:      PLAN :  Recommendations and Planned Interventions:   [x]               Self Care Training                  [x]      Therapeutic Activities  [x]               Functional Mobility Training   []      Cognitive Retraining  [x]               Therapeutic Exercises           [x]      Endurance Activities  [x]               Balance Training                    []      Neuromuscular Re-Education  []               Visual/Perceptual Training     [x]      Home Safety Training  [x]               Patient Education                   [x]      Family Training/Education  []               Other (comment):    Frequency/Duration: Patient will be followed by occupational therapy to address goals, 1-2 times per day/3-5 days per week to address goals.    Further Equipment Recommendations for Discharge: hospital bed, mechanical lift, and wheelchair 18 inch    AMPA: AM-PAC Inpatient Daily Activity Raw Score: 7      Current research shows that an AM-PAC score of 17 or less is not associated with a discharge to the patient's home setting.  Based on an AM-PAC score and their current ADL deficits; it is recommended that the patient have 3-5 sessions per week of Occupational Therapy at d/c to increase the patient's independence.      This AMPAC score should be considered in conjunction with interdisciplinary team recommendations to determine the most appropriate discharge setting. Patient's social support, diagnosis, medical stability, and prior level of function should also be taken into consideration.     SUBJECTIVE:   Patient stated, “I'm tired.”    OBJECTIVE DATA SUMMARY:     Past Medical History:  Layout: One level  Home Access: Ramped entrance  Home Equipment: Wheelchair-manual  ADL Assistance: Needs assistance  Ambulation Assistance: Non-ambulatory  Transfer Assistance: Needs assistance  []  Right hand dominant   []  Left hand dominant    Cognitive/Behavioral Status:  Orientation  Overall Orientation Status:  (unable to fully assess d/t pt appears to have dysarthria)  Cognition  Overall Cognitive Status: Exceptions  Arousal/Alertness: Delayed responses to stimuli  Following Commands: Follows one step commands with repetition  Attention Span: Difficulty attending to directions;Difficulty dividing attention  Memory: Decreased recall of recent events  Safety Judgement: Decreased awareness of need for assistance  Problem Solving: Assistance required to generate solutions;Assistance required to implement solutions  Insights: Decreased awareness of deficits  Initiation: Requires cues for all  Sequencing: Requires cues for all    Skin: appears intact  Edema: none noted    Vision/Perceptual:    Vision  Vision: Within Functional Limits        Coordination: BUE  Coordination: Grossly decreased, non-functional      Strength: BUE  Strength: Grossly decreased, non-functional    Tone & Sensation: BUE  Tone: Abnormal       Range of Motion: BUE  AROM:  ((L)UE impaired, (R)UE shoulder impaired, elbow flex/ext WFL for self feeding)  PROM: Generally decreased, functional ((L)UE with increased flexor tone, (R)UE with mild flexor tone)      ADL Assessment:   Feeding: Increased time to complete;Moderate assistance  Grooming: Dependent/Total  UE Bathing: Dependent/Total  LE Bathing: Dependent/Total  UE Dressing: Dependent/Total  LE Dressing: Dependent/Total  Toileting: Dependent/Total         Pain:  Pain level pre-treatment: 0/10   Pain level post-treatment: 0/10       Activity Tolerance:   Activity Tolerance: Treatment limited secondary to medical complications (free text)  Please refer to the flowsheet for vital signs taken

## 2024-01-03 NOTE — ED NOTES
Patient reports finished with lunch. Ate less than 50% of lunch tray, drank about 2/3 of the sweetened tea. Patient adjusted in bed, recovered per request. NAD, VSS on cont monitoring.     Patient handoff completed with MARCELINA Mckoy who denies further questions/concerns and assumes responsibility of the patient.     Patient is stable . Continuous VS monitoring. Call bell in reach.

## 2024-01-03 NOTE — PLAN OF CARE
Problem: Physical Therapy - Adult  Goal: By Discharge: Performs mobility at highest level of function for planned discharge setting.  See evaluation for individualized goals.  Description: Physical Therapy Goals:  Initiated 1/3/2024 to be met within 7-10 days.    1.  Patient will move from supine to sit and sit to supine  in bed with minimal assistance/contact guard assist.    2.  Patient will transfer from bed to chair and chair to bed with moderate assistance  using the least restrictive device.  3.  Patient will perform sit to stand with moderate assistance .  4.  Patient will ambulate with moderate assistance  for 5 feet with the least restrictive device.     PLOF: pt unable to give full history but per chart review has hospital bed and w/c at home     Outcome: Progressing   PHYSICAL THERAPY EVALUATION    Patient: Ivonne Taylor (73 y.o. female)  Date: 1/3/2024  Primary Diagnosis: No admission diagnoses are documented for this encounter.       Precautions: Fall Risk    ASSESSMENT :  Pt in bed and agreeable to PT evaluation.  Pt with 1-2 word responses to questions. Pt required mod A for rolling and min/mod A for supine to sit transfer.  Pt demonstrated fair static sitting balance at the edge of the bed with posterior lean noted.  Attempted to stand and with max A patient was able to clear buttocks but not fully extend at hips and knees.  Pt required mod A to return to supine.  Pt was left in bed with needs in reach and nursing notified.    DEFICITS/IMPAIRMENTS:    , Body Structures, Functions, Activity Limitations Requiring Skilled Therapeutic Intervention: Decreased functional mobility ;Decreased endurance;Decreased coordination;Decreased balance;Decreased safe awareness;Decreased strength    Patient will benefit from skilled intervention to address the above impairments.  Patient's rehabilitation potential/Therapy Prognosis: Fair.  Factors which may influence rehabilitation potential include:   []          functional  PROM: Within functional limits    Functional Mobility:  Bed Mobility:  Bed Mobility Training  Bed Mobility Training: Yes  Rolling: Moderate assistance  Supine to Sit: Minimum assistance;Moderate assistance  Sit to Supine: Moderate assistance  Transfers:  Transfer Training  Transfer Training: Yes  Sit to Stand: Maximum assistance  Stand to Sit: Maximum assistance  Balance:   Balance  Sitting - Static: Fair (occasional)  Sitting - Dynamic:  (fair-)  Standing: Impaired  Standing - Static: Poor  Wheelchair Mobility:  Wheelchair Management  Wheelchair Management: No    Therapeutic Exercises/Neuromuscular Re-education:   Ankle pumps, heel slides with assistance, LAQ  Pain:  Pain level pre-treatment: 0/10   Pain level post-treatment: 0/10   Pain Intervention(s): n/a    Activity Tolerance:   Activity Tolerance: Patient tolerated treatment well  Please refer to the flowsheet for vital signs taken during this treatment.    After treatment:   []         Patient left in no apparent distress sitting up in chair  [x]         Patient left in no apparent distress in bed  [x]         Call bell left within reach  [x]         Nursing notified  []         Caregiver present  []         Bed alarm activated  []         SCDs applied    COMMUNICATION/EDUCATION:   Patient Education  Education Given To: Patient  Education Provided: Role of Therapy;Plan of Care;Transfer Training;Fall Prevention Strategies  Education Method: Demonstration;Verbal;Teach Back  Barriers to Learning: Cognition  Education Outcome: Verbalized understanding;Continued education needed    Thank you for this referral.  Lauren Mckeon, PT  Minutes: 13      Eval Complexity: Decision Making: Medium Complexity

## 2024-01-04 LAB — GLUCOSE BLD STRIP.AUTO-MCNC: 70 MG/DL (ref 70–110)

## 2024-01-04 PROCEDURE — 94761 N-INVAS EAR/PLS OXIMETRY MLT: CPT

## 2024-01-04 PROCEDURE — 82962 GLUCOSE BLOOD TEST: CPT

## 2024-01-04 PROCEDURE — 6370000000 HC RX 637 (ALT 250 FOR IP): Performed by: EMERGENCY MEDICINE

## 2024-01-04 RX ADMIN — LACOSAMIDE 200 MG: 50 TABLET, FILM COATED ORAL at 08:15

## 2024-01-04 RX ADMIN — APIXABAN 5 MG: 5 TABLET, FILM COATED ORAL at 21:09

## 2024-01-04 RX ADMIN — LOSARTAN POTASSIUM 50 MG: 50 TABLET, FILM COATED ORAL at 08:16

## 2024-01-04 RX ADMIN — APIXABAN 5 MG: 5 TABLET, FILM COATED ORAL at 08:16

## 2024-01-04 RX ADMIN — LACOSAMIDE 200 MG: 50 TABLET, FILM COATED ORAL at 21:09

## 2024-01-04 RX ADMIN — ISOSORBIDE MONONITRATE 30 MG: 30 TABLET, EXTENDED RELEASE ORAL at 08:16

## 2024-01-04 RX ADMIN — AMLODIPINE BESYLATE 5 MG: 5 TABLET ORAL at 08:15

## 2024-01-04 NOTE — ED NOTES
Pt provided dinner tray, set up tray and patient provided utensils. Pt observed independently eating (needed set up only) will monitor intake.

## 2024-01-04 NOTE — ED NOTES
Pt provided breakfast tray, set up and able to feed self with set up.   Patient ate approx 50% of breakfast

## 2024-01-04 NOTE — ED NOTES
Recvd report from MARCELINA Dill  Pt resting comfortably in ED bed, waiting patiently for breakfast. No other needs at this time.

## 2024-01-04 NOTE — CARE COORDINATION
Writer spoke with patient and brother cheri    Per cheri: \"Patient and  lives with my wife and I . Her  went out of town. When ever he goes out of town she stop eating and talking. We would like her to go to a rehab center until she can get some strength back. Her  will be home on Friday.      Writer sent referrals to all the local Snf

## 2024-01-04 NOTE — ED NOTES
Pt had been set up for lunch but RN found tray full. RN offered to feed pt and she accepted. Pt ate approx 30% lunch, then stated \"I can feed myself\" so RN provided utensils and set up again.

## 2024-01-04 NOTE — ED NOTES
Pt ate approx 75% of lunch total.   Provided add'l blanket. Pt pointing outside of room saying \"dining room?'  Pt is unable to elaborate. Pt denies wanting more food. Denies pain at this time.

## 2024-01-04 NOTE — ED NOTES
Pt ate 100% of dinner tray independently. Minimal spills (cleaned up by RN)  RN offered and pt accepted teodoro crackers.     Pt pointing outside of room stating \"box, box\" RN advised 'yes, there are boxes out there but they belong out there' Pt nodded.     Eating crackers and drinking water independently.

## 2024-01-05 PROCEDURE — 97110 THERAPEUTIC EXERCISES: CPT

## 2024-01-05 PROCEDURE — 97535 SELF CARE MNGMENT TRAINING: CPT

## 2024-01-05 PROCEDURE — 94761 N-INVAS EAR/PLS OXIMETRY MLT: CPT

## 2024-01-05 PROCEDURE — 6370000000 HC RX 637 (ALT 250 FOR IP): Performed by: EMERGENCY MEDICINE

## 2024-01-05 RX ADMIN — LOSARTAN POTASSIUM 50 MG: 50 TABLET, FILM COATED ORAL at 09:05

## 2024-01-05 RX ADMIN — ISOSORBIDE MONONITRATE 30 MG: 30 TABLET, EXTENDED RELEASE ORAL at 09:05

## 2024-01-05 RX ADMIN — APIXABAN 5 MG: 5 TABLET, FILM COATED ORAL at 20:44

## 2024-01-05 RX ADMIN — LACOSAMIDE 200 MG: 50 TABLET, FILM COATED ORAL at 09:05

## 2024-01-05 RX ADMIN — AMLODIPINE BESYLATE 5 MG: 5 TABLET ORAL at 09:05

## 2024-01-05 RX ADMIN — LACOSAMIDE 200 MG: 50 TABLET, FILM COATED ORAL at 20:44

## 2024-01-05 RX ADMIN — APIXABAN 5 MG: 5 TABLET, FILM COATED ORAL at 09:05

## 2024-01-05 NOTE — PLAN OF CARE
Problem: Physical Therapy - Adult  Goal: By Discharge: Performs mobility at highest level of function for planned discharge setting.  See evaluation for individualized goals.  Description: Physical Therapy Goals:  Initiated 1/3/2024 to be met within 7-10 days.    1.  Patient will move from supine to sit and sit to supine  in bed with minimal assistance/contact guard assist.    2.  Patient will transfer from bed to chair and chair to bed with moderate assistance  using the least restrictive device.  3.  Patient will perform sit to stand with moderate assistance .  4.  Patient will ambulate with moderate assistance  for 5 feet with the least restrictive device.     PLOF: pt unable to give full history but per chart review has hospital bed and w/c at home     Outcome: Progressing   PHYSICAL THERAPY TREATMENT    Patient: Ivonne Taylor (73 y.o. female)  Date: 1/5/2024  Diagnosis: No admission diagnoses are documented for this encounter.   Precautions: Fall Risk, General Precautions  ASSESSMENT:  Treated in ED. Alert to voice. Agreeable to PT. Able to wiggle toes on RLE. Demonstrates 25% of AROM on RLE. Resists all PROM to all extremities. Seated on stretcher with towel roll between knees to prevent skin breakdown. Call bell in reach.     Progression toward goals:   []      Improving appropriately and progressing toward goals  [x]      Improving slowly and progressing toward goals  []      Not making progress toward goals and plan of care will be adjusted     PLAN:  Patient continues to benefit from skilled intervention to address the above impairments.  Continue treatment per established plan of care.    Further Equipment Recommendations for Discharge: hospital bed, mechanical lift, and wheelchair     AM-PAC Inpatient Mobility Raw Score : 8    This [facility] score should be considered in conjunction with interdisciplinary team recommendations to determine the most appropriate discharge setting. Patient's social support,

## 2024-01-05 NOTE — PLAN OF CARE
Problem: Occupational Therapy - Adult  Goal: By Discharge: Performs self-care activities at highest level of function for planned discharge setting.  See evaluation for individualized goals.  Description:   1.  Patient will perform self-feeding with supervision/set-up using adaptive equipment and compensatory techniques.   2.  Patient will perform grooming with supervision/set-up using adaptive equipment and compensatory techniques.   3.  Patient will participate in upper extremity therapeutic exercise/activities with supervision/set-up for 8-10 minutes to increase strength/endurance for ADLs.      PLOF: per spouse, pt has PCA to assist with ADLs and functional transfer with Max A bed <-> w/c, pt was able to perform self feeding and basic grooming tasks with set up assist     Outcome: Progressing   OCCUPATIONAL THERAPY TREATMENT    Patient: Ivonne Taylor (73 y.o. female)  Date: 2024  Diagnosis: No admission diagnoses are documented for this encounter. <principal problem not specified>      Precautions: Fall Risk, General Precautions    Chart, occupational therapy assessment, plan of care, and goals were reviewed.  ASSESSMENT:  Verified pt name and  2/2 pt nonverbal and aphasic. Nods yes/no inconsistently. Pt requires max encouragement for participation at bed level. Pt seen at bed level for UE Therex and simple ADLs. Pt presents w/decrease ROM/strength LUE. Provided prolonged stretch w/elbow flexion to increase independence w/UE dressing ADLs. Pt requires hand over hand and MAX A w/simple ADLs. Pt becoming more engaged in session at end of session. No c/o pain or SOB w/bed level activity.    Progression toward goals:  []          Improving appropriately and progressing toward goals  [x]          Improving slowly and progressing toward goals  []          Not making progress toward goals and plan of care will be adjusted     PLAN:  Patient continues to benefit from skilled intervention to address the above  impairments.  Continue treatment per established plan of care.    Further Equipment Recommendations for Discharge:  possibly hospital bed    AMPAC: AM-PAC Inpatient Daily Activity Raw Score: 8    Current research shows that an AM-PAC score of 17 or less is not associated with a discharge to the patient's home setting. Based on an AM-PAC score and their current ADL deficits; it is recommended that the patient have 3-5 sessions per week of Occupational Therapy at d/c to increase the patient's independence.      This AMPAC score should be considered in conjunction with interdisciplinary team recommendations to determine the most appropriate discharge setting. Patient's social support, diagnosis, medical stability, and prior level of function should also be taken into consideration.     SUBJECTIVE:   Patient nonverbal.    OBJECTIVE DATA SUMMARY:   Cognitive/Behavioral Status:  Orientation  Orientation Level: Oriented to person    ADL Intervention:  Grooming: Maximum assistance  Pt requires hand over hand assist w/simple ADL grooming tasks w/hand hygiene and applying lotion to BUE.    UE Dressing: Maximum assistance  Donning/doffing hospital gown at bed level    UE Therapeutic Exercises:   AAROM > AROM BUE shoulder flexion  AAROM > AROM BUE elbow flexion/extension  AAROM BUE wrist flexion/extension, ulnar deviation L/R    Pain:  Pain level pre-treatment: 0/10   Pain level post-treatment: 0/10    Activity Tolerance:    Activity Tolerance: Patient tolerated treatment well    After treatment:   []  Patient left in no apparent distress sitting up in chair  [x]  Patient left in no apparent distress in bed  [x]  Call bell left within reach  [x]  Nursing notified  []  Caregiver present  []  Bed alarm activated    COMMUNICATION/EDUCATION:   Patient Education  Education Given To: Patient  Education Provided: ADL Adaptive Strategies;Energy Conservation  Education Method: Verbal;Teach Back  Barriers to Learning:

## 2024-01-05 NOTE — ED NOTES
Patient shakes her head no when SN attempts to feed her breakfast. Patient responds with one word phrases and by shaking her head yes or no.

## 2024-01-06 PROBLEM — G93.40 ENCEPHALOPATHY: Status: ACTIVE | Noted: 2024-01-06

## 2024-01-06 PROCEDURE — C9113 INJ PANTOPRAZOLE SODIUM, VIA: HCPCS | Performed by: STUDENT IN AN ORGANIZED HEALTH CARE EDUCATION/TRAINING PROGRAM

## 2024-01-06 PROCEDURE — 6370000000 HC RX 637 (ALT 250 FOR IP): Performed by: EMERGENCY MEDICINE

## 2024-01-06 PROCEDURE — 6370000000 HC RX 637 (ALT 250 FOR IP): Performed by: STUDENT IN AN ORGANIZED HEALTH CARE EDUCATION/TRAINING PROGRAM

## 2024-01-06 PROCEDURE — 2580000003 HC RX 258: Performed by: STUDENT IN AN ORGANIZED HEALTH CARE EDUCATION/TRAINING PROGRAM

## 2024-01-06 PROCEDURE — G0378 HOSPITAL OBSERVATION PER HR: HCPCS

## 2024-01-06 PROCEDURE — 6360000002 HC RX W HCPCS: Performed by: STUDENT IN AN ORGANIZED HEALTH CARE EDUCATION/TRAINING PROGRAM

## 2024-01-06 PROCEDURE — 99222 1ST HOSP IP/OBS MODERATE 55: CPT | Performed by: STUDENT IN AN ORGANIZED HEALTH CARE EDUCATION/TRAINING PROGRAM

## 2024-01-06 PROCEDURE — 96374 THER/PROPH/DIAG INJ IV PUSH: CPT

## 2024-01-06 PROCEDURE — A4216 STERILE WATER/SALINE, 10 ML: HCPCS | Performed by: STUDENT IN AN ORGANIZED HEALTH CARE EDUCATION/TRAINING PROGRAM

## 2024-01-06 RX ORDER — ACETAMINOPHEN 650 MG/1
650 SUPPOSITORY RECTAL EVERY 6 HOURS PRN
Status: DISCONTINUED | OUTPATIENT
Start: 2024-01-06 | End: 2024-01-11 | Stop reason: HOSPADM

## 2024-01-06 RX ORDER — POTASSIUM CHLORIDE 20 MEQ/1
40 TABLET, EXTENDED RELEASE ORAL PRN
Status: DISCONTINUED | OUTPATIENT
Start: 2024-01-06 | End: 2024-01-11 | Stop reason: HOSPADM

## 2024-01-06 RX ORDER — POLYETHYLENE GLYCOL 3350 17 G/17G
17 POWDER, FOR SOLUTION ORAL DAILY PRN
Status: DISCONTINUED | OUTPATIENT
Start: 2024-01-06 | End: 2024-01-11 | Stop reason: HOSPADM

## 2024-01-06 RX ORDER — MAGNESIUM SULFATE IN WATER 40 MG/ML
2000 INJECTION, SOLUTION INTRAVENOUS PRN
Status: DISCONTINUED | OUTPATIENT
Start: 2024-01-06 | End: 2024-01-11 | Stop reason: HOSPADM

## 2024-01-06 RX ORDER — SODIUM CHLORIDE 0.9 % (FLUSH) 0.9 %
5-40 SYRINGE (ML) INJECTION PRN
Status: DISCONTINUED | OUTPATIENT
Start: 2024-01-06 | End: 2024-01-11 | Stop reason: HOSPADM

## 2024-01-06 RX ORDER — SODIUM CHLORIDE 0.9 % (FLUSH) 0.9 %
5-40 SYRINGE (ML) INJECTION EVERY 12 HOURS SCHEDULED
Status: DISCONTINUED | OUTPATIENT
Start: 2024-01-06 | End: 2024-01-11 | Stop reason: HOSPADM

## 2024-01-06 RX ORDER — ONDANSETRON 4 MG/1
4 TABLET, ORALLY DISINTEGRATING ORAL EVERY 8 HOURS PRN
Status: DISCONTINUED | OUTPATIENT
Start: 2024-01-06 | End: 2024-01-11 | Stop reason: HOSPADM

## 2024-01-06 RX ORDER — SODIUM CHLORIDE 9 MG/ML
INJECTION, SOLUTION INTRAVENOUS PRN
Status: DISCONTINUED | OUTPATIENT
Start: 2024-01-06 | End: 2024-01-11 | Stop reason: HOSPADM

## 2024-01-06 RX ORDER — LANOLIN ALCOHOL/MO/W.PET/CERES
3 CREAM (GRAM) TOPICAL NIGHTLY PRN
Status: DISCONTINUED | OUTPATIENT
Start: 2024-01-06 | End: 2024-01-11 | Stop reason: HOSPADM

## 2024-01-06 RX ORDER — ACETAMINOPHEN 325 MG/1
650 TABLET ORAL EVERY 6 HOURS PRN
Status: DISCONTINUED | OUTPATIENT
Start: 2024-01-06 | End: 2024-01-11 | Stop reason: HOSPADM

## 2024-01-06 RX ORDER — POTASSIUM CHLORIDE 7.45 MG/ML
10 INJECTION INTRAVENOUS PRN
Status: DISCONTINUED | OUTPATIENT
Start: 2024-01-06 | End: 2024-01-11 | Stop reason: HOSPADM

## 2024-01-06 RX ORDER — ONDANSETRON 2 MG/ML
4 INJECTION INTRAMUSCULAR; INTRAVENOUS EVERY 6 HOURS PRN
Status: DISCONTINUED | OUTPATIENT
Start: 2024-01-06 | End: 2024-01-11 | Stop reason: HOSPADM

## 2024-01-06 RX ADMIN — SODIUM CHLORIDE, PRESERVATIVE FREE 10 ML: 5 INJECTION INTRAVENOUS at 21:19

## 2024-01-06 RX ADMIN — LACOSAMIDE 200 MG: 50 TABLET, FILM COATED ORAL at 08:51

## 2024-01-06 RX ADMIN — SODIUM CHLORIDE 10 ML: 9 INJECTION INTRAMUSCULAR; INTRAVENOUS; SUBCUTANEOUS at 19:29

## 2024-01-06 RX ADMIN — APIXABAN 5 MG: 5 TABLET, FILM COATED ORAL at 20:49

## 2024-01-06 RX ADMIN — LOSARTAN POTASSIUM 50 MG: 50 TABLET, FILM COATED ORAL at 08:55

## 2024-01-06 RX ADMIN — LACOSAMIDE 200 MG: 50 TABLET, FILM COATED ORAL at 20:49

## 2024-01-06 RX ADMIN — PANTOPRAZOLE SODIUM 40 MG: 40 INJECTION, POWDER, FOR SOLUTION INTRAVENOUS at 19:29

## 2024-01-06 RX ADMIN — APIXABAN 5 MG: 5 TABLET, FILM COATED ORAL at 08:54

## 2024-01-06 RX ADMIN — ISOSORBIDE MONONITRATE 30 MG: 30 TABLET, EXTENDED RELEASE ORAL at 08:55

## 2024-01-06 RX ADMIN — AMLODIPINE BESYLATE 5 MG: 5 TABLET ORAL at 08:55

## 2024-01-06 NOTE — ED NOTES
Noted with 200mls of urine in the suction canister.    Changed pt's diaper and purewick to keep her dry. Repositioned in bed and placed pillow behind her back and under her legs.    Side rails raised x2. Call bell and bedside table within reach.

## 2024-01-06 NOTE — ED NOTES
Breakfast tray placed on bedside table.    Pt nodded her head when this RN offered to feed her. Repositioned and HOB elevated as aspiration precaution but pt refused to open her mouth to eat.    Proceeded to go back to sleep after.

## 2024-01-06 NOTE — ED PROVIDER NOTES
6:44 AM :Pt care assumed from Dr. Pierre , ED provider. Pt complaint(s), current treatment plan, progression and available diagnostic results have been discussed thoroughly. The patient was seen and evaluated on my shift.   Rounding occurred: Yes  Intended Disposition: To SNF  Pending diagnostic reports and/or labs (please list): awaiting authorization for SNF        Teofilo Prado MD  01/06/24 0645

## 2024-01-06 NOTE — CARE COORDINATION
CM Chart Review      Per Cassi auth for SNF [facility] started on Friday 01/06/2024.       CM messaged Ney and Jeanette with SNF [facility], and asked to let CM know if auth for SNF comes back today or tomorrow for patient.               Rivka Trejo, RN  Case Management [phone]

## 2024-01-06 NOTE — ED NOTES
Report received from MARCELINA Gonzalez and MARCELINA Nagel.    Pt is asleep on stretcher, not in distress.  Respirations even and unlabored.    Side rails raised x2. Call bell and bedside table within reach.

## 2024-01-06 NOTE — H&P
History & Physical    Patient: Ivonne Taylor MRN: [id number]  [facility]: [id number]    YOB: 1950  Age: 73 y.o.  Sex: female      DOA: 1/2/2024    Chief Complaint:   Chief Complaint   Patient presents with    Altered Mental Status     Hasn't been eating much x 3 days, Per EMS ST EKG, , + HA and high BP    Headache          HPI:     Ivonne Taylor is a 73 y.o.  female with hx of breast cancer, depression, CVA, diabetes, hyperlipidemia, hypertension, prior PE, PUD, GERD, seizures and seizure disorder, altered mental status.  Patient had recently been seen for similar altered mental status however has been discharged home after presumed return to neurologic baseline.  Patient again found to have altered mentation.  Worked up in emergency room over several days consideration for placement.  Patient likely can be placed within the next couple of days however given patient's ongoing needs decision made for observation admission for acute on chronic encephalopathy.      Past Medical History:   Diagnosis Date    Acute metabolic encephalopathy due to hypoglycemia 09/19/2021    LEI (acute kidney injury) ([facility]) 06/16/2022    Altered mental status, unspecified 11/03/2022    Aortic stenosis, mild 09/02/2021    Formatting of this note might be different from the original.   Echo 6/1/2020.  Peak velocity 2.48 m/s.  Mean gradient 15.9 mmHg.  Valve area calculation 1.6 cm²    Arthritis     Breast CA ([facility]) 2016    DDD (degenerative disc disease), cervical     Depression     Diabetes ([facility])     DJD (degenerative joint disease) of cervical spine     Encephalopathy acute 12/21/2023    Endocarditis, valve unspecified 11/03/2022    H/O medication noncompliance 12/21/2019    Heart murmur     History of cardiac catheterization 09/02/2021 2015; no angiographic evidence of CAD.       History of noncompliance with medical treatment     History of noncompliance with medical treatment     Hypercholesteremia      12/21/2023.    FINDINGS: Evaluation is limited by patient rotation.    Similar sequela of large right MCA infarct. No new regions of loss of gray-white  matter differentiation. No acute hemorrhage. Mild generalized volume loss. Mild  periventricular hypodensity otherwise. No midline shift. Basilar cisterns are  patent.    Limited visualized orbits, soft tissues and osseous structures grossly normal.  Mild sinus mucosal thickening. Trace mastoid effusions.    Impression  No acute findings or significant interval change.          Procedures/imaging: see electronic medical records for all procedures/Xrays and details which were not copied into this note but were reviewed prior to creation of Plan      Assessment/Plan   73 y.o. female  now admitted with acute on chronic encephalopathy.  - Admit to med/surg floor  Active Problems:    * No active hospital problems. *  Resolved Problems:    * No resolved hospital problems. *       Assessment  Acute on chronic encephalopathy  Seizure disorder  Type 2 diabetes  History CVA  CHF  Dementia  HLD  Hypertension  History of PE  GERD and PUD  Mild aortic stenosis      Plan  AMS workup mostly done in the emergency room along with PT OT evaluation and both suggest placement.  Likely acute on chronic given patient's considerable neurologic insult  Home medications as appropriate  Sliding-scale insulin  Advance diet as tolerated  Likely can be placed within a couple of days          Diet Advance as tolerated   DVT Prophylaxis Lovenox   GI Prophylaxis Pantoprazole   Code status Full       Discussed with patient at bedside about hospital admission and plan care.  They understand and agree.  All questions answered.      Disclaimer: Sections of this note are dictated using utilizing voice recognition software. Minor typographical errors may be present. If questions arise, please do not hesitate to contact me or call our department.        Justen Do MD MD  [facility]

## 2024-01-06 NOTE — ED NOTES
Pt is awake, elevated HOB.  RN fed pt, maintained on aspiration precaution.  Pt able to eat 1/2 of meal and half cup of water.

## 2024-01-06 NOTE — ED NOTES
RN offered breakfast again, pt verbalized, \"It's cold.\" RN heated the food, pt accepted. Able to eat 3 spoons of scrambled egg and sausage yaron plus 1 pc banana.     Medicated per MAR.     Side rails raised x2. HOB kept elevated.

## 2024-01-06 NOTE — ED NOTES
Noted with small amount of formed BM on diaper.  Cleaned pt and provided with new diaper.    Turned and repositioned.  Side rails raised x2. Call bell and bedside table within reach.

## 2024-01-06 NOTE — ED NOTES
Repositioned pt on bed, HOB elevated.   RN fed pt, pt able to eat 3/4 of her dinner.    Pt was ordered IV medicine, currently has no PIV.  RN tried to place IV unsuccessfully. Tech now trying to place PIV.

## 2024-01-07 PROCEDURE — A4216 STERILE WATER/SALINE, 10 ML: HCPCS | Performed by: STUDENT IN AN ORGANIZED HEALTH CARE EDUCATION/TRAINING PROGRAM

## 2024-01-07 PROCEDURE — 96376 TX/PRO/DX INJ SAME DRUG ADON: CPT

## 2024-01-07 PROCEDURE — G0378 HOSPITAL OBSERVATION PER HR: HCPCS

## 2024-01-07 PROCEDURE — 2580000003 HC RX 258: Performed by: STUDENT IN AN ORGANIZED HEALTH CARE EDUCATION/TRAINING PROGRAM

## 2024-01-07 PROCEDURE — 6370000000 HC RX 637 (ALT 250 FOR IP): Performed by: STUDENT IN AN ORGANIZED HEALTH CARE EDUCATION/TRAINING PROGRAM

## 2024-01-07 PROCEDURE — 99231 SBSQ HOSP IP/OBS SF/LOW 25: CPT | Performed by: STUDENT IN AN ORGANIZED HEALTH CARE EDUCATION/TRAINING PROGRAM

## 2024-01-07 PROCEDURE — 6360000002 HC RX W HCPCS: Performed by: STUDENT IN AN ORGANIZED HEALTH CARE EDUCATION/TRAINING PROGRAM

## 2024-01-07 PROCEDURE — C9113 INJ PANTOPRAZOLE SODIUM, VIA: HCPCS | Performed by: STUDENT IN AN ORGANIZED HEALTH CARE EDUCATION/TRAINING PROGRAM

## 2024-01-07 PROCEDURE — 94761 N-INVAS EAR/PLS OXIMETRY MLT: CPT

## 2024-01-07 RX ADMIN — SODIUM CHLORIDE, PRESERVATIVE FREE 10 ML: 5 INJECTION INTRAVENOUS at 20:59

## 2024-01-07 RX ADMIN — LOSARTAN POTASSIUM 50 MG: 50 TABLET, FILM COATED ORAL at 10:06

## 2024-01-07 RX ADMIN — LACOSAMIDE 200 MG: 50 TABLET, FILM COATED ORAL at 20:58

## 2024-01-07 RX ADMIN — ISOSORBIDE MONONITRATE 30 MG: 30 TABLET, EXTENDED RELEASE ORAL at 10:05

## 2024-01-07 RX ADMIN — APIXABAN 5 MG: 5 TABLET, FILM COATED ORAL at 20:59

## 2024-01-07 RX ADMIN — SODIUM CHLORIDE, PRESERVATIVE FREE 10 ML: 5 INJECTION INTRAVENOUS at 10:06

## 2024-01-07 RX ADMIN — PANTOPRAZOLE SODIUM 40 MG: 40 INJECTION, POWDER, FOR SOLUTION INTRAVENOUS at 10:06

## 2024-01-07 RX ADMIN — AMLODIPINE BESYLATE 5 MG: 5 TABLET ORAL at 10:06

## 2024-01-07 RX ADMIN — APIXABAN 5 MG: 5 TABLET, FILM COATED ORAL at 10:06

## 2024-01-07 RX ADMIN — Medication 3 MG: at 20:59

## 2024-01-07 RX ADMIN — LACOSAMIDE 200 MG: 50 TABLET, FILM COATED ORAL at 10:05

## 2024-01-07 NOTE — CARE COORDINATION
JOANNE messaged Jeanette with [facility] in [facility] to check on auth status for SNF.             Rivka Trejo, RN  Case Management [phone]

## 2024-01-07 NOTE — CARE COORDINATION
01/07/24 0900   Service Assessment   Patient Orientation Person;Alert and Oriented   Cognition Alert   History Provided By Medical Record   Primary Caregiver Spouse   Support Systems Spouse/Significant Other;Children   Prior Functional Level Assistance with the following:;Bathing;Dressing;Toileting;Mobility   Current Functional Level Assistance with the following:;Bathing;Dressing;Toileting;Mobility   Can patient return to prior living arrangement Yes   Ability to make needs known: Other (see comment)  (Unable to assess at this time.)   Family able to assist with home care needs: Yes   Financial Resources Medicare   Community Resources None   Social/Functional History   Lives With Spouse   Type of Home House   Home Layout One level   Home Access Ramped entrance   Bathroom Equipment None   Bathroom Accessibility Accessible   Home Equipment Wheelchair-manual   Receives Help From Family   ADL Assistance Needs assistance   Toileting Needs assistance   Homemaking Assistance Needs assistance   Ambulation Assistance Needs assistance   Transfer Assistance Needs assistance   Active  No   Patient's  Info Patient's family transports patient.   Occupation Retired   Discharge Planning   Type of Residence House   Living Arrangements Spouse/Significant Other   Current Services Prior To Admission Durable Medical Equipment   Current DME Prior to Arrival Wheelchair   Potential Assistance Needed Skilled Nursing Facility   DME Ordered? No   Potential Assistance Purchasing Medications No   Type of Home Care Services None   Patient expects to be discharged to: Skilled nursing facility   Services At/After Discharge   Transition of Care Consult (CM Consult) SNF   Partner SNF Yes   Services At/After Discharge Skilled Nursing Facility (SNF)   Mode of Transport at Discharge BLS   Confirm Follow Up Transport Family   Condition of Participation: Discharge Planning   The Plan for Transition of Care is related to the following  Housing: Yes  Other Identified Issues/Barriers to RETURNING to current housing:  Patient needs SNF, auth pending for [facility] since Friday 01/05/2024.  Potential Assistance needed at discharge: (P) Skilled Nursing Facility            Potential DME:    Patient expects to discharge to: (P) Skilled nursing facility  Plan for transportation at discharge: (P) Family    Financial    Payor: [employer] MEDICARE / Plan: [employer] MEDICARE-ADVANTAGE PPO / Product Type: Medicare /     Does insurance require precert for SNF: Yes    Potential assistance Purchasing Medications: (P) No  Meds-to-Beds request:        [id number] - [place], [place] - [address] [phone] - F [phone]  [address] [address]  Phone: [phone] Fax: [phone]      Notes:    Factors facilitating achievement of predicted outcomes: Family support and Has needed Durable Medical Equipment at home    Barriers to discharge:  Patient needs SNF, auth pending for [facility] since Friday 01/05/2024.    Additional Case Management Notes:  Patient admitted due to ED bed needed, and auth for SNF was pending.     The Plan for Transition of Care is related to the following treatment goals of Encephalopathy [G93.40]  Dementia with behavioral disturbance (HCC) [F03.918]    IF APPLICABLE: The Patient and/or patient representative Ivonne and her family were provided with a choice of provider and agrees with the discharge plan. Freedom of choice list with basic dialogue that supports the patient's individualized plan of care/goals and shares the quality data associated with the providers was provided to:     Patient Representative Name:       The Patient and/or Patient Representative Agree with the Discharge Plan?      Rivka Trejo  Case Management Department

## 2024-01-07 NOTE — CARE COORDINATION
Jeanette with [facility].   Auth for SNF has been pending since 01/05/2024, while patient was in the ED.       Patient did not need to be admitted, and patient has been medically ready for discharge since 01/02/2024.           Rivka Trejo, RN  Case Management [phone]

## 2024-01-07 NOTE — PROGRESS NOTES
[facility]  Progress Note  Date:2024       Room:[facility]  Patient Name:Ivonne Taylor     YOB: 1950     Age:73 y.o.        Subjective    Subjective   Review of Systems    No acute events overnight. Patient is nonverbal. She has no complaints.    Objective         Vitals Last 24 Hours:  TEMPERATURE:  Temp  Av.9 °F (36.6 °C)  Min: 97.3 °F (36.3 °C)  Max: 98.2 °F (36.8 °C)  RESPIRATIONS RANGE: Resp  Av.4  Min: 11  Max: 28  PULSE OXIMETRY RANGE: SpO2  Av %  Min: 99 %  Max: 100 %  PULSE RANGE: Pulse  Av.2  Min: 80  Max: 112  BLOOD PRESSURE RANGE: Systolic (24hrs), Av , Min:113 , Max:161     ; Diastolic (24hrs), Av, Min:50, Max:97      I/O (24Hr):    Intake/Output Summary (Last 24 hours) at 2024 1522  Last data filed at 2024 1215  Gross per 24 hour   Intake 240 ml   Output --   Net 240 ml     Objective:  General Appearance:  Comfortable.    Vital signs: (most recent): Blood pressure (!) 141/83, pulse 93, temperature 97.9 °F (36.6 °C), temperature source Axillary, resp. rate 16, height 1.676 m (5' 6\"), weight 72.6 kg (160 lb), SpO2 100 %.    Output: Producing urine.    HEENT: Normal HEENT exam.    Lungs:  Normal effort and normal respiratory rate.  Breath sounds clear to auscultation.    Heart: Normal rate.  Regular rhythm.  Positive for murmur.    Abdomen: Abdomen is soft and flat.  Bowel sounds are normal.   There is no abdominal tenderness.     Extremities: Normal range of motion.    Pulses: Distal pulses are intact.    Neurological: Patient is alert and oriented to person, place and time.    Skin:  Warm and dry.          Labs/Imaging/Diagnostics    Labs:  CBC:  No results for input(s): \"WBC\", \"RBC\", \"HGB\", \"HCT\", \"MCV\", \"RDW\", \"PLT\" in the last 72 hours.  CHEMISTRIES:  No results for input(s): \"NA\", \"K\", \"CL\", \"CO2\", \"BUN\", \"CREATININE\", \"GLUCOSE\", \"CA\", \"PHOS\", \"MG\" in the last 72 hours.  PT/INR:No results for input(s):

## 2024-01-08 LAB
ANION GAP SERPL CALC-SCNC: 2 MMOL/L (ref 3–18)
BACTERIA SPEC CULT: NORMAL
BACTERIA SPEC CULT: NORMAL
BASOPHILS # BLD: 0 K/UL (ref 0–0.1)
BASOPHILS NFR BLD: 0 % (ref 0–2)
BUN SERPL-MCNC: 22 MG/DL (ref 7–18)
BUN/CREAT SERPL: 24 (ref 12–20)
CALCIUM SERPL-MCNC: 8.9 MG/DL (ref 8.5–10.1)
CHLORIDE SERPL-SCNC: 113 MMOL/L (ref 100–111)
CO2 SERPL-SCNC: 25 MMOL/L (ref 21–32)
CREAT SERPL-MCNC: 0.9 MG/DL (ref 0.6–1.3)
DIFFERENTIAL METHOD BLD: ABNORMAL
EOSINOPHIL # BLD: 0.2 K/UL (ref 0–0.4)
EOSINOPHIL NFR BLD: 3 % (ref 0–5)
ERYTHROCYTE [DISTWIDTH] IN BLOOD BY AUTOMATED COUNT: 13.1 % (ref 11.6–14.5)
GLUCOSE SERPL-MCNC: 84 MG/DL (ref 74–99)
HCT VFR BLD AUTO: 29.2 % (ref 35–45)
HGB BLD-MCNC: 9 G/DL (ref 12–16)
IMM GRANULOCYTES # BLD AUTO: 0 K/UL (ref 0–0.04)
IMM GRANULOCYTES NFR BLD AUTO: 0 % (ref 0–0.5)
LYMPHOCYTES # BLD: 3.4 K/UL (ref 0.9–3.6)
LYMPHOCYTES NFR BLD: 49 % (ref 21–52)
MCH RBC QN AUTO: 26.3 PG (ref 24–34)
MCHC RBC AUTO-ENTMCNC: 30.8 G/DL (ref 31–37)
MCV RBC AUTO: 85.4 FL (ref 78–100)
MONOCYTES # BLD: 0.5 K/UL (ref 0.05–1.2)
MONOCYTES NFR BLD: 8 % (ref 3–10)
NEUTS SEG # BLD: 2.7 K/UL (ref 1.8–8)
NEUTS SEG NFR BLD: 40 % (ref 40–73)
NRBC # BLD: 0 K/UL (ref 0–0.01)
NRBC BLD-RTO: 0 PER 100 WBC
PLATELET # BLD AUTO: 198 K/UL (ref 135–420)
PMV BLD AUTO: 10.2 FL (ref 9.2–11.8)
POTASSIUM SERPL-SCNC: 4 MMOL/L (ref 3.5–5.5)
RBC # BLD AUTO: 3.42 M/UL (ref 4.2–5.3)
SERVICE CMNT-IMP: NORMAL
SERVICE CMNT-IMP: NORMAL
SODIUM SERPL-SCNC: 140 MMOL/L (ref 136–145)
WBC # BLD AUTO: 6.8 K/UL (ref 4.6–13.2)

## 2024-01-08 PROCEDURE — 85025 COMPLETE CBC W/AUTO DIFF WBC: CPT

## 2024-01-08 PROCEDURE — 80048 BASIC METABOLIC PNL TOTAL CA: CPT

## 2024-01-08 PROCEDURE — 96376 TX/PRO/DX INJ SAME DRUG ADON: CPT

## 2024-01-08 PROCEDURE — 94761 N-INVAS EAR/PLS OXIMETRY MLT: CPT

## 2024-01-08 PROCEDURE — A4216 STERILE WATER/SALINE, 10 ML: HCPCS | Performed by: STUDENT IN AN ORGANIZED HEALTH CARE EDUCATION/TRAINING PROGRAM

## 2024-01-08 PROCEDURE — G0378 HOSPITAL OBSERVATION PER HR: HCPCS

## 2024-01-08 PROCEDURE — 36415 COLL VENOUS BLD VENIPUNCTURE: CPT

## 2024-01-08 PROCEDURE — 2580000003 HC RX 258: Performed by: STUDENT IN AN ORGANIZED HEALTH CARE EDUCATION/TRAINING PROGRAM

## 2024-01-08 PROCEDURE — 6370000000 HC RX 637 (ALT 250 FOR IP): Performed by: STUDENT IN AN ORGANIZED HEALTH CARE EDUCATION/TRAINING PROGRAM

## 2024-01-08 PROCEDURE — 6360000002 HC RX W HCPCS: Performed by: STUDENT IN AN ORGANIZED HEALTH CARE EDUCATION/TRAINING PROGRAM

## 2024-01-08 PROCEDURE — 99231 SBSQ HOSP IP/OBS SF/LOW 25: CPT | Performed by: STUDENT IN AN ORGANIZED HEALTH CARE EDUCATION/TRAINING PROGRAM

## 2024-01-08 PROCEDURE — C9113 INJ PANTOPRAZOLE SODIUM, VIA: HCPCS | Performed by: STUDENT IN AN ORGANIZED HEALTH CARE EDUCATION/TRAINING PROGRAM

## 2024-01-08 RX ADMIN — AMLODIPINE BESYLATE 5 MG: 5 TABLET ORAL at 08:08

## 2024-01-08 RX ADMIN — APIXABAN 5 MG: 5 TABLET, FILM COATED ORAL at 08:08

## 2024-01-08 RX ADMIN — SODIUM CHLORIDE, PRESERVATIVE FREE 10 ML: 5 INJECTION INTRAVENOUS at 08:10

## 2024-01-08 RX ADMIN — ACETAMINOPHEN 325MG 650 MG: 325 TABLET ORAL at 10:02

## 2024-01-08 RX ADMIN — LACOSAMIDE 200 MG: 50 TABLET, FILM COATED ORAL at 08:08

## 2024-01-08 RX ADMIN — PANTOPRAZOLE SODIUM 40 MG: 40 INJECTION, POWDER, FOR SOLUTION INTRAVENOUS at 08:09

## 2024-01-08 RX ADMIN — ISOSORBIDE MONONITRATE 30 MG: 30 TABLET, EXTENDED RELEASE ORAL at 08:08

## 2024-01-08 RX ADMIN — LACOSAMIDE 200 MG: 50 TABLET, FILM COATED ORAL at 21:30

## 2024-01-08 RX ADMIN — APIXABAN 5 MG: 5 TABLET, FILM COATED ORAL at 21:30

## 2024-01-08 RX ADMIN — Medication 3 MG: at 21:30

## 2024-01-08 RX ADMIN — SODIUM CHLORIDE, PRESERVATIVE FREE 10 ML: 5 INJECTION INTRAVENOUS at 21:30

## 2024-01-08 RX ADMIN — LOSARTAN POTASSIUM 50 MG: 50 TABLET, FILM COATED ORAL at 08:09

## 2024-01-08 ASSESSMENT — PAIN SCALES - WONG BAKER
WONGBAKER_NUMERICALRESPONSE: HURTS A LITTLE BIT
WONGBAKER_NUMERICALRESPONSE: 2

## 2024-01-08 ASSESSMENT — PAIN DESCRIPTION - LOCATION: LOCATION: LEG;HIP

## 2024-01-08 ASSESSMENT — PAIN DESCRIPTION - ORIENTATION: ORIENTATION: LEFT

## 2024-01-08 ASSESSMENT — PAIN - FUNCTIONAL ASSESSMENT: PAIN_FUNCTIONAL_ASSESSMENT: PREVENTS OR INTERFERES SOME ACTIVE ACTIVITIES AND ADLS

## 2024-01-08 NOTE — CARE COORDINATION
Ney with [facility] messaged CM in [facility] that they need updated MD notes and therapy notes.       No updated PT and OT notes since 01/05/2024.   CM uploaded MD notes to [facility] for SNF.       JOANNE messaged Ney in [facility] , and let them know that MD notes uploaded, but there are no updated therapy notes since 01/05/2024, and that CM will ask that PT and OT see patient tomorrow for updated therapy notes needed for auth for SNF.       JOANNE Perfect Served Jeanette with Therapy, asked that PT and OT be scheduled to see patient tomorrow, due to SNF needs updated therapy notes for auth for SNF.       Jeanette let JOANNE know that PT and OT will be scheduled for patient tomorrow.             Rivka Trejo, RN  Case Management [phone]

## 2024-01-08 NOTE — CARE COORDINATION
JOANNE messaged Jeanette in [facility] with [facility], checking on auth status for SNF.             Rivka Trejo, RN  Case Management [phone]

## 2024-01-08 NOTE — CARE COORDINATION
Ney with [facility] messaged CM in [facility] that auth for SNF is still pending.   Auth for SNF started on 01/05/2024.            Rivka Trejo RN  Case Management [phone]

## 2024-01-08 NOTE — PLAN OF CARE
Problem: Safety - Adult  Goal: Free from fall injury  Outcome: Progressing     Problem: Discharge Planning  Goal: Discharge to home or other facility with appropriate resources  Outcome: Progressing  Flowsheets (Taken 1/8/2024 0800)  Discharge to home or other facility with appropriate resources:   Identify barriers to discharge with patient and caregiver   Arrange for needed discharge resources and transportation as appropriate   Identify discharge learning needs (meds, wound care, etc)   Refer to discharge planning if patient needs post-hospital services based on physician order or complex needs related to functional status, cognitive ability or social support system     Problem: Pain  Goal: Verbalizes/displays adequate comfort level or baseline comfort level  Outcome: Progressing     Problem: Skin/Tissue Integrity  Goal: Absence of new skin breakdown  Description: 1.  Monitor for areas of redness and/or skin breakdown  2.  Assess vascular access sites hourly  3.  Every 4-6 hours minimum:  Change oxygen saturation probe site  4.  Every 4-6 hours:  If on nasal continuous positive airway pressure, respiratory therapy assess nares and determine need for appliance change or resting period.  Outcome: Progressing     Problem: Confusion  Goal: Confusion, delirium, dementia, or psychosis is improved or at baseline  Description: INTERVENTIONS:  1. Assess for possible contributors to thought disturbance, including medications, impaired vision or hearing, underlying metabolic abnormalities, dehydration, psychiatric diagnoses, and notify attending LIP  2. [place] high risk fall precautions, as indicated  3. Provide frequent short contacts to provide reality reorientation, refocusing and direction  4. Decrease environmental stimuli, including noise as appropriate  5. Monitor and intervene to maintain adequate nutrition, hydration, elimination, sleep and activity  6. If unable to ensure safety without constant attention

## 2024-01-08 NOTE — PROGRESS NOTES
[facility]  Progress Note  Date:2024       Room:[facility]  Patient Name:Ivonne Taylor     YOB: 1950     Age:73 y.o.        Subjective    Subjective   Review of Systems    No acute events overnight. Patient is nonverbal. She has no complaints. She stares at me and grunts.    Objective         Vitals Last 24 Hours:  TEMPERATURE:  Temp  Av.9 °F (36.6 °C)  Min: 97.2 °F (36.2 °C)  Max: 98.3 °F (36.8 °C)  RESPIRATIONS RANGE: Resp  Av.8  Min: 16  Max: 18  PULSE OXIMETRY RANGE: SpO2  Av.8 %  Min: 99 %  Max: 100 %  PULSE RANGE: Pulse  Av  Min: 81  Max: 97  BLOOD PRESSURE RANGE: Systolic (24hrs), Av , Min:97 , Max:134     ; Diastolic (24hrs), Av, Min:60, Max:81      I/O (24Hr):    Intake/Output Summary (Last 24 hours) at 2024 1524  Last data filed at 2024 0400  Gross per 24 hour   Intake 340 ml   Output 500 ml   Net -160 ml       Objective:  General Appearance:  Comfortable.    Vital signs: (most recent): Blood pressure 97/60, pulse 89, temperature 97.2 °F (36.2 °C), temperature source Oral, resp. rate 18, height 1.676 m (5' 6\"), weight 72.6 kg (160 lb), SpO2 100 %.    Output: Producing urine.    HEENT: Normal HEENT exam.    Lungs:  Normal effort and normal respiratory rate.  Breath sounds clear to auscultation.    Heart: Normal rate.  Regular rhythm.  Positive for murmur.    Abdomen: Abdomen is soft and flat.  Bowel sounds are normal.   There is no abdominal tenderness.     Extremities: Normal range of motion.    Pulses: Distal pulses are intact.    Neurological: Patient is alert and oriented to person, place and time.    Skin:  Warm and dry.          Labs/Imaging/Diagnostics    Labs:  CBC:  Recent Labs     24  0353   WBC 6.8   RBC 3.42*   HGB 9.0*   HCT 29.2*   MCV 85.4   RDW 13.1        CHEMISTRIES:  Recent Labs     24  0353      K 4.0   *   CO2 25   BUN 22*   CREATININE 0.90   GLUCOSE 84  diabetes mellitus with diabetic neuropathy, with long-term current use of insulin (HCC) (Chronic) 1/6/2024 Yes    History of CVA (cerebrovascular accident) (Chronic) 1/6/2024 Yes    Gastroesophageal reflux disease without esophagitis (Chronic) 1/6/2024 Yes   Assessment:    Condition: In stable condition.  Unchanged.   (    1. Acute on chronic encephalopathy  2. Seizure disorder  3. Type 2 diabetes  4. History CVA  5. CHF  6. Dementia  7. HLD  8. Hypertension  9. History of PE  10. GERD and PUD  11. Mild aortic stenosis  ).     Plan:   Regular diet.  Administer medications as ordered.   (    1. AMS workup mostly done in the emergency room along with PT OT evaluation and both suggest placement.  Likely acute on chronic given patient's considerable neurologic insult  2. Home medications as appropriate  3. Sliding-scale insulin  4.         Pending SNF auth     ).       Case discussed with:  [x]Patient  []Family  [x]Nursing  [x]Case Management  DVT Prophylaxis:  []Lovenox  []Hep SQ  []SCDs  []Coumadin   []Heparin gtt   []Xarelto   [x]Jabari Coronado DO, MBA, MS [facility]  Hospitalist

## 2024-01-09 LAB
ANION GAP SERPL CALC-SCNC: 4 MMOL/L (ref 3–18)
BASOPHILS # BLD: 0.1 K/UL (ref 0–0.1)
BASOPHILS NFR BLD: 1 % (ref 0–2)
BUN SERPL-MCNC: 27 MG/DL (ref 7–18)
BUN/CREAT SERPL: 27 (ref 12–20)
CALCIUM SERPL-MCNC: 9 MG/DL (ref 8.5–10.1)
CHLORIDE SERPL-SCNC: 113 MMOL/L (ref 100–111)
CO2 SERPL-SCNC: 23 MMOL/L (ref 21–32)
CREAT SERPL-MCNC: 1 MG/DL (ref 0.6–1.3)
DIFFERENTIAL METHOD BLD: ABNORMAL
EOSINOPHIL # BLD: 0.2 K/UL (ref 0–0.4)
EOSINOPHIL NFR BLD: 3 % (ref 0–5)
ERYTHROCYTE [DISTWIDTH] IN BLOOD BY AUTOMATED COUNT: 13.1 % (ref 11.6–14.5)
GLUCOSE SERPL-MCNC: 83 MG/DL (ref 74–99)
HCT VFR BLD AUTO: 30.3 % (ref 35–45)
HGB BLD-MCNC: 9.6 G/DL (ref 12–16)
IMM GRANULOCYTES # BLD AUTO: 0 K/UL (ref 0–0.04)
IMM GRANULOCYTES NFR BLD AUTO: 0 % (ref 0–0.5)
LYMPHOCYTES # BLD: 3 K/UL (ref 0.9–3.6)
LYMPHOCYTES NFR BLD: 49 % (ref 21–52)
MCH RBC QN AUTO: 27.4 PG (ref 24–34)
MCHC RBC AUTO-ENTMCNC: 31.7 G/DL (ref 31–37)
MCV RBC AUTO: 86.3 FL (ref 78–100)
MONOCYTES # BLD: 0.5 K/UL (ref 0.05–1.2)
MONOCYTES NFR BLD: 8 % (ref 3–10)
NEUTS SEG # BLD: 2.4 K/UL (ref 1.8–8)
NEUTS SEG NFR BLD: 40 % (ref 40–73)
NRBC # BLD: 0 K/UL (ref 0–0.01)
NRBC BLD-RTO: 0 PER 100 WBC
PLATELET # BLD AUTO: 183 K/UL (ref 135–420)
PMV BLD AUTO: 10.7 FL (ref 9.2–11.8)
POTASSIUM SERPL-SCNC: 4.3 MMOL/L (ref 3.5–5.5)
RBC # BLD AUTO: 3.51 M/UL (ref 4.2–5.3)
SODIUM SERPL-SCNC: 140 MMOL/L (ref 136–145)
WBC # BLD AUTO: 6.2 K/UL (ref 4.6–13.2)

## 2024-01-09 PROCEDURE — C9113 INJ PANTOPRAZOLE SODIUM, VIA: HCPCS | Performed by: STUDENT IN AN ORGANIZED HEALTH CARE EDUCATION/TRAINING PROGRAM

## 2024-01-09 PROCEDURE — 97110 THERAPEUTIC EXERCISES: CPT

## 2024-01-09 PROCEDURE — 36415 COLL VENOUS BLD VENIPUNCTURE: CPT

## 2024-01-09 PROCEDURE — 85025 COMPLETE CBC W/AUTO DIFF WBC: CPT

## 2024-01-09 PROCEDURE — 6370000000 HC RX 637 (ALT 250 FOR IP): Performed by: STUDENT IN AN ORGANIZED HEALTH CARE EDUCATION/TRAINING PROGRAM

## 2024-01-09 PROCEDURE — G0378 HOSPITAL OBSERVATION PER HR: HCPCS

## 2024-01-09 PROCEDURE — 99231 SBSQ HOSP IP/OBS SF/LOW 25: CPT | Performed by: STUDENT IN AN ORGANIZED HEALTH CARE EDUCATION/TRAINING PROGRAM

## 2024-01-09 PROCEDURE — 96376 TX/PRO/DX INJ SAME DRUG ADON: CPT

## 2024-01-09 PROCEDURE — 97535 SELF CARE MNGMENT TRAINING: CPT

## 2024-01-09 PROCEDURE — 6360000002 HC RX W HCPCS: Performed by: STUDENT IN AN ORGANIZED HEALTH CARE EDUCATION/TRAINING PROGRAM

## 2024-01-09 PROCEDURE — 80048 BASIC METABOLIC PNL TOTAL CA: CPT

## 2024-01-09 PROCEDURE — 97530 THERAPEUTIC ACTIVITIES: CPT

## 2024-01-09 PROCEDURE — A4216 STERILE WATER/SALINE, 10 ML: HCPCS | Performed by: STUDENT IN AN ORGANIZED HEALTH CARE EDUCATION/TRAINING PROGRAM

## 2024-01-09 PROCEDURE — 2580000003 HC RX 258: Performed by: STUDENT IN AN ORGANIZED HEALTH CARE EDUCATION/TRAINING PROGRAM

## 2024-01-09 PROCEDURE — 97164 PT RE-EVAL EST PLAN CARE: CPT

## 2024-01-09 RX ADMIN — LACOSAMIDE 200 MG: 50 TABLET, FILM COATED ORAL at 21:27

## 2024-01-09 RX ADMIN — APIXABAN 5 MG: 5 TABLET, FILM COATED ORAL at 09:03

## 2024-01-09 RX ADMIN — PANTOPRAZOLE SODIUM 40 MG: 40 INJECTION, POWDER, FOR SOLUTION INTRAVENOUS at 09:03

## 2024-01-09 RX ADMIN — LACOSAMIDE 200 MG: 50 TABLET, FILM COATED ORAL at 09:03

## 2024-01-09 RX ADMIN — AMLODIPINE BESYLATE 5 MG: 5 TABLET ORAL at 09:03

## 2024-01-09 RX ADMIN — APIXABAN 5 MG: 5 TABLET, FILM COATED ORAL at 21:27

## 2024-01-09 RX ADMIN — SODIUM CHLORIDE, PRESERVATIVE FREE 10 ML: 5 INJECTION INTRAVENOUS at 21:28

## 2024-01-09 RX ADMIN — ISOSORBIDE MONONITRATE 30 MG: 30 TABLET, EXTENDED RELEASE ORAL at 09:03

## 2024-01-09 RX ADMIN — LOSARTAN POTASSIUM 50 MG: 50 TABLET, FILM COATED ORAL at 09:03

## 2024-01-09 RX ADMIN — SODIUM CHLORIDE, PRESERVATIVE FREE 10 ML: 5 INJECTION INTRAVENOUS at 09:03

## 2024-01-09 ASSESSMENT — PAIN SCALES - WONG BAKER
WONGBAKER_NUMERICALRESPONSE: NO HURT
WONGBAKER_NUMERICALRESPONSE: NO HURT
WONGBAKER_NUMERICALRESPONSE: 0
WONGBAKER_NUMERICALRESPONSE: NO HURT

## 2024-01-09 NOTE — PROGRESS NOTES
This RN introduced self to patient. No needs or questions voiced at this time. Whiteboard is updated. Patient left with call light and their bed in the lowest position with brakes on.

## 2024-01-09 NOTE — PLAN OF CARE
Problem: Occupational Therapy - Adult  Goal: By Discharge: Performs self-care activities at highest level of function for planned discharge setting.  See evaluation for individualized goals.  Description:   1.  Patient will perform self-feeding with supervision/set-up using adaptive equipment and compensatory techniques.   2.  Patient will perform grooming with supervision/set-up using adaptive equipment and compensatory techniques.   3.  Patient will participate in upper extremity therapeutic exercise/activities with supervision/set-up for 8-10 minutes to increase strength/endurance for ADLs.      PLOF: per spouse, pt has PCA to assist with ADLs and functional transfer with Max A bed <-> w/c, pt was able to perform self feeding and basic grooming tasks with set up assist       Outcome: Progressing   OCCUPATIONAL THERAPY TREATMENT    Patient: Ivonne Taylor (73 y.o. female)  Date: 1/9/2024  Diagnosis: Encephalopathy [G93.40]  Dementia with behavioral disturbance (HCC) [F03.918] Encephalopathy      Precautions: Fall Risk, General Precautions    Chart, occupational therapy assessment, plan of care, and goals were reviewed.  ASSESSMENT:  Pt is pleasant and cooperative. Motivated to participate w/therapy. Demonstrates improving ROM RUE, hitting targets, at bed level. LUE presents w/flexion contracture, requiring assist w/UE Therex. Pt requires hand over hand and MAX A w/hand hygiene. Pt w/posterior, R-lateral lean seated EOB. Provided max verbal/tactile cues for midline. Pt tolerates ~ 20 minutes sitting EOB in prep for ADLs at EOB. PT joined session for increase safety w/EOB activity. Pt requires assist to demonstrate compensatory strategy w/UE dressing task, donning/doffing hospital gown. Pt requires TOTAL A w/toileting ADL at bed level.     Progression toward goals:  []          Improving appropriately and progressing toward goals  [x]          Improving slowly and progressing toward goals  []          Not making

## 2024-01-09 NOTE — PROGRESS NOTES
This RN rounded on patient. Patient is resting with eyes closed at this time. Chest rise and fall even. Patient left with call light and their bed in the lowest position with brakes on.

## 2024-01-09 NOTE — PROGRESS NOTES
Patient administered meds per MAR orders. Pills taken whole. IV is flushing well.   Patient left with call light and their bed in the lowest position with brakes on.

## 2024-01-09 NOTE — CARE COORDINATION
Updated PT/OT notes uploaded in [facility] ashley Reid with [facility].    Monika Nelson RN [facility]  Case Management

## 2024-01-09 NOTE — PLAN OF CARE
Problem: Physical Therapy - Adult  Goal: By Discharge: Performs mobility at highest level of function for planned discharge setting.  See evaluation for individualized goals.  Description: Physical Therapy Goals:  Initiated 1/3/2024 to be met within 7-10 days.    1.  Patient will move from supine to sit and sit to supine  in bed with minimal assistance/contact guard assist.    2.  Patient will transfer from bed to chair and chair to bed with moderate assistance  using the least restrictive device.  3.  Patient will perform sit to stand with moderate assistance .  4.  Patient will ambulate with moderate assistance  for 5 feet with the least restrictive device.     PLOF: pt unable to give full history but per chart review has hospital bed and w/c at home     Outcome: Progressing     PHYSICAL THERAPY RE-EVALUATION    Patient: Ivonne Taylor (73 y.o. female)  Date: 1/9/2024  Primary Diagnosis: Encephalopathy [G93.40]  Dementia with behavioral disturbance (HCC) [F03.918]       Precautions: Fall Risk, General Precautions,     ASSESSMENT :  Received in bed being treated by OT side lying cleaning up stool. Agreeable to PT re-evaluation. Requires mod x 2 for bed mobility to help clean pt. Max x 2 to come sitting EOB. Demonstrates fair to poor sitting balance and requires max cueing to remain seated upright. While trunk being supported, demonstrates fair knee extension, but lacking full AROM and resists PROM for full assessment, R LE strength > L.  BLE cross midline and blocking knees was unsuccessful. Max x 2 to scoot laterally closer to HOB, and total x 2 for sit to supine. Inconsistently follows commands and requires max redirecting. Demonstrates ability to abduct BLE for further postioning. Pt will benefit from skilled acute care PT during admission to address strength, balance, and ROM deficits.     DEFICITS/IMPAIRMENTS:    , Body Structures, Functions, Activity Limitations Requiring Skilled Therapeutic Intervention:  SURGERY      left arm surg    XR MIDLINE EQUAL OR GREATER THAN 5 YEARS  11/3/2022    XR MIDLINE EQUAL OR GREATER THAN 5 YEARS 11/3/2022       Home Situation:  Social/Functional History  Lives With: Spouse  Type of Home: House  Home Layout: One level  Home Access: Ramped entrance  Bathroom Equipment: None  Bathroom Accessibility: Accessible  Home Equipment: Wheelchair-manual  Receives Help From: Family  ADL Assistance: Needs assistance  Toileting: Needs assistance  Homemaking Assistance: Needs assistance  Ambulation Assistance: Needs assistance  Transfer Assistance: Needs assistance  Active : No  Patient's  Info: Patient's family transports patient.  Occupation: Retired  Critical Behavior:  Orientation  Overall Orientation Status: Impaired  Orientation Level: Oriented to person  Cognition  Overall Cognitive Status: Exceptions  Arousal/Alertness: Inconsistent responses to stimuli  Following Commands: Inconsistently follows commands  Attention Span: Difficulty attending to directions;Difficulty dividing attention  Safety Judgement: Decreased awareness of need for assistance  Problem Solving: Assistance required to generate solutions;Assistance required to implement solutions  Insights: Decreased awareness of deficits  Initiation: Requires cues for all  Sequencing: Requires cues for all    Strength:    Strength: Generally decreased, functional    Tone & Sensation:      Sensation: Impaired    Range Of Motion:  AROM: Generally decreased, functional  PROM: Generally decreased, functional    Functional Mobility:  Bed Mobility:     Bed Mobility Training  Bed Mobility Training: Yes  Rolling: Moderate assistance;Assist X2  Supine to Sit: Maximum assistance  Sit to Supine: Total assistance;Assist X2  Scooting: Total assistance;Assist X2  Transfers:     Transfer Training  Transfer Training: No  Balance:               Balance  Sitting: Impaired  Sitting - Static: Fair (occasional)  Sitting - Dynamic: Fair

## 2024-01-10 PROBLEM — G93.40 ACUTE ENCEPHALOPATHY: Status: ACTIVE | Noted: 2024-01-10

## 2024-01-10 LAB
ANION GAP SERPL CALC-SCNC: 0 MMOL/L (ref 3–18)
BASOPHILS # BLD: 0.1 K/UL (ref 0–0.1)
BASOPHILS NFR BLD: 1 % (ref 0–2)
BUN SERPL-MCNC: 18 MG/DL (ref 7–18)
BUN/CREAT SERPL: 21 (ref 12–20)
CALCIUM SERPL-MCNC: 9.2 MG/DL (ref 8.5–10.1)
CHLORIDE SERPL-SCNC: 113 MMOL/L (ref 100–111)
CO2 SERPL-SCNC: 27 MMOL/L (ref 21–32)
CREAT SERPL-MCNC: 0.85 MG/DL (ref 0.6–1.3)
DIFFERENTIAL METHOD BLD: ABNORMAL
EOSINOPHIL # BLD: 0.2 K/UL (ref 0–0.4)
EOSINOPHIL NFR BLD: 3 % (ref 0–5)
ERYTHROCYTE [DISTWIDTH] IN BLOOD BY AUTOMATED COUNT: 13.2 % (ref 11.6–14.5)
GLUCOSE SERPL-MCNC: 84 MG/DL (ref 74–99)
HCT VFR BLD AUTO: 28.9 % (ref 35–45)
HGB BLD-MCNC: 9.2 G/DL (ref 12–16)
IMM GRANULOCYTES # BLD AUTO: 0 K/UL (ref 0–0.04)
IMM GRANULOCYTES NFR BLD AUTO: 0 % (ref 0–0.5)
LYMPHOCYTES # BLD: 2.7 K/UL (ref 0.9–3.6)
LYMPHOCYTES NFR BLD: 41 % (ref 21–52)
MCH RBC QN AUTO: 27.1 PG (ref 24–34)
MCHC RBC AUTO-ENTMCNC: 31.8 G/DL (ref 31–37)
MCV RBC AUTO: 85 FL (ref 78–100)
MONOCYTES # BLD: 0.5 K/UL (ref 0.05–1.2)
MONOCYTES NFR BLD: 7 % (ref 3–10)
NEUTS SEG # BLD: 3.1 K/UL (ref 1.8–8)
NEUTS SEG NFR BLD: 48 % (ref 40–73)
NRBC # BLD: 0 K/UL (ref 0–0.01)
NRBC BLD-RTO: 0 PER 100 WBC
PLATELET # BLD AUTO: 215 K/UL (ref 135–420)
PMV BLD AUTO: 10.8 FL (ref 9.2–11.8)
POTASSIUM SERPL-SCNC: 4.1 MMOL/L (ref 3.5–5.5)
RBC # BLD AUTO: 3.4 M/UL (ref 4.2–5.3)
SODIUM SERPL-SCNC: 140 MMOL/L (ref 136–145)
WBC # BLD AUTO: 6.5 K/UL (ref 4.6–13.2)

## 2024-01-10 PROCEDURE — 99231 SBSQ HOSP IP/OBS SF/LOW 25: CPT | Performed by: STUDENT IN AN ORGANIZED HEALTH CARE EDUCATION/TRAINING PROGRAM

## 2024-01-10 PROCEDURE — G0378 HOSPITAL OBSERVATION PER HR: HCPCS

## 2024-01-10 PROCEDURE — 36415 COLL VENOUS BLD VENIPUNCTURE: CPT

## 2024-01-10 PROCEDURE — 6370000000 HC RX 637 (ALT 250 FOR IP): Performed by: STUDENT IN AN ORGANIZED HEALTH CARE EDUCATION/TRAINING PROGRAM

## 2024-01-10 PROCEDURE — 85025 COMPLETE CBC W/AUTO DIFF WBC: CPT

## 2024-01-10 PROCEDURE — 94761 N-INVAS EAR/PLS OXIMETRY MLT: CPT

## 2024-01-10 PROCEDURE — 2580000003 HC RX 258: Performed by: STUDENT IN AN ORGANIZED HEALTH CARE EDUCATION/TRAINING PROGRAM

## 2024-01-10 PROCEDURE — C9113 INJ PANTOPRAZOLE SODIUM, VIA: HCPCS | Performed by: STUDENT IN AN ORGANIZED HEALTH CARE EDUCATION/TRAINING PROGRAM

## 2024-01-10 PROCEDURE — A4216 STERILE WATER/SALINE, 10 ML: HCPCS | Performed by: STUDENT IN AN ORGANIZED HEALTH CARE EDUCATION/TRAINING PROGRAM

## 2024-01-10 PROCEDURE — 1100000000 HC RM PRIVATE

## 2024-01-10 PROCEDURE — 80048 BASIC METABOLIC PNL TOTAL CA: CPT

## 2024-01-10 PROCEDURE — 6360000002 HC RX W HCPCS: Performed by: STUDENT IN AN ORGANIZED HEALTH CARE EDUCATION/TRAINING PROGRAM

## 2024-01-10 RX ADMIN — LOSARTAN POTASSIUM 50 MG: 50 TABLET, FILM COATED ORAL at 11:18

## 2024-01-10 RX ADMIN — APIXABAN 5 MG: 5 TABLET, FILM COATED ORAL at 11:19

## 2024-01-10 RX ADMIN — PANTOPRAZOLE SODIUM 40 MG: 40 INJECTION, POWDER, FOR SOLUTION INTRAVENOUS at 11:25

## 2024-01-10 RX ADMIN — SODIUM CHLORIDE, PRESERVATIVE FREE 10 ML: 5 INJECTION INTRAVENOUS at 21:34

## 2024-01-10 RX ADMIN — LACOSAMIDE 200 MG: 50 TABLET, FILM COATED ORAL at 11:18

## 2024-01-10 RX ADMIN — LACOSAMIDE 200 MG: 50 TABLET, FILM COATED ORAL at 21:34

## 2024-01-10 RX ADMIN — APIXABAN 5 MG: 5 TABLET, FILM COATED ORAL at 21:34

## 2024-01-10 ASSESSMENT — PAIN SCALES - GENERAL: PAINLEVEL_OUTOF10: 0

## 2024-01-10 NOTE — CARE COORDINATION
JOANNE messaged Gregorio with [facility], checking on [facility] status for [facility].   JOANNE let Gregorio know that Monika RUBIO uploaded PT and OT notes yesterday in [facility].       Auth for SNF was started on 01/05/2024.           Rivka Trejo RN  Case Management [phone]

## 2024-01-10 NOTE — CARE COORDINATION
Walker with SN[facility] messaged CM in [facility], said they are checking on auth status for SNF.             Rivka Trejo, RN  Case Management [phone]

## 2024-01-10 NOTE — PROGRESS NOTES
Physician Progress Note      PATIENT:               MATTHEW RENNER  CSN #:                  [id number]  :                       1950  ADMIT DATE:       2024 9:21 AM  DISCH DATE:  RESPONDING  PROVIDER #:        Marlon Coronado DO          QUERY TEXT:    Dear Hospitalist      Pt admitted with  AMS  and has acute on chronic  encephalopathy documented. If   possible, please document in progress notes and discharge summary further   specificity regarding the type of encephalopathy:    The medical record reflects the following:  Risk Factors: dementia;  h/o  CVA;   seizure disorder,   diabetes;   HTN  Clinical Indicators: per  notes- AMS workup mostly done in the emergency room   along with PT OT evaluation and both suggest placement.  Likely acute on   chronic given patient's considerable neurologic insult  Treatment: placement pending    Thank you,   Ammy Leal RN   CCDS  Options provided:  -- Acute  on chronic Encephalopathy due to dementia  -- Acute on chronic Encephalopathy  due to chronic  neurological issues  -- Other - I will add my own diagnosis  -- Disagree - Not applicable / Not valid  -- Disagree - Clinically unable to determine / Unknown  -- Refer to Clinical Documentation Reviewer    PROVIDER RESPONSE TEXT:    This patient has acute on chronic encephalopathy due to chronic neurological   issues.    Query created by: Annmarie Leal on 1/10/2024 12:59 PM      Electronically signed by:  Marlon Coronado DO 1/10/2024 4:52 PM

## 2024-01-10 NOTE — PROGRESS NOTES
[facility] Hospitalist Group  Progress Note  Date:2024       Room:[facility]  Patient Name:Ivonne Taylor     YOB: 1950     Age:73 y.o.        Subjective    Subjective   Review of Systems    No acute events overnight. Patient is nonverbal. She has no complaints. She said hi to me.    Objective         Vitals Last 24 Hours:  TEMPERATURE:  Temp  Av.9 °F (36.6 °C)  Min: 97.4 °F (36.3 °C)  Max: 98.2 °F (36.8 °C)  RESPIRATIONS RANGE: Resp  Av  Min: 16  Max: 19  PULSE OXIMETRY RANGE: SpO2  Av %  Min: 97 %  Max: 100 %  PULSE RANGE: Pulse  Av.6  Min: 79  Max: 111  BLOOD PRESSURE RANGE: Systolic (24hrs), Av , Min:106 , Max:156     ; Diastolic (24hrs), Av, Min:62, Max:78      I/O (24Hr):    Intake/Output Summary (Last 24 hours) at 2024 1924  Last data filed at 2024 1120  Gross per 24 hour   Intake --   Output 500 ml   Net -500 ml       Objective:  General Appearance:  Comfortable.    Vital signs: (most recent): Blood pressure (!) 156/78, pulse (!) 111, temperature 98 °F (36.7 °C), temperature source Oral, resp. rate 19, height 1.676 m (5' 6\"), weight 68.9 kg (151 lb 14.4 oz), SpO2 98 %.    Output: Producing urine.    HEENT: Normal HEENT exam.    Lungs:  Normal effort and normal respiratory rate.  Breath sounds clear to auscultation.    Heart: Normal rate.  Regular rhythm.  Positive for murmur.    Abdomen: Abdomen is soft and flat.  Bowel sounds are normal.   There is no abdominal tenderness.     Extremities: Normal range of motion.    Pulses: Distal pulses are intact.    Neurological: Patient is alert and oriented to person, place and time.    Skin:  Warm and dry.          Labs/Imaging/Diagnostics    Labs:  CBC:  Recent Labs     24  0353 24  0500   WBC 6.8 6.2   RBC 3.42* 3.51*   HGB 9.0* 9.6*   HCT 29.2* 30.3*   MCV 85.4 86.3   RDW 13.1 13.1    183       CHEMISTRIES:  Recent Labs     24  0353 24  0500    140   K 4.0  Encephalopathy 1/6/2024 Yes    Seizure disorder (HCC) (Chronic) 1/6/2024 Yes    Type 2 diabetes mellitus with diabetic neuropathy, with long-term current use of insulin (HCC) (Chronic) 1/6/2024 Yes    History of CVA (cerebrovascular accident) (Chronic) 1/6/2024 Yes    Gastroesophageal reflux disease without esophagitis (Chronic) 1/6/2024 Yes   Assessment:    Condition: In stable condition.  Unchanged.   (    1. Acute on chronic encephalopathy  2. Seizure disorder  3. Type 2 diabetes  4. History CVA  5. CHF  6. Dementia  7. HLD  8. Hypertension  9. History of PE  10. GERD and PUD  11. Mild aortic stenosis  ).     Plan:   Regular diet.  Administer medications as ordered.   (    - PT/OT  - Pending SNF auth  - Continue current meds       ).       Case discussed with:  [x]Patient  []Family  [x]Nursing  [x]Case Management  DVT Prophylaxis:  []Lovenox  []Hep SQ  []SCDs  []Coumadin   []Heparin gtt   []Xarelto   [x]Kaiaqugita Coronado DO, MBA, MS [facility]  Hospitalist

## 2024-01-11 VITALS
SYSTOLIC BLOOD PRESSURE: 126 MMHG | BODY MASS INDEX: 24.41 KG/M2 | OXYGEN SATURATION: 96 % | HEIGHT: 66 IN | TEMPERATURE: 97.6 F | RESPIRATION RATE: 19 BRPM | HEART RATE: 81 BPM | DIASTOLIC BLOOD PRESSURE: 75 MMHG | WEIGHT: 151.9 LBS

## 2024-01-11 PROCEDURE — 2580000003 HC RX 258: Performed by: STUDENT IN AN ORGANIZED HEALTH CARE EDUCATION/TRAINING PROGRAM

## 2024-01-11 PROCEDURE — 6370000000 HC RX 637 (ALT 250 FOR IP): Performed by: STUDENT IN AN ORGANIZED HEALTH CARE EDUCATION/TRAINING PROGRAM

## 2024-01-11 PROCEDURE — 6360000002 HC RX W HCPCS: Performed by: STUDENT IN AN ORGANIZED HEALTH CARE EDUCATION/TRAINING PROGRAM

## 2024-01-11 PROCEDURE — 97168 OT RE-EVAL EST PLAN CARE: CPT

## 2024-01-11 PROCEDURE — 97535 SELF CARE MNGMENT TRAINING: CPT

## 2024-01-11 PROCEDURE — 99239 HOSP IP/OBS DSCHRG MGMT >30: CPT | Performed by: STUDENT IN AN ORGANIZED HEALTH CARE EDUCATION/TRAINING PROGRAM

## 2024-01-11 PROCEDURE — C9113 INJ PANTOPRAZOLE SODIUM, VIA: HCPCS | Performed by: STUDENT IN AN ORGANIZED HEALTH CARE EDUCATION/TRAINING PROGRAM

## 2024-01-11 PROCEDURE — A4216 STERILE WATER/SALINE, 10 ML: HCPCS | Performed by: STUDENT IN AN ORGANIZED HEALTH CARE EDUCATION/TRAINING PROGRAM

## 2024-01-11 RX ADMIN — LOSARTAN POTASSIUM 50 MG: 50 TABLET, FILM COATED ORAL at 10:38

## 2024-01-11 RX ADMIN — LACOSAMIDE 200 MG: 50 TABLET, FILM COATED ORAL at 10:37

## 2024-01-11 RX ADMIN — PANTOPRAZOLE SODIUM 40 MG: 40 INJECTION, POWDER, FOR SOLUTION INTRAVENOUS at 10:38

## 2024-01-11 RX ADMIN — AMLODIPINE BESYLATE 5 MG: 5 TABLET ORAL at 10:38

## 2024-01-11 RX ADMIN — SODIUM CHLORIDE, PRESERVATIVE FREE 10 ML: 5 INJECTION INTRAVENOUS at 10:39

## 2024-01-11 RX ADMIN — APIXABAN 5 MG: 5 TABLET, FILM COATED ORAL at 10:38

## 2024-01-11 RX ADMIN — ISOSORBIDE MONONITRATE 30 MG: 30 TABLET, EXTENDED RELEASE ORAL at 10:37

## 2024-01-11 ASSESSMENT — PAIN SCALES - WONG BAKER
WONGBAKER_NUMERICALRESPONSE: NO HURT
WONGBAKER_NUMERICALRESPONSE: 0

## 2024-01-11 ASSESSMENT — PAIN SCALES - GENERAL: PAINLEVEL_OUTOF10: 0

## 2024-01-11 NOTE — CARE COORDINATION
CM messaged Jeanette and Ney with SNF [facility] in [facility], and CM checking on auth status for SNF.         Auth for SNF started on 01/05/2024.               Rivka Trejo RN  Case Management [phone]

## 2024-01-11 NOTE — CARE COORDINATION
Requested Case Management specialist to assist with transportation to:      [facility]        Address is:      [address] [place] [id number]       and phone number is:    [phone]      Patient will require BLS transport.   Pt requires Stretcher If stretcher, reason: Acute on Chronic Encephalopathy, Seizures, Hx of CVA, Dementia, Impaired Mobility  Patient is currently requiring oxygen No   Height: 5\"6   Weight: 151 lbs  Pt is on isolation:  No    Is the pt ready now? Yes  Requested time: Next Available  PCS Faxed: No  Insurance verified on face sheet: Yes  Auth needed for transport: No  CM completed PCS/ Envelope and placed on chart.

## 2024-01-11 NOTE — CARE COORDINATION
Walker with [facility] let CM know that auth for SNF approved, can take patient today, need MAR 7 days, and discharge summary.       MAR 7 days uploaded to [facility] for Walker with [facility].        Perfect Served Dr. Coronado, and updated that auth for SNF approved, can take patient today, and will need discharge summary.                 Rivka Trejo, RN  Case Management [phone]

## 2024-01-11 NOTE — DISCHARGE SUMMARY
Discharge Summary    Patient: Ivonne Taylor MRN: [id number]  [facility]: [id number]    YOB: 1950  Age: 73 y.o.  Sex: female    DOA: 1/2/2024 LOS:  LOS: 1 day        Disposition: SNF    Discharge Date: 01/11/2024    Admission Diagnosis: Encephalopathy [G93.40]  Dementia with behavioral disturbance (HCC) [F03.918]  Acute encephalopathy [G93.40]    Discharge Diagnosis:    1.         Acute on chronic encephalopathy  2.         Seizure disorder  3.         Type 2 diabetes  4.         History CVA  5.         CHF  6.         Dementia  7.         HLD  8.         Hypertension  9.         History of PE  10.       GERD and PUD  11.       Mild aortic stenosis    Discharge Condition: Stable      PHYSICAL EXAM  Visit Vitals  /69   Pulse 98   Temp 97.9 °F (36.6 °C) (Axillary)   Resp 19   Ht 1.676 m (5' 6\")   Wt 68.9 kg (151 lb 14.4 oz)   SpO2 100%   BMI 24.52 kg/m²       General Appearance:  Comfortable.    Output: Producing urine.    HEENT: Normal HEENT exam.    Lungs:  Normal effort and normal respiratory rate.  Breath sounds clear to auscultation.    Heart: Normal rate.  Regular rhythm.  Positive for murmur.    Abdomen: Abdomen is soft and flat.  Bowel sounds are normal.   There is no abdominal tenderness.     Extremities: Normal range of motion.    Pulses: Distal pulses are intact.    Neurological: Patient is alert and oriented to person, place and time.    Skin:  Warm and dry.                                  Hospital Course:   Ivonne Taylor is a 73 y.o.  female with hx of breast cancer, depression, CVA, diabetes, hyperlipidemia, hypertension, prior PE, PUD, GERD, seizures and seizure disorder, altered mental status.  Patient had recently been seen for similar altered mental status however has been discharged home after presumed return to neurologic baseline.  Patient again found to have acute on chronic altered mental status. Patient's mentation improved. Home medications were resumed. Patient worked with PT/OT.

## 2024-01-11 NOTE — CARE COORDINATION
Haley from [facility] transportation called,  time is moved to 2:00 pm.   Incubation End Time: 10:10am

## 2024-01-11 NOTE — CARE COORDINATION
Call made to [facility] [phone], spoke with Haley, will transport patient at 11:00 am.

## 2024-01-11 NOTE — CARE COORDINATION
Discharge order noted for today. [facility] has been [facility]. Confirmed with Ney and Jeanette that bed is available today.  Met with patient and her  and are agreeable to the transition plan today. **Insurance authorization has been obtained.** Transport to facility has been arranged through [facility] at 2 pm  time. Patient's discharge summary has been forwarded to skilled nursing facility via [facility], and was placed in Transport Envelope to go with patient to SNF. Bedside RN, Sobeida, has been updated to the transition plan. Discharge information has been updated on the AVS.  Please call report to [phone].            Rivka Trejo RN  Case Management [phone]

## 2024-01-11 NOTE — CARE COORDINATION
Transition of Care Plan to SNF/Rehab    SNF/Rehab Transition:  Patient has been accepted to [facility]  and meets criteria for admission.   Patient will transported by [facility] and expected to leave at 2 pm today.    Communication to Patient/Family:  Met with patient and her (identified care giver) and they are agreeable to the transition plan.    Communication to SNF/Rehab:  Bedside RN, Sobeida, has been notified to update the transition plan to the facility and call report (phone number [phone]).  Discharge information has been updated on the AVS.       Nursing Please include all hard scripts for controlled substances, med rec and dc summary, and AVS in packet.     Reviewed and confirmed with facility, [facility], can manage the patient care needs for the following:     Nitesh with (X) only those applicable:    Medication:  [x]  Medications will be available at the facility  []  IV Antibiotics   []  Controlled Substance - hard copy to be sent with patient   []  Weekly Labs   Documents:  [] Hard RX  [] MAR  [] Kardex  [] AVS  []Transfer Summary  [x]Discharge Summary   Equipment:  []  CPAP/BiPAP  []  Wound Vacuum  []  Gibson or Urinary Device  []  PICC/Central Line  []  Nebulizer  []  Ventilator   Treatment:  []Isolation (for MRSA, VRE, etc.)  []Surgical Drain Management  []Tracheostomy Care  []Dressing Changes  []Dialysis with transportation and chair time.  []PEG Care  []Oxygen  []Daily Weights for Heart Failure   Dietary:  [x]Any diet limitations  ADULT DIET; Easy to Chew; 4 carb choices (60 gm/meal)   []Tube Feedings   []Total Parenteral Management (TPN)   Eligible for Medicaid Long Term Services and Supports  Yes:  [] Eligible for medical assistance or will become eligible within 180 days and UAI completed.   [] Provider/Patient and/or support system has requested screening.  [] UAI copy provided to patient or responsible party.  [] UAI unavailable at

## 2024-01-11 NOTE — CARE COORDINATION
SNF Note uploaded to [facility] for [facility].             Rivka Trejo RN  Case Management [phone]

## 2024-01-11 NOTE — DISCHARGE INSTRUCTIONS
reviewed with the {PATIENT PARENT GUARDIAN:69018}.  The {PATIENT PARENT GUARDIAN:01192} verbalized understanding.  Discharge medications reviewed with the {Dishcarge meds reviewed with:66777} and appropriate educational materials and side effects teaching were provided.  ___________________________________________________________________________________________________________________________________

## 2024-01-11 NOTE — CARE COORDINATION
JOANNE let Sobeida HEWITT know that Medical Transport pushed back to 2 pm today for [facility].         CM messaged Jeanette and Ney with [facility] in [facility], that Medical Transport pushed back to 2 pm today.             Rivka Trejo, RN  Case Management [phone]

## 2024-01-11 NOTE — PROGRESS NOTES
[facility] called 3 times to give report and each time was placed on hold and answering machine responded. After 3 time message was left with answering machine.  Ambulance here to  patient at this time. Report given to ambulance staff.

## 2024-01-11 NOTE — PROGRESS NOTES
[facility] Hospitalist Group  Progress Note  Date:1/10/2024       Room:[facility]  Patient Name:Ivonne Taylor     YOB: 1950     Age:73 y.o.        Subjective    Subjective   Review of Systems    No acute events overnight. Patient was talkative today.  was bedside. She wants to go home. Patient refused medications earlier. When I asked her to take her medications. She was agreeable.    Objective         Vitals Last 24 Hours:  TEMPERATURE:  Temp  Av.8 °F (36.6 °C)  Min: 97.2 °F (36.2 °C)  Max: 98.2 °F (36.8 °C)  RESPIRATIONS RANGE: Resp  Av  Min: 19  Max: 19  PULSE OXIMETRY RANGE: SpO2  Av.2 %  Min: 93 %  Max: 100 %  PULSE RANGE: Pulse  Av.8  Min: 86  Max: 108  BLOOD PRESSURE RANGE: Systolic (24hrs), Av , Min:129 , Max:163     ; Diastolic (24hrs), Av, Min:62, Max:88      I/O (24Hr):  No intake or output data in the 24 hours ending 01/10/24 1908    Objective:  General Appearance:  Comfortable.    Vital signs: (most recent): Blood pressure 129/62, pulse 94, temperature 98.2 °F (36.8 °C), temperature source Axillary, resp. rate 19, height 1.676 m (5' 6\"), weight 68.9 kg (151 lb 14.4 oz), SpO2 100 %.    Output: Producing urine.    HEENT: Normal HEENT exam.    Lungs:  Normal effort and normal respiratory rate.  Breath sounds clear to auscultation.    Heart: Normal rate.  Regular rhythm.  Positive for murmur.    Abdomen: Abdomen is soft and flat.  Bowel sounds are normal.   There is no abdominal tenderness.     Extremities: Normal range of motion.    Pulses: Distal pulses are intact.    Neurological: Patient is alert and oriented to person, place and time.    Skin:  Warm and dry.          Labs/Imaging/Diagnostics    Labs:  CBC:  Recent Labs     24  0353 24  0500 01/10/24  0914   WBC 6.8 6.2 6.5   RBC 3.42* 3.51* 3.40*   HGB 9.0* 9.6* 9.2*   HCT 29.2* 30.3* 28.9*   MCV 85.4 86.3 85.0   RDW 13.1 13.1 13.2    183 215

## 2024-01-11 NOTE — CARE COORDINATION
Discharge Summary uploaded to [facility] for [facility].       JOANNE spoke with patient's  at bedside, updated on discharge plan for [facility] @ 11 am today.   Patient's  is agreeable to the discharge plan for today.       Sobeida HEWITT updated with discharge plan for today.         JOANNE  messaged Walker with [facility] in [facility] that Medical Transport scheduled for 11 am today.             Rivka Trejo, RN  Case Management [phone]

## 2024-01-11 NOTE — PLAN OF CARE
Problem: Occupational Therapy - Adult  Goal: By Discharge: Performs self-care activities at highest level of function for planned discharge setting.  See evaluation for individualized goals.  Description: Occupational Therapy Goals:  Initiated 1/3/2024, re-evaluated 1/11/2023, continue with goals to be met within 7-10 days.    1.  Patient will perform self-feeding with supervision/set-up using adaptive equipment and compensatory techniques.   2.  Patient will perform grooming with supervision/set-up using adaptive equipment and compensatory techniques.   3.  Patient will participate in upper extremity therapeutic exercise/activities with supervision/set-up for 8-10 minutes to increase strength/endurance for ADLs.      PLOF: per spouse, pt has PCA to assist with ADLs and functional transfer with Max A bed <-> w/c, pt was able to perform self feeding and basic grooming tasks with set up assist     Outcome: Progressing  OCCUPATIONAL THERAPY RE-EVALUATION    Patient: Ivonne Taylor (73 y.o. female)  Date: 1/11/2024  Primary Diagnosis: Encephalopathy [G93.40]  Dementia with behavioral disturbance (HCC) [F03.918]  Acute encephalopathy [G93.40]       Precautions: Fall Risk, General Precautions    ASSESSMENT :    Pt is asleep upon entry, found to be soiled with urine. Pt required Total A for rolling in bed for change of pads and toileting hygiene. Pt is very resistive to rolling to R side, only able to perform partial roll with Total A. Pt required Max A for simple grooming tasks with max cues for initiation and termination. Goals remain appropriate.  DEFICITS/IMPAIRMENTS:  Performance deficits / Impairments: Decreased functional mobility ;Decreased endurance;Decreased coordination;Decreased ADL status;Decreased ROM;Decreased balance;Decreased strength;Decreased safe awareness;Decreased fine motor control;Decreased cognition    Patient will benefit from skilled intervention to address the above impairments.  Patient's  Motion: BUE  AROM: Grossly decreased, non-functional  PROM: Generally decreased, functional    Functional Mobility and Transfers for ADLs:  Bed Mobility:     Bed Mobility Training  Rolling: Maximum assistance  Scooting: Total assistance  ADL Assessment:      Feeding: Moderate assistance  Grooming: Maximum assistance  UE Bathing: Maximum assistance  LE Bathing: Dependent/Total  UE Dressing: Maximum assistance  LE Dressing: Dependent/Total  Toileting: Dependent/Total   ADL Intervention:  Max A for simple grooming task  Total A for toileting    Pain:  Pain level pre-treatment: not rated  Pain level post-treatment: not rated     Activity Tolerance:   Activity Tolerance: Patient Tolerated treatment well  Please refer to the flowsheet for vital signs taken during this treatment.    After treatment:   [] Patient left in no apparent distress sitting up in chair  [] Patient left in no apparent distress in bed  [x] Call bell left within reach  [x] Nursing notified  [x] Caregiver present  [] Bed alarm activated    COMMUNICATION/EDUCATION:   Patient Education  Education Given To: Patient  Education Provided: ADL Adaptive Strategies;Energy Conservation;Role of Therapy;Orientation  Education Method: Verbal;Teach Back;Demonstration  Barriers to Learning: Cognition  Education Outcome: Continued education needed    Thank you for this referral.  Candace Longoria OTR/L  Minutes: 18

## 2024-03-01 ENCOUNTER — APPOINTMENT (OUTPATIENT)
Facility: HOSPITAL | Age: 74
End: 2024-03-01
Payer: MEDICARE

## 2024-03-01 ENCOUNTER — HOSPITAL ENCOUNTER (EMERGENCY)
Facility: HOSPITAL | Age: 74
Discharge: NURSING, REHAB OR LONG-TERM CARE | End: 2024-03-02
Attending: STUDENT IN AN ORGANIZED HEALTH CARE EDUCATION/TRAINING PROGRAM
Payer: MEDICARE

## 2024-03-01 DIAGNOSIS — I63.9 RIGHT-SIDED CEREBROVASCULAR ACCIDENT (CVA) (HCC): ICD-10-CM

## 2024-03-01 DIAGNOSIS — R56.9 SEIZURE (HCC): Primary | ICD-10-CM

## 2024-03-01 DIAGNOSIS — E88.09 HYPOALBUMINEMIA: ICD-10-CM

## 2024-03-01 DIAGNOSIS — D64.9 ANEMIA, UNSPECIFIED TYPE: ICD-10-CM

## 2024-03-01 DIAGNOSIS — M25.511 ACUTE PAIN OF RIGHT SHOULDER: ICD-10-CM

## 2024-03-01 DIAGNOSIS — E11.65 HYPERGLYCEMIA DUE TO DIABETES MELLITUS (HCC): ICD-10-CM

## 2024-03-01 LAB
ALBUMIN SERPL-MCNC: 2.9 G/DL (ref 3.4–5)
ALBUMIN/GLOB SERPL: 0.6 (ref 0.8–1.7)
ALP SERPL-CCNC: 89 U/L (ref 45–117)
ALT SERPL-CCNC: 22 U/L (ref 13–56)
ANION GAP SERPL CALC-SCNC: 4 MMOL/L (ref 3–18)
AST SERPL-CCNC: 20 U/L (ref 10–38)
BASOPHILS # BLD: 0 K/UL (ref 0–0.1)
BASOPHILS NFR BLD: 1 % (ref 0–2)
BILIRUB SERPL-MCNC: 0.4 MG/DL (ref 0.2–1)
BUN SERPL-MCNC: 25 MG/DL (ref 7–18)
BUN/CREAT SERPL: 29 (ref 12–20)
CALCIUM SERPL-MCNC: 9.3 MG/DL (ref 8.5–10.1)
CHLORIDE SERPL-SCNC: 109 MMOL/L (ref 100–111)
CO2 SERPL-SCNC: 25 MMOL/L (ref 21–32)
CREAT SERPL-MCNC: 0.86 MG/DL (ref 0.6–1.3)
DIFFERENTIAL METHOD BLD: ABNORMAL
EOSINOPHIL # BLD: 0.2 K/UL (ref 0–0.4)
EOSINOPHIL NFR BLD: 2 % (ref 0–5)
ERYTHROCYTE [DISTWIDTH] IN BLOOD BY AUTOMATED COUNT: 13.3 % (ref 11.6–14.5)
GLOBULIN SER CALC-MCNC: 4.8 G/DL (ref 2–4)
GLUCOSE SERPL-MCNC: 106 MG/DL (ref 74–99)
HCT VFR BLD AUTO: 30.2 % (ref 35–45)
HGB BLD-MCNC: 9.6 G/DL (ref 12–16)
IMM GRANULOCYTES # BLD AUTO: 0 K/UL (ref 0–0.04)
IMM GRANULOCYTES NFR BLD AUTO: 1 % (ref 0–0.5)
LYMPHOCYTES # BLD: 3.1 K/UL (ref 0.9–3.6)
LYMPHOCYTES NFR BLD: 43 % (ref 21–52)
MAGNESIUM SERPL-MCNC: 2 MG/DL (ref 1.6–2.6)
MCH RBC QN AUTO: 26.9 PG (ref 24–34)
MCHC RBC AUTO-ENTMCNC: 31.8 G/DL (ref 31–37)
MCV RBC AUTO: 84.6 FL (ref 78–100)
MONOCYTES # BLD: 0.5 K/UL (ref 0.05–1.2)
MONOCYTES NFR BLD: 6 % (ref 3–10)
NEUTS SEG # BLD: 3.5 K/UL (ref 1.8–8)
NEUTS SEG NFR BLD: 48 % (ref 40–73)
NRBC # BLD: 0 K/UL (ref 0–0.01)
NRBC BLD-RTO: 0 PER 100 WBC
PLATELET # BLD AUTO: 311 K/UL (ref 135–420)
PMV BLD AUTO: 10.3 FL (ref 9.2–11.8)
POTASSIUM SERPL-SCNC: 4.4 MMOL/L (ref 3.5–5.5)
PROT SERPL-MCNC: 7.7 G/DL (ref 6.4–8.2)
RBC # BLD AUTO: 3.57 M/UL (ref 4.2–5.3)
SODIUM SERPL-SCNC: 138 MMOL/L (ref 136–145)
WBC # BLD AUTO: 7.2 K/UL (ref 4.6–13.2)

## 2024-03-01 PROCEDURE — 73030 X-RAY EXAM OF SHOULDER: CPT

## 2024-03-01 PROCEDURE — 6370000000 HC RX 637 (ALT 250 FOR IP): Performed by: STUDENT IN AN ORGANIZED HEALTH CARE EDUCATION/TRAINING PROGRAM

## 2024-03-01 PROCEDURE — 80053 COMPREHEN METABOLIC PANEL: CPT

## 2024-03-01 PROCEDURE — 93005 ELECTROCARDIOGRAM TRACING: CPT | Performed by: STUDENT IN AN ORGANIZED HEALTH CARE EDUCATION/TRAINING PROGRAM

## 2024-03-01 PROCEDURE — 83735 ASSAY OF MAGNESIUM: CPT

## 2024-03-01 PROCEDURE — 85025 COMPLETE CBC W/AUTO DIFF WBC: CPT

## 2024-03-01 PROCEDURE — 70450 CT HEAD/BRAIN W/O DYE: CPT

## 2024-03-01 PROCEDURE — 99285 EMERGENCY DEPT VISIT HI MDM: CPT

## 2024-03-01 RX ORDER — ACETAMINOPHEN 325 MG/1
650 TABLET ORAL
Status: COMPLETED | OUTPATIENT
Start: 2024-03-01 | End: 2024-03-01

## 2024-03-01 RX ORDER — LACOSAMIDE 50 MG/1
200 TABLET ORAL ONCE
Status: COMPLETED | OUTPATIENT
Start: 2024-03-01 | End: 2024-03-01

## 2024-03-01 RX ADMIN — LACOSAMIDE 200 MG: 50 TABLET, FILM COATED ORAL at 20:43

## 2024-03-01 RX ADMIN — ACETAMINOPHEN 325MG 650 MG: 325 TABLET ORAL at 20:43

## 2024-03-01 NOTE — ED TRIAGE NOTES
Client  from [facility] and re[facility], witness about 7-8 seiaures per family. Client has hx of seizures, CVA, bilateal weakness.  None witness by ems.

## 2024-03-01 NOTE — ED PROVIDER NOTES
Emergency Department Note    Patient: Ivonne Taylor Age: 73 y.o. Sex: female    YOB: 1950 Admit Date: 3/1/2024 PCP: Shanon Kim APRN - CNP   MRN: [id number]  CSN: [id number]     Room: [place] [address] Time Dictated: 8:03 PM        Chief Complaint   Chief Complaint   Patient presents with    Seizures       History of Present Illness   Ivonne Taylor is a 73 y.o. female with past medical history of dementia, seizures, DM2, hx CVA with bilateral deficits, HTN/HLD  who presents to the ED with the chief complaint of seizures.  Per EMS, brother was at bedside when he witnessed 7-8 seizures that occurred over 20min period.  Brother would not let the facility treat and requested EMS.  EMS stated she was HDS during transport and did not have any seizures during their care.  Patient has deficits at bedline and garbled speech from prior CVA.  She takes lacosamide in the evenings and has not had her medication this evening.  EMS denies any report from facility of complaints of chest pain, N/V, fever, or recent falls.        Review of Systems   Review of Systems     Unable to obtain 2/2 dementia    Past Medical/Surgical History     Past Medical History:   Diagnosis Date    Acute metabolic encephalopathy due to hypoglycemia 09/19/2021    LEI (acute kidney injury) ([facility]) 06/16/2022    Altered mental status, unspecified 11/03/2022    Aortic stenosis, mild 09/02/2021    Formatting of this note might be different from the original.   Echo 6/1/2020.  Peak velocity 2.48 m/s.  Mean gradient 15.9 mmHg.  Valve area calculation 1.6 cm²    Arthritis     Breast CA ([facility]) 2016    DDD (degenerative disc disease), cervical     Depression     Diabetes ([facility])     DJD (degenerative joint disease) of cervical spine     Encephalopathy acute 12/21/2023    Endocarditis, valve unspecified 11/03/2022    H/O medication noncompliance 12/21/2019    Heart murmur     History of cardiac catheterization 09/02/2021 2015; no angiographic evidence of  syncopal causes. Narrow QRS without bundle branch block morphology.  No ST segment elevations or depressions.  Impression: Normal sinus rhythm without evidence of arrhythmia or ischemia or acute cardiac pathology.   [SV]   1948 Interpreted by me at 1853 as NSR rate 95. Normal axis, normal intervals, Not STEMI [LK]   1951 Wet read of shoulder XR does not show dislocation or fracture.  Suspect muscular pain 2/2 seizure activity earlier today.  [SV]   2002 IMPRESSION:  1. No evidence of acute intracranial abnormality.     2. Large chronic right MCA territory infarct.     3. Underlying sequela of chronic small vessel ischemic changes and generalized  volume loss, unchanged.   [SV]      ED Course User Index  [LK] Marry Benitez MD  [SV] Kristin Field MD       Medications   lacosamide (VIMPAT) tablet 200 mg (has no administration in time range)   acetaminophen (TYLENOL) tablet 650 mg (has no administration in time range)           RECORDS REVIEWED:  I reviewed the patient's previous records here at [facility] and available outside facilities via care everywhere and note that   Patient seen in ED for similar presentation.  At that time her lacosamide was increased from 150 to 200mg BID.        - INDEPENDENT HISTORIAN:  History and/or plan development assisted by: brother at bedside, EMS    - Severe exacerbation or progression of chronic illness:seziure, DM    - Comorbidities impacting Evaluation and Management:DM, seizure history, stroke    - Threat to body function without evaluation and management:  neurologic      - SOCIAL DETERMINANTS  impacting Evaluation and Management: Transportation Needs limited mobility      Procedures       Final Diagnosis     1. Seizure (HCC)    2. Hypoalbuminemia    3. Hyperglycemia due to diabetes mellitus (HCC)    4. Anemia, unspecified type    5. Acute pain of right shoulder    6. Right-sided cerebrovascular accident (CVA) ([facility])          Disposition   Patient verbalized

## 2024-03-02 VITALS
OXYGEN SATURATION: 98 % | TEMPERATURE: 98.8 F | SYSTOLIC BLOOD PRESSURE: 135 MMHG | DIASTOLIC BLOOD PRESSURE: 66 MMHG | RESPIRATION RATE: 15 BRPM | HEART RATE: 93 BPM

## 2024-03-02 LAB
EKG ATRIAL RATE: 95 BPM
EKG DIAGNOSIS: NORMAL
EKG P AXIS: 87 DEGREES
EKG P-R INTERVAL: 138 MS
EKG Q-T INTERVAL: 334 MS
EKG QRS DURATION: 68 MS
EKG QTC CALCULATION (BAZETT): 419 MS
EKG R AXIS: 20 DEGREES
EKG T AXIS: 62 DEGREES
EKG VENTRICULAR RATE: 95 BPM

## 2024-03-02 PROCEDURE — 93010 ELECTROCARDIOGRAM REPORT: CPT | Performed by: INTERNAL MEDICINE

## 2024-03-02 NOTE — ED NOTES
Called [facility]; gave report to receiving nurse.       Patient pain on discharge 0.  Discharge instructions discussed with patient/caregiver and provided to the patient/caregiver either in hard copy or electronically..  [facility].  Discharge Method stretcher.  Discharged with patient.

## 2024-03-02 NOTE — ED NOTES
Assumed care of PT. Received report from MARCELINA Belle. PT resting in bed at this time. Family at bedside. NAD. Breathing even and unlabored.

## 2024-03-02 NOTE — DISCHARGE INSTRUCTIONS
Thank you for allowing us to care for you in the Emergency Department.  You have been evaluated for seizures.  Your evaluation including exam and other diagnostics was reassuring and not suggestive of any emergent condition requiring additional medical intervention or admission at this time.  However, some problems may take more time to appear.  Therefore, it is important for you to watch for any new or worsening symptoms of your current condition.     You can return to your normal diet and level of activity.   You can take 650mg of tylenol every 6 hours as needed for shoulder pain.     Please return immediately for any new or worsening symptoms, specifically please return for a seizure lasting >5 minutes.  Also, please return for episodes of fainting, chest pain with sweating or shortness of breath or tingling in your shoulders/arms, difficulty or worsening breathing, sudden onset severe headache, confusion or abnormal behavior, severe abdominal or pelvic pain, black or blood in your stool, numbness or difficulty moving your legs or weakness in your arms causing you to drop things, persistent vomiting where your cannot keep down food or water, or persistent fever >102.2.

## 2024-03-15 ENCOUNTER — APPOINTMENT (OUTPATIENT)
Facility: HOSPITAL | Age: 74
End: 2024-03-15
Payer: MEDICARE

## 2024-03-15 ENCOUNTER — HOSPITAL ENCOUNTER (EMERGENCY)
Facility: HOSPITAL | Age: 74
Discharge: HOME | End: 2024-03-16
Payer: MEDICARE

## 2024-03-15 DIAGNOSIS — N39.0 ACUTE UTI: ICD-10-CM

## 2024-03-15 DIAGNOSIS — J11.1 INFLUENZA WITH RESPIRATORY MANIFESTATION OTHER THAN PNEUMONIA: Primary | ICD-10-CM

## 2024-03-15 LAB
ALBUMIN SERPL-MCNC: 2.9 G/DL (ref 3.4–5)
ALBUMIN/GLOB SERPL: 0.7 (ref 0.8–1.7)
ALP SERPL-CCNC: 84 U/L (ref 45–117)
ALT SERPL-CCNC: 25 U/L (ref 13–56)
ANION GAP SERPL CALC-SCNC: 4 MMOL/L (ref 3–18)
ANION GAP SERPL CALC-SCNC: 6 MMOL/L (ref 3–18)
AST SERPL-CCNC: 41 U/L (ref 10–38)
B PERT DNA SPEC QL NAA+PROBE: NOT DETECTED
BASOPHILS # BLD: 0 K/UL (ref 0–0.1)
BASOPHILS NFR BLD: 0 % (ref 0–2)
BILIRUB SERPL-MCNC: 0.6 MG/DL (ref 0.2–1)
BORDETELLA PARAPERTUSSIS BY PCR: NOT DETECTED
BUN SERPL-MCNC: 32 MG/DL (ref 7–18)
BUN SERPL-MCNC: 32 MG/DL (ref 7–18)
BUN/CREAT SERPL: 31 (ref 12–20)
BUN/CREAT SERPL: 31 (ref 12–20)
C PNEUM DNA SPEC QL NAA+PROBE: NOT DETECTED
CALCIUM SERPL-MCNC: 8.7 MG/DL (ref 8.5–10.1)
CALCIUM SERPL-MCNC: 8.7 MG/DL (ref 8.5–10.1)
CHLORIDE SERPL-SCNC: 107 MMOL/L (ref 100–111)
CHLORIDE SERPL-SCNC: 109 MMOL/L (ref 100–111)
CO2 SERPL-SCNC: 24 MMOL/L (ref 21–32)
CO2 SERPL-SCNC: 25 MMOL/L (ref 21–32)
CREAT SERPL-MCNC: 1.02 MG/DL (ref 0.6–1.3)
CREAT SERPL-MCNC: 1.02 MG/DL (ref 0.6–1.3)
DIFFERENTIAL METHOD BLD: ABNORMAL
EOSINOPHIL # BLD: 0.1 K/UL (ref 0–0.4)
EOSINOPHIL NFR BLD: 1 % (ref 0–5)
ERYTHROCYTE [DISTWIDTH] IN BLOOD BY AUTOMATED COUNT: 14 % (ref 11.6–14.5)
FLUAV H3 RNA SPEC QL NAA+PROBE: DETECTED
FLUBV RNA SPEC QL NAA+PROBE: NOT DETECTED
GLOBULIN SER CALC-MCNC: 4.4 G/DL (ref 2–4)
GLUCOSE SERPL-MCNC: 79 MG/DL (ref 74–99)
GLUCOSE SERPL-MCNC: 82 MG/DL (ref 74–99)
HADV DNA SPEC QL NAA+PROBE: NOT DETECTED
HCOV 229E RNA SPEC QL NAA+PROBE: NOT DETECTED
HCOV HKU1 RNA SPEC QL NAA+PROBE: NOT DETECTED
HCOV NL63 RNA SPEC QL NAA+PROBE: NOT DETECTED
HCOV OC43 RNA SPEC QL NAA+PROBE: NOT DETECTED
HCT VFR BLD AUTO: 26.4 % (ref 35–45)
HGB BLD-MCNC: 8.1 G/DL (ref 12–16)
HMPV RNA SPEC QL NAA+PROBE: NOT DETECTED
HPIV1 RNA SPEC QL NAA+PROBE: NOT DETECTED
HPIV2 RNA SPEC QL NAA+PROBE: NOT DETECTED
HPIV3 RNA SPEC QL NAA+PROBE: NOT DETECTED
HPIV4 RNA SPEC QL NAA+PROBE: NOT DETECTED
IMM GRANULOCYTES # BLD AUTO: 0 K/UL (ref 0–0.04)
IMM GRANULOCYTES NFR BLD AUTO: 1 % (ref 0–0.5)
LYMPHOCYTES # BLD: 1.4 K/UL (ref 0.9–3.6)
LYMPHOCYTES NFR BLD: 23 % (ref 21–52)
M PNEUMO DNA SPEC QL NAA+PROBE: NOT DETECTED
MCH RBC QN AUTO: 26.6 PG (ref 24–34)
MCHC RBC AUTO-ENTMCNC: 30.7 G/DL (ref 31–37)
MCV RBC AUTO: 86.8 FL (ref 78–100)
MONOCYTES # BLD: 0.7 K/UL (ref 0.05–1.2)
MONOCYTES NFR BLD: 12 % (ref 3–10)
NEUTS SEG # BLD: 4 K/UL (ref 1.8–8)
NEUTS SEG NFR BLD: 64 % (ref 40–73)
NRBC # BLD: 0 K/UL (ref 0–0.01)
NRBC BLD-RTO: 0 PER 100 WBC
PLATELET # BLD AUTO: 187 K/UL (ref 135–420)
PMV BLD AUTO: 11.3 FL (ref 9.2–11.8)
POTASSIUM SERPL-SCNC: 4.2 MMOL/L (ref 3.5–5.5)
POTASSIUM SERPL-SCNC: 5.6 MMOL/L (ref 3.5–5.5)
PROCALCITONIN SERPL-MCNC: 0.06 NG/ML
PROT SERPL-MCNC: 7.3 G/DL (ref 6.4–8.2)
RBC # BLD AUTO: 3.04 M/UL (ref 4.2–5.3)
RSV RNA SPEC QL NAA+PROBE: NOT DETECTED
RV+EV RNA SPEC QL NAA+PROBE: NOT DETECTED
SARS-COV-2 RNA RESP QL NAA+PROBE: NOT DETECTED
SODIUM SERPL-SCNC: 137 MMOL/L (ref 136–145)
SODIUM SERPL-SCNC: 138 MMOL/L (ref 136–145)
TROPONIN I SERPL HS-MCNC: 10 NG/L (ref 0–54)
TROPONIN I SERPL HS-MCNC: 10 NG/L (ref 0–54)
WBC # BLD AUTO: 6.3 K/UL (ref 4.6–13.2)

## 2024-03-15 PROCEDURE — 2580000003 HC RX 258: Performed by: EMERGENCY MEDICINE

## 2024-03-15 PROCEDURE — 6360000002 HC RX W HCPCS: Performed by: EMERGENCY MEDICINE

## 2024-03-15 PROCEDURE — 84145 PROCALCITONIN (PCT): CPT

## 2024-03-15 PROCEDURE — 0202U NFCT DS 22 TRGT SARS-COV-2: CPT

## 2024-03-15 PROCEDURE — 99285 EMERGENCY DEPT VISIT HI MDM: CPT

## 2024-03-15 PROCEDURE — 71250 CT THORAX DX C-: CPT

## 2024-03-15 PROCEDURE — 80053 COMPREHEN METABOLIC PANEL: CPT

## 2024-03-15 PROCEDURE — 6370000000 HC RX 637 (ALT 250 FOR IP): Performed by: EMERGENCY MEDICINE

## 2024-03-15 PROCEDURE — 71045 X-RAY EXAM CHEST 1 VIEW: CPT

## 2024-03-15 PROCEDURE — 51701 INSERT BLADDER CATHETER: CPT

## 2024-03-15 PROCEDURE — 87040 BLOOD CULTURE FOR BACTERIA: CPT

## 2024-03-15 PROCEDURE — 93005 ELECTROCARDIOGRAM TRACING: CPT | Performed by: EMERGENCY MEDICINE

## 2024-03-15 PROCEDURE — 84484 ASSAY OF TROPONIN QUANT: CPT

## 2024-03-15 PROCEDURE — 85025 COMPLETE CBC W/AUTO DIFF WBC: CPT

## 2024-03-15 PROCEDURE — 96365 THER/PROPH/DIAG IV INF INIT: CPT

## 2024-03-15 PROCEDURE — 82962 GLUCOSE BLOOD TEST: CPT

## 2024-03-15 RX ORDER — 0.9 % SODIUM CHLORIDE 0.9 %
1000 INTRAVENOUS SOLUTION INTRAVENOUS ONCE
Status: COMPLETED | OUTPATIENT
Start: 2024-03-15 | End: 2024-03-15

## 2024-03-15 RX ORDER — OSELTAMIVIR PHOSPHATE 75 MG/1
75 CAPSULE ORAL
Status: COMPLETED | OUTPATIENT
Start: 2024-03-15 | End: 2024-03-15

## 2024-03-15 RX ORDER — 0.9 % SODIUM CHLORIDE 0.9 %
30 INTRAVENOUS SOLUTION INTRAVENOUS ONCE
Status: DISCONTINUED | OUTPATIENT
Start: 2024-03-15 | End: 2024-03-15

## 2024-03-15 RX ORDER — ACETAMINOPHEN 500 MG
1000 TABLET ORAL
Status: COMPLETED | OUTPATIENT
Start: 2024-03-15 | End: 2024-03-15

## 2024-03-15 RX ADMIN — PIPERACILLIN AND TAZOBACTAM 4500 MG: 4; .5 INJECTION, POWDER, FOR SOLUTION INTRAVENOUS at 22:33

## 2024-03-15 RX ADMIN — SODIUM CHLORIDE 1000 ML: 9 INJECTION, SOLUTION INTRAVENOUS at 20:18

## 2024-03-15 RX ADMIN — ACETAMINOPHEN 1000 MG: 500 TABLET ORAL at 20:15

## 2024-03-15 RX ADMIN — OSELTAMIVIR PHOSPHATE 75 MG: 75 CAPSULE ORAL at 22:32

## 2024-03-15 ASSESSMENT — ENCOUNTER SYMPTOMS
RESPIRATORY NEGATIVE: 1
ALLERGIC/IMMUNOLOGIC NEGATIVE: 1
ABDOMINAL PAIN: 1
EYES NEGATIVE: 1

## 2024-03-15 NOTE — ED PROVIDER NOTES
[facility] EMERGENCY DEPT  EMERGENCY DEPARTMENT ENCOUNTER      Pt Name: Ivonne Taylor  MRN: [id number]  Birthdate 1950  Date of evaluation: 3/15/2024  Provider: KERI Cameron  6:54 PM    CHIEF COMPLAINT       Chief Complaint   Patient presents with    Epistaxis    Fever         HISTORY OF PRESENT ILLNESS    Ivonne Taylor is a 73 y.o. female who presents to the emergency department with fever today.  I spoke with caregiver at [facility].  She was oncoming shift and had not been with the patient all day but there have been nothing reported to her about patient's concerns other than the fact that patient had a fever and had an episode of epistaxis that the outgoing caregiver had taking care of of but had called [phone].  Patient is on Eliquis.  [facility] long-term care was not consulted.  Tylenol was not given because no the ambulance was already on the way.  We were told by EMS that Tylenol was not given because the POA her brother refused any treatment.  POA was contacted and he said that is not the case and he is coming to the emergency department forthwit.  Patient has a history of dementia, CHF, diabetes stroke TIA hypertension and generalized debility.  The only thing that she is complaining of is lower abdominal pain pointing into her suprapubic area; she also does hold her hand to her left chest when asking about pain but does not endorse chest pain directly..  Oncoming provider at facility says patient's [facility] long-term care practitioner is Yadi Perez.  Patient was given Eliquis this morning.  Patient has not any current epistaxis.      HPI    Nursing Notes were reviewed.    REVIEW OF SYSTEMS       Review of Systems   Unable to perform ROS: Dementia   Constitutional:  Positive for fever.   HENT: Negative.     Eyes: Negative.    Respiratory: Negative.     Cardiovascular:  Positive for chest pain.   Gastrointestinal:  Positive for abdominal pain.   Endocrine: Negative.   Occupational Health - Occupational Stress Questionnaire     Feeling of Stress : Only a little   Social Connections: Socially Isolated (8/30/2023)    Social Connection and Isolation Panel [NHANES]     Frequency of Communication with Friends and Family: More than three times a week     Frequency of Social Gatherings with Friends and Family: More than three times a week     Attends Roman Catholic Services: Never     Active Member of Clubs or Organizations: No     Marital Status: Never    Intimate Partner Violence: Not At Risk (8/30/2023)    Humiliation, Afraid, Rape, and Kick questionnaire     Fear of Current or Ex-Partner: No     Emotionally Abused: No     Physically Abused: No     Sexually Abused: No   Housing Stability: Patient Unable To Answer (1/6/2024)    Housing Stability Vital Sign     Unable to Pay for Housing in the Last Year: Patient unable to answer     Number of Places Lived in the Last Year: 1     Unstable Housing in the Last Year: Patient unable to answer       SCREENINGS         [place] Coma Scale  Eye Opening: Spontaneous  Best Verbal Response: Oriented  Best Motor Response: Obeys commands  [place] Coma Scale Score: 15                     CIWA Assessment  BP: (!) 122/52  Pulse: 82                 PHYSICAL EXAM       ED Triage Vitals   BP Temp Temp Source Pulse Respirations SpO2 Height Weight - Scale   03/15/24 1818 03/15/24 1815 03/15/24 1815 03/15/24 1818 03/15/24 1818 03/15/24 1818 03/15/24 1818 03/15/24 1818   (!) 143/55 (!) 104.5 °F (40.3 °C) Oral 97 13 100 % 1.676 m (5' 6\") 70.8 kg (156 lb)       Physical Exam  Constitutional:       General: She is not in acute distress.     Appearance: Normal appearance. She is normal weight. She is not toxic-appearing.   HENT:      Head: Normocephalic.      Nose: Nose normal.      Mouth/Throat:      Mouth: Mucous membranes are moist.   Eyes:      Extraocular Movements: Extraocular movements intact.   Cardiovascular:      Rate and Rhythm: Normal rate and

## 2024-03-15 NOTE — ED TRIAGE NOTES
Coming from [facility] and re[facility]. Pt having nose bleed and fever of 104.5. Hx of stroke and dementia. At baseline mentation.

## 2024-03-16 VITALS
HEIGHT: 66 IN | HEART RATE: 82 BPM | OXYGEN SATURATION: 100 % | WEIGHT: 156 LBS | BODY MASS INDEX: 25.07 KG/M2 | TEMPERATURE: 100.4 F | DIASTOLIC BLOOD PRESSURE: 52 MMHG | RESPIRATION RATE: 17 BRPM | SYSTOLIC BLOOD PRESSURE: 122 MMHG

## 2024-03-16 LAB
APPEARANCE UR: CLEAR
BACTERIA URNS QL MICRO: ABNORMAL /HPF
BILIRUB UR QL: NEGATIVE
COLOR UR: YELLOW
EKG ATRIAL RATE: 99 BPM
EKG DIAGNOSIS: NORMAL
EKG P AXIS: 49 DEGREES
EKG P-R INTERVAL: 124 MS
EKG Q-T INTERVAL: 312 MS
EKG QRS DURATION: 70 MS
EKG QTC CALCULATION (BAZETT): 400 MS
EKG R AXIS: 3 DEGREES
EKG T AXIS: 67 DEGREES
EKG VENTRICULAR RATE: 99 BPM
EPITH CASTS URNS QL MICRO: ABNORMAL /LPF (ref 0–5)
GLUCOSE BLD STRIP.AUTO-MCNC: 75 MG/DL (ref 70–110)
GLUCOSE UR STRIP.AUTO-MCNC: NEGATIVE MG/DL
HGB UR QL STRIP: ABNORMAL
KETONES UR QL STRIP.AUTO: NEGATIVE MG/DL
LACTATE SERPL-SCNC: 0.7 MMOL/L (ref 0.4–2)
LEUKOCYTE ESTERASE UR QL STRIP.AUTO: ABNORMAL
NITRITE UR QL STRIP.AUTO: NEGATIVE
PH UR STRIP: 5.5 (ref 5–8)
PROT UR STRIP-MCNC: 100 MG/DL
RBC #/AREA URNS HPF: ABNORMAL /HPF (ref 0–5)
SP GR UR REFRACTOMETRY: 1.02 (ref 1–1.03)
UROBILINOGEN UR QL STRIP.AUTO: 1 EU/DL (ref 0.2–1)
WBC URNS QL MICRO: ABNORMAL /HPF (ref 0–4)

## 2024-03-16 PROCEDURE — 2500000003 HC RX 250 WO HCPCS

## 2024-03-16 PROCEDURE — 2500000003 HC RX 250 WO HCPCS: Performed by: EMERGENCY MEDICINE

## 2024-03-16 PROCEDURE — 81001 URINALYSIS AUTO W/SCOPE: CPT

## 2024-03-16 PROCEDURE — 87086 URINE CULTURE/COLONY COUNT: CPT

## 2024-03-16 PROCEDURE — 96372 THER/PROPH/DIAG INJ SC/IM: CPT

## 2024-03-16 PROCEDURE — 93010 ELECTROCARDIOGRAM REPORT: CPT | Performed by: INTERNAL MEDICINE

## 2024-03-16 PROCEDURE — 87088 URINE BACTERIA CULTURE: CPT

## 2024-03-16 PROCEDURE — 6360000002 HC RX W HCPCS: Performed by: EMERGENCY MEDICINE

## 2024-03-16 PROCEDURE — 83605 ASSAY OF LACTIC ACID: CPT

## 2024-03-16 PROCEDURE — 87186 SC STD MICRODIL/AGAR DIL: CPT

## 2024-03-16 RX ORDER — CEFTRIAXONE 1 G/1
INJECTION, POWDER, FOR SOLUTION INTRAMUSCULAR; INTRAVENOUS
Status: DISCONTINUED
Start: 2024-03-16 | End: 2024-03-16 | Stop reason: HOSPADM

## 2024-03-16 RX ORDER — OSELTAMIVIR PHOSPHATE 75 MG/1
75 CAPSULE ORAL 2 TIMES DAILY
Qty: 10 CAPSULE | Refills: 0 | Status: SHIPPED | OUTPATIENT
Start: 2024-03-16 | End: 2024-03-21

## 2024-03-16 RX ORDER — CEFDINIR 300 MG/1
300 CAPSULE ORAL 2 TIMES DAILY
Qty: 10 CAPSULE | Refills: 0 | Status: SHIPPED | OUTPATIENT
Start: 2024-03-16 | End: 2024-03-21

## 2024-03-16 RX ORDER — LIDOCAINE HYDROCHLORIDE 10 MG/ML
INJECTION, SOLUTION EPIDURAL; INFILTRATION; INTRACAUDAL; PERINEURAL
Status: COMPLETED
Start: 2024-03-16 | End: 2024-03-16

## 2024-03-16 RX ADMIN — LIDOCAINE HYDROCHLORIDE: 10 INJECTION, SOLUTION EPIDURAL; INFILTRATION; INTRACAUDAL; PERINEURAL at 01:59

## 2024-03-16 RX ADMIN — LIDOCAINE HYDROCHLORIDE 1000 MG: 10 INJECTION, SOLUTION EPIDURAL; INFILTRATION; INTRACAUDAL; PERINEURAL at 01:59

## 2024-03-16 NOTE — ED NOTES
System Downtime Recovery  The EMR experienced a system downtime.  This downtime occurred on March 16 at 0100 AM for a duration of 2 hour(s).  During this downtime paper charting was completed by me.  The following was documented on paper during the downtime and is now being back-entered: Medications, vitals and assessments.  The following is remaining on paper and will be scanned into Epic: N/A .

## 2024-03-16 NOTE — ED NOTES
Assumed care of patient.    Patient comes in by EMS from [facility]. Patient presents with a nose bleed and an elevated temperature. Patient has HX of stroke and dementia.  Patient on full cardiac monitors.     Techs at bedside trying to establish IV line.    Plan of care maintained, awaiting placement of IV, and labs.

## 2024-03-16 NOTE — DISCHARGE INSTRUCTIONS
You were evaluated for influenza and uti  .  Based on your work-up it was deemed that she was stable for discharge.  Please  your medication of tamiflu which was prescribed to you.  Please follow-up with your primary care physician if you have any further concerns and go over your work-up.  If you experience any chest pain, shortness of breath, worsening abdominal pain, vomiting blood, worsening headache, seizures, or any worsening of your symptoms please return to the emergency department immediately.  If you have any pending results or any further questions please contact the emergency department at [phone].

## 2024-03-17 LAB
BACTERIA SPEC CULT: NORMAL
CC UR VC: NORMAL
SERVICE CMNT-IMP: NORMAL

## 2024-03-19 LAB
BACTERIA SPEC CULT: ABNORMAL
BACTERIA SPEC CULT: ABNORMAL
CC UR VC: ABNORMAL
SERVICE CMNT-IMP: ABNORMAL

## 2024-03-24 RX ORDER — NITROFURANTOIN 25; 75 MG/1; MG/1
100 CAPSULE ORAL 2 TIMES DAILY
Qty: 14 CAPSULE | Refills: 0 | Status: SHIPPED | OUTPATIENT
Start: 2024-03-24 | End: 2024-03-31

## 2024-03-25 ENCOUNTER — HOSPITAL ENCOUNTER (INPATIENT)
Facility: HOSPITAL | Age: 74
LOS: 6 days | Discharge: NURSING, REHAB OR LONG-TERM CARE | DRG: 378 | End: 2024-03-31
Attending: EMERGENCY MEDICINE | Admitting: STUDENT IN AN ORGANIZED HEALTH CARE EDUCATION/TRAINING PROGRAM
Payer: MEDICARE

## 2024-03-25 ENCOUNTER — APPOINTMENT (OUTPATIENT)
Facility: HOSPITAL | Age: 74
DRG: 378 | End: 2024-03-25
Payer: MEDICARE

## 2024-03-25 DIAGNOSIS — D64.9 ANEMIA, UNSPECIFIED TYPE: ICD-10-CM

## 2024-03-25 DIAGNOSIS — Z86.718 HX OF DEEP VENOUS THROMBOSIS: ICD-10-CM

## 2024-03-25 DIAGNOSIS — K92.2 LOWER GI BLEED: Primary | ICD-10-CM

## 2024-03-25 DIAGNOSIS — F03.C0 SEVERE DEMENTIA, UNSPECIFIED DEMENTIA TYPE, UNSPECIFIED WHETHER BEHAVIORAL, PSYCHOTIC, OR MOOD DISTURBANCE OR ANXIETY (HCC): ICD-10-CM

## 2024-03-25 PROBLEM — I26.99 ACUTE PULMONARY EMBOLISM (HCC): Status: RESOLVED | Noted: 2019-01-01 | Resolved: 2024-03-25

## 2024-03-25 LAB
ANION GAP SERPL CALC-SCNC: 4 MMOL/L (ref 3–18)
BASOPHILS # BLD: 0 K/UL (ref 0–0.1)
BASOPHILS NFR BLD: 0 % (ref 0–2)
BUN SERPL-MCNC: 38 MG/DL (ref 7–18)
BUN/CREAT SERPL: 35 (ref 12–20)
CALCIUM SERPL-MCNC: 8.8 MG/DL (ref 8.5–10.1)
CHLORIDE SERPL-SCNC: 122 MMOL/L (ref 100–111)
CO2 SERPL-SCNC: 24 MMOL/L (ref 21–32)
CREAT SERPL-MCNC: 1.1 MG/DL (ref 0.6–1.3)
DIFFERENTIAL METHOD BLD: ABNORMAL
EOSINOPHIL # BLD: 0.1 K/UL (ref 0–0.4)
EOSINOPHIL NFR BLD: 1 % (ref 0–5)
ERYTHROCYTE [DISTWIDTH] IN BLOOD BY AUTOMATED COUNT: 14.9 % (ref 11.6–14.5)
GLUCOSE BLD STRIP.AUTO-MCNC: 73 MG/DL (ref 70–110)
GLUCOSE BLD STRIP.AUTO-MCNC: 91 MG/DL (ref 70–110)
GLUCOSE SERPL-MCNC: 95 MG/DL (ref 74–99)
HCT VFR BLD AUTO: 16.8 % (ref 35–45)
HCT VFR BLD AUTO: 25.1 % (ref 35–45)
HEMOCCULT STL QL: POSITIVE
HGB BLD-MCNC: 5.1 G/DL (ref 12–16)
HGB BLD-MCNC: 8 G/DL (ref 12–16)
HISTORY CHECK: NORMAL
IMM GRANULOCYTES # BLD AUTO: 0.2 K/UL (ref 0–0.04)
IMM GRANULOCYTES NFR BLD AUTO: 2 % (ref 0–0.5)
LYMPHOCYTES # BLD: 3.7 K/UL (ref 0.9–3.6)
LYMPHOCYTES NFR BLD: 40 % (ref 21–52)
MCH RBC QN AUTO: 26.4 PG (ref 24–34)
MCHC RBC AUTO-ENTMCNC: 30.4 G/DL (ref 31–37)
MCV RBC AUTO: 87 FL (ref 78–100)
MONOCYTES # BLD: 0.7 K/UL (ref 0.05–1.2)
MONOCYTES NFR BLD: 8 % (ref 3–10)
NEUTS SEG # BLD: 4.6 K/UL (ref 1.8–8)
NEUTS SEG NFR BLD: 49 % (ref 40–73)
NRBC # BLD: 0.05 K/UL (ref 0–0.01)
NRBC BLD-RTO: 0.5 PER 100 WBC
PLATELET # BLD AUTO: 389 K/UL (ref 135–420)
PMV BLD AUTO: 10.2 FL (ref 9.2–11.8)
POTASSIUM SERPL-SCNC: 4.5 MMOL/L (ref 3.5–5.5)
RBC # BLD AUTO: 1.93 M/UL (ref 4.2–5.3)
SODIUM SERPL-SCNC: 150 MMOL/L (ref 136–145)
WBC # BLD AUTO: 9.3 K/UL (ref 4.6–13.2)

## 2024-03-25 PROCEDURE — 1100000003 HC PRIVATE W/ TELEMETRY

## 2024-03-25 PROCEDURE — 86923 COMPATIBILITY TEST ELECTRIC: CPT

## 2024-03-25 PROCEDURE — 85025 COMPLETE CBC W/AUTO DIFF WBC: CPT

## 2024-03-25 PROCEDURE — 85018 HEMOGLOBIN: CPT

## 2024-03-25 PROCEDURE — 74176 CT ABD & PELVIS W/O CONTRAST: CPT

## 2024-03-25 PROCEDURE — 36415 COLL VENOUS BLD VENIPUNCTURE: CPT

## 2024-03-25 PROCEDURE — 82962 GLUCOSE BLOOD TEST: CPT

## 2024-03-25 PROCEDURE — 99223 1ST HOSP IP/OBS HIGH 75: CPT | Performed by: STUDENT IN AN ORGANIZED HEALTH CARE EDUCATION/TRAINING PROGRAM

## 2024-03-25 PROCEDURE — 94761 N-INVAS EAR/PLS OXIMETRY MLT: CPT

## 2024-03-25 PROCEDURE — 30233N1 TRANSFUSION OF NONAUTOLOGOUS RED BLOOD CELLS INTO PERIPHERAL VEIN, PERCUTANEOUS APPROACH: ICD-10-PCS | Performed by: STUDENT IN AN ORGANIZED HEALTH CARE EDUCATION/TRAINING PROGRAM

## 2024-03-25 PROCEDURE — 86850 RBC ANTIBODY SCREEN: CPT

## 2024-03-25 PROCEDURE — P9016 RBC LEUKOCYTES REDUCED: HCPCS

## 2024-03-25 PROCEDURE — 82272 OCCULT BLD FECES 1-3 TESTS: CPT

## 2024-03-25 PROCEDURE — 6360000004 HC RX CONTRAST MEDICATION: Performed by: STUDENT IN AN ORGANIZED HEALTH CARE EDUCATION/TRAINING PROGRAM

## 2024-03-25 PROCEDURE — 85014 HEMATOCRIT: CPT

## 2024-03-25 PROCEDURE — 80048 BASIC METABOLIC PNL TOTAL CA: CPT

## 2024-03-25 PROCEDURE — 99285 EMERGENCY DEPT VISIT HI MDM: CPT

## 2024-03-25 PROCEDURE — 86901 BLOOD TYPING SEROLOGIC RH(D): CPT

## 2024-03-25 PROCEDURE — 6370000000 HC RX 637 (ALT 250 FOR IP): Performed by: STUDENT IN AN ORGANIZED HEALTH CARE EDUCATION/TRAINING PROGRAM

## 2024-03-25 PROCEDURE — 2580000003 HC RX 258: Performed by: STUDENT IN AN ORGANIZED HEALTH CARE EDUCATION/TRAINING PROGRAM

## 2024-03-25 PROCEDURE — 86900 BLOOD TYPING SEROLOGIC ABO: CPT

## 2024-03-25 RX ORDER — ONDANSETRON 4 MG/1
4 TABLET, ORALLY DISINTEGRATING ORAL EVERY 8 HOURS PRN
Status: DISCONTINUED | OUTPATIENT
Start: 2024-03-25 | End: 2024-03-31 | Stop reason: HOSPADM

## 2024-03-25 RX ORDER — ACETAMINOPHEN 325 MG/1
650 TABLET ORAL EVERY 6 HOURS PRN
Status: DISCONTINUED | OUTPATIENT
Start: 2024-03-25 | End: 2024-03-31 | Stop reason: HOSPADM

## 2024-03-25 RX ORDER — SODIUM CHLORIDE 0.9 % (FLUSH) 0.9 %
5-40 SYRINGE (ML) INJECTION EVERY 12 HOURS SCHEDULED
Status: DISCONTINUED | OUTPATIENT
Start: 2024-03-25 | End: 2024-03-31 | Stop reason: HOSPADM

## 2024-03-25 RX ORDER — INSULIN LISPRO 100 [IU]/ML
0-4 INJECTION, SOLUTION INTRAVENOUS; SUBCUTANEOUS NIGHTLY
Status: DISCONTINUED | OUTPATIENT
Start: 2024-03-25 | End: 2024-03-31 | Stop reason: HOSPADM

## 2024-03-25 RX ORDER — INSULIN LISPRO 100 [IU]/ML
0-4 INJECTION, SOLUTION INTRAVENOUS; SUBCUTANEOUS
Status: DISCONTINUED | OUTPATIENT
Start: 2024-03-25 | End: 2024-03-31 | Stop reason: HOSPADM

## 2024-03-25 RX ORDER — LACOSAMIDE 10 MG/ML
150 INJECTION, SOLUTION INTRAVENOUS 2 TIMES DAILY
Status: DISCONTINUED | OUTPATIENT
Start: 2024-03-25 | End: 2024-03-31 | Stop reason: HOSPADM

## 2024-03-25 RX ORDER — SODIUM CHLORIDE 9 MG/ML
INJECTION, SOLUTION INTRAVENOUS PRN
Status: DISCONTINUED | OUTPATIENT
Start: 2024-03-25 | End: 2024-03-31 | Stop reason: HOSPADM

## 2024-03-25 RX ORDER — ASPIRIN 81 MG/1
81 TABLET ORAL DAILY
Status: DISCONTINUED | OUTPATIENT
Start: 2024-03-25 | End: 2024-03-31 | Stop reason: HOSPADM

## 2024-03-25 RX ORDER — ISOSORBIDE MONONITRATE 30 MG/1
30 TABLET, EXTENDED RELEASE ORAL DAILY
Status: DISCONTINUED | OUTPATIENT
Start: 2024-03-25 | End: 2024-03-31 | Stop reason: HOSPADM

## 2024-03-25 RX ORDER — LOSARTAN POTASSIUM 50 MG/1
50 TABLET ORAL DAILY
Status: DISCONTINUED | OUTPATIENT
Start: 2024-03-25 | End: 2024-03-31 | Stop reason: HOSPADM

## 2024-03-25 RX ORDER — ONDANSETRON 2 MG/ML
4 INJECTION INTRAMUSCULAR; INTRAVENOUS EVERY 6 HOURS PRN
Status: DISCONTINUED | OUTPATIENT
Start: 2024-03-25 | End: 2024-03-31 | Stop reason: HOSPADM

## 2024-03-25 RX ORDER — AMLODIPINE BESYLATE 5 MG/1
5 TABLET ORAL DAILY
Status: DISCONTINUED | OUTPATIENT
Start: 2024-03-25 | End: 2024-03-31 | Stop reason: HOSPADM

## 2024-03-25 RX ORDER — SODIUM CHLORIDE 0.9 % (FLUSH) 0.9 %
5-40 SYRINGE (ML) INJECTION PRN
Status: DISCONTINUED | OUTPATIENT
Start: 2024-03-25 | End: 2024-03-31 | Stop reason: HOSPADM

## 2024-03-25 RX ORDER — ACETAMINOPHEN 650 MG/1
650 SUPPOSITORY RECTAL EVERY 6 HOURS PRN
Status: DISCONTINUED | OUTPATIENT
Start: 2024-03-25 | End: 2024-03-31 | Stop reason: HOSPADM

## 2024-03-25 RX ORDER — ATORVASTATIN CALCIUM 40 MG/1
40 TABLET, FILM COATED ORAL NIGHTLY
Status: DISCONTINUED | OUTPATIENT
Start: 2024-03-25 | End: 2024-03-31 | Stop reason: HOSPADM

## 2024-03-25 RX ORDER — GLUCAGON 1 MG
1 KIT INJECTION PRN
Status: DISCONTINUED | OUTPATIENT
Start: 2024-03-25 | End: 2024-03-31 | Stop reason: HOSPADM

## 2024-03-25 RX ORDER — DEXTROSE MONOHYDRATE 100 MG/ML
INJECTION, SOLUTION INTRAVENOUS CONTINUOUS PRN
Status: DISCONTINUED | OUTPATIENT
Start: 2024-03-25 | End: 2024-03-31 | Stop reason: HOSPADM

## 2024-03-25 RX ORDER — SODIUM CHLORIDE 9 MG/ML
INJECTION, SOLUTION INTRAVENOUS PRN
Status: DISCONTINUED | OUTPATIENT
Start: 2024-03-25 | End: 2024-03-27

## 2024-03-25 RX ADMIN — SODIUM CHLORIDE, PRESERVATIVE FREE 10 ML: 5 INJECTION INTRAVENOUS at 22:09

## 2024-03-25 RX ADMIN — ATORVASTATIN CALCIUM 40 MG: 40 TABLET, FILM COATED ORAL at 22:08

## 2024-03-25 RX ADMIN — IOPAMIDOL 30 ML: 612 INJECTION, SOLUTION INTRAVENOUS at 14:09

## 2024-03-25 ASSESSMENT — PAIN - FUNCTIONAL ASSESSMENT: PAIN_FUNCTIONAL_ASSESSMENT: NONE - DENIES PAIN

## 2024-03-25 NOTE — ED NOTES
Brother at the bedside provider consenting the patient at this time.

## 2024-03-25 NOTE — ED NOTES
..TRANSFER - OUT REPORT:    Verbal report given to MARCELINA Licona on Ivonne Taylor  being transferred to [address] [place] [address] for routine progression of patient care       Report consisted of patient's Situation, Background, Assessment and   Recommendations(SBAR).     Information from the following report(s) ED SBAR was reviewed with the receiving nurse.    [place] Fall Assessment:                           Lines:   Peripheral IV 03/25/24 Left Antecubital (Active)        Opportunity for questions and clarification was provided.      Patient transported with:  Transport Tech and RN

## 2024-03-25 NOTE — ED TRIAGE NOTES
Patient arrived via EMS from [facility] with C/O LOW H&H at 5.3. Labs drawn of the  3/22/2024 Pt has hx uti,CVA, dementia

## 2024-03-25 NOTE — H&P
History & Physical    Patient: Ivonne Taylor MRN: [id number]  [facility]: [id number]    YOB: 1950  Age: 73 y.o.  Sex: female      DOA: 3/25/2024    Chief Complaint:   Chief Complaint   Patient presents with    Labs Only     Low   H&H          HPI:     Ivonne Taylor is a 73 y.o. female with hx of advanced dementia with nonsensical speech, who presented to the emergency room due to follow-up of low hemoglobin on laboratory.  Patient overall without any significant complaints, does not seem to be in acute distress on presentation, overall vital signs stable, no obvious signs of pain or discomfort.    In the emergency room patient was worked up in usual manner including labs and imaging as appropriate.  Labs significant for hemoglobin of 5.1 with appropriate hematocrit and preserved cell lines otherwise, hypernatremia 150, hyperchloremia 122, positive fecal occult, pending CT abdomen pelvis.  GI was consulted and patient was admitted for symptomatic severe anemia requiring blood transfusions suspected GI source.    Past Medical History:   Diagnosis Date    Acute metabolic encephalopathy due to hypoglycemia 09/19/2021    Acute pulmonary embolism ([facility]) 01/01/2019    LEI (acute kidney injury) ([facility]) 06/16/2022    Altered mental status, unspecified 11/03/2022    Aortic stenosis, mild 09/02/2021    Formatting of this note might be different from the original.   Echo 6/1/2020.  Peak velocity 2.48 m/s.  Mean gradient 15.9 mmHg.  Valve area calculation 1.6 cm²    Arthritis     Breast CA ([facility]) 2016    DDD (degenerative disc disease), cervical     Depression     Diabetes ([facility])     DJD (degenerative joint disease) of cervical spine     Encephalopathy acute 12/21/2023    Endocarditis, valve unspecified 11/03/2022    H/O medication noncompliance 12/21/2019    Heart murmur     History of cardiac catheterization 09/02/2021 2015; no angiographic evidence of CAD.       History of noncompliance with medical

## 2024-03-25 NOTE — CONSULTS
the Last Year: Patient unable to answer   Transportation Needs: Patient Unable To Answer (1/6/2024)    PRAPARE - Transportation     Lack of Transportation (Medical): Patient unable to answer     Lack of Transportation (Non-Medical): Patient unable to answer   Physical Activity: Inactive (8/30/2023)    Exercise Vital Sign     Days of Exercise per Week: 0 days     Minutes of Exercise per Session: 0 min   Stress: No Stress Concern Present (8/30/2023)    [place] of Occupational Health - Occupational Stress Questionnaire     Feeling of Stress : Only a little   Social Connections: Socially Isolated (8/30/2023)    Social Connection and Isolation Panel [NHANES]     Frequency of Communication with Friends and Family: More than three times a week     Frequency of Social Gatherings with Friends and Family: More than three times a week     Attends Christian Services: Never     Active Member of Clubs or Organizations: No     Attends Club or Organization Meetings: Not on file     Marital Status: Never    Intimate Partner Violence: Not At Risk (8/30/2023)    Humiliation, Afraid, Rape, and Kick questionnaire     Fear of Current or Ex-Partner: No     Emotionally Abused: No     Physically Abused: No     Sexually Abused: No   Housing Stability: Patient Unable To Answer (1/6/2024)    Housing Stability Vital Sign     Unable to Pay for Housing in the Last Year: Patient unable to answer     Number of Places Lived in the Last Year: 1     Unstable Housing in the Last Year: Patient unable to answer       FHX:   Family History   Problem Relation Age of Onset    Breast Cancer Other     Breast Cancer Other     Breast Cancer Maternal Aunt     Diabetes Mother     Heart Disease Mother     Hypertension Mother     Cancer Father 80        colon       Allergy:   No Known Allergies    Patient Active Problem List   Diagnosis    Morbid obesity (HCC)    LVH (left ventricular hypertrophy)    Pulmonary nodule, right    Type 2 diabetes

## 2024-03-25 NOTE — ED NOTES
Pt given additional warm blankets per request, pt  at bedside, neither pt or pt family states no further needs at this time.

## 2024-03-25 NOTE — ED PROVIDER NOTES
EMERGENCY DEPARTMENT HISTORY AND PHYSICAL EXAM    8:26 AM EDT seen at this time in room 10        Date: 3/25/2024  Patient Name: Ivonne Taylor    History of Presenting Illness     Chief Complaint   Patient presents with   • Labs Only     Low   H&H         History Provided By:  Nursing home report and paramedics    Additional History (Context): Ivonne Taylor is a 73 y.o. female presents with advanced dementia nonsensical speech at baseline not voicing any complaints, was noted to have a hemoglobin of 5.3 on labs on the 22nd.  She does not appear in any acute distress and imminently stable regards her surroundings appropriately.  No pain..    PCP: Shanon Kim APRN - CNP    Chief Complaint:   Duration:    Timing:    Location:   Quality:   Severity:   Modifying Factors:   Associated Symptoms:       Current Facility-Administered Medications   Medication Dose Route Frequency Provider Last Rate Last Admin   • 0.9 % sodium chloride infusion   IntraVENous PRN Demario White MD         Current Outpatient Medications   Medication Sig Dispense Refill   • nitrofurantoin, macrocrystal-monohydrate, (MACROBID) 100 MG capsule Take 1 capsule by mouth 2 times daily for 7 days 14 capsule 0   • lacosamide (VIMPAT) 200 MG tablet Take 1 tablet by mouth in the morning and at bedtime for 60 days. This is an increased dose from your 150 mg twice daily.  Please make sure to review your dosing with your primary neurology provider. Max Daily Amount: 400 mg 60 tablet 1   • losartan (COZAAR) 50 MG tablet 08/02/2016 Losartan Oral  PO 1.0 TABLET(S) BID  08/02/2016  active 90 tablet 1   • letrozole (FEMARA) 2.5 MG tablet TAKE 1 TABLET BY MOUTH ONCE DAILY FOR CHEMOTHERAPY 30 tablet 1   • nystatin (MYCOSTATIN) 310037 UNIT/GM powder Apply 3 times daily. 60 g 3   • ondansetron (ZOFRAN-ODT) 4 MG disintegrating tablet Take 1 tablet by mouth every 8 hours as needed for Nausea or Vomiting Take 4 mg by mouth every 8 hours as needed for Nausea or

## 2024-03-26 LAB
ALBUMIN SERPL-MCNC: 2.6 G/DL (ref 3.4–5)
ALBUMIN/GLOB SERPL: 0.7 (ref 0.8–1.7)
ALP SERPL-CCNC: 55 U/L (ref 45–117)
ALT SERPL-CCNC: 12 U/L (ref 13–56)
ANION GAP SERPL CALC-SCNC: 4 MMOL/L (ref 3–18)
AST SERPL-CCNC: 19 U/L (ref 10–38)
BILIRUB SERPL-MCNC: 0.7 MG/DL (ref 0.2–1)
BUN SERPL-MCNC: 37 MG/DL (ref 7–18)
BUN/CREAT SERPL: 34 (ref 12–20)
CALCIUM SERPL-MCNC: 8.8 MG/DL (ref 8.5–10.1)
CHLORIDE SERPL-SCNC: 124 MMOL/L (ref 100–111)
CO2 SERPL-SCNC: 22 MMOL/L (ref 21–32)
CREAT SERPL-MCNC: 1.1 MG/DL (ref 0.6–1.3)
ERYTHROCYTE [DISTWIDTH] IN BLOOD BY AUTOMATED COUNT: 14.9 % (ref 11.6–14.5)
FERRITIN SERPL-MCNC: 289 NG/ML (ref 8–388)
FOLATE SERPL-MCNC: 8 NG/ML (ref 3.1–17.5)
GLOBULIN SER CALC-MCNC: 4 G/DL (ref 2–4)
GLUCOSE BLD STRIP.AUTO-MCNC: 146 MG/DL (ref 70–110)
GLUCOSE BLD STRIP.AUTO-MCNC: 163 MG/DL (ref 70–110)
GLUCOSE BLD STRIP.AUTO-MCNC: 80 MG/DL (ref 70–110)
GLUCOSE BLD STRIP.AUTO-MCNC: 86 MG/DL (ref 70–110)
GLUCOSE SERPL-MCNC: 89 MG/DL (ref 74–99)
HCT VFR BLD AUTO: 21.5 % (ref 35–45)
HCT VFR BLD AUTO: 23.2 % (ref 35–45)
HCT VFR BLD AUTO: 23.4 % (ref 35–45)
HGB BLD-MCNC: 6.7 G/DL (ref 12–16)
HGB BLD-MCNC: 7.2 G/DL (ref 12–16)
HGB BLD-MCNC: 7.4 G/DL (ref 12–16)
IRON SATN MFR SERPL: 29 % (ref 20–50)
IRON SERPL-MCNC: 44 UG/DL (ref 50–175)
MCH RBC QN AUTO: 27 PG (ref 24–34)
MCHC RBC AUTO-ENTMCNC: 31 G/DL (ref 31–37)
MCV RBC AUTO: 86.9 FL (ref 78–100)
NRBC # BLD: 0.08 K/UL (ref 0–0.01)
NRBC BLD-RTO: 0.9 PER 100 WBC
PHOSPHATE SERPL-MCNC: 3.5 MG/DL (ref 2.5–4.9)
PLATELET # BLD AUTO: 356 K/UL (ref 135–420)
PMV BLD AUTO: 10.1 FL (ref 9.2–11.8)
POTASSIUM SERPL-SCNC: 4.3 MMOL/L (ref 3.5–5.5)
PROT SERPL-MCNC: 6.6 G/DL (ref 6.4–8.2)
RBC # BLD AUTO: 2.67 M/UL (ref 4.2–5.3)
SODIUM SERPL-SCNC: 150 MMOL/L (ref 136–145)
TIBC SERPL-MCNC: 153 UG/DL (ref 250–450)
VIT B12 SERPL-MCNC: 786 PG/ML (ref 211–911)
WBC # BLD AUTO: 8.6 K/UL (ref 4.6–13.2)

## 2024-03-26 PROCEDURE — 94761 N-INVAS EAR/PLS OXIMETRY MLT: CPT

## 2024-03-26 PROCEDURE — 99232 SBSQ HOSP IP/OBS MODERATE 35: CPT | Performed by: HOSPITALIST

## 2024-03-26 PROCEDURE — 80053 COMPREHEN METABOLIC PANEL: CPT

## 2024-03-26 PROCEDURE — 2580000003 HC RX 258: Performed by: HOSPITALIST

## 2024-03-26 PROCEDURE — 82746 ASSAY OF FOLIC ACID SERUM: CPT

## 2024-03-26 PROCEDURE — 6360000002 HC RX W HCPCS: Performed by: STUDENT IN AN ORGANIZED HEALTH CARE EDUCATION/TRAINING PROGRAM

## 2024-03-26 PROCEDURE — C9113 INJ PANTOPRAZOLE SODIUM, VIA: HCPCS | Performed by: STUDENT IN AN ORGANIZED HEALTH CARE EDUCATION/TRAINING PROGRAM

## 2024-03-26 PROCEDURE — 2580000003 HC RX 258: Performed by: STUDENT IN AN ORGANIZED HEALTH CARE EDUCATION/TRAINING PROGRAM

## 2024-03-26 PROCEDURE — 85014 HEMATOCRIT: CPT

## 2024-03-26 PROCEDURE — 85027 COMPLETE CBC AUTOMATED: CPT

## 2024-03-26 PROCEDURE — 82962 GLUCOSE BLOOD TEST: CPT

## 2024-03-26 PROCEDURE — C9254 INJECTION, LACOSAMIDE: HCPCS | Performed by: STUDENT IN AN ORGANIZED HEALTH CARE EDUCATION/TRAINING PROGRAM

## 2024-03-26 PROCEDURE — 36415 COLL VENOUS BLD VENIPUNCTURE: CPT

## 2024-03-26 PROCEDURE — 92610 EVALUATE SWALLOWING FUNCTION: CPT

## 2024-03-26 PROCEDURE — 83550 IRON BINDING TEST: CPT

## 2024-03-26 PROCEDURE — 84100 ASSAY OF PHOSPHORUS: CPT

## 2024-03-26 PROCEDURE — 83540 ASSAY OF IRON: CPT

## 2024-03-26 PROCEDURE — 82728 ASSAY OF FERRITIN: CPT

## 2024-03-26 PROCEDURE — 51798 US URINE CAPACITY MEASURE: CPT

## 2024-03-26 PROCEDURE — 82607 VITAMIN B-12: CPT

## 2024-03-26 PROCEDURE — 85018 HEMOGLOBIN: CPT

## 2024-03-26 PROCEDURE — 6370000000 HC RX 637 (ALT 250 FOR IP): Performed by: STUDENT IN AN ORGANIZED HEALTH CARE EDUCATION/TRAINING PROGRAM

## 2024-03-26 PROCEDURE — 92526 ORAL FUNCTION THERAPY: CPT

## 2024-03-26 PROCEDURE — 1100000003 HC PRIVATE W/ TELEMETRY

## 2024-03-26 PROCEDURE — A4216 STERILE WATER/SALINE, 10 ML: HCPCS | Performed by: STUDENT IN AN ORGANIZED HEALTH CARE EDUCATION/TRAINING PROGRAM

## 2024-03-26 RX ORDER — DEXTROSE AND SODIUM CHLORIDE 5; .45 G/100ML; G/100ML
INJECTION, SOLUTION INTRAVENOUS CONTINUOUS
Status: DISCONTINUED | OUTPATIENT
Start: 2024-03-26 | End: 2024-03-28

## 2024-03-26 RX ADMIN — PANTOPRAZOLE SODIUM 40 MG: 40 INJECTION, POWDER, FOR SOLUTION INTRAVENOUS at 23:52

## 2024-03-26 RX ADMIN — ATORVASTATIN CALCIUM 40 MG: 40 TABLET, FILM COATED ORAL at 23:53

## 2024-03-26 RX ADMIN — LACOSAMIDE 150 MG: 10 INJECTION INTRAVENOUS at 08:50

## 2024-03-26 RX ADMIN — PANTOPRAZOLE SODIUM 40 MG: 40 INJECTION, POWDER, FOR SOLUTION INTRAVENOUS at 13:13

## 2024-03-26 RX ADMIN — DEXTROSE AND SODIUM CHLORIDE: 5; 450 INJECTION, SOLUTION INTRAVENOUS at 15:00

## 2024-03-26 RX ADMIN — DEXTROSE AND SODIUM CHLORIDE 500 ML: 5; 450 INJECTION, SOLUTION INTRAVENOUS at 13:13

## 2024-03-26 RX ADMIN — SODIUM CHLORIDE, PRESERVATIVE FREE 20 ML: 5 INJECTION INTRAVENOUS at 23:54

## 2024-03-26 ASSESSMENT — PAIN SCALES - GENERAL
PAINLEVEL_OUTOF10: 0

## 2024-03-26 NOTE — CONSENT
Informed Consent for Blood Component Transfusion Note    I have discussed with the  the rationale for blood component transfusion; its benefits in treating or preventing fatigue, organ damage, or death; and its risk which includes mild transfusion reactions, rare risk of blood borne infection, or more serious but rare reactions. I have discussed the alternatives to transfusion, including the risk and consequences of not receiving transfusion. The  had an opportunity to ask questions and had agreed to proceed with transfusion of blood components.    Electronically signed by Kendy Wilhelm MD on 3/26/24 at 3:40 PM EDT

## 2024-03-27 LAB
ANION GAP SERPL CALC-SCNC: 4 MMOL/L (ref 3–18)
BASOPHILS # BLD: 0 K/UL (ref 0–0.1)
BASOPHILS NFR BLD: 0 % (ref 0–2)
BUN SERPL-MCNC: 33 MG/DL (ref 7–18)
BUN/CREAT SERPL: 32 (ref 12–20)
CALCIUM SERPL-MCNC: 8.3 MG/DL (ref 8.5–10.1)
CHLORIDE SERPL-SCNC: 118 MMOL/L (ref 100–111)
CO2 SERPL-SCNC: 23 MMOL/L (ref 21–32)
CREAT SERPL-MCNC: 1.04 MG/DL (ref 0.6–1.3)
DIFFERENTIAL METHOD BLD: ABNORMAL
EOSINOPHIL # BLD: 0.2 K/UL (ref 0–0.4)
EOSINOPHIL NFR BLD: 2 % (ref 0–5)
ERYTHROCYTE [DISTWIDTH] IN BLOOD BY AUTOMATED COUNT: 15.5 % (ref 11.6–14.5)
GLUCOSE BLD STRIP.AUTO-MCNC: 142 MG/DL (ref 70–110)
GLUCOSE BLD STRIP.AUTO-MCNC: 145 MG/DL (ref 70–110)
GLUCOSE BLD STRIP.AUTO-MCNC: 162 MG/DL (ref 70–110)
GLUCOSE BLD STRIP.AUTO-MCNC: 207 MG/DL (ref 70–110)
GLUCOSE SERPL-MCNC: 149 MG/DL (ref 74–99)
HCT VFR BLD AUTO: 21.1 % (ref 35–45)
HCT VFR BLD AUTO: 26.8 % (ref 35–45)
HGB BLD-MCNC: 6.6 G/DL (ref 12–16)
HGB BLD-MCNC: 8.4 G/DL (ref 12–16)
HISTORY CHECK: NORMAL
IMM GRANULOCYTES # BLD AUTO: 0.1 K/UL (ref 0–0.04)
IMM GRANULOCYTES NFR BLD AUTO: 1 % (ref 0–0.5)
LYMPHOCYTES # BLD: 3.5 K/UL (ref 0.9–3.6)
LYMPHOCYTES NFR BLD: 37 % (ref 21–52)
MAGNESIUM SERPL-MCNC: 2.2 MG/DL (ref 1.6–2.6)
MCH RBC QN AUTO: 27.7 PG (ref 24–34)
MCHC RBC AUTO-ENTMCNC: 31.3 G/DL (ref 31–37)
MCV RBC AUTO: 88.7 FL (ref 78–100)
MONOCYTES # BLD: 0.8 K/UL (ref 0.05–1.2)
MONOCYTES NFR BLD: 9 % (ref 3–10)
NEUTS SEG # BLD: 4.9 K/UL (ref 1.8–8)
NEUTS SEG NFR BLD: 51 % (ref 40–73)
NRBC # BLD: 0.04 K/UL (ref 0–0.01)
NRBC BLD-RTO: 0.4 PER 100 WBC
PLATELET # BLD AUTO: 320 K/UL (ref 135–420)
PMV BLD AUTO: 10 FL (ref 9.2–11.8)
POTASSIUM SERPL-SCNC: 4 MMOL/L (ref 3.5–5.5)
RBC # BLD AUTO: 2.38 M/UL (ref 4.2–5.3)
SODIUM SERPL-SCNC: 145 MMOL/L (ref 136–145)
WBC # BLD AUTO: 9.5 K/UL (ref 4.6–13.2)

## 2024-03-27 PROCEDURE — 97530 THERAPEUTIC ACTIVITIES: CPT

## 2024-03-27 PROCEDURE — P9016 RBC LEUKOCYTES REDUCED: HCPCS

## 2024-03-27 PROCEDURE — 82962 GLUCOSE BLOOD TEST: CPT

## 2024-03-27 PROCEDURE — 94761 N-INVAS EAR/PLS OXIMETRY MLT: CPT

## 2024-03-27 PROCEDURE — 1100000003 HC PRIVATE W/ TELEMETRY

## 2024-03-27 PROCEDURE — 6360000002 HC RX W HCPCS: Performed by: STUDENT IN AN ORGANIZED HEALTH CARE EDUCATION/TRAINING PROGRAM

## 2024-03-27 PROCEDURE — 6370000000 HC RX 637 (ALT 250 FOR IP): Performed by: STUDENT IN AN ORGANIZED HEALTH CARE EDUCATION/TRAINING PROGRAM

## 2024-03-27 PROCEDURE — C9254 INJECTION, LACOSAMIDE: HCPCS | Performed by: STUDENT IN AN ORGANIZED HEALTH CARE EDUCATION/TRAINING PROGRAM

## 2024-03-27 PROCEDURE — 97165 OT EVAL LOW COMPLEX 30 MIN: CPT

## 2024-03-27 PROCEDURE — 85025 COMPLETE CBC W/AUTO DIFF WBC: CPT

## 2024-03-27 PROCEDURE — C9113 INJ PANTOPRAZOLE SODIUM, VIA: HCPCS | Performed by: STUDENT IN AN ORGANIZED HEALTH CARE EDUCATION/TRAINING PROGRAM

## 2024-03-27 PROCEDURE — 97161 PT EVAL LOW COMPLEX 20 MIN: CPT

## 2024-03-27 PROCEDURE — 36430 TRANSFUSION BLD/BLD COMPNT: CPT

## 2024-03-27 PROCEDURE — 85018 HEMOGLOBIN: CPT

## 2024-03-27 PROCEDURE — 85014 HEMATOCRIT: CPT

## 2024-03-27 PROCEDURE — 83735 ASSAY OF MAGNESIUM: CPT

## 2024-03-27 PROCEDURE — 2580000003 HC RX 258: Performed by: STUDENT IN AN ORGANIZED HEALTH CARE EDUCATION/TRAINING PROGRAM

## 2024-03-27 PROCEDURE — 80048 BASIC METABOLIC PNL TOTAL CA: CPT

## 2024-03-27 PROCEDURE — 36415 COLL VENOUS BLD VENIPUNCTURE: CPT

## 2024-03-27 PROCEDURE — A4216 STERILE WATER/SALINE, 10 ML: HCPCS | Performed by: STUDENT IN AN ORGANIZED HEALTH CARE EDUCATION/TRAINING PROGRAM

## 2024-03-27 RX ORDER — SODIUM CHLORIDE 9 MG/ML
INJECTION, SOLUTION INTRAVENOUS PRN
Status: DISCONTINUED | OUTPATIENT
Start: 2024-03-27 | End: 2024-03-31 | Stop reason: HOSPADM

## 2024-03-27 RX ORDER — DEXTROSE MONOHYDRATE 100 MG/ML
INJECTION, SOLUTION INTRAVENOUS CONTINUOUS PRN
Status: CANCELLED | OUTPATIENT
Start: 2024-03-27

## 2024-03-27 RX ORDER — LIDOCAINE HYDROCHLORIDE 10 MG/ML
1 INJECTION, SOLUTION EPIDURAL; INFILTRATION; INTRACAUDAL; PERINEURAL
Status: CANCELLED | OUTPATIENT
Start: 2024-03-27 | End: 2024-03-28

## 2024-03-27 RX ORDER — INSULIN LISPRO 100 [IU]/ML
0-15 INJECTION, SOLUTION INTRAVENOUS; SUBCUTANEOUS ONCE
Status: CANCELLED | OUTPATIENT
Start: 2024-03-27 | End: 2024-03-27

## 2024-03-27 RX ORDER — SODIUM CHLORIDE, SODIUM LACTATE, POTASSIUM CHLORIDE, CALCIUM CHLORIDE 600; 310; 30; 20 MG/100ML; MG/100ML; MG/100ML; MG/100ML
INJECTION, SOLUTION INTRAVENOUS CONTINUOUS
Status: CANCELLED | OUTPATIENT
Start: 2024-03-27

## 2024-03-27 RX ADMIN — ATORVASTATIN CALCIUM 40 MG: 40 TABLET, FILM COATED ORAL at 22:07

## 2024-03-27 RX ADMIN — PANTOPRAZOLE SODIUM 40 MG: 40 INJECTION, POWDER, FOR SOLUTION INTRAVENOUS at 11:53

## 2024-03-27 RX ADMIN — INSULIN LISPRO 1 UNITS: 100 INJECTION, SOLUTION INTRAVENOUS; SUBCUTANEOUS at 12:12

## 2024-03-27 RX ADMIN — SODIUM CHLORIDE, PRESERVATIVE FREE 10 ML: 5 INJECTION INTRAVENOUS at 09:39

## 2024-03-27 RX ADMIN — SODIUM CHLORIDE, PRESERVATIVE FREE 10 ML: 5 INJECTION INTRAVENOUS at 22:08

## 2024-03-27 RX ADMIN — LACOSAMIDE 150 MG: 10 INJECTION INTRAVENOUS at 22:07

## 2024-03-27 RX ADMIN — PANTOPRAZOLE SODIUM 40 MG: 40 INJECTION, POWDER, FOR SOLUTION INTRAVENOUS at 22:07

## 2024-03-27 RX ADMIN — LACOSAMIDE 150 MG: 10 INJECTION INTRAVENOUS at 09:38

## 2024-03-27 RX ADMIN — LACOSAMIDE 150 MG: 10 INJECTION INTRAVENOUS at 02:21

## 2024-03-27 ASSESSMENT — PAIN SCALES - GENERAL
PAINLEVEL_OUTOF10: 0

## 2024-03-28 ENCOUNTER — APPOINTMENT (OUTPATIENT)
Facility: HOSPITAL | Age: 74
DRG: 378 | End: 2024-03-28
Attending: HOSPITALIST
Payer: MEDICARE

## 2024-03-28 LAB
ABO + RH BLD: NORMAL
ANION GAP SERPL CALC-SCNC: 5 MMOL/L (ref 3–18)
BLD PROD TYP BPU: NORMAL
BLD PROD TYP BPU: NORMAL
BLOOD BANK BLOOD PRODUCT EXPIRATION DATE: NORMAL
BLOOD BANK BLOOD PRODUCT EXPIRATION DATE: NORMAL
BLOOD BANK DISPENSE STATUS: NORMAL
BLOOD BANK DISPENSE STATUS: NORMAL
BLOOD BANK ISBT PRODUCT BLOOD TYPE: 600
BLOOD BANK ISBT PRODUCT BLOOD TYPE: 9500
BLOOD BANK PRODUCT CODE: NORMAL
BLOOD BANK PRODUCT CODE: NORMAL
BLOOD BANK UNIT TYPE AND RH: NORMAL
BLOOD BANK UNIT TYPE AND RH: NORMAL
BLOOD GROUP ANTIBODIES SERPL: NORMAL
BPU ID: NORMAL
BPU ID: NORMAL
BUN SERPL-MCNC: 19 MG/DL (ref 7–18)
BUN/CREAT SERPL: 24 (ref 12–20)
CALCIUM SERPL-MCNC: 7.9 MG/DL (ref 8.5–10.1)
CALLED TO: NORMAL
CALLED TO:: NORMAL
CHLORIDE SERPL-SCNC: 114 MMOL/L (ref 100–111)
CO2 SERPL-SCNC: 21 MMOL/L (ref 21–32)
CREAT SERPL-MCNC: 0.79 MG/DL (ref 0.6–1.3)
CROSSMATCH RESULT: NORMAL
CROSSMATCH RESULT: NORMAL
ECHO BSA: 1.82 M2
ERYTHROCYTE [DISTWIDTH] IN BLOOD BY AUTOMATED COUNT: 15.4 % (ref 11.6–14.5)
GLUCOSE BLD STRIP.AUTO-MCNC: 108 MG/DL (ref 70–110)
GLUCOSE BLD STRIP.AUTO-MCNC: 141 MG/DL (ref 70–110)
GLUCOSE BLD STRIP.AUTO-MCNC: 161 MG/DL (ref 70–110)
GLUCOSE BLD STRIP.AUTO-MCNC: 82 MG/DL (ref 70–110)
GLUCOSE SERPL-MCNC: 142 MG/DL (ref 74–99)
HCT VFR BLD AUTO: 23.1 % (ref 35–45)
HCT VFR BLD AUTO: 26 % (ref 35–45)
HGB BLD-MCNC: 7.3 G/DL (ref 12–16)
HGB BLD-MCNC: 8.3 G/DL (ref 12–16)
MCH RBC QN AUTO: 27.3 PG (ref 24–34)
MCHC RBC AUTO-ENTMCNC: 31.6 G/DL (ref 31–37)
MCV RBC AUTO: 86.5 FL (ref 78–100)
NRBC # BLD: 0 K/UL (ref 0–0.01)
NRBC BLD-RTO: 0 PER 100 WBC
PLATELET # BLD AUTO: 309 K/UL (ref 135–420)
PMV BLD AUTO: 10 FL (ref 9.2–11.8)
POTASSIUM SERPL-SCNC: 4 MMOL/L (ref 3.5–5.5)
RBC # BLD AUTO: 2.67 M/UL (ref 4.2–5.3)
SODIUM SERPL-SCNC: 140 MMOL/L (ref 136–145)
SPECIMEN EXP DATE BLD: NORMAL
UNIT DIVISION: 0
UNIT DIVISION: 0
UNIT ISSUE DATE/TIME: NORMAL
UNIT ISSUE DATE/TIME: NORMAL
WBC # BLD AUTO: 8.6 K/UL (ref 4.6–13.2)

## 2024-03-28 PROCEDURE — 6360000002 HC RX W HCPCS: Performed by: STUDENT IN AN ORGANIZED HEALTH CARE EDUCATION/TRAINING PROGRAM

## 2024-03-28 PROCEDURE — 36415 COLL VENOUS BLD VENIPUNCTURE: CPT

## 2024-03-28 PROCEDURE — 2580000003 HC RX 258: Performed by: HOSPITALIST

## 2024-03-28 PROCEDURE — C9254 INJECTION, LACOSAMIDE: HCPCS | Performed by: STUDENT IN AN ORGANIZED HEALTH CARE EDUCATION/TRAINING PROGRAM

## 2024-03-28 PROCEDURE — 80048 BASIC METABOLIC PNL TOTAL CA: CPT

## 2024-03-28 PROCEDURE — 2580000003 HC RX 258: Performed by: STUDENT IN AN ORGANIZED HEALTH CARE EDUCATION/TRAINING PROGRAM

## 2024-03-28 PROCEDURE — 93970 EXTREMITY STUDY: CPT | Performed by: SURGERY

## 2024-03-28 PROCEDURE — 85027 COMPLETE CBC AUTOMATED: CPT

## 2024-03-28 PROCEDURE — 1100000003 HC PRIVATE W/ TELEMETRY

## 2024-03-28 PROCEDURE — 85018 HEMOGLOBIN: CPT

## 2024-03-28 PROCEDURE — 6370000000 HC RX 637 (ALT 250 FOR IP): Performed by: STUDENT IN AN ORGANIZED HEALTH CARE EDUCATION/TRAINING PROGRAM

## 2024-03-28 PROCEDURE — 85014 HEMATOCRIT: CPT

## 2024-03-28 PROCEDURE — A4216 STERILE WATER/SALINE, 10 ML: HCPCS | Performed by: STUDENT IN AN ORGANIZED HEALTH CARE EDUCATION/TRAINING PROGRAM

## 2024-03-28 PROCEDURE — C9113 INJ PANTOPRAZOLE SODIUM, VIA: HCPCS | Performed by: STUDENT IN AN ORGANIZED HEALTH CARE EDUCATION/TRAINING PROGRAM

## 2024-03-28 PROCEDURE — 93970 EXTREMITY STUDY: CPT

## 2024-03-28 PROCEDURE — 82962 GLUCOSE BLOOD TEST: CPT

## 2024-03-28 RX ORDER — SODIUM CHLORIDE 9 MG/ML
INJECTION, SOLUTION INTRAVENOUS CONTINUOUS
Status: DISCONTINUED | OUTPATIENT
Start: 2024-03-28 | End: 2024-03-31 | Stop reason: HOSPADM

## 2024-03-28 RX ADMIN — PANTOPRAZOLE SODIUM 40 MG: 40 INJECTION, POWDER, FOR SOLUTION INTRAVENOUS at 22:11

## 2024-03-28 RX ADMIN — LACOSAMIDE 150 MG: 10 INJECTION INTRAVENOUS at 10:20

## 2024-03-28 RX ADMIN — SODIUM CHLORIDE: 9 INJECTION, SOLUTION INTRAVENOUS at 21:55

## 2024-03-28 RX ADMIN — DEXTROSE AND SODIUM CHLORIDE: 5; 450 INJECTION, SOLUTION INTRAVENOUS at 08:22

## 2024-03-28 RX ADMIN — LACOSAMIDE 150 MG: 10 INJECTION INTRAVENOUS at 21:00

## 2024-03-28 RX ADMIN — SODIUM CHLORIDE, PRESERVATIVE FREE 10 ML: 5 INJECTION INTRAVENOUS at 10:22

## 2024-03-28 RX ADMIN — SODIUM CHLORIDE, PRESERVATIVE FREE 10 ML: 5 INJECTION INTRAVENOUS at 21:54

## 2024-03-28 RX ADMIN — ATORVASTATIN CALCIUM 40 MG: 40 TABLET, FILM COATED ORAL at 21:53

## 2024-03-28 RX ADMIN — PANTOPRAZOLE SODIUM 40 MG: 40 INJECTION, POWDER, FOR SOLUTION INTRAVENOUS at 10:20

## 2024-03-28 ASSESSMENT — PAIN SCALES - WONG BAKER: WONGBAKER_NUMERICALRESPONSE: NO HURT

## 2024-03-28 ASSESSMENT — PAIN SCALES - GENERAL
PAINLEVEL_OUTOF10: 0
PAINLEVEL_OUTOF10: 0

## 2024-03-28 NOTE — CARE COORDINATION
03/28/24 1756   /Social Work Whiteboard Notes   /Social Work Whiteboard RED 03/28/2024 Dispo [facility] when medicall stable. KAYLYNN Knox BSW   Case Management

## 2024-03-29 ENCOUNTER — ANESTHESIA (OUTPATIENT)
Facility: HOSPITAL | Age: 74
End: 2024-03-29
Payer: MEDICARE

## 2024-03-29 ENCOUNTER — ANESTHESIA EVENT (OUTPATIENT)
Facility: HOSPITAL | Age: 74
End: 2024-03-29
Payer: MEDICARE

## 2024-03-29 LAB
ANION GAP SERPL CALC-SCNC: 4 MMOL/L (ref 3–18)
BASOPHILS # BLD: 0 K/UL (ref 0–0.1)
BASOPHILS NFR BLD: 0 % (ref 0–2)
BUN SERPL-MCNC: 11 MG/DL (ref 7–18)
BUN/CREAT SERPL: 14 (ref 12–20)
CALCIUM SERPL-MCNC: 8 MG/DL (ref 8.5–10.1)
CHLORIDE SERPL-SCNC: 117 MMOL/L (ref 100–111)
CO2 SERPL-SCNC: 21 MMOL/L (ref 21–32)
CREAT SERPL-MCNC: 0.79 MG/DL (ref 0.6–1.3)
DIFFERENTIAL METHOD BLD: ABNORMAL
EOSINOPHIL # BLD: 0.1 K/UL (ref 0–0.4)
EOSINOPHIL NFR BLD: 2 % (ref 0–5)
ERYTHROCYTE [DISTWIDTH] IN BLOOD BY AUTOMATED COUNT: 15.6 % (ref 11.6–14.5)
GLUCOSE BLD STRIP.AUTO-MCNC: 75 MG/DL (ref 70–110)
GLUCOSE BLD STRIP.AUTO-MCNC: 78 MG/DL (ref 70–110)
GLUCOSE BLD STRIP.AUTO-MCNC: 78 MG/DL (ref 70–110)
GLUCOSE BLD STRIP.AUTO-MCNC: 83 MG/DL (ref 70–110)
GLUCOSE BLD STRIP.AUTO-MCNC: 99 MG/DL (ref 70–110)
GLUCOSE SERPL-MCNC: 90 MG/DL (ref 74–99)
HCT VFR BLD AUTO: 27.5 % (ref 35–45)
HGB BLD-MCNC: 8.6 G/DL (ref 12–16)
IMM GRANULOCYTES # BLD AUTO: 0.1 K/UL (ref 0–0.04)
IMM GRANULOCYTES NFR BLD AUTO: 1 % (ref 0–0.5)
LYMPHOCYTES # BLD: 2.6 K/UL (ref 0.9–3.6)
LYMPHOCYTES NFR BLD: 31 % (ref 21–52)
MAGNESIUM SERPL-MCNC: 1.7 MG/DL (ref 1.6–2.6)
MCH RBC QN AUTO: 27.9 PG (ref 24–34)
MCHC RBC AUTO-ENTMCNC: 31.3 G/DL (ref 31–37)
MCV RBC AUTO: 89.3 FL (ref 78–100)
MONOCYTES # BLD: 0.6 K/UL (ref 0.05–1.2)
MONOCYTES NFR BLD: 7 % (ref 3–10)
NEUTS SEG # BLD: 5.2 K/UL (ref 1.8–8)
NEUTS SEG NFR BLD: 60 % (ref 40–73)
NRBC # BLD: 0 K/UL (ref 0–0.01)
NRBC BLD-RTO: 0 PER 100 WBC
PLATELET # BLD AUTO: 333 K/UL (ref 135–420)
PMV BLD AUTO: 10 FL (ref 9.2–11.8)
POTASSIUM SERPL-SCNC: 4.1 MMOL/L (ref 3.5–5.5)
RBC # BLD AUTO: 3.08 M/UL (ref 4.2–5.3)
SODIUM SERPL-SCNC: 142 MMOL/L (ref 136–145)
WBC # BLD AUTO: 8.6 K/UL (ref 4.6–13.2)

## 2024-03-29 PROCEDURE — 82962 GLUCOSE BLOOD TEST: CPT

## 2024-03-29 PROCEDURE — C9113 INJ PANTOPRAZOLE SODIUM, VIA: HCPCS | Performed by: STUDENT IN AN ORGANIZED HEALTH CARE EDUCATION/TRAINING PROGRAM

## 2024-03-29 PROCEDURE — 2709999900 HC NON-CHARGEABLE SUPPLY: Performed by: INTERNAL MEDICINE

## 2024-03-29 PROCEDURE — 0DJ08ZZ INSPECTION OF UPPER INTESTINAL TRACT, VIA NATURAL OR ARTIFICIAL OPENING ENDOSCOPIC: ICD-10-PCS | Performed by: INTERNAL MEDICINE

## 2024-03-29 PROCEDURE — 36415 COLL VENOUS BLD VENIPUNCTURE: CPT

## 2024-03-29 PROCEDURE — 7100000010 HC PHASE II RECOVERY - FIRST 15 MIN: Performed by: INTERNAL MEDICINE

## 2024-03-29 PROCEDURE — 3700000000 HC ANESTHESIA ATTENDED CARE: Performed by: INTERNAL MEDICINE

## 2024-03-29 PROCEDURE — 94761 N-INVAS EAR/PLS OXIMETRY MLT: CPT

## 2024-03-29 PROCEDURE — 80048 BASIC METABOLIC PNL TOTAL CA: CPT

## 2024-03-29 PROCEDURE — 6370000000 HC RX 637 (ALT 250 FOR IP): Performed by: STUDENT IN AN ORGANIZED HEALTH CARE EDUCATION/TRAINING PROGRAM

## 2024-03-29 PROCEDURE — 1100000003 HC PRIVATE W/ TELEMETRY

## 2024-03-29 PROCEDURE — 7100000000 HC PACU RECOVERY - FIRST 15 MIN: Performed by: INTERNAL MEDICINE

## 2024-03-29 PROCEDURE — 2500000003 HC RX 250 WO HCPCS: Performed by: NURSE ANESTHETIST, CERTIFIED REGISTERED

## 2024-03-29 PROCEDURE — 2580000003 HC RX 258: Performed by: HOSPITALIST

## 2024-03-29 PROCEDURE — 85025 COMPLETE CBC W/AUTO DIFF WBC: CPT

## 2024-03-29 PROCEDURE — C9254 INJECTION, LACOSAMIDE: HCPCS | Performed by: STUDENT IN AN ORGANIZED HEALTH CARE EDUCATION/TRAINING PROGRAM

## 2024-03-29 PROCEDURE — A4216 STERILE WATER/SALINE, 10 ML: HCPCS | Performed by: STUDENT IN AN ORGANIZED HEALTH CARE EDUCATION/TRAINING PROGRAM

## 2024-03-29 PROCEDURE — 83735 ASSAY OF MAGNESIUM: CPT

## 2024-03-29 PROCEDURE — 6360000002 HC RX W HCPCS: Performed by: NURSE ANESTHETIST, CERTIFIED REGISTERED

## 2024-03-29 PROCEDURE — 3600007502: Performed by: INTERNAL MEDICINE

## 2024-03-29 PROCEDURE — 3600007512: Performed by: INTERNAL MEDICINE

## 2024-03-29 PROCEDURE — 2580000003 HC RX 258: Performed by: STUDENT IN AN ORGANIZED HEALTH CARE EDUCATION/TRAINING PROGRAM

## 2024-03-29 PROCEDURE — 3700000001 HC ADD 15 MINUTES (ANESTHESIA): Performed by: INTERNAL MEDICINE

## 2024-03-29 PROCEDURE — 6360000002 HC RX W HCPCS: Performed by: STUDENT IN AN ORGANIZED HEALTH CARE EDUCATION/TRAINING PROGRAM

## 2024-03-29 RX ORDER — LIDOCAINE HYDROCHLORIDE 20 MG/ML
INJECTION, SOLUTION EPIDURAL; INFILTRATION; INTRACAUDAL; PERINEURAL PRN
Status: DISCONTINUED | OUTPATIENT
Start: 2024-03-29 | End: 2024-03-29 | Stop reason: SDUPTHER

## 2024-03-29 RX ORDER — PROPOFOL 10 MG/ML
INJECTION, EMULSION INTRAVENOUS PRN
Status: DISCONTINUED | OUTPATIENT
Start: 2024-03-29 | End: 2024-03-29 | Stop reason: SDUPTHER

## 2024-03-29 RX ADMIN — LACOSAMIDE 150 MG: 10 INJECTION INTRAVENOUS at 21:54

## 2024-03-29 RX ADMIN — SODIUM CHLORIDE, PRESERVATIVE FREE 10 ML: 5 INJECTION INTRAVENOUS at 21:55

## 2024-03-29 RX ADMIN — PROPOFOL 20 MG: 10 INJECTION, EMULSION INTRAVENOUS at 16:00

## 2024-03-29 RX ADMIN — LACOSAMIDE 150 MG: 10 INJECTION INTRAVENOUS at 08:54

## 2024-03-29 RX ADMIN — PANTOPRAZOLE SODIUM 40 MG: 40 INJECTION, POWDER, FOR SOLUTION INTRAVENOUS at 11:39

## 2024-03-29 RX ADMIN — SODIUM CHLORIDE: 9 INJECTION, SOLUTION INTRAVENOUS at 13:18

## 2024-03-29 RX ADMIN — LIDOCAINE HYDROCHLORIDE 40 MG: 20 INJECTION, SOLUTION EPIDURAL; INFILTRATION; INTRACAUDAL; PERINEURAL at 15:59

## 2024-03-29 RX ADMIN — ATORVASTATIN CALCIUM 40 MG: 40 TABLET, FILM COATED ORAL at 21:55

## 2024-03-29 RX ADMIN — APIXABAN 5 MG: 5 TABLET, FILM COATED ORAL at 21:55

## 2024-03-29 RX ADMIN — SODIUM CHLORIDE, PRESERVATIVE FREE 10 ML: 5 INJECTION INTRAVENOUS at 08:55

## 2024-03-29 RX ADMIN — SODIUM CHLORIDE, PRESERVATIVE FREE 10 ML: 5 INJECTION INTRAVENOUS at 09:00

## 2024-03-29 RX ADMIN — PROPOFOL 50 MG: 10 INJECTION, EMULSION INTRAVENOUS at 15:59

## 2024-03-29 ASSESSMENT — PAIN SCALES - GENERAL
PAINLEVEL_OUTOF10: 0

## 2024-03-29 ASSESSMENT — PAIN SCALES - WONG BAKER
WONGBAKER_NUMERICALRESPONSE: NO HURT
WONGBAKER_NUMERICALRESPONSE: NO HURT

## 2024-03-29 ASSESSMENT — PAIN - FUNCTIONAL ASSESSMENT: PAIN_FUNCTIONAL_ASSESSMENT: 0-10

## 2024-03-29 NOTE — ANESTHESIA PRE PROCEDURE
Justen CHILD MD       • acetaminophen (TYLENOL) tablet 650 mg  650 mg Oral Q6H PRN Justen Do MD        Or   • acetaminophen (TYLENOL) suppository 650 mg  650 mg Rectal Q6H PRN Justen Do MD       • pantoprazole (PROTONIX) 40 mg in sodium chloride (PF) 0.9 % 10 mL injection  40 mg IntraVENous Q12H Justen Do MD   40 mg at 03/29/24 1139   • lacosamide (VIMPAT) injection 150 mg  150 mg IntraVENous BID Justen Do MD   150 mg at 03/29/24 0854   • glucose chewable tablet 16 g  4 tablet Oral PRN Justen Do MD       • dextrose bolus 10% 125 mL  125 mL IntraVENous PRN Justen Do MD        Or   • dextrose bolus 10% 250 mL  250 mL IntraVENous PRN Justen Do MD       • Glucagon Emergency SOLR 1 mg  1 mg SubCUTAneous PRN Justen Do MD       • dextrose 10 % infusion   IntraVENous Continuous PRN Justen Do MD       • insulin lispro (HUMALOG) injection vial 0-4 Units  0-4 Units SubCUTAneous TID WC Justen Do MD   1 Units at 03/27/24 1212   • insulin lispro (HUMALOG) injection vial 0-4 Units  0-4 Units SubCUTAneous Nightly Justen Do MD           Allergies:  No Known Allergies    Problem List:    Patient Active Problem List   Diagnosis Code   • Morbid obesity ([facility]) E66.01   • LVH (left ventricular hypertrophy) I51.7   • Pulmonary nodule, right R91.1   • Type 2 diabetes mellitus without complications ([facility]) E11.9   • Chest pain R07.9   • Anemia, unspecified D64.9   • Type 2 diabetes mellitus with nephropathy ([facility]) E11.21   • Seizure disorder ([facility]) G40.909   • Aortic stenosis, mild I35.0   • Mild diastolic dysfunction I51.9   • History of recent travel Z78.9   • Type 2 diabetes mellitus with diabetic neuropathy, with long-term current use of insulin ([facility]) E11.40, Z79.4   • History of CVA (cerebrovascular accident) Z86.73   • Essential hypertension I10   • Malignant neoplasm of upper-outer quadrant of right female breast ([facility]) C50.411   • Gastroesophageal reflux disease

## 2024-03-29 NOTE — PERIOP NOTE
TRANSFER - OUT REPORT:    Verbal report given to Paul on Ivonne Taylor  being transferred to  for routine progression of patient care       Report consisted of patient's Situation, Background, Assessment and   Recommendations(SBAR).     Information from the following report(s) Nurse Handoff Report was reviewed with the receiving nurse.           Lines:   Peripheral IV 03/28/24 Right Antecubital (Active)   Line Status Clotted 03/29/24 0800   Dressing Status Clean, dry & intact 03/29/24 0800   Dressing Type Transparent 03/29/24 0800       Peripheral IV 03/28/24 Right;Ventral Cephalic (Active)   Site Assessment Clean, dry & intact 03/29/24 0800   Line Status Blood return noted;Flushed;Infusing 03/29/24 0800   Line Care Cap changed 03/29/24 0800   Phlebitis Assessment No symptoms 03/29/24 0800   Infiltration Assessment 0 03/29/24 0800   Alcohol Cap Used Yes 03/29/24 0800   Dressing Status Clean, dry & intact 03/29/24 0800   Dressing Type Transparent 03/29/24 0800        Opportunity for questions and clarification was provided.      Patient transported with:  Technician     Paul

## 2024-03-29 NOTE — ANESTHESIA POSTPROCEDURE EVALUATION
Department of Anesthesiology  Postprocedure Note    Patient: Ivonne Taylor  MRN: [id number]  YOB: 1950  Date of evaluation: 3/29/2024    Procedure Summary       Date: 03/29/24 Room / Location: [facility] ENDO 02 / [facility]COPY    Anesthesia Start: 1558 Anesthesia Stop: 1622    Procedure: ESOPHAGOGASTRODUODENOSCOPY (Upper GI Region) Diagnosis:       Anemia, unspecified type      Melena      (Anemia, unspecified type [D64.9])      (Melena [K92.1])    Surgeons: Blas Kaufman MD Responsible Provider: Julio C Berger DO    Anesthesia Type: MAC ASA Status: 3            Anesthesia Type: MAC    Terrance Phase I: Terrance Score: 10    Terrance Phase II: Terrance Score: 10    Anesthesia Post Evaluation    Patient location during evaluation: PACU  Patient participation: complete - patient participated  Level of consciousness: awake and alert  Airway patency: patent  Nausea & Vomiting: no nausea and no vomiting  Cardiovascular status: hemodynamically stable  Respiratory status: acceptable  Hydration status: stable  Multimodal analgesia pain management approach    No notable events documented.

## 2024-03-29 NOTE — OP NOTE
[phone]      [address] [address]     Endoscopic Gastroduodenoscopy Procedure Note    Ivonne Taylor  1950  [id number]    Indication:  Anemia-concern for upper GI bleeding    : Blas Kaufman MD    Assistant(s): Circulator: Brigida Mayfield RN; Idalia Becerril RN    Referring Provider:  No primary care provider on file.    Anesthesia/Sedation:  MAC anesthesia      Procedure Details     After infomed consent was obtained for the procedure, with all risks and benefits of procedure explained the patient was taken to the endoscopy suite and placed in the left lateral decubitus position.  Following sequential administration of sedation as per above, the endoscope was inserted into the mouth and advanced under direct vision to fourth portion of the duodenum.  A careful inspection was made as the gastroscope was withdrawn, including a retroflexed view of the proximal stomach; findings and interventions are described below.      Findings:   Esophagus:normal  Stomach: 3 cm hiatal hernia noted in the fundus; otherwise normal stomach-no bleeding seen  Duodenum/jejunum:  Normal to the extent of the scope    Therapies:  none    Specimens: * No specimens in log *    Devices/implants/grafts/tissues/prosthesis: None           Complications:   None; patient tolerated the procedure well.    EBL:  None.           Impression: Small hiatal hernia; no evidence of upper GI bleeding    Recommendations:    1.  Resume diet.  2.  Resume Eliquis.  3.  Watch for rebleeding-based on her current clinical condition, I do not think she will tolerate a colonoscopy.  I would recommend close monitoring for any ongoing GI bleeding      Blas Kaufman MD  3/29/2024  4:21 PM

## 2024-03-30 LAB
ANION GAP SERPL CALC-SCNC: 6 MMOL/L (ref 3–18)
BUN SERPL-MCNC: 8 MG/DL (ref 7–18)
BUN/CREAT SERPL: 12 (ref 12–20)
CALCIUM SERPL-MCNC: 8.2 MG/DL (ref 8.5–10.1)
CHLORIDE SERPL-SCNC: 118 MMOL/L (ref 100–111)
CO2 SERPL-SCNC: 17 MMOL/L (ref 21–32)
CREAT SERPL-MCNC: 0.65 MG/DL (ref 0.6–1.3)
ERYTHROCYTE [DISTWIDTH] IN BLOOD BY AUTOMATED COUNT: 15.2 % (ref 11.6–14.5)
GLUCOSE BLD STRIP.AUTO-MCNC: 110 MG/DL (ref 70–110)
GLUCOSE BLD STRIP.AUTO-MCNC: 80 MG/DL (ref 70–110)
GLUCOSE BLD STRIP.AUTO-MCNC: 98 MG/DL (ref 70–110)
GLUCOSE BLD STRIP.AUTO-MCNC: 99 MG/DL (ref 70–110)
GLUCOSE SERPL-MCNC: 69 MG/DL (ref 74–99)
HCT VFR BLD AUTO: 27.3 % (ref 35–45)
HGB BLD-MCNC: 8.8 G/DL (ref 12–16)
MCH RBC QN AUTO: 27.9 PG (ref 24–34)
MCHC RBC AUTO-ENTMCNC: 32.2 G/DL (ref 31–37)
MCV RBC AUTO: 86.7 FL (ref 78–100)
NRBC # BLD: 0 K/UL (ref 0–0.01)
NRBC BLD-RTO: 0 PER 100 WBC
PLATELET # BLD AUTO: 228 K/UL (ref 135–420)
PMV BLD AUTO: 10.9 FL (ref 9.2–11.8)
POTASSIUM SERPL-SCNC: 4.2 MMOL/L (ref 3.5–5.5)
RBC # BLD AUTO: 3.15 M/UL (ref 4.2–5.3)
SODIUM SERPL-SCNC: 141 MMOL/L (ref 136–145)
WBC # BLD AUTO: 8.1 K/UL (ref 4.6–13.2)

## 2024-03-30 PROCEDURE — 6360000002 HC RX W HCPCS: Performed by: STUDENT IN AN ORGANIZED HEALTH CARE EDUCATION/TRAINING PROGRAM

## 2024-03-30 PROCEDURE — 6370000000 HC RX 637 (ALT 250 FOR IP): Performed by: STUDENT IN AN ORGANIZED HEALTH CARE EDUCATION/TRAINING PROGRAM

## 2024-03-30 PROCEDURE — A4216 STERILE WATER/SALINE, 10 ML: HCPCS | Performed by: STUDENT IN AN ORGANIZED HEALTH CARE EDUCATION/TRAINING PROGRAM

## 2024-03-30 PROCEDURE — 1100000003 HC PRIVATE W/ TELEMETRY

## 2024-03-30 PROCEDURE — 2580000003 HC RX 258: Performed by: STUDENT IN AN ORGANIZED HEALTH CARE EDUCATION/TRAINING PROGRAM

## 2024-03-30 PROCEDURE — C9254 INJECTION, LACOSAMIDE: HCPCS | Performed by: STUDENT IN AN ORGANIZED HEALTH CARE EDUCATION/TRAINING PROGRAM

## 2024-03-30 PROCEDURE — 36415 COLL VENOUS BLD VENIPUNCTURE: CPT

## 2024-03-30 PROCEDURE — 94761 N-INVAS EAR/PLS OXIMETRY MLT: CPT

## 2024-03-30 PROCEDURE — 2580000003 HC RX 258: Performed by: HOSPITALIST

## 2024-03-30 PROCEDURE — 85027 COMPLETE CBC AUTOMATED: CPT

## 2024-03-30 PROCEDURE — C9113 INJ PANTOPRAZOLE SODIUM, VIA: HCPCS | Performed by: STUDENT IN AN ORGANIZED HEALTH CARE EDUCATION/TRAINING PROGRAM

## 2024-03-30 PROCEDURE — 80048 BASIC METABOLIC PNL TOTAL CA: CPT

## 2024-03-30 PROCEDURE — 82962 GLUCOSE BLOOD TEST: CPT

## 2024-03-30 RX ADMIN — APIXABAN 5 MG: 5 TABLET, FILM COATED ORAL at 09:17

## 2024-03-30 RX ADMIN — SODIUM CHLORIDE: 9 INJECTION, SOLUTION INTRAVENOUS at 18:55

## 2024-03-30 RX ADMIN — SODIUM CHLORIDE, PRESERVATIVE FREE 10 ML: 5 INJECTION INTRAVENOUS at 09:17

## 2024-03-30 RX ADMIN — SODIUM CHLORIDE: 9 INJECTION, SOLUTION INTRAVENOUS at 05:40

## 2024-03-30 RX ADMIN — PANTOPRAZOLE SODIUM 40 MG: 40 INJECTION, POWDER, FOR SOLUTION INTRAVENOUS at 12:33

## 2024-03-30 RX ADMIN — PANTOPRAZOLE SODIUM 40 MG: 40 INJECTION, POWDER, FOR SOLUTION INTRAVENOUS at 00:25

## 2024-03-30 RX ADMIN — APIXABAN 5 MG: 5 TABLET, FILM COATED ORAL at 20:25

## 2024-03-30 RX ADMIN — ATORVASTATIN CALCIUM 40 MG: 40 TABLET, FILM COATED ORAL at 20:25

## 2024-03-30 RX ADMIN — SODIUM CHLORIDE, PRESERVATIVE FREE 10 ML: 5 INJECTION INTRAVENOUS at 20:26

## 2024-03-30 RX ADMIN — LACOSAMIDE 150 MG: 10 INJECTION INTRAVENOUS at 20:25

## 2024-03-30 RX ADMIN — LACOSAMIDE 150 MG: 10 INJECTION INTRAVENOUS at 09:17

## 2024-03-30 ASSESSMENT — PAIN SCALES - GENERAL
PAINLEVEL_OUTOF10: 0

## 2024-03-30 NOTE — CARE COORDINATION
JUDY received call from the attending MD, pt is medically stable to return to LTC facility [facility].    JUDY placed call to [facility] rep Jeanette [phone] to inform of pts medical stability. Per Jeanette, pt needs to be opened up in the system in effort for her to review clinicals for potential change in medications. Jeanette stated she will review clinicals today and  will follow up with JOANNE Awad [phone]  tomorrow.    JUDY updated the attending MD, and opened pt up in [facility].     CRYSTAL Alexander  Case Management Department

## 2024-03-31 VITALS
WEIGHT: 156 LBS | SYSTOLIC BLOOD PRESSURE: 120 MMHG | OXYGEN SATURATION: 100 % | DIASTOLIC BLOOD PRESSURE: 78 MMHG | HEIGHT: 66 IN | RESPIRATION RATE: 18 BRPM | BODY MASS INDEX: 25.07 KG/M2 | HEART RATE: 78 BPM | TEMPERATURE: 98.3 F

## 2024-03-31 LAB
ANION GAP SERPL CALC-SCNC: 3 MMOL/L (ref 3–18)
BUN SERPL-MCNC: 10 MG/DL (ref 7–18)
BUN/CREAT SERPL: 13 (ref 12–20)
CALCIUM SERPL-MCNC: 7.8 MG/DL (ref 8.5–10.1)
CHLORIDE SERPL-SCNC: 115 MMOL/L (ref 100–111)
CO2 SERPL-SCNC: 24 MMOL/L (ref 21–32)
CREAT SERPL-MCNC: 0.76 MG/DL (ref 0.6–1.3)
ERYTHROCYTE [DISTWIDTH] IN BLOOD BY AUTOMATED COUNT: 15.3 % (ref 11.6–14.5)
GLUCOSE BLD STRIP.AUTO-MCNC: 119 MG/DL (ref 70–110)
GLUCOSE BLD STRIP.AUTO-MCNC: 126 MG/DL (ref 70–110)
GLUCOSE SERPL-MCNC: 116 MG/DL (ref 74–99)
HCT VFR BLD AUTO: 25 % (ref 35–45)
HGB BLD-MCNC: 8.1 G/DL (ref 12–16)
MCH RBC QN AUTO: 27.9 PG (ref 24–34)
MCHC RBC AUTO-ENTMCNC: 32.4 G/DL (ref 31–37)
MCV RBC AUTO: 86.2 FL (ref 78–100)
NRBC # BLD: 0 K/UL (ref 0–0.01)
NRBC BLD-RTO: 0 PER 100 WBC
PLATELET # BLD AUTO: 294 K/UL (ref 135–420)
PMV BLD AUTO: 10.4 FL (ref 9.2–11.8)
POTASSIUM SERPL-SCNC: 3.9 MMOL/L (ref 3.5–5.5)
RBC # BLD AUTO: 2.9 M/UL (ref 4.2–5.3)
SODIUM SERPL-SCNC: 142 MMOL/L (ref 136–145)
WBC # BLD AUTO: 7.9 K/UL (ref 4.6–13.2)

## 2024-03-31 PROCEDURE — 36415 COLL VENOUS BLD VENIPUNCTURE: CPT

## 2024-03-31 PROCEDURE — 2580000003 HC RX 258: Performed by: STUDENT IN AN ORGANIZED HEALTH CARE EDUCATION/TRAINING PROGRAM

## 2024-03-31 PROCEDURE — A4216 STERILE WATER/SALINE, 10 ML: HCPCS | Performed by: STUDENT IN AN ORGANIZED HEALTH CARE EDUCATION/TRAINING PROGRAM

## 2024-03-31 PROCEDURE — 6360000002 HC RX W HCPCS: Performed by: STUDENT IN AN ORGANIZED HEALTH CARE EDUCATION/TRAINING PROGRAM

## 2024-03-31 PROCEDURE — 80048 BASIC METABOLIC PNL TOTAL CA: CPT

## 2024-03-31 PROCEDURE — 6370000000 HC RX 637 (ALT 250 FOR IP): Performed by: STUDENT IN AN ORGANIZED HEALTH CARE EDUCATION/TRAINING PROGRAM

## 2024-03-31 PROCEDURE — C9113 INJ PANTOPRAZOLE SODIUM, VIA: HCPCS | Performed by: STUDENT IN AN ORGANIZED HEALTH CARE EDUCATION/TRAINING PROGRAM

## 2024-03-31 PROCEDURE — 94761 N-INVAS EAR/PLS OXIMETRY MLT: CPT

## 2024-03-31 PROCEDURE — 82962 GLUCOSE BLOOD TEST: CPT

## 2024-03-31 PROCEDURE — C9254 INJECTION, LACOSAMIDE: HCPCS | Performed by: STUDENT IN AN ORGANIZED HEALTH CARE EDUCATION/TRAINING PROGRAM

## 2024-03-31 PROCEDURE — 2580000003 HC RX 258: Performed by: HOSPITALIST

## 2024-03-31 PROCEDURE — 85027 COMPLETE CBC AUTOMATED: CPT

## 2024-03-31 RX ADMIN — SODIUM CHLORIDE, PRESERVATIVE FREE 10 ML: 5 INJECTION INTRAVENOUS at 09:02

## 2024-03-31 RX ADMIN — PANTOPRAZOLE SODIUM 40 MG: 40 INJECTION, POWDER, FOR SOLUTION INTRAVENOUS at 00:05

## 2024-03-31 RX ADMIN — LACOSAMIDE 150 MG: 10 INJECTION INTRAVENOUS at 09:02

## 2024-03-31 RX ADMIN — SODIUM CHLORIDE: 9 INJECTION, SOLUTION INTRAVENOUS at 07:03

## 2024-03-31 RX ADMIN — APIXABAN 5 MG: 5 TABLET, FILM COATED ORAL at 09:02

## 2024-03-31 NOTE — PLAN OF CARE
Problem: Discharge Planning  Goal: Discharge to home or other facility with appropriate resources  3/27/2024 2304 by Kelvin Burns RN  Outcome: Progressing  3/27/2024 2304 by Kelvin Burns RN  Outcome: Progressing  Flowsheets (Taken 3/27/2024 1945)  Discharge to home or other facility with appropriate resources:   Identify barriers to discharge with patient and caregiver   Arrange for needed discharge resources and transportation as appropriate   Identify discharge learning needs (meds, wound care, etc)     Problem: Pain  Goal: Verbalizes/displays adequate comfort level or baseline comfort level  3/27/2024 2304 by Kelvin Burns RN  Outcome: Progressing  3/27/2024 2304 by Kelvin Burns RN  Outcome: Progressing     Problem: Safety - Adult  Goal: Free from fall injury  3/27/2024 2304 by Kelvin Burns RN  Outcome: Progressing  3/27/2024 2304 by Kelvin Burns RN  Outcome: Progressing  Flowsheets (Taken 3/27/2024 1945)  Free From Fall Injury:   Instruct family/caregiver on patient safety   Based on caregiver fall risk screen, instruct family/caregiver to ask for assistance with transferring infant if caregiver noted to have fall risk factors     Problem: Chronic Conditions and Co-morbidities  Goal: Patient's chronic conditions and co-morbidity symptoms are monitored and maintained or improved  3/27/2024 2304 by Kelvin Burns RN  Outcome: Progressing  3/27/2024 2304 by Kelvin Burns RN  Outcome: Progressing  Flowsheets (Taken 3/27/2024 1945)  Care Plan - Patient's Chronic Conditions and Co-Morbidity Symptoms are Monitored and Maintained or Improved:   Collaborate with multidisciplinary team to address chronic and comorbid conditions and prevent exacerbation or deterioration   Monitor and assess patient's chronic conditions and comorbid symptoms for stability, deterioration, or improvement   Update acute care plan with appropriate goals if chronic or comorbid symptoms are exacerbated and prevent overall 
  Problem: Discharge Planning  Goal: Discharge to home or other facility with appropriate resources  Outcome: Progressing     Problem: Pain  Goal: Verbalizes/displays adequate comfort level or baseline comfort level  3/29/2024 0946 by Fadi Caldwell RN  Outcome: Progressing  3/29/2024 0203 by Katherine Sanchez RN  Outcome: Progressing     Problem: Safety - Adult  Goal: Free from fall injury  3/29/2024 0946 by Fadi Caldwell RN  Outcome: Progressing  3/29/2024 0203 by Katherine Sanchez RN  Outcome: Progressing     Problem: Chronic Conditions and Co-morbidities  Goal: Patient's chronic conditions and co-morbidity symptoms are monitored and maintained or improved  Outcome: Progressing     Problem: Skin/Tissue Integrity  Goal: Absence of new skin breakdown  Description: 1.  Monitor for areas of redness and/or skin breakdown  2.  Assess vascular access sites hourly  3.  Every 4-6 hours minimum:  Change oxygen saturation probe site  4.  Every 4-6 hours:  If on nasal continuous positive airway pressure, respiratory therapy assess nares and determine need for appliance change or resting period.  Outcome: Progressing     Problem: ABCDS Injury Assessment  Goal: Absence of physical injury  Outcome: Progressing     Problem: Nutrition Deficit:  Goal: Optimize nutritional status  Outcome: Progressing     
  Problem: Discharge Planning  Goal: Discharge to home or other facility with appropriate resources  Outcome: Progressing  Flowsheets (Taken 3/27/2024 1945)  Discharge to home or other facility with appropriate resources:   Identify barriers to discharge with patient and caregiver   Arrange for needed discharge resources and transportation as appropriate   Identify discharge learning needs (meds, wound care, etc)     Problem: Pain  Goal: Verbalizes/displays adequate comfort level or baseline comfort level  Outcome: Progressing     Problem: Safety - Adult  Goal: Free from fall injury  Outcome: Progressing  Flowsheets (Taken 3/27/2024 1945)  Free From Fall Injury:   Instruct family/caregiver on patient safety   Based on caregiver fall risk screen, instruct family/caregiver to ask for assistance with transferring infant if caregiver noted to have fall risk factors     Problem: Chronic Conditions and Co-morbidities  Goal: Patient's chronic conditions and co-morbidity symptoms are monitored and maintained or improved  Outcome: Progressing  Flowsheets (Taken 3/27/2024 1945)  Care Plan - Patient's Chronic Conditions and Co-Morbidity Symptoms are Monitored and Maintained or Improved:   Collaborate with multidisciplinary team to address chronic and comorbid conditions and prevent exacerbation or deterioration   Monitor and assess patient's chronic conditions and comorbid symptoms for stability, deterioration, or improvement   Update acute care plan with appropriate goals if chronic or comorbid symptoms are exacerbated and prevent overall improvement and discharge     Problem: Skin/Tissue Integrity  Goal: Absence of new skin breakdown  Description: 1.  Monitor for areas of redness and/or skin breakdown  2.  Assess vascular access sites hourly  3.  Every 4-6 hours minimum:  Change oxygen saturation probe site  4.  Every 4-6 hours:  If on nasal continuous positive airway pressure, respiratory therapy assess nares and determine 
  Problem: Discharge Planning  Goal: Discharge to home or other facility with appropriate resources  Outcome: Progressing  Flowsheets (Taken 3/30/2024 1939)  Discharge to home or other facility with appropriate resources:   Identify barriers to discharge with patient and caregiver   Arrange for needed discharge resources and transportation as appropriate   Identify discharge learning needs (meds, wound care, etc)     Problem: Pain  Goal: Verbalizes/displays adequate comfort level or baseline comfort level  Outcome: Progressing     Problem: Safety - Adult  Goal: Free from fall injury  Outcome: Progressing  Flowsheets (Taken 3/30/2024 1939)  Free From Fall Injury: Instruct family/caregiver on patient safety     Problem: Chronic Conditions and Co-morbidities  Goal: Patient's chronic conditions and co-morbidity symptoms are monitored and maintained or improved  Outcome: Progressing  Flowsheets (Taken 3/30/2024 1939)  Care Plan - Patient's Chronic Conditions and Co-Morbidity Symptoms are Monitored and Maintained or Improved:   Monitor and assess patient's chronic conditions and comorbid symptoms for stability, deterioration, or improvement   Update acute care plan with appropriate goals if chronic or comorbid symptoms are exacerbated and prevent overall improvement and discharge   Collaborate with multidisciplinary team to address chronic and comorbid conditions and prevent exacerbation or deterioration     Problem: Skin/Tissue Integrity  Goal: Absence of new skin breakdown  Description: 1.  Monitor for areas of redness and/or skin breakdown  2.  Assess vascular access sites hourly  3.  Every 4-6 hours minimum:  Change oxygen saturation probe site  4.  Every 4-6 hours:  If on nasal continuous positive airway pressure, respiratory therapy assess nares and determine need for appliance change or resting period.  Outcome: Progressing     Problem: ABCDS Injury Assessment  Goal: Absence of physical injury  Outcome: 
  Problem: Pain  Goal: Verbalizes/displays adequate comfort level or baseline comfort level  Outcome: Progressing     Problem: Safety - Adult  Goal: Free from fall injury  Outcome: Progressing     
Monitor for areas of redness and/or skin breakdown  2.  Assess vascular access sites hourly  3.  Every 4-6 hours minimum:  Change oxygen saturation probe site  4.  Every 4-6 hours:  If on nasal continuous positive airway pressure, respiratory therapy assess nares and determine need for appliance change or resting period.  3/31/2024 1221 by Jm Grimes RN  Outcome: Adequate for Discharge  3/31/2024 0034 by Kelvin Burns RN  Outcome: Progressing     Problem: ABCDS Injury Assessment  Goal: Absence of physical injury  3/31/2024 1221 by Jm Grimes RN  Outcome: Adequate for Discharge  3/31/2024 0034 by Kelvin Burns RN  Outcome: Progressing  Flowsheets (Taken 3/30/2024 1939)  Absence of Physical Injury: Implement safety measures based on patient assessment     Problem: Nutrition Deficit:  Goal: Optimize nutritional status  3/31/2024 1221 by Jm Grimes RN  Outcome: Adequate for Discharge  3/31/2024 0034 by Kelvin Burns RN  Outcome: Progressing     
EQUAL OR GREATER THAN 5 YEARS 11/3/2022     Daily Assessment:  Baseline Assessment  Respiratory Status: Room air  O2 Device: None (Room air)  Communication Observation: Functional, Dysarthria  Follows Directions: Simple  Current Diet : NPO  Current Liquid Diet : NPO  Dentition: Poor dental/oral hygeine, Some missing teeth  Patient Positioning: Upright in bed  Baseline Vocal Quality: Normal  Volitional Cough: Strong    Orientation:  Person    Oral Assessment:  Oral Motor   Labial: No impairment  Dentition: Limited  Oral Hygiene: Moist, Clean  Lingual: Decreased strength  Mandible: No impairment        P.O. Trials:  PO Trials  Assessment Method(s): Observation, Palpation  Vocal Quality: No Impairment  Consistency Presented: Regular, Soft & Bite Sized, Pureed, Thin  How Presented: Self-fed/presented, SLP-fed/Presented, Spoon, Straw, Successive Swallows  Bolus Acceptance: No impairment  Bolus Formation/Control: Impaired  Type of Impairment: Mastication  Propulsion: No impairment  Oral Residue: 10-50% of bolus  Initiation of Swallow: Delayed (# of seconds)  Laryngeal Elevation: Functional  Aspiration Signs/Symptoms: None  Pharyngeal Phase Characteristics: No impairment, issues, or problems          DYSPHAGIA DIAGNOSIS: Dysphagia Diagnosis: Mild oral stage dysphagia    PAIN:  Start of Eval: 0  End of Eval: 0     After treatment:   []            Patient left in no apparent distress sitting up in chair  [x]            Patient left in no apparent distress in bed  [x]            Call bell left within reach  [x]            Nursing notified  []            Family present  []            Caregiver present  []            Bed alarm activated    COMMUNICATION/EDUCATION:   [x]            Aspiration precautions; swallow safety; compensatory techniques provided via demonstration, verbalization and teach back of comprehension  []         Patient/family have participated as able in goal setting and plan of care.  []

## 2024-03-31 NOTE — CARE COORDINATION
Transition of Care Plan to SNF/Rehab    SNF/Rehab Transition:  Patient has been accepted to [facility] and meets criteria for admission.   Patient will transported by Hospital to [facility]. and expected to leave at 1230.    Communication to Patient/Family:   call brother Demario Quintero at [phone]  (identified care giver) and they are agreeable to the transition plan.    Communication to SNF/Rehab:  Bedside RN, Jm, has been notified to update the transition plan to the facility and call report (phone number  [phone])    Discharge information has been updated on the AVS.       Nursing Please include all hard scripts for controlled substances, med rec and dc summary, and AVS in packet.     Reviewed and confirmed with facility can manage the patient care needs for the following:     Nitesh with (X) only those applicable:    Medication:  [x]  Medications will be available at the facility  []  IV Antibiotics   []  Controlled Substance - hard copy to be sent with patient   []  Weekly Labs   Documents:  [] Hard RX  [] MAR  [] Kardex  [] AVS  []Transfer Summary  []Discharge   Equipment:  []  CPAP/BiPAP  []  Wound Vacuum  []  Gibson or Urinary Device  []  PICC/Central Line  []  Nebulizer  []  Ventilator   Treatment:  []Isolation (for MRSA, VRE, etc.)  []Surgical Drain Management  []Tracheostomy Care  []Dressing Changes  []Dialysis with transportation and chair time.  []PEG Care  []Oxygen  []Daily Weights for Heart Failure   Dietary:  []Any diet limitations  []Tube Feedings   []Total Parenteral Management (TPN)   Eligible for Medicaid Long Term Services and Supports  Yes:  [] Eligible for medical assistance or will become eligible within 180 days and UAI completed.   [] Provider/Patient and/or support system has requested screening.  [] UAI copy provided to patient or responsible party.  [] UAI unavailable at discharge will send once processed to SNF provider.  [] UAI unavailable at discharged

## 2024-03-31 NOTE — DISCHARGE SUMMARY
Discharge Summary    Patient: Ivonne Taylor MRN: [id number]  CSN: [id number]    YOB: 1950  Age: 73 y.o.  Sex: female    DOA: 3/25/2024 LOS:  LOS: 6 days   Discharge Date: 3/31/2024     Admission Diagnoses: Lower GI bleed [K92.2]  Anemia, unspecified type [D64.9]  Severe dementia, unspecified dementia type, unspecified whether behavioral, psychotic, or mood disturbance or anxiety ([facility]) [F03.C0]    Discharge Diagnoses:    Acute blood loss anemia  Possible upper GI bleed  Hyponatremia  History of pulmonary embolism on Eliquis  Advanced dementia  CVA with bedbound status  Seizure disorder  Diabetes mellitus type 2  Hypertension  Chronic diastolic heart failure    Discharge Condition: Stable    PHYSICAL EXAM  Visit Vitals  /64   Pulse 77   Temp 98.5 °F (36.9 °C) (Oral)   Resp 18   Ht 1.676 m (5' 6\")   Wt 70.8 kg (156 lb)   SpO2 100%   BMI 25.18 kg/m²       General: In NAD.  Nontoxic-appearing.  HEENT: NCAT.  Sclerae anicteric.  Lungs:  Clear, no wheezes.  No accessory muscle use.  Heart:  RRR.  Abdomen: Soft, NT/ND.  Extremities: Warm, no edema or ischemia.  Psych:   Calm, mood normal.  Neurologic:  Awake and alert, moves extremities spontaneously.  Motor grossly nonfocal.    Hospital Course:   See admission H&P for full details of HPI.  Patient was admitted to the hospital from a local long-term care facility for evaluation of mental status change and \"nonsensical speech\" per HPI.  On arrival to the ED most significant finding was hemoglobin of 5.1.  Patient was transfused PRBCs with good response.  GI evaluation was obtained.  She underwent EGD on 3/29/2024 with no significant findings other than a hiatal hernia of approximately 3 cm noted in the fundus of the stomach.  Esophagus and duodenum were normal to the extent of the scope.  Patient was not felt to be candidate for colonoscopy and the remainder of management has been expected.  Patient has not had any evidence of any ongoing bleeding and

## 2024-03-31 NOTE — CARE COORDINATION
03/31/24 1152   /Social Work Whiteboard Notes   /Social Work Whiteboard GREEN 03/31/2024 Patient discharged and dispo return back to [facility]. Jeanette Calderon notified @ [phone] and [facility] is agreeable with patient return. KAYLYNN     Per Karo Vaughan, @[phone] Transportation set with [facility] to [facility].  Set for 12:30 pm today 03/31/2024.     Shadi with Medicaid transportation @ [phone].  Ticket Number [id number]    Demario Pulido @ [phone], Jeanette Hogan @ [phone] and MARCELINA Oneal notified of patient transportation time.    Discharge order noted for today. Orders received. No other needs identified at this time. Case management remains available as needed.        Delmi Knox BSW   Case Management

## 2024-03-31 NOTE — PROGRESS NOTES
WWW[employer]  [phone]    Gastroenterology Progress Note    Impression:  1. Acute on chronic anemia w/ tarry stool - 3/25 Hgb 5.1, baseline is in 8-10 range. 3/15 CT abd pelvis w/o contrast neg for acute findings. 3/25 CTA neg for acute bleed.   2. H/O of pulmonary embolism on Eliquis as OP  3. Advanced dementia - w/ minimal speech  4. CVA  5. Seizure disorder  6. CHF  7. DM  8. HTN    Plan:  1. Tentatively plan on EGD Tomorrow with Dr. Kaufman. Continue to hold anticoagulation. Procedure posted to board. NPO after midnight. Brother Demario franco, will be available over phone to provide consent tomorrow.  2. Monitor H/H, transfuse if Hgb <7.  3. IV PPI BID  4. Continue medical management per primary team.     Chief Complaint:  acute anemia, melena        Subjective:  Patient feels well this morning, tolerating diet but does not like hospital food. Had a small brown BM this morning.     ROS: Denies any fevers, chills, nausea, vomiting, abd pain, or hematochezia.       General: well nourished, no acute distress  Eyes: conjunctiva normal, EOM normal  Pulmonary: breath sounds normal and effort normal  Abdominal: non-distended, soft, non-tender, non-acute  Patient Active Problem List   Diagnosis    Morbid obesity (HCC)    LVH (left ventricular hypertrophy)    Pulmonary nodule, right    Type 2 diabetes mellitus without complications (HCC)    Chest pain    Anemia, unspecified    Type 2 diabetes mellitus with nephropathy (HCC)    Seizure disorder (HCC)    Aortic stenosis, mild    Mild diastolic dysfunction    History of recent travel    Type 2 diabetes mellitus with diabetic neuropathy, with long-term current use of insulin (HCC)    History of CVA (cerebrovascular accident)    Essential hypertension    Malignant neoplasm of upper-outer quadrant of right female breast (HCC)    Gastroesophageal reflux disease without esophagitis    Hypercholesteremia    Chronic headaches    History of breast cancer    Hearing 
      [email or web address]  [phone]    Gastroenterology Progress Note    Impression:  1. Acute on chronic anemia w/ tarry stool - 3/25 Hgb 5.1, baseline is in 8-10 range. 3/15 CT abd pelvis w/o contrast neg for acute findings. 3/25 CTA neg for acute bleed.   2. H/O of pulmonary embolism on Eliquis as OP  3. Advanced dementia w/ minimal speech  4. CVA  5. Seizure disorder  6. CHF  7. DM  8. HTN    Plan:  1. Tentatively plan on EGD Thursday due to OR schedule limitations and given stable H/H. Continue to hold anticoagulation. Procedure posted to board. NPO after midnight. Brother Demario updated, will be available to provide consent tomorrow.  2. Monitor H/H, transfuse if Hgb <7.  3. IV PPI BID  4. Continue medical management per primary team.    Chief Complaint:  acute anemia, melena        Subjective:  doing well this morning, able to respond slightly more compared to yesterday. No documented BM overnight. H/H is stable.     ROS: Denies any fevers, chills, nausea, vomiting, abdominal pain.       General: well nourished, no acute distress  Eyes: conjunctiva normal, EOM normal  Pulmonary: breath sounds normal and effort normal  Abdominal: non-distended, soft, non-tender, non-acute  Patient Active Problem List   Diagnosis    Morbid obesity (HCC)    LVH (left ventricular hypertrophy)    Pulmonary nodule, right    Type 2 diabetes mellitus without complications (HCC)    Chest pain    Anemia, unspecified    Type 2 diabetes mellitus with nephropathy (HCC)    Seizure disorder (HCC)    Aortic stenosis, mild    Mild diastolic dysfunction    History of recent travel    Type 2 diabetes mellitus with diabetic neuropathy, with long-term current use of insulin (HCC)    History of CVA (cerebrovascular accident)    Essential hypertension    Malignant neoplasm of upper-outer quadrant of right female breast (HCC)    Gastroesophageal reflux disease without esophagitis    Hypercholesteremia    Chronic headaches    History of breast cancer 
  *ATTENTION:  This note has been created by a medical student for educational purposes only.  Please do not refer to the content of this note for clinical decision-making, billing, or other purposes.  Please see attending physician’s note to obtain clinical information on this patient.*        [facility] Hospitalist Group  Progress Note    Patient: Ivonne Taylor Age: 73 y.o. : 1950 MR#: [id number] SSN:   Date/Time: 3/27/2024     Subjective:   Patient resting in no acute distress, more verbal than yesterday. Hgb dropped to 6.6 today. Endorses feeling a little dizzy and short of breath. Denies any chest pain, nausea, vomiting, abdominal pain, diarrhea, blood in stool. Patient was not oriented to place or time but oriented to person. 3/25 CTA negative for acute bleed.     Hospital course: Patient admitted on 3/25 for Hgb 5.1. Family reported melena. Suspect upper vs lower GI bleed. History of PE, on Eliquis outpatient, held on admission. Posttransfusion H&H better. 3/25 CTA negative for acute bleed. Hypernatremic on admission, improved with D5 half NS. Hgb today 3/27 6.6.        Assessment/Plan:   Acute blood loss anemia  GI bleed suspect upper vs lower.  Advanced dementia  History of Pulmonary embolism  Chronic diastolic heart failure  Type 2 Diabetes mellitus  Hypertension  Seizure disorder  CVA with bedbound status  Hypernatremia, improved     Plan:  Posttransfusion 1 unit PRBC. Monitor H/H. Transfuse for Hgb below 7.  Continue to hold Eliquis.  Seen by GI, tentative plan for EGD Thursday. NPO after midnight.   Continue IV PPI BID  SSI and monitor blood sugar.  Continue IV Vimpat  Continue to hold antihypertensives due to low BP and concern for blood loss anemia  Seen by PT/OT and SLP, appreciate recommendations.   Further plan based on hospital course.         Case discussed with:  [x]Patient  []Family  []Nursing  []Case Management  DVT Prophylaxis:  []Lovenox  []Hep SQ  
  *ATTENTION:  This note has been created by a medical student for educational purposes only.  Please do not refer to the content of this note for clinical decision-making, billing, or other purposes.  Please see attending physician’s note to obtain clinical information on this patient.*       [facility] Hospitalist Group  Progress Note    Patient: Ivonne Taylor Age: 73 y.o. : 1950 MR#: [id number] SSN:   Date/Time: 3/26/2024     Subjective:   Patient resting in bed, shakes head yes or no, with occasional words. Denies any chest pain, shortness of breath, diarrhea, nausea, vomiting, presyncope, lightheadedness, generalized bleeding or blood in stool. Patient was not oriented to place. Vital signs have been stable, patient received 1 unit PRBC with improvement of Hgb to 7.2 from 5.1 on admission. CTA was unable to be completed due to infiltration. Brother was not available to provide additional history but reportedly with tarry stool.         Assessment/Plan:   Acute on chronic anemia  GI bleed suspect upper vs lower.  Advanced dementia  History of Pulmonary embolism  Congestive heart failure  Type 2 Diabetes mellitus  Hypertension  Seizure disorder    Plan:  Monitor H/H. Transfuse for Hgb below 7.  Continue to hold Eliquis.  Seen by GI who ordered CTA. Tentative plan for EGD Wednesday. NPO after midnight. Brother Demario needed to provide consent.   Re-order CTA  IV PPI BID  SSI  Continue IV Vimpat  Continue to hold antihypertensives due to low BP and concern for blood loss anemia      Case discussed with:  [x]Patient  []Family  []Nursing  []Case Management  DVT Prophylaxis:  []Lovenox  []Hep SQ  []SCDs  []Coumadin   []Eliquis/Xarelto     Objective:   VS: /71   Pulse 90   Temp 97.9 °F (36.6 °C) (Oral)   Resp 17   Ht 1.676 m (5' 6\")   Wt 70.8 kg (156 lb)   SpO2 100%   BMI 25.18 kg/m²    Tmax/24hrs: Temp (24hrs), Av °F (36.7 °C), Min:97.6 °F (36.4 °C), Max:98.4 
  Physician Progress Note      PATIENT:               MATTHEW RENNER  [facility] #:                  [id number]  :                       1950  ADMIT DATE:       3/25/2024 8:17 AM  DISCH DATE:  RESPONDING  PROVIDER #:        Kendy Wilhelm MD          QUERY TEXT:    Pt admitted with \"severe symptomatic anemia suspected GI source\" per H&P. If   possible, please document in progress notes and discharge summary further   specificity regarding the acuity and type of anemia:    The medical record reflects the following:  Risk Factors: Hx of chronic anemia  Clinical Indicators: H&P:  severe symptomatic anemia suspected GI source  1. Acute on chronic anemia  2. GI bleed suspect lower versus upper  Iron 44  Treatment: Transfusion; GI consult; PPI; hold anticoagulation; H&H every 8   hours    Thank you,  Staci Valles RN/CCDS  [email or web address]  Options provided:  -- Anemia due to acute blood loss  -- Anemia due to chronic blood loss  -- Anemia due to acute on chronic blood loss  -- Anemia due to iron deficiency  -- Anemia due to (please specify), please specify.  -- Other - I will add my own diagnosis  -- Disagree - Not applicable / Not valid  -- Disagree - Clinically unable to determine / Unknown  -- Refer to Clinical Documentation Reviewer    PROVIDER RESPONSE TEXT:    This patient has acute blood loss anemia.    Query created by: Staci Valles on 3/26/2024 1:06 PM      Electronically signed by:  Kendy Wilhelm MD 3/26/2024 3:39 PM          
 conducted a Follow up consultation and Spiritual Assessment for Ivonne Taylor, who is a 73 y.o.,female.      The  provided the following Interventions:  Continued the relationship of care and support.   Listened empathically.  Offered  assurance of continued prayer on behalf of the patient.    Intervention/Outcome:    Patient expressed not too welcoming attitude towards 's visit.    Plan:  Continue support as per need or as a request basis.  All caregivers, family members, call  if patient develops any spiritual or emotional crises.    Chaplain Martita Kaur, [facility]  Spiritual Care  [phone]  
Advance Care Planning   Healthcare Decision Maker:      Click here to complete Healthcare Decision Makers including selection of the Healthcare Decision Maker Relationship (ie \"Primary\").  Today we documented Decision Maker(s) consistent with Legal Next of Kin hierarchy.       Demario Quintero Brother/Sister     Primary Phone: [phone] (M)Home Phone: [phone] Phone: [phone]      Tab Oneill  Other     Primary Phone: [phone] (M)Home Phone: [phone] Phone: [phone]       Cara Taylor  Child     Primary Phone: [phone] (M)Home Phone: [phone] Phone: [phone]         conducted an initial consultation and Spiritual Assessment for Ivonne Taylor, who is a 73 y.o.,female. Patient's Primary Language is: English.   According to the patient's EMR Anabaptist Affiliation is: Jehovah's witness.     The reason the Patient came to the hospital is:   Patient Active Problem List    Diagnosis Date Noted    Lower GI bleed 03/25/2024    Acute encephalopathy 01/10/2024    Encephalopathy 01/06/2024    AMS (altered mental status) 12/21/2023    Cognitive decline 12/21/2023    Left hemiparesis (HCC) 07/14/2023    History of pulmonary embolism 07/14/2023    History of peptic ulcer disease 07/14/2023    Aphasia 11/03/2022    Endocarditis 10/31/2022    Chronic diastolic (congestive) heart failure (HCC) 07/18/2022    Epilepsy, unspecified, intractable, with status epilepticus (HCC) 07/18/2022    Dementia in other diseases classified elsewhere, unspecified severity, without behavioral disturbance, psychotic disturbance, mood disturbance, and anxiety (HCC) 07/18/2022    LVH (left ventricular hypertrophy) 09/02/2021    Aortic stenosis, mild 09/02/2021    Mild diastolic dysfunction     Anemia, unspecified 12/21/2019    History of recent travel 12/21/2019    Chronic headaches 12/21/2019    Morbid obesity (HCC) 04/17/2018    Type 2 diabetes mellitus with nephropathy (HCC) 12/14/2017    
CTA Delay     20g IV placed by ED RN in CT exam room in right upper arm - 30mL Isovue 300 infiltrated - no contrast images available due to infiltrate - exam ended without contrast at this time - pt had blood transfusion running in 20g in left arm, can try to give contrast again after blood transfusion is complete or if new IV for CTA is placed.     CTA for GI Bleed will need to be reordered once pt is able to have exam attempted again. Please call CT at [phone] with any questions, thank you.   
Called report to [facility] spoke with Lisa. A complete head to toe assessment was covered along with the AVS. I went over medications patient received here and when the next dose is due. Lisa made of aware of patient's wound on coccyx. Lisa also was provided with unit phone number in case she has any further questions.   
Comprehensive Nutrition Assessment    Type and Reason for Visit:  Initial, Positive Nutrition Screen    Nutrition Recommendations/Plan:   Modify current diet to add 4 carb choice restriction r/t pt with T2DM and elevated POC glucose. Maintain soft/bite-sized restriction per SLP.   Plan to order Glucerna (each provides 220 kcal, 10g protein) BID  r/t suspected poor intake PTA.   Monitor PO intake/tolerance of meals/supplements, weight, labs, and POC while admitted.      Malnutrition Assessment:  Malnutrition Status:  Insufficient data (03/27/24 1343)    Context:  Acute Illness       Nutrition History and Allergies:   PMHx: advanced dementia, breast cancer, hypercholesterolemia, HTN, seizures, T2DM. Wt hx: c wt- 156 lb (no source), 151.9 lb (1/09/24, +2.7%), 180 lb (12/20/23, -13.3%), 171 lb (9/09/23, -8.8%), 220.2 lb (2/28/22, -31%), significant wt loss x 90 days, per EMR review and current wt without source. NKFA.      Nutrition Assessment:    Pt admitted for lower GI bleed. MST noted: unsure of wt loss, decreased appetite. Seen by SLP 3/26- rec'd soft/bite-sized diet with thin liquids.   No PO data recorded. Attempted to visit pt- was asleep, obtained bed scale measurement of 155.6 lb. No significant wasting noted to face, clavicles, and ribs per limited visual NFPE. Plan to add oral nutrition supplements to increase PO intake, will re-attempt to assess nutrition hx at follow-up.      Nutrition Related Findings:    Last BM: 3/27. Edema: WDL. Labs: Cl 118 H, BUN 33 H, GFR 57 L, Ca 8.3 L. POC glucose  x 24-hrs. No recent hemoglobin A1C available. Pertinent meds: Humalog, protonix. Wound Type: None       Current Nutrition Intake & Therapies:    Average Meal Intake: Unable to assess  Average Supplements Intake: None Ordered  Diet NPO Exceptions are: Ice Chips, Sips of Water with Meds  ADULT DIET; Dysphagia - Soft and Bite Sized    Anthropometric Measures:  Height: 167.6 cm (5' 6\")  Ideal Body Weight (IBW): 130 
Dominik [facility] Hospitalist Group  Progress Note    Patient: Ivonne Taylor Age: 73 y.o. : 1950 MR#: [id number] SSN: [id number]  Date/Time: 3/27/2024     Subjective: Patient lying in the bed, able to tell me her name.  Follows simple commands.  Not able to give detailed history.   at the bedside  Per RN, patient had 1 small melanotic stool this morning.  No bloody stools.     Assessment/Plan:   Acute blood loss anemia  Possible upper GI bleed  Hyponatremia  History of pulmonary embolism on Eliquis  Advanced dementia  CVA with bedbound status  Seizure disorder  Diabetes mellitus type 2  Hypertension  Chronic diastolic heart failure    Plan  H&H trended down, agree with 1 unit of PRBC and monitor H&H  Continue IV PPI  Continue to hold Eliquis  GI input noted, plan for endoscopy tomorrow  Will monitor H&H and transfuse if needed  Continue D5 half NS and monitor sodium levels  SLP eval  PT/OT eval and treatment  SSI and monitor blood sugar  Further plan based on hospital course    Discussed with patient and also  at the bedside explained in detail about my above plan care.      Disposition: Back to facility once stable, anticipate discharge 3/29/2024        Case discussed with:  [x]Patient  [x]Family  [x]Nursing  []Case Management  DVT Prophylaxis:  []Lovenox  []Hep SQ  [x]SCDs  []Coumadin   []Eliquis/Xarelto     Objective:   VS: /69   Pulse 77   Temp 98.3 °F (36.8 °C) (Oral)   Resp 17   Ht 1.676 m (5' 6\")   Wt 70.8 kg (156 lb)   SpO2 100%   BMI 25.18 kg/m²    Tmax/24hrs: Temp (24hrs), Av °F (36.7 °C), Min:97.5 °F (36.4 °C), Max:98.4 °F (36.9 °C)  IOBRIEF  Intake/Output Summary (Last 24 hours) at 3/27/2024 1702  Last data filed at 3/27/2024 1525  Gross per 24 hour   Intake 1733.92 ml   Output 300 ml   Net 1433.92 ml       General:  Alert, cooperative, no acute distress    Pulmonary:  CTA Bilaterally. No Wheezing/Rales.  Cardiovascular: Regular rate and 
Dominik Heller [facility] Hospitalist Group  Progress Note    Patient: Ivonne Taylor Age: 73 y.o. : 1950 MR#: [id number] SSN: [id number]  Date/Time: 3/26/2024     Subjective: Patient lying in the bed, able to tell me her name.  Follows simple commands.  Not able to give detailed history.   at the bedside     Assessment/Plan:   Acute blood loss anemia  Possible upper GI bleed  Hyponatremia  History of pulmonary embolism on Eliquis  Advanced dementia  CVA with bedbound status  Seizure disorder  Diabetes mellitus type 2  Hypertension  Chronic diastolic heart failure    Plan  Posttransfusion, H&H better  Continue IV PPI  Continue to hold Eliquis  GI input noted, plan for endoscopy on Tuesday  Will monitor H&H and transfuse if needed  Will start D5 half NS and monitor sodium levels  SLP eval  PT/OT eval and treatment  SSI and monitor blood sugar  Further plan based on hospital course    Discussed with patient and also  at the bedside explained in detail about my above plan care.  Per  patient mostly bedridden does not move out of the bed, noticed melena at home.        Case discussed with:  [x]Patient  [x]Family  [x]Nursing  []Case Management  DVT Prophylaxis:  []Lovenox  []Hep SQ  [x]SCDs  []Coumadin   []Eliquis/Xarelto     Objective:   VS: /69   Pulse 77   Temp 98.1 °F (36.7 °C) (Oral)   Resp 18   Ht 1.676 m (5' 6\")   Wt 70.8 kg (156 lb)   SpO2 98%   BMI 25.18 kg/m²    Tmax/24hrs: Temp (24hrs), Av.1 °F (36.7 °C), Min:97.6 °F (36.4 °C), Max:98.4 °F (36.9 °C)  IOBRIEFNo intake or output data in the 24 hours ending 24 1541    General:  Alert, cooperative, no acute distress    HEENT: PERRLA, anicteric sclerae.  Pulmonary:  CTA Bilaterally. No Wheezing/Rales.  Cardiovascular: Regular rate and Rhythm.  GI:  Soft, Non distended, Non tender. + Bowel sounds.  Extremities:  No edema. No calf tenderness.   Psych: Not anxious or agitated.  Neurologic: Alert and oriented 
Dominik Heller [facility] Hospitalist Group  Progress Note    Patient: Ivonne Taylor Age: 73 y.o. : 1950 MR#: [id number] SSN: [id number]      Subjective/24-hour events:     No complaints,  at bedside on visit today.    Assessment:   Acute blood loss anemia  Possible upper GI bleed  Hyponatremia  History of pulmonary embolism on Eliquis  Advanced dementia  CVA with bedbound status  Seizure disorder  Diabetes mellitus type 2  Hypertension  Chronic diastolic heart failure    Plan:   EGD completed, results noted.  Essentially normal except for hiatal hernia.  OK to resume eliquis.  No plan for colonoscopy at present.   Will continue to monitor Hgb and for signs of re-bleeding.  Will obtain CTA if evidence for active bleed.  Supportive care o/w.  Can likely be discharged in the next 24 to 48 hours if no bleeding status post resumption of diet.  Plan discussed with patient and  at bedside, questions answered.    Case discussed with:  [x]Patient  [x]Family  [x] Nursing  [x]Case Management  DVT Prophylaxis:  []Lovenox  []Hep SQ  []SCDs  []Coumadin   []On Heparin gtt [x]PO anticoagulant    Objective:   VS: BP (!) 160/86   Pulse 64   Temp 98.1 °F (36.7 °C) (Oral)   Resp 13   Ht 1.676 m (5' 6\")   Wt 70.8 kg (156 lb)   SpO2 100%   BMI 25.18 kg/m²      Tmax/24hrs: Temp (24hrs), Av.7 °F (36.5 °C), Min:97 °F (36.1 °C), Max:98.1 °F (36.7 °C)    Intake/Output Summary (Last 24 hours) at 3/29/2024 1912  Last data filed at 3/29/2024 1745  Gross per 24 hour   Intake 757.5 ml   Output 1351 ml   Net -593.5 ml       Gen:  In NAD.  Lungs: Clear, no wheezes  Effort nonlabored.  CV: RRR.  Abdomen: Soft, NTTP.  Extremities: Warm, no pitting edema or ischemia.  Neuro:  Awake and alert, moves extremities spontaneously.    Current Facility-Administered Medications   Medication Dose Route Frequency    0.9 % sodium chloride infusion   IntraVENous Continuous    0.9 % sodium chloride infusion   IntraVENous 
Dominik Heller [facility] Hospitalist Group  Progress Note    Patient: Ivonne Taylor Age: 73 y.o. : 1950 MR#: [id number] SSN: [id number]      Subjective/24-hour events:     Ate entire lunch.  No new issues overnight.   present at bedside.    Assessment:   Acute blood loss anemia  Possible upper GI bleed  Hyponatremia  History of pulmonary embolism on Eliquis  Advanced dementia  CVA with bedbound status  Seizure disorder  Diabetes mellitus type 2  Hypertension  Chronic diastolic heart failure    Plan:   Continue to monitor hemoglobin.  Stable, no signs or symptoms of rebleeding.  Patient tolerating diet without difficulty.  Continue to monitor.  Medically stable for discharge.  Have made patient and  aware at bedside.  Discussed with care management subsequently and patient should be able to return to facility tomorrow pending admissions coordinator reviewing most recent clinicals.    Case discussed with:  [x]Patient  [x]Family  [x] Nursing  [x]Case Management  DVT Prophylaxis:  []Lovenox  []Hep SQ  []SCDs  []Coumadin   []On Heparin gtt [x]PO anticoagulant    Objective:   VS: BP (!) 156/80   Pulse 80   Temp 98.3 °F (36.8 °C) (Oral)   Resp 16   Ht 1.676 m (5' 6\")   Wt 70.8 kg (156 lb)   SpO2 100%   BMI 25.18 kg/m²      Tmax/24hrs: Temp (24hrs), Av.8 °F (36.6 °C), Min:97.5 °F (36.4 °C), Max:98.3 °F (36.8 °C)    Intake/Output Summary (Last 24 hours) at 3/30/2024 1456  Last data filed at 3/30/2024 1000  Gross per 24 hour   Intake 425 ml   Output 1500 ml   Net -1075 ml       Gen:  In NAD.  Lungs: Clear, no wheezes  Effort nonlabored.  CV: RRR.  Abdomen: Soft, NTTP.  Extremities: Warm, no pitting edema or ischemia.  Neuro:  Awake and alert, moves extremities spontaneously.    Current Facility-Administered Medications   Medication Dose Route Frequency    0.9 % sodium chloride infusion   IntraVENous Continuous    0.9 % sodium chloride infusion   IntraVENous PRN    [Held by provider] 
Dr Coronado called with H/H 6.6/21.1, no orders given.    
OCCUPATIONAL THERAPY EVALUATION/DISCHARGE    Patient: Ivonne Taylor (73 y.o. female)  Date: 3/27/2024  Primary Diagnosis: Lower GI bleed [K92.2]  Anemia, unspecified type [D64.9]  Severe dementia, unspecified dementia type, unspecified whether behavioral, psychotic, or mood disturbance or anxiety ([facility]) [F03.C0]  Procedure(s) (LRB):  ESOPHAGOGASTRODUODENOSCOPY (N/A)     Precautions: Contact Precautions, Fall Risk  PLOF: Pt came from SNF, receives assist for all ADLs at baseline, dependent for functional mobility.    ASSESSMENT AND RECOMMENDATIONS:    Based on the objective data below, patient presents with ability to perform basic ADLs and functional transfers at what appears to be pt's baseline level of function. Pt requires assist for all basic ADLs, and able to self-feed with set-up, dependent for functional txfrs.  Maximum therapeutic gains met at current level of care and patient will be discharged from occupational therapy at this time.    Further Equipment Recommendations for Discharge: NA pt has DME    [facility]: AM-PAC Inpatient Daily Activity Raw Score: 14      At this time and based on an AM-PAC score, no further OT is recommended upon discharge due to (i.e. patient at baseline functional status…etc…).  Recommend patient returns to prior setting with prior services.    This [facility] score should be considered in conjunction with interdisciplinary team recommendations to determine the most appropriate discharge setting. Patient's social support, diagnosis, medical stability, and prior level of function should also be taken into consideration.     SUBJECTIVE:   Patient stated “I am hungry.”    OBJECTIVE DATA SUMMARY:     Past Medical History:   Diagnosis Date    Acute metabolic encephalopathy due to hypoglycemia 09/19/2021    Acute pulmonary embolism (HCC) 01/01/2019    LEI (acute kidney injury) ([facility]) 06/16/2022    Altered mental status, unspecified 11/03/2022    Aortic stenosis, mild 09/02/2021    Formatting of 
Patient in no distress. Alert and awake. Tried to eat BF but did not like it. No pain or fever.  PE: /63   Pulse 80   Temp 98 °F (36.7 °C) (Oral)   Resp 16   Ht 1.676 m (5' 6\")   Wt 70.8 kg (156 lb)   SpO2 100%   BMI 25.18 kg/m²   Abdomen benign exam  Lungs CTA  Heart sounds normal    Labs reviewed.    A/P: EGD neg. Not a candidate for colonoscopy. Regular diet. Supplement iron and b12 as needed. Call GI as needed. Will sign off.    MEKA Glover MD  
Physical Therapy    PHYSICAL THERAPY EVALUATION/DISCHARGE    Patient: Ivonne Taylor (73 y.o. female)  Date: 3/27/2024  Primary Diagnosis: Lower GI bleed [K92.2]  Anemia, unspecified type [D64.9]  Severe dementia, unspecified dementia type, unspecified whether behavioral, psychotic, or mood disturbance or anxiety ([facility]) [F03.C0]  Procedure(s) (LRB):  ESOPHAGOGASTRODUODENOSCOPY (N/A)     Precautions: Contact Precautions, Fall Risk, General Precautions, Bed Alarm   PLOF: pt from facility, max/total for all functional mobility     ASSESSMENT AND RECOMMENDATIONS:  Pt in bed and agreeable. Oriented to person and city. Denies pain. Pt with grossly decreased ROM and strength at bed level. Increased stiffness noted in B ankles, unable to achieve neutral. Pt appears to be at baseline functional mobility level needing max/total A for all functional mobility at bed level. Reports she never gets out of bed. Will sign off. Left with all needs met in bed, bed alarm on for safety.     Patient does not require further skilled physical therapy intervention at this level of care.    Further Equipment Recommendations for Discharge:  hospital bed    AMPAC:    6    At this time and based on an AM-PAC score, no further PT is recommended upon discharge due to (i.e. patient at baseline functional status…etc…).  Recommend patient returns to prior setting with prior services.    This AMPAC score should be considered in conjunction with interdisciplinary team recommendations to determine the most appropriate discharge setting. Patient's social support, diagnosis, medical stability, and prior level of function should also be taken into consideration.     SUBJECTIVE:   Patient stated “0.”    OBJECTIVE DATA SUMMARY:     Past Medical History:   Diagnosis Date    Acute metabolic encephalopathy due to hypoglycemia 09/19/2021    Acute pulmonary embolism (HCC) 01/01/2019    LEI (acute kidney injury) ([facility]) 06/16/2022    Altered mental status, 
Pt agreed to blood draw.  
Pt refused blood draw for H&H.  
SLP Note:    Pt NPO for possible GI intervention.  Will hold dysphagia management and continue to follow per POC.       Caio Ribeiro M.S., CCC-SLP     
Speech Pathology:     Pt NPO for EGD. Will follow up at a later date or as medical condition permits.     Thank you for this referral.    Marry Petersen M.S., CCC-SLP/L  Speech-Language Pathologist    
We had planned for an EGD today but we could not get IV access established.  Will plan for EGD tomorrow as long as we can have IV access.       Blas Kaufman MD  [facility] digestive care  Phone: [phone]  Pager: [phone]  Cell: [phone].  
recognition software.  Minor typographical errors may be present. If questions arise, please do not hesitate to contact me or call our department.

## 2024-06-11 ENCOUNTER — OFFICE VISIT (OUTPATIENT)
Facility: CLINIC | Age: 74
End: 2024-06-11
Payer: MEDICARE

## 2024-06-11 VITALS
BODY MASS INDEX: 25.71 KG/M2 | HEART RATE: 100 BPM | RESPIRATION RATE: 16 BRPM | SYSTOLIC BLOOD PRESSURE: 103 MMHG | TEMPERATURE: 98.2 F | OXYGEN SATURATION: 98 % | HEIGHT: 66 IN | WEIGHT: 160 LBS | DIASTOLIC BLOOD PRESSURE: 65 MMHG

## 2024-06-11 DIAGNOSIS — I35.0 AORTIC STENOSIS, MILD: ICD-10-CM

## 2024-06-11 DIAGNOSIS — I15.2 HYPERTENSION ASSOCIATED WITH DIABETES (HCC): ICD-10-CM

## 2024-06-11 DIAGNOSIS — G40.909 SEIZURE DISORDER (HCC): Chronic | ICD-10-CM

## 2024-06-11 DIAGNOSIS — E11.9 TYPE 2 DIABETES MELLITUS WITHOUT COMPLICATION, WITHOUT LONG-TERM CURRENT USE OF INSULIN (HCC): ICD-10-CM

## 2024-06-11 DIAGNOSIS — I50.32 CHRONIC DIASTOLIC (CONGESTIVE) HEART FAILURE (HCC): Chronic | ICD-10-CM

## 2024-06-11 DIAGNOSIS — Z76.89 ENCOUNTER TO ESTABLISH CARE: ICD-10-CM

## 2024-06-11 DIAGNOSIS — I10 ESSENTIAL HYPERTENSION: ICD-10-CM

## 2024-06-11 DIAGNOSIS — E11.65 TYPE 2 DIABETES MELLITUS WITH HYPERGLYCEMIA, WITHOUT LONG-TERM CURRENT USE OF INSULIN (HCC): Primary | ICD-10-CM

## 2024-06-11 DIAGNOSIS — E11.59 HYPERTENSION ASSOCIATED WITH DIABETES (HCC): ICD-10-CM

## 2024-06-11 PROCEDURE — 99205 OFFICE O/P NEW HI 60 MIN: CPT

## 2024-06-11 PROCEDURE — 3078F DIAST BP <80 MM HG: CPT

## 2024-06-11 PROCEDURE — 3074F SYST BP LT 130 MM HG: CPT

## 2024-06-11 PROCEDURE — 1123F ACP DISCUSS/DSCN MKR DOCD: CPT

## 2024-06-11 SDOH — ECONOMIC STABILITY: INCOME INSECURITY: HOW HARD IS IT FOR YOU TO PAY FOR THE VERY BASICS LIKE FOOD, HOUSING, MEDICAL CARE, AND HEATING?: NOT VERY HARD

## 2024-06-11 SDOH — ECONOMIC STABILITY: FOOD INSECURITY: WITHIN THE PAST 12 MONTHS, YOU WORRIED THAT YOUR FOOD WOULD RUN OUT BEFORE YOU GOT MONEY TO BUY MORE.: NEVER TRUE

## 2024-06-11 SDOH — ECONOMIC STABILITY: FOOD INSECURITY: WITHIN THE PAST 12 MONTHS, THE FOOD YOU BOUGHT JUST DIDN'T LAST AND YOU DIDN'T HAVE MONEY TO GET MORE.: NEVER TRUE

## 2024-06-11 ASSESSMENT — ANXIETY QUESTIONNAIRES
6. BECOMING EASILY ANNOYED OR IRRITABLE: NOT AT ALL
2. NOT BEING ABLE TO STOP OR CONTROL WORRYING: NOT AT ALL
4. TROUBLE RELAXING: NOT AT ALL
3. WORRYING TOO MUCH ABOUT DIFFERENT THINGS: NOT AT ALL
7. FEELING AFRAID AS IF SOMETHING AWFUL MIGHT HAPPEN: NOT AT ALL
1. FEELING NERVOUS, ANXIOUS, OR ON EDGE: NOT AT ALL
5. BEING SO RESTLESS THAT IT IS HARD TO SIT STILL: NOT AT ALL
GAD7 TOTAL SCORE: 0
IF YOU CHECKED OFF ANY PROBLEMS ON THIS QUESTIONNAIRE, HOW DIFFICULT HAVE THESE PROBLEMS MADE IT FOR YOU TO DO YOUR WORK, TAKE CARE OF THINGS AT HOME, OR GET ALONG WITH OTHER PEOPLE: NOT DIFFICULT AT ALL

## 2024-06-11 ASSESSMENT — PATIENT HEALTH QUESTIONNAIRE - PHQ9
SUM OF ALL RESPONSES TO PHQ9 QUESTIONS 1 & 2: 0
SUM OF ALL RESPONSES TO PHQ QUESTIONS 1-9: 0
1. LITTLE INTEREST OR PLEASURE IN DOING THINGS: NOT AT ALL
SUM OF ALL RESPONSES TO PHQ QUESTIONS 1-9: 0
2. FEELING DOWN, DEPRESSED OR HOPELESS: NOT AT ALL
SUM OF ALL RESPONSES TO PHQ QUESTIONS 1-9: 0
SUM OF ALL RESPONSES TO PHQ QUESTIONS 1-9: 0

## 2024-06-11 NOTE — PROGRESS NOTES
Ivonne Taylor presents today for No chief complaint on file.  Is someone accompanying this pt? No    Is the patient using any DME equipment during OV? No    Depression Screenin/30/2023    11:50 AM 2023     2:35 PM 2022     3:47 PM 10/20/2021     2:53 PM 10/13/2021     1:30 PM 2021     3:57 PM 2021     2:28 PM   PHQ-9 Questionaire   Little interest or pleasure in doing things 1 0 0 0 0 0 0   Feeling down, depressed, or hopeless 1 2 0  0 0 0   PHQ-9 Total Score 2 2 0 0 0 0 0        TYLER 7-Anxiety        No data to display                 Travel Screening:    Travel Screening     No screening recorded since 06/10/24 0000       Travel History   Travel since 24    No documented travel since 24          Health Maintenance reviewed and discussed and ordered per Provider.  Transportation Needs: Patient Unable To Answer (3/26/2024)    PRAPARE - Transportation     Lack of Transportation (Medical): Patient unable to answer     Lack of Transportation (Non-Medical): Patient unable to answer      Food Insecurity: Patient Unable To Answer (3/26/2024)    Hunger Vital Sign     Worried About Running Out of Food in the Last Year: Patient unable to answer     Ran Out of Food in the Last Year: Patient unable to answer     Financial Resource Strain: Low Risk  (2023)    Overall Financial Resource Strain (CARDIA)     Difficulty of Paying Living Expenses: Not hard at all     Housing Stability: Patient Unable To Answer (3/26/2024)    Housing Stability Vital Sign     Unable to Pay for Housing in the Last Year: Patient unable to answer     Number of Places Lived in the Last Year: 1     Unstable Housing in the Last Year: Patient unable to answer       Did you provide resources if patient requested them? None Requested      Health Maintenance Due   Topic Date Due    Shingles vaccine (1 of 2) Never done    Respiratory Syncytial Virus (RSV) Pregnant or age 60 yrs+ (1 - 1-dose 60+ series) Never done

## 2024-06-11 NOTE — PROGRESS NOTES
Ivonne Taylor is a 73 y.o. female is seen on 6/11/2024 for New Patient    Assessment & Plan:     1. Type 2 diabetes mellitus with hyperglycemia, without long-term current use of insulin (HCC)  Assessment & Plan:  Status unknown   Repeat labs ordered today   Awaiting results  Will treat accordingly  Maintain low carb/sugar diet   Monitor blood sugars at home  Home health to provide education on diabetes and assist in monitoring blood sugars and medication  Orders:  -     CBC with Auto Differential; Future  -     Comprehensive Metabolic Panel; Future  -     Hemoglobin A1C; Future  -     [facility]  2. Seizure disorder (HCC)  Assessment & Plan:  Stable   Continue lacosamide 200 mg daily  Continue following with neurology   Consider sending new referral if unable to get timely appt with neurology in future  Orders:  -     [facility]  3. Hypertension associated with diabetes ([facility])  Assessment & Plan:  Stable  Continue losartan 50 mg and amlodipine 5 mg daily  Maintain low salt diet  Orders:  -     CBC with Auto Differential; Future  -     Comprehensive Metabolic Panel; Future  -     Lipid Panel; Future  -     [facility]  4. Aortic stenosis, mild  Assessment & Plan:  Repeat echo ordered today  Referral placed today to cardiology for further evaluation and management  Orders:  -     Ambulatory referral to Cardiology  [facility]  -     Echo (TTE) complete (PRN contrast/bubble/strain/3D); Future  5. Chronic diastolic (congestive) heart failure (HCC)  Assessment & Plan:  Repeat echo ordered today  Referral placed today to cardiology for further evaluation and management  Orders:  -     Ambulatory referral to Cardiology  [facility]  -     Echo (TTE) complete (PRN contrast/bubble/strain/3D); Future  6. Encounter to

## 2024-06-12 ENCOUNTER — HOME HEALTH ADMISSION (OUTPATIENT)
Age: 74
End: 2024-06-12
Payer: MEDICARE

## 2024-06-12 PROBLEM — R01.1 HEART MURMUR: Status: ACTIVE | Noted: 2024-06-12

## 2024-06-12 ASSESSMENT — ENCOUNTER SYMPTOMS: SHORTNESS OF BREATH: 0

## 2024-06-12 NOTE — ASSESSMENT & PLAN NOTE
Stable   Continue lacosamide 200 mg daily  Continue following with neurology   Consider sending new referral if unable to get timely appt with neurology in future

## 2024-06-12 NOTE — ASSESSMENT & PLAN NOTE
Repeat echo ordered today  Referral placed today to cardiology for further evaluation and management

## 2024-06-12 NOTE — ASSESSMENT & PLAN NOTE
Status unknown   Repeat labs ordered today   Awaiting results  Will treat accordingly  Maintain low carb/sugar diet   Monitor blood sugars at home  Home health to provide education on diabetes and assist in monitoring blood sugars and medication

## 2024-06-14 ENCOUNTER — HOME CARE VISIT (OUTPATIENT)
Age: 74
End: 2024-06-14

## 2024-06-17 LAB
ESTIMATED AVERAGE GLUCOSE: 109
HBA1C MFR BLD: 5.4 %

## 2024-06-18 ENCOUNTER — OFFICE VISIT (OUTPATIENT)
Facility: CLINIC | Age: 74
End: 2024-06-18
Payer: MEDICARE

## 2024-06-18 VITALS
DIASTOLIC BLOOD PRESSURE: 61 MMHG | HEIGHT: 66 IN | HEART RATE: 80 BPM | TEMPERATURE: 98.3 F | RESPIRATION RATE: 18 BRPM | BODY MASS INDEX: 25.71 KG/M2 | SYSTOLIC BLOOD PRESSURE: 96 MMHG | WEIGHT: 160 LBS

## 2024-06-18 DIAGNOSIS — K21.9 GASTROESOPHAGEAL REFLUX DISEASE WITHOUT ESOPHAGITIS: ICD-10-CM

## 2024-06-18 DIAGNOSIS — E78.5 HYPERLIPIDEMIA ASSOCIATED WITH TYPE 2 DIABETES MELLITUS (HCC): ICD-10-CM

## 2024-06-18 DIAGNOSIS — Z71.89 ACP (ADVANCE CARE PLANNING): ICD-10-CM

## 2024-06-18 DIAGNOSIS — H53.9 VISION CHANGES: ICD-10-CM

## 2024-06-18 DIAGNOSIS — E11.69 HYPERLIPIDEMIA ASSOCIATED WITH TYPE 2 DIABETES MELLITUS (HCC): ICD-10-CM

## 2024-06-18 DIAGNOSIS — Z86.73 HISTORY OF CVA (CEREBROVASCULAR ACCIDENT): ICD-10-CM

## 2024-06-18 DIAGNOSIS — D64.9 ANEMIA, UNSPECIFIED TYPE: ICD-10-CM

## 2024-06-18 DIAGNOSIS — G40.909 SEIZURE DISORDER (HCC): Primary | ICD-10-CM

## 2024-06-18 DIAGNOSIS — F02.80 DEMENTIA IN OTHER DISEASES CLASSIFIED ELSEWHERE, UNSPECIFIED SEVERITY, WITHOUT BEHAVIORAL DISTURBANCE, PSYCHOTIC DISTURBANCE, MOOD DISTURBANCE, AND ANXIETY (HCC): ICD-10-CM

## 2024-06-18 DIAGNOSIS — H91.93 BILATERAL CHANGE IN HEARING: ICD-10-CM

## 2024-06-18 DIAGNOSIS — Z00.00 MEDICARE ANNUAL WELLNESS VISIT, SUBSEQUENT: ICD-10-CM

## 2024-06-18 DIAGNOSIS — I11.0 HYPERTENSIVE HEART DISEASE WITH CONGESTIVE HEART FAILURE, UNSPECIFIED HEART FAILURE TYPE (HCC): ICD-10-CM

## 2024-06-18 PROCEDURE — 99215 OFFICE O/P EST HI 40 MIN: CPT

## 2024-06-18 PROCEDURE — G0439 PPPS, SUBSEQ VISIT: HCPCS

## 2024-06-18 PROCEDURE — 1123F ACP DISCUSS/DSCN MKR DOCD: CPT

## 2024-06-18 RX ORDER — OMEPRAZOLE 40 MG/1
40 CAPSULE, DELAYED RELEASE ORAL
Qty: 90 CAPSULE | Refills: 1 | Status: SHIPPED | OUTPATIENT
Start: 2024-06-18

## 2024-06-18 RX ORDER — ATORVASTATIN CALCIUM 40 MG/1
TABLET, FILM COATED ORAL
Qty: 90 TABLET | Refills: 0 | Status: SHIPPED | OUTPATIENT
Start: 2024-06-18

## 2024-06-18 RX ORDER — LACOSAMIDE 200 MG/1
200 TABLET ORAL 2 TIMES DAILY
Qty: 60 TABLET | Refills: 0 | Status: SHIPPED | OUTPATIENT
Start: 2024-06-18 | End: 2024-07-18

## 2024-06-18 RX ORDER — LOSARTAN POTASSIUM 50 MG/1
TABLET ORAL
Qty: 90 TABLET | Refills: 0 | Status: SHIPPED | OUTPATIENT
Start: 2024-06-18

## 2024-06-18 RX ORDER — AMLODIPINE BESYLATE 5 MG/1
5 TABLET ORAL DAILY
Qty: 30 TABLET | Refills: 0 | Status: SHIPPED | OUTPATIENT
Start: 2024-06-18 | End: 2024-07-18

## 2024-06-18 RX ORDER — ISOSORBIDE MONONITRATE 30 MG/1
30 TABLET, EXTENDED RELEASE ORAL DAILY
Qty: 90 TABLET | Refills: 1 | Status: SHIPPED | OUTPATIENT
Start: 2024-06-18

## 2024-06-18 SDOH — ECONOMIC STABILITY: FOOD INSECURITY: WITHIN THE PAST 12 MONTHS, YOU WORRIED THAT YOUR FOOD WOULD RUN OUT BEFORE YOU GOT MONEY TO BUY MORE.: NEVER TRUE

## 2024-06-18 SDOH — ECONOMIC STABILITY: FOOD INSECURITY: WITHIN THE PAST 12 MONTHS, THE FOOD YOU BOUGHT JUST DIDN'T LAST AND YOU DIDN'T HAVE MONEY TO GET MORE.: NEVER TRUE

## 2024-06-18 SDOH — ECONOMIC STABILITY: INCOME INSECURITY: HOW HARD IS IT FOR YOU TO PAY FOR THE VERY BASICS LIKE FOOD, HOUSING, MEDICAL CARE, AND HEATING?: NOT HARD AT ALL

## 2024-06-18 ASSESSMENT — ANXIETY QUESTIONNAIRES
IF YOU CHECKED OFF ANY PROBLEMS ON THIS QUESTIONNAIRE, HOW DIFFICULT HAVE THESE PROBLEMS MADE IT FOR YOU TO DO YOUR WORK, TAKE CARE OF THINGS AT HOME, OR GET ALONG WITH OTHER PEOPLE: NOT DIFFICULT AT ALL
2. NOT BEING ABLE TO STOP OR CONTROL WORRYING: NOT AT ALL
6. BECOMING EASILY ANNOYED OR IRRITABLE: NOT AT ALL
5. BEING SO RESTLESS THAT IT IS HARD TO SIT STILL: NOT AT ALL
7. FEELING AFRAID AS IF SOMETHING AWFUL MIGHT HAPPEN: NOT AT ALL
GAD7 TOTAL SCORE: 0
3. WORRYING TOO MUCH ABOUT DIFFERENT THINGS: NOT AT ALL
4. TROUBLE RELAXING: NOT AT ALL
1. FEELING NERVOUS, ANXIOUS, OR ON EDGE: NOT AT ALL

## 2024-06-18 ASSESSMENT — PATIENT HEALTH QUESTIONNAIRE - PHQ9
SUM OF ALL RESPONSES TO PHQ QUESTIONS 1-9: 0
SUM OF ALL RESPONSES TO PHQ QUESTIONS 1-9: 0
SUM OF ALL RESPONSES TO PHQ9 QUESTIONS 1 & 2: 0
1. LITTLE INTEREST OR PLEASURE IN DOING THINGS: NOT AT ALL
SUM OF ALL RESPONSES TO PHQ QUESTIONS 1-9: 0
SUM OF ALL RESPONSES TO PHQ QUESTIONS 1-9: 0
2. FEELING DOWN, DEPRESSED OR HOPELESS: NOT AT ALL

## 2024-06-18 NOTE — PATIENT INSTRUCTIONS
do to protect your heart. It is never too late to quit. Try to avoid secondhand smoke too.     Stay at a weight that's healthy for you. Talk to your doctor if you need help losing weight.     Try to get 7 to 9 hours of sleep each night.     Limit alcohol to 2 drinks a day for men and 1 drink a day for women. Too much alcohol can cause health problems.     Manage other health problems such as diabetes, high blood pressure, and high cholesterol. If you think you may have a problem with alcohol or drug use, talk to your doctor.   Medicines    Take your medicines exactly as prescribed. Call your doctor if you think you are having a problem with your medicine.     If your doctor recommends aspirin, take the amount directed each day. Make sure you take aspirin and not another kind of pain reliever, such as acetaminophen (Tylenol).   When should you call for help?   Call 911 if you have symptoms of a heart attack. These may include:    Chest pain or pressure, or a strange feeling in the chest.     Sweating.     Shortness of breath.     Pain, pressure, or a strange feeling in the back, neck, jaw, or upper belly or in one or both shoulders or arms.     Lightheadedness or sudden weakness.     A fast or irregular heartbeat.   After you call 911, the  may tell you to chew 1 adult-strength or 2 to 4 low-dose aspirin. Wait for an ambulance. Do not try to drive yourself.  Watch closely for changes in your health, and be sure to contact your doctor if you have any problems.  Where can you learn more?  Go to https://www.[employer].net/patientEd and enter F075 to learn more about \"A Healthy Heart: Care Instructions.\"  Current as of: June 24, 2023  Content Version: 14.1  © 2913-2709 [employer].   Care instructions adapted under license by [employer]. If you have questions about a medical condition or this instruction, always ask your healthcare professional. [employer] disclaims any warranty or

## 2024-06-18 NOTE — PROGRESS NOTES
Ivonne Taylor presents today for   Chief Complaint   Patient presents with    Medicare AWV   Is someone accompanying this pt? Yes, daughter, care giver    Is the patient using any DME equipment during OV? Yes wheelchair    Depression Screenin/11/2024    11:34 AM 2023    11:50 AM 2023     2:35 PM 2022     3:47 PM 10/20/2021     2:53 PM 10/13/2021     1:30 PM 2021     3:57 PM   PHQ-9 Questionaire   Little interest or pleasure in doing things 0 1 0 0 0 0 0   Feeling down, depressed, or hopeless 0 1 2 0  0 0   PHQ-9 Total Score 0 2 2 0 0 0 0        TYLER 7-Anxiety       2024    11:34 AM   TYLER-7 SCREENING   Feeling nervous, anxious, or on edge Not at all   Not being able to stop or control worrying Not at all   Worrying too much about different things Not at all   Trouble relaxing Not at all   Being so restless that it is hard to sit still Not at all   Becoming easily annoyed or irritable Not at all   Feeling afraid as if something awful might happen Not at all   TYLER-7 Total Score 0   How difficult have these problems made it for you to do your work, take care of things at home, or get along with other people? Not difficult at all        Travel Screening:    Travel Screening     No screening recorded since 24 0000       Travel History   Travel since 24    No documented travel since 24          Health Maintenance reviewed and discussed and ordered per Provider.  Transportation Needs: Unmet Transportation Needs (2024)    PRAPARE - Transportation     Lack of Transportation (Medical): Patient unable to answer     Lack of Transportation (Non-Medical): Yes      Food Insecurity: No Food Insecurity (2024)    Hunger Vital Sign     Worried About Running Out of Food in the Last Year: Never true     Ran Out of Food in the Last Year: Never true     Financial Resource Strain: Low Risk  (2024)    Overall Financial Resource Strain (CARDIA)     Difficulty of Paying Living

## 2024-06-18 NOTE — PROGRESS NOTES
Advance Care Planning     Advance Care Planning (ACP) Physician/NP/PA Conversation    Date of Conversation: 6/18/2024  Conducted with: Healthcare Decision Maker and Legal next of kin    Healthcare Decision Maker:    Primary Decision Maker: Cara Taylor - Child - [phone]  Click here to complete Healthcare Decision Makers including selection of the Healthcare Decision Maker Relationship (ie \"Primary\").     Today we referred to ACP Clinical Specialist for assistance.    Care Preferences:    Hospitalization:  \"If your health worsens and it becomes clear that your chance of recovery is unlikely, what would be your preference regarding hospitalization?\"  {hospitalization preference:[id number]::\"The patient would prefer comfort-focused treatment without hospitalization.\"}    Ventilation:  \"If you were unable to breath on your own and your chance of recovery was unlikely, what would be your preference about the use of a ventilator (breathing machine) if it was available to you?\"  {ventilator preference:[id number]::\"The patient would NOT desire the use of a ventilator.\"}    Resuscitation:  \"In the event your heart stopped as a result of an underlying serious health condition, would you want attempts made to restart your heart, or would you prefer a natural death?\"  {CPR Preference:[id number]}        Conversation Outcomes / Follow-Up Plan:  {APC outcomes (Optional):[id number]::\"ACP in process - information provided, considering goals and options\"}      Length of Voluntary ACP Conversation in minutes:  16 minutes    Bandar Schneider, APRN - CNP    {Note - If the relationship to the patient does NOT follow our state's Next of Kin hierarchy, the patient MUST complete an ACP Document to allow him/her to act on the patient's behalf:13443}  {If the patient has a valid advance directive AND verbally provides inconsistent care preference(s), advise the patient to either create a new advance directive of to orally revoke that

## 2024-06-18 NOTE — PROGRESS NOTES
Chief Complaint   Patient presents with    Medicare AW     Assessment & Plan:     1. Seizure disorder (HCC)  Assessment & Plan:  Chronic, stable  Continue lacosamide 200 mg daily  Referral to neurology placed today  POA understands that this medication needs to be filled and monitored through neurology however will send refill today so patient doesn't run out of this medication  Orders:  -     External Referral To Neurology  -     lacosamide (VIMPAT) 200 MG tablet; Take 1 tablet by mouth in the morning and at bedtime for 30 days. This is an increased dose from your 150 mg twice daily.  Please make sure to review your dosing with your primary neurology provider. Max Daily Amount: 400 mg, Disp-60 tablet, R-0Normal  2. Hyperlipidemia associated with type 2 diabetes mellitus (HCC)  Assessment & Plan:  Uncontrolled  Goal less than 70  Continue atorvastatin 40 mg at night  Maintain low-fat/cholesterol diet  Daily exercise as tolerated  Orders:  -     atorvastatin (LIPITOR) 40 MG tablet; TAKE 1 TABLET BY MOUTH AT BEDTIME FOR HYPERLIPEDEMIA, Disp-90 tablet, R-0Normal  3. Hypertensive heart disease with congestive heart failure, unspecified heart failure type ([facility])  Assessment & Plan:  Stable; borderline hypotensive   Consider decreasing amlodipine from 5 mg to 2.5 mg daily if BP continues to drop   Continue isosorbide 30 mg daily and losartan 50 mg daily   Referral to cardiology already placed - advised to schedule   Repeat echo ordered - advised to schedule   Maintain low salt diet  Daily exercise as tolerated  Orders:  -     amLODIPine (NORVASC) 5 MG tablet; Take 1 tablet by mouth daily, Disp-30 tablet, R-0Normal  -     isosorbide mononitrate (IMDUR) 30 MG extended release tablet; Take 1 tablet by mouth daily, Disp-90 tablet, R-1Normal  -     losartan (COZAAR) 50 MG tablet; 08/02/2016 Losartan Oral  PO 1.0 TABLET(S) BID  08/02/2016  active, Disp-90 tablet, R-0Normal  4. History of CVA (cerebrovascular

## 2024-06-18 NOTE — PROGRESS NOTES
Medicare Annual Wellness Visit    Ivonne Taylor is here for Medicare AWV    Assessment & Plan   Medicare annual wellness visit, subsequent  ACP (advance care planning)  Comments:  Referred to ACP specialist for further evaluation  Orders:  -     BS -  Referral to ACP Clinical Specialist  Bilateral change in hearing  Comments:  Pt requesting referral for further evaluation  Orders:  -     External Referral To Audiology  Dementia in other diseases classified elsewhere, unspecified severity, without behavioral disturbance, psychotic disturbance, mood disturbance, and anxiety (HCC)  Assessment & Plan:  Chronic, worsening  Referral placed to neurology per patient request during today's medicare wellness visit  Vision changes  Comments:  Pt requesting referral for further evaluation  Orders:  -     External Referral To Ophthalmology     Recommendations for Preventive Services Due: see orders and patient instructions/AVS.  Recommended screening schedule for the next 5-10 years is provided to the patient in written form: see Patient Instructions/AVS.     Return in about 4 weeks (around 7/16/2024) for chronic disease management.     Subjective       Patient's complete Health Risk Assessment and screening values have been reviewed and are found in Flowsheets. The following problems were reviewed today and where indicated follow up appointments were made and/or referrals ordered.    Positive Risk Factor Screenings with Interventions:    Fall Risk:  Do you feel unsteady or are you worried about falling? : (!) yes  2 or more falls in past year?: no  Fall with injury in past year?: no     Interventions:    Reviewed medications, home hazards, visual acuity, and co-morbidities that can increase risk for falls  Referral: Physical Therapy (PT)  Referral to Home health already placed including PT and OT    Cognitive:      Words recalled: 0 Words Recalled     Total Score Interpretation: Abnormal Mini-Cog  Interventions:  Neurology:

## 2024-06-19 ENCOUNTER — CLINICAL DOCUMENTATION (OUTPATIENT)
Dept: CASE MANAGEMENT | Age: 74
End: 2024-06-19

## 2024-06-19 PROBLEM — I11.0 HYPERTENSIVE HEART DISEASE WITH CONGESTIVE HEART FAILURE (HCC): Status: ACTIVE | Noted: 2024-06-19

## 2024-06-19 PROBLEM — R41.89 COGNITIVE DECLINE: Status: RESOLVED | Noted: 2023-12-21 | Resolved: 2024-06-19

## 2024-06-19 PROBLEM — G93.40 ACUTE ENCEPHALOPATHY: Status: RESOLVED | Noted: 2024-01-10 | Resolved: 2024-06-19

## 2024-06-19 PROBLEM — R41.82 AMS (ALTERED MENTAL STATUS): Status: RESOLVED | Noted: 2023-12-21 | Resolved: 2024-06-19

## 2024-06-19 PROBLEM — G40.911 EPILEPSY, UNSPECIFIED, INTRACTABLE, WITH STATUS EPILEPTICUS (HCC): Status: RESOLVED | Noted: 2022-07-18 | Resolved: 2024-06-19

## 2024-06-19 PROBLEM — Z78.9 HISTORY OF RECENT TRAVEL: Status: RESOLVED | Noted: 2019-12-21 | Resolved: 2024-06-19

## 2024-06-19 PROBLEM — I51.7 LVH (LEFT VENTRICULAR HYPERTROPHY): Status: RESOLVED | Noted: 2021-09-02 | Resolved: 2024-06-19

## 2024-06-19 PROBLEM — E66.01 MORBID OBESITY (HCC): Status: RESOLVED | Noted: 2018-04-17 | Resolved: 2024-06-19

## 2024-06-19 ASSESSMENT — ENCOUNTER SYMPTOMS: SHORTNESS OF BREATH: 0

## 2024-06-19 NOTE — ASSESSMENT & PLAN NOTE
Chronic, stable  Continue lacosamide 200 mg daily  Referral to neurology placed today  POA understands that this medication needs to be filled and monitored through neurology however will send refill today so patient doesn't run out of this medication

## 2024-06-19 NOTE — ASSESSMENT & PLAN NOTE
Chronic; noted on most recent CBC  Iron studies ordered today  Awaiting results  Will treat accordingly

## 2024-06-19 NOTE — ASSESSMENT & PLAN NOTE
Chronic, worsening  Referral placed to neurology per patient request during today's medicare wellness visit

## 2024-06-19 NOTE — ASSESSMENT & PLAN NOTE
Stable; borderline hypotensive   Consider decreasing amlodipine from 5 mg to 2.5 mg daily if BP continues to drop   Continue isosorbide 30 mg daily and losartan 50 mg daily   Referral to cardiology already placed - advised to schedule   Repeat echo ordered - advised to schedule   Maintain low salt diet  Daily exercise as tolerated

## 2024-06-19 NOTE — ASSESSMENT & PLAN NOTE
Uncontrolled  Goal less than 70  Continue atorvastatin 40 mg at night  Maintain low-fat/cholesterol diet  Daily exercise as tolerated

## 2024-06-24 ENCOUNTER — HOME CARE VISIT (OUTPATIENT)
Age: 74
End: 2024-06-24
Payer: MEDICARE

## 2024-06-24 ENCOUNTER — HOME CARE VISIT (OUTPATIENT)
Age: 74
End: 2024-06-24

## 2024-06-24 VITALS
RESPIRATION RATE: 16 BRPM | DIASTOLIC BLOOD PRESSURE: 68 MMHG | TEMPERATURE: 97.3 F | SYSTOLIC BLOOD PRESSURE: 130 MMHG | HEART RATE: 88 BPM | OXYGEN SATURATION: 99 %

## 2024-06-24 PROCEDURE — 0221000100 HH NO PAY CLAIM PROCEDURE

## 2024-06-24 PROCEDURE — 1090000002 HH PPS REVENUE DEBIT

## 2024-06-24 PROCEDURE — 400013 HH SOC

## 2024-06-24 PROCEDURE — 1090000001 HH PPS REVENUE CREDIT

## 2024-06-24 PROCEDURE — G0299 HHS/HOSPICE OF RN EA 15 MIN: HCPCS

## 2024-06-24 ASSESSMENT — ENCOUNTER SYMPTOMS: BOWEL INCONTINENCE: 1

## 2024-06-24 NOTE — HOME HEALTH
Skilled services/Home bound verification:     Skilled Reason for admission/summary of clinical condition:  PDGM Dx: Type 2 diabetes mellitus with hyperglycemia     Services to provide: SN Evaluation and Treat, Medication and Disease Management, PT OT Eval and treat HHA to assist with ADL's, MSW to assist with Community resources, Speech Language Pathology for Evaluate and Treat   This patient is homebound for the following reasons Requires considerable and taxing effort to leave the home , Requires the assistance of 1 or more persons to leave the home , Only leaves the home for medical reasons or [facility] services and are infrequent and of short duration for other reasons  and Patient is bedridden .    Caregiver: relative.  Caregiver assists with Caregiver assists with medication management, transportation, meals, adls and iadls..    Medications reconciled and all medications are available in the home this visit.      The following education was provided regarding medications: patient/cg reminded to continue to take medications as prescribed. patient aware to monitor for effectiveness and to notify staff of any adverse reactions to medications/any changes to medication regimen.    Medications  are effective at this time.      High risk medication teaching regarding anticoagulants, antiplatelets, antibiotics, antipsychotics, hypoglycemic agents, or opioid/narcotics performed (specify):   Instructed patient/caregiver to notify SN/PT of signs and symptoms of anticoagulant adverse effects including bleeding gums, blood in urine or stool, easily bruising.  Instructed on importance of fall prevention due to risk for bleeding.         notified of any discrepancies/look a like medications/medication interactions NA.     Home health supplies by type and quantity ordered/delivered this visit include: NA    Patient education provided this visit to include: SEE SN ORDER.      Patient level of understanding of education

## 2024-06-25 ENCOUNTER — HOME CARE VISIT (OUTPATIENT)
Age: 74
End: 2024-06-25
Payer: MEDICARE

## 2024-06-25 VITALS
DIASTOLIC BLOOD PRESSURE: 62 MMHG | RESPIRATION RATE: 18 BRPM | HEART RATE: 75 BPM | OXYGEN SATURATION: 98 % | SYSTOLIC BLOOD PRESSURE: 138 MMHG | TEMPERATURE: 98 F

## 2024-06-25 PROCEDURE — G0151 HHCP-SERV OF PT,EA 15 MIN: HCPCS

## 2024-06-25 PROCEDURE — 1090000002 HH PPS REVENUE DEBIT

## 2024-06-25 PROCEDURE — 1090000001 HH PPS REVENUE CREDIT

## 2024-06-26 PROCEDURE — 1090000001 HH PPS REVENUE CREDIT

## 2024-06-26 PROCEDURE — 1090000002 HH PPS REVENUE DEBIT

## 2024-06-26 NOTE — HOME HEALTH
PT eval:  Pt is a 72 y/o female who went to ED on 6/22/24 due to \"transient alteration of awareness\".    Pt  is sent home and referred to home care PT.  Pt is a known patient to agency in the past with Hx of CVA with residual L hemiparesis and bedbound x 6 years.  PMH:  Chronic diastolic (congestive) heart failure;   LVH (left ventricular hypertrophy);  Aortic stenosis, mild;  Mild diastolic dysfunction;  Hypertension associated with diabetes; Endocarditis;  Heart murmur;  Gastroesophageal reflux disease without esophagitis;  Lower GI bleed;  Type 2 diabetes mellitus with hyperglycemia, without long-term current use of insulin; Pulmonary nodule, right;  Seizure disorder; History of CVA (cerebrovascular accident);  Hypercholesteremia;  History of breast cancer;  Hearing impairment;  Left hemiparesis; History of pulmonary embolism;  History of peptic ulcer disease;  Chest pain;  Anemia, unspecified;  Malignant neoplasm of upper-outer quadrant of right female breast;  Chronic headaches; Altered mental status; Epilepsy, unspecified, intractable, with status epilepticus;  AMS (altered mental status);  Dementia in other diseases classified elsewhere, unspecified severity, without behavioral disturbance, psychotic disturbance, mood disturbance, and anxiety; Aphasia;  Cognitive decline; Encephalopathy;  Acute encephalopathy.   PLOF/living environment:  bedbound x 6 years;  lives with daughter and her family in a 2 story house with bedroom on first floor;  PCA x 8 hours daily.  PREC: high fall risk;  L hemiparesis;  pressure injury risk.   S:  Pt.tries to talk but slurred speech;  Caregiver and family deny pt falls  and denies change in meds since SOC  with nursing yesterday.   Pt's goal in PT is  not verbalized..  O:  Pain:  see pain assessment.   Integumentary:  intact.  Pt education:  Maintenance of skin integrity:  change body position every 1-2 hours.   Per caregiver, she has been changing pt's body position every  2

## 2024-06-27 ENCOUNTER — TELEPHONE (OUTPATIENT)
Facility: CLINIC | Age: 74
End: 2024-06-27

## 2024-06-27 PROCEDURE — 1090000002 HH PPS REVENUE DEBIT

## 2024-06-27 PROCEDURE — 1090000001 HH PPS REVENUE CREDIT

## 2024-06-27 NOTE — TELEPHONE ENCOUNTER
Called patient's son t let him know Bandar is not in until next Tuesday but I can assist with any questions concerns. He did not answer.

## 2024-06-27 NOTE — TELEPHONE ENCOUNTER
----- Message from Venkat Ramirez MA sent at 6/26/2024  3:20 PM EDT -----  Regarding: FW: ECC Message to Provider  Please see message below  ----- Message -----  From: Swati Paulson  Sent: 6/26/2024   3:10 PM EDT  To: [facility] Clinical Staff  Subject: ECC Message to Provider                          ECC Message to Provider    Relationship to Patient: Covered Entity Doneil Son     Additional Information Son of the patient is asking if the doctor is in the office since they wanted to talk to him.  --------------------------------------------------------------------------------------------------------------------------    Call Back Information: OK to leave message on voicemail  Preferred Call Back Number: Phone [phone]

## 2024-06-28 ENCOUNTER — CLINICAL DOCUMENTATION (OUTPATIENT)
Dept: CASE MANAGEMENT | Age: 74
End: 2024-06-28

## 2024-06-28 PROCEDURE — 1090000002 HH PPS REVENUE DEBIT

## 2024-06-28 PROCEDURE — 1090000001 HH PPS REVENUE CREDIT

## 2024-06-29 PROCEDURE — 1090000001 HH PPS REVENUE CREDIT

## 2024-06-29 PROCEDURE — 1090000002 HH PPS REVENUE DEBIT

## 2024-06-30 PROCEDURE — 1090000002 HH PPS REVENUE DEBIT

## 2024-06-30 PROCEDURE — 1090000001 HH PPS REVENUE CREDIT

## 2024-07-09 ENCOUNTER — TELEPHONE (OUTPATIENT)
Facility: CLINIC | Age: 74
End: 2024-07-09

## 2024-07-09 NOTE — TELEPHONE ENCOUNTER
Pt called and she needs a letter saying that Ivonne Taylor is able to respond. So she can give it her power of . Please advise. Thank you!!!

## 2024-07-10 ENCOUNTER — HOME CARE VISIT (OUTPATIENT)
Age: 74
End: 2024-07-10
Payer: MEDICARE

## 2024-07-11 ENCOUNTER — CLINICAL DOCUMENTATION (OUTPATIENT)
Dept: CASE MANAGEMENT | Age: 74
End: 2024-07-11

## 2024-07-11 ENCOUNTER — OFFICE VISIT (OUTPATIENT)
Facility: CLINIC | Age: 74
End: 2024-07-11
Payer: MEDICARE

## 2024-07-11 VITALS
DIASTOLIC BLOOD PRESSURE: 60 MMHG | HEART RATE: 92 BPM | RESPIRATION RATE: 16 BRPM | HEIGHT: 66 IN | OXYGEN SATURATION: 95 % | WEIGHT: 160 LBS | TEMPERATURE: 98.2 F | BODY MASS INDEX: 25.71 KG/M2 | SYSTOLIC BLOOD PRESSURE: 127 MMHG

## 2024-07-11 DIAGNOSIS — F02.80 DEMENTIA IN OTHER DISEASES CLASSIFIED ELSEWHERE, UNSPECIFIED SEVERITY, WITHOUT BEHAVIORAL DISTURBANCE, PSYCHOTIC DISTURBANCE, MOOD DISTURBANCE, AND ANXIETY (HCC): Primary | ICD-10-CM

## 2024-07-11 DIAGNOSIS — Z71.89 ADVANCE CARE PLANNING: ICD-10-CM

## 2024-07-11 PROCEDURE — 1123F ACP DISCUSS/DSCN MKR DOCD: CPT

## 2024-07-11 PROCEDURE — 99213 OFFICE O/P EST LOW 20 MIN: CPT

## 2024-07-11 RX ORDER — ASPIRIN 81 MG/1
81 TABLET ORAL DAILY
COMMUNITY
Start: 2024-06-25

## 2024-07-11 SDOH — ECONOMIC STABILITY: FOOD INSECURITY: WITHIN THE PAST 12 MONTHS, YOU WORRIED THAT YOUR FOOD WOULD RUN OUT BEFORE YOU GOT MONEY TO BUY MORE.: NEVER TRUE

## 2024-07-11 SDOH — ECONOMIC STABILITY: INCOME INSECURITY: HOW HARD IS IT FOR YOU TO PAY FOR THE VERY BASICS LIKE FOOD, HOUSING, MEDICAL CARE, AND HEATING?: NOT HARD AT ALL

## 2024-07-11 SDOH — ECONOMIC STABILITY: FOOD INSECURITY: WITHIN THE PAST 12 MONTHS, THE FOOD YOU BOUGHT JUST DIDN'T LAST AND YOU DIDN'T HAVE MONEY TO GET MORE.: NEVER TRUE

## 2024-07-11 ASSESSMENT — PATIENT HEALTH QUESTIONNAIRE - PHQ9
SUM OF ALL RESPONSES TO PHQ QUESTIONS 1-9: 0
SUM OF ALL RESPONSES TO PHQ QUESTIONS 1-9: 0
2. FEELING DOWN, DEPRESSED OR HOPELESS: NOT AT ALL
SUM OF ALL RESPONSES TO PHQ QUESTIONS 1-9: 0
SUM OF ALL RESPONSES TO PHQ9 QUESTIONS 1 & 2: 0
SUM OF ALL RESPONSES TO PHQ QUESTIONS 1-9: 0
1. LITTLE INTEREST OR PLEASURE IN DOING THINGS: NOT AT ALL

## 2024-07-11 ASSESSMENT — ANXIETY QUESTIONNAIRES
GAD7 TOTAL SCORE: 0
IF YOU CHECKED OFF ANY PROBLEMS ON THIS QUESTIONNAIRE, HOW DIFFICULT HAVE THESE PROBLEMS MADE IT FOR YOU TO DO YOUR WORK, TAKE CARE OF THINGS AT HOME, OR GET ALONG WITH OTHER PEOPLE: NOT DIFFICULT AT ALL
5. BEING SO RESTLESS THAT IT IS HARD TO SIT STILL: NOT AT ALL
7. FEELING AFRAID AS IF SOMETHING AWFUL MIGHT HAPPEN: NOT AT ALL
2. NOT BEING ABLE TO STOP OR CONTROL WORRYING: NOT AT ALL
4. TROUBLE RELAXING: NOT AT ALL
6. BECOMING EASILY ANNOYED OR IRRITABLE: NOT AT ALL
1. FEELING NERVOUS, ANXIOUS, OR ON EDGE: NOT AT ALL
3. WORRYING TOO MUCH ABOUT DIFFERENT THINGS: NOT AT ALL

## 2024-07-11 ASSESSMENT — ENCOUNTER SYMPTOMS: SHORTNESS OF BREATH: 0

## 2024-07-11 NOTE — PROGRESS NOTES
Ivonne Taylor presents today for   Chief Complaint   Patient presents with    Referral    Power of  Discussion   Power of  letter, son states that he needs a letter to state his mom has the power to make decisions of her own    External Neuro Referral    Is someone accompanying this pt? No    Is the patient using any DME equipment during OV? No    Depression Screenin/11/2024    10:32 AM 2024    10:00 AM 2024    11:34 AM 2023    11:50 AM 2023     2:35 PM 2022     3:47 PM 10/20/2021     2:53 PM   PHQ-9 Questionaire   Little interest or pleasure in doing things 0 0 0 1 0 0 0   Feeling down, depressed, or hopeless 0 0 0 1 2 0    PHQ-9 Total Score 0 0 0 2 2 0 0        TYLER 7-Anxiety       2024    10:32 AM 2024    10:00 AM 2024    11:34 AM   TYLER-7 SCREENING   Feeling nervous, anxious, or on edge Not at all Not at all Not at all   Not being able to stop or control worrying Not at all Not at all Not at all   Worrying too much about different things Not at all Not at all Not at all   Trouble relaxing Not at all Not at all Not at all   Being so restless that it is hard to sit still Not at all Not at all Not at all   Becoming easily annoyed or irritable Not at all Not at all Not at all   Feeling afraid as if something awful might happen Not at all Not at all Not at all   TYLER-7 Total Score 0 0 0   How difficult have these problems made it for you to do your work, take care of things at home, or get along with other people? Not difficult at all Not difficult at all Not difficult at all        Travel Screening:    Travel Screening     No screening recorded since 07/10/24 0000       Travel History   Travel since 24    No documented travel since 24          Health Maintenance reviewed and discussed and ordered per Provider.  Transportation Needs: Unknown (2024)    PRAPARE - Transportation     Lack of Transportation (Medical): Patient unable to answer

## 2024-07-11 NOTE — PROGRESS NOTES
Chief Complaint   Patient presents with    Referral    Power of  Discussion     Assessment & Plan:     1. 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 Advance care planning  Comments:  Advised to schedule with ACP specalist   Information given today    Follow-up and Dispositions    Return if symptoms worsen or fail to improve, for chronic disease management.       Subjective:     HPI  Pt's family here today to requesting a letter be written that states Mrs. Taylor is capable to making her own medical decisions  Accompanied by daughter and grandson  Daughter states that this letter is need by the family's  so that she can become her mother's power of   Daughter also states she is not able to see neurology for one year due to availability and would timmy another referral sent different office    Review of Systems   Respiratory:  Negative for shortness of breath.    Cardiovascular:  Negative for chest pain and leg swelling.   Neurological:  Negative for dizziness, light-headedness and headaches.     Objective:   /60 (Site: Left Upper Arm, Position: Sitting, Cuff Size: Medium Adult)   Pulse 92   Temp 98.2 °F (36.8 °C) (Temporal)   Resp 16   Ht 1.676 m (5' 6\")   Wt 72.6 kg (160 lb)   SpO2 95%   BMI 25.82 kg/m²     Physical Exam  Vitals and nursing note reviewed.   Constitutional:       General: She is not in acute distress.     Appearance: She is not ill-appearing.   HENT:      Head: Normocephalic and atraumatic.   Cardiovascular:      Rate and Rhythm: Normal rate.      Heart sounds: Murmur heard.   Pulmonary:      Effort: Pulmonary effort is normal. No respiratory distress.      Breath sounds: No wheezing, rhonchi or rales.

## 2024-07-15 ENCOUNTER — HOME CARE VISIT (OUTPATIENT)
Age: 74
End: 2024-07-15
Payer: MEDICARE

## 2024-07-16 ENCOUNTER — OFFICE VISIT (OUTPATIENT)
Facility: CLINIC | Age: 74
End: 2024-07-16
Payer: MEDICARE

## 2024-07-16 VITALS
DIASTOLIC BLOOD PRESSURE: 60 MMHG | OXYGEN SATURATION: 98 % | RESPIRATION RATE: 14 BRPM | HEIGHT: 66 IN | WEIGHT: 160 LBS | SYSTOLIC BLOOD PRESSURE: 94 MMHG | BODY MASS INDEX: 25.71 KG/M2 | HEART RATE: 96 BPM | TEMPERATURE: 97.9 F

## 2024-07-16 DIAGNOSIS — I11.0 HYPERTENSIVE HEART DISEASE WITH CONGESTIVE HEART FAILURE, UNSPECIFIED HEART FAILURE TYPE (HCC): ICD-10-CM

## 2024-07-16 DIAGNOSIS — I11.0 HYPERTENSIVE HEART DISEASE WITH CONGESTIVE HEART FAILURE, UNSPECIFIED HEART FAILURE TYPE (HCC): Primary | ICD-10-CM

## 2024-07-16 DIAGNOSIS — D64.9 ANEMIA, UNSPECIFIED TYPE: ICD-10-CM

## 2024-07-16 PROCEDURE — 99215 OFFICE O/P EST HI 40 MIN: CPT

## 2024-07-16 PROCEDURE — 1123F ACP DISCUSS/DSCN MKR DOCD: CPT

## 2024-07-16 RX ORDER — AMLODIPINE BESYLATE 5 MG/1
2.5 TABLET ORAL DAILY
Qty: 15 TABLET | Refills: 0
Start: 2024-07-16 | End: 2024-08-15

## 2024-07-16 SDOH — ECONOMIC STABILITY: FOOD INSECURITY: WITHIN THE PAST 12 MONTHS, THE FOOD YOU BOUGHT JUST DIDN'T LAST AND YOU DIDN'T HAVE MONEY TO GET MORE.: NEVER TRUE

## 2024-07-16 SDOH — ECONOMIC STABILITY: FOOD INSECURITY: WITHIN THE PAST 12 MONTHS, YOU WORRIED THAT YOUR FOOD WOULD RUN OUT BEFORE YOU GOT MONEY TO BUY MORE.: NEVER TRUE

## 2024-07-16 SDOH — ECONOMIC STABILITY: INCOME INSECURITY: HOW HARD IS IT FOR YOU TO PAY FOR THE VERY BASICS LIKE FOOD, HOUSING, MEDICAL CARE, AND HEATING?: NOT HARD AT ALL

## 2024-07-16 ASSESSMENT — PATIENT HEALTH QUESTIONNAIRE - PHQ9
SUM OF ALL RESPONSES TO PHQ QUESTIONS 1-9: 0
SUM OF ALL RESPONSES TO PHQ9 QUESTIONS 1 & 2: 0
2. FEELING DOWN, DEPRESSED OR HOPELESS: NOT AT ALL
SUM OF ALL RESPONSES TO PHQ QUESTIONS 1-9: 0
1. LITTLE INTEREST OR PLEASURE IN DOING THINGS: NOT AT ALL
SUM OF ALL RESPONSES TO PHQ QUESTIONS 1-9: 0
SUM OF ALL RESPONSES TO PHQ QUESTIONS 1-9: 0

## 2024-07-16 ASSESSMENT — ENCOUNTER SYMPTOMS: SHORTNESS OF BREATH: 0

## 2024-07-16 ASSESSMENT — ANXIETY QUESTIONNAIRES
2. NOT BEING ABLE TO STOP OR CONTROL WORRYING: NOT AT ALL
7. FEELING AFRAID AS IF SOMETHING AWFUL MIGHT HAPPEN: NOT AT ALL
1. FEELING NERVOUS, ANXIOUS, OR ON EDGE: NOT AT ALL
4. TROUBLE RELAXING: NOT AT ALL
6. BECOMING EASILY ANNOYED OR IRRITABLE: NOT AT ALL
GAD7 TOTAL SCORE: 0
IF YOU CHECKED OFF ANY PROBLEMS ON THIS QUESTIONNAIRE, HOW DIFFICULT HAVE THESE PROBLEMS MADE IT FOR YOU TO DO YOUR WORK, TAKE CARE OF THINGS AT HOME, OR GET ALONG WITH OTHER PEOPLE: NOT DIFFICULT AT ALL
3. WORRYING TOO MUCH ABOUT DIFFERENT THINGS: NOT AT ALL
5. BEING SO RESTLESS THAT IT IS HARD TO SIT STILL: NOT AT ALL

## 2024-07-16 NOTE — PROGRESS NOTES
Ivonne Taylor presents today for   Chief Complaint   Patient presents with    Follow-up     Is someone accompanying this pt? No    Is the patient using any DME equipment during OV? No    Depression Screenin/16/2024     8:40 AM 2024    10:32 AM 2024    10:00 AM 2024    11:34 AM 2023    11:50 AM 2023     2:35 PM 2022     3:47 PM   PHQ-9 Questionaire   Little interest or pleasure in doing things 0 0 0 0 1 0 0   Feeling down, depressed, or hopeless 0 0 0 0 1 2 0   PHQ-9 Total Score 0 0 0 0 2 2 0        TYLER 7-Anxiety       2024     8:40 AM 2024    10:32 AM 2024    10:00 AM 2024    11:34 AM   TYLER-7 SCREENING   Feeling nervous, anxious, or on edge Not at all Not at all Not at all Not at all   Not being able to stop or control worrying Not at all Not at all Not at all Not at all   Worrying too much about different things Not at all Not at all Not at all Not at all   Trouble relaxing Not at all Not at all Not at all Not at all   Being so restless that it is hard to sit still Not at all Not at all Not at all Not at all   Becoming easily annoyed or irritable Not at all Not at all Not at all Not at all   Feeling afraid as if something awful might happen Not at all Not at all Not at all Not at all   TYLER-7 Total Score 0 0 0 0   How difficult have these problems made it for you to do your work, take care of things at home, or get along with other people? Not difficult at all Not difficult at all Not difficult at all Not difficult at all        Travel Screening:    Travel Screening     No screening recorded since 07/15/24 0000       Travel History   Travel since 24    No documented travel since 24          Health Maintenance reviewed and discussed and ordered per Provider.  Transportation Needs: Unknown (2024)    PRAPARE - Transportation     Lack of Transportation (Medical): Patient unable to answer     Lack of Transportation (Non-Medical): No   Recent

## 2024-07-16 NOTE — PATIENT INSTRUCTIONS
[facility]  [address], [address], [place] [address]  [phone]

## 2024-07-16 NOTE — ASSESSMENT & PLAN NOTE
Chronic  Labs ordered during prior OV   Advised to complete iron studies and RTC in 4 weeks for further management   Pt's daughter verbalized understanding and agreeable to plan

## 2024-07-16 NOTE — ASSESSMENT & PLAN NOTE
Hypotensive today in office   Decrease amlodipine from 5 mg to 2.5 mg daily  Monitor BP at home   Continue following with cardiology  Advised to complete echo that was ordered during prior OV   Central scheduling info given today  Maintain low salt diet   Daily physical activity as tolerated

## 2024-07-16 NOTE — PROGRESS NOTES
Chief Complaint   Patient presents with    Follow-up     Assessment & Plan:     1. Hypertensive heart disease with congestive heart failure, unspecified heart failure type (HCC)  Assessment & Plan:  Hypotensive today in office   Decrease amlodipine from 5 mg to 2.5 mg daily  Monitor BP at home   Continue following with cardiology  Advised to complete echo that was ordered during prior OV   Central scheduling info given today  Maintain low salt diet   Daily physical activity as tolerated  Orders:  -     amLODIPine (NORVASC) 5 MG tablet; Take 0.5 tablets by mouth daily, Disp-15 tablet, R-0NO PRINT  -     Comprehensive Metabolic Panel; Future  2. Anemia, unspecified type  Assessment & Plan:  Chronic  Labs ordered during prior OV   Advised to complete iron studies and RTC in 4 weeks for further management   Pt's daughter verbalized understanding and agreeable to plan    Follow-up and Dispositions    Return in about 4 weeks (around 8/13/2024) for chronic disease management.       Subjective:     HPI  Chronic Disease Management  Hypertension:   Pt reports long-standing history of HTN  Current treatment: losartan 50 mg daily; amlodipine 5 mg daily  Reports daily medication compliance  Home BP monitoring: No  Home BP range: n/a  Current diet/exercise regimen: n/a; wheelchair bound  Denies CP, SOB, palpitations, headaches, edema    Anemia:   Current symptoms: None   Risk factors: history, sex   Treatment: None currently     Review of Systems   Respiratory:  Negative for shortness of breath.    Cardiovascular:  Negative for chest pain and leg swelling.   Neurological:  Negative for dizziness, light-headedness and headaches.     Objective:   BP 94/60   Pulse 96   Temp 97.9 °F (36.6 °C) (Temporal)   Resp 14   Ht 1.676 m (5' 6\")   Wt 72.6 kg (160 lb)   SpO2 98%   BMI 25.82 kg/m²     Physical Exam  Vitals and nursing note reviewed.   Constitutional:       General: She is not in acute distress.     Appearance: She is not

## 2024-07-19 ENCOUNTER — CLINICAL DOCUMENTATION (OUTPATIENT)
Dept: CASE MANAGEMENT | Age: 74
End: 2024-07-19

## 2024-07-19 DIAGNOSIS — E78.5 HYPERLIPIDEMIA ASSOCIATED WITH TYPE 2 DIABETES MELLITUS (HCC): ICD-10-CM

## 2024-07-19 DIAGNOSIS — E11.69 HYPERLIPIDEMIA ASSOCIATED WITH TYPE 2 DIABETES MELLITUS (HCC): ICD-10-CM

## 2024-07-19 DIAGNOSIS — I11.0 HYPERTENSIVE HEART DISEASE WITH CONGESTIVE HEART FAILURE, UNSPECIFIED HEART FAILURE TYPE (HCC): ICD-10-CM

## 2024-07-19 DIAGNOSIS — Z86.73 HISTORY OF CVA (CEREBROVASCULAR ACCIDENT): ICD-10-CM

## 2024-07-19 RX ORDER — LOSARTAN POTASSIUM 50 MG/1
TABLET ORAL
Qty: 180 TABLET | OUTPATIENT
Start: 2024-07-19

## 2024-07-19 RX ORDER — ATORVASTATIN CALCIUM 40 MG/1
TABLET, FILM COATED ORAL
Qty: 90 TABLET | Refills: 0 | OUTPATIENT
Start: 2024-07-19

## 2024-07-19 RX ORDER — APIXABAN 5 MG/1
5 TABLET, FILM COATED ORAL 2 TIMES DAILY
Qty: 180 TABLET | Refills: 0 | OUTPATIENT
Start: 2024-07-19

## 2024-07-23 ENCOUNTER — OFFICE VISIT (OUTPATIENT)
Age: 74
End: 2024-07-23

## 2024-07-23 VITALS
BODY MASS INDEX: 25.82 KG/M2 | OXYGEN SATURATION: 100 % | DIASTOLIC BLOOD PRESSURE: 60 MMHG | SYSTOLIC BLOOD PRESSURE: 110 MMHG | HEIGHT: 66 IN | HEART RATE: 96 BPM

## 2024-07-23 DIAGNOSIS — I10 PRIMARY HYPERTENSION: ICD-10-CM

## 2024-07-23 DIAGNOSIS — D64.9 ANEMIA, UNSPECIFIED TYPE: ICD-10-CM

## 2024-07-23 DIAGNOSIS — G81.94 LEFT HEMIPARESIS (HCC): Chronic | ICD-10-CM

## 2024-07-23 DIAGNOSIS — I67.9 CEREBROVASCULAR DISEASE: ICD-10-CM

## 2024-07-23 DIAGNOSIS — Z86.73 HISTORY OF CVA (CEREBROVASCULAR ACCIDENT): Chronic | ICD-10-CM

## 2024-07-23 DIAGNOSIS — I35.0 AORTIC VALVE STENOSIS, ETIOLOGY OF CARDIAC VALVE DISEASE UNSPECIFIED: Primary | ICD-10-CM

## 2024-07-23 RX ORDER — LOSARTAN POTASSIUM 50 MG/1
50 TABLET ORAL DAILY
COMMUNITY

## 2024-07-23 RX ORDER — CLOPIDOGREL BISULFATE 75 MG/1
75 TABLET ORAL DAILY
Qty: 90 TABLET | Refills: 3 | Status: SHIPPED | OUTPATIENT
Start: 2024-07-23

## 2024-07-23 RX ORDER — AMLODIPINE BESYLATE 2.5 MG/1
2.5 TABLET ORAL DAILY
Qty: 90 TABLET | Refills: 3 | Status: SHIPPED | OUTPATIENT
Start: 2024-07-23 | End: 2025-07-18

## 2024-07-23 ASSESSMENT — ENCOUNTER SYMPTOMS
VOMITING: 0
SORE THROAT: 0
ABDOMINAL PAIN: 0
ABDOMINAL DISTENTION: 0
COUGH: 0
SHORTNESS OF BREATH: 0
NAUSEA: 0

## 2024-07-23 NOTE — PROGRESS NOTES
Ivonne Taylor presents today for No chief complaint on file.      Ivonne Taylor preferred language for health care discussion is english/other.    Is someone accompanying this pt? yes    Is the patient using any DME equipment during OV? yes    Depression Screening:  Depression: Not at risk (7/16/2024)    PHQ-2     PHQ-2 Score: 0        Learning Assessment:  No question data found.       Pt currently taking Anticoagulant therapy? Eliquis 5mg twice a day    Pt currently taking Antiplatelet therapy ? Aspirin 81 mg daily      Coordination of Care:  1. Have you been to the ER, urgent care clinic since your last visit? Hospitalized since your last visit? yes    2. Have you seen or consulted any other health care providers outside of the [facility] since your last visit? Include any pap smears or colon screening. no    
Medium Adult)   Pulse 96   Ht 1.676 m (5' 6\")   SpO2 100%   BMI 25.82 kg/m²     Objective:   Physical Exam  Constitutional:       General: She is not in acute distress.     Comments: Wheelchair-bound   HENT:      Head: Normocephalic.   Neck:      Vascular: No carotid bruit or JVD.   Cardiovascular:      Rate and Rhythm: Normal rate and regular rhythm. No extrasystoles are present.     Heart sounds: Murmur heard.      Harsh midsystolic murmur is present with a grade of 3/6 at the upper right sternal border radiating to the neck.      No gallop.   Pulmonary:      Effort: Pulmonary effort is normal.      Breath sounds: No wheezing, rhonchi or rales.   Abdominal:      General: Bowel sounds are normal. There is no distension.      Palpations: Abdomen is soft.      Tenderness: There is no abdominal tenderness.   Musculoskeletal:         General: No swelling or deformity.   Skin:     General: Skin is warm and dry.      Findings: No rash.   Neurological:      General: No focal deficit present.      Mental Status: She is alert and oriented to person, place, and time.      Comments: Left hemiparesis   Psychiatric:         Mood and Affect: Mood normal.         Behavior: Behavior normal.         June 22, 2024 EKG: Normal sinus rhythm, normal axis, normal QTc interval, no ST or T wave changes concerning for ischemia.  Normal tracing.  Assessment / Plan:   Aortic valve stenosis.  This was in the moderate range on an echocardiogram in June 2022.  I have recommended reevaluation of the valve with a repeat echocardiogram.  This is already been scheduled by her PCP later next month.  I will review this once this is completed.    Cerebrovascular disease.  This was extensive on a CTA of the head and neck vessels in June 2024.  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  There is no history of previous atrial fibrillation.  Given her cerebrovascular disease, would recommend

## 2024-07-25 ENCOUNTER — CLINICAL DOCUMENTATION (OUTPATIENT)
Dept: CASE MANAGEMENT | Age: 74
End: 2024-07-25

## 2024-07-30 ENCOUNTER — CLINICAL DOCUMENTATION (OUTPATIENT)
Dept: CASE MANAGEMENT | Age: 74
End: 2024-07-30

## 2024-07-30 NOTE — ACP (ADVANCE CARE PLANNING)
Advance Care Planning     Advance Care Planning Clinical Specialist  Conversation Note      Date of ACP Conversation: 7/30/2024    Conversation Conducted with: Legal next of kin    ACP Clinical Specialist: Preeti Gonzalez LCSW    Healthcare Decision Maker:     Current Designated Healthcare Decision Maker:     Primary Decision Maker: Cara Taylor - Child - [phone]    Primary Decision Maker: Mason Taylor - Child - [phone]    Primary Decision Maker: Leandro Taylor - Child - [phone]    Care Preferences    Ventilation:  \"If you were in your present state of health and suddenly became very ill and were unable to breathe on your own, what would your preference be about the use of a ventilator (breathing machine) if it were available to you?\"      If the patient would desire the use of ventilator (breathing machine), answer \"yes\".  If not, \"no\":  No healthcare decisions made during this appointment    \"If your health worsens and it becomes clear that your chance of recovery is unlikely, what would your preference be about the use of a ventilator (breathing machine) if it were available to you?\"     Would the patient desire the use of ventilator (breathing machine)?: No healthcare decisions made during this appointment      Resuscitation  \"CPR works best to restart the heart when there is a sudden event, like a heart attack, in someone who is otherwise healthy. Unfortunately, CPR does not typically restart the heart for people who have serious health conditions or who are very sick.\"    \"In the event your heart stopped as a result of an underlying serious health condition, would you want attempts to be made to restart your heart (answer \"yes\" for attempt to resuscitate) or would you prefer a natural death (answer \"no\" for do not attempt to resuscitate)?\" No healthcare decisions made during this appointment       [x] Yes   [] No   Educated Patient / Decision Maker regarding differences between Advance

## 2024-08-21 ENCOUNTER — OFFICE VISIT (OUTPATIENT)
Facility: CLINIC | Age: 74
End: 2024-08-21
Payer: MEDICARE

## 2024-08-21 VITALS
TEMPERATURE: 98.2 F | WEIGHT: 161 LBS | BODY MASS INDEX: 25.88 KG/M2 | HEART RATE: 100 BPM | HEIGHT: 66 IN | RESPIRATION RATE: 16 BRPM | DIASTOLIC BLOOD PRESSURE: 73 MMHG | SYSTOLIC BLOOD PRESSURE: 108 MMHG

## 2024-08-21 DIAGNOSIS — E11.65 TYPE 2 DIABETES MELLITUS WITH HYPERGLYCEMIA, WITHOUT LONG-TERM CURRENT USE OF INSULIN (HCC): ICD-10-CM

## 2024-08-21 DIAGNOSIS — Z85.3 HISTORY OF BREAST CANCER: Chronic | ICD-10-CM

## 2024-08-21 DIAGNOSIS — G40.909 SEIZURE DISORDER (HCC): ICD-10-CM

## 2024-08-21 DIAGNOSIS — R07.9 CHEST PAIN, UNSPECIFIED TYPE: Primary | ICD-10-CM

## 2024-08-21 DIAGNOSIS — R07.9 CHEST PAIN, UNSPECIFIED TYPE: ICD-10-CM

## 2024-08-21 PROCEDURE — 99214 OFFICE O/P EST MOD 30 MIN: CPT

## 2024-08-21 PROCEDURE — 1123F ACP DISCUSS/DSCN MKR DOCD: CPT

## 2024-08-21 RX ORDER — ASPIRIN 81 MG/1
81 TABLET ORAL DAILY
Qty: 90 TABLET | Refills: 1 | Status: SHIPPED | OUTPATIENT
Start: 2024-08-21 | End: 2025-02-17

## 2024-08-21 RX ORDER — DAPAGLIFLOZIN 10 MG/1
10 TABLET, FILM COATED ORAL DAILY
Qty: 90 TABLET | Refills: 0 | Status: SHIPPED | OUTPATIENT
Start: 2024-08-21 | End: 2024-11-19

## 2024-08-21 RX ORDER — LACOSAMIDE 200 MG/1
200 TABLET ORAL 2 TIMES DAILY
Qty: 180 TABLET | Refills: 0 | Status: SHIPPED | OUTPATIENT
Start: 2024-08-21 | End: 2024-11-19

## 2024-08-21 RX ORDER — DAPAGLIFLOZIN 10 MG/1
10 TABLET, FILM COATED ORAL DAILY
COMMUNITY
End: 2024-08-21 | Stop reason: SDUPTHER

## 2024-08-21 SDOH — ECONOMIC STABILITY: FOOD INSECURITY: WITHIN THE PAST 12 MONTHS, THE FOOD YOU BOUGHT JUST DIDN'T LAST AND YOU DIDN'T HAVE MONEY TO GET MORE.: NEVER TRUE

## 2024-08-21 SDOH — ECONOMIC STABILITY: FOOD INSECURITY: WITHIN THE PAST 12 MONTHS, YOU WORRIED THAT YOUR FOOD WOULD RUN OUT BEFORE YOU GOT MONEY TO BUY MORE.: NEVER TRUE

## 2024-08-21 SDOH — ECONOMIC STABILITY: INCOME INSECURITY: HOW HARD IS IT FOR YOU TO PAY FOR THE VERY BASICS LIKE FOOD, HOUSING, MEDICAL CARE, AND HEATING?: NOT HARD AT ALL

## 2024-08-21 ASSESSMENT — ANXIETY QUESTIONNAIRES
IF YOU CHECKED OFF ANY PROBLEMS ON THIS QUESTIONNAIRE, HOW DIFFICULT HAVE THESE PROBLEMS MADE IT FOR YOU TO DO YOUR WORK, TAKE CARE OF THINGS AT HOME, OR GET ALONG WITH OTHER PEOPLE: NOT DIFFICULT AT ALL
4. TROUBLE RELAXING: NOT AT ALL
3. WORRYING TOO MUCH ABOUT DIFFERENT THINGS: NOT AT ALL
5. BEING SO RESTLESS THAT IT IS HARD TO SIT STILL: NOT AT ALL
7. FEELING AFRAID AS IF SOMETHING AWFUL MIGHT HAPPEN: NOT AT ALL
2. NOT BEING ABLE TO STOP OR CONTROL WORRYING: NOT AT ALL
6. BECOMING EASILY ANNOYED OR IRRITABLE: NOT AT ALL
1. FEELING NERVOUS, ANXIOUS, OR ON EDGE: NOT AT ALL

## 2024-08-21 ASSESSMENT — PATIENT HEALTH QUESTIONNAIRE - PHQ9
1. LITTLE INTEREST OR PLEASURE IN DOING THINGS: NOT AT ALL
SUM OF ALL RESPONSES TO PHQ QUESTIONS 1-9: 0
SUM OF ALL RESPONSES TO PHQ QUESTIONS 1-9: 0
SUM OF ALL RESPONSES TO PHQ9 QUESTIONS 1 & 2: 0
2. FEELING DOWN, DEPRESSED OR HOPELESS: NOT AT ALL
SUM OF ALL RESPONSES TO PHQ QUESTIONS 1-9: 0
SUM OF ALL RESPONSES TO PHQ QUESTIONS 1-9: 0

## 2024-08-21 NOTE — PROGRESS NOTES
Chief Complaint   Patient presents with    Follow-Up from Hospital    Medication Refill       Assessment & Plan:     1. Chest pain, unspecified type  Comments:  Stable  ED records reviewed  Repeat labs ordered today  Medication refilled per patient request  Orders:  -     aspirin 81 MG EC tablet; Take 1 tablet by mouth daily, Disp-90 tablet, R-1Normal  -     CBC with Auto Differential; Future  -     Comprehensive Metabolic Panel; Future  -     Lipid Panel; Future  2. Seizure disorder (HCC)  Assessment & Plan:  Chronic, stable  Continue lacosamide 200 mg daily  Advised to schedule with neurology  POA understands that this medication needs to be filled and monitored through neurology however will send refill today so patient doesn't run out of this medication  Orders:  -     lacosamide (VIMPAT) 200 MG tablet; Take 1 tablet by mouth in the morning and at bedtime for 90 days. This is an increased dose from your 150 mg twice daily.  Please make sure to review your dosing with your primary neurology provider. Max Daily Amount: 400 mg, Disp-180 tablet, R-0Normal  -     CBC with Auto Differential; Future  -     Comprehensive Metabolic Panel; Future  3. Type 2 diabetes mellitus with hyperglycemia, without long-term current use of insulin (HCC)  Assessment & Plan:  Status unknown   Continue Farxiga 10 mg daily  Repeat labs ordered today   Awaiting results  Will treat accordingly  Maintain low carb/sugar diet   Monitor blood sugars at home  Home health to provide education on diabetes and assist in monitoring blood sugars and medication  Orders:  -     FARXIGA 10 MG tablet; Take 1 tablet by mouth daily, Disp-90 tablet, R-0, DAWNormal  -     Hemoglobin A1C; Future  -     Lipid Panel; Future  4. History of breast cancer  Assessment & Plan:  Referred to oncology for further evaluation  Letrozole needs to be refilled through oncology  Orders:  -     External Referral To Hematology Oncology    Follow-up and Dispositions    Return

## 2024-08-21 NOTE — PATIENT INSTRUCTIONS
Schedule echo (ultrasound of her heat)     Call neurology for sooner appt: [facility]  [address], [address], [place] [address]  [phone]    [facility]- located on [address]  [place] [address]  Main Phone: [phone]  Fax: [phone]     Schedule appt w/ oncology for breat cancer follow-up

## 2024-08-21 NOTE — PROGRESS NOTES
Ivonne Claudia presents today for   Chief Complaint   Patient presents with    Follow-Up from Hospital     Is someone accompanying this pt? No    Is the patient using any DME equipment during OV? No    Depression Screenin/21/2024    10:20 AM 2024    11:30 AM 2024     8:40 AM 2024    10:32 AM 2024    10:00 AM 2024    11:34 AM 2023    11:50 AM   PHQ-9 Questionaire   Little interest or pleasure in doing things 0 0 0 0 0 0 1   Feeling down, depressed, or hopeless 0 0 0 0 0 0 1   PHQ-9 Total Score 0 0 0 0 0 0 2        TYLER 7-Anxiety       2024    10:20 AM 2024    11:30 AM 2024     8:40 AM 2024    10:32 AM 2024    10:00 AM 2024    11:34 AM   TYLER-7 SCREENING   Feeling nervous, anxious, or on edge Not at all Not at all Not at all Not at all Not at all Not at all   Not being able to stop or control worrying Not at all Not at all Not at all Not at all Not at all Not at all   Worrying too much about different things Not at all Not at all Not at all Not at all Not at all Not at all   Trouble relaxing Not at all Not at all Not at all Not at all Not at all Not at all   Being so restless that it is hard to sit still Not at all Not at all Not at all Not at all Not at all Not at all   Becoming easily annoyed or irritable Not at all Not at all Not at all Not at all Not at all Not at all   Feeling afraid as if something awful might happen Not at all Not at all Not at all Not at all Not at all Not at all   TYLER-7 Total Score 0 0 0 0 0 0   How difficult have these problems made it for you to do your work, take care of things at home, or get along with other people? Not difficult at all Not difficult at all Not difficult at all Not difficult at all Not difficult at all Not difficult at all        Travel Screening:    Travel Screening     No screening recorded since 24 0000       Travel History   Travel since 24    No documented travel since 24

## 2024-08-22 ASSESSMENT — ENCOUNTER SYMPTOMS: SHORTNESS OF BREATH: 0

## 2024-08-22 NOTE — ASSESSMENT & PLAN NOTE
Status unknown   Continue Farxiga 10 mg daily  Repeat labs ordered today   Awaiting results  Will treat accordingly  Maintain low carb/sugar diet   Monitor blood sugars at home  Home health to provide education on diabetes and assist in monitoring blood sugars and medication

## 2024-08-22 NOTE — ASSESSMENT & PLAN NOTE
Chronic, stable  Continue lacosamide 200 mg daily  Advised to schedule with neurology  POA understands that this medication needs to be filled and monitored through neurology however will send refill today so patient doesn't run out of this medication

## 2024-09-10 ENCOUNTER — TELEPHONE (OUTPATIENT)
Facility: CLINIC | Age: 74
End: 2024-09-10

## 2024-10-03 DIAGNOSIS — K21.9 GASTROESOPHAGEAL REFLUX DISEASE WITHOUT ESOPHAGITIS: ICD-10-CM

## 2024-10-03 DIAGNOSIS — E11.69 HYPERLIPIDEMIA ASSOCIATED WITH TYPE 2 DIABETES MELLITUS (HCC): ICD-10-CM

## 2024-10-03 DIAGNOSIS — E78.5 HYPERLIPIDEMIA ASSOCIATED WITH TYPE 2 DIABETES MELLITUS (HCC): ICD-10-CM

## 2024-10-03 RX ORDER — ATORVASTATIN CALCIUM 40 MG/1
TABLET, FILM COATED ORAL
Qty: 90 TABLET | Refills: 0 | OUTPATIENT
Start: 2024-10-03

## 2024-10-03 RX ORDER — OMEPRAZOLE 40 MG/1
40 CAPSULE, DELAYED RELEASE ORAL
Qty: 90 CAPSULE | Refills: 1 | OUTPATIENT
Start: 2024-10-03

## 2024-10-08 ENCOUNTER — COMMUNITY OUTREACH (OUTPATIENT)
Facility: CLINIC | Age: 74
End: 2024-10-08

## 2024-10-08 ENCOUNTER — TELEPHONE (OUTPATIENT)
Facility: CLINIC | Age: 74
End: 2024-10-08

## 2024-10-08 DIAGNOSIS — K21.9 GASTROESOPHAGEAL REFLUX DISEASE WITHOUT ESOPHAGITIS: ICD-10-CM

## 2024-10-08 DIAGNOSIS — E78.5 HYPERLIPIDEMIA ASSOCIATED WITH TYPE 2 DIABETES MELLITUS (HCC): ICD-10-CM

## 2024-10-08 DIAGNOSIS — E11.65 TYPE 2 DIABETES MELLITUS WITH HYPERGLYCEMIA, WITHOUT LONG-TERM CURRENT USE OF INSULIN (HCC): ICD-10-CM

## 2024-10-08 DIAGNOSIS — Z85.3 HISTORY OF BREAST CANCER: ICD-10-CM

## 2024-10-08 DIAGNOSIS — E11.69 HYPERLIPIDEMIA ASSOCIATED WITH TYPE 2 DIABETES MELLITUS (HCC): ICD-10-CM

## 2024-10-08 DIAGNOSIS — R07.9 CHEST PAIN, UNSPECIFIED TYPE: ICD-10-CM

## 2024-10-08 DIAGNOSIS — I11.0 HYPERTENSIVE HEART DISEASE WITH CONGESTIVE HEART FAILURE, UNSPECIFIED HEART FAILURE TYPE (HCC): ICD-10-CM

## 2024-10-08 RX ORDER — LETROZOLE 2.5 MG/1
TABLET, FILM COATED ORAL
Qty: 30 TABLET | Refills: 1 | Status: CANCELLED | OUTPATIENT
Start: 2024-10-08

## 2024-10-08 RX ORDER — AMLODIPINE BESYLATE 2.5 MG/1
2.5 TABLET ORAL DAILY
Qty: 90 TABLET | Refills: 3 | Status: CANCELLED | OUTPATIENT
Start: 2024-10-08 | End: 2025-10-03

## 2024-10-08 RX ORDER — ISOSORBIDE MONONITRATE 30 MG/1
30 TABLET, EXTENDED RELEASE ORAL DAILY
Qty: 90 TABLET | Refills: 1 | Status: CANCELLED | OUTPATIENT
Start: 2024-10-08

## 2024-10-08 RX ORDER — ATORVASTATIN CALCIUM 40 MG/1
TABLET, FILM COATED ORAL
Qty: 90 TABLET | Refills: 0 | Status: CANCELLED | OUTPATIENT
Start: 2024-10-08

## 2024-10-08 RX ORDER — OMEPRAZOLE 40 MG/1
40 CAPSULE, DELAYED RELEASE ORAL
Qty: 90 CAPSULE | Refills: 1 | Status: CANCELLED | OUTPATIENT
Start: 2024-10-08

## 2024-10-08 RX ORDER — DAPAGLIFLOZIN 10 MG/1
10 TABLET, FILM COATED ORAL DAILY
Qty: 90 TABLET | Refills: 0 | Status: CANCELLED | OUTPATIENT
Start: 2024-10-08 | End: 2025-01-06

## 2024-10-08 RX ORDER — ASPIRIN 81 MG/1
81 TABLET ORAL DAILY
Qty: 90 TABLET | Refills: 1 | Status: CANCELLED | OUTPATIENT
Start: 2024-10-08 | End: 2025-04-06

## 2024-10-08 RX ORDER — LOSARTAN POTASSIUM 50 MG/1
50 TABLET ORAL DAILY
Qty: 30 TABLET | Status: CANCELLED | OUTPATIENT
Start: 2024-10-08

## 2024-10-08 RX ORDER — CLOPIDOGREL BISULFATE 75 MG/1
75 TABLET ORAL DAILY
Qty: 90 TABLET | Refills: 3 | Status: CANCELLED | OUTPATIENT
Start: 2024-10-08

## 2024-10-08 NOTE — PROGRESS NOTES
Patient's HM shows they are overdue for A1C & Mammogram.  Care Everywhere and  files searched.   updated with 2024 A1C.

## 2024-10-08 NOTE — TELEPHONE ENCOUNTER
Patient called in requesting the following medication refill:     Eliquis 5 mg   Amlodipine (NORVASC) 2.5 MG tablet   Aspirin 81 MG EC tablet   Atorvastatin (LIPITOR) 40 MG tablet   Clopidogrel (PLAVIX) 75 MG tablet   Farxiga 10 MG tablet   Isosorbide Mononitrate (IMDUR) 30 MG extended release tablet   Letrozole (Femara) 2.5 MG tablet   Omeprazole (PRILOSEC) 40 MG delayed release capsule   Losartan (COZAAR) 50 MG tablet     Pharmacy has been confirmed for [address] Phone: [phone]     LOV: NONE   NOV: NONE     Please be advised, thank you.

## 2024-10-16 NOTE — ED NOTES
Patient having focal seizure activity- patient able to nod head to nurse. PIV attempt being made- ultrasound needing to be used.
Statement Selected

## 2024-10-22 ENCOUNTER — OFFICE VISIT (OUTPATIENT)
Facility: CLINIC | Age: 74
End: 2024-10-22

## 2024-10-22 VITALS
RESPIRATION RATE: 14 BRPM | DIASTOLIC BLOOD PRESSURE: 66 MMHG | SYSTOLIC BLOOD PRESSURE: 132 MMHG | HEIGHT: 66 IN | BODY MASS INDEX: 25.71 KG/M2 | HEART RATE: 97 BPM | OXYGEN SATURATION: 96 % | TEMPERATURE: 98.2 F | WEIGHT: 160 LBS

## 2024-10-22 DIAGNOSIS — Z85.3 HISTORY OF BREAST CANCER: Chronic | ICD-10-CM

## 2024-10-22 DIAGNOSIS — G40.909 SEIZURE DISORDER (HCC): Primary | Chronic | ICD-10-CM

## 2024-10-22 ASSESSMENT — PATIENT HEALTH QUESTIONNAIRE - PHQ9
SUM OF ALL RESPONSES TO PHQ QUESTIONS 1-9: 0
SUM OF ALL RESPONSES TO PHQ9 QUESTIONS 1 & 2: 0
SUM OF ALL RESPONSES TO PHQ QUESTIONS 1-9: 0
2. FEELING DOWN, DEPRESSED OR HOPELESS: NOT AT ALL
1. LITTLE INTEREST OR PLEASURE IN DOING THINGS: NOT AT ALL

## 2024-10-22 NOTE — PROGRESS NOTES
Ivonne Taylor presents today for   Chief Complaint   Patient presents with    Follow-up     Is someone accompanying this pt? Yes, family    Is the patient using any DME equipment during OV? No    Depression Screening:      10/22/2024    10:16 AM 8/21/2024    10:20 AM 7/23/2024    11:30 AM 7/16/2024     8:40 AM 7/11/2024    10:32 AM 6/18/2024    10:00 AM 6/11/2024    11:34 AM   PHQ-9 Questionaire   Little interest or pleasure in doing things 0 0 0 0 0 0 0   Feeling down, depressed, or hopeless 0 0 0 0 0 0 0   PHQ-9 Total Score 0 0 0 0 0 0 0        TYLER 7-Anxiety       8/21/2024    10:20 AM 7/23/2024    11:30 AM 7/16/2024     8:40 AM 7/11/2024    10:32 AM 6/18/2024    10:00 AM 6/11/2024    11:34 AM   TYLER-7 SCREENING   Feeling nervous, anxious, or on edge Not at all Not at all Not at all Not at all Not at all Not at all   Not being able to stop or control worrying Not at all Not at all Not at all Not at all Not at all Not at all   Worrying too much about different things Not at all Not at all Not at all Not at all Not at all Not at all   Trouble relaxing Not at all Not at all Not at all Not at all Not at all Not at all   Being so restless that it is hard to sit still Not at all Not at all Not at all Not at all Not at all Not at all   Becoming easily annoyed or irritable Not at all Not at all Not at all Not at all Not at all Not at all   Feeling afraid as if something awful might happen Not at all Not at all Not at all Not at all Not at all Not at all   TYLER-7 Total Score 0 0 0 0 0 0   How difficult have these problems made it for you to do your work, take care of things at home, or get along with other people? Not difficult at all Not difficult at all Not difficult at all Not difficult at all Not difficult at all Not difficult at all        Travel Screening:    Travel Screening     No screening recorded since 10/21/24 0000       Travel History   Travel since 09/22/24    No documented travel since 09/22/24      
Chief Complaint   Patient presents with   • Follow-up       Assessment & Plan:     {There are no diagnoses linked to this encounter. (Refresh or delete this SmartLink)}        Subjective:     HPI  Chronic Disease Management  ***    Acute Health Concerns  ***    Health Maintenance  ***    Review of Systems      Objective:   /66 (Site: Left Upper Arm, Position: Sitting, Cuff Size: Large Adult)   Pulse 97   Temp 98.2 °F (36.8 °C) (Temporal)   Resp 14   Ht 1.676 m (5' 6\")   Wt 72.6 kg (160 lb)   SpO2 96%   BMI 25.82 kg/m²     Physical Exam  Total time spent:   Bandar Schneider, JESSICA  
ill-appearing.   HENT:      Head: Normocephalic and atraumatic.   Cardiovascular:      Rate and Rhythm: Normal rate and regular rhythm.      Heart sounds: Murmur heard.   Pulmonary:      Effort: Pulmonary effort is normal. No respiratory distress.      Breath sounds: No wheezing, rhonchi or rales.   Musculoskeletal:         General: Normal range of motion.   Skin:     General: Skin is warm and dry.   Neurological:      Mental Status: She is alert. Mental status is at baseline. She is disoriented.     Total time spent: 30 min   JESSICA Marcial

## 2024-10-23 ASSESSMENT — ENCOUNTER SYMPTOMS: SHORTNESS OF BREATH: 0

## 2024-11-04 DIAGNOSIS — E11.69 HYPERLIPIDEMIA ASSOCIATED WITH TYPE 2 DIABETES MELLITUS (HCC): ICD-10-CM

## 2024-11-04 DIAGNOSIS — E78.5 HYPERLIPIDEMIA ASSOCIATED WITH TYPE 2 DIABETES MELLITUS (HCC): ICD-10-CM

## 2024-11-04 NOTE — TELEPHONE ENCOUNTER
Patient is requesting refill      amLODIPine (NORVASC) 2.5 MG tablet   clopidogrel (PLAVIX) 75 MG tablet   lacosamide (VIMPAT) 200 MG tablet     Patient is completely out    Pt is also requesting a [facility] homecare nurse to come to the home because the current one they have doesn't come like she's supposed to    Please f/u with the son when available for clarification on this matter at [phone]

## 2024-11-05 RX ORDER — ATORVASTATIN CALCIUM 40 MG/1
TABLET, FILM COATED ORAL
Qty: 90 TABLET | Refills: 0 | Status: SHIPPED | OUTPATIENT
Start: 2024-11-05

## 2024-11-05 NOTE — TELEPHONE ENCOUNTER
Called patient's son in regards to medication. I let him know she should have refills on all the medications stated and it should last until her appointment on 11/21/2024. He stated she is out of atorvastatin. [facility] is not here but I let him know I will go ahead and send the request to another provider to be refilled. Patient's son understood.    Also let him know he has to call [facility] to request a different nurse. Patient's son understood.

## 2024-11-07 ENCOUNTER — TELEPHONE (OUTPATIENT)
Facility: CLINIC | Age: 74
End: 2024-11-07

## 2024-11-07 NOTE — TELEPHONE ENCOUNTER
Pati Vallejo  from [facility] called and is requesting to speak to the nurse about the pts wound care follow up. Ms. Krueger can be reached at [phone]. Please advise. Thank you!!

## 2024-11-07 NOTE — TELEPHONE ENCOUNTER
Called Pati in regards to patients wound. She expressed that Ivonne went to the ED due to a wound she had because the family was not properly changing the dressing. She says there are pictures in the ED note on epic. She states the wound Ivonne went to the ED for is looking better when home health care comes and changes the dressing out but she wants to know if it is ok to assist the family in changing her dressing through home health daily. I let her know that is fine considering she is improving. Pati understood.

## 2024-11-21 ENCOUNTER — OFFICE VISIT (OUTPATIENT)
Facility: CLINIC | Age: 74
End: 2024-11-21

## 2024-11-21 VITALS
TEMPERATURE: 97.8 F | DIASTOLIC BLOOD PRESSURE: 71 MMHG | RESPIRATION RATE: 14 BRPM | HEART RATE: 71 BPM | SYSTOLIC BLOOD PRESSURE: 118 MMHG | BODY MASS INDEX: 25.55 KG/M2 | OXYGEN SATURATION: 95 % | WEIGHT: 159 LBS | HEIGHT: 66 IN

## 2024-11-21 DIAGNOSIS — I26.99 ACUTE PULMONARY EMBOLISM WITHOUT ACUTE COR PULMONALE, UNSPECIFIED PULMONARY EMBOLISM TYPE (HCC): Primary | ICD-10-CM

## 2024-11-21 DIAGNOSIS — Z09 HOSPITAL DISCHARGE FOLLOW-UP: ICD-10-CM

## 2024-11-21 RX ORDER — AMOXICILLIN 250 MG
1 CAPSULE ORAL DAILY
COMMUNITY

## 2024-11-21 SDOH — ECONOMIC STABILITY: FOOD INSECURITY: WITHIN THE PAST 12 MONTHS, THE FOOD YOU BOUGHT JUST DIDN'T LAST AND YOU DIDN'T HAVE MONEY TO GET MORE.: NEVER TRUE

## 2024-11-21 SDOH — ECONOMIC STABILITY: FOOD INSECURITY: WITHIN THE PAST 12 MONTHS, YOU WORRIED THAT YOUR FOOD WOULD RUN OUT BEFORE YOU GOT MONEY TO BUY MORE.: NEVER TRUE

## 2024-11-21 SDOH — ECONOMIC STABILITY: INCOME INSECURITY: HOW HARD IS IT FOR YOU TO PAY FOR THE VERY BASICS LIKE FOOD, HOUSING, MEDICAL CARE, AND HEATING?: NOT HARD AT ALL

## 2024-11-21 ASSESSMENT — PATIENT HEALTH QUESTIONNAIRE - PHQ9
SUM OF ALL RESPONSES TO PHQ9 QUESTIONS 1 & 2: 0
1. LITTLE INTEREST OR PLEASURE IN DOING THINGS: NOT AT ALL
SUM OF ALL RESPONSES TO PHQ QUESTIONS 1-9: 0
2. FEELING DOWN, DEPRESSED OR HOPELESS: NOT AT ALL
SUM OF ALL RESPONSES TO PHQ QUESTIONS 1-9: 0

## 2024-11-21 ASSESSMENT — ANXIETY QUESTIONNAIRES
2. NOT BEING ABLE TO STOP OR CONTROL WORRYING: NOT AT ALL
5. BEING SO RESTLESS THAT IT IS HARD TO SIT STILL: NOT AT ALL
6. BECOMING EASILY ANNOYED OR IRRITABLE: NOT AT ALL
7. FEELING AFRAID AS IF SOMETHING AWFUL MIGHT HAPPEN: NOT AT ALL
4. TROUBLE RELAXING: NOT AT ALL
IF YOU CHECKED OFF ANY PROBLEMS ON THIS QUESTIONNAIRE, HOW DIFFICULT HAVE THESE PROBLEMS MADE IT FOR YOU TO DO YOUR WORK, TAKE CARE OF THINGS AT HOME, OR GET ALONG WITH OTHER PEOPLE: NOT DIFFICULT AT ALL
3. WORRYING TOO MUCH ABOUT DIFFERENT THINGS: NOT AT ALL
1. FEELING NERVOUS, ANXIOUS, OR ON EDGE: NOT AT ALL
GAD7 TOTAL SCORE: 0

## 2024-11-21 NOTE — PROGRESS NOTES
Post-Discharge Transitional Care Management Progress Note      Ivonne Taylor   YOB: 1950    Date of Office Visit:  11/21/2024  Date of Hospital Admission: 11/10/24  Date of Hospital Discharge: 11/11/24    Care management risk score Rising risk (score 2-5) and Complex Care (Scores >=6): No Risk Score On File     Non face to face  following discharge, date last encounter closed (first attempt may have been earlier): *No documented post hospital discharge outreach found in the last 14 days *No documented post hospital discharge outreach found in the last 14 days    Call initiated 2 business days of discharge: *No response recorded in the last 14 days    ASSESSMENT/PLAN:   Acute pulmonary embolism without acute cor pulmonale, unspecified pulmonary embolism type (HCC)  Assessment & Plan:  Stable   Hospital records reviewed   Continue Eliquis 5 mg BID  Refer to pulmonary for further evaluation  Referral to hematology/oncology already placed  Long discussion about importance of making/keeping speciality appts  Pt's grandson verbalized understanding and agreeable to plan  Orders:  -     External Referral To Pulmonology  Hospital discharge follow-up  -     ME DISCHARGE MEDS RECONCILED W/ CURRENT OUTPATIENT MED LIST    Medical Decision Making: moderate complexity  Return in about 6 weeks (around 1/2/2025).    On this date 11/21/2024 I have spent 30 minutes reviewing previous notes, test results and face to face with the patient discussing the diagnosis and importance of compliance with the treatment plan as well as documenting on the day of the visit.     Subjective:   HPI:  Follow up of Hospital problems/diagnosis(es):     Acute right sided pulmonary embolism:   Is currently taking eliquis 5 mg BID   Reports daily medication compliance   States that she does not yet have an appt with hematology/oncology at this time   Referral to hematology/oncology placed in 08/2024   Small pericardial effusion, 
at all Not difficult at all Not difficult at all        Travel Screening:    Travel Screening     No screening recorded since 11/20/24 0000       Travel History   Travel since 10/21/24    No documented travel since 10/21/24          Health Maintenance reviewed and discussed and ordered per Provider.  Transportation Needs: Unknown (11/21/2024)    PRAPARE - Transportation     Lack of Transportation (Medical): Patient unable to answer     Lack of Transportation (Non-Medical): No      Food Insecurity: No Food Insecurity (11/21/2024)    Hunger Vital Sign     Worried About Running Out of Food in the Last Year: Never true     Ran Out of Food in the Last Year: Never true     Financial Resource Strain: Low Risk  (11/21/2024)    Overall Financial Resource Strain (CARDIA)     Difficulty of Paying Living Expenses: Not hard at all     Housing Stability: Unknown (11/21/2024)    Housing Stability Vital Sign     Unable to Pay for Housing in the Last Year: Patient unable to answer     Number of Times Moved in the Last Year: Not on file     Homeless in the Last Year: No       Did you provide resources if patient requested them? None Requested      Health Maintenance Due   Topic Date Due    Shingles vaccine (1 of 2) Never done    Respiratory Syncytial Virus (RSV) Pregnant or age 60 yrs+ (1 - Risk 60-74 years 1-dose series) Never done    Diabetic retinal exam  03/10/2021    Diabetic Alb to Cr ratio (uACR) test  07/01/2022    Breast cancer screen  07/15/2022    Colorectal Cancer Screen  03/26/2024    Diabetic foot exam  05/31/2024    Lipids  05/31/2024    Flu vaccine (1) 08/01/2024    COVID-19 Vaccine (6 - 2023-24 season) 09/01/2024   .      \"Have you been to the ER, urgent care clinic since your last visit?  Hospitalized since your last visit?\"    YES - When: approximately 10 days ago.  Where and Why: [facility], Acute PE.    “Have you seen or consulted any other health care providers outside our system since your last visit?”    NO    Have

## 2024-11-21 NOTE — PATIENT INSTRUCTIONS
[facility]  [place] ([facility])  [address], [address]  [facility] [address]  [phone] phone  [phone] fax or ** new pt fax for all offices**    [facility] ([place] provider)  Dr. Dewitt   [address], [address]  [facility] [address]  [phone] phone  [phone] fax   Advance Care Planning     Advance Care Planning opens a door to talk about and write down your wishes before a sudden accident or illness.  Make your goals, values, and preferences known.     This puts you in the ’s seat and helps others know what matters most to you so they won’t have to guess.      Where can you learn more?    Go to [email or web address]   to learn how to:    Name someone you trust to make healthcare decisions for you, only if you can’t. (Healthcare Power of )    Document your wishes for care if you were seriously ill and not expected to recover or are approaching end of life. (Advance Directive or Living Will)    The same page can be found using the QR code below.

## 2024-11-22 NOTE — ASSESSMENT & PLAN NOTE
Stable   Continue Eliquis 5 mg BID  Refer to pulmonary for further evaluation  Referral to hematology/oncology already placed  Long discussion about importance of making/keeping speciality appts  Pt's grandson verbalized understanding and agreeable to plan

## 2024-11-23 DIAGNOSIS — E11.65 TYPE 2 DIABETES MELLITUS WITH HYPERGLYCEMIA, WITHOUT LONG-TERM CURRENT USE OF INSULIN (HCC): ICD-10-CM

## 2024-11-25 NOTE — TELEPHONE ENCOUNTER
Last seen 11/21/2024   Last labs 3/31/2024  Last filled  8/21/2024  Next appointment 1/2/2025     Lab Results   Component Value Date     03/31/2024    K 3.9 03/31/2024     (H) 03/31/2024    CO2 24 03/31/2024    BUN 10 03/31/2024    CREATININE 0.76 03/31/2024    GLUCOSE 116 (H) 03/31/2024    CALCIUM 7.8 (L) 03/31/2024    BILITOT 0.7 03/26/2024    ALKPHOS 55 03/26/2024    AST 19 03/26/2024    ALT 12 (L) 03/26/2024    LABGLOM 84 03/31/2024    GFRAA 39 (L) 02/25/2022    AGRATIO 0.6 (L) 02/25/2022    GLOB 4.0 03/26/2024

## 2024-11-27 RX ORDER — DAPAGLIFLOZIN 10 MG/1
10 TABLET, FILM COATED ORAL DAILY
Qty: 90 TABLET | Refills: 0 | Status: SHIPPED | OUTPATIENT
Start: 2024-11-27 | End: 2025-02-25

## 2024-12-05 ENCOUNTER — TELEPHONE (OUTPATIENT)
Facility: CLINIC | Age: 74
End: 2024-12-05

## 2024-12-05 NOTE — TELEPHONE ENCOUNTER
Pt home health nurse by the name of Kaylee  BP 90/40 heart rate 91 temp 99 oral oxy 98%. Kaylee asked if she can have a call at [phone]

## 2024-12-05 NOTE — TELEPHONE ENCOUNTER
Called Home Health Nurse Kaylee. She says patient is alert. BP was checked twice manually and it was recorded that it was 90/40. Patient is not taking in much fluids, patient is refusing fluid intake. Drank a little bit of water. Confirmed patient is taking Atorvastatin, Plavix, Senna, Farxiga, Isosorbide, lacosamide, losartan, and omeprazole. Updated in chart. Per Bandar caregiver should push fluids, hold BP medications if SBP is 100 mmHG or less this includes losartan and imdur. Patient should reports to ED if any changes in LOC. While on the phone with the caregiver patient mentioned to her daughter she was not feeling well. I advised patient to go to the ED. Patient's caregiver understood.

## 2024-12-14 DIAGNOSIS — G40.909 SEIZURE DISORDER (HCC): ICD-10-CM

## 2024-12-17 RX ORDER — LACOSAMIDE 200 MG/1
TABLET ORAL
Qty: 180 TABLET | OUTPATIENT
Start: 2024-12-17

## 2025-01-21 DIAGNOSIS — I11.0 HYPERTENSIVE HEART DISEASE WITH CONGESTIVE HEART FAILURE, UNSPECIFIED HEART FAILURE TYPE (HCC): ICD-10-CM

## 2025-01-21 NOTE — TELEPHONE ENCOUNTER
Last seen 11/21/2024   Last labs 3/31/2024  Last filled  6/18/2024  Next appointment 1/24/2025     Lab Results   Component Value Date     03/31/2024    K 3.9 03/31/2024     (H) 03/31/2024    CO2 24 03/31/2024    BUN 10 03/31/2024    CREATININE 0.76 03/31/2024    GLUCOSE 116 (H) 03/31/2024    CALCIUM 7.8 (L) 03/31/2024    BILITOT 0.7 03/26/2024    ALKPHOS 55 03/26/2024    AST 19 03/26/2024    ALT 12 (L) 03/26/2024    LABGLOM 84 03/31/2024    GFRAA 39 (L) 02/25/2022    AGRATIO 0.6 (L) 02/25/2022    GLOB 4.0 03/26/2024

## 2025-01-22 RX ORDER — ISOSORBIDE MONONITRATE 30 MG/1
30 TABLET, EXTENDED RELEASE ORAL DAILY
Qty: 90 TABLET | Refills: 1 | Status: SHIPPED | OUTPATIENT
Start: 2025-01-22

## 2025-01-30 ENCOUNTER — OFFICE VISIT (OUTPATIENT)
Age: 75
End: 2025-01-30
Payer: MEDICARE

## 2025-01-30 VITALS
DIASTOLIC BLOOD PRESSURE: 68 MMHG | HEIGHT: 66 IN | BODY MASS INDEX: 25.66 KG/M2 | HEART RATE: 109 BPM | OXYGEN SATURATION: 99 % | SYSTOLIC BLOOD PRESSURE: 128 MMHG

## 2025-01-30 DIAGNOSIS — E11.65 TYPE 2 DIABETES MELLITUS WITH HYPERGLYCEMIA, WITHOUT LONG-TERM CURRENT USE OF INSULIN (HCC): ICD-10-CM

## 2025-01-30 DIAGNOSIS — I10 PRIMARY HYPERTENSION: ICD-10-CM

## 2025-01-30 DIAGNOSIS — I67.9 CEREBROVASCULAR DISEASE: ICD-10-CM

## 2025-01-30 DIAGNOSIS — I11.0 HYPERTENSIVE HEART DISEASE WITH CONGESTIVE HEART FAILURE, UNSPECIFIED HEART FAILURE TYPE (HCC): ICD-10-CM

## 2025-01-30 DIAGNOSIS — E11.69 HYPERLIPIDEMIA ASSOCIATED WITH TYPE 2 DIABETES MELLITUS (HCC): ICD-10-CM

## 2025-01-30 DIAGNOSIS — E78.5 HYPERLIPIDEMIA ASSOCIATED WITH TYPE 2 DIABETES MELLITUS (HCC): ICD-10-CM

## 2025-01-30 DIAGNOSIS — Z86.73 HISTORY OF CVA (CEREBROVASCULAR ACCIDENT): ICD-10-CM

## 2025-01-30 DIAGNOSIS — I35.0 SEVERE AORTIC VALVE STENOSIS: Primary | ICD-10-CM

## 2025-01-30 DIAGNOSIS — D64.9 ANEMIA, UNSPECIFIED TYPE: ICD-10-CM

## 2025-01-30 DIAGNOSIS — I26.99 RECURRENT PULMONARY EMBOLI (HCC): ICD-10-CM

## 2025-01-30 PROBLEM — I51.9 MILD DIASTOLIC DYSFUNCTION: Status: ACTIVE | Noted: 2025-01-30

## 2025-01-30 PROBLEM — I82.509 CHRONIC DEEP VEIN THROMBOSIS (DVT) OF LOWER EXTREMITY (HCC): Status: ACTIVE | Noted: 2025-01-30

## 2025-01-30 PROBLEM — R09.A2 GLOBUS SENSATION: Status: ACTIVE | Noted: 2025-01-17

## 2025-01-30 PROBLEM — D64.89 OTHER SPECIFIED ANEMIAS: Status: ACTIVE | Noted: 2025-01-30

## 2025-01-30 PROCEDURE — 93000 ELECTROCARDIOGRAM COMPLETE: CPT | Performed by: INTERNAL MEDICINE

## 2025-01-30 PROCEDURE — 1160F RVW MEDS BY RX/DR IN RCRD: CPT | Performed by: INTERNAL MEDICINE

## 2025-01-30 PROCEDURE — 3078F DIAST BP <80 MM HG: CPT | Performed by: INTERNAL MEDICINE

## 2025-01-30 PROCEDURE — 3074F SYST BP LT 130 MM HG: CPT | Performed by: INTERNAL MEDICINE

## 2025-01-30 PROCEDURE — 1159F MED LIST DOCD IN RCRD: CPT | Performed by: INTERNAL MEDICINE

## 2025-01-30 PROCEDURE — 1123F ACP DISCUSS/DSCN MKR DOCD: CPT | Performed by: INTERNAL MEDICINE

## 2025-01-30 PROCEDURE — 1126F AMNT PAIN NOTED NONE PRSNT: CPT | Performed by: INTERNAL MEDICINE

## 2025-01-30 PROCEDURE — 99214 OFFICE O/P EST MOD 30 MIN: CPT | Performed by: INTERNAL MEDICINE

## 2025-01-30 RX ORDER — ISOSORBIDE MONONITRATE 30 MG/1
30 TABLET, EXTENDED RELEASE ORAL DAILY
Qty: 90 TABLET | Refills: 3 | Status: SHIPPED | OUTPATIENT
Start: 2025-01-30

## 2025-01-30 RX ORDER — DAPAGLIFLOZIN 10 MG/1
10 TABLET, FILM COATED ORAL DAILY
Qty: 90 TABLET | Refills: 3 | Status: SHIPPED | OUTPATIENT
Start: 2025-01-30

## 2025-01-30 RX ORDER — LOSARTAN POTASSIUM 50 MG/1
50 TABLET ORAL DAILY
Qty: 90 TABLET | Refills: 3 | Status: SHIPPED | OUTPATIENT
Start: 2025-01-30

## 2025-01-30 RX ORDER — ATORVASTATIN CALCIUM 40 MG/1
TABLET, FILM COATED ORAL
Qty: 90 TABLET | Refills: 3 | Status: SHIPPED | OUTPATIENT
Start: 2025-01-30

## 2025-01-30 RX ORDER — CLOPIDOGREL BISULFATE 75 MG/1
75 TABLET ORAL DAILY
Qty: 90 TABLET | Refills: 3 | Status: SHIPPED | OUTPATIENT
Start: 2025-01-30

## 2025-01-30 ASSESSMENT — ANXIETY QUESTIONNAIRES
3. WORRYING TOO MUCH ABOUT DIFFERENT THINGS: NOT AT ALL
5. BEING SO RESTLESS THAT IT IS HARD TO SIT STILL: NOT AT ALL
6. BECOMING EASILY ANNOYED OR IRRITABLE: NOT AT ALL
2. NOT BEING ABLE TO STOP OR CONTROL WORRYING: NOT AT ALL
GAD7 TOTAL SCORE: 0
4. TROUBLE RELAXING: NOT AT ALL
1. FEELING NERVOUS, ANXIOUS, OR ON EDGE: NOT AT ALL
7. FEELING AFRAID AS IF SOMETHING AWFUL MIGHT HAPPEN: NOT AT ALL

## 2025-01-30 ASSESSMENT — ENCOUNTER SYMPTOMS
ABDOMINAL PAIN: 0
ABDOMINAL DISTENTION: 0
SORE THROAT: 0
NAUSEA: 0
VOMITING: 0
COUGH: 0
SHORTNESS OF BREATH: 0

## 2025-01-30 ASSESSMENT — PATIENT HEALTH QUESTIONNAIRE - PHQ9
SUM OF ALL RESPONSES TO PHQ9 QUESTIONS 1 & 2: 0
2. FEELING DOWN, DEPRESSED OR HOPELESS: NOT AT ALL
SUM OF ALL RESPONSES TO PHQ QUESTIONS 1-9: 0
SUM OF ALL RESPONSES TO PHQ QUESTIONS 1-9: 0
1. LITTLE INTEREST OR PLEASURE IN DOING THINGS: NOT AT ALL
SUM OF ALL RESPONSES TO PHQ QUESTIONS 1-9: 0
SUM OF ALL RESPONSES TO PHQ QUESTIONS 1-9: 0

## 2025-01-30 NOTE — PROGRESS NOTES
01/30/25     Ivonne Taylor  is a 74 y.o. female     Chief Complaint   Patient presents with    Follow-up     6 month       HPI    Patient presents for a follow-up office visit.   She has a history of aortic valve stenosis which was last in the moderate range by an echocardiogram in June 2022.  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  She has known significant cerebrovascular disease which was recently evaluated by CTA of the head and neck vessels in June 2024 when she presented with another TIA.  She has not had a recent acute CVA.    She has known hypertension, dyslipidemia, remote history of breast cancer and also a remote history of a pulmonary embolus in 2019 managed with oral anticoagulation with Eliquis ever since.  She has never had documented atrial fibrillation on prior EKGs or heart monitors.  She also has a history of chronic anemia with hemoglobins typically between 8-9.  She was also admitted for a GI bleed in March 2024.    She is now confined to a wheelchair.  The patient was last seen in our office 6 months ago.  At last visit, I stopped her Eliquis since she had not had any thromboembolic event in many years.  Unfortunately, she suffered a small recurrent PE in November 2024 and was started back on her Eliquis at 5 mg twice daily.  She also underwent a repeat echocardiogram during that hospital stay which showed worsening aortic stenosis, now in the severe range with a peak gradient 4.6 m/s, calculated MANDEEP 0.8 cm².    She was found to have a small to medium sized pericardial fusion on a CT scan in January 2025, so underwent a repeat limited echocardiogram at that time which only showed a small pericardial effusion, still with significant aortic stenosis.    The patient was last seen in our office 6 months ago.  She is scheduled to have a initial visit with structural cardiology in 2 weeks.  I suspect she will be worked up for a TAVR

## 2025-01-30 NOTE — PROGRESS NOTES
Ivonne Claudia presents today for   Chief Complaint   Patient presents with    Follow-up     6 month       Ivonne Levinmartin preferred language for health care discussion is english/other.    Is someone accompanying this pt? yes    Is the patient using any DME equipment during OV? wheelchair    Depression Screening:  Depression: Not at risk (1/30/2025)    PHQ-2     PHQ-2 Score: 0        Learning Assessment:  Who is the primary learner? Patient    What is the preferred language for health care of the primary learner? ENGLISH    How does the primary learner prefer to learn new concepts? DEMONSTRATION    Answered By patient    Relationship to Learner SELF           Pt currently taking Anticoagulant therapy? Eliquis 5 mg bid    Pt currently taking Antiplatelet therapy ? no      Coordination of Care:  1. Have you been to the ER, urgent care clinic since your last visit? Hospitalized since your last visit? no    2. Have you seen or consulted any other health care providers outside of the [facility] since your last visit? Include any pap smears or colon screening. no

## (undated) DEVICE — SOLUTION IRRIG 1000ML H2O STRL BLT

## (undated) DEVICE — UNDERPAD INCONT W23XL36IN STD BLU POLYPR BK FLUF SFT

## (undated) DEVICE — FORCEPS BX L240CM JAW DIA2.4MM ORNG L CAP W/ NDL DISP RAD

## (undated) DEVICE — SYRINGE MED 25GA 3ML L5/8IN SUBQ PLAS W/ DETACH NDL SFTY

## (undated) DEVICE — MEDI-VAC NON-CONDUCTIVE SUCTION TUBING: Brand: CARDINAL HEALTH

## (undated) DEVICE — YANKAUER,SMOOTH HANDLE,HIGH CAPACITY: Brand: MEDLINE INDUSTRIES, INC.

## (undated) DEVICE — BASIN EMSIS 16OZ GRAPHITE PLAS KID SHP MOLD GRAD FOR ORAL

## (undated) DEVICE — CANNULA NSL AD TBNG L14FT STD PVC O2 CRV CONN NONFLARED NSL

## (undated) DEVICE — CATHETER SUCT TR FL TIP 14FR W/ O CTRL

## (undated) DEVICE — STERILE POLYISOPRENE POWDER-FREE SURGICAL GLOVES: Brand: PROTEXIS

## (undated) DEVICE — BITE BLOCK ENDOSCP UNIV AD 6 TO 9.4 MM

## (undated) DEVICE — SYRINGE MED 50ML LUERSLIP TIP

## (undated) DEVICE — LINER SUCT CANSTR 3000CC PLAS SFT PRE ASSEMB W/OUT TBNG W/

## (undated) DEVICE — ENDOSCOPY PUMP TUBING/ CAP SET: Brand: ERBE

## (undated) DEVICE — GAUZE,SPONGE,4"X4",16PLY,STRL,LF,10/TRAY: Brand: MEDLINE